# Patient Record
Sex: FEMALE | Race: BLACK OR AFRICAN AMERICAN | Employment: OTHER | URBAN - METROPOLITAN AREA
[De-identification: names, ages, dates, MRNs, and addresses within clinical notes are randomized per-mention and may not be internally consistent; named-entity substitution may affect disease eponyms.]

---

## 2018-06-07 ENCOUNTER — APPOINTMENT (OUTPATIENT)
Dept: CT IMAGING | Age: 68
DRG: 064 | End: 2018-06-07
Attending: EMERGENCY MEDICINE
Payer: MEDICARE

## 2018-06-07 ENCOUNTER — APPOINTMENT (OUTPATIENT)
Dept: GENERAL RADIOLOGY | Age: 68
DRG: 064 | End: 2018-06-07
Attending: EMERGENCY MEDICINE
Payer: MEDICARE

## 2018-06-07 ENCOUNTER — HOSPITAL ENCOUNTER (INPATIENT)
Age: 68
LOS: 4 days | Discharge: HOME | DRG: 064 | End: 2018-06-11
Attending: EMERGENCY MEDICINE | Admitting: INTERNAL MEDICINE
Payer: MEDICARE

## 2018-06-07 DIAGNOSIS — R73.9 HYPERGLYCEMIA: ICD-10-CM

## 2018-06-07 DIAGNOSIS — R56.9 SEIZURE (HCC): ICD-10-CM

## 2018-06-07 DIAGNOSIS — D72.829 LEUKOCYTOSIS, UNSPECIFIED TYPE: ICD-10-CM

## 2018-06-07 DIAGNOSIS — E87.20 METABOLIC ACIDOSIS: ICD-10-CM

## 2018-06-07 DIAGNOSIS — G40.209 PARTIAL SYMPTOMATIC EPILEPSY WITH COMPLEX PARTIAL SEIZURES, NOT INTRACTABLE, WITHOUT STATUS EPILEPTICUS (HCC): ICD-10-CM

## 2018-06-07 DIAGNOSIS — R41.82 ALTERED MENTAL STATUS, UNSPECIFIED ALTERED MENTAL STATUS TYPE: Primary | ICD-10-CM

## 2018-06-07 DIAGNOSIS — G93.40 ACUTE ENCEPHALOPATHY: ICD-10-CM

## 2018-06-07 PROBLEM — T17.908A ASPIRATION INTO AIRWAY: Status: ACTIVE | Noted: 2018-06-07

## 2018-06-07 PROBLEM — N28.9 RENAL INSUFFICIENCY: Status: ACTIVE | Noted: 2018-06-07

## 2018-06-07 PROBLEM — E11.9 DM (DIABETES MELLITUS) (HCC): Status: ACTIVE | Noted: 2018-06-07

## 2018-06-07 LAB
ANION GAP SERPL CALC-SCNC: 23 MMOL/L (ref 5–15)
ARTERIAL PATENCY WRIST A: YES
BASE DEFICIT BLDA-SCNC: 7.6 MMOL/L
BASOPHILS # BLD: 0.1 K/UL (ref 0–0.1)
BASOPHILS NFR BLD: 1 % (ref 0–1)
BDY SITE: ABNORMAL
BUN SERPL-MCNC: 6 MG/DL (ref 6–20)
BUN/CREAT SERPL: 5 (ref 12–20)
CALCIUM SERPL-MCNC: 10 MG/DL (ref 8.5–10.1)
CHLORIDE SERPL-SCNC: 95 MMOL/L (ref 97–108)
CO2 SERPL-SCNC: 20 MMOL/L (ref 21–32)
CREAT SERPL-MCNC: 1.2 MG/DL (ref 0.55–1.02)
DIFFERENTIAL METHOD BLD: ABNORMAL
EOSINOPHIL # BLD: 0.1 K/UL (ref 0–0.4)
EOSINOPHIL NFR BLD: 0 % (ref 0–7)
EPAP/CPAP/PEEP, PAPEEP: 5
ERYTHROCYTE [DISTWIDTH] IN BLOOD BY AUTOMATED COUNT: 13 % (ref 11.5–14.5)
FIO2 ON VENT: 100 %
GAS FLOW.O2 SETTING OXYMISER: 14 L/MIN
GLUCOSE SERPL-MCNC: 412 MG/DL (ref 65–100)
HCO3 BLDA-SCNC: 20 MMOL/L (ref 22–26)
HCT VFR BLD AUTO: 51.7 % (ref 35–47)
HGB BLD-MCNC: 16.5 G/DL (ref 11.5–16)
IMM GRANULOCYTES # BLD: 0.1 K/UL (ref 0–0.04)
IMM GRANULOCYTES NFR BLD AUTO: 1 % (ref 0–0.5)
LYMPHOCYTES # BLD: 2.8 K/UL (ref 0.8–3.5)
LYMPHOCYTES NFR BLD: 15 % (ref 12–49)
MCH RBC QN AUTO: 33.2 PG (ref 26–34)
MCHC RBC AUTO-ENTMCNC: 31.9 G/DL (ref 30–36.5)
MCV RBC AUTO: 104 FL (ref 80–99)
MONOCYTES # BLD: 0.7 K/UL (ref 0–1)
MONOCYTES NFR BLD: 3 % (ref 5–13)
NEUTS SEG # BLD: 15.6 K/UL (ref 1.8–8)
NEUTS SEG NFR BLD: 80 % (ref 32–75)
NRBC # BLD: 0 K/UL (ref 0–0.01)
NRBC BLD-RTO: 0 PER 100 WBC
PCO2 BLDA: 46 MMHG (ref 35–45)
PH BLDA: 7.25 [PH] (ref 7.35–7.45)
PLATELET # BLD AUTO: 320 K/UL (ref 150–400)
PMV BLD AUTO: 10.5 FL (ref 8.9–12.9)
PO2 BLDA: 137 MMHG (ref 80–100)
POTASSIUM SERPL-SCNC: 3.9 MMOL/L (ref 3.5–5.1)
RBC # BLD AUTO: 4.97 M/UL (ref 3.8–5.2)
SAO2 % BLD: 98 % (ref 92–97)
SAO2% DEVICE SAO2% SENSOR NAME: ABNORMAL
SERVICE CMNT-IMP: ABNORMAL
SODIUM SERPL-SCNC: 138 MMOL/L (ref 136–145)
SPECIMEN SITE: ABNORMAL
TROPONIN I SERPL-MCNC: <0.04 NG/ML
UR CULT HOLD, URHOLD: NORMAL
VENTILATION MODE VENT: ABNORMAL
VT SETTING VENT: 400 ML
WBC # BLD AUTO: 19.3 K/UL (ref 3.6–11)

## 2018-06-07 PROCEDURE — 96374 THER/PROPH/DIAG INJ IV PUSH: CPT

## 2018-06-07 PROCEDURE — 82803 BLOOD GASES ANY COMBINATION: CPT | Performed by: EMERGENCY MEDICINE

## 2018-06-07 PROCEDURE — 0BH18EZ INSERTION OF ENDOTRACHEAL AIRWAY INTO TRACHEA, VIA NATURAL OR ARTIFICIAL OPENING ENDOSCOPIC: ICD-10-PCS | Performed by: EMERGENCY MEDICINE

## 2018-06-07 PROCEDURE — 74011636320 HC RX REV CODE- 636/320: Performed by: RADIOLOGY

## 2018-06-07 PROCEDURE — 74011250636 HC RX REV CODE- 250/636: Performed by: EMERGENCY MEDICINE

## 2018-06-07 PROCEDURE — 74011000250 HC RX REV CODE- 250: Performed by: EMERGENCY MEDICINE

## 2018-06-07 PROCEDURE — 94762 N-INVAS EAR/PLS OXIMTRY CONT: CPT

## 2018-06-07 PROCEDURE — 80076 HEPATIC FUNCTION PANEL: CPT | Performed by: INTERNAL MEDICINE

## 2018-06-07 PROCEDURE — 96368 THER/DIAG CONCURRENT INF: CPT

## 2018-06-07 PROCEDURE — 96375 TX/PRO/DX INJ NEW DRUG ADDON: CPT

## 2018-06-07 PROCEDURE — 51702 INSERT TEMP BLADDER CATH: CPT

## 2018-06-07 PROCEDURE — 85730 THROMBOPLASTIN TIME PARTIAL: CPT | Performed by: EMERGENCY MEDICINE

## 2018-06-07 PROCEDURE — 70498 CT ANGIOGRAPHY NECK: CPT

## 2018-06-07 PROCEDURE — 5A1935Z RESPIRATORY VENTILATION, LESS THAN 24 CONSECUTIVE HOURS: ICD-10-PCS | Performed by: EMERGENCY MEDICINE

## 2018-06-07 PROCEDURE — 70450 CT HEAD/BRAIN W/O DYE: CPT

## 2018-06-07 PROCEDURE — 85610 PROTHROMBIN TIME: CPT | Performed by: EMERGENCY MEDICINE

## 2018-06-07 PROCEDURE — 85025 COMPLETE CBC W/AUTO DIFF WBC: CPT | Performed by: EMERGENCY MEDICINE

## 2018-06-07 PROCEDURE — 99285 EMERGENCY DEPT VISIT HI MDM: CPT

## 2018-06-07 PROCEDURE — 93005 ELECTROCARDIOGRAM TRACING: CPT

## 2018-06-07 PROCEDURE — 83036 HEMOGLOBIN GLYCOSYLATED A1C: CPT | Performed by: INTERNAL MEDICINE

## 2018-06-07 PROCEDURE — 81001 URINALYSIS AUTO W/SCOPE: CPT | Performed by: EMERGENCY MEDICINE

## 2018-06-07 PROCEDURE — 94002 VENT MGMT INPAT INIT DAY: CPT

## 2018-06-07 PROCEDURE — 36600 WITHDRAWAL OF ARTERIAL BLOOD: CPT | Performed by: EMERGENCY MEDICINE

## 2018-06-07 PROCEDURE — 96365 THER/PROPH/DIAG IV INF INIT: CPT

## 2018-06-07 PROCEDURE — 99291 CRITICAL CARE FIRST HOUR: CPT

## 2018-06-07 PROCEDURE — 74011636320 HC RX REV CODE- 636/320: Performed by: EMERGENCY MEDICINE

## 2018-06-07 PROCEDURE — 82550 ASSAY OF CK (CPK): CPT | Performed by: EMERGENCY MEDICINE

## 2018-06-07 PROCEDURE — 65610000006 HC RM INTENSIVE CARE

## 2018-06-07 PROCEDURE — 36415 COLL VENOUS BLD VENIPUNCTURE: CPT | Performed by: EMERGENCY MEDICINE

## 2018-06-07 PROCEDURE — 77030034850

## 2018-06-07 PROCEDURE — 71045 X-RAY EXAM CHEST 1 VIEW: CPT

## 2018-06-07 PROCEDURE — 80048 BASIC METABOLIC PNL TOTAL CA: CPT | Performed by: EMERGENCY MEDICINE

## 2018-06-07 PROCEDURE — 74011000258 HC RX REV CODE- 258: Performed by: EMERGENCY MEDICINE

## 2018-06-07 PROCEDURE — 36415 COLL VENOUS BLD VENIPUNCTURE: CPT | Performed by: INTERNAL MEDICINE

## 2018-06-07 PROCEDURE — 84484 ASSAY OF TROPONIN QUANT: CPT | Performed by: EMERGENCY MEDICINE

## 2018-06-07 RX ORDER — ETOMIDATE 2 MG/ML
20 INJECTION INTRAVENOUS
Status: COMPLETED | OUTPATIENT
Start: 2018-06-07 | End: 2018-06-07

## 2018-06-07 RX ORDER — SODIUM CHLORIDE 0.9 % (FLUSH) 0.9 %
5-10 SYRINGE (ML) INJECTION AS NEEDED
Status: DISCONTINUED | OUTPATIENT
Start: 2018-06-07 | End: 2018-06-11 | Stop reason: HOSPADM

## 2018-06-07 RX ORDER — LORAZEPAM 2 MG/ML
INJECTION INTRAMUSCULAR
Status: DISPENSED
Start: 2018-06-07 | End: 2018-06-08

## 2018-06-07 RX ORDER — LORAZEPAM 2 MG/ML
1 INJECTION INTRAMUSCULAR
Status: ACTIVE | OUTPATIENT
Start: 2018-06-07 | End: 2018-06-08

## 2018-06-07 RX ORDER — SODIUM CHLORIDE 0.9 % (FLUSH) 0.9 %
5-10 SYRINGE (ML) INJECTION EVERY 8 HOURS
Status: DISCONTINUED | OUTPATIENT
Start: 2018-06-07 | End: 2018-06-11 | Stop reason: HOSPADM

## 2018-06-07 RX ORDER — LORAZEPAM 2 MG/ML
1 INJECTION INTRAMUSCULAR
Status: COMPLETED | OUTPATIENT
Start: 2018-06-07 | End: 2018-06-07

## 2018-06-07 RX ORDER — SUCCINYLCHOLINE CHLORIDE 20 MG/ML
200 INJECTION INTRAMUSCULAR; INTRAVENOUS
Status: COMPLETED | OUTPATIENT
Start: 2018-06-07 | End: 2018-06-07

## 2018-06-07 RX ORDER — PROPOFOL 10 MG/ML
0-50 VIAL (ML) INTRAVENOUS
Status: COMPLETED | OUTPATIENT
Start: 2018-06-07 | End: 2018-06-07

## 2018-06-07 RX ORDER — FENTANYL CITRATE 50 UG/ML
100 INJECTION, SOLUTION INTRAMUSCULAR; INTRAVENOUS
Status: COMPLETED | OUTPATIENT
Start: 2018-06-07 | End: 2018-06-07

## 2018-06-07 RX ADMIN — IOPAMIDOL 95 ML: 755 INJECTION, SOLUTION INTRAVENOUS at 22:33

## 2018-06-07 RX ADMIN — FENTANYL CITRATE 100 MCG: 50 INJECTION, SOLUTION INTRAMUSCULAR; INTRAVENOUS at 23:45

## 2018-06-07 RX ADMIN — IOPAMIDOL 95 ML: 755 INJECTION, SOLUTION INTRAVENOUS at 22:56

## 2018-06-07 RX ADMIN — ETOMIDATE 20 MG: 2 INJECTION, SOLUTION INTRAVENOUS at 22:05

## 2018-06-07 RX ADMIN — SUCCINYLCHOLINE CHLORIDE 200 MG: 20 INJECTION, SOLUTION INTRAMUSCULAR; INTRAVENOUS at 22:06

## 2018-06-07 RX ADMIN — LEVETIRACETAM 1000 MG: 100 INJECTION, SOLUTION, CONCENTRATE INTRAVENOUS at 23:19

## 2018-06-07 RX ADMIN — PROPOFOL 30 MCG/KG/MIN: 10 INJECTION, EMULSION INTRAVENOUS at 22:15

## 2018-06-07 RX ADMIN — LORAZEPAM 1 MG: 2 INJECTION INTRAMUSCULAR; INTRAVENOUS at 21:49

## 2018-06-07 NOTE — Clinical Note
Status[de-identified] Inpatient [101] Type of Bed: Intensive Care [6] Inpatient Hospitalization Certified Necessary for the Following Reasons: 4. Patient requires ICU level of care interventions (further clarification in H&P documentation) Admitting Diagnosis: Encephalopathy acute [875887] Admitting Physician: Quinten Golden Attending Physician: Quinten Golden Estimated Length of Stay: 3-4 Midnights Discharge Plan[de-identified] Home with Office Follow-up

## 2018-06-07 NOTE — IP AVS SNAPSHOT
[address] [place] [address] 
[phone] Patient: Kevyn Gallardo MRN: [id number] MAR:95/34/9019 About your hospitalization You were admitted on:  June 7, 2018 You last received care in the:  [facility] 5M1 MED SURG 1 You were discharged on:  June 11, 2018 Why you were hospitalized Your primary diagnosis was:  Not on File Your diagnoses also included:  Encephalopathy Acute, Seizure (Hcc), Dm (Diabetes Mellitus) (Hcc), Leukocytosis, Aspiration Into Airway, Renal Insufficiency Follow-up Information Follow up With Details Comments Contact Info French Varela MD   [address] [facility] 
[phone] Discharge Orders None A check hipolito indicates which time of day the medication should be taken. My Medications START taking these medications Instructions Each Dose to Equal  
 Morning Noon Evening Bedtime  
 atorvastatin 20 mg tablet Commonly known as:  LIPITOR Your last dose was: Your next dose is: Take 0.5 Tabs by mouth daily. 10 mg  
    
   
   
   
  
 levETIRAcetam 500 mg tablet Commonly known as:  KEPPRA Your last dose was: Your next dose is: Take 1 Tab by mouth two (2) times a day. 500 mg  
    
   
   
   
  
 lisinopril 2.5 mg tablet Commonly known as:  Lupillo Little Your last dose was: Your next dose is: Take 1 Tab by mouth daily. 2.5 mg SITagliptin 50 mg tablet Commonly known as:  Del Couch Your last dose was: Your next dose is: Take 1 Tab by mouth daily. 50 mg CHANGE how you take these medications Instructions Each Dose to Equal  
 Morning Noon Evening Bedtime  
 aspirin delayed-release 81 mg tablet What changed:  additional instructions Your last dose was: Your next dose is: Take 1 Tab by mouth daily. Restart 18  
 81 mg CONTINUE taking these medications Instructions Each Dose to Equal  
 Morning Noon Evening Bedtime  
 amLODIPine 10 mg tablet Commonly known as:  Haig [place] Your last dose was: Your next dose is: Take 10 mg by mouth daily. 10 mg  
    
   
   
   
  
 busPIRone 10 mg tablet Commonly known as:  BUSPAR Your last dose was: Your next dose is: Take 10 mg by mouth two (2) times a day. 10 mg MOBIC 15 mg tablet Generic drug:  meloxicam  
   
Your last dose was: Your next dose is: Take 15 mg by mouth daily as needed for Pain (back pain). 15 mg  
    
   
   
   
  
 PriLOSEC 20 mg capsule Generic drug:  omeprazole Your last dose was: Your next dose is: Take 20 mg by mouth daily as needed (GERD). 20 mg  
    
   
   
   
  
 sertraline 100 mg tablet Commonly known as:  ZOLOFT Your last dose was: Your next dose is: Take 100 mg by mouth daily. 100 mg Where to Get Your Medications Information on where to get these meds will be given to you by the nurse or doctor. ! Ask your nurse or doctor about these medications  
  aspirin delayed-release 81 mg tablet  
 atorvastatin 20 mg tablet  
 levETIRAcetam 500 mg tablet  
 lisinopril 2.5 mg tablet SITagliptin 50 mg tablet Discharge Instructions Patient Discharge Instructions Nikki Gee / [id number] : 1950 Admitted 2018 Discharged: 2018 4:49 PM  
 
ACUTE DIAGNOSES: 
Encephalopathy acute Encephalopathy acute Coffee ground emesis CVA 
DM out of control CHRONIC MEDICAL DIAGNOSES: 
Problem List as of 2018  Date Reviewed: 2018 Codes Class Noted - Resolved Encephalopathy acute ICD-10-CM: G93.40 ICD-9-CM: 348.30  6/7/2018 - Present Seizure ([facility] [address] ICD-10-CM: R56.9 ICD-9-CM: 780.39  6/7/2018 - Present DM (diabetes mellitus) ([facility] ICD-10-CM: E11.9 ICD-9-CM: 250.00  6/7/2018 - Present Leukocytosis ICD-10-CM: F68.034 ICD-9-CM: 288.60  6/7/2018 - Present Aspiration into airway ICD-10-CM: T17.908A ICD-9-CM: 934.9  6/7/2018 - Present Renal insufficiency ICD-10-CM: N28.9 ICD-9-CM: 593.9  6/7/2018 - Present DISCHARGE MEDICATIONS:  
 
 
 
· It is important that you take the medication exactly as they are prescribed. · Keep your medication in the bottles provided by the pharmacist and keep a list of the medication names, dosages, and times to be taken in your wallet. · Do not take other medications without consulting your doctor. DIET:  Diabetic Diet ACTIVITY: Activity as tolerated ADDITIONAL INFORMATION: If you experience any of the following symptoms then please call your primary care physician or return to the emergency room if you cannot get hold of your doctor: Fever, chills, nausea, vomiting, diarrhea, change in mentation, falling, bleeding, shortness of breath. FOLLOW UP CARE: 
Dr. Jacinto Miranda MD  you are to call and set up an appointment to see them with in 1 week. Follow-up with specialists at directed by them Information obtained by : 
I understand that if any problems occur once I am at home I am to contact my physician. I understand and acknowledge receipt of the instructions indicated above. Physician's or R.N.'s Signature                                                                  Date/Time Patient or Representative Signature                                                          Date/Time CultureMap Announcement We are excited to announce that we are making your provider's discharge notes available to you in CultureMap. You will see these notes when they are completed and signed by the physician that discharged you from your recent hospital stay. If you have any questions or concerns about any information you see in CultureMap, please call the Health Information Department where you were seen or reach out to your Primary Care Provider for more information about your plan of care. Introducing [facility]! [facility] introduces CultureMap patient portal. Now you can access parts of your medical record, email your doctor's office, and request medication refills online. 1. In your internet browser, go to [email or web address]/Health [email or web address] 2. Click on the First Time User? Click Here link in the Sign In box. You will see the New Member Sign Up page. 3. Enter your CultureMap Access Code exactly as it appears below. You will not need to use this code after youve completed the sign-up process. If you do not sign up before the expiration date, you must request a new code. · CultureMap Access Code: [email or web address] Expires: 9/5/2018 10:49 PM 
 
4. Enter the last four digits of your Social Security Number (xxxx) and Date of Birth (mm/dd/yyyy) as indicated and click Submit. You will be taken to the next sign-up page. 5. Create a CultureMap ID. This will be your CultureMap login ID and cannot be changed, so think of one that is secure and easy to remember. 6. Create a CultureMap password. You can change your password at any time. 7. Enter your Password Reset Question and Answer. This can be used at a later time if you forget your password. 8. Enter your e-mail address. You will receive e-mail notification when new information is available in [address] 9. Click Sign Up. You can now view and download portions of your medical record. 10. Click the Download Summary menu link to download a portable copy of your medical information. If you have questions, please visit the Frequently Asked Questions section of the Databoxt website. Remember, [employer]ooms[employer] is NOT to be used for urgent needs. For medical emergencies, dial 911. Now available from your iPhone and Android! Introducing Johnathan Nichols As a [facility] patient, I wanted to make you aware of our electronic visit tool called Johnathan Nichols. Cramster 24/7 allows you to connect within minutes with a medical provider 24 hours a day, seven days a week via a mobile device or tablet or logging into a secure website from your computer. You can access Johnathan [employer] MediaForgekalyanfin from anywhere in the [place]. A virtual visit might be right for you when you have a simple condition and feel like you just dont want to get out of bed, or cant get away from work for an appointment, when your regular [facility] provider is not available (evenings, weekends or holidays), or when youre out of town and need minor care. Electronic visits cost only $49 and if the [employer] provider determines a prescription is needed to treat your condition, one can be electronically transmitted to a nearby pharmacy*. Please take a moment to enroll today if you have not already done so. The enrollment process is free and takes just a few minutes. To enroll, please download the [employer] sonia to your tablet or phone, or visit [email or web address] to enroll on your computer. And, as an [address] [place] [address] patient with a Nextinit account, the results of your visits will be scanned into your electronic medical record and your primary care provider will be able to view the scanned results.    
We urge you to continue to see your regular [facility] provider for your ongoing medical care. And while your primary care provider may not be the one available when you seek a Johnathan Jdkamryn virtual visit, the peace of mind you get from getting a real diagnosis real time can be priceless. For more information on Johnathan Mileskamryn, view our Frequently Asked Questions (FAQs) at [email or web address]. Sincerely, 
 
Narinder Pruett MD 
Chief Medical Officer Harpreet Ryan *:  certain medications cannot be prescribed via Johnathan Jdkamryn Unresulted tests-please follow up with your PCP on these results Procedure/Test Authorizing Provider  AMMONIA Barrington Pedroza MD  
 BLOOD GAS, ARTERIAL Evita Ordaz MD  
 BLOOD GAS, ARTERIAL Amelia Lev, DO  
 CBC W/O DIFF Pipo Manjarrez MD  
 CBC W/O DIFF Barrington Pedroza MD  
 CBC WITH AUTOMATED DIFF Amelia Lev, DO  
 CBC WITH AUTOMATED DIFF Myesha Simmons MD  
 CBC WITH AUTOMATED DIFF Barrington Pedroza MD  
 CK W/ REFLX [address], DO  
 CT HEAD  [address], DO  
 CTA CODE NEURO HEAD AND NECK W [address], DO  
 [facility]URE, [facility] [facility] [place]  
 DRUG SCREEN, [address], DO  
 EKG, 12 LEAD, [address], DO  
 HEMOGLOBIN A1C WITH EAG Evita Ordaz MD  
 HEPATIC FUNCTION PANEL Evita Ordaz MD  
 [place] 1/2 SPECIFIC AB, Emilia Zhao MD  
 LIPID PANEL MD OSMEL Garcia MD  
 MAGNESIUM Myesha Simmons MD  
 MAGNESIUM MD OSMEL Koch MD  
 MAGNESIUM Barrington Pedroza MD  
 METABOLIC PANEL, BASIC Amelia Lev, DO  
 METABOLIC PANEL, BASIC Barrington Pedroza MD  
 METABOLIC PANEL, Maritza Garcia MD  
 METABOLIC PANEL, Jessica Lomax MD  
 METABOLIC PANEL, Jessica Lomax MD  
 METABOLIC PANEL, COMPREHENSIVE Barrington Pedroza MD  
 METABOLIC PANEL, COMPREHENSIVE Florencia Judge MD  
 MRI BRAIN W WO CONT Michael Gee MD  
 PROTHROMBIN TIME + INR Tramaine Perez, DO  
 PTT Tramaine Perez, DO  
 SAMPLES BEING HELD Tramaine Perez DO  
 TROPONIN I Tramaine Perez, DO  
 URINALYSIS [address] [facility], DO  
 URINE CULTURE HOLD SAMPLE Tramaine Perez, DO  
 XR ABD PORT  1 V Michael Gee MD  
 XR CHEST PORT Tramaine Perez, DO  
 XR CHEST PORT Michael Gee MD  
  
Providers Seen During Your Hospitalization Provider Specialty Primary office phone Tramaine Perez, [address] [facility] [phone] Florencia Judge MD [facility] [phone] Your Primary Care Physician (PCP) Primary Care Physician Office Phone Office Fax Nikhil Freitas [phone] You are allergic to the following No active allergies Recent Documentation Height Weight BMI Smoking Status 1.676 m 99.4 kg 35.37 kg/m2 Unknown If Ever Smoked Emergency Contacts Name Discharge Info Relation Home Work Mobile Kareen Lee DISCHARGE CAREGIVER [3] Child [2] [phone] Patient Belongings The following personal items are in your possession at time of discharge: 
  Dental Appliances: None  Visual Aid: Glasses Please provide this summary of care documentation to your next provider. Signatures-by signing, you are acknowledging that this After Visit Summary has been reviewed with you and you have received a copy. Patient Signature:  ____________________________________________________________ Date:  ____________________________________________________________  
  
Lauryn Moy Provider Signature:  ____________________________________________________________ Date:  ____________________________________________________________

## 2018-06-08 ENCOUNTER — APPOINTMENT (OUTPATIENT)
Dept: GENERAL RADIOLOGY | Age: 68
DRG: 064 | End: 2018-06-08
Attending: INTERNAL MEDICINE
Payer: MEDICARE

## 2018-06-08 ENCOUNTER — APPOINTMENT (OUTPATIENT)
Dept: MRI IMAGING | Age: 68
DRG: 064 | End: 2018-06-08
Attending: INTERNAL MEDICINE
Payer: MEDICARE

## 2018-06-08 LAB
ALBUMIN SERPL-MCNC: 2.9 G/DL (ref 3.5–5)
ALBUMIN SERPL-MCNC: 4.1 G/DL (ref 3.5–5)
ALBUMIN/GLOB SERPL: 0.6 {RATIO} (ref 1.1–2.2)
ALBUMIN/GLOB SERPL: 0.7 {RATIO} (ref 1.1–2.2)
ALP SERPL-CCNC: 132 U/L (ref 45–117)
ALP SERPL-CCNC: 80 U/L (ref 45–117)
ALT SERPL-CCNC: 35 U/L (ref 12–78)
ALT SERPL-CCNC: 48 U/L (ref 12–78)
AMPHET UR QL SCN: NEGATIVE
ANION GAP SERPL CALC-SCNC: 11 MMOL/L (ref 5–15)
APPEARANCE UR: CLEAR
APTT PPP: 24.5 SEC (ref 22.1–32)
ARTERIAL PATENCY WRIST A: YES
AST SERPL-CCNC: 52 U/L (ref 15–37)
AST SERPL-CCNC: 53 U/L (ref 15–37)
ATRIAL RATE: 109 BPM
BACTERIA URNS QL MICRO: NEGATIVE /HPF
BARBITURATES UR QL SCN: NEGATIVE
BASE EXCESS BLDA CALC-SCNC: 1.6 MMOL/L
BASOPHILS # BLD: 0 K/UL (ref 0–0.1)
BASOPHILS NFR BLD: 0 % (ref 0–1)
BDY SITE: ABNORMAL
BENZODIAZ UR QL: NEGATIVE
BILIRUB DIRECT SERPL-MCNC: 0.3 MG/DL (ref 0–0.2)
BILIRUB SERPL-MCNC: 0.6 MG/DL (ref 0.2–1)
BILIRUB SERPL-MCNC: 0.7 MG/DL (ref 0.2–1)
BILIRUB UR QL: NEGATIVE
BUN SERPL-MCNC: 7 MG/DL (ref 6–20)
BUN/CREAT SERPL: 6 (ref 12–20)
CALCIUM SERPL-MCNC: 8.1 MG/DL (ref 8.5–10.1)
CALCULATED P AXIS, ECG09: 44 DEGREES
CALCULATED R AXIS, ECG10: -16 DEGREES
CALCULATED T AXIS, ECG11: 76 DEGREES
CANNABINOIDS UR QL SCN: NEGATIVE
CHLORIDE SERPL-SCNC: 99 MMOL/L (ref 97–108)
CK SERPL-CCNC: 176 U/L (ref 26–192)
CO2 SERPL-SCNC: 27 MMOL/L (ref 21–32)
COCAINE UR QL SCN: NEGATIVE
COLOR UR: ABNORMAL
CREAT SERPL-MCNC: 1.15 MG/DL (ref 0.55–1.02)
DIAGNOSIS, 93000: NORMAL
DIFFERENTIAL METHOD BLD: ABNORMAL
DRUG SCRN COMMENT,DRGCM: NORMAL
EOSINOPHIL # BLD: 0 K/UL (ref 0–0.4)
EOSINOPHIL NFR BLD: 0 % (ref 0–7)
EPAP/CPAP/PEEP, PAPEEP: 5
EPITH CASTS URNS QL MICRO: ABNORMAL /LPF
ERYTHROCYTE [DISTWIDTH] IN BLOOD BY AUTOMATED COUNT: 12.9 % (ref 11.5–14.5)
EST. AVERAGE GLUCOSE BLD GHB EST-MCNC: 235 MG/DL
FIO2 ON VENT: 50 %
GAS FLOW.O2 SETTING OXYMISER: 16 L/MIN
GLOBULIN SER CALC-MCNC: 5.2 G/DL (ref 2–4)
GLOBULIN SER CALC-MCNC: 6.2 G/DL (ref 2–4)
GLUCOSE BLD STRIP.AUTO-MCNC: 205 MG/DL (ref 65–100)
GLUCOSE BLD STRIP.AUTO-MCNC: 217 MG/DL (ref 65–100)
GLUCOSE BLD STRIP.AUTO-MCNC: 224 MG/DL (ref 65–100)
GLUCOSE BLD STRIP.AUTO-MCNC: 232 MG/DL (ref 65–100)
GLUCOSE BLD STRIP.AUTO-MCNC: 346 MG/DL (ref 65–100)
GLUCOSE SERPL-MCNC: 212 MG/DL (ref 65–100)
GLUCOSE UR STRIP.AUTO-MCNC: 500 MG/DL
H PYLORI FROM TISSUE: NEGATIVE
HBA1C MFR BLD: 9.8 % (ref 4.2–6.3)
HCO3 BLDA-SCNC: 25 MMOL/L (ref 22–26)
HCT VFR BLD AUTO: 42 % (ref 35–47)
HGB BLD-MCNC: 14.3 G/DL (ref 11.5–16)
HGB UR QL STRIP: ABNORMAL
IMM GRANULOCYTES # BLD: 0.1 K/UL (ref 0–0.04)
IMM GRANULOCYTES NFR BLD AUTO: 0 % (ref 0–0.5)
INR PPP: 1.1 (ref 0.9–1.1)
KETONES UR QL STRIP.AUTO: NEGATIVE MG/DL
KIT LOT NO., HCLOLOT: NORMAL
LEUKOCYTE ESTERASE UR QL STRIP.AUTO: NEGATIVE
LYMPHOCYTES # BLD: 1.4 K/UL (ref 0.8–3.5)
LYMPHOCYTES NFR BLD: 11 % (ref 12–49)
MAGNESIUM SERPL-MCNC: 2 MG/DL (ref 1.6–2.4)
MCH RBC QN AUTO: 33.9 PG (ref 26–34)
MCHC RBC AUTO-ENTMCNC: 34 G/DL (ref 30–36.5)
MCV RBC AUTO: 99.5 FL (ref 80–99)
METHADONE UR QL: NEGATIVE
MONOCYTES # BLD: 0.7 K/UL (ref 0–1)
MONOCYTES NFR BLD: 6 % (ref 5–13)
NEGATIVE CONTROL: NEGATIVE
NEUTS SEG # BLD: 10.3 K/UL (ref 1.8–8)
NEUTS SEG NFR BLD: 83 % (ref 32–75)
NITRITE UR QL STRIP.AUTO: NEGATIVE
NRBC # BLD: 0 K/UL (ref 0–0.01)
NRBC BLD-RTO: 0 PER 100 WBC
OPIATES UR QL: NEGATIVE
P-R INTERVAL, ECG05: 148 MS
PCO2 BLDA: 37 MMHG (ref 35–45)
PCP UR QL: NEGATIVE
PH BLDA: 7.45 [PH] (ref 7.35–7.45)
PH UR STRIP: 7 [PH] (ref 5–8)
PLATELET # BLD AUTO: 217 K/UL (ref 150–400)
PMV BLD AUTO: 11.6 FL (ref 8.9–12.9)
PO2 BLDA: 95 MMHG (ref 80–100)
POSITIVE CONTROL: POSITIVE
POTASSIUM SERPL-SCNC: 3.2 MMOL/L (ref 3.5–5.1)
PROT SERPL-MCNC: 10.3 G/DL (ref 6.4–8.2)
PROT SERPL-MCNC: 8.1 G/DL (ref 6.4–8.2)
PROT UR STRIP-MCNC: 300 MG/DL
PROTHROMBIN TIME: 11.1 SEC (ref 9–11.1)
Q-T INTERVAL, ECG07: 392 MS
QRS DURATION, ECG06: 82 MS
QTC CALCULATION (BEZET), ECG08: 527 MS
RBC # BLD AUTO: 4.22 M/UL (ref 3.8–5.2)
RBC #/AREA URNS HPF: ABNORMAL /HPF (ref 0–5)
SAO2 % BLD: 98 % (ref 92–97)
SAO2% DEVICE SAO2% SENSOR NAME: ABNORMAL
SERVICE CMNT-IMP: ABNORMAL
SODIUM SERPL-SCNC: 137 MMOL/L (ref 136–145)
SP GR UR REFRACTOMETRY: 1.01 (ref 1–1.03)
SPECIMEN SITE: ABNORMAL
THERAPEUTIC RANGE,PTTT: NORMAL SECS (ref 58–77)
UROBILINOGEN UR QL STRIP.AUTO: 0.2 EU/DL (ref 0.2–1)
VENTILATION MODE VENT: ABNORMAL
VENTRICULAR RATE, ECG03: 109 BPM
VT SETTING VENT: 450 ML
WBC # BLD AUTO: 12.4 K/UL (ref 3.6–11)
WBC URNS QL MICRO: ABNORMAL /HPF (ref 0–4)

## 2018-06-08 PROCEDURE — 31500 INSERT EMERGENCY AIRWAY: CPT

## 2018-06-08 PROCEDURE — 80053 COMPREHEN METABOLIC PANEL: CPT | Performed by: FAMILY MEDICINE

## 2018-06-08 PROCEDURE — 74011000250 HC RX REV CODE- 250: Performed by: INTERNAL MEDICINE

## 2018-06-08 PROCEDURE — 77030019563 HC DEV ATTCH FEED HOLL -A

## 2018-06-08 PROCEDURE — 77030009426 HC FCPS BIOP ENDOSC BSC -B: Performed by: INTERNAL MEDICINE

## 2018-06-08 PROCEDURE — 83735 ASSAY OF MAGNESIUM: CPT | Performed by: FAMILY MEDICINE

## 2018-06-08 PROCEDURE — 70553 MRI BRAIN STEM W/O & W/DYE: CPT

## 2018-06-08 PROCEDURE — 85025 COMPLETE CBC W/AUTO DIFF WBC: CPT | Performed by: FAMILY MEDICINE

## 2018-06-08 PROCEDURE — 77030010936 HC CLP LIG BSC -C: Performed by: INTERNAL MEDICINE

## 2018-06-08 PROCEDURE — 82962 GLUCOSE BLOOD TEST: CPT

## 2018-06-08 PROCEDURE — 74011250636 HC RX REV CODE- 250/636: Performed by: FAMILY MEDICINE

## 2018-06-08 PROCEDURE — 77030010547 HC BG URIN W/UMETER -A

## 2018-06-08 PROCEDURE — 74011250636 HC RX REV CODE- 250/636: Performed by: RADIOLOGY

## 2018-06-08 PROCEDURE — 74011636637 HC RX REV CODE- 636/637: Performed by: INTERNAL MEDICINE

## 2018-06-08 PROCEDURE — 94003 VENT MGMT INPAT SUBQ DAY: CPT

## 2018-06-08 PROCEDURE — 74011250636 HC RX REV CODE- 250/636: Performed by: PSYCHIATRY & NEUROLOGY

## 2018-06-08 PROCEDURE — 74011000258 HC RX REV CODE- 258: Performed by: INTERNAL MEDICINE

## 2018-06-08 PROCEDURE — 0W3P8ZZ CONTROL BLEEDING IN GASTROINTESTINAL TRACT, VIA NATURAL OR ARTIFICIAL OPENING ENDOSCOPIC: ICD-10-PCS | Performed by: INTERNAL MEDICINE

## 2018-06-08 PROCEDURE — 80307 DRUG TEST PRSMV CHEM ANLYZR: CPT | Performed by: EMERGENCY MEDICINE

## 2018-06-08 PROCEDURE — 74018 RADEX ABDOMEN 1 VIEW: CPT

## 2018-06-08 PROCEDURE — 95816 EEG AWAKE AND DROWSY: CPT | Performed by: INTERNAL MEDICINE

## 2018-06-08 PROCEDURE — 36600 WITHDRAWAL OF ARTERIAL BLOOD: CPT | Performed by: INTERNAL MEDICINE

## 2018-06-08 PROCEDURE — 82803 BLOOD GASES ANY COMBINATION: CPT | Performed by: INTERNAL MEDICINE

## 2018-06-08 PROCEDURE — 65610000006 HC RM INTENSIVE CARE

## 2018-06-08 PROCEDURE — A9575 INJ GADOTERATE MEGLUMI 0.1ML: HCPCS | Performed by: RADIOLOGY

## 2018-06-08 PROCEDURE — 74011250636 HC RX REV CODE- 250/636: Performed by: INTERNAL MEDICINE

## 2018-06-08 PROCEDURE — 87077 CULTURE AEROBIC IDENTIFY: CPT | Performed by: INTERNAL MEDICINE

## 2018-06-08 PROCEDURE — 0DB68ZX EXCISION OF STOMACH, VIA NATURAL OR ARTIFICIAL OPENING ENDOSCOPIC, DIAGNOSTIC: ICD-10-PCS | Performed by: INTERNAL MEDICINE

## 2018-06-08 PROCEDURE — 74011000258 HC RX REV CODE- 258: Performed by: PSYCHIATRY & NEUROLOGY

## 2018-06-08 PROCEDURE — 76040000019: Performed by: INTERNAL MEDICINE

## 2018-06-08 RX ORDER — MIDAZOLAM HYDROCHLORIDE 1 MG/ML
.25-5 INJECTION, SOLUTION INTRAMUSCULAR; INTRAVENOUS
Status: DISCONTINUED | OUTPATIENT
Start: 2018-06-08 | End: 2018-06-08 | Stop reason: HOSPADM

## 2018-06-08 RX ORDER — EPINEPHRINE 0.1 MG/ML
1 INJECTION INTRACARDIAC; INTRAVENOUS
Status: DISCONTINUED | OUTPATIENT
Start: 2018-06-08 | End: 2018-06-08 | Stop reason: HOSPADM

## 2018-06-08 RX ORDER — CHLORHEXIDINE GLUCONATE 1.2 MG/ML
15 RINSE ORAL 2 TIMES DAILY
Status: DISCONTINUED | OUTPATIENT
Start: 2018-06-08 | End: 2018-06-09

## 2018-06-08 RX ORDER — PROPOFOL 10 MG/ML
INJECTION, EMULSION INTRAVENOUS
Status: DISPENSED
Start: 2018-06-08 | End: 2018-06-08

## 2018-06-08 RX ORDER — FLUMAZENIL 0.1 MG/ML
0.2 INJECTION INTRAVENOUS
Status: DISCONTINUED | OUTPATIENT
Start: 2018-06-08 | End: 2018-06-08 | Stop reason: HOSPADM

## 2018-06-08 RX ORDER — SODIUM CHLORIDE 0.9 % (FLUSH) 0.9 %
5-10 SYRINGE (ML) INJECTION AS NEEDED
Status: DISCONTINUED | OUTPATIENT
Start: 2018-06-08 | End: 2018-06-11 | Stop reason: HOSPADM

## 2018-06-08 RX ORDER — ATROPINE SULFATE 0.1 MG/ML
0.4 INJECTION INTRAVENOUS
Status: DISCONTINUED | OUTPATIENT
Start: 2018-06-08 | End: 2018-06-08 | Stop reason: HOSPADM

## 2018-06-08 RX ORDER — DEXTROMETHORPHAN/PSEUDOEPHED 2.5-7.5/.8
1.2 DROPS ORAL
Status: DISCONTINUED | OUTPATIENT
Start: 2018-06-08 | End: 2018-06-08 | Stop reason: HOSPADM

## 2018-06-08 RX ORDER — INSULIN LISPRO 100 [IU]/ML
INJECTION, SOLUTION INTRAVENOUS; SUBCUTANEOUS EVERY 6 HOURS
Status: DISCONTINUED | OUTPATIENT
Start: 2018-06-08 | End: 2018-06-11 | Stop reason: HOSPADM

## 2018-06-08 RX ORDER — LABETALOL HYDROCHLORIDE 5 MG/ML
10 INJECTION, SOLUTION INTRAVENOUS
Status: DISCONTINUED | OUTPATIENT
Start: 2018-06-08 | End: 2018-06-10

## 2018-06-08 RX ORDER — GADOTERATE MEGLUMINE 376.9 MG/ML
19 INJECTION INTRAVENOUS
Status: COMPLETED | OUTPATIENT
Start: 2018-06-08 | End: 2018-06-08

## 2018-06-08 RX ORDER — SODIUM CHLORIDE 0.9 % (FLUSH) 0.9 %
5-10 SYRINGE (ML) INJECTION EVERY 8 HOURS
Status: DISCONTINUED | OUTPATIENT
Start: 2018-06-08 | End: 2018-06-11 | Stop reason: HOSPADM

## 2018-06-08 RX ORDER — LORAZEPAM 2 MG/ML
0.5 INJECTION INTRAMUSCULAR ONCE
Status: COMPLETED | OUTPATIENT
Start: 2018-06-08 | End: 2018-06-08

## 2018-06-08 RX ORDER — PROPOFOL 10 MG/ML
0-50 VIAL (ML) INTRAVENOUS
Status: DISCONTINUED | OUTPATIENT
Start: 2018-06-08 | End: 2018-06-11 | Stop reason: HOSPADM

## 2018-06-08 RX ORDER — HEPARIN SODIUM 5000 [USP'U]/ML
5000 INJECTION, SOLUTION INTRAVENOUS; SUBCUTANEOUS EVERY 8 HOURS
Status: DISCONTINUED | OUTPATIENT
Start: 2018-06-08 | End: 2018-06-09

## 2018-06-08 RX ORDER — MAGNESIUM SULFATE 100 %
4 CRYSTALS MISCELLANEOUS AS NEEDED
Status: DISCONTINUED | OUTPATIENT
Start: 2018-06-08 | End: 2018-06-11 | Stop reason: HOSPADM

## 2018-06-08 RX ORDER — DEXTROSE 50 % IN WATER (D50W) INTRAVENOUS SYRINGE
12.5-25 AS NEEDED
Status: DISCONTINUED | OUTPATIENT
Start: 2018-06-08 | End: 2018-06-11 | Stop reason: HOSPADM

## 2018-06-08 RX ORDER — HYDRALAZINE HYDROCHLORIDE 20 MG/ML
10 INJECTION INTRAMUSCULAR; INTRAVENOUS
Status: DISCONTINUED | OUTPATIENT
Start: 2018-06-08 | End: 2018-06-10

## 2018-06-08 RX ORDER — NALOXONE HYDROCHLORIDE 0.4 MG/ML
0.4 INJECTION, SOLUTION INTRAMUSCULAR; INTRAVENOUS; SUBCUTANEOUS
Status: ACTIVE | OUTPATIENT
Start: 2018-06-08 | End: 2018-06-08

## 2018-06-08 RX ORDER — SODIUM CHLORIDE 9 MG/ML
50 INJECTION, SOLUTION INTRAVENOUS CONTINUOUS
Status: DISCONTINUED | OUTPATIENT
Start: 2018-06-08 | End: 2018-06-08 | Stop reason: HOSPADM

## 2018-06-08 RX ORDER — SODIUM CHLORIDE 9 MG/ML
100 INJECTION, SOLUTION INTRAVENOUS CONTINUOUS
Status: DISCONTINUED | OUTPATIENT
Start: 2018-06-08 | End: 2018-06-10

## 2018-06-08 RX ADMIN — LORAZEPAM 0.5 MG: 2 INJECTION INTRAMUSCULAR; INTRAVENOUS at 22:34

## 2018-06-08 RX ADMIN — PROPOFOL 50 MCG/KG/MIN: 10 INJECTION, EMULSION INTRAVENOUS at 05:28

## 2018-06-08 RX ADMIN — INSULIN LISPRO 2 UNITS: 100 INJECTION, SOLUTION INTRAVENOUS; SUBCUTANEOUS at 23:22

## 2018-06-08 RX ADMIN — HYDRALAZINE HYDROCHLORIDE 10 MG: 20 INJECTION INTRAMUSCULAR; INTRAVENOUS at 03:18

## 2018-06-08 RX ADMIN — INSULIN LISPRO 3 UNITS: 100 INJECTION, SOLUTION INTRAVENOUS; SUBCUTANEOUS at 11:42

## 2018-06-08 RX ADMIN — Medication 10 ML: at 14:00

## 2018-06-08 RX ADMIN — INSULIN LISPRO 3 UNITS: 100 INJECTION, SOLUTION INTRAVENOUS; SUBCUTANEOUS at 18:03

## 2018-06-08 RX ADMIN — HEPARIN SODIUM 5000 UNITS: 5000 INJECTION, SOLUTION INTRAVENOUS; SUBCUTANEOUS at 00:53

## 2018-06-08 RX ADMIN — PROPOFOL 40 MCG/KG/MIN: 10 INJECTION, EMULSION INTRAVENOUS at 01:31

## 2018-06-08 RX ADMIN — INSULIN LISPRO 3 UNITS: 100 INJECTION, SOLUTION INTRAVENOUS; SUBCUTANEOUS at 05:41

## 2018-06-08 RX ADMIN — PIPERACILLIN SODIUM AND TAZOBACTAM SODIUM 3.38 G: 3; .375 INJECTION, POWDER, LYOPHILIZED, FOR SOLUTION INTRAVENOUS at 02:40

## 2018-06-08 RX ADMIN — FAMOTIDINE 20 MG: 10 INJECTION INTRAVENOUS at 21:58

## 2018-06-08 RX ADMIN — HYDRALAZINE HYDROCHLORIDE 10 MG: 20 INJECTION INTRAMUSCULAR; INTRAVENOUS at 18:03

## 2018-06-08 RX ADMIN — PIPERACILLIN SODIUM AND TAZOBACTAM SODIUM 3.38 G: 3; .375 INJECTION, POWDER, LYOPHILIZED, FOR SOLUTION INTRAVENOUS at 17:03

## 2018-06-08 RX ADMIN — Medication 10 ML: at 13:31

## 2018-06-08 RX ADMIN — HYDRALAZINE HYDROCHLORIDE 10 MG: 20 INJECTION INTRAMUSCULAR; INTRAVENOUS at 22:35

## 2018-06-08 RX ADMIN — HEPARIN SODIUM 5000 UNITS: 5000 INJECTION, SOLUTION INTRAVENOUS; SUBCUTANEOUS at 17:03

## 2018-06-08 RX ADMIN — LEVETIRACETAM 500 MG: 100 INJECTION, SOLUTION, CONCENTRATE INTRAVENOUS at 13:31

## 2018-06-08 RX ADMIN — PROPOFOL 50 MCG/KG/MIN: 10 INJECTION, EMULSION INTRAVENOUS at 07:41

## 2018-06-08 RX ADMIN — HEPARIN SODIUM 5000 UNITS: 5000 INJECTION, SOLUTION INTRAVENOUS; SUBCUTANEOUS at 08:51

## 2018-06-08 RX ADMIN — PROPOFOL 30 MCG/KG/MIN: 10 INJECTION, EMULSION INTRAVENOUS at 11:42

## 2018-06-08 RX ADMIN — LABETALOL HYDROCHLORIDE 10 MG: 5 INJECTION INTRAVENOUS at 01:00

## 2018-06-08 RX ADMIN — LABETALOL HYDROCHLORIDE 10 MG: 5 INJECTION INTRAVENOUS at 23:35

## 2018-06-08 RX ADMIN — INSULIN LISPRO 7 UNITS: 100 INJECTION, SOLUTION INTRAVENOUS; SUBCUTANEOUS at 00:53

## 2018-06-08 RX ADMIN — HYDRALAZINE HYDROCHLORIDE 10 MG: 20 INJECTION INTRAMUSCULAR; INTRAVENOUS at 06:43

## 2018-06-08 RX ADMIN — PIPERACILLIN SODIUM AND TAZOBACTAM SODIUM 3.38 G: 3; .375 INJECTION, POWDER, LYOPHILIZED, FOR SOLUTION INTRAVENOUS at 08:51

## 2018-06-08 RX ADMIN — PROPOFOL 45 MCG/KG/MIN: 10 INJECTION, EMULSION INTRAVENOUS at 16:00

## 2018-06-08 RX ADMIN — SODIUM CHLORIDE 100 ML/HR: 900 INJECTION, SOLUTION INTRAVENOUS at 15:00

## 2018-06-08 RX ADMIN — SODIUM CHLORIDE 100 ML/HR: 900 INJECTION, SOLUTION INTRAVENOUS at 01:00

## 2018-06-08 RX ADMIN — LABETALOL HYDROCHLORIDE 10 MG: 5 INJECTION INTRAVENOUS at 19:52

## 2018-06-08 RX ADMIN — GADOTERATE MEGLUMINE 19 ML: 376.9 INJECTION INTRAVENOUS at 19:01

## 2018-06-08 RX ADMIN — LEVETIRACETAM 500 MG: 100 INJECTION, SOLUTION, CONCENTRATE INTRAVENOUS at 21:54

## 2018-06-08 NOTE — ED NOTES
TRANSFER - OUT REPORT:    Verbal report given to Catherine MIGUEL on Haley Vizcarra  being transferred to ICU for routine progression of care       Report consisted of patients Situation, Background, Assessment and   Recommendations(SBAR). Information from the following report(s) SBAR and ED Summary was reviewed with the receiving nurse. Lines:   Peripheral IV 06/07/18 Right Forearm (Active)   Site Assessment Clean, dry, & intact 6/7/2018 10:02 PM   Phlebitis Assessment 0 6/7/2018 10:02 PM   Infiltration Assessment 0 6/7/2018 10:02 PM   Dressing Status Clean, dry, & intact 6/7/2018 10:02 PM   Hub Color/Line Status Pink 6/7/2018 10:02 PM       Peripheral IV 06/07/18 Right Hand (Active)   Site Assessment Clean, dry, & intact 6/7/2018 10:39 PM   Phlebitis Assessment 0 6/7/2018 10:39 PM   Infiltration Assessment 0 6/7/2018 10:39 PM   Dressing Status Clean, dry, & intact 6/7/2018 10:39 PM   Hub Color/Line Status Pink 6/7/2018 10:39 PM        Opportunity for questions and clarification was provided.       Patient transported with:   Monitor  O2 @ vent 50% liters  Registered Nurse

## 2018-06-08 NOTE — PROGRESS NOTES
[facility]: MED RECONCILIATION    Please see previous note family has been unable to report the patient's home medications or pharmacy. Pharmacist obtained a recent office note and medication list from the office of Dr. Ely Duncan. The  note is dated 9/5/17 and a copy will be placed on the paper chart for scanning. The note does not specify what pharmacy the patient uses. The note lists the following medications  Sertraline 100 mg daily started 9/5/17  Buspirone 10 mg twice daily started 9/5/17  Amlodipine 10 mg daily  Metformin  mg tablet Take two tablets daily  A pharmacist will return to the patient's room when she is able to complete an interview.     Thank you      Juliane Medeiros, PharmD, BCPS

## 2018-06-08 NOTE — PROGRESS NOTES
[facility]: MED RECONCILIATION    Comments/Recommendations:     Patient is intubated and sedated. Family is not present at this time. Pharmacist spoke with the nurse and family is expected today. Nurse will notify pharmacist when family arrives.     Thank you,    Lauryn Hoffmann, PharmD, BCPS

## 2018-06-08 NOTE — ED TRIAGE NOTES
Triage: Per family at approximately 2100 family went to check on pt and pt was \"in and out\". Pt is not verbal, and not following any commands. Per family pt is usually up and walking around.

## 2018-06-08 NOTE — PROGRESS NOTES
[facility]: MED RECONCILIATION    Patient is intubated and sedated    Family provided the following pill bottles  Filled with [employer] mail order  Citalopram 20 mg daily filled 4/4/17 for 90 pills (significant number of pills in the bottle). Bottle sealed with foil top. Buspirone 10 mg twice daily filled 9/9/17 for 180 pills (pill bottle approximately a quarter full) Pill identified as buspirone. Amlodipine 10 mg daily filled 9/9/17 for 90 pills (one pill in the bottle) Pill identified as amlodipine. Filled with [facility] on [facility]  Meloxicam 7.5mg one pill daily for back pain filled 9/6/17 for 30 pills (two pills left in the bottle). Pill identified as meloxicam.  Filled with [address]  Vitamin B1 100 mg daily filled 9/24/15 for 30 tablets (significant number of pills in the bottle). White pill no marks. Crestor 10 mg daily filled 7/6/15 for 90 pills (significant number of pills in the bottle) Pill identified as Crestor  Filled with [facility]  Oxycodone/apap 5/325 one pill every 6 hours as needed filled 4/6/16 for 20 pills (pill bottle is empty)  Cyclobenzaprine 10 mg one pill every 8 hours prn filled 4/6/16 for 15 pills (pill bottle is empty)    Per previous note:  Pharmacist obtained a recent office note and medication list from the office of Dr. Leonel Bustos. The  note is dated 9/5/17 and a copy will be placed on the paper chart for scanning. The note does not specify what pharmacy the patient uses. The note lists the following medications  Sertraline 100 mg daily started 9/5/17  Buspirone 10 mg twice daily started 9/5/17  Amlodipine 10 mg daily  Metformin  mg tablet Take two tablets daily    A pharmacist will return to the patient's room when she is able to complete an interview.     Thank you,    Delfino Patel, PharmD, BCPS

## 2018-06-08 NOTE — CONSULTS
[facility]   Inpatient Neurology Consult    Bernard Whitt MD    [address] [facility], [address] [facility] Manpreet Babinski, [address] [place] [address],[address]  [address] [place] [address]  [phone]    Medical record #: [id number]  Admission Date: 6/7/2018  Consult Date:  06/08/18  Referring Provider: Trudy Fairchild MD    Chief Complaint:  [address] of Hx:  Chart, Daughter    HISTORY OF PRESENT ILLNESS:     This is a 78 y.o. female with DM, HTN, prior stroke (mild residual aphasia/ dysarthria, mild right sided weakness) who I'm asked to see for witnessed seizure activity. Pt's daughter last talked to her around 4 PM yesterday, patient was reportedly normal.  When she went to check on her about 7 PM, she found patient in her room, vomiting, coming in and out of consciousness, became short of breath and passed out. EMS reported pt appeared comfortable during transport, alert, conversant. While in the ER, pt became combative then non-verbal, eyes deviated up and to right, right side started shaking. She was emergently intubated and then giving a loading dose of Keppra (1000 mg IV x 1). CT Head showed a large area of encephalomalacia in the left frontal and parieto-temporal regions, no intracranial bleed, no evidence of acute stroke. CTA Head/ Neck was done: no significant stenosis of CCAs or ICAs. Both ICAs with prominent tortuosity. Calcification of distal ICAs. Mild stenosis of distal right vertebral artery, but both vertebral arteries are patent (left vertebral dominant over right side, basilar artery patent). MCAs symmetric. Left EMMANUEL larger than right EMMANUEL. No evidence of aneurysms. Pt currently intubated, on lower dose proprofol, follows commands per RN. Complete Review of Systems: reviewed on admission H&P    Allergies:   No Known Allergies    Outpatient Meds  No current facility-administered medications on file prior to encounter.       No current outpatient prescriptions on file prior to encounter. History reviewed. No pertinent past medical history.       Inpatient Meds    Current Facility-Administered Medications:     sodium chloride (NS) flush 5-10 mL, 5-10 mL, IntraVENous, Q8H, Calos Muir MD    sodium chloride (NS) flush 5-10 mL, 5-10 mL, IntraVENous, PRN, Calos Muir MD    0.9% sodium chloride infusion, 100 mL/hr, IntraVENous, CONTINUOUS, Calos Muir MD, Last Rate: 100 mL/hr at 06/08/18 0100, 100 mL/hr at 06/08/18 0100    heparin (porcine) injection 5,000 Units, 5,000 Units, SubCUTAneous, Q8H, Calos Muir MD, 5,000 Units at 06/08/18 0851    insulin lispro (HUMALOG) injection, , SubCUTAneous, Q6H, Calos Muir MD, 3 Units at 06/08/18 1142    glucose chewable tablet 16 g, 4 Tab, Oral, PRN, Calos Muir MD    dextrose (D50W) injection syrg 12.5-25 g, 12.5-25 g, IntraVENous, PRN, Calos Muir MD    glucagon (GLUCAGEN) injection 1 mg, 1 mg, IntraMUSCular, PRN, Calos Muir MD    labetalol (NORMODYNE;TRANDATE) injection 10 mg, 10 mg, IntraVENous, Q2H PRN, Calos Muir MD, 10 mg at 06/08/18 0100    piperacillin-tazobactam (ZOSYN) 3.375 g in 0.9% sodium chloride (MBP/ADV) 100 mL, 3.375 g, IntraVENous, Q8H, Calos Muir MD, Last Rate: 25 mL/hr at 06/08/18 0851, 3.375 g at 06/08/18 0851    propofol (DIPRIVAN) infusion, 0-50 mcg/kg/min, IntraVENous, TITRATE, Calos Muir MD, Last Rate: 18 mL/hr at 06/08/18 1142, 30 mcg/kg/min at 06/08/18 1142    hydrALAZINE (APRESOLINE) 20 mg/mL injection 10 mg, 10 mg, IntraVENous, Q2H PRN, Calos Muir MD, 10 mg at 06/08/18 0643    famotidine (PF) (PEPCID) 20 mg in sodium chloride 0.9% 10 mL injection, 20 mg, IntraVENous, Q12H, Tim Shay MD    chlorhexidine (PERIDEX) 0.12 % mouthwash 15 mL, 15 mL, Oral, BID, Tim Shay MD    levETIRAcetam (KEPPRA) 500 mg in 0.9% sodium chloride 100 mL IVPB, 500 mg, IntraVENous, Q12H, Roseanna Vasquez MD    sodium chloride (NS) flush 5-10 mL, 5-10 mL, IntraVENous, Q8H, Dennis Baar, DO    sodium chloride (NS) flush 5-10 mL, 5-10 mL, IntraVENous, PRN, Dennis Baar, DO    History reviewed. No pertinent past medical history. History reviewed. No pertinent surgical history. No family history on file. Social History     Social History    Marital status:      Spouse name: N/A    Number of children: N/A    Years of education: N/A     Occupational History    Not on file.      Social History Main Topics    Smoking status: Not on file    Smokeless tobacco: Not on file    Alcohol use Not on file    Drug use: Not on file    Sexual activity: Not on file     Other Topics Concern    Not on file     Social History Narrative    No narrative on file       PHYSICAL EXAM  Patient Vitals for the past 12 hrs:   Temp Pulse Resp BP SpO2   06/08/18 1300 - (!) 110 16 (!) 123/97 99 %   06/08/18 1245 - (!) 114 20 158/89 100 %   06/08/18 1230 - (!) 108 17 147/80 98 %   06/08/18 1215 - (!) 113 18 151/81 97 %   06/08/18 1200 99.7 °F (37.6 °C) (!) 106 16 148/82 97 %   06/08/18 1145 - (!) 105 18 148/83 99 %   06/08/18 1139 - (!) 109 16 - 100 %   06/08/18 1130 - (!) 109 16 153/83 99 %   06/08/18 1115 - (!) 110 16 152/78 99 %   06/08/18 1109 - - - 149/79 -   06/08/18 1030 - (!) 106 18 157/83 98 %   06/08/18 1015 - (!) 106 17 155/83 98 %   06/08/18 1000 - (!) 102 18 153/83 98 %   06/08/18 0945 - (!) 104 19 149/81 98 %   06/08/18 0930 - (!) 102 17 157/82 98 %   06/08/18 0915 - - - 155/88 -   06/08/18 0900 - (!) 107 16 143/82 99 %   06/08/18 0845 - (!) 101 16 144/75 99 %   06/08/18 0830 - (!) 101 16 145/83 99 %   06/08/18 0815 - (!) 101 16 144/77 99 %   06/08/18 0806 99.9 °F (37.7 °C) (!) 101 16 114/83 100 %   06/08/18 0801 - (!) 102 17 - 99 %   06/08/18 0800 - (!) 101 18 114/83 99 %   06/08/18 0755 - (!) 102 - - -   06/08/18 0715 - (!) 102 17 174/86 99 %   06/08/18 0700 - (!) 101 17 150/79 99 %   06/08/18 0645 - 96 17 (!) 194/91 99 %   06/08/18 0630 - 99 16 (!) 186/97 99 %   06/08/18 0615 - 98 16 (!) 177/93 99 %   06/08/18 0600 - 96 16 (!) 186/93 99 %   06/08/18 0500 - 97 16 172/90 99 %   06/08/18 0430 - 96 16 141/81 98 %   06/08/18 0400 98.4 °F (36.9 °C) 98 16 173/86 99 %   06/08/18 0330 - 99 16 (!) 175/92 98 %   06/08/18 0315 - 98 16 (!) 197/112 98 %   06/08/18 0304 - 99 16 - 100 %   06/08/18 0300 - 98 16 (!) 188/93 100 %   06/08/18 0230 - 97 16 (!) 193/110 100 %   06/08/18 0215 - 97 16 (!) 176/112 100 %   06/08/18 0200 - 97 16 (!) 193/97 100 %   06/08/18 0130 - 96 16 (!) 184/130 100 %   06/08/18 0115 - 96 16 (!) 199/99 99 %           General:  Resting, intubated, sedated, but opens eyes to voice, follows commands     Head:  Normocephalic, atraumatic. Eyes:  Conjunctivae/corneas clear   Lungs:  Heart:  Non labored breathing  Not examined    Extremities: No edema.    Skin: No rashes    Neurologic Exam       Language: cannot test  Memory:   cannot test    Cranial Nerves:  I: smell Not tested   II: visual fields Grossly normal bilaterally    II: pupils Equal, round, reactive to light   II: optic disc Not examined   III,VII: ptosis none   III,IV,VI: extraocular muscles  normal   V: facial light touch sensation  cannot test   VII: facial muscle function  Raises eyebrows symmetrically;    VIII: hearing Symmetric/ grossly normal   IX: soft palate elevation  cannot test   XI: sternocleidomastoid strength cannot test   XII: tongue  cannot test      Motor: moves all limbs 3-4/ 5 (soft restraints)  Sensory:  cannot test  Cerebellar: no rest tremors    Reflexes: 1+ biceps, 1+ patellars, trace achilles  Plantar response: neutral bilaterally  Gait: cannot test  Romberg: cannot test     -----------------------------    Recent Results (from the past 12 hour(s))   DRUG SCREEN, URINE    Collection Time: 06/08/18  2:02 AM   Result Value Ref Range    AMPHETAMINES NEGATIVE  NEG      BARBITURATES NEGATIVE  NEG      BENZODIAZEPINES NEGATIVE  NEG      COCAINE NEGATIVE  NEG METHADONE NEGATIVE  NEG      OPIATES NEGATIVE  NEG      PCP(PHENCYCLIDINE) NEGATIVE  NEG      THC (TH-CANNABINOL) NEGATIVE  NEG      Drug screen comment (NOTE)    GLUCOSE, POC    Collection Time: 06/08/18  5:34 AM   Result Value Ref Range    Glucose (POC) 232 (H) 65 - 100 mg/dL    Performed by [facility], ARTERIAL    Collection Time: 06/08/18  5:40 AM   Result Value Ref Range    pH 7.45 7.35 - 7.45      PCO2 37 35.0 - 45.0 mmHg    PO2 95 80 - 100 mmHg    O2 SAT 98 (H) 92 - 97 %    BICARBONATE 25 22 - 26 mmol/L    BASE EXCESS 1.6 mmol/L    O2 METHOD VENTILATOR      FIO2 50 %    MODE A/C      Tidal volume 450      SET RATE 16      EPAP/CPAP/PEEP 5.0      Sample source ARTERIAL      SITE LEFT RADIAL      ANGELITA'S TEST YES     CBC WITH AUTOMATED DIFF    Collection Time: 06/08/18 10:21 AM   Result Value Ref Range    WBC 12.4 (H) 3.6 - 11.0 K/uL    RBC 4.22 3.80 - 5.20 M/uL    HGB 14.3 11.5 - 16.0 g/dL    HCT 42.0 35.0 - 47.0 %    MCV 99.5 (H) 80.0 - 99.0 FL    MCH 33.9 26.0 - 34.0 PG    MCHC 34.0 30.0 - 36.5 g/dL    RDW 12.9 11.5 - 14.5 %    PLATELET 954 199 - 758 K/uL    MPV 11.6 8.9 - 12.9 FL    NRBC 0.0 0  WBC    ABSOLUTE NRBC 0.00 0.00 - 0.01 K/uL    NEUTROPHILS 83 (H) 32 - 75 %    LYMPHOCYTES 11 (L) 12 - 49 %    MONOCYTES 6 5 - 13 %    EOSINOPHILS 0 0 - 7 %    BASOPHILS 0 0 - 1 %    IMMATURE GRANULOCYTES 0 0.0 - 0.5 %    ABS. NEUTROPHILS 10.3 (H) 1.8 - 8.0 K/UL    ABS. LYMPHOCYTES 1.4 0.8 - 3.5 K/UL    ABS. MONOCYTES 0.7 0.0 - 1.0 K/UL    ABS. EOSINOPHILS 0.0 0.0 - 0.4 K/UL    ABS. BASOPHILS 0.0 0.0 - 0.1 K/UL    ABS. IMM.  GRANS. 0.1 (H) 0.00 - 0.04 K/UL    DF AUTOMATED     MAGNESIUM    Collection Time: 06/08/18 10:21 AM   Result Value Ref Range    Magnesium 2.0 1.6 - 2.4 mg/dL   METABOLIC PANEL, COMPREHENSIVE    Collection Time: 06/08/18 10:21 AM   Result Value Ref Range    Sodium 137 136 - 145 mmol/L    Potassium 3.2 (L) 3.5 - 5.1 mmol/L    Chloride 99 97 - 108 mmol/L    CO2 27 21 - 32 mmol/L    Anion gap 11 5 - 15 mmol/L    Glucose 212 (H) 65 - 100 mg/dL    BUN 7 6 - 20 MG/DL    Creatinine 1.15 (H) 0.55 - 1.02 MG/DL    BUN/Creatinine ratio 6 (L) 12 - 20      GFR est AA 57 (L) >60 ml/min/1.73m2    GFR est non-AA 47 (L) >60 ml/min/1.73m2    Calcium 8.1 (L) 8.5 - 10.1 MG/DL    Bilirubin, total 0.7 0.2 - 1.0 MG/DL    ALT (SGPT) 35 12 - 78 U/L    AST (SGOT) 52 (H) 15 - 37 U/L    Alk. phosphatase 80 45 - 117 U/L    Protein, total 8.1 6.4 - 8.2 g/dL    Albumin 2.9 (L) 3.5 - 5.0 g/dL    Globulin 5.2 (H) 2.0 - 4.0 g/dL    A-G Ratio 0.6 (L) 1.1 - 2.2     GLUCOSE, POC    Collection Time: 06/08/18 11:30 AM   Result Value Ref Range    Glucose (POC) 205 (H) 65 - 100 mg/dL    Performed by [facility] Problems  Date Reviewed: 6/7/2018          Codes Class Noted POA    Encephalopathy acute ICD-10-CM: G93.40  ICD-9-CM: 348.30  6/7/2018 Unknown        Seizure ([facility] ICD-10-CM: R56.9  ICD-9-CM: 780.39  6/7/2018 Unknown        DM (diabetes mellitus) ([facility] ICD-10-CM: E11.9  ICD-9-CM: 250.00  6/7/2018 Unknown        Leukocytosis ICD-10-CM: Z58.763  ICD-9-CM: 288.60  6/7/2018 Unknown        Aspiration into airway ICD-10-CM: H64.317W  ICD-9-CM: 934.9  6/7/2018 Unknown        Renal insufficiency ICD-10-CM: N28.9  ICD-9-CM: 593.9  6/7/2018 Unknown              Impression and Plan      78 y.o. female with hx of DM, HTN. Found to have large area of encephalomalacia in left parieto-temporal and left frontal regions consistent with prior strokes. Unclear what precipitated patient's vomiting, but pt was witnessed to have a focal-onset/ complex partial seizure yesterday while in ER. Given underlying brain injury from strokes will start her on Keppra 500 mg IV BID (change to PO when possible) to reduce risk of future seizure. EEG to be done soon; will review. Agree with checking MRI Brain to rule out new structural abnormalities (? new stroke) and check FLP. If LDL > 70, recommend starting statin of choice. If MRI shows acute stroke, recommend getting TTE. If no large acute stroke seen on MRI, then go ahead and start Aspirin 81 mg/day. Will sign out to Dr Domingo Saenz who assumes service today and will round tomorrow. Thank you for asking the Neurology Service to see Mrs Pam Ernandez.         Lizy Souza MD  06/08/18

## 2018-06-08 NOTE — H&P
[facility]  [address], [address]  [phone]    Admission History and Physical      NAME:  Jaqui Oliva   :   1950   MRN:  [id number]     PCP:  Gerson Dahl MD     Date/Time:  2018         Subjective:     CHIEF COMPLAINT: less responsiveness, vomiting      HISTORY OF PRESENT ILLNESS:     Ms. Geno White is a 78 y.o.  female who is admitted with acute encephalopathy. Ms. Geno White with PMH of DM, HTN was brought to ER after she became less responsive and vomiting. Pt is intubated and sedated. aAcording to her daughter, she talked to her at 4 PM yesterday on the phone and was ok, but later when she reached home, she went to her room and found her vomiting all over the floor and became in and out of consciousness. Later EMS was called and was brought to ER and ER she noted to have a seizure like activity. keppra loading dose was given. History reviewed. No pertinent past medical history. uncle: DM    History reviewed. No pertinent surgical history. Social History   Substance Use Topics    Smoking status: Not on file    Smokeless tobacco: Not on file    Alcohol use Not on file        No family history on file. No Known Allergies     Prior to Admission medications    Not on File         Review of Systems:  Pt is intubated and sedated.         Objective:      VITALS:    Vital signs reviewed; most recent are:    Visit Vitals    /78    Pulse (!) 122    Temp 96.7 °F (35.9 °C)    Resp 16    Wt 99.8 kg (220 lb)    SpO2 98%    BMI 38.97 kg/m2     SpO2 Readings from Last 6 Encounters:   18 98%   14 97%    O2 Flow Rate (L/min): 15 l/min   No intake or output data in the 24 hours ending 18 0025         Exam:     Physical Exam:    Gen:  Well-developed, well-nourished, in no acute distress  HEENT:  Pink conjunctivae, PERRL, hearing intact to voice, moist mucous membranes  Neck:  Supple, without masses, thyroid non-tender  Resp:  No accessory muscle use, course breath sounds  Card:  No murmurs, normal S1, S2 without thrills, bruits or peripheral edema  Abd:  Soft, non-tender, non-distended, normoactive bowel sounds are present, no palpable organomegaly and no detectable hernias  Lymph:  No cervical or inguinal adenopathy  Musc:  No cyanosis or clubbing  Skin:  No rashes or ulcers, skin turgor is good  Neuro:  Intubated and sedated. Psych: Intubated and sedated        Labs:    Recent Labs      06/07/18   2154   WBC  19.3*   HGB  16.5*   HCT  51.7*   PLT  320     Recent Labs      06/07/18   2154   NA  138   K  3.9   CL  95*   CO2  20*   GLU  412*   BUN  6   CREA  1.20*   CA  10.0     No results found for: GLUCPOC  Recent Labs      06/07/18   2317   PH  7.25*   PCO2  46*   PO2  137*   HCO3  20*   FIO2  100     No results for input(s): INR in the last 72 hours. No lab exists for component: INREXT    Telemetry reviewed:   QT prolongation        Assessment/Plan:    1. Encephalopathy acute (6/7/2018). Possible secondary to seizure (seizue like activity was noted in ER). keppra loading dose was given. Check EEG. Pt is sedated on vent support. Check EEG, urine drug screen. Consult neurology      2. Possible Seizure ([facility] (6/7/2018). As above check EEG and consult neurology. 3.   DM (diabetes mellitus) ([facility] (6/7/2018). Check A1C and cover with SSI. 4.  Possible aspiration pneumonia. Pt has been vomiting even after intubation and earlier when she was less responsive. Start zosyn. 5.  Leukocytosis (6/7/2018). likely secondary to aspiration and SZD. Continue ABx and check BC     6. Renal insufficiency (6/7/2018). Unknown baseline. Continue IVF and monitor renal function     7. Acute respiratory failure due aspiration. On vent support. Consult pulmonary. 8.  HTN.  For now labetalol PRN IV       signed out to Dr Asia Pierce at 7 am    Previous medical records reviewed     Risk of deterioration: high Total time spent with patient: 70 Minutes of which 35 minutes of critical time.                    Care Plan discussed with: Family, Nursing Staff and >50% of time spent in counseling and coordination of care    Discussed:  Care Plan    Prophylaxis:  Hep SQ    Probable Disposition:  Home w/Family           ___________________________________________________    Attending Physician: Singh Bucio MD

## 2018-06-08 NOTE — PROGRESS NOTES
[facility] Pharmacy Dosing Services: Antimicrobial Stewardship Progress Note    Consult for antibiotic dosing of Zosyn by Dr. Francis Aguirre  Pharmacist reviewed antibiotic appropriateness for 78year old , female  for indication of possible aspiration PNA s/p seizure    Day of Therapy 1    Plan:    Non-Kinetic Antimicrobial Dosing:   Recommendation: Zosyn 3.375 gm IV x 1 now over 30 minutes followed by Zosyn 3.375 gm IV Q8H to be infused over 240 minutes    Other Antimicrobial  (not dosed by pharmacist)   none   Cultures     6/7 Urine - (pending)   Serum Creatinine     Lab Results   Component Value Date/Time    Creatinine 1.20 (H) 06/07/2018 09:54 PM       Creatinine Clearance CrCl cannot be calculated (Unknown ideal weight.). Temp   96.7 °F (35.9 °C)    WBC   Lab Results   Component Value Date/Time    WBC 19.3 (H) 06/07/2018 09:54 PM       H/H   Lab Results   Component Value Date/Time    HGB 16.5 (H) 06/07/2018 09:54 PM        Platelets   Lab Results   Component Value Date/Time    PLATELET 673 84/83/9955 09:54 PM        Thank you for the consult,  Gavino Jones [facility]

## 2018-06-08 NOTE — PROGRESS NOTES
I was unable to evaluate pt. Procedure being performed by GI in pt.'s room. I will request weekend CM to complete initial evaluation if possible.   Sophia Truong

## 2018-06-08 NOTE — CONSULTS
[facility]     Name: Asia Thakur MRN: [id number]   : 1950 [facility]   Date: 2018        Impression Plan   1. Encephalopathy  2. Seizure activity  3. DM  4. Abnormal Chest xray- bilateral atelectasis               · Full vent support  · Zosyn for now  · Wean Propofol and asses neuro again  · MRI brain  · Neuro eval pending  · D/C versed  · Accuchecks q6h  · SSI  · heparin         Pt is critically ill. Critical care time spent with pt exclusive of procedures was 42 minutes. Pt at risk for further decline due to seizure activity and lack of airway protection    Radiology  ( personally reviewed) CXR reviewed: bilateral atelectasis   ABG No results for input(s): PHI, PO2I, PCO2I in the last 72 hours. Subjective     Cc: encephalopathy    77 yo with PMHx of DM presenting with encephalopathy. Very little information available in chart and pt intubated, sedated. Pt was known to be in her normal state of health on . Yesterday,  she was found in her house unresponsive and with emesis around her. UDS negative. CT head negative. UA and CXR with no signs of infection. Trop wnl. Review of Systems:  Review of systems not obtained due to patient factors. History reviewed. No pertinent past medical history. History reviewed. No pertinent surgical history.    Prior to Admission medications    Not on File     Current Facility-Administered Medications   Medication Dose Route Frequency    sodium chloride (NS) flush 5-10 mL  5-10 mL IntraVENous Q8H    0.9% sodium chloride infusion  100 mL/hr IntraVENous CONTINUOUS    heparin (porcine) injection 5,000 Units  5,000 Units SubCUTAneous Q8H    insulin lispro (HUMALOG) injection   SubCUTAneous Q6H    piperacillin-tazobactam (ZOSYN) 3.375 g in 0.9% sodium chloride (MBP/ADV) 100 mL  3.375 g IntraVENous Q8H    propofol (DIPRIVAN) infusion  0-50 mcg/kg/min IntraVENous TITRATE    midazolam in normal saline (VERSED) 2 mg/mL infusion  1-10 mg/hr IntraVENous TITRATE    sodium chloride (NS) flush 5-10 mL  5-10 mL IntraVENous Q8H    LORazepam (ATIVAN) injection 1 mg  1 mg IntraVENous NOW    LORazepam (ATIVAN) 2 mg/mL injection        iopamidol (ISOVUE-370) 76 % injection         No Known Allergies   Social History   Substance Use Topics    Smoking status: Not on file    Smokeless tobacco: Not on file    Alcohol use Not on file      No family history on file. Laboratory: I have personally reviewed the critical care flowsheet and labs.      Recent Labs      06/07/18 2154   WBC  19.3*   HGB  16.5*   HCT  51.7*   PLT  320     Recent Labs      06/07/18 2154   NA  138   K  3.9   CL  95*   CO2  20*   GLU  412*   BUN  6   CREA  1.20*   CA  10.0   ALB  4.1   SGOT  53*   ALT  48   INR  1.1       Objective:     Mode Rate Tidal Volume Pressure FiO2 PEEP   Assist control   450 ml    50 % 5 cm H20     Vital Signs:    Ventilator Pressures  PIP Observed (cm H2O): 18 cm H2O  Plateau Pressure (cm H2O): 16 cm H2O  MAP (cm H2O): 8.4  PEEP/VENT (cm H2O): 5 cm H20  Auto PEEP Observed (cm H2O): 0 cm [id number](24)Ventilator Pressures  PIP Observed (cm H2O): 18 cm H2O  Plateau Pressure (cm H2O): 16 cm H2O  MAP (cm H2O): 8.4  PEEP/VENT (cm H2O): 5 cm H20  Auto PEEP Observed (cm H2O): 0 cm H2O  Safety & Alarms  Circuit Temperature: 98.6 °F (37 °C)  Backup Mode Checked/Apnea: Yes  Pressure Max: 40 cm H2O  Ve Min: 2  Ve Max: 20  Vt Min: 200 ml  Vt Max: 800 ml  RR Min: 16  RR Max: 50  Intake/Output:   Last shift:      Ventilator Volumes  Vt Set (ml): 450 ml  Vt Exhaled (Machine Breath) (ml): 450 ml  Ve Observed (l/min): 7.2 l/min  Last 3 shifts:  [id number]  Intake/Output Summary (Last 24 hours) at 06/08/18 0916  Last data filed at 06/08/18 0600   Gross per 24 hour   Intake                0 ml   Output              675 ml   Net             -675 ml     EXAM:   GENERAL: intubated, sedated, HEENT:  PERRL, EOMI, no alar flaring or epistaxis, oral mucosa moist without cyanosis, NECK:  no jugular vein distention, no retractions, no thyromegaly or masses, LUNGS: CTA, no w/r/r, HEART:  Regular rate and rhythm with no MGR; no edema is present, ABDOMEN:  soft with no tenderness, bowel sounds present, EXTREMITIES:  warm with no cyanosis, SKIN:  no jaundice or ecchymosis and NEUROLOGIC:  RASS -1. Will nod when addressed, but will not follow commands.      Jaylan Schaeffer MD  [facility]

## 2018-06-08 NOTE — PROGRESS NOTES
Nutrition Assessment:    RECOMMENDATIONS/INTERVENTION(S):   1. If pt remains intubated & EN is not contraindicated, recommend initiating TF    TF recommendations:  · Vital High Protein - initiate at 30 ml/hr increasing the rate 10 ml Q8H as pt tolerates to the goal rate of 60 ml/hr continuous  · 50 ml water flushes Q4H - MD to manage  TF @ goal + Propofol + water flushes provides 1915 kcals, 126 g protein, 1503 ml fluid which meets 107% of energy & 105% of protein needs)    2. Adjust/discontinue IVF accordingly    3. Once extubated, PO initiation & diet advancement per SLP and/or MD    ASSESSMENT:   6/8:  Chart reviewed 2/2 intubation. Discussed case in ICU rounds. 78 yof admitted for acute encephalopathy, ?seizure. Neurology following, s/p CT showing signs of old stroke & plans for MRI to f/o new stroke. Intubated and sedated on propofol (providing ~475 kcals/d from lipids). NS IVF. BG elevated, 232-346, A1c 9.8. DTC following. Pmhx includes DM, HTN. Cr elevated. NGT to suction. S/p KUB. No plans to start nutrition support - Intensivist aware of RD TF recs if appropriate. No edema, skin intact per flow sheets. Last BM unknown, abd WDL. Obese per BMI. No recent wt hx - wt +12% x ~4 yrs per hx. SLP following, plans for eval prior to initiating PO. SUBJECTIVE/OBJECTIVE:     Diet Order: NPO  % Eaten:  No data found. Pertinent Medications: [x] Reviewed - insulin, zosyn, propofol, NS IVF, pepcid     History reviewed. No pertinent past medical history.     Chemistries:  Lab Results   Component Value Date/Time    Sodium 137 06/08/2018 10:21 AM    Potassium 3.2 (L) 06/08/2018 10:21 AM    Chloride 99 06/08/2018 10:21 AM    CO2 27 06/08/2018 10:21 AM    Anion gap 11 06/08/2018 10:21 AM    Glucose 212 (H) 06/08/2018 10:21 AM    BUN 7 06/08/2018 10:21 AM    Creatinine 1.15 (H) 06/08/2018 10:21 AM    BUN/Creatinine ratio 6 (L) 06/08/2018 10:21 AM    GFR est AA 57 (L) 06/08/2018 10:21 AM    GFR est non-AA 47 (L) 06/08/2018 10:21 AM    Calcium 8.1 (L) 06/08/2018 10:21 AM    AST (SGOT) 52 (H) 06/08/2018 10:21 AM    Alk. phosphatase 80 06/08/2018 10:21 AM    Protein, total 8.1 06/08/2018 10:21 AM    Albumin 2.9 (L) 06/08/2018 10:21 AM    Globulin 5.2 (H) 06/08/2018 10:21 AM    A-G Ratio 0.6 (L) 06/08/2018 10:21 AM    ALT (SGPT) 35 06/08/2018 10:21 AM      Anthropometrics: Height: 5' 6\" (167.6 cm) Weight: 99.8 kg (220 lb 0.3 oz)    IBW (%IBW): 65 kg (143 lb 4.8 oz) (153.54 %) UBW (%UBW):  (UTO) (  %)    BMI: Body mass index is 35.51 kg/(m^2). This BMI is indicative of:   [] Underweight    [] Normal    [] Overweight    [x]  Obesity    []  Extreme Obesity (BMI>40)  Estimated Nutrition Needs (Based on): 1787 Kcals/day (PSU) , 99 g (-119 (1.0-1.2 g/kg x actual BW)) Protein  Carbohydrate: At Least 130 g/day  Fluids: 1800 mL/day (1 ml/kcal)    Last BM: ?, abd WDL   []Active     []Hyperactive  []Hypoactive       [] Absent   BS - not charted  Skin:    [x] Intact   [] Incision  [] Breakdown   [] DTI   [] Tears/Excoriation/Abrasion  []Edema [] Other:      Wt Readings from Last 30 Encounters:   06/08/18 99.8 kg (220 lb 0.3 oz)   06/08/18 99.8 kg (220 lb 0.3 oz)   12/09/14 88.5 kg (195 lb 1.7 oz)      NUTRITION DIAGNOSES:   Problem:  Inadequate oral intake     Etiology: related to conditions associated with a dx: respiratory failure, altered GI function     Signs/Symptoms: as evidenced by intubated, NPO w/ NGT to suction      NUTRITION INTERVENTIONS:  Meals/Snacks: General/healthful diet - once diet initiated Enteral/Parenteral Nutrition: Initiate enteral nutrition - if remains intubated                GOAL:   PO vs EN w/ no s/s of intolerance in the next 1-3 days    Cultural, Protestant, or Ethnic Dietary Needs: Other (comment) (UTO - pt sedated on vent)     EDUCATION & DISCHARGE NEEDS:    [x] None Identified   [] Identified and Education Provided/Documented   [] Identified and Pt declined/was not appropriate      [x] Interdisciplinary Care Plan Reviewed/Documented    [x] Discharge Needs:    tbd   [] No Nutrition Related Discharge Needs    NUTRITION RISK:   Pt Is At Nutrition Risk  [x]     No Nutrition Risk Identified  []       PT SEEN FOR:    []  MD Consult: []Calorie Count      []Diabetic Diet Education        []Diet Education     []Electrolyte Management     []General Nutrition Management and Supplements     []Management of Tube Feeding     []TPN Recommendations    []  RN Referral:  []MST score >=2     []Enteral/Parenteral Nutrition PTA     []Pregnant: Gestational DM or Multigestation                 [] Pressure Ulcer    []  Low BMI      []  Length of Stay       [] Dysphagia Diet         [x] Ventilator  []  Follow-up     Previous Recommendations:   [] Implemented          [] Not Implemented          [x] Not Applicable    Previous Goal:   [] Met              [] Progressing Towards Goal              [] Not Progressing Towards Goal   [x] Not Applicable            Bharat Brown, [address]  Pager [phone]

## 2018-06-08 NOTE — PROGRESS NOTES
1500: I have precepted Barb Smith today and agree with her charting and care for the day. 1510: Per GI, will be at bedside around 1600 for EGD. Consent obtained by daughter and placed in chart. Pt put back on a rate and sedation titrated up per Dr. Daphney Benítez in preparation for scope. 1555: EGD RNs at bedside. 1630: Dr. Marielle De La Torre at bedside, EGD complete. Ulcer found and clips placed. NGT removed during procedure. Per Dr. Daphney Benítez, decrease sedation and plan to extubate this evening. 440 2168: Pt extubated with RT, on 4L at this time. Pt sats daphne 90's    1840: Pt transported to MRI with RN on monitor. 1900: Bedside and Verbal shift change report given to 1200 Daniel Sullivan (oncoming nurse) by Shade Pacheco RN (offgoing nurse). Report included the following information SBAR, Kardex, ED Summary, Procedure Summary, Intake/Output, Recent Results and Cardiac Rhythm Sinus tach.

## 2018-06-08 NOTE — PROGRESS NOTES
Daily Progress Note: 6/8/2018  Nhung Palacios MD    Assessment/Plan:   1. Encephalopathy acute (6/7/2018). Possible secondary to seizure (seizue like activity was noted in ER). keppra loading dose was given. Check EEG. Pt is sedated on vent support. urine drug screen neg. Consulted neurology       2. Possible Seizure ([facility] (6/7/2018). As above check EEG and consulted neurology.      3.   DM (diabetes mellitus) ([facility] (6/7/2018). Check A1C and cover with SSI.      4.  Possible aspiration pneumonia. Pt has been vomiting even after intubation and earlier when she was less responsive. Start zosyn and per Pulmonary.      5.  Leukocytosis (6/7/2018). likely secondary to aspiration and SZD. Continue ABx and check BC      6.  Renal insufficiency (6/7/2018). Unknown baseline. Continue IVF and monitor renal function      7. Acute respiratory failure due aspiration. On vent support. Consult pulmonary.      8. HTN. For now labetalol PRN IV      Problem List:  Problem List as of 6/8/2018  Date Reviewed: 6/7/2018          Codes Class Noted - Resolved    Encephalopathy acute ICD-10-CM: G93.40  ICD-9-CM: 348.30  6/7/2018 - Present        Seizure ([facility] ICD-10-CM: R56.9  ICD-9-CM: 851.11  6/7/2018 - Present        DM (diabetes mellitus) ([facility] ICD-10-CM: E11.9  ICD-9-CM: 250.00  6/7/2018 - Present        Leukocytosis ICD-10-CM: Y88.269  ICD-9-CM: 288.60  6/7/2018 - Present        Aspiration into airway ICD-10-CM: T17.908A  ICD-9-CM: 934.9  6/7/2018 - Present        Renal insufficiency ICD-10-CM: N28.9  ICD-9-CM: 593.9  6/7/2018 - Present              Subjective:     78 y.o.  female who is admitted with acute encephalopathy. Ms. Osvaldo Del Toro with PMH of DM, HTN was brought to ER after she became less responsive and vomiting. Pt is intubated and sedated.  aAcording to her daughter, she talked to her at 4 PM yesterday on the phone and was ok, but later when she reached home, she went to her room and found her vomiting all over the floor and became in and out of consciousness. Later EMS was called and was brought to ER and ER she noted to have a seizure like activity. keppra loading dose was given. History reviewed. No pertinent past medical history. uncle: DM  History reviewed. No pertinent surgical history. (Dr Chanelle Brown)    :  Intubated and sedated. Some spontaneous movement noted. AM las pending. Review of Systems:   Review of systems not obtained due to patient factors. Objective:   Physical Exam:     Visit Vitals    /83 (BP 1 Location: Left arm, BP Patient Position: At rest)    Pulse (!) 101    Temp 99.9 °F (37.7 °C)    Resp 16    Wt 99.8 kg (220 lb)    SpO2 100%    BMI 38.97 kg/m2    O2 Flow Rate (L/min): 15 l/min O2 Device: Endotracheal tube, Ventilator    Temp (24hrs), Av.5 °F (36.9 °C), Min:96.7 °F (35.9 °C), Max:99.9 °F (37.7 °C)         1901 -  0700  In: -   Out: 165 [Urine:675]    General:  Intubated and sedated;  no distress, appears stated age. Head:  Normocephalic, without obvious abnormality, atraumatic. Eyes:  Conjunctivae/corneas clear. EOMs intact. Neck: Supple, symmetrical, trachea midline, no adenopathy, thyroid: no enlargement/tenderness/nodules, no carotid bruit and no JVD. Lungs:   A few scattered rhonchi bilaterally. Chest wall:  No tenderness or deformity. Heart:  Regular rate and rhythm, S1, S2 normal, no murmur, click, rub or gallop. Abdomen:   Soft, non-tender. Bowel sounds normal. No masses,  No organomegaly. Extremities: no cyanosis or edema. Skin: turgor normal.    Neurologic: Intubated and sedated     Data Review:    18  EXAM:  CTA CODE NEURO HEAD AND NECK W CONT  INDICATION:  seizure, waxing and waning altered mental status. TECHNIQUE: Axial spiral acquired CT angiography was performed from the aortic  arch up through the intracranial vessels.  This was performed during intravenous  bolus infusion 100 mL of Isovue 370. Post-processing with  MIP reconstructions  from aortic arch to the skull base. CT dose reduction was achieved through use  of a standardized protocol tailored for this examination and automatic exposure  control for dose modulation. COMPARISON: CT head. FINDINGS:  For unknown technical reasons there is suboptimal opacification of the arteries  with contrast.  Some atherosclerotic calcification in the aorta. 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 The left common carotid artery and right common carotid arteries both arise from  the innominate. Dominant left vertebral artery. Right vertebral artery is patent but less than  optimally visualized due to poor contrast opacification. The basilar artery has  no significant stenosis and supplies both posterior cerebral arteries. Common carotid arteries are mildly tortuous and otherwise unremarkable. No evidence of hemodynamically significant stenosis at carotid bifurcations. 0%  stenosis by NASCET criteria. Prominent tortuosity of both cervical internal  carotid arteries without associated stenosis. Carotid siphons are most visualized with no abnormality demonstrated. Symmetric  sclerotic calcification in the distal internal carotid arteries. Probable at  least mild stenosis distal right vertebral artery. Relatively symmetric  opacification of middle cerebral arteries. Left anterior cerebral artery is  dominant. Endotracheal tube is in place. Atelectasis possible small pleural effusions. IMPRESSION:  1. 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 2. Suboptimal arterial opacification apparently due to technical limitations. 3. Mild stenosis distal right internal carotid artery.   4. No acute arterial abnormality demonstrated.     Recent Days:  Recent Labs      06/07/18   2154   WBC  19.3*   HGB  16.5*   HCT  51.7*   PLT  320     Recent Labs      06/07/18   2154   NA  138   K  3.9   CL  95*   CO2  20*   GLU  412*   BUN  6 CREA  1.20*   CA  10.0   ALB  4.1   TBILI  0.6   SGOT  53*   ALT  48   INR  1.1     Recent Labs      06/08/18   0540  06/07/18   2317   PH  7.45  7.25*   PCO2  37  46*   PO2  95  137*   HCO3  25  20*   FIO2  50  100       24 Hour Results:  Recent Results (from the past 24 hour(s))   CBC WITH AUTOMATED DIFF    Collection Time: 06/07/18  9:54 PM   Result Value Ref Range    WBC 19.3 (H) 3.6 - 11.0 K/uL    RBC 4.97 3.80 - 5.20 M/uL    HGB 16.5 (H) 11.5 - 16.0 g/dL    HCT 51.7 (H) 35.0 - 47.0 %    .0 (H) 80.0 - 99.0 FL    MCH 33.2 26.0 - 34.0 PG    MCHC 31.9 30.0 - 36.5 g/dL    RDW 13.0 11.5 - 14.5 %    PLATELET 762 323 - 700 K/uL    MPV 10.5 8.9 - 12.9 FL    NRBC 0.0 0  WBC    ABSOLUTE NRBC 0.00 0.00 - 0.01 K/uL    NEUTROPHILS 80 (H) 32 - 75 %    LYMPHOCYTES 15 12 - 49 %    MONOCYTES 3 (L) 5 - 13 %    EOSINOPHILS 0 0 - 7 %    BASOPHILS 1 0 - 1 %    IMMATURE GRANULOCYTES 1 (H) 0.0 - 0.5 %    ABS. NEUTROPHILS 15.6 (H) 1.8 - 8.0 K/UL    ABS. LYMPHOCYTES 2.8 0.8 - 3.5 K/UL    ABS. MONOCYTES 0.7 0.0 - 1.0 K/UL    ABS. EOSINOPHILS 0.1 0.0 - 0.4 K/UL    ABS. BASOPHILS 0.1 0.0 - 0.1 K/UL    ABS. IMM.  GRANS. 0.1 (H) 0.00 - 0.04 K/UL    DF AUTOMATED     METABOLIC PANEL, BASIC    Collection Time: 06/07/18  9:54 PM   Result Value Ref Range    Sodium 138 136 - 145 mmol/L    Potassium 3.9 3.5 - 5.1 mmol/L    Chloride 95 (L) 97 - 108 mmol/L    CO2 20 (L) 21 - 32 mmol/L    Anion gap 23 (H) 5 - 15 mmol/L    Glucose 412 (H) 65 - 100 mg/dL    BUN 6 6 - 20 MG/DL    Creatinine 1.20 (H) 0.55 - 1.02 MG/DL    BUN/Creatinine ratio 5 (L) 12 - 20      GFR est AA 54 (L) >60 ml/min/1.73m2    GFR est non-AA 45 (L) >60 ml/min/1.73m2    Calcium 10.0 8.5 - 10.1 MG/DL   CK W/ REFLX CKMB    Collection Time: 06/07/18  9:54 PM   Result Value Ref Range     26 - 192 U/L   HEPATIC FUNCTION PANEL    Collection Time: 06/07/18  9:54 PM   Result Value Ref Range    Protein, total 10.3 (H) 6.4 - 8.2 g/dL    Albumin 4.1 3.5 - 5.0 g/dL Globulin 6.2 (H) 2.0 - 4.0 g/dL    A-G Ratio 0.7 (L) 1.1 - 2.2      Bilirubin, total 0.6 0.2 - 1.0 MG/DL    Bilirubin, direct 0.3 (H) 0.0 - 0.2 MG/DL    Alk.  phosphatase 132 (H) 45 - 117 U/L    AST (SGOT) 53 (H) 15 - 37 U/L    ALT (SGPT) 48 12 - 78 U/L   PROTHROMBIN TIME + INR    Collection Time: 06/07/18  9:54 PM   Result Value Ref Range    INR 1.1 0.9 - 1.1      Prothrombin time 11.1 9.0 - 11.1 sec   PTT    Collection Time: 06/07/18  9:54 PM   Result Value Ref Range    aPTT 24.5 22.1 - 32.0 sec    aPTT, therapeutic range     58.0 - 77.0 SECS   BLOOD GAS, ARTERIAL    Collection Time: 06/07/18 11:17 PM   Result Value Ref Range    pH 7.25 (L) 7.35 - 7.45      PCO2 46 (H) 35.0 - 45.0 mmHg    PO2 137 (H) 80 - 100 mmHg    O2 SAT 98 (H) 92 - 97 %    BICARBONATE 20 (L) 22 - 26 mmol/L    BASE DEFICIT 7.6 mmol/L    O2 METHOD VENTILATOR      FIO2 100 %    MODE A/C      Tidal volume 400      SET RATE 14      EPAP/CPAP/PEEP 5.0      Sample source ARTERIAL      SITE LEFT RADIAL      ANGELITA'S TEST YES      Critical value read back Esvin Paris MD    EKG, 12 LEAD, INITIAL    Collection Time: 06/07/18 11:27 PM   Result Value Ref Range    Ventricular Rate 109 BPM    Atrial Rate 109 BPM    P-R Interval 148 ms    QRS Duration 82 ms    Q-T Interval 392 ms    QTC Calculation (Bezet) 527 ms    Calculated P Axis 44 degrees    Calculated R Axis -16 degrees    Calculated T Axis 76 degrees    Diagnosis       Sinus tachycardia  Possible Left atrial enlargement  Septal infarct , age undetermined  Inferior injury pattern  Prolonged QT  ** ** ACUTE MI / STEMI ** **  Consider right ventricular involvement in acute inferior infarct  Abnormal ECG  No previous ECGs available     TROPONIN I    Collection Time: 06/07/18 11:30 PM   Result Value Ref Range    Troponin-I, Qt. <0.04 <0.05 ng/mL   URINALYSIS W/MICROSCOPIC    Collection Time: 06/07/18 11:42 PM   Result Value Ref Range    Color YELLOW/STRAW      Appearance CLEAR CLEAR      Specific gravity 1.013 1.003 - 1.030      pH (UA) 7.0 5.0 - 8.0      Protein 300 (A) NEG mg/dL    Glucose 500 (A) NEG mg/dL    Ketone NEGATIVE  NEG mg/dL    Bilirubin NEGATIVE  NEG      Blood SMALL (A) NEG      Urobilinogen 0.2 0.2 - 1.0 EU/dL    Nitrites NEGATIVE  NEG      Leukocyte Esterase NEGATIVE  NEG      WBC 0-4 0 - 4 /hpf    RBC 0-5 0 - 5 /hpf    Epithelial cells FEW FEW /lpf    Bacteria NEGATIVE  NEG /hpf   URINE CULTURE HOLD SAMPLE    Collection Time: 06/07/18 11:42 PM   Result Value Ref Range    Urine culture hold        URINE ON HOLD IN MICROBIOLOGY DEPT FOR 3 DAYS. IF UNPRESERVED URINE IS SUBMITTED, IT CANNOT BE USED FOR ADDITIONAL TESTING AFTER 24 HRS, RECOLLECTION WILL BE REQUIRED.    GLUCOSE, POC    Collection Time: 06/08/18 12:43 AM   Result Value Ref Range    Glucose (POC) 346 (H) 65 - 100 mg/dL    Performed by A[facility] [address] [address], URINE    Collection Time: 06/08/18  2:02 AM   Result Value Ref Range    AMPHETAMINES NEGATIVE  NEG      BARBITURATES NEGATIVE  NEG      BENZODIAZEPINES NEGATIVE  NEG      COCAINE NEGATIVE  NEG      METHADONE NEGATIVE  NEG      OPIATES NEGATIVE  NEG      PCP(PHENCYCLIDINE) NEGATIVE  NEG      THC (TH-CANNABINOL) NEGATIVE  NEG      Drug screen comment (NOTE)    GLUCOSE, POC    Collection Time: 06/08/18  5:34 AM   Result Value Ref Range    Glucose (POC) 232 (H) 65 - 100 mg/dL    Performed by [address] [place] [address], ARTERIAL    Collection Time: 06/08/18  5:40 AM   Result Value Ref Range    pH 7.45 7.35 - 7.45      PCO2 37 35.0 - 45.0 mmHg    PO2 95 80 - 100 mmHg    O2 SAT 98 (H) 92 - 97 %    BICARBONATE 25 22 - 26 mmol/L    BASE EXCESS 1.6 mmol/L    O2 METHOD VENTILATOR      FIO2 50 %    MODE A/C      Tidal volume 450      SET RATE 16      EPAP/CPAP/PEEP 5.0      Sample source ARTERIAL      SITE LEFT RADIAL      ANGELITA'S TEST YES         Medications reviewed  Current Facility-Administered Medications   Medication Dose Route Frequency    sodium chloride (NS) flush 5-10 mL  5-10 mL IntraVENous Q8H    sodium chloride (NS) flush 5-10 mL  5-10 mL IntraVENous PRN    0.9% sodium chloride infusion  100 mL/hr IntraVENous CONTINUOUS    heparin (porcine) injection 5,000 Units  5,000 Units SubCUTAneous Q8H    insulin lispro (HUMALOG) injection   SubCUTAneous Q6H    glucose chewable tablet 16 g  4 Tab Oral PRN    dextrose (D50W) injection syrg 12.5-25 g  12.5-25 g IntraVENous PRN    glucagon (GLUCAGEN) injection 1 mg  1 mg IntraMUSCular PRN    labetalol (NORMODYNE;TRANDATE) injection 10 mg  10 mg IntraVENous Q2H PRN    piperacillin-tazobactam (ZOSYN) 3.375 g in 0.9% sodium chloride (MBP/ADV) 100 mL  3.375 g IntraVENous Q8H    propofol (DIPRIVAN) infusion  0-50 mcg/kg/min IntraVENous TITRATE    midazolam in normal saline (VERSED) 2 mg/mL infusion  1-10 mg/hr IntraVENous TITRATE    hydrALAZINE (APRESOLINE) 20 mg/mL injection 10 mg  10 mg IntraVENous Q2H PRN    sodium chloride (NS) flush 5-10 mL  5-10 mL IntraVENous Q8H    sodium chloride (NS) flush 5-10 mL  5-10 mL IntraVENous PRN    LORazepam (ATIVAN) injection 1 mg  1 mg IntraVENous NOW    LORazepam (ATIVAN) 2 mg/mL injection        iopamidol (ISOVUE-370) 76 % injection           Care Plan discussed with: Patient/Family and Nurse    Total time spent with patient: 30 minutes.     Abner Barrios MD

## 2018-06-08 NOTE — PROGRESS NOTES
Bedside and Verbal shift change report given to Tresa Palm RN (oncoming nurse) by Marcia Grissom RN (offgoing nurse). Report included the following information SBAR, Kardex, ED Summary, OR Summary, Procedure Summary, Intake/Output, MAR and Recent Results. Received report from [place] while patient in MRI. Assisted patient back to ICU. Pt 's administered prn labetalol. Will continue to monitor. 65 Paged Dr. Jess Matias, updated MD on patient using SBAR format. Informed MD that patients BP continues to be elevated up to . Patient was a code atlas confused and did not want to be touched. Told the RN and the sitter that she did not want to live. BP continues to be elevated. Per MD administer prn ativan. Also administered prn hydralizine. Sitter order given for suicide precautions. Attempted to notify family. No answer. 2345 Pt resting comfortably with sitter at bedside. BP remains elevated. Administered prn BP medication. Will continue to monitor. 0996 Paged Dr. Margarita Lopez due to inablilty to maintain SBP less than 170 with PRN BP meds. Per MD administer nitropaste 0.5 inches times once. Will administer and continue to monitor. 0500 AM labs sent on patient. 0600 Lab called and said that CBC had clotted. Redrew CBC.  [id number] called and said second CBC clotted.

## 2018-06-08 NOTE — PROGRESS NOTES
[facility]: MED RECONCILIATION    Patient is intubated and sedated. A family member is currently in the room but does not know the patient's medications. She also is unsure of the patient's pharmacy (thinks only [facility]). She did state that another family member read the name of two providers off the medication bottles at home: [facility] and [employer]. Family member reports it will not be possible to bring in the pill bottles. Called the office for Dr. Kari Angela with no answer as it was closed for lunch. Pharmacist called the office again at [phone] and requested a recent note and medication list be faxed to the hospital. Reviewed  and the patient has not had any controlled substances filled for the last two years.      Thank you,        Mounika Diaz, PharmD, BCPS

## 2018-06-08 NOTE — PROGRESS NOTES
0745: TRANSFER - IN REPORT:  Verbal report received from Edgard, [address] [facility] (name) on Brianne Katz  being received from ER (unit) for routine progression of care    Report consisted of patients Situation, Background, Assessment and   Recommendations(SBAR). Information from the following report(s) SBAR, Kardex, ED Summary and MAR was reviewed with the receiving nurse. Opportunity for questions and clarification was provided. Assessment completed upon patients arrival to unit and care assumed. 2610: Patient arrives on unit. Initial assessment performed. Patient intubated and sedated. Patient opens eyes but does not follow commands. King patent and draining. Patient suctioned and mouth care performed. VSS. Will continue to monitor patient. 1100: Dr. Rachel Goldman at bedside. 0915: Dr. Zulay Moyer at bedside. 1015: Patient's daughter at bedside. 1040: Patient vomited dark, coffee ground color emesis. Patient suctioned and cleaned up. Dr. Zulay Moyer notified. Orders for KUB to verify tube placement received. 1100: Blood work drawn and sent to lab at this time. 1135: Xray at bedside to confirm tube placement. 1200: Reassessment completed at this time. Patient intubated and sedated. VSS. Family at bedside. King patent and draining. No pain noted and patient does not appear to be in distress. No additional needs at this time. Will continue to monitor patient. 1230: Patient awake and able to follow commands. Propofol titrated down from 50 mcg/kg/min to 30 mcg/kg/min. VSS. Family at bedside. Will continue to monitor patient. 1245: NGT reinserted to 60 at the nares. Xray to confirm placement to be ordered. 1250: Dr. Zulay Moyer notified of patient's blood work results. No new orders received at this time. 1300: Xray at bedside. 1330: EEG tech at bedside for EEG setup. 1340: Patient placed on SBT at this time. Will continue to monitor patient. 1443. Patient placed on 5/5 settings at this time.   1500: Dr. Zulay Moyer evaluating patient. GI consulted for NGT output. Patient to be extubated. NGT to remain in place. 1505: Bedside shift change report given to Mateus Lee RN (oncoming nurse) by Samuel Bravo RN (offgoing nurse). Report included the following information SBAR, Kardex, ED Summary and MAR.   1305: Verbal orders from Dr. Ivan Dior to titrate Propofol to 30 mcg/kg/min for EGD this afternoon.

## 2018-06-08 NOTE — ED NOTES
Bedside and Verbal shift change report given to Macey Huerta RN (oncoming nurse) by Kathleen Nelson RN (offgoing nurse). Report included the following information SBAR, Kardex, ED Summary, Procedure Summary, Intake/Output, MAR, Recent Results, Med Rec Status, Cardiac Rhythm sinus tach and Alarm Parameters .

## 2018-06-08 NOTE — PROGRESS NOTES
SPEECH THERAPY SCREENING:  SERVICES ARE INDICATED AND THERAPY ORDER IS REQUIRED    An InBasket screening referral was triggered for speech therapy based on results obtained during the nursing admission assessment. The patients chart was reviewed and the patient is appropriate for a skilled therapy evaluation. Please order a consult for speech therapy if you are in agreement and would like an evaluation to be completed. Thank you. Patient currently intubated. Has h/o CVA and possible new sz. Please reconsult SLP when appropriate.   Miguel Avery, SLP

## 2018-06-08 NOTE — ED PROVIDER NOTES
HPI Comments: 78 y.o. female with a limited known medical history of a CVA who presents via EMS to be evaluated for AMS. Per EMS, the pt's family reported that the pt \"vomited everywhere,\" became SOB, and then had a syncopal episode PTA. Per EMS personnel, the pt's family said that the pt was \"coming in and out,\" and they called EMS for help. EMS states that the pt's family did not provide a last known well time on the scene. EMS personnel notes that the pt was resting comfortably en route until they arrived at the hospital when she became combative and started resisting commands. EMS reports that the pt is alert and communicative at baseline. There are no other acute medical concerns at this time. Full history, physical exam, and ROS unable to be obtained due to:  AMS. Note written by Ranjit Rahman, as dictated by Micheline Conklin DO 9:40 PM     The history is provided by the EMS personnel. No  was used. History reviewed. No pertinent past medical history. History reviewed. No pertinent surgical history. No family history on file. Social History     Social History    Marital status:      Spouse name: N/A    Number of children: N/A    Years of education: N/A     Occupational History    Not on file. Social History Main Topics    Smoking status: Not on file    Smokeless tobacco: Not on file    Alcohol use Not on file    Drug use: Not on file    Sexual activity: Not on file     Other Topics Concern    Not on file     Social History Narrative    No narrative on file     ALLERGIES: Review of patient's allergies indicates no known allergies.     Review of Systems   Unable to perform ROS: Intubated       Vitals:    06/08/18 0100 06/08/18 0101 06/08/18 0115 06/08/18 0130   BP:  (!) 191/120 (!) 199/99 (!) 184/130   Pulse:  (!) 102 96 96   Resp:  16 16 16   Temp: 98.8 °F (37.1 °C) 98.8 °F (37.1 °C)     SpO2:  100% 99% 100%   Weight: Physical Exam   Constitutional: She appears well-developed and well-nourished. She appears distressed. HENT:   Head: Normocephalic and atraumatic. Right Ear: External ear normal.   Left Ear: External ear normal.   Nose: Nose normal.   Mouth/Throat: Oropharynx is clear and moist. Abnormal dentition. No oropharyngeal exudate. Poor dentition   Eyes: Conjunctivae are normal. Pupils are equal, round, and reactive to light. Right eye exhibits no discharge. Left eye exhibits no discharge. No scleral icterus. Neck: Neck supple. No JVD present. No tracheal deviation present. Cardiovascular: Regular rhythm, normal heart sounds and intact distal pulses. Tachycardia present. Exam reveals no gallop and no friction rub. No murmur heard. Pulmonary/Chest: Effort normal. No stridor. No respiratory distress. She has decreased breath sounds in the right lower field and the left lower field. She has no wheezes. She has no rhonchi. She has no rales. She exhibits no tenderness. Abdominal: Soft. Bowel sounds are normal. She exhibits no distension. There is no tenderness. There is no rebound and no guarding. Musculoskeletal: Normal range of motion. She exhibits no edema or tenderness. Neurological: She displays seizure activity. GCS eye subscore is 4. GCS verbal subscore is 2. GCS motor subscore is 5. Not following commands. Combative. Nonverbal. Eyes deviated to right and upward. Right sided stiffness. Skin: Skin is warm and dry. No rash noted. She is not diaphoretic. No erythema. No pallor. Nursing note and vitals reviewed.    Note written by Ranjit Zuniga, as dictated by Leon Richter DO 9:40 PM     MDM  Number of Diagnoses or Management Options  Acute encephalopathy:   Altered mental status, unspecified altered mental status type:   Hyperglycemia:   Leukocytosis, unspecified type:   Metabolic acidosis:   Seizure [facility]):      Amount and/or Complexity of Data Reviewed  Clinical lab tests: ordered and reviewed  Tests in the radiology section of CPT®: ordered and reviewed  Tests in the medicine section of CPT®: ordered and reviewed  Discuss the patient with other providers: yes (Hospitalist; cardiology)  Independent visualization of images, tracings, or specimens: yes (ekg)    Risk of Complications, Morbidity, and/or Mortality  Presenting problems: high  Diagnostic procedures: moderate  Management options: high    Critical Care  Total time providing critical care: 30-74 minutes (Total critical care time spent exclusive of procedures:  70 minutes)    Patient Progress  Patient progress: stable (Critical condition)        ED Course       INTUBATE PATIENT  Date/Time: 6/7/2018 10:11 PM  Performed by: Lyubov Lainez by: Ismael Chavira     Consent:     Consent obtained:  Emergent situation  Pre-procedure details:     Patient status:  Altered mental status    Paralytics:  Succinylcholine  Procedure details:     Preoxygenation:  Nonrebreather mask    CPR in progress: no      Intubation method:  Oral    Oral intubation technique:  Direct (DL)    Laryngoscope blade: Mac 4    Tube size (mm):  8.0    Number of attempts:  1    Ventilation between attempts: no      Cricoid pressure: no      Tube visualized through cords: yes    Placement assessment:     ETT to lip:  20    Tube secured with:  ETT plaza    Breath sounds:  Equal    Placement verification: equal breath sounds    Post-procedure details:     Patient tolerance of procedure: Tolerated well, no immediate complications  Comments:      Etomidate was given prior to the procedure for sedation. During the intubation, a small laceration to the R upper lip gum was sustained from the laryngoscope blade. Bleeding controlled. PROGRESS NOTE:  10:15 PM Spoke with the pt's family.  They reported that they went to see the pt around 5:00 PM, the pt opened the door, she vomited, and the pt was subsequently in and out of consciousness since then.      CONSULT NOTE:  11:55 PM Rhina Coats DO spoke with Dr. Ap Cormier, Consult for Hospitalist.  Discussed available diagnostic tests and clinical findings. He is in agreement with care plans as outlined. Dr. Ap Cormier will see and admit pt for further evaluation of treatment. Chief Complaint   Patient presents with    Altered mental status       The patient's presenting problems have been discussed, and they are in agreement with the care plan formulated and outlined with them. I have encouraged them to ask questions as they arise throughout their visit.     MEDICATIONS GIVEN:  Medications   sodium chloride (NS) flush 5-10 mL (not administered)   sodium chloride (NS) flush 5-10 mL (not administered)   LORazepam (ATIVAN) injection 1 mg (not administered)   LORazepam (ATIVAN) 2 mg/mL injection (not administered)   iopamidol (ISOVUE-370) 76 % injection (not administered)   sodium chloride (NS) flush 5-10 mL (not administered)   sodium chloride (NS) flush 5-10 mL (not administered)   0.9% sodium chloride infusion (100 mL/hr IntraVENous New Bag 6/8/18 0100)   heparin (porcine) injection 5,000 Units (5,000 Units SubCUTAneous Given 6/8/18 0053)   insulin lispro (HUMALOG) injection (7 Units SubCUTAneous Given 6/8/18 0053)   glucose chewable tablet 16 g (not administered)   dextrose (D50W) injection syrg 12.5-25 g (not administered)   glucagon (GLUCAGEN) injection 1 mg (not administered)   labetalol (NORMODYNE;TRANDATE) injection 10 mg (10 mg IntraVENous Given 6/8/18 0100)   piperacillin-tazobactam (ZOSYN) 3.375 g in 0.9% sodium chloride (MBP/ADV) 100 mL (not administered)   piperacillin-tazobactam (ZOSYN) 3.375 g in 0.9% sodium chloride (MBP/ADV) 100 mL (not administered)   propofol (DIPRIVAN) infusion (40 mcg/kg/min × 99.8 kg IntraVENous New Bag 6/8/18 0131)   propofol (DIPRIVAN) 10 mg/mL injection (not administered)   LORazepam (ATIVAN) injection 1 mg (1 mg IntraVENous Given 6/7/18 2149)   etomidate (AMIDATE) 2 mg/mL injection 20 mg (20 mg IntraVENous Given 6/7/18 2205)   succinylcholine (ANECTINE) injection 200 mg (200 mg IntraVENous Given 6/7/18 2206)   fentaNYL citrate (PF) injection 100 mcg (100 mcg IntraVENous Given 6/7/18 2345)   propofol (DIPRIVAN) infusion (30 mcg/kg/min × 99.8 kg IntraVENous New Bag 6/7/18 2215)   levETIRAcetam (KEPPRA) 1,000 mg in 0.9% sodium chloride 100 mL IVPB (0 mg IntraVENous IV Completed 6/7/18 2358)   iopamidol (ISOVUE-370) 76 % injection 100 mL (95 mL IntraVENous Given 6/7/18 2233)   iopamidol (ISOVUE-370) 76 % injection 100 mL (95 mL IntraVENous Given 6/7/18 2256)       LABS REVIEWED:  Recent Results (from the past 24 hour(s))   CBC WITH AUTOMATED DIFF    Collection Time: 06/07/18  9:54 PM   Result Value Ref Range    WBC 19.3 (H) 3.6 - 11.0 K/uL    RBC 4.97 3.80 - 5.20 M/uL    HGB 16.5 (H) 11.5 - 16.0 g/dL    HCT 51.7 (H) 35.0 - 47.0 %    .0 (H) 80.0 - 99.0 FL    MCH 33.2 26.0 - 34.0 PG    MCHC 31.9 30.0 - 36.5 g/dL    RDW 13.0 11.5 - 14.5 %    PLATELET 684 400 - 090 K/uL    MPV 10.5 8.9 - 12.9 FL    NRBC 0.0 0  WBC    ABSOLUTE NRBC 0.00 0.00 - 0.01 K/uL    NEUTROPHILS 80 (H) 32 - 75 %    LYMPHOCYTES 15 12 - 49 %    MONOCYTES 3 (L) 5 - 13 %    EOSINOPHILS 0 0 - 7 %    BASOPHILS 1 0 - 1 %    IMMATURE GRANULOCYTES 1 (H) 0.0 - 0.5 %    ABS. NEUTROPHILS 15.6 (H) 1.8 - 8.0 K/UL    ABS. LYMPHOCYTES 2.8 0.8 - 3.5 K/UL    ABS. MONOCYTES 0.7 0.0 - 1.0 K/UL    ABS. EOSINOPHILS 0.1 0.0 - 0.4 K/UL    ABS. BASOPHILS 0.1 0.0 - 0.1 K/UL    ABS. IMM.  GRANS. 0.1 (H) 0.00 - 0.04 K/UL    DF AUTOMATED     METABOLIC PANEL, BASIC    Collection Time: 06/07/18  9:54 PM   Result Value Ref Range    Sodium 138 136 - 145 mmol/L    Potassium 3.9 3.5 - 5.1 mmol/L    Chloride 95 (L) 97 - 108 mmol/L    CO2 20 (L) 21 - 32 mmol/L    Anion gap 23 (H) 5 - 15 mmol/L    Glucose 412 (H) 65 - 100 mg/dL    BUN 6 6 - 20 MG/DL    Creatinine 1.20 (H) 0.55 - 1.02 MG/DL    BUN/Creatinine ratio 5 (L) 12 - 20 GFR est AA 54 (L) >60 ml/min/1.73m2    GFR est non-AA 45 (L) >60 ml/min/1.73m2    Calcium 10.0 8.5 - 10.1 MG/DL   CK W/ REFLX CKMB    Collection Time: 06/07/18  9:54 PM   Result Value Ref Range     26 - 192 U/L   HEPATIC FUNCTION PANEL    Collection Time: 06/07/18  9:54 PM   Result Value Ref Range    Protein, total 10.3 (H) 6.4 - 8.2 g/dL    Albumin 4.1 3.5 - 5.0 g/dL    Globulin 6.2 (H) 2.0 - 4.0 g/dL    A-G Ratio 0.7 (L) 1.1 - 2.2      Bilirubin, total 0.6 0.2 - 1.0 MG/DL    Bilirubin, direct 0.3 (H) 0.0 - 0.2 MG/DL    Alk.  phosphatase 132 (H) 45 - 117 U/L    AST (SGOT) 53 (H) 15 - 37 U/L    ALT (SGPT) 48 12 - 78 U/L   PROTHROMBIN TIME + INR    Collection Time: 06/07/18  9:54 PM   Result Value Ref Range    INR 1.1 0.9 - 1.1      Prothrombin time 11.1 9.0 - 11.1 sec   PTT    Collection Time: 06/07/18  9:54 PM   Result Value Ref Range    aPTT 24.5 22.1 - 32.0 sec    aPTT, therapeutic range     58.0 - 77.0 SECS   BLOOD GAS, ARTERIAL    Collection Time: 06/07/18 11:17 PM   Result Value Ref Range    pH 7.25 (L) 7.35 - 7.45      PCO2 46 (H) 35.0 - 45.0 mmHg    PO2 137 (H) 80 - 100 mmHg    O2 SAT 98 (H) 92 - 97 %    BICARBONATE 20 (L) 22 - 26 mmol/L    BASE DEFICIT 7.6 mmol/L    O2 METHOD VENTILATOR      FIO2 100 %    MODE A/C      Tidal volume 400      SET RATE 14      EPAP/CPAP/PEEP 5.0      Sample source ARTERIAL      SITE LEFT RADIAL      ANGELITA'S TEST YES      Critical value read back Lucia Maher MD    TROPONIN I    Collection Time: 06/07/18 11:30 PM   Result Value Ref Range    Troponin-I, Qt. <0.04 <0.05 ng/mL   URINALYSIS W/MICROSCOPIC    Collection Time: 06/07/18 11:42 PM   Result Value Ref Range    Color YELLOW/STRAW      Appearance CLEAR CLEAR      Specific gravity 1.013 1.003 - 1.030      pH (UA) 7.0 5.0 - 8.0      Protein 300 (A) NEG mg/dL    Glucose 500 (A) NEG mg/dL    Ketone NEGATIVE  NEG mg/dL    Bilirubin NEGATIVE  NEG      Blood SMALL (A) NEG      Urobilinogen 0.2 0.2 - 1.0 EU/dL    Nitrites NEGATIVE  NEG      Leukocyte Esterase NEGATIVE  NEG      WBC 0-4 0 - 4 /hpf    RBC 0-5 0 - 5 /hpf    Epithelial cells FEW FEW /lpf    Bacteria NEGATIVE  NEG /hpf   URINE CULTURE HOLD SAMPLE    Collection Time: 06/07/18 11:42 PM   Result Value Ref Range    Urine culture hold        URINE ON HOLD IN MICROBIOLOGY DEPT FOR 3 DAYS. IF UNPRESERVED URINE IS SUBMITTED, IT CANNOT BE USED FOR ADDITIONAL TESTING AFTER 24 HRS, RECOLLECTION WILL BE REQUIRED. GLUCOSE, POC    Collection Time: 06/08/18 12:43 AM   Result Value Ref Range    Glucose (POC) 346 (H) 65 - 100 mg/dL    Performed by [employer]:  Patient Vitals for the past 12 hrs:   Temp Pulse Resp BP SpO2   06/08/18 0130 - 96 16 (!) 184/130 100 %   06/08/18 0115 - 96 16 (!) 199/99 99 %   06/08/18 0101 98.8 °F (37.1 °C) (!) 102 16 (!) 191/120 100 %   06/08/18 0100 98.8 °F (37.1 °C) - - - -   06/08/18 0045 - (!) 102 16 (!) 187/148 100 %   06/08/18 0030 - (!) 102 17 (!) 215/100 96 %   06/08/18 0022 - (!) 108 18 - 98 %   06/08/18 0015 - (!) 104 19 (!) 209/158 99 %   06/07/18 2328 - - 16 - 98 %   06/07/18 2230 - - - 105/78 -   06/07/18 2220 - (!) 122 14 - 100 %   06/07/18 2150 96.7 °F (35.9 °C) (!) 122 24 (!) 171/92 100 %       RADIOLOGY RESULTS:  The following have been ordered and reviewed:  Ct Head Wo Cont    Result Date: 6/7/2018  INDICATION: AMS Exam: Noncontrast CT of the brain is performed with 5 mm collimation. CT dose reduction was achieved with the use of the standardized protocol tailored for this examination and automatic exposure control for dose modulation. There is no prior study for direct comparison. FINDINGS: There is left frontal and left parietotemporal encephalomalacia. There is no evidence of acute territorial infarct. There is no acute intracranial hemorrhage, mass, mass effect or herniation. Ventricular system is normal. The mastoid air cells are well pneumatized.  The visualized paranasal sinuses are normal.     IMPRESSION: Left frontal and left parietotemporal encephalomalacia. No acute intracranial hemorrhage, mass or infarct. Xr Chest Port    Result Date: 6/7/2018  INDICATION: CVA, ET tube and NG tube placement. Portable AP supine view of the chest. Direct comparison made to prior chest x-ray dated December 9, 2014. Cardiomediastinal silhouette is prominent, likely secondary to supine positioning. ET tube is in appropriate position, 4.4 cm superior level of the braulio. NG tube extends to stomach. Lungs are hypoinflated. There is mild bibasilar atelectasis. No pleural fluid is visualized but is no pneumothorax. IMPRESSION: Tubes and lines in appropriate position. Mild bibasilar atelectasis. Cta Code Neuro Head And Neck W Cont    Result Date: 6/7/2018  Prelim report: Aberrant right subclavian artery. Right and left common and internal carotid arteries are patent. Left and right vertebral arteries are patent. Hypoplastic right A1 segment. No intracranial arterial occlusion. Incompletely visualized bibasilar patchy airspace disease. ET tube tip is 1.8 cm superior to the braulio. Final report to follow. ED EKG interpretation:  Rhythm: sinus tachycardia; and regular . Rate (approx.): 109; Axis: normal; P wave: normal; QRS interval: normal ; ST/T wave: non-specific changes; Other findings: abnormal ekg, slight ST elevation in III, septal infarct, LAE, NO STEMI. This EKG was interpreted by Rocky Huizar DO, ED Provider. PROCEDURES:  ETT, PIV    CONSULTATIONS:   Hospitalist; cardiology    PROGRESS NOTES:  Discussed results and plan with patient's family. Patient will be admitted/observed for further evaluation and treatment. DIAGNOSIS:    1. Altered mental status, unspecified altered mental status type    2. Seizure ([address]    3. Leukocytosis, unspecified type    4. Metabolic acidosis    5. Hyperglycemia    6.  Acute encephalopathy        PLAN:  Admit    ED COURSE: The patient's hospital course has been uncomplicated.

## 2018-06-08 NOTE — PROCEDURES
Keith Mayo M.D.  [phone]           2018                EGD Operative Report  Troy Search  :  1950  Niranjan Mosqueda Medical Record Number:  [id number]      Indication: Coffee ground emesis    : Travis Sanabria MD    Referring Provider:  Marika Roche MD      Anesthesia/Sedation:  MAC anesthesia Propofol    Airway assessment: No airway problems anticipated    Pre-Procedural Exam:      Airway: clear, no airway problems anticipated  Heart: RRR, without gallops or rubs  Lungs: clear bilaterally without wheezes, crackles, or rhonchi  Abdomen: soft, nontender, nondistended, bowel sounds present  Mental Status: awake, alert and oriented to person, place and time       Procedure Details     After infomed consent was obtained for the procedure, with all risks and benefits of procedure explained the patient was taken to the endoscopy suite and placed in the left lateral decubitus position. Following sequential administration of sedation as per above, the endoscope was inserted into the mouth and advanced under direct vision to second portion of the duodenum. A careful inspection was made as the gastroscope was withdrawn, including a retroflexed view of the proximal stomach; findings and interventions are described below. Findings:   Esophagus:normal  Stomach: 1 ulcer(s) 10 mm in size located in   body. This had an eschar and was oozing. I elected to treat this with hemoclip x 4. The ulcer was examined carefully and no residual bleeding was noted. Multiple small erosions were also noted throughout the stomach. Biopsies were obtained for LUCINDA testing for H.pylori infection  Duodenum/jejunum: normal    Therapies:  biopsy of stomach antrum  endoclip was placed for hemorrhage    Specimens: LUCINDA test           Complications:   None; patient tolerated the procedure well. EBL:  None. Impression:    A large oozing ulcer noted in the gastric body.  This was successfully treated with hemoclips x 4                 Multiple small erosions noted. Biopsies obtained for LUCINDA testing to rule out H.pylori    Recommendations:    -Continue acid suppression. ,   -Await LUCINDA test result and treat for Helicobacter pylori if positive. ,   -Follow symptoms.  -Monitor H&H closely  -Call the oncall GI team if there is evidence of recurrent GI bleeding  -Avoid placing NGT at this time.   -[facility]

## 2018-06-08 NOTE — H&P
Gume Marin M.D.  [phone]            History and Physical       NAME:  Brianne Katz   :   1950   MRN:   [id number]       Referring Physician:  aMtty Rodriguez    Consult Date: 2018 4:39 PM    Chief Complaint:  Colon cancer screening    History of Present Illness:  Patient is a 78 y.o. who is seen for coffee ground emesis. She is intubated and was found down. Hgb has drifted down  Unable to provide any history    PMH:  Past Medical History:   Diagnosis Date    Diabetes ([address]     Hypertension        PSH:  History reviewed. No pertinent surgical history.     Allergies:  No Known Allergies    Home Medications:  None       Hospital Medications:  Current Facility-Administered Medications   Medication Dose Route Frequency    sodium chloride (NS) flush 5-10 mL  5-10 mL IntraVENous Q8H    sodium chloride (NS) flush 5-10 mL  5-10 mL IntraVENous PRN    0.9% sodium chloride infusion  100 mL/hr IntraVENous CONTINUOUS    heparin (porcine) injection 5,000 Units  5,000 Units SubCUTAneous Q8H    insulin lispro (HUMALOG) injection   SubCUTAneous Q6H    glucose chewable tablet 16 g  4 Tab Oral PRN    dextrose (D50W) injection syrg 12.5-25 g  12.5-25 g IntraVENous PRN    glucagon (GLUCAGEN) injection 1 mg  1 mg IntraMUSCular PRN    labetalol (NORMODYNE;TRANDATE) injection 10 mg  10 mg IntraVENous Q2H PRN    piperacillin-tazobactam (ZOSYN) 3.375 g in 0.9% sodium chloride (MBP/ADV) 100 mL  3.375 g IntraVENous Q8H    propofol (DIPRIVAN) infusion  0-50 mcg/kg/min IntraVENous TITRATE    hydrALAZINE (APRESOLINE) 20 mg/mL injection 10 mg  10 mg IntraVENous Q2H PRN    famotidine (PF) (PEPCID) 20 mg in sodium chloride 0.9% 10 mL injection  20 mg IntraVENous Q12H    chlorhexidine (PERIDEX) 0.12 % mouthwash 15 mL  15 mL Oral BID    levETIRAcetam (KEPPRA) 500 mg in 0.9% sodium chloride 100 mL IVPB  500 mg IntraVENous Q12H    sodium chloride (NS) flush 5-10 mL  5-10 mL IntraVENous Q8H    sodium chloride (NS) flush 5-10 mL  5-10 mL IntraVENous PRN       Social History:  Social History   Substance Use Topics    Smoking status: Unknown If Ever Smoked    Smokeless tobacco: Not on file    Alcohol use Not on file      Comment: Unknown       Family History:  History reviewed. No pertinent family history.           Review of Systems:  Unable to obtain due to patients current mental status        Objective:   Patient Vitals for the past 8 hrs:   BP Temp Pulse Resp SpO2 Height Weight   06/08/18 1545 146/82 - (!) 108 16 100 % - -   06/08/18 1538 - 98.7 °F (37.1 °C) - - - - -   06/08/18 1530 150/79 - (!) 107 16 97 % - -   06/08/18 1524 - - (!) 107 16 99 % - -   06/08/18 1515 152/80 - (!) 104 16 98 % - -   06/08/18 1500 165/80 - (!) 105 10 98 % - -   06/08/18 1445 168/80 - (!) 108 16 97 % - -   06/08/18 1430 157/82 - (!) 105 - 98 % - -   06/08/18 1415 159/76 - (!) 107 17 98 % - -   06/08/18 1400 157/83 - (!) 109 17 99 % - -   06/08/18 1355 - - - - - 5' 6\" (1.676 m) 99.8 kg (220 lb 0.3 oz)   06/08/18 1345 145/74 - (!) 109 13 99 % - -   06/08/18 1334 - - (!) 108 16 98 % - -   06/08/18 1330 151/82 - (!) 111 16 96 % - -   06/08/18 1315 144/84 - (!) 113 16 97 % - -   06/08/18 1300 (!) 123/97 - (!) 110 16 99 % - -   06/08/18 1245 158/89 - (!) 114 20 100 % - -   06/08/18 1230 147/80 - (!) 108 17 98 % - -   06/08/18 1215 151/81 - (!) 113 18 97 % - -   06/08/18 1200 148/82 99.7 °F (37.6 °C) (!) 106 16 97 % - -   06/08/18 1145 148/83 - (!) 105 18 99 % - -   06/08/18 1139 - - (!) 109 16 100 % - -   06/08/18 1130 153/83 - (!) 109 16 99 % - -   06/08/18 1115 152/78 - (!) 110 16 99 % - -   06/08/18 1109 149/79 - - - - - -   06/08/18 1030 157/83 - (!) 106 18 98 % - -   06/08/18 1015 155/83 - (!) 106 17 98 % - -   06/08/18 1000 153/83 - (!) 102 18 98 % - -   06/08/18 0945 149/81 - (!) 104 19 98 % - -   06/08/18 0930 157/82 - (!) 102 17 98 % - -   06/08/18 0915 155/88 - - - - - -   06/08/18 0900 143/82 - (!) 107 16 99 % - -   06/08/18 0845 144/75 - (!) 101 16 99 % - -     06/08 0701 - 06/08 1900  In: 1998.8 [I.V.:1998.8]  Out: 495 [Urine:495]  06/06 1901 - 06/08 0700  In: -   Out: 675 [Urine:675]    EXAM:     NEURO-intubated and sedated   HEENT-wnl   LUNGS-clear    COR-regular rate and rhythym     ABD-soft , no tenderness, no rebound, good bs     EXT-no edema     Data Review     Recent Labs      06/08/18   1021  06/07/18   2154   WBC  12.4*  19.3*   HGB  14.3  16.5*   HCT  42.0  51.7*   PLT  217  320     Recent Labs      06/08/18   1021  06/07/18   2154   NA  137  138   K  3.2*  3.9   CL  99  95*   CO2  27  20*   BUN  7  6   CREA  1.15*  1.20*   GLU  212*  412*   CA  8.1*  10.0     Recent Labs      06/08/18   1021  06/07/18   2154   SGOT  52*  53*   AP  80  132*   TP  8.1  10.3*   ALB  2.9*  4.1   GLOB  5.2*  6.2*     Recent Labs      06/07/18   2154   INR  1.1   PTP  11.1   APTT  24.5       Patient Active Problem List   Diagnosis Code    Encephalopathy acute G93.40    Seiz[address] ([facility] [address] R56.9    DM (diabetes mellitus) ([facility] [address] E11.9    Leukocytosis D72.829    Aspiration into airway T17.908A    Renal insufficiency N28.9      Assessment:   · Coffee ground emesis   Plan:   · EGD today.      Signed By: Jose Amador MD     6/8/2018  4:39 PM

## 2018-06-08 NOTE — PERIOP NOTES
1614 Unable to obtain information from patient; information documented was found in chart or from assessment. 1625 Rapid H Pylori obtained. 5 Endoscope was pre-cleaned at bedside immediately following procedure by Bhumi Lacy. Patient tolerated procedure well. Abdomen remains soft and non tender post procedure, no indication of discomfort noted at this time. 1631 TRANSFER - OUT REPORT:    Verbal report given to [employer] (name) on Didi Parmar  being transferred to ICU 10 (unit) for routine progression of care       Report consisted of patients Situation, Background, Assessment and   Recommendations(SBAR). Information from the following report(s) SBAR, Procedure Summary and Recent Results was reviewed with the receiving nurse. Lines:   Peripheral IV 06/07/18 Right Forearm (Active)   Site Assessment Clean, dry, & intact 6/8/2018  3:40 PM   Phlebitis Assessment 0 6/8/2018  3:40 PM   Infiltration Assessment 0 6/8/2018  3:40 PM   Dressing Status Clean, dry, & intact 6/8/2018  3:40 PM   Dressing Type Transparent 6/8/2018  3:40 PM   Hub Color/Line Status Pink 6/8/2018  3:40 PM   Action Taken Open ports on tubing capped 6/8/2018 12:00 PM   Alcohol Cap Used Yes 6/8/2018  3:40 PM       Peripheral IV 06/07/18 Right Hand (Active)   Site Assessment Clean, dry, & intact 6/8/2018  3:57 PM   Phlebitis Assessment 0 6/8/2018  3:57 PM   Infiltration Assessment 0 6/8/2018  3:57 PM   Dressing Status Clean, dry, & intact 6/8/2018  3:57 PM   Dressing Type Transparent;Tape 6/8/2018  3:57 PM   Hub Color/Line Status Pink;Patent; Infusing 6/8/2018  3:57 PM   Action Taken Open ports on tubing capped 6/8/2018 12:00 PM   Alcohol Cap Used Yes 6/8/2018  3:40 PM        Opportunity for questions and clarification was provided. Care resumed by ICU nurse Joyce Sullivan.

## 2018-06-09 ENCOUNTER — APPOINTMENT (OUTPATIENT)
Dept: GENERAL RADIOLOGY | Age: 68
DRG: 064 | End: 2018-06-09
Attending: INTERNAL MEDICINE
Payer: MEDICARE

## 2018-06-09 LAB
ALBUMIN SERPL-MCNC: 2.7 G/DL (ref 3.5–5)
ALBUMIN SERPL-MCNC: 2.9 G/DL (ref 3.5–5)
ALBUMIN/GLOB SERPL: 0.5 {RATIO} (ref 1.1–2.2)
ALBUMIN/GLOB SERPL: 0.6 {RATIO} (ref 1.1–2.2)
ALP SERPL-CCNC: 71 U/L (ref 45–117)
ALP SERPL-CCNC: 78 U/L (ref 45–117)
ALT SERPL-CCNC: 30 U/L (ref 12–78)
ALT SERPL-CCNC: 33 U/L (ref 12–78)
ANION GAP SERPL CALC-SCNC: 5 MMOL/L (ref 5–15)
ANION GAP SERPL CALC-SCNC: 8 MMOL/L (ref 5–15)
AST SERPL-CCNC: 48 U/L (ref 15–37)
AST SERPL-CCNC: 49 U/L (ref 15–37)
BACTERIA SPEC CULT: NORMAL
BACTERIA SPEC CULT: NORMAL
BILIRUB SERPL-MCNC: 0.9 MG/DL (ref 0.2–1)
BILIRUB SERPL-MCNC: 1 MG/DL (ref 0.2–1)
BUN SERPL-MCNC: 6 MG/DL (ref 6–20)
BUN SERPL-MCNC: 8 MG/DL (ref 6–20)
BUN/CREAT SERPL: 6 (ref 12–20)
BUN/CREAT SERPL: 8 (ref 12–20)
CALCIUM SERPL-MCNC: 8.5 MG/DL (ref 8.5–10.1)
CALCIUM SERPL-MCNC: 8.6 MG/DL (ref 8.5–10.1)
CHLORIDE SERPL-SCNC: 102 MMOL/L (ref 97–108)
CHLORIDE SERPL-SCNC: 102 MMOL/L (ref 97–108)
CHOLEST SERPL-MCNC: 153 MG/DL
CO2 SERPL-SCNC: 28 MMOL/L (ref 21–32)
CO2 SERPL-SCNC: 30 MMOL/L (ref 21–32)
CREAT SERPL-MCNC: 1.02 MG/DL (ref 0.55–1.02)
CREAT SERPL-MCNC: 1.06 MG/DL (ref 0.55–1.02)
ERYTHROCYTE [DISTWIDTH] IN BLOOD BY AUTOMATED COUNT: 13.1 % (ref 11.5–14.5)
GLOBULIN SER CALC-MCNC: 5.2 G/DL (ref 2–4)
GLOBULIN SER CALC-MCNC: 5.2 G/DL (ref 2–4)
GLUCOSE BLD STRIP.AUTO-MCNC: 147 MG/DL (ref 65–100)
GLUCOSE BLD STRIP.AUTO-MCNC: 148 MG/DL (ref 65–100)
GLUCOSE BLD STRIP.AUTO-MCNC: 190 MG/DL (ref 65–100)
GLUCOSE BLD STRIP.AUTO-MCNC: 210 MG/DL (ref 65–100)
GLUCOSE SERPL-MCNC: 201 MG/DL (ref 65–100)
GLUCOSE SERPL-MCNC: 205 MG/DL (ref 65–100)
HCT VFR BLD AUTO: 37 % (ref 35–47)
HDLC SERPL-MCNC: 48 MG/DL
HDLC SERPL: 3.2 {RATIO} (ref 0–5)
HGB BLD-MCNC: 12.2 G/DL (ref 11.5–16)
LDLC SERPL CALC-MCNC: 85.8 MG/DL (ref 0–100)
LIPID PROFILE,FLP: NORMAL
MAGNESIUM SERPL-MCNC: 2.1 MG/DL (ref 1.6–2.4)
MAGNESIUM SERPL-MCNC: 2.2 MG/DL (ref 1.6–2.4)
MCH RBC QN AUTO: 33.3 PG (ref 26–34)
MCHC RBC AUTO-ENTMCNC: 33 G/DL (ref 30–36.5)
MCV RBC AUTO: 101.1 FL (ref 80–99)
NRBC # BLD: 0 K/UL (ref 0–0.01)
NRBC BLD-RTO: 0 PER 100 WBC
PLATELET # BLD AUTO: 176 K/UL (ref 150–400)
PMV BLD AUTO: 10.6 FL (ref 8.9–12.9)
POTASSIUM SERPL-SCNC: 3.5 MMOL/L (ref 3.5–5.1)
POTASSIUM SERPL-SCNC: 3.8 MMOL/L (ref 3.5–5.1)
PROT SERPL-MCNC: 7.9 G/DL (ref 6.4–8.2)
PROT SERPL-MCNC: 8.1 G/DL (ref 6.4–8.2)
RBC # BLD AUTO: 3.66 M/UL (ref 3.8–5.2)
SERVICE CMNT-IMP: ABNORMAL
SERVICE CMNT-IMP: NORMAL
SODIUM SERPL-SCNC: 137 MMOL/L (ref 136–145)
SODIUM SERPL-SCNC: 138 MMOL/L (ref 136–145)
TRIGL SERPL-MCNC: 96 MG/DL (ref ?–150)
VLDLC SERPL CALC-MCNC: 19.2 MG/DL
WBC # BLD AUTO: 10.6 K/UL (ref 3.6–11)

## 2018-06-09 PROCEDURE — 74011250637 HC RX REV CODE- 250/637: Performed by: INTERNAL MEDICINE

## 2018-06-09 PROCEDURE — 74011000258 HC RX REV CODE- 258: Performed by: PSYCHIATRY & NEUROLOGY

## 2018-06-09 PROCEDURE — 71045 X-RAY EXAM CHEST 1 VIEW: CPT

## 2018-06-09 PROCEDURE — 80053 COMPREHEN METABOLIC PANEL: CPT | Performed by: FAMILY MEDICINE

## 2018-06-09 PROCEDURE — 97530 THERAPEUTIC ACTIVITIES: CPT

## 2018-06-09 PROCEDURE — 82962 GLUCOSE BLOOD TEST: CPT

## 2018-06-09 PROCEDURE — 77010033678 HC OXYGEN DAILY

## 2018-06-09 PROCEDURE — 97162 PT EVAL MOD COMPLEX 30 MIN: CPT

## 2018-06-09 PROCEDURE — 74011250636 HC RX REV CODE- 250/636: Performed by: PSYCHIATRY & NEUROLOGY

## 2018-06-09 PROCEDURE — 86695 HERPES SIMPLEX TYPE 1 TEST: CPT | Performed by: PSYCHIATRY & NEUROLOGY

## 2018-06-09 PROCEDURE — 83735 ASSAY OF MAGNESIUM: CPT | Performed by: FAMILY MEDICINE

## 2018-06-09 PROCEDURE — 74011636637 HC RX REV CODE- 636/637: Performed by: INTERNAL MEDICINE

## 2018-06-09 PROCEDURE — 65610000006 HC RM INTENSIVE CARE

## 2018-06-09 PROCEDURE — 74011250636 HC RX REV CODE- 250/636: Performed by: INTERNAL MEDICINE

## 2018-06-09 PROCEDURE — 74011250637 HC RX REV CODE- 250/637: Performed by: FAMILY MEDICINE

## 2018-06-09 PROCEDURE — 85027 COMPLETE CBC AUTOMATED: CPT | Performed by: INTERNAL MEDICINE

## 2018-06-09 PROCEDURE — 36415 COLL VENOUS BLD VENIPUNCTURE: CPT | Performed by: FAMILY MEDICINE

## 2018-06-09 PROCEDURE — 74011250637 HC RX REV CODE- 250/637

## 2018-06-09 PROCEDURE — 74011000250 HC RX REV CODE- 250: Performed by: INTERNAL MEDICINE

## 2018-06-09 PROCEDURE — 80061 LIPID PANEL: CPT | Performed by: PSYCHIATRY & NEUROLOGY

## 2018-06-09 PROCEDURE — 74011000258 HC RX REV CODE- 258: Performed by: INTERNAL MEDICINE

## 2018-06-09 RX ORDER — PANTOPRAZOLE SODIUM 40 MG/1
40 TABLET, DELAYED RELEASE ORAL 2 TIMES DAILY
Status: DISCONTINUED | OUTPATIENT
Start: 2018-06-09 | End: 2018-06-11 | Stop reason: HOSPADM

## 2018-06-09 RX ORDER — AMLODIPINE BESYLATE 5 MG/1
5 TABLET ORAL DAILY
Status: DISCONTINUED | OUTPATIENT
Start: 2018-06-09 | End: 2018-06-10

## 2018-06-09 RX ADMIN — NITROGLYCERIN 0.5 INCH: 20 OINTMENT TOPICAL at 02:00

## 2018-06-09 RX ADMIN — Medication 10 ML: at 14:11

## 2018-06-09 RX ADMIN — AMLODIPINE BESYLATE 5 MG: 5 TABLET ORAL at 10:42

## 2018-06-09 RX ADMIN — INSULIN LISPRO 2 UNITS: 100 INJECTION, SOLUTION INTRAVENOUS; SUBCUTANEOUS at 18:09

## 2018-06-09 RX ADMIN — LEVETIRACETAM 500 MG: 100 INJECTION, SOLUTION, CONCENTRATE INTRAVENOUS at 08:35

## 2018-06-09 RX ADMIN — Medication 10 ML: at 20:25

## 2018-06-09 RX ADMIN — HEPARIN SODIUM 5000 UNITS: 5000 INJECTION, SOLUTION INTRAVENOUS; SUBCUTANEOUS at 08:35

## 2018-06-09 RX ADMIN — HEPARIN SODIUM 5000 UNITS: 5000 INJECTION, SOLUTION INTRAVENOUS; SUBCUTANEOUS at 02:30

## 2018-06-09 RX ADMIN — LABETALOL HYDROCHLORIDE 10 MG: 5 INJECTION INTRAVENOUS at 18:21

## 2018-06-09 RX ADMIN — INSULIN LISPRO 2 UNITS: 100 INJECTION, SOLUTION INTRAVENOUS; SUBCUTANEOUS at 12:05

## 2018-06-09 RX ADMIN — PANTOPRAZOLE SODIUM 40 MG: 40 TABLET, DELAYED RELEASE ORAL at 18:08

## 2018-06-09 RX ADMIN — INSULIN LISPRO 3 UNITS: 100 INJECTION, SOLUTION INTRAVENOUS; SUBCUTANEOUS at 06:34

## 2018-06-09 RX ADMIN — PIPERACILLIN SODIUM AND TAZOBACTAM SODIUM 3.38 G: 3; .375 INJECTION, POWDER, LYOPHILIZED, FOR SOLUTION INTRAVENOUS at 02:30

## 2018-06-09 RX ADMIN — LEVETIRACETAM 500 MG: 100 INJECTION, SOLUTION, CONCENTRATE INTRAVENOUS at 20:24

## 2018-06-09 RX ADMIN — INSULIN LISPRO 2 UNITS: 100 INJECTION, SOLUTION INTRAVENOUS; SUBCUTANEOUS at 23:50

## 2018-06-09 RX ADMIN — FAMOTIDINE 20 MG: 10 INJECTION INTRAVENOUS at 08:35

## 2018-06-09 NOTE — PROGRESS NOTES
Problem: Diabetes Self-Management  Goal: *Disease process and treatment process  Define diabetes and identify own type of diabetes; list 3 options for treating diabetes. Outcome: Not Progressing Towards Goal  Patient educated on exercise, medication, diet    Problem: Pressure Injury - Risk of  Goal: *Prevention of pressure injury  Document Fidel Scale and appropriate interventions in the flowsheet. Outcome: Progressing Towards Goal  Pressure Injury Interventions:  Sensory Interventions: Avoid rigorous massage over bony prominences, Discuss PT/OT consult with provider    Moisture Interventions: Check for incontinence Q2 hours and as needed    Activity Interventions: PT/OT evaluation    Mobility Interventions: Pressure redistribution bed/mattress (bed type)    Nutrition Interventions: Document food/fluid/supplement intake, Discuss nutritional consult with provider    Friction and Shear Interventions: Apply protective barrier, creams and emollients, HOB 30 degrees or less               Problem: Falls - Risk of  Goal: *Absence of Falls  Document Suzette Fall Risk and appropriate interventions in the flowsheet.    Outcome: Progressing Towards Goal  Fall Risk Interventions:       Mentation Interventions: Adequate sleep, hydration, pain control, Bed/chair exit alarm, Door open when patient unattended, Evaluate medications/consider consulting pharmacy    Medication Interventions: Patient to call before getting OOB    Elimination Interventions: Call light in reach, Patient to call for help with toileting needs, Toileting schedule/hourly rounds    History of Falls Interventions: Consult care management for discharge planning

## 2018-06-09 NOTE — PROGRESS NOTES
Problem: Mobility Impaired (Adult and Pediatric)  Goal: *Acute Goals and Plan of Care (Insert Text)  Physical Therapy Goals  Initiated 6/9/2018  1. Patient will move from supine to sit and sit to supine  in bed with modified independence within 7 day(s). 2.  Patient will transfer from bed to chair and chair to bed with modified independence using the least restrictive device within 7 day(s). 3.  Patient will perform sit to stand with modified independence within 7 day(s). 4.  Patient will ambulate with modified independence for 150 feet with the least restrictive device within 7 day(s). 5.  Patient will ascend/descend 4 stairs with handrail(s) with supervision/set-up within 7 day(s). physical Therapy EVALUATION  Patient: Mak Caceres (7 y.o. female)  Date: 6/9/2018  Primary Diagnosis: Encephalopathy acute  Encephalopathy acute  Coffee ground emesis  Procedure(s) (LRB):  ESOPHAGOGASTRODUODENOSCOPY (EGD) (N/A)  RESOLUTION  CLIP x 4 (N/A)  ESOPHAGOGASTRODUODENAL (EGD) BIOPSY (N/A) 1 Day Post-Op   Precautions:   Fall    ASSESSMENT :  Based on the objective data described below, the patient presents with generally decreased strength, decreased endurance, impaired dynamic standing balance, and limited functional mobility on hospital day 2 of admission with acute encephalopathy and possible seizure activity now POD 1 s/p EGD with clips. Pt remains disoriented and with questionable accuracy of verbal responses although follows all commands. She requires up to minimal assistance for functional mobility and VSS with activity. Patient will benefit from skilled intervention to address the above impairments.   Patients rehabilitation potential is considered to be Good  Factors which may influence rehabilitation potential include:   []         None noted  [x]         Mental ability/status  []         Medical condition  []         Home/family situation and support systems  []         Safety awareness  [] Pain tolerance/management  []         Other:      PLAN :  Recommendations and Planned Interventions:  [x]           Bed Mobility Training             []    Neuromuscular Re-Education  [x]           Transfer Training                   []    Orthotic/Prosthetic Training  [x]           Gait Training                         []    Modalities  [x]           Therapeutic Exercises           []    Edema Management/Control  [x]           Therapeutic Activities            [x]    Patient and Family Training/Education  []           Other (comment):    Frequency/Duration: Patient will be followed by physical therapy  5 times a week to address goals. Discharge Recommendations: To Be Determined: HHPT vs. Rehab pending progress  Further Equipment Recommendations for Discharge: none     SUBJECTIVE:   Patient stated I need to go to the bathroom.  Otherwise pt offering yes/no responses which sometime conflict. Affirms living in one story home and two story home. OBJECTIVE DATA SUMMARY:   HISTORY:    Past Medical History:   Diagnosis Date    Diabetes ([facility] [address]     Hypertension    History reviewed. No pertinent surgical history. Prior Level of Function/Home Situation: unclear baseline mobility status. Personal factors and/or comorbidities impacting plan of care: cognitive status. DM. Home Situation  Home Environment: Private residence  # Steps to Enter: 4  Rails to Enter: Yes  One/Two Story Residence: One story  Living Alone: Yes    EXAMINATION/PRESENTATION/DECISION MAKING:   Critical Behavior:  Neurologic State: Confused  Orientation Level: Oriented to person, Disoriented to place, Disoriented to situation, Disoriented to time  Cognition: Follows commands     Pt received supine, agreeable to PT and cleared by [facility]. Skin:  LE exposed skin intact  Edema: none noted LEs.   Range Of Motion:  AROM: Generally decreased, functional                       Strength:    Strength: Generally decreased, functional                    Tone & Sensation:   Tone: Normal                              Coordination:  Coordination: Within functional limits  Vision:      Functional Mobility:  Bed Mobility:     Supine to Sit: Additional time;Minimum assistance     Scooting: Additional time;Contact guard assistance  Transfers:  Sit to Stand: Additional time;Minimum assistance; cues for UE placement. Stand to Sit: Additional time;Minimum assistance                       Balance:   Sitting: Without support  Sitting - Static: Good (unsupported)  Sitting - Dynamic: Good (unsupported)  Standing: With support  Standing - Static: Good  Standing - Dynamic : Fair  Ambulation/Gait Training:  Distance (ft): 6 Feet (ft)  Assistive Device: Walker, rolling;Gait belt  Ambulation - Level of Assistance: Minimal assistance        Gait Abnormalities: Decreased step clearance        Base of Support: Widened                           Assist for line management and walker manipulation. Functional Measure:  Barthel Index:    Bathin  Bladder: 0  Bowels: 5  Groomin  Dressin  Feedin  Mobility: 0  Stairs: 0  Toilet Use: 5  Transfer (Bed to Chair and Back): 5  Total: 25       Barthel and G-code impairment scale:  Percentage of impairment CH  0% CI  1-19% CJ  20-39% CK  40-59% CL  60-79% CM  80-99% CN  100%   Barthel Score 0-100 100 99-80 79-60 59-40 20-39 1-19   0   Barthel Score 0-20 20 17-19 13-16 9-12 5-8 1-4 0      The Barthel ADL Index: Guidelines  1. The index should be used as a record of what a patient does, not as a record of what a patient could do. 2. The main aim is to establish degree of independence from any help, physical or verbal, however minor and for whatever reason. 3. The need for supervision renders the patient not independent. 4. A patient's performance should be established using the best available evidence.  Asking the patient, friends/relatives and nurses are the usual sources, but direct observation and common sense are also important. However direct testing is not needed. 5. Usually the patient's performance over the preceding 24-48 hours is important, but occasionally longer periods will be relevant. 6. Middle categories imply that the patient supplies over 50 per cent of the effort. 7. Use of aids to be independent is allowed. Freda Hilton., Barthel, D.W. ([id number]). Functional evaluation: the Barthel Index. [address] [facility] (14)2. Elias Fitch eric KYLAH Funk, Monica Esparza., Eleanor French., [place], [address] [place] [address] (1999). Measuring the change indisability after inpatient rehabilitation; comparison of the responsiveness of the Barthel Index and Functional [place] Measure. Journal of Neurology, Neurosurgery, and Psychiatry, 66(4), 622-150. Iamn Sultana, N.J.A, TANNA Hernandes, & Mai Carranza, MEMILIO. (2004.) Assessment of post-stroke quality of life in cost-effectiveness studies: The usefulness of the Barthel Index and the EuroQoL-5D. Quality of Life Research, 13, 901-09         G codes: In compliance with CMSs Claims Based Outcome Reporting, the following G-code set was chosen for this patient based on their primary functional limitation being treated: The outcome measure chosen to determine the severity of the functional limitation was the barthel with a score of 25/100 which was correlated with the impairment scale.     ? Mobility - Walking and Moving Around:     - CURRENT STATUS: CL - 60%-79% impaired, limited or restricted    - GOAL STATUS: CK - 40%-59% impaired, limited or restricted    - D/C STATUS:  ---------------To be determined---------------      Physical Therapy Evaluation Charge Determination   History Examination Presentation Decision-Making   MEDIUM  Complexity : 1-2 comorbidities / personal factors will impact the outcome/ POC  HIGH Complexity : 4+ Standardized tests and measures addressing body structure, function, activity limitation and / or participation in recreation  MEDIUM Complexity : Evolving with changing characteristics  Other outcome measures Barthel  HIGH       Based on the above components, the patient evaluation is determined to be of the following complexity level: MEDIUM    Pain:  Pain Scale 1: Numeric (0 - 10)  Pain Intensity 1: 0              Activity Tolerance:   No pt complaints. VSS. Please refer to the flowsheet for vital signs taken during this treatment. After treatment:   [x]         Patient left in no apparent distress sitting up in chair  []         Patient left in no apparent distress in bed  [x]         Call bell left within reach  [x]         Nursing notified  []         Caregiver present  []         Bed alarm activated    COMMUNICATION/EDUCATION:   The patients plan of care was discussed with: Occupational Therapist, Registered Nurse and Rehabilitation Attendant. [x]         Fall prevention education was provided and the patient/caregiver indicated understanding. [x]         Patient/family have participated as able in goal setting and plan of care. [x]         Patient/family agree to work toward stated goals and plan of care. []         Patient understands intent and goals of therapy, but is neutral about his/her participation. []         Patient is unable to participate in goal setting and plan of care.     Thank you for this referral.  Farhan Ulloa, PT, DPT   Time Calculation: 26 mins

## 2018-06-09 NOTE — PROGRESS NOTES
2110 Bed alarm ringing, RN to room. patient confused, attempting to get OOB without assistance. States, \" I am scared of yalll, dont touch me\"  patient appears agitated AEB swinging towards staff. Code [place] called.

## 2018-06-09 NOTE — PROGRESS NOTES
Neurology Hospital Progress Note    Patient ID:  Ely Conner  [id number]  78 y.o.  1950      Subjective:   History:  Ms. Brush Maze history of DM and HTN  who is admitted for vomiting coffee ground with increased confusion and apparently had seizure like activity. MRI brain showing L high cortical parietal lobe stroke; and a subacute L medial temporal stroke. CTA head and neck WNL  EEG (preliminary) was said to be okay  (+) elevated WBC    Found to have large ulcers requiring clipping and unable to give ASA    Still confused and agitated. ROS:  Per HPI-  Otherwise the remainder of the review of system was negative      Social Hx:  Social History     Social History    Marital status:      Spouse name: N/A    Number of children: N/A    Years of education: N/A     Social History Main Topics    Smoking status: Unknown If Ever Smoked    Smokeless tobacco: None    Alcohol use None      Comment: Unknown    Drug use: None    Sexual activity: Not Asked     Other Topics Concern    None     Social History Narrative       Meds:  No current facility-administered medications on file prior to encounter. No current outpatient prescriptions on file prior to encounter. Imaging:    CT Results (recent):    Results from Hospital Encounter encounter on 06/07/18   CTA CODE NEURO HEAD AND NECK W CONT   Narrative Prelim report:  Aberrant right subclavian artery. Right and left common and internal carotid arteries are patent. Left and right vertebral arteries are patent. Hypoplastic right A1 segment. No intracranial arterial occlusion. Incompletely visualized bibasilar patchy airspace disease. ET tube tip is 1.8 cm  superior to the braulio. Final report to follow. *FINAL REPORT BELOW  EXAM:  CTA CODE NEURO HEAD AND NECK W CONT  INDICATION:  seizure, waxing and waning altered mental status.   TECHNIQUE: Axial spiral acquired CT angiography was performed from the aortic  arch up through the intracranial vessels. This was performed during intravenous  bolus infusion 100 mL of Isovue 370. Post-processing with  MIP reconstructions  from aortic arch to the skull base. CT dose reduction was achieved through use  of a standardized protocol tailored for this examination and automatic exposure  control for dose modulation. COMPARISON: CT head. FINDINGS:  For unknown technical reasons there is suboptimal opacification of the arteries  with contrast.  Some atherosclerotic calcification in the aorta. 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 The left common carotid artery and right common carotid arteries both arise from  the innominate. Dominant left vertebral artery. Right vertebral artery is patent but less than  optimally visualized due to poor contrast opacification. The basilar artery has  no significant stenosis and supplies both posterior cerebral arteries. Common carotid arteries are mildly tortuous and otherwise unremarkable. No evidence of hemodynamically significant stenosis at carotid bifurcations. 0%  stenosis by NASCET criteria. Prominent tortuosity of both cervical internal  carotid arteries without associated stenosis. Carotid siphons are most visualized with no abnormality demonstrated. Symmetric  sclerotic calcification in the distal internal carotid arteries. Probable at  least mild stenosis distal right vertebral artery. Relatively symmetric  opacification of middle cerebral arteries. Left anterior cerebral artery is  dominant. Endotracheal tube is in place. Atelectasis possible small pleural effusions. Impression IMPRESSION:  1. 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 2. Suboptimal arterial opacification apparently due to technical limitations. 3. Mild stenosis distal right internal carotid artery. 4. No acute arterial abnormality demonstrated.               MRI Results (recent):    Results from [place] encounter on 06/07/18   MRI BRAIN W WO CONT   Narrative Clinical indication: Encephalopathy. Technical factors: Diffusion imaging, sagittal T1-weighted, axial load  T1-weighted pre and post 19 cc IV dotarem T2-weighted FLAIR gradient echo  coronal T1-weighted postcontrast no prior. There is no extra-axial fluid collection hemorrhage or shift of the midline. There is some encephalomalacia seen in the left parietal lobe inferiorly. Diffusion imaging show likely subacute to acute likely ischemic remote infarct  also seen in the left frontal convexity. Remote ischemic major flow voids in the  vessels at the base of the brain are present. Ventricular size is normal for  age. There is no abnormal enhancement or masses her. Impression IMPRESSION: Area of encephalomalacia likely relate to remote ischemic changes  left frontal convexity, left inferior parietal lobe. Suspect minimal recent  likely subacute ischemic changes medial left temporal lobe. Changes medially in  the left temporal lobe. IR Results (recent):  No results found for this or any previous visit. VAS/US Results (recent):    Results from Hospital Encounter encounter on 12/09/14   DUPLEX LOWER EXT VENOUS RIGHT   Narrative **Final Report**      ICD Codes / Adm. Diagnosis: 341815   / Foot Pain  Foot pain  Examination:  US LOW EXT VENOUS DOPPLER UNI RT  - [id number] - Dec  9 2014    2:53PM  Accession No:  [id number]  Reason:  pain; swelling      REPORT:  Indication: pain; swelling     Examination: Right lower extremity venous Doppler. Findings: Grayscale, color, and duplex imaging of the right lower lower   extremity deep venous system was performed. The veins are compressible and   demonstrate normal color flow and Doppler waveform. IMPRESSION:  1.  No evidence of right lower extremity venous thrombosis            Signing/Reading Doctor: Beatriz Treadwell ([id number])    Approved: Melvina PALACIOS ([id number])  Dec  9 2014  2:57PM Reviewed records in [facility] tab today    Lab Review     Admission on 06/07/2018   No results displayed because visit has over 200 results. Objective:       Exam:  Visit Vitals    /84    Pulse (!) 102    Temp 99.5 °F (37.5 °C)    Resp 20    Ht 5' 6\" (1.676 m)    Wt 99.4 kg (219 lb 2.2 oz)    SpO2 97%    BMI 35.37 kg/m2     Gen: Awake, alert, follows commands  Appropriate appearance, normal speech. Oriented to all spheres. No visual field defect on confrontation exam.  Full eyes movement, with no nystagmus, no diplopia, no ptosis. Normal gag and swallow. All remaining cranial nerves were normal  Motor function: 5/5 in all extremities  Sensroy: intact to LT, PP and JPS  DTRs ++ in all extremities, (-) Babinski  Good FTN and HTS   Gait: Deferred    Assessment:     1. Altered mental status, unspecified altered mental status type    2. Seizure ([address]    3. Leukocytosis, unspecified type    4. Metabolic acidosis    5. Hyperglycemia    6. Acute encephalopathy    7. Partial symptomatic epilepsy with complex partial seizures, not intractable, without status epilepticus ([address]      MRI brain showing L high cortical parietal lobe stroke; and a subacute L medial temporal stroke. Plan:   1. Will check HSV antibodies  2. Check ammonia  3. Already on Keppra 500 mg BID  4. If still no improvement despite above, consider referral for spinal tap c/o interventional radiologist (obese) to rule out HSV encephalitis (temporal lobe lesion)  5. Cannot give ASA due to recent GI bleed. Awaiting for GI to clear patient to be placed back on enteric ASA if possible  6.  Optimize medical management  Please call for questions      Darby Arroyo MD  Diplomate, American Board of Psychiatry and Neurology  Diplomate, Neuromuscular Medicine  Diplomate, American Board of Electrodiagnostic Medicine

## 2018-06-09 NOTE — PROGRESS NOTES
Shift Summary     0700 Bedside report with nightshift RN. Patient resting in bed, alert to self, confused on date and time. Denies pain. 2L of O2. 2 PIV CDI. Fluids infusing. Sitter at bedside for suicide precautions. King patent and draining. No other needs noted at this time. [id number] Daughter and son in law at bedside, updated them on plan of care, all questions answered fully. 0750 Scheduled labs. 0830 MD Cash at bedside to assess. Patient not suicidal upon her assessment. Suicide precautions discontinued. 0930 STAND assessment complete, allowed patient sips of water until GI rounds. 1003 [place] at bedside to assess. 1030 Scheduled meds. 1236 Neuro at bedside to assess. 0 MD Simin at bedside to assess. 1403 PT at bedside to assess. 1408 Reported recent events to moise Bucio to DC King and downgrade. 1421 King out per order, assisted patient to [facility] to void. Patient tolerated fairly. Denies pain, will continue to monitor. 1500 Patient back to bed, denies pain. No other needs voiced at this time. 1600 Assisted patient to [facility].     1700 Assisted patient to [facility] and into chair for dinner. 3 [place] patient back to bed. Family at bedside. Updated on recent events and plan of care. No other needs voiced at this time. Will sign off to nightshift RN.

## 2018-06-09 NOTE — CONSULTS
[facility]     Name: [facility] [place] MRN: [id number]   : 1950 Hospital: [facility]   Date: 2018        Impression Plan   1. Encephalopathy  2. GI bleed- gastric ulcer  3. Seizure activity  4. DM  5. Abnormal Chest xray- bilateral atelectasis               · Wean O2 to keep sats above 90%  · D/C zosyn  · MRI brain with chronic and subacute changes  · Neuro following- treating for seizures secondary to past strokes  · Continue Keppra  · Unable to give ASA due to GI bleed  · Accuchecks q6h  · SSI  · Hold heparin for 24 hrs atleast           Radiology  ( personally reviewed) CXR reviewed: bilateral atelectasis, no edema   ABG No results for input(s): PHI, PO2I, PCO2I in the last 72 hours. Subjective     Overnight events:  Reportedly pt said she wanted to kill herself last night. Pt is delirious for me. Answers \"Yes, I feel a little short\" to every questions including if she is suicidal. Can only follow simple commands after being prompted several times. No further bleeding/vomiting overnight  Hgb lower  MRI showed old strokes  No seizure activity    Past Medical History:   Diagnosis Date    Diabetes ([facility] [address]     Hypertension       History reviewed. No pertinent surgical history.    Prior to Admission medications    Not on File     Current Facility-Administered Medications   Medication Dose Route Frequency    sodium chloride (NS) flush 5-10 mL  5-10 mL IntraVENous Q8H    0.9% sodium chloride infusion  100 mL/hr IntraVENous CONTINUOUS    heparin (porcine) injection 5,000 Units  5,000 Units SubCUTAneous Q8H    insulin lispro (HUMALOG) injection   SubCUTAneous Q6H    piperacillin-tazobactam (ZOSYN) 3.375 g in 0.9% sodium chloride (MBP/ADV) 100 mL  3.375 g IntraVENous Q8H    propofol (DIPRIVAN) infusion  0-50 mcg/kg/min IntraVENous TITRATE    famotidine (PF) (PEPCID) 20 mg in sodium chloride 0.9% 10 mL injection  20 mg IntraVENous Q12H    levETIRAcetam (KEPPRA) 500 mg in 0.9% sodium chloride 100 mL IVPB  500 mg IntraVENous Q12H    sodium chloride (NS) flush 5-10 mL  5-10 mL IntraVENous Q8H     No Known Allergies   Social History   Substance Use Topics    Smoking status: Unknown If Ever Smoked    Smokeless tobacco: Not on file    Alcohol use Not on file      Comment: Unknown      History reviewed. No pertinent family history. Laboratory: I have personally reviewed the critical care flowsheet and labs.      Recent Labs      06/09/18   0750  06/08/18   1021  06/07/18   2154   WBC  10.6  12.4*  19.3*   HGB  12.2  14.3  16.5*   HCT  37.0  42.0  51.7*   PLT  176  217  320     Recent Labs      06/09/18   0615  06/09/18   0449  06/08/18   1021  06/07/18   2154   NA  137  138  137  138   K  3.8  3.5  3.2*  3.9   CL  102  102  99  95*   CO2  30  28  27  20*   GLU  201*  205*  212*  412*   BUN  8  6  7  6   CREA  1.02  1.06*  1.15*  1.20*   CA  8.5  8.6  8.1*  10.0   MG  2.1  2.2  2.0   --    ALB  2.7*  2.9*  2.9*  4.1   SGOT  49*  48*  52*  53*   ALT  30  33  35  48   INR   --    --    --   1.1       Objective:     Mode Rate Tidal Volume Pressure FiO2 PEEP   Assist control   450 ml  10 cm H2O 50 % 5 cm H20     Vital Signs:    Ventilator Pressures  Pressure Support (cm H2O): 10 cm H2O  PIP Observed (cm H2O): 16 cm H2O  Plateau Pressure (cm H2O): 17 cm H2O  MAP (cm H2O): 8.6  PEEP/VENT (cm H2O): 5 cm H20  Auto PEEP Observed (cm H2O): 0 cm [id number](24)Ventilator Pressures  Pressure Support (cm H2O): 10 cm H2O  PIP Observed (cm H2O): 16 cm H2O  Plateau Pressure (cm H2O): 17 cm H2O  MAP (cm H2O): 8.6  PEEP/VENT (cm H2O): 5 cm H20  Auto PEEP Observed (cm H2O): 0 cm H2O  Safety & Alarms  Circuit Temperature: 98.6 °F (37 °C)  Backup Mode Checked/Apnea: Yes  Pressure Max: 40 cm H2O  Ve Min: 2  Ve Max: 20  Vt Min: 200 ml  Vt Max: 800 ml  RR Min: 16  RR Max: 50  Intake/Output:   Last shift:      Ventilator Volumes  Vt Set (ml): 450 ml  Vt Exhaled (Machine Breath) (ml): 480 ml  Vt Spont (ml): 400 ml  Ve Observed (l/min): 7.8 l/min  Last 3 shifts: 06/09 0701 - 06/09 1900  In: 100 [I.V.:100]  Out: 125 [Urine:125][id number]    Intake/Output Summary (Last 24 hours) at 06/09/18 0849  Last data filed at 06/09/18 0800   Gross per 24 hour   Intake          2649.43 ml   Output             1470 ml   Net          1179.43 ml     EXAM:   GENERAL:awake, alert, HEENT:  Poor dentition, PERRL, EOMI, no alar flaring or epistaxis, oral mucosa moist without cyanosis, NECK:  no jugular vein distention, no retractions, no thyromegaly or masses, LUNGS: CTA, no w/r/r, HEART:  Regular rate and rhythm with no MGR; no edema is present, ABDOMEN:  soft with no tenderness, bowel sounds present, EXTREMITIES:  warm with no cyanosis, SKIN:  no jaundice or ecchymosis and NEUROLOGIC:  Perseverates on the same answer, needs repeated prompting to follow commands, moving all extremeties and meets eye contact.      Zainab Perez MD  [facility]

## 2018-06-09 NOTE — PROGRESS NOTES
Daily Progress Note: 6/9/2018  Jerilyn Slade MD    Assessment/Plan:   1. Encephalopathy acute -- initially intubated, extubated 6/8. --Likely seizure activity post old CVA  --still confused  --NPO, bedside speech ok  --on Keppra per neuro     3. Uncontrolled DM - A1c >9%  --BS high, will add lantus if needed especially if she starts taking po  --Start on acei pending Cr trend     4. Post extubation CXR with atelectasis  --abx stopped per pulmonary 6/8     5. Leukocytosis -- resolving     6. Renal insufficiency -. Unknown baseline.   -improving with IVF     7. HTN.  --re-start home norvasc, increase to 10mg if needed    8. GI bleed -- EGD yesterday with gastric ulcer  --holding all anticoag due to GI bleed  --Hgb down but still >12  --advance diet if ok with GI     Problem List:  Problem List as of 6/9/2018  Date Reviewed: 6/7/2018          Codes Class Noted - Resolved    Encephalopathy acute ICD-10-CM: G93.40  ICD-9-CM: 348.30  6/7/2018 - Present        Seizure ([facility] [address] ICD-10-CM: R56.9  ICD-9-CM: 780.39  6/7/2018 - Present        DM (diabetes mellitus) ([facility] [address] ICD-10-CM: E11.9  ICD-9-CM: 250.00  6/7/2018 - Present        Leukocytosis ICD-10-CM: M52.437  ICD-9-CM: 288.60  6/7/2018 - Present        Aspiration into airway ICD-10-CM: T17.908A  ICD-9-CM: 934.9  6/7/2018 - Present        Renal insufficiency ICD-10-CM: N28.9  ICD-9-CM: 593.9  6/7/2018 - Present              Subjective:     78 y.o.  female who is admitted with acute encephalopathy. Ms. Alexandro Blas with PMH of DM, HTN was brought to ER after she became less responsive and vomiting. Pt is intubated and sedated. aAcording to her daughter, she talked to her at 4 PM yesterday on the phone and was ok, but later when she reached home, she went to her room and found her vomiting all over the floor and became in and out of consciousness.  Later EMS was called and was brought to ER and ER she noted to have a seizure like activity. keppra loading dose was given. History reviewed. No pertinent past medical history. uncle: DM  History reviewed. No pertinent surgical history. (Dr Betsy Macias)    :  Intubated and sedated. Some spontaneous movement noted. AM las pending. : More pleasant this AM but generally very confused. Answers most yes/no questions but then can't elaborate. Nonsensical at times. Not oriented. Review of Systems:   Review of systems not obtained due to patient factors. Objective:   Physical Exam:     Visit Vitals    BP (!) 165/100 (BP 1 Location: Left arm, BP Patient Position: At rest)    Pulse 100    Temp 99.5 °F (37.5 °C)    Resp 16    Ht 5' 6\" (1.676 m)    Wt 99.4 kg (219 lb 2.2 oz)    LMP Comment: unknown    SpO2 94%    BMI 35.37 kg/m2    O2 Flow Rate (L/min): 2 l/min O2 Device: Nasal cannula    Temp (24hrs), Av.3 °F (37.4 °C), Min:98.7 °F (37.1 °C), Max:99.7 °F (37.6 °C)     07 -  1900  In: 100 [I.V.:100]  Out: 125 [Urine:125]   1901 -  0700  In: 3616.6 [I.V.:3616.6]  Out: 2095 [Urine:2095]    General:  Awake, alert, no distress, obese   Head:  Normocephalic, without obvious abnormality, atraumatic. Eyes:  Conjunctivae/corneas clear. EOMs intact. Neck: Supple, symmetrical, trachea midline, no adenopathy   Lungs:   CTAB no w/r/r   Heart:  Regular rate and rhythm, S1, S2 normal, no murmur, click, rub or gallop. Abdomen:   Soft, non-tender. Bowel sounds normal. No masses,  No organomegaly. Extremities: no cyanosis or edema. DP pulses 2+   Skin: turgor normal.    Neurologic: Alert, oriented x0. Moves all extremities on command     Data Review:    18  EXAM:  CTA CODE NEURO HEAD AND NECK W CONT  INDICATION:  seizure, waxing and waning altered mental status. TECHNIQUE: Axial spiral acquired CT angiography was performed from the aortic  arch up through the intracranial vessels. This was performed during intravenous  bolus infusion 100 mL of Isovue 370. Post-processing with  MIP reconstructions  from aortic arch to the skull base. CT dose reduction was achieved through use  of a standardized protocol tailored for this examination and automatic exposure  control for dose modulation. COMPARISON: CT head. FINDINGS:  For unknown technical reasons there is suboptimal opacification of the arteries  with contrast.  Some atherosclerotic calcification in the aorta. 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 The left common carotid artery and right common carotid arteries both arise from  the innominate. Dominant left vertebral artery. Right vertebral artery is patent but less than  optimally visualized due to poor contrast opacification. The basilar artery has  no significant stenosis and supplies both posterior cerebral arteries. Common carotid arteries are mildly tortuous and otherwise unremarkable. No evidence of hemodynamically significant stenosis at carotid bifurcations. 0%  stenosis by NASCET criteria. Prominent tortuosity of both cervical internal  carotid arteries without associated stenosis. Carotid siphons are most visualized with no abnormality demonstrated. Symmetric  sclerotic calcification in the distal internal carotid arteries. Probable at  least mild stenosis distal right vertebral artery. Relatively symmetric  opacification of middle cerebral arteries. Left anterior cerebral artery is  dominant. Endotracheal tube is in place. Atelectasis possible small pleural effusions. IMPRESSION:  1. 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 2. Suboptimal arterial opacification apparently due to technical limitations. 3. Mild stenosis distal right internal carotid artery.   4. No acute arterial abnormality demonstrated.     Recent Days:  Recent Labs      06/09/18   0750  06/08/18   1021  06/07/18   2154   WBC  10.6  12.4*  19.3*   HGB  12.2  14.3  16.5*   HCT  37.0  42.0  51.7*   PLT  176  217  320     Recent Labs 06/09/18   0615  06/09/18   0449  06/08/18   1021  06/07/18   2154   NA  137  138  137  138   K  3.8  3.5  3.2*  3.9   CL  102  102  99  95*   CO2  30  28  27  20*   GLU  201*  205*  212*  412*   BUN  8  6  7  6   CREA  1.02  1.06*  1.15*  1.20*   CA  8.5  8.6  8.1*  10.0   MG  2.1  2.2  2.0   --    ALB  2.7*  2.9*  2.9*  4.1   TBILI  0.9  1.0  0.7  0.6   SGOT  49*  48*  52*  53*   ALT  30  33  35  48   INR   --    --    --   1.1     Recent Labs      06/08/18   0540  06/07/18   2317   PH  7.45  7.25*   PCO2  37  46*   PO2  95  137*   HCO3  25  20*   FIO2  50  100       24 Hour Results:  Recent Results (from the past 24 hour(s))   CBC WITH AUTOMATED DIFF    Collection Time: 06/08/18 10:21 AM   Result Value Ref Range    WBC 12.4 (H) 3.6 - 11.0 K/uL    RBC 4.22 3.80 - 5.20 M/uL    HGB 14.3 11.5 - 16.0 g/dL    HCT 42.0 35.0 - 47.0 %    MCV 99.5 (H) 80.0 - 99.0 FL    MCH 33.9 26.0 - 34.0 PG    MCHC 34.0 30.0 - 36.5 g/dL    RDW 12.9 11.5 - 14.5 %    PLATELET 793 083 - 362 K/uL    MPV 11.6 8.9 - 12.9 FL    NRBC 0.0 0  WBC    ABSOLUTE NRBC 0.00 0.00 - 0.01 K/uL    NEUTROPHILS 83 (H) 32 - 75 %    LYMPHOCYTES 11 (L) 12 - 49 %    MONOCYTES 6 5 - 13 %    EOSINOPHILS 0 0 - 7 %    BASOPHILS 0 0 - 1 %    IMMATURE GRANULOCYTES 0 0.0 - 0.5 %    ABS. NEUTROPHILS 10.3 (H) 1.8 - 8.0 K/UL    ABS. LYMPHOCYTES 1.4 0.8 - 3.5 K/UL    ABS. MONOCYTES 0.7 0.0 - 1.0 K/UL    ABS. EOSINOPHILS 0.0 0.0 - 0.4 K/UL    ABS. BASOPHILS 0.0 0.0 - 0.1 K/UL    ABS. IMM.  GRANS. 0.1 (H) 0.00 - 0.04 K/UL    DF AUTOMATED     MAGNESIUM    Collection Time: 06/08/18 10:21 AM   Result Value Ref Range    Magnesium 2.0 1.6 - 2.4 mg/dL   METABOLIC PANEL, COMPREHENSIVE    Collection Time: 06/08/18 10:21 AM   Result Value Ref Range    Sodium 137 136 - 145 mmol/L    Potassium 3.2 (L) 3.5 - 5.1 mmol/L    Chloride 99 97 - 108 mmol/L    CO2 27 21 - 32 mmol/L    Anion gap 11 5 - 15 mmol/L    Glucose 212 (H) 65 - 100 mg/dL    BUN 7 6 - 20 MG/DL    Creatinine 1.15 (H) 0.55 - 1.02 MG/DL    BUN/Creatinine ratio 6 (L) 12 - 20      GFR est AA 57 (L) >60 ml/min/1.73m2    GFR est non-AA 47 (L) >60 ml/min/1.73m2    Calcium 8.1 (L) 8.5 - 10.1 MG/DL    Bilirubin, total 0.7 0.2 - 1.0 MG/DL    ALT (SGPT) 35 12 - 78 U/L    AST (SGOT) 52 (H) 15 - 37 U/L    Alk. phosphatase 80 45 - 117 U/L    Protein, total 8.1 6.4 - 8.2 g/dL    Albumin 2.9 (L) 3.5 - 5.0 g/dL    Globulin 5.2 (H) 2.0 - 4.0 g/dL    A-G Ratio 0.6 (L) 1.1 - 2.2     GLUCOSE, POC    Collection Time: 06/08/18 11:30 AM   Result Value Ref Range    Glucose (POC) 205 (H) 65 - 100 mg/dL    Performed by Easton Menon    POC H. PYLORI, TISSUE    Collection Time: 06/08/18  4:27 PM   Result Value Ref Range    H. pylori from tissue Negative Negative    Positive control Positive     Negative control Negative     Lot no. [id number]    GLUCOSE, POC    Collection Time: 06/08/18  5:59 PM   Result Value Ref Range    Glucose (POC) 217 (H) 65 - 100 mg/dL    Performed by [facility]    GLUCOSE, POC    Collection Time: 06/08/18 11:19 PM   Result Value Ref Range    Glucose (POC) 224 (H) 65 - 100 mg/dL    Performed by Da[facility]    METABOLIC PANEL, COMPREHENSIVE    Collection Time: 06/09/18  4:49 AM   Result Value Ref Range    Sodium 138 136 - 145 mmol/L    Potassium 3.5 3.5 - 5.1 mmol/L    Chloride 102 97 - 108 mmol/L    CO2 28 21 - 32 mmol/L    Anion gap 8 5 - 15 mmol/L    Glucose 205 (H) 65 - 100 mg/dL    BUN 6 6 - 20 MG/DL    Creatinine 1.06 (H) 0.55 - 1.02 MG/DL    BUN/Creatinine ratio 6 (L) 12 - 20      GFR est AA >60 >60 ml/min/1.73m2    GFR est non-AA 52 (L) >60 ml/min/1.73m2    Calcium 8.6 8.5 - 10.1 MG/DL    Bilirubin, total 1.0 0.2 - 1.0 MG/DL    ALT (SGPT) 33 12 - 78 U/L    AST (SGOT) 48 (H) 15 - 37 U/L    Alk.  phosphatase 78 45 - 117 U/L    Protein, total 8.1 6.4 - 8.2 g/dL    Albumin 2.9 (L) 3.5 - 5.0 g/dL    Globulin 5.2 (H) 2.0 - 4.0 g/dL    A-G Ratio 0.6 (L) 1.1 - 2.2     MAGNESIUM    Collection Time: 06/09/18  4:49 AM   Result Value Ref Range    Magnesium 2.2 1.6 - 2.4 mg/dL   LIPID PANEL    Collection Time: 06/09/18  4:49 AM   Result Value Ref Range    LIPID PROFILE          Cholesterol, total 153 <200 MG/DL    Triglyceride 96 <150 MG/DL    HDL Cholesterol 48 MG/DL    LDL, calculated 85.8 0 - 100 MG/DL    VLDL, calculated 19.2 MG/DL    CHOL/HDL Ratio 3.2 0 - 5.0     GLUCOSE, POC    Collection Time: 06/09/18  6:05 AM   Result Value Ref Range    Glucose (POC) 210 (H) 65 - 100 mg/dL    Performed by Monae Victor    METABOLIC PANEL, COMPREHENSIVE    Collection Time: 06/09/18  6:15 AM   Result Value Ref Range    Sodium 137 136 - 145 mmol/L    Potassium 3.8 3.5 - 5.1 mmol/L    Chloride 102 97 - 108 mmol/L    CO2 30 21 - 32 mmol/L    Anion gap 5 5 - 15 mmol/L    Glucose 201 (H) 65 - 100 mg/dL    BUN 8 6 - 20 MG/DL    Creatinine 1.02 0.55 - 1.02 MG/DL    BUN/Creatinine ratio 8 (L) 12 - 20      GFR est AA >60 >60 ml/min/1.73m2    GFR est non-AA 54 (L) >60 ml/min/1.73m2    Calcium 8.5 8.5 - 10.1 MG/DL    Bilirubin, total 0.9 0.2 - 1.0 MG/DL    ALT (SGPT) 30 12 - 78 U/L    AST (SGOT) 49 (H) 15 - 37 U/L    Alk.  phosphatase 71 45 - 117 U/L    Protein, total 7.9 6.4 - 8.2 g/dL    Albumin 2.7 (L) 3.5 - 5.0 g/dL    Globulin 5.2 (H) 2.0 - 4.0 g/dL    A-G Ratio 0.5 (L) 1.1 - 2.2     MAGNESIUM    Collection Time: 06/09/18  6:15 AM   Result Value Ref Range    Magnesium 2.1 1.6 - 2.4 mg/dL   CBC W/O DIFF    Collection Time: 06/09/18  7:50 AM   Result Value Ref Range    WBC 10.6 3.6 - 11.0 K/uL    RBC 3.66 (L) 3.80 - 5.20 M/uL    HGB 12.2 11.5 - 16.0 g/dL    HCT 37.0 35.0 - 47.0 %    .1 (H) 80.0 - 99.0 FL    MCH 33.3 26.0 - 34.0 PG    MCHC 33.0 30.0 - 36.5 g/dL    RDW 13.1 11.5 - 14.5 %    PLATELET 539 679 - 920 K/uL    MPV 10.6 8.9 - 12.9 FL    NRBC 0.0 0  WBC    ABSOLUTE NRBC 0.00 0.00 - 0.01 K/uL       Medications reviewed  Current Facility-Administered Medications   Medication Dose Route Frequency    sodium chloride (NS) flush 5-10 mL  5-10 mL IntraVENous Q8H    sodium chloride (NS) flush 5-10 mL  5-10 mL IntraVENous PRN    0.9% sodium chloride infusion  100 mL/hr IntraVENous CONTINUOUS    insulin lispro (HUMALOG) injection   SubCUTAneous Q6H    glucose chewable tablet 16 g  4 Tab Oral PRN    dextrose (D50W) injection syrg 12.5-25 g  12.5-25 g IntraVENous PRN    glucagon (GLUCAGEN) injection 1 mg  1 mg IntraMUSCular PRN    labetalol (NORMODYNE;TRANDATE) injection 10 mg  10 mg IntraVENous Q2H PRN    propofol (DIPRIVAN) infusion  0-50 mcg/kg/min IntraVENous TITRATE    hydrALAZINE (APRESOLINE) 20 mg/mL injection 10 mg  10 mg IntraVENous Q2H PRN    famotidine (PF) (PEPCID) 20 mg in sodium chloride 0.9% 10 mL injection  20 mg IntraVENous Q12H    levETIRAcetam (KEPPRA) 500 mg in 0.9% sodium chloride 100 mL IVPB  500 mg IntraVENous Q12H    sodium chloride (NS) flush 5-10 mL  5-10 mL IntraVENous Q8H    sodium chloride (NS) flush 5-10 mL  5-10 mL IntraVENous PRN         Deven Hess MD

## 2018-06-09 NOTE — PROGRESS NOTES
To whom it may concern:     Lois Anaya has been admitted to [facility] from 6/6/18- current and is under the care of MD Soraya Myers in the ICU. Please excuse Mahsa Garcia from work during this challenging time for his family.        MD may be contacted    Pretty Mcclendon MD   [facility]    [phone]    Freeman Tristan RN BSN CCU

## 2018-06-09 NOTE — ROUTINE PROCESS
0700 Bedside and Verbal shift change report given to [employer] (oncoming nurse) by Rene Cheung (offgoing nurse). Report included the following information SBAR, Kardex, Intake/Output, Recent Results and Cardiac Rhythm ST. Patient declines skin assessment. 1905 Bedside and Verbal shift change report given to [facility] RN (oncoming nurse) by Tomy Alba RN (offgoing nurse). Report included the following information SBAR, Kardex, Intake/Output, Recent Results and Cardiac Rhythm ST/NSR.

## 2018-06-09 NOTE — PROGRESS NOTES
Problem: Falls - Risk of  Goal: *Absence of Falls  Document Suzette Fall Risk and appropriate interventions in the flowsheet. Fall Risk Interventions:            Medication Interventions: Bed/chair exit alarm    Elimination Interventions:  Toileting schedule/hourly rounds    History of Falls Interventions: Door open when patient unattended, Room close to nurse's station

## 2018-06-09 NOTE — PROGRESS NOTES
Mirna Hall M.D.  [phone]           GI PROGRESS NOTE        NAME: Tirso Kent   :  1950   MRN:  [id number]       Subjective:   Awake, disoriented, however communicating well. Denies any abdominal pain, nausea or vomiting or bowel movements. Objective: In nAD      VITALS:   Last 24hrs VS reviewed since prior progress note. Most recent are:  Visit Vitals    /84    Pulse (!) 102    Temp 99.5 °F (37.5 °C)    Resp 20    Ht 5' 6\" (1.676 m)    Wt 99.4 kg (219 lb 2.2 oz)    SpO2 97%    BMI 35.37 kg/m2       Intake/Output Summary (Last 24 hours) at 18 1309  Last data filed at 18 1207   Gross per 24 hour   Intake          1935.78 ml   Output             1345 ml   Net           590.78 ml       PHYSICAL EXAM:  General: Alert, in no acute distress    HEENT: Anicteric sclerae. Lungs:            CTA Bilaterally. Heart:  Regular  rhythm,    Abdomen: Soft, Non distended, Non tender.  (+)Bowel sounds, no HSM    Lab Data Reviewed:   Recent Labs      18   0750  18   1021   WBC  10.6  12.4*   HGB  12.2  14.3   HCT  37.0  42.0   PLT  176  217     Recent Labs      18   0615  18   0449   NA  137  138   K  3.8  3.5   CL  102  102   CO2  30  28   BUN  8  6   CREA  1.02  1.06*   GLU  201*  205*   CA  8.5  8.6     Recent Labs      18   0615  18   0449   SGOT  49*  48*   AP  71  78   TP  7.9  8.1   ALB  2.7*  2.9*   GLOB  5.2*  5.2*       ________________________________________________________________________  Patient Active Problem List   Diagnosis Code    Encephalopathy acute G93.40    Seizure (HCC) R56.9    DM (diabetes mellitus) (HCC) E11.9    Leukocytosis D72.829    Aspiration into airway T17.908A    Renal insufficiency N28.9         Assessment and Plan:  Large gastric ulcer S/P EGD with 4 clips applied. Continue with BID PPI, check H.pylori serology.   Continue to monitor hemoglobin  Resume clear liquids today and will advance in am if she continues to do well. Can resume aspirin in about 5 days as she is still at high risk of bleeding.        Signed By: Wicho Patel MD     6/9/2018  1:09 PM

## 2018-06-09 NOTE — PROCEDURES
Procedure:   Routine EEG     Location:   [facility]  Date of Recordin18    Date of Interpretation:    18  Requesting provider:   Jeovanny Amaya MD    Interpreting physician: Catarina Lundberg MD      Introduction: This is a 78 y.o. female with is admitted with prior history of large left parietal-temporal stroke and small left frontal stroke who is admitted with new episode of seizure. Current medications: has a current medication list which includes the following Facility-Administered Medications: amlodipine, pantoprazole, sodium chloride, sodium chloride, 0.9% sodium chloride, insulin lispro, glucose, dextrose, glucagon, labetalol, propofol, hydralazine, levETIRAcetam (KEPPRA) 500 mg in 0.9% sodium chloride 100 mL IVPB, sodium chloride, and sodium chloride. Technical:   Digital EEG recorded in 10-20 international placement system, multiple montages    Interpretation:   Patient level of alertness: intubated, sedated  Background pattern: asymmetric; left fronto-temporal slowing  Artifacts: diffuse myogenic artifact    Posterior dominant rhythm: 6-7 Hz bilaterally. Photic stimulation: no clear driving response on either side. Hyperventilation: could not be done. Drowsiness recorded: no.  Sleep recorded: no.  Single lead EKG: no abnormalities    Areas of focal slowing: left frontal-temporal.  Epileptiform discharges: none. Electrographic seizures: none. Impression: This is a technically limited and abnormal recording. There was severe myogenic artifact that required filtering down to 15 Hz to make the study interpretable. There were no epileptiform discharges or triphasic waves during the recording. There was persistent focal slowing in the left frontal-temporal region which suggests an underlying structural injury and is compatible with patient's history of prior stroke in this area. Clinical correlation is necessary.         Catarina Lundberg, MD [facility]

## 2018-06-09 NOTE — PROGRESS NOTES
Bedside and Verbal shift change report given to Alireza Caputo RN (oncoming nurse) by Pamela Yeager (offgoing nurse). Report included the following information SBAR, Kardex, Recent Results, Med Rec Status and Cardiac Rhythm SR; ST   2030- patient alert, with some confusion. Following commands. Currently on room air. 2274- Patient alert, oxygen reapplied, patient saturations low in the mid 80's without 2 liters    0639- patient alert, labs redrawn per lab they were hemolyzed.

## 2018-06-10 LAB
ALBUMIN SERPL-MCNC: 2.6 G/DL (ref 3.5–5)
ALBUMIN/GLOB SERPL: 0.5 {RATIO} (ref 1.1–2.2)
ALP SERPL-CCNC: 60 U/L (ref 45–117)
ALT SERPL-CCNC: 26 U/L (ref 12–78)
AMMONIA PLAS-SCNC: 17 UMOL/L
ANION GAP SERPL CALC-SCNC: 7 MMOL/L (ref 5–15)
ANION GAP SERPL CALC-SCNC: 9 MMOL/L (ref 5–15)
AST SERPL-CCNC: 45 U/L (ref 15–37)
BILIRUB SERPL-MCNC: 1.1 MG/DL (ref 0.2–1)
BUN SERPL-MCNC: 7 MG/DL (ref 6–20)
BUN SERPL-MCNC: 8 MG/DL (ref 6–20)
BUN/CREAT SERPL: 10 (ref 12–20)
BUN/CREAT SERPL: 8 (ref 12–20)
CALCIUM SERPL-MCNC: 8.1 MG/DL (ref 8.5–10.1)
CALCIUM SERPL-MCNC: 8.3 MG/DL (ref 8.5–10.1)
CHLORIDE SERPL-SCNC: 100 MMOL/L (ref 97–108)
CHLORIDE SERPL-SCNC: 100 MMOL/L (ref 97–108)
CO2 SERPL-SCNC: 28 MMOL/L (ref 21–32)
CO2 SERPL-SCNC: 30 MMOL/L (ref 21–32)
CREAT SERPL-MCNC: 0.83 MG/DL (ref 0.55–1.02)
CREAT SERPL-MCNC: 0.86 MG/DL (ref 0.55–1.02)
ERYTHROCYTE [DISTWIDTH] IN BLOOD BY AUTOMATED COUNT: 12.3 % (ref 11.5–14.5)
GLOBULIN SER CALC-MCNC: 4.9 G/DL (ref 2–4)
GLUCOSE BLD STRIP.AUTO-MCNC: 130 MG/DL (ref 65–100)
GLUCOSE BLD STRIP.AUTO-MCNC: 137 MG/DL (ref 65–100)
GLUCOSE BLD STRIP.AUTO-MCNC: 146 MG/DL (ref 65–100)
GLUCOSE SERPL-MCNC: 142 MG/DL (ref 65–100)
GLUCOSE SERPL-MCNC: 144 MG/DL (ref 65–100)
HCT VFR BLD AUTO: 41.5 % (ref 35–47)
HGB BLD-MCNC: 13.2 G/DL (ref 11.5–16)
MAGNESIUM SERPL-MCNC: 1.9 MG/DL (ref 1.6–2.4)
MCH RBC QN AUTO: 32.5 PG (ref 26–34)
MCHC RBC AUTO-ENTMCNC: 31.8 G/DL (ref 30–36.5)
MCV RBC AUTO: 102.2 FL (ref 80–99)
NRBC # BLD: 0 K/UL (ref 0–0.01)
NRBC BLD-RTO: 0 PER 100 WBC
PLATELET # BLD AUTO: 143 K/UL (ref 150–400)
PMV BLD AUTO: 11.3 FL (ref 8.9–12.9)
POTASSIUM SERPL-SCNC: 3.2 MMOL/L (ref 3.5–5.1)
POTASSIUM SERPL-SCNC: 3.3 MMOL/L (ref 3.5–5.1)
PROT SERPL-MCNC: 7.5 G/DL (ref 6.4–8.2)
RBC # BLD AUTO: 4.06 M/UL (ref 3.8–5.2)
SERVICE CMNT-IMP: ABNORMAL
SODIUM SERPL-SCNC: 137 MMOL/L (ref 136–145)
SODIUM SERPL-SCNC: 137 MMOL/L (ref 136–145)
WBC # BLD AUTO: 10.9 K/UL (ref 3.6–11)

## 2018-06-10 PROCEDURE — 74011250637 HC RX REV CODE- 250/637: Performed by: FAMILY MEDICINE

## 2018-06-10 PROCEDURE — 77010033678 HC OXYGEN DAILY

## 2018-06-10 PROCEDURE — 97116 GAIT TRAINING THERAPY: CPT

## 2018-06-10 PROCEDURE — 74011000258 HC RX REV CODE- 258: Performed by: PSYCHIATRY & NEUROLOGY

## 2018-06-10 PROCEDURE — 36415 COLL VENOUS BLD VENIPUNCTURE: CPT | Performed by: FAMILY MEDICINE

## 2018-06-10 PROCEDURE — 82140 ASSAY OF AMMONIA: CPT | Performed by: FAMILY MEDICINE

## 2018-06-10 PROCEDURE — 74011250637 HC RX REV CODE- 250/637: Performed by: INTERNAL MEDICINE

## 2018-06-10 PROCEDURE — 85027 COMPLETE CBC AUTOMATED: CPT | Performed by: FAMILY MEDICINE

## 2018-06-10 PROCEDURE — 80053 COMPREHEN METABOLIC PANEL: CPT | Performed by: FAMILY MEDICINE

## 2018-06-10 PROCEDURE — 74011250636 HC RX REV CODE- 250/636: Performed by: INTERNAL MEDICINE

## 2018-06-10 PROCEDURE — 83735 ASSAY OF MAGNESIUM: CPT | Performed by: FAMILY MEDICINE

## 2018-06-10 PROCEDURE — 80048 BASIC METABOLIC PNL TOTAL CA: CPT | Performed by: FAMILY MEDICINE

## 2018-06-10 PROCEDURE — 74011000250 HC RX REV CODE- 250: Performed by: FAMILY MEDICINE

## 2018-06-10 PROCEDURE — 82962 GLUCOSE BLOOD TEST: CPT

## 2018-06-10 PROCEDURE — 74011636637 HC RX REV CODE- 636/637: Performed by: INTERNAL MEDICINE

## 2018-06-10 PROCEDURE — 65660000000 HC RM CCU STEPDOWN

## 2018-06-10 PROCEDURE — 74011250636 HC RX REV CODE- 250/636: Performed by: PSYCHIATRY & NEUROLOGY

## 2018-06-10 RX ORDER — BUSPIRONE HYDROCHLORIDE 10 MG/1
10 TABLET ORAL 2 TIMES DAILY
COMMUNITY
End: 2018-09-27

## 2018-06-10 RX ORDER — OMEPRAZOLE 20 MG/1
20 CAPSULE, DELAYED RELEASE ORAL
COMMUNITY
End: 2018-09-27

## 2018-06-10 RX ORDER — AMLODIPINE BESYLATE 5 MG/1
10 TABLET ORAL DAILY
Status: DISCONTINUED | OUTPATIENT
Start: 2018-06-10 | End: 2018-06-11 | Stop reason: HOSPADM

## 2018-06-10 RX ORDER — AMLODIPINE BESYLATE 10 MG/1
10 TABLET ORAL DAILY
COMMUNITY
End: 2021-01-16

## 2018-06-10 RX ORDER — MELOXICAM 15 MG/1
15 TABLET ORAL
COMMUNITY
End: 2018-09-27

## 2018-06-10 RX ORDER — POTASSIUM CHLORIDE 750 MG/1
20 TABLET, FILM COATED, EXTENDED RELEASE ORAL ONCE
Status: COMPLETED | OUTPATIENT
Start: 2018-06-10 | End: 2018-06-10

## 2018-06-10 RX ORDER — HEPARIN SODIUM 5000 [USP'U]/ML
5000 INJECTION, SOLUTION INTRAVENOUS; SUBCUTANEOUS EVERY 8 HOURS
Status: DISCONTINUED | OUTPATIENT
Start: 2018-06-10 | End: 2018-06-11 | Stop reason: HOSPADM

## 2018-06-10 RX ORDER — LABETALOL HYDROCHLORIDE 5 MG/ML
10 INJECTION, SOLUTION INTRAVENOUS
Status: DISCONTINUED | OUTPATIENT
Start: 2018-06-10 | End: 2018-06-11 | Stop reason: HOSPADM

## 2018-06-10 RX ORDER — SERTRALINE HYDROCHLORIDE 100 MG/1
100 TABLET, FILM COATED ORAL DAILY
Status: ON HOLD | COMMUNITY
End: 2020-10-24

## 2018-06-10 RX ORDER — HYDRALAZINE HYDROCHLORIDE 20 MG/ML
10 INJECTION INTRAMUSCULAR; INTRAVENOUS
Status: DISCONTINUED | OUTPATIENT
Start: 2018-06-10 | End: 2018-06-11 | Stop reason: HOSPADM

## 2018-06-10 RX ORDER — ASPIRIN 81 MG/1
81 TABLET ORAL DAILY
Status: ON HOLD | COMMUNITY
End: 2018-06-11

## 2018-06-10 RX ADMIN — LEVETIRACETAM 500 MG: 100 INJECTION, SOLUTION, CONCENTRATE INTRAVENOUS at 22:17

## 2018-06-10 RX ADMIN — SODIUM CHLORIDE 100 ML/HR: 900 INJECTION, SOLUTION INTRAVENOUS at 12:32

## 2018-06-10 RX ADMIN — PANTOPRAZOLE SODIUM 40 MG: 40 TABLET, DELAYED RELEASE ORAL at 08:27

## 2018-06-10 RX ADMIN — Medication 10 ML: at 05:36

## 2018-06-10 RX ADMIN — LEVETIRACETAM 500 MG: 100 INJECTION, SOLUTION, CONCENTRATE INTRAVENOUS at 08:31

## 2018-06-10 RX ADMIN — HEPARIN SODIUM 5000 UNITS: 5000 INJECTION, SOLUTION INTRAVENOUS; SUBCUTANEOUS at 21:53

## 2018-06-10 RX ADMIN — PANTOPRAZOLE SODIUM 40 MG: 40 TABLET, DELAYED RELEASE ORAL at 17:12

## 2018-06-10 RX ADMIN — Medication 10 ML: at 21:55

## 2018-06-10 RX ADMIN — AMLODIPINE BESYLATE 10 MG: 5 TABLET ORAL at 08:27

## 2018-06-10 RX ADMIN — HEPARIN SODIUM 5000 UNITS: 5000 INJECTION, SOLUTION INTRAVENOUS; SUBCUTANEOUS at 12:31

## 2018-06-10 RX ADMIN — INSULIN LISPRO 2 UNITS: 100 INJECTION, SOLUTION INTRAVENOUS; SUBCUTANEOUS at 17:12

## 2018-06-10 RX ADMIN — LABETALOL HYDROCHLORIDE 10 MG: 5 INJECTION INTRAVENOUS at 17:13

## 2018-06-10 RX ADMIN — POTASSIUM CHLORIDE 20 MEQ: 750 TABLET, EXTENDED RELEASE ORAL at 08:27

## 2018-06-10 RX ADMIN — Medication 10 ML: at 15:36

## 2018-06-10 NOTE — PROGRESS NOTES
4:27 PM Patient drinking, /98, called Dr. Emerald Morgan MD states OK to DC IVF and give PRN Bp meds, will continue to monitor.

## 2018-06-10 NOTE — ROUTINE PROCESS
0700 Shift change report received from Bonnie Hicks RN. SBAR, Kardex, Procedure Summary, Intake/Output, MAR, Accordion, Recent Results and Cardiac Rhythm SR/ST reviewed. TRANSFER - OUT REPORT:    Verbal report given to Stefani(name) on Neal Brown  being transferred to Fifth floor tele(unit) for routine progression of care       Report consisted of patients Situation, Background, Assessment and   Recommendations(SBAR). Information from the following report(s) SBAR, Kardex, Procedure Summary, Intake/Output, MAR, Accordion, Recent Results and Cardiac Rhythm SR was reviewed with the receiving nurse. Lines:   Peripheral IV 06/07/18 Right Forearm (Active)   Site Assessment Clean, dry, & intact 6/10/2018 12:02 PM   Phlebitis Assessment 0 6/10/2018 12:02 PM   Infiltration Assessment 0 6/10/2018 12:02 PM   Dressing Status Clean, dry, & intact 6/10/2018 12:02 PM   Dressing Type Transparent 6/10/2018 12:02 PM   Hub Color/Line Status Pink;Patent 6/10/2018 12:02 PM   Action Taken Open ports on tubing capped 6/10/2018 12:02 PM   Alcohol Cap Used Yes 6/10/2018 12:02 PM       Peripheral IV 06/07/18 Right Hand (Active)   Site Assessment Clean, dry, & intact 6/10/2018 12:02 PM   Phlebitis Assessment 0 6/10/2018 12:02 PM   Infiltration Assessment 0 6/10/2018 12:02 PM   Dressing Status Clean, dry, & intact 6/10/2018 12:02 PM   Dressing Type Transparent 6/10/2018 12:02 PM   Hub Color/Line Status Pink;Patent; Flushed 6/10/2018 12:02 PM   Action Taken Open ports on tubing capped 6/10/2018 12:02 PM   Alcohol Cap Used Yes 6/10/2018 12:02 PM        Opportunity for questions and clarification was provided.       Patient transported with:   Monitor  Tech

## 2018-06-10 NOTE — PROGRESS NOTES
[facility]: MED RECONCILIATION    Comments/Recommendations:    Med rec completed via patient interview. Patient presents much more awake and alert today and was able to answer all of my questions.  Added amlodipine, aspirin, buspar, mobic prn, prilosec prn, and sertraline to patient's PTA medication list per patient interview. Prior to Admission Medications:     Medication Documentation Review Audit       Reviewed by Jj Devine PHARMD (Pharmacist) on 06/10/18 at 1432         Medication Sig Documenting Provider Last Dose Status Taking? amLODIPine (NORVASC) 10 mg tablet Take 10 mg by mouth daily. Historical Provider  Active Yes    aspirin delayed-release 81 mg tablet Take 81 mg by mouth daily. Historical Provider  Active Yes    busPIRone (BUSPAR) 10 mg tablet Take 10 mg by mouth two (2) times a day. Historical Provider  Active Yes    meloxicam (MOBIC) 15 mg tablet Take 15 mg by mouth daily as needed for Pain (back pain). Historical Provider  Active Yes    omeprazole (PRILOSEC) 20 mg capsule Take 20 mg by mouth daily as needed (GERD). Historical Provider  Active Yes    sertraline (ZOLOFT) 100 mg tablet Take 100 mg by mouth daily.  Historical Provider  Active Yes                        KIMI Calvin   Contact: [phone]

## 2018-06-10 NOTE — PROGRESS NOTES
Shift Summary    0720: Bedside report done. Patient resting quietly in bed.  02 2L. No signs of distress noted. Patient voices no complaints or needs at this time. 0825:  Patient continues to rest in bed. She refused breakfast this morning. Medications given. 1000: Patient watching tv. No needs voiced. No changes noted. 1200: Patient up with PT. She is sitting in the chair. Encouraged to sit up for at least an hour. 1300:  Patient up in chair with family in the room. She does not want her lunch tray. Patient encouraged to do what she could. 1400:  Patient assisted to commode then back into bed. 1515:  Patient assisted to commode. No changes noted. 1540:  Spoke with Dr. Viet Gutierrez about PRN BP meds. Requested to have them changed from Q2 PRN to Q6 PRN for transfer out to unit. Order received.

## 2018-06-10 NOTE — PROGRESS NOTES
Problem: Mobility Impaired (Adult and Pediatric)  Goal: *Acute Goals and Plan of Care (Insert Text)  Physical Therapy Goals  Initiated 6/9/2018  1. Patient will move from supine to sit and sit to supine  in bed with modified independence within 7 day(s). 2.  Patient will transfer from bed to chair and chair to bed with modified independence using the least restrictive device within 7 day(s). 3.  Patient will perform sit to stand with modified independence within 7 day(s). 4.  Patient will ambulate with modified independence for 150 feet with the least restrictive device within 7 day(s). 5.  Patient will ascend/descend 4 stairs with handrail(s) with supervision/set-up within 7 day(s). physical Therapy TREATMENT  Patient: Mak Caceres (11 y.o. female)  Date: 6/10/2018  Diagnosis: Encephalopathy acute  Encephalopathy acute  Coffee ground emesis <principal problem not specified>  Procedure(s) (LRB):  ESOPHAGOGASTRODUODENOSCOPY (EGD) (N/A)  RESOLUTION  CLIP x 4 (N/A)  ESOPHAGOGASTRODUODENAL (EGD) BIOPSY (N/A) 2 Days Post-Op  Precautions: Fall  Chart, physical therapy assessment, plan of care and goals were reviewed. ASSESSMENT:  Received orders for PT to mobilize today. Patient evaluated yesterday. Chart reviewed and MYRIAM Frazier cleared PT to attempt tx - no recent events or concerns. Patient sleeping on arrival but easily aroused and calm cooperative, pleasant and followed commands. Patient without recollection of any recent events and complexity of her status on admission. Patient reports mild stiffness/soreness in her legs and back but denies pain. Patient agreed to get OOB and work with PT and put forth good effort. She appears more lucid today compared with previous description of mental status. According to her she typically ambulates without assistive device in the home and does not own DME such as RW or SPC.  She reports she has 3 JOHN one level home and typically home alone but has several children that check on her routinely. Given PMH of extensive CVAs, need to confirm accuracy of SH with family. Improved tolerance for distance amb today with greater independence. Hemodynamically stable throughout activity. PT recommends mobility per rehab tech for ongoing mobilization. PT will continue 5x weekly toward goals. Pending availability of family for home support and clearing mental status,  recommend HHPT. Progression toward goals:  [x]    Improving appropriately and progressing toward goals  []    Improving slowly and progressing toward goals  []    Not making progress toward goals and plan of care will be adjusted     PLAN:  Patient continues to benefit from skilled intervention to address the above impairments. Continue treatment per established plan of care. Discharge Recommendations:  Home Health if continued progress and improved MS  Further Equipment Recommendations for Discharge: None     SUBJECTIVE:   Patient stated I feel okay.     OBJECTIVE DATA SUMMARY:   Critical Behavior:  Neurologic State: Alert, Confused  Orientation Level: Appropriate for age, Oriented to person, Oriented to place, Disoriented to situation, Disoriented to time  Cognition: Appropriate decision making, Appropriate for age attention/concentration, Impaired decision making  Safety/Judgement: Lack of insight into deficits  Functional Mobility Training:  Bed Mobility:  Rolling: Supervision; Additional time  Supine to Sit: Supervision; Additional time  Sit to Supine:  (up to chair)  Scooting: Supervision        Transfers:  Sit to Stand: Stand-by assistance  Stand to Sit: Stand-by assistance  Stand Pivot Transfers: Stand-by assistance     Bed to Chair: Stand-by assistance                    Balance:  Sitting - Static: Good (unsupported)  Sitting - Dynamic: Good (unsupported)  Standing - Static: Good  Standing - Dynamic : Fair  Ambulation/Gait Training:  Distance (ft): 90 Feet (ft)  Assistive Device: Gait belt  Ambulation - Level of Assistance: Contact guard assistance        Gait Abnormalities: Trunk sway increased (lateral trunk sway)          Stairs - Level of Assistance:  (NT)        Pain:  Pain Scale 1: Numeric (0 - 10)  Pain Intensity 1: 0              Activity Tolerance:   Fair  /68 98% 1L 89bpm  /86 98% RA 99bpm  POST 157/98 96% RA 98bpm, placed back on 1L  Please refer to the flowsheet for vital signs taken during this treatment.   After treatment:   [x]    Patient left in no apparent distress sitting up in chair  []    Patient left in no apparent distress in bed  [x]    Call bell left within reach  [x]    Nursing notified  []    Caregiver present  [x]    Chair alarm activated    COMMUNICATION/COLLABORATION:   The patients plan of care was discussed with: Registered Nurse    José Luis White, PT   Time Calculation: 27 mins

## 2018-06-10 NOTE — PROGRESS NOTES
Paula Sol M.D.  [phone]           GI PROGRESS NOTE        NAME: Haley Vizcarra   :  1950   MRN:  [id number]       Subjective:   Awake, sitting in chair, family at the bedside. Denies any bowel movements, tolerating clear liquids. Objective: In nAD      VITALS:   Last 24hrs VS reviewed since prior progress note. Most recent are:  Visit Vitals    /79    Pulse 93    Temp 98.9 °F (37.2 °C)    Resp 13    Ht 5' 6\" (1.676 m)    Wt 99.4 kg (219 lb 2.2 oz)    SpO2 96%    BMI 35.37 kg/m2       Intake/Output Summary (Last 24 hours) at 06/10/18 1346  Last data filed at 06/10/18 1200   Gross per 24 hour   Intake             2940 ml   Output             3530 ml   Net             -590 ml       PHYSICAL EXAM:  General: Alert, in no acute distress    HEENT: Anicteric sclerae. Lungs:            CTA Bilaterally. Heart:  Regular  rhythm,    Abdomen: Soft, Non distended, Non tender.  (+)Bowel sounds, no HSM    Lab Data Reviewed:   Recent Labs      06/10/18   0615  18   0750   WBC  10.9  10.6   HGB  13.2  12.2   HCT  41.5  37.0   PLT  143*  176     Recent Labs      06/10/18   0615  06/10/18   0436   NA  137  137   K  3.2*  3.3*   CL  100  100   CO2  28  30   BUN  7  8   CREA  0.86  0.83   GLU  142*  144*   CA  8.1*  8.3*     Recent Labs      06/10/18   0436  18   0615   SGOT  45*  49*   AP  60  71   TP  7.5  7.9   ALB  2.6*  2.7*   GLOB  4.9*  5.2*       ________________________________________________________________________  Patient Active Problem List   Diagnosis Code    Encephalopathy acute G93.40    Seizure ([facility]) R56.9    DM (diabetes mellitus) ([facility]) E11.9    Leukocytosis D72.829    Aspiration into airway T17.908A    Renal insufficiency N28.9         Assessment and Plan:  Large gastric ulcer S/P EGD with 4 clips applied. Continue with BID PPI, check H.pylori serology.   Hemoglobin stable  Will advance diet  Can resume aspirin in about 5 days as she is still at high risk of bleeding.        Signed By: Ana Paula Vicente MD     6/10/2018  1:09 PM

## 2018-06-10 NOTE — PROGRESS NOTES
Daily Progress Note: 6/10/2018  Екатерина Cutler MD    Assessment/Plan:   1. Encephalopathy acute -- initially intubated, extubated 6/8. Ammonia normal  --Likely seizure activity in setting of CVA: MRI brain showing L high cortical parietal lobe stroke; and a subacute L medial temporal stroke. --still confused but improved  --tolerating po  --on Keppra per neuro  --ASA when ok with GI [in 5 days]     3. Uncontrolled DM - A1c >9%  --BS acceptable, start lantus if needed  --Start on acei prior to discharge but will watch BP with norvasc first     4. Post extubation CXR with atelectasis  --abx stopped per pulmonary 6/8     5. Leukocytosis -- resolving     6. Renal insufficiency -. Unknown baseline. Cr back to normal with IVF     7. HTN.  --re-started home norvasc, increase to 10mg 6/10 for continued elevated BP    8. GI bleed -- EGD 6/9 with gastric ulcer  --holding asa due to GI bleed  --Hgb down but still >12  --advance diet if ok with GI     Problem List:  Problem List as of 6/10/2018  Date Reviewed: 6/7/2018          Codes Class Noted - Resolved    Encephalopathy acute ICD-10-CM: G93.40  ICD-9-CM: 348.30  6/7/2018 - Present        Se[facility] ([facility] ICD-10-CM: R56.9  ICD-9-CM: 780.39  6/7/2018 - Present        DM (diabetes mellitus) ([facility] ICD-10-CM: E11.9  ICD-9-CM: 250.00  6/7/2018 - Present        Leukocytosis ICD-10-CM: R95.459  ICD-9-CM: 288.60  6/7/2018 - Present        Aspiration into airway ICD-10-CM: T17.908A  ICD-9-CM: 934.9  6/7/2018 - Present        Renal insufficiency ICD-10-CM: N28.9  ICD-9-CM: 593.9  6/7/2018 - Present              Subjective:     78 y.o.  female who is admitted with acute encephalopathy. Ms. Edward Roy with PMH of DM, HTN was brought to ER after she became less responsive and vomiting. Pt is intubated and sedated.  aAcording to her daughter, she talked to her at 4 PM yesterday on the phone and was ok, but later when she reached home, she went to her room and found her vomiting all over the floor and became in and out of consciousness. Later EMS was called and was brought to ER and ER she noted to have a seizure like activity. keppra loading dose was given. History reviewed. No pertinent past medical history. uncle: DM  History reviewed. No pertinent surgical history. (Dr Sasha Gonzáles)    :  Intubated and sedated. Some spontaneous movement noted. AM las pending. : More pleasant this AM but generally very confused. Answers most yes/no questions but then can't elaborate. Nonsensical at times. Not oriented. 6/10: Much more appropriate and answers questions. She has no complaints this morning. Review of Systems:   A comprehensive review of systems was negative except for that written in the HPI. Objective:   Physical Exam:     Visit Vitals    /80    Pulse 82    Temp 99.1 °F (37.3 °C)    Resp 18    Ht 5' 6\" (1.676 m)    Wt 99.4 kg (219 lb 2.2 oz)    LMP Comment: unknown    SpO2 94%    BMI 35.37 kg/m2    O2 Flow Rate (L/min): 2 l/min O2 Device: Nasal cannula    Temp (24hrs), Av.2 °F (37.3 °C), Min:98.9 °F (37.2 °C), Max:99.5 °F (37.5 °C)    1901 - 06/10 0700  In: 740 [P.O.:240; I.V.:500]  Out: 1800 [Urine:1800]    07 -  190  In: 4916.6 [I.V.:4916.6]  Out: 1362 [Urine:1725]    General:  Awake, alert, no distress, obese   Head:  Normocephalic, without obvious abnormality, atraumatic. Eyes:  Conjunctivae/corneas clear. EOMs intact. Neck: Supple, symmetrical, trachea midline, no adenopathy   Lungs:   CTAB no w/r/r   Heart:  Regular rate and rhythm, S1, S2 normal, no murmur, click, rub or gallop. Abdomen:   Soft, non-tender. Bowel sounds normal. No masses,  No organomegaly. Extremities: no cyanosis or edema. DP pulses 2+   Skin: turgor normal.    Neurologic: Alert, oriented x3.  Non focal     Data Review:    18  EXAM:  CTA CODE NEURO HEAD AND NECK W CONT  INDICATION:  seizure, waxing and waning altered mental status. TECHNIQUE: Axial spiral acquired CT angiography was performed from the aortic  arch up through the intracranial vessels. This was performed during intravenous  bolus infusion 100 mL of Isovue 370. Post-processing with  MIP reconstructions  from aortic arch to the skull base. CT dose reduction was achieved through use  of a standardized protocol tailored for this examination and automatic exposure  control for dose modulation. COMPARISON: CT head. FINDINGS:  For unknown technical reasons there is suboptimal opacification of the arteries  with contrast.  Some atherosclerotic calcification in the aorta. 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 The left common carotid artery and right common carotid arteries both arise from  the innominate. Dominant left vertebral artery. Right vertebral artery is patent but less than  optimally visualized due to poor contrast opacification. The basilar artery has  no significant stenosis and supplies both posterior cerebral arteries. Common carotid arteries are mildly tortuous and otherwise unremarkable. No evidence of hemodynamically significant stenosis at carotid bifurcations. 0%  stenosis by NASCET criteria. Prominent tortuosity of both cervical internal  carotid arteries without associated stenosis. Carotid siphons are most visualized with no abnormality demonstrated. Symmetric  sclerotic calcification in the distal internal carotid arteries. Probable at  least mild stenosis distal right vertebral artery. Relatively symmetric  opacification of middle cerebral arteries. Left anterior cerebral artery is  dominant. Endotracheal tube is in place. Atelectasis possible small pleural effusions. IMPRESSION:  1. 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 2. Suboptimal arterial opacification apparently due to technical limitations. 3. Mild stenosis distal right internal carotid artery.   4. No acute arterial abnormality demonstrated.     Recent Days:  Recent Labs      06/09/18   0750  06/08/18   1021  06/07/18   2154   WBC  10.6  12.4*  19.3*   HGB  12.2  14.3  16.5*   HCT  37.0  42.0  51.7*   PLT  176  217  320     Recent Labs      06/10/18   0436  06/09/18   0615  06/09/18   0449   06/07/18   2154   NA  137  137  138   < >  138   K  3.3*  3.8  3.5   < >  3.9   CL  100  102  102   < >  95*   CO2  30  30  28   < >  20*   GLU  144*  201*  205*   < >  412*   BUN  8  8  6   < >  6   CREA  0.83  1.02  1.06*   < >  1.20*   CA  8.3*  8.5  8.6   < >  10.0   MG  1.9  2.1  2.2   < >   --    ALB  2.6*  2.7*  2.9*   < >  4.1   TBILI  1.1*  0.9  1.0   < >  0.6   SGOT  45*  49*  48*   < >  53*   ALT  26  30  33   < >  48   INR   --    --    --    --   1.1    < > = values in this interval not displayed. Recent Labs      06/08/18   0540  06/07/18   2317   PH  7.45  7.25*   PCO2  37  46*   PO2  95  137*   HCO3  25  20*   FIO2  50  100       24 Hour Results:  Recent Results (from the past 24 hour(s))   GLUCOSE, POC    Collection Time: 06/09/18  6:05 AM   Result Value Ref Range    Glucose (POC) 210 (H) 65 - 100 mg/dL    Performed by [facility]    METABOLIC PANEL, COMPREHENSIVE    Collection Time: 06/09/18  6:15 AM   Result Value Ref Range    Sodium 137 136 - 145 mmol/L    Potassium 3.8 3.5 - 5.1 mmol/L    Chloride 102 97 - 108 mmol/L    CO2 30 21 - 32 mmol/L    Anion gap 5 5 - 15 mmol/L    Glucose 201 (H) 65 - 100 mg/dL    BUN 8 6 - 20 MG/DL    Creatinine 1.02 0.55 - 1.02 MG/DL    BUN/Creatinine ratio 8 (L) 12 - 20      GFR est AA >60 >60 ml/min/1.73m2    GFR est non-AA 54 (L) >60 ml/min/1.73m2    Calcium 8.5 8.5 - 10.1 MG/DL    Bilirubin, total 0.9 0.2 - 1.0 MG/DL    ALT (SGPT) 30 12 - 78 U/L    AST (SGOT) 49 (H) 15 - 37 U/L    Alk.  phosphatase 71 45 - 117 U/L    Protein, total 7.9 6.4 - 8.2 g/dL    Albumin 2.7 (L) 3.5 - 5.0 g/dL    Globulin 5.2 (H) 2.0 - 4.0 g/dL    A-G Ratio 0.5 (L) 1.1 - 2.2     MAGNESIUM    Collection Time: 06/09/18  6:15 AM   Result Value Ref Range    Magnesium 2.1 1.6 - 2.4 mg/dL   CBC W/O DIFF    Collection Time: 06/09/18  7:50 AM   Result Value Ref Range    WBC 10.6 3.6 - 11.0 K/uL    RBC 3.66 (L) 3.80 - 5.20 M/uL    HGB 12.2 11.5 - 16.0 g/dL    HCT 37.0 35.0 - 47.0 %    .1 (H) 80.0 - 99.0 FL    MCH 33.3 26.0 - 34.0 PG    MCHC 33.0 30.0 - 36.5 g/dL    RDW 13.1 11.5 - 14.5 %    PLATELET 400 377 - 300 K/uL    MPV 10.6 8.9 - 12.9 FL    NRBC 0.0 0  WBC    ABSOLUTE NRBC 0.00 0.00 - 0.01 K/uL   GLUCOSE, POC    Collection Time: 06/09/18 11:49 AM   Result Value Ref Range    Glucose (POC) 190 (H) 65 - 100 mg/dL    Performed by [id number], POC    Collection Time: 06/09/18  4:47 PM   Result Value Ref Range    Glucose (POC) 148 (H) 65 - 100 mg/dL    Performed by Danuta Vasquez    GLUCOSE, POC    Collection Time: 06/09/18 11:31 PM   Result Value Ref Range    Glucose (POC) 147 (H) 65 - 100 mg/dL    Performed by Sara Ambriz    METABOLIC PANEL, COMPREHENSIVE    Collection Time: 06/10/18  4:36 AM   Result Value Ref Range    Sodium 137 136 - 145 mmol/L    Potassium 3.3 (L) 3.5 - 5.1 mmol/L    Chloride 100 97 - 108 mmol/L    CO2 30 21 - 32 mmol/L    Anion gap 7 5 - 15 mmol/L    Glucose 144 (H) 65 - 100 mg/dL    BUN 8 6 - 20 MG/DL    Creatinine 0.83 0.55 - 1.02 MG/DL    BUN/Creatinine ratio 10 (L) 12 - 20      GFR est AA >60 >60 ml/min/1.73m2    GFR est non-AA >60 >60 ml/min/1.73m2    Calcium 8.3 (L) 8.5 - 10.1 MG/DL    Bilirubin, total 1.1 (H) 0.2 - 1.0 MG/DL    ALT (SGPT) 26 12 - 78 U/L    AST (SGOT) 45 (H) 15 - 37 U/L    Alk.  phosphatase 60 45 - 117 U/L    Protein, total 7.5 6.4 - 8.2 g/dL    Albumin 2.6 (L) 3.5 - 5.0 g/dL    Globulin 4.9 (H) 2.0 - 4.0 g/dL    A-G Ratio 0.5 (L) 1.1 - 2.2     MAGNESIUM    Collection Time: 06/10/18  4:36 AM   Result Value Ref Range    Magnesium 1.9 1.6 - 2.4 mg/dL   GLUCOSE, POC    Collection Time: 06/10/18  5:34 AM   Result Value Ref Range    Glucose (POC) 130 (H) 65 - 100 mg/dL    Performed by Debroah Blend        Medications reviewed  Current Facility-Administered Medications   Medication Dose Route Frequency    amLODIPine (NORVASC) tablet 10 mg  10 mg Oral DAILY    potassium chloride SR (KLOR-CON 10) tablet 20 mEq  20 mEq Oral ONCE    pantoprazole (PROTONIX) tablet 40 mg  40 mg Oral BID    sodium chloride (NS) flush 5-10 mL  5-10 mL IntraVENous Q8H    sodium chloride (NS) flush 5-10 mL  5-10 mL IntraVENous PRN    0.9% sodium chloride infusion  100 mL/hr IntraVENous CONTINUOUS    insulin lispro (HUMALOG) injection   SubCUTAneous Q6H    glucose chewable tablet 16 g  4 Tab Oral PRN    dextrose (D50W) injection syrg 12.5-25 g  12.5-25 g IntraVENous PRN    glucagon (GLUCAGEN) injection 1 mg  1 mg IntraMUSCular PRN    labetalol (NORMODYNE;TRANDATE) injection 10 mg  10 mg IntraVENous Q2H PRN    propofol (DIPRIVAN) infusion  0-50 mcg/kg/min IntraVENous TITRATE    hydrALAZINE (APRESOLINE) 20 mg/mL injection 10 mg  10 mg IntraVENous Q2H PRN    levETIRAcetam (KEPPRA) 500 mg in 0.9% sodium chloride 100 mL IVPB  500 mg IntraVENous Q12H    sodium chloride (NS) flush 5-10 mL  5-10 mL IntraVENous Q8H    sodium chloride (NS) flush 5-10 mL  5-10 mL IntraVENous PRN         Pramod Mota MD

## 2018-06-10 NOTE — PROGRESS NOTES
Neurology Hospital Progress Note    Patient ID:  Ximena Diego  [id number]  78 y.o.  1950      Subjective:   History:  Ms. Johnson Kill history of DM and HTN  who is admitted for vomiting coffee ground with increased confusion and apparently had seizure like activity. Apparently had a stroke in Dec. MRI brain showing L high cortical parietal lobe stroke; and a subacute L medial temporal stroke. CTA head and neck WNL   Found to have large ulcers requiring clipping and unable to give ASA     Patient doing better, more alert and conversant and now transferred to regular floor.     ROS:  Per HPI-  Otherwise the remainder of the review of system was negative      Social Hx:  Social History     Social History    Marital status:      Spouse name: N/A    Number of children: N/A    Years of education: N/A     Social History Main Topics    Smoking status: Unknown If Ever Smoked    Smokeless tobacco: None    Alcohol use None      Comment: Unknown    Drug use: None    Sexual activity: Not Asked     Other Topics Concern    None     Social History Narrative       Meds:  No current facility-administered medications on file prior to encounter. No current outpatient prescriptions on file prior to encounter. Imaging:    CT Results (recent):    Results from Hospital Encounter encounter on 06/07/18   CTA CODE NEURO HEAD AND NECK W CONT   Narrative Prelim report:  Aberrant right subclavian artery. Right and left common and internal carotid arteries are patent. Left and right vertebral arteries are patent. Hypoplastic right A1 segment. No intracranial arterial occlusion. Incompletely visualized bibasilar patchy airspace disease. ET tube tip is 1.8 cm  superior to the braulio. Final report to follow. *FINAL REPORT BELOW*  EXAM:  CTA CODE NEURO HEAD AND NECK W CONT  INDICATION:  seizure, waxing and waning altered mental status.   TECHNIQUE: Axial spiral acquired CT angiography was performed from the aortic  arch up through the intracranial vessels. This was performed during intravenous  bolus infusion 100 mL of Isovue 370. Post-processing with  MIP reconstructions  from aortic arch to the skull base. CT dose reduction was achieved through use  of a standardized protocol tailored for this examination and automatic exposure  control for dose modulation. COMPARISON: CT head. FINDINGS:  For unknown technical reasons there is suboptimal opacification of the arteries  with contrast.  Some atherosclerotic calcification in the aorta. 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 The left common carotid artery and right common carotid arteries both arise from  the innominate. Dominant left vertebral artery. Right vertebral artery is patent but less than  optimally visualized due to poor contrast opacification. The basilar artery has  no significant stenosis and supplies both posterior cerebral arteries. Common carotid arteries are mildly tortuous and otherwise unremarkable. No evidence of hemodynamically significant stenosis at carotid bifurcations. 0%  stenosis by NASCET criteria. Prominent tortuosity of both cervical internal  carotid arteries without associated stenosis. Carotid siphons are most visualized with no abnormality demonstrated. Symmetric  sclerotic calcification in the distal internal carotid arteries. Probable at  least mild stenosis distal right vertebral artery. Relatively symmetric  opacification of middle cerebral arteries. Left anterior cerebral artery is  dominant. Endotracheal tube is in place. Atelectasis possible small pleural effusions. Impression IMPRESSION:  1. 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 2. Suboptimal arterial opacification apparently due to technical limitations. 3. Mild stenosis distal right internal carotid artery. 4. No acute arterial abnormality demonstrated.               MRI Results (recent):    Results from [facility] Encounter encounter on 06/07/18   MRI BRAIN W WO CONT   Narrative Clinical indication: Encephalopathy. Technical factors: Diffusion imaging, sagittal T1-weighted, axial load  T1-weighted pre and post 19 cc IV dotarem T2-weighted FLAIR gradient echo  coronal T1-weighted postcontrast no prior. There is no extra-axial fluid collection hemorrhage or shift of the midline. There is some encephalomalacia seen in the left parietal lobe inferiorly. Diffusion imaging show likely subacute to acute likely ischemic remote infarct  also seen in the left frontal convexity. Remote ischemic major flow voids in the  vessels at the base of the brain are present. Ventricular size is normal for  age. There is no abnormal enhancement or masses her. Impression IMPRESSION: Area of encephalomalacia likely relate to remote ischemic changes  left frontal convexity, left inferior parietal lobe. Suspect minimal recent  likely subacute ischemic changes medial left temporal lobe. Changes medially in  the left temporal lobe. IR Results (recent):  No results found for this or any previous visit. VAS/US Results (recent):    Results from Hospital Encounter encounter on 12/09/14   DUPLEX LOWER EXT VENOUS RIGHT   Narrative **Final Report**      ICD Codes / Adm. Diagnosis: 340363   / Foot Pain  Foot pain  Examination:  US LOW EXT VENOUS DOPPLER UNI RT  - [id number] - Dec  9 2014    2:53PM  Accession No:  [id number]  Reason:  pain; swelling      REPORT:  Indication: pain; swelling     Examination: Right lower extremity venous Doppler. Findings: Grayscale, color, and duplex imaging of the right lower lower   extremity deep venous system was performed. The veins are compressible and   demonstrate normal color flow and Doppler waveform. IMPRESSION:  1.  No evidence of right lower extremity venous thrombosis            Signing/Reading Doctor: Alma Ortiz ([id number])    Approved: Charly PALACIOS ([id number])  Dec  9 2014  2:57PM Reviewed records in [employer] tab today    Lab Review     Admission on 06/07/2018   No results displayed because visit has over 200 results. Objective:       Exam:  Visit Vitals    BP (!) 197/99    Pulse 93    Temp 99.6 °F (37.6 °C)    Resp 20    Ht 5' 6\" (1.676 m)    Wt 99.4 kg (219 lb 2.2 oz)    SpO2 97%    BMI 35.37 kg/m2     Gen: Awake, alert, follows commands  Appropriate appearance, normal speech. Still disoriented to date and president  No visual field defect on confrontation exam.  Full eyes movement, with no nystagmus, no diplopia, no ptosis. Normal gag and swallow. All remaining cranial nerves were normal  Motor function: 5/5 in all extremities  Sensroy: intact to LT, PP and JPS  DTRs ++ in all extremities, (-) Babinski  Good FTN and HTS   Gait: Deferred    Assessment:     1. Altered mental status, unspecified altered mental status type    2. Seizure ([address]    3. Leukocytosis, unspecified type    4. Metabolic acidosis    5. Hyperglycemia    6. Acute encephalopathy    7. Partial symptomatic epilepsy with complex partial seizures, not intractable, without status epilepticus ([address]        MRI brain showing L high cortical parietal lobe stroke; and a subacute L medial temporal stroke    Plan:   1. Follow up HSV serum  2. Since patient continues to improve, will hold off doing a spinal tap  3. Appreciate GI input, patient can be placed back on enteric coated ASA 81 after 5 days  4.  Continue Keppra 500 mg BID    Please call for questions    Dao Kathleen MD  Diplomate, American Board of Psychiatry and Neurology  Diplomate, Neuromuscular Medicine  Diplomate, American Board of Electrodiagnostic Medicine

## 2018-06-10 NOTE — PROGRESS NOTES
7:51 PM Bedside and Verbal shift change report given to Bora RN (oncoming nurse) by Ryan Moran (offgoing nurse). Report included the following information SBAR and Kardex.

## 2018-06-10 NOTE — PROGRESS NOTES
TRANSFER - IN REPORT:    Verbal report received from [facility] ROSEMARY RN(name) on Lulú Staton  being received from ICU(unit) for routine progression of care      Report consisted of patients Situation, Background, Assessment and   Recommendations(SBAR). Information from the following report(s) SBAR and Kardex was reviewed with the receiving nurse. Opportunity for questions and clarification was provided. Assessment completed upon patients arrival to unit and care assumed.

## 2018-06-11 VITALS
SYSTOLIC BLOOD PRESSURE: 180 MMHG | HEART RATE: 84 BPM | WEIGHT: 219.14 LBS | HEIGHT: 66 IN | DIASTOLIC BLOOD PRESSURE: 87 MMHG | RESPIRATION RATE: 20 BRPM | BODY MASS INDEX: 35.22 KG/M2 | TEMPERATURE: 99 F | OXYGEN SATURATION: 96 %

## 2018-06-11 LAB
ALBUMIN SERPL-MCNC: 2.6 G/DL (ref 3.5–5)
ALBUMIN/GLOB SERPL: 0.5 {RATIO} (ref 1.1–2.2)
ALP SERPL-CCNC: 56 U/L (ref 45–117)
ALT SERPL-CCNC: 27 U/L (ref 12–78)
ANION GAP SERPL CALC-SCNC: 9 MMOL/L (ref 5–15)
AST SERPL-CCNC: 64 U/L (ref 15–37)
BASOPHILS # BLD: 0 K/UL (ref 0–0.1)
BASOPHILS NFR BLD: 0 % (ref 0–1)
BILIRUB SERPL-MCNC: 1.1 MG/DL (ref 0.2–1)
BUN SERPL-MCNC: 7 MG/DL (ref 6–20)
BUN/CREAT SERPL: 9 (ref 12–20)
CALCIUM SERPL-MCNC: 8.5 MG/DL (ref 8.5–10.1)
CHLORIDE SERPL-SCNC: 97 MMOL/L (ref 97–108)
CO2 SERPL-SCNC: 29 MMOL/L (ref 21–32)
CREAT SERPL-MCNC: 0.76 MG/DL (ref 0.55–1.02)
DIFFERENTIAL METHOD BLD: NORMAL
EOSINOPHIL # BLD: 0.3 K/UL (ref 0–0.4)
EOSINOPHIL NFR BLD: 3 % (ref 0–7)
ERYTHROCYTE [DISTWIDTH] IN BLOOD BY AUTOMATED COUNT: 12.3 % (ref 11.5–14.5)
GLOBULIN SER CALC-MCNC: 5 G/DL (ref 2–4)
GLUCOSE BLD STRIP.AUTO-MCNC: 123 MG/DL (ref 65–100)
GLUCOSE BLD STRIP.AUTO-MCNC: 129 MG/DL (ref 65–100)
GLUCOSE BLD STRIP.AUTO-MCNC: 160 MG/DL (ref 65–100)
GLUCOSE SERPL-MCNC: 126 MG/DL (ref 65–100)
HCT VFR BLD AUTO: 38.9 % (ref 35–47)
HGB BLD-MCNC: 13.1 G/DL (ref 11.5–16)
HSV1 IGG SER IA-ACNC: <0.91 INDEX (ref 0–0.9)
HSV2 IGG SER IA-ACNC: 12.3 INDEX (ref 0–0.9)
IMM GRANULOCYTES # BLD: 0 K/UL (ref 0–0.04)
IMM GRANULOCYTES NFR BLD AUTO: 0 % (ref 0–0.5)
LYMPHOCYTES # BLD: 2.5 K/UL (ref 0.8–3.5)
LYMPHOCYTES NFR BLD: 23 % (ref 12–49)
MAGNESIUM SERPL-MCNC: 1.7 MG/DL (ref 1.6–2.4)
MCH RBC QN AUTO: 33.2 PG (ref 26–34)
MCHC RBC AUTO-ENTMCNC: 33.7 G/DL (ref 30–36.5)
MCV RBC AUTO: 98.5 FL (ref 80–99)
MONOCYTES # BLD: 0.7 K/UL (ref 0–1)
MONOCYTES NFR BLD: 6 % (ref 5–13)
NEUTS SEG # BLD: 7.3 K/UL (ref 1.8–8)
NEUTS SEG NFR BLD: 67 % (ref 32–75)
NRBC # BLD: 0 K/UL (ref 0–0.01)
NRBC BLD-RTO: 0 PER 100 WBC
PLATELET # BLD AUTO: 179 K/UL (ref 150–400)
PMV BLD AUTO: 11.2 FL (ref 8.9–12.9)
POTASSIUM SERPL-SCNC: 3.9 MMOL/L (ref 3.5–5.1)
PROT SERPL-MCNC: 7.6 G/DL (ref 6.4–8.2)
RBC # BLD AUTO: 3.95 M/UL (ref 3.8–5.2)
SERVICE CMNT-IMP: ABNORMAL
SODIUM SERPL-SCNC: 135 MMOL/L (ref 136–145)
WBC # BLD AUTO: 10.8 K/UL (ref 3.6–11)

## 2018-06-11 PROCEDURE — 80053 COMPREHEN METABOLIC PANEL: CPT | Performed by: FAMILY MEDICINE

## 2018-06-11 PROCEDURE — 74011636637 HC RX REV CODE- 636/637: Performed by: INTERNAL MEDICINE

## 2018-06-11 PROCEDURE — 97116 GAIT TRAINING THERAPY: CPT

## 2018-06-11 PROCEDURE — 85025 COMPLETE CBC W/AUTO DIFF WBC: CPT | Performed by: FAMILY MEDICINE

## 2018-06-11 PROCEDURE — 74011250637 HC RX REV CODE- 250/637: Performed by: PSYCHIATRY & NEUROLOGY

## 2018-06-11 PROCEDURE — 97535 SELF CARE MNGMENT TRAINING: CPT

## 2018-06-11 PROCEDURE — 83735 ASSAY OF MAGNESIUM: CPT | Performed by: FAMILY MEDICINE

## 2018-06-11 PROCEDURE — 74011250637 HC RX REV CODE- 250/637: Performed by: FAMILY MEDICINE

## 2018-06-11 PROCEDURE — 74011250637 HC RX REV CODE- 250/637: Performed by: INTERNAL MEDICINE

## 2018-06-11 PROCEDURE — 36415 COLL VENOUS BLD VENIPUNCTURE: CPT | Performed by: FAMILY MEDICINE

## 2018-06-11 PROCEDURE — 74011250636 HC RX REV CODE- 250/636: Performed by: INTERNAL MEDICINE

## 2018-06-11 PROCEDURE — 97165 OT EVAL LOW COMPLEX 30 MIN: CPT

## 2018-06-11 PROCEDURE — 74011000258 HC RX REV CODE- 258: Performed by: PSYCHIATRY & NEUROLOGY

## 2018-06-11 PROCEDURE — 74011250636 HC RX REV CODE- 250/636: Performed by: PSYCHIATRY & NEUROLOGY

## 2018-06-11 PROCEDURE — 97530 THERAPEUTIC ACTIVITIES: CPT

## 2018-06-11 PROCEDURE — 82962 GLUCOSE BLOOD TEST: CPT

## 2018-06-11 RX ORDER — ATORVASTATIN CALCIUM 20 MG/1
10 TABLET, FILM COATED ORAL DAILY
Qty: 30 TAB | Refills: 3 | Status: SHIPPED | OUTPATIENT
Start: 2018-06-11 | End: 2018-09-27

## 2018-06-11 RX ORDER — LEVETIRACETAM 500 MG/1
500 TABLET ORAL 2 TIMES DAILY
Qty: 60 TAB | Refills: 0 | Status: ON HOLD | OUTPATIENT
Start: 2018-06-11 | End: 2020-10-24

## 2018-06-11 RX ORDER — LISINOPRIL 2.5 MG/1
2.5 TABLET ORAL DAILY
Qty: 30 TAB | Refills: 3 | Status: SHIPPED | OUTPATIENT
Start: 2018-06-11 | End: 2018-09-27

## 2018-06-11 RX ORDER — LEVETIRACETAM 500 MG/1
500 TABLET ORAL 2 TIMES DAILY
Status: DISCONTINUED | OUTPATIENT
Start: 2018-06-11 | End: 2018-06-11 | Stop reason: HOSPADM

## 2018-06-11 RX ORDER — ASPIRIN 81 MG/1
81 TABLET ORAL DAILY
Qty: 30 TAB | Refills: 0 | Status: SHIPPED
Start: 2018-06-11 | End: 2018-09-27

## 2018-06-11 RX ADMIN — PANTOPRAZOLE SODIUM 40 MG: 40 TABLET, DELAYED RELEASE ORAL at 09:29

## 2018-06-11 RX ADMIN — Medication 10 ML: at 13:10

## 2018-06-11 RX ADMIN — HEPARIN SODIUM 5000 UNITS: 5000 INJECTION, SOLUTION INTRAVENOUS; SUBCUTANEOUS at 13:10

## 2018-06-11 RX ADMIN — INSULIN LISPRO 2 UNITS: 100 INJECTION, SOLUTION INTRAVENOUS; SUBCUTANEOUS at 13:10

## 2018-06-11 RX ADMIN — Medication 10 ML: at 05:40

## 2018-06-11 RX ADMIN — HEPARIN SODIUM 5000 UNITS: 5000 INJECTION, SOLUTION INTRAVENOUS; SUBCUTANEOUS at 05:40

## 2018-06-11 RX ADMIN — AMLODIPINE BESYLATE 10 MG: 5 TABLET ORAL at 09:29

## 2018-06-11 RX ADMIN — LEVETIRACETAM 500 MG: 500 TABLET ORAL at 11:07

## 2018-06-11 NOTE — CDMP QUERY
2) => Confirmation of Aspiration PNA on admission secondary to vomiting treated with IV abx    => Other explanation of clinical findings   => Clinically Undetermined (no explanation for clinical findings)    The medical record reflects the following clinical findings, treatment, and risk factors. Risk Factors:  79 yo F presents with encephalopathy, seizure, & vomiting   Clinical Indicators:  H/P states \"  Possible aspiration pneumonia. Pt has been vomiting even after intubation and earlier when she was less responsive. Start zosyn\" \"  Acute respiratory failure due aspiration. On vent support. Consult pulmonary\"   Treatment: IV Abx Zosyn, intubation, pulm consult     Please clarify and document your clinical opinion in the progress notes and discharge summary including the definitive and/or presumptive diagnosis, (suspected or probable), related to the above clinical findings. Please include clinical findings supporting your diagnosis.     Thank you for your time   [facility] RN/BSN, [address] [address] [place]  Desk:   [phone]   Other:  [phone]

## 2018-06-11 NOTE — PROGRESS NOTES
Neurology Hospital Progress Note    Patient ID:  Wayne Patel  [id number]  78 y.o.  1950      Subjective:   History:  Ms. Lee history of DM and HTN  who is admitted for vomiting coffee ground with increased confusion and apparently had seizure like activity. Apparently had a stroke in Dec. MRI brain showing L high cortical parietal lobe stroke; and a subacute L medial temporal stroke. CTA head and neck WNL  Found to have large ulcers requiring clipping and unable to give ASA.     Patient is better compared to yesterday, more alert, and oriented. (-) fever  (-) seizure activity    ROS:  Per HPI-  Otherwise the remainder of the review of system was negative      Social Hx:  Social History     Social History    Marital status:      Spouse name: N/A    Number of children: N/A    Years of education: N/A     Social History Main Topics    Smoking status: Unknown If Ever Smoked    Smokeless tobacco: None    Alcohol use None      Comment: Unknown    Drug use: None    Sexual activity: Not Asked     Other Topics Concern    None     Social History Narrative       Meds:  No current facility-administered medications on file prior to encounter. No current outpatient prescriptions on file prior to encounter. Imaging:    CT Results (recent):    Results from Hospital Encounter encounter on 06/07/18   CTA CODE NEURO HEAD AND NECK W CONT   Narrative Prelim report:  Aberrant right subclavian artery. Right and left common and internal carotid arteries are patent. Left and right vertebral arteries are patent. Hypoplastic right A1 segment. No intracranial arterial occlusion. Incompletely visualized bibasilar patchy airspace disease. ET tube tip is 1.8 cm  superior to the braulio. Final report to follow. *FINAL REPORT BELOW*  EXAM:  CTA CODE NEURO HEAD AND NECK W CONT  INDICATION:  seizure, waxing and waning altered mental status.   TECHNIQUE: Axial spiral acquired CT angiography was performed from the aortic  arch up through the intracranial vessels. This was performed during intravenous  bolus infusion 100 mL of Isovue 370. Post-processing with  MIP reconstructions  from aortic arch to the skull base. CT dose reduction was achieved through use  of a standardized protocol tailored for this examination and automatic exposure  control for dose modulation. COMPARISON: CT head. FINDINGS:  For unknown technical reasons there is suboptimal opacification of the arteries  with contrast.  Some atherosclerotic calcification in the aorta. 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 The left common carotid artery and right common carotid arteries both arise from  the innominate. Dominant left vertebral artery. Right vertebral artery is patent but less than  optimally visualized due to poor contrast opacification. The basilar artery has  no significant stenosis and supplies both posterior cerebral arteries. Common carotid arteries are mildly tortuous and otherwise unremarkable. No evidence of hemodynamically significant stenosis at carotid bifurcations. 0%  stenosis by NASCET criteria. Prominent tortuosity of both cervical internal  carotid arteries without associated stenosis. Carotid siphons are most visualized with no abnormality demonstrated. Symmetric  sclerotic calcification in the distal internal carotid arteries. Probable at  least mild stenosis distal right vertebral artery. Relatively symmetric  opacification of middle cerebral arteries. Left anterior cerebral artery is  dominant. Endotracheal tube is in place. Atelectasis possible small pleural effusions. Impression IMPRESSION:  1. 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 2. Suboptimal arterial opacification apparently due to technical limitations. 3. Mild stenosis distal right internal carotid artery. 4. No acute arterial abnormality demonstrated.               MRI Results (recent):    Results from Hospital Encounter encounter on 06/07/18   MRI BRAIN W WO CONT   Narrative Clinical indication: Encephalopathy. Technical factors: Diffusion imaging, sagittal T1-weighted, axial load  T1-weighted pre and post 19 cc IV dotarem T2-weighted FLAIR gradient echo  coronal T1-weighted postcontrast no prior. There is no extra-axial fluid collection hemorrhage or shift of the midline. There is some encephalomalacia seen in the left parietal lobe inferiorly. Diffusion imaging show likely subacute to acute likely ischemic remote infarct  also seen in the left frontal convexity. Remote ischemic major flow voids in the  vessels at the base of the brain are present. Ventricular size is normal for  age. There is no abnormal enhancement or masses her. Impression IMPRESSION: Area of encephalomalacia likely relate to remote ischemic changes  left frontal convexity, left inferior parietal lobe. Suspect minimal recent  likely subacute ischemic changes medial left temporal lobe. Changes medially in  the left temporal lobe. IR Results (recent):  No results found for this or any previous visit. VAS/US Results (recent):    Results from Hospital Encounter encounter on 12/09/14   DUPLEX LOWER EXT VENOUS RIGHT   Narrative **Final Report**      ICD Codes / Adm. Diagnosis: [id number]   / Foot Pain  Foot pain  Examination:  US LOW EXT VENOUS DOPPLER UNI RT  - [id number] - Dec  9 2014    2:53PM  Accession No:  [id number]  Reason:  pain; swelling      REPORT:  Indication: pain; swelling     Examination: Right lower extremity venous Doppler. Findings: Grayscale, color, and duplex imaging of the right lower lower   extremity deep venous system was performed. The veins are compressible and   demonstrate normal color flow and Doppler waveform. IMPRESSION:  1.  No evidence of right lower extremity venous thrombosis            Signing/Reading Doctor: Felice Davalos ([id number])    Approved: Amena PALACIOS ([id number])  Dec  9 2014  2:57PM                                   Reviewed records in [employer] tab today    Lab Review     Admission on 06/07/2018   No results displayed because visit has over 200 results. Objective:       Exam:  Visit Vitals    /88 (BP 1 Location: Right arm, BP Patient Position: At rest;Supine)    Pulse 80    Temp 98.6 °F (37 °C)    Resp 20    Ht 5' 6\" (1.676 m)    Wt 99.4 kg (219 lb 2.2 oz)    SpO2 93%    BMI 35.37 kg/m2     Gen: Awake, alert, follows commands  Knows the president and knows hospital  Appropriate appearance, normal speech. No visual field defect on confrontation exam.  Full eyes movement, with no nystagmus, no diplopia, no ptosis. Normal gag and swallow. All remaining cranial nerves were normal  Motor function: 5/5 in all extremities  Sensroy: intact to LT, PP and JPS  Good FTN and HTS   Gait: Deferred    Assessment:     1. Altered mental status, unspecified altered mental status type    2. Seizure ([address]    3. Leukocytosis, unspecified type    4. Metabolic acidosis    5. Hyperglycemia    6. Acute encephalopathy    7. Partial symptomatic epilepsy with complex partial seizures, not intractable, without status epilepticus ([address]            Plan:   1. Since patient continues to improve, continue current management  2. Appreciate GI input, patient can be placed back on enteric coated ASA 81 after 5 days from procedure  3. Continue Keppra 500 mg BID    Please call for questions    35 mins of time spent, 50% of which was spent on counseling and coordination of care.       Hilaria Mayers MD  Diplomate, American Board of Psychiatry and Neurology  Diplomate, Neuromuscular Medicine  Diplomate, American Board of Electrodiagnostic Medicine

## 2018-06-11 NOTE — PROGRESS NOTES
Daily Progress Note and Discharge Note: 6/11/2018  Lisbeth Blackman MD    Assessment/Plan:   1. Encephalopathy acute -- initially intubated, extubated 6/8. Ammonia normal  --Likely seizure activity in setting of CVA: MRI brain showing L high cortical parietal lobe stroke; and a subacute L medial temporal stroke. --tolerating po  --on Keppra per neuro  --ASA when ok with GI [in 5 days]     3. Uncontrolled DM - A1c >9%  --BS acceptable in hosp  --Start on acei, statin, Januvia at DC and follow up with PCP     4. Post extubation CXR with atelectasis - no pneumonia  --abx stopped per pulmonary 6/8     5. Leukocytosis -- resolving     6. Renal insufficiency -. Unknown baseline. Cr back to normal with IVF     7. HTN.  --re-started home norvasc, increase to 10mg 6/10 for continued elevated BP    8. GI bleed -- EGD 6/9 with gastric ulcer  --holding ASA until 6/13 when ok to restart per GI      Problem List:  Problem List as of 6/11/2018  Date Reviewed: 6/7/2018          Codes Class Noted - Resolved    Encephalopathy acute ICD-10-CM: G93.40  ICD-9-CM: 348.30  6/7/2018 - Present        Seizure ([facility] [address] ICD-10-CM: R56.9  ICD-9-CM: 780.39  6/7/2018 - Present        DM (diabetes mellitus) ([facility] [address] ICD-10-CM: E11.9  ICD-9-CM: 250.00  6/7/2018 - Present        Leukocytosis ICD-10-CM: C74.308  ICD-9-CM: 288.60  6/7/2018 - Present        Aspiration into airway ICD-10-CM: T17.908A  ICD-9-CM: 934.9  6/7/2018 - Present        Renal insufficiency ICD-10-CM: N28.9  ICD-9-CM: 593.9  6/7/2018 - Present              Subjective:     78 y.o.  female who is admitted with acute encephalopathy. Ms. Raymundo Michaels with PMH of DM, HTN was brought to ER after she became less responsive and vomiting. Pt is intubated and sedated.  aAcording to her daughter, she talked to her at 4 PM yesterday on the phone and was ok, but later when she reached home, she went to her room and found her vomiting all over the floor and became in and out of consciousness. Later EMS was called and was brought to ER and ER she noted to have a seizure like activity. keppra loading dose was given. History reviewed. No pertinent past medical history. uncle: DM  History reviewed. No pertinent surgical history. (Dr Rik Sigala)    :  Intubated and sedated. Some spontaneous movement noted. AM las pending. : More pleasant this AM but generally very confused. Answers most yes/no questions but then can't elaborate. Nonsensical at times. Not oriented. 6/10: Much more appropriate and answers questions. She has no complaints this morning.    :  No complaints. Slept well. Answers most questions easily. BP better. She wants to go home. GI and Neuro have signed off. Restart ASA after . Cont Keppra. Will place on low dose Januvia at [place] for DM for now (A1C = 9.8 on ) and is to follow up with PCP later this wk for further tx/work up. Also add ACE inhib and statin and follow up with PCP. Follow up with Neuro and with GI as they direct. Review of Systems:   A comprehensive review of systems was negative except for that written in the HPI. Objective:   Physical Exam:     Visit Vitals    /78 (BP 1 Location: Right arm, BP Patient Position: At rest)    Pulse 86    Temp 99.3 °F (37.4 °C)    Resp 16    Ht 5' 6\" (1.676 m)    Wt 99.4 kg (219 lb 2.2 oz)    LMP Comment: unknown    SpO2 96%    BMI 35.37 kg/m2    O2 Flow Rate (L/min): 2 l/min O2 Device: Room air    Temp (24hrs), Av.3 °F (37.4 °C), Min:98.8 °F (37.1 °C), Max:99.7 °F (37.6 °C)         07 - 06/10 1900  In: 6425 [P.O.:1240; I.V.:2400]  Out: 3805 [Urine:3805]    General:  Awake, alert, no distress, obese   Head:  Normocephalic, without obvious abnormality, atraumatic. Eyes:  Conjunctivae/corneas clear. EOMs intact.    Neck: Supple, symmetrical, trachea midline, no adenopathy   Lungs:   CTAB no w/r/r   Heart:  Regular rate and rhythm, S1, S2 normal, no murmur, click, rub or gallop. Abdomen:   Soft, non-tender. Bowel sounds normal. No masses,  No organomegaly. Extremities: no cyanosis or edema. DP pulses 2+   Skin: turgor normal.    Neurologic: Alert, oriented x3. Non focal     Data Review:    6/7/18  EXAM:  CTA CODE NEURO HEAD AND NECK W CONT  IMPRESSION:  1. 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 2. Suboptimal arterial opacification apparently due to technical limitations. 3. Mild stenosis distal right internal carotid artery. 4. No acute arterial abnormality demonstrated. 6/8/17 MRI Brain  IMPRESSION: Area of encephalomalacia likely relate to remote ischemic changes  left frontal convexity, left inferior parietal lobe. Suspect minimal recent  likely subacute ischemic changes medial left temporal lobe. Changes medially in  the left temporal lobe.     Lab Results   Component Value Date/Time    Hemoglobin A1c 9.8 (H) 06/07/2018 09:54 PM     Recent Days:  Recent Labs      06/11/18   0225  06/10/18   0615  06/09/18   0750   WBC  10.8  10.9  10.6   HGB  13.1  13.2  12.2   HCT  38.9  41.5  37.0   PLT  179  143*  176     Recent Labs      06/11/18   0225  06/10/18   0615  06/10/18   0436  06/09/18   0615   NA  135*  137  137  137   K  3.9  3.2*  3.3*  3.8   CL  97  100  100  102   CO2  29  28  30  30   GLU  126*  142*  144*  201*   BUN  7  7  8  8   CREA  0.76  0.86  0.83  1.02   CA  8.5  8.1*  8.3*  8.5   MG  1.7   --   1.9  2.1   ALB  2.6*   --   2.6*  2.7*   TBILI  1.1*   --   1.1*  0.9   SGOT  64*   --   45*  49*   ALT  27   --   26  30     No results for input(s): PH, PCO2, PO2, HCO3, FIO2 in the last 72 hours.     24 Hour Results:  Recent Results (from the past 24 hour(s))   AMMONIA    Collection Time: 06/10/18  6:15 AM   Result Value Ref Range    Ammonia 17 <62 UMOL/L   METABOLIC PANEL, BASIC    Collection Time: 06/10/18  6:15 AM   Result Value Ref Range    Sodium 137 136 - 145 mmol/L    Potassium 3.2 (L) 3.5 - 5.1 mmol/L    Chloride 100 97 - 108 mmol/L    CO2 28 21 - 32 mmol/L    Anion gap 9 5 - 15 mmol/L    Glucose 142 (H) 65 - 100 mg/dL    BUN 7 6 - 20 MG/DL    Creatinine 0.86 0.55 - 1.02 MG/DL    BUN/Creatinine ratio 8 (L) 12 - 20      GFR est AA >60 >60 ml/min/1.73m2    GFR est non-AA >60 >60 ml/min/1.73m2    Calcium 8.1 (L) 8.5 - 10.1 MG/DL   CBC W/O DIFF    Collection Time: 06/10/18  6:15 AM   Result Value Ref Range    WBC 10.9 3.6 - 11.0 K/uL    RBC 4.06 3.80 - 5.20 M/uL    HGB 13.2 11.5 - 16.0 g/dL    HCT 41.5 35.0 - 47.0 %    .2 (H) 80.0 - 99.0 FL    MCH 32.5 26.0 - 34.0 PG    MCHC 31.8 30.0 - 36.5 g/dL    RDW 12.3 11.5 - 14.5 %    PLATELET 472 (L) 992 - 400 K/uL    MPV 11.3 8.9 - 12.9 FL    NRBC 0.0 0  WBC    ABSOLUTE NRBC 0.00 0.00 - 0.01 K/uL   GLUCOSE, POC    Collection Time: 06/10/18 11:50 AM   Result Value Ref Range    Glucose (POC) 137 (H) 65 - 100 mg/dL    Performed by Eliu العراقي    GLUCOSE, POC    Collection Time: 06/10/18  4:30 PM   Result Value Ref Range    Glucose (POC) 146 (H) 65 - 100 mg/dL    Performed by Gabe Vyas (PCT)    GLUCOSE, POC    Collection Time: 06/11/18 12:08 AM   Result Value Ref Range    Glucose (POC) 129 (H) 65 - 100 mg/dL    Performed by [facility] ([facility])    CBC WITH AUTOMATED DIFF    Collection Time: 06/11/18  2:25 AM   Result Value Ref Range    WBC 10.8 3.6 - 11.0 K/uL    RBC 3.95 3.80 - 5.20 M/uL    HGB 13.1 11.5 - 16.0 g/dL    HCT 38.9 35.0 - 47.0 %    MCV 98.5 80.0 - 99.0 FL    MCH 33.2 26.0 - 34.0 PG    MCHC 33.7 30.0 - 36.5 g/dL    RDW 12.3 11.5 - 14.5 %    PLATELET 414 868 - 960 K/uL    MPV 11.2 8.9 - 12.9 FL    NRBC 0.0 0  WBC    ABSOLUTE NRBC 0.00 0.00 - 0.01 K/uL    NEUTROPHILS 67 32 - 75 %    LYMPHOCYTES 23 12 - 49 %    MONOCYTES 6 5 - 13 %    EOSINOPHILS 3 0 - 7 %    BASOPHILS 0 0 - 1 %    IMMATURE GRANULOCYTES 0 0.0 - 0.5 %    ABS. NEUTROPHILS 7.3 1.8 - 8.0 K/UL    ABS. LYMPHOCYTES 2.5 0.8 - 3.5 K/UL    ABS. MONOCYTES 0.7 0.0 - 1.0 K/UL    ABS.  EOSINOPHILS 0.3 0.0 - 0.4 K/UL    ABS. BASOPHILS 0.0 0.0 - 0.1 K/UL    ABS. IMM. GRANS. 0.0 0.00 - 0.04 K/UL    DF AUTOMATED     METABOLIC PANEL, COMPREHENSIVE    Collection Time: 06/11/18  2:25 AM   Result Value Ref Range    Sodium 135 (L) 136 - 145 mmol/L    Potassium 3.9 3.5 - 5.1 mmol/L    Chloride 97 97 - 108 mmol/L    CO2 29 21 - 32 mmol/L    Anion gap 9 5 - 15 mmol/L    Glucose 126 (H) 65 - 100 mg/dL    BUN 7 6 - 20 MG/DL    Creatinine 0.76 0.55 - 1.02 MG/DL    BUN/Creatinine ratio 9 (L) 12 - 20      GFR est AA >60 >60 ml/min/1.73m2    GFR est non-AA >60 >60 ml/min/1.73m2    Calcium 8.5 8.5 - 10.1 MG/DL    Bilirubin, total 1.1 (H) 0.2 - 1.0 MG/DL    ALT (SGPT) 27 12 - 78 U/L    AST (SGOT) 64 (H) 15 - 37 U/L    Alk.  phosphatase 56 45 - 117 U/L    Protein, total 7.6 6.4 - 8.2 g/dL    Albumin 2.6 (L) 3.5 - 5.0 g/dL    Globulin 5.0 (H) 2.0 - 4.0 g/dL    A-G Ratio 0.5 (L) 1.1 - 2.2     MAGNESIUM    Collection Time: 06/11/18  2:25 AM   Result Value Ref Range    Magnesium 1.7 1.6 - 2.4 mg/dL   GLUCOSE, POC    Collection Time: 06/11/18  6:06 AM   Result Value Ref Range    Glucose (POC) 123 (H) 65 - 100 mg/dL    Performed by Jenny Gallo (PCT)        Medications reviewed  Current Facility-Administered Medications   Medication Dose Route Frequency    amLODIPine (NORVASC) tablet 10 mg  10 mg Oral DAILY    heparin (porcine) injection 5,000 Units  5,000 Units SubCUTAneous Q8H    labetalol (NORMODYNE;TRANDATE) injection 10 mg  10 mg IntraVENous Q6H PRN    hydrALAZINE (APRESOLINE) 20 mg/mL injection 10 mg  10 mg IntraVENous Q6H PRN    pantoprazole (PROTONIX) tablet 40 mg  40 mg Oral BID    sodium chloride (NS) flush 5-10 mL  5-10 mL IntraVENous Q8H    sodium chloride (NS) flush 5-10 mL  5-10 mL IntraVENous PRN    insulin lispro (HUMALOG) injection   SubCUTAneous Q6H    glucose chewable tablet 16 g  4 Tab Oral PRN    dextrose (D50W) injection syrg 12.5-25 g  12.5-25 g IntraVENous PRN    glucagon (GLUCAGEN) injection 1 mg  1 mg IntraMUSCular PRN    propofol (DIPRIVAN) infusion  0-50 mcg/kg/min IntraVENous TITRATE    levETIRAcetam (KEPPRA) 500 mg in 0.9% sodium chloride 100 mL IVPB  500 mg IntraVENous Q12H    sodium chloride (NS) flush 5-10 mL  5-10 mL IntraVENous Q8H    sodium chloride (NS) flush 5-10 mL  5-10 mL IntraVENous PRN         Chloe Moran MD

## 2018-06-11 NOTE — PROGRESS NOTES
Discharge instructions reviewed with the patient and her daughters and all questions answered. Prescriptions called in to [address] [place] [address]. Peripheral IV removed. Patient discharged via wheelchair home with daughters.

## 2018-06-11 NOTE — PROGRESS NOTES
Ivonne Hinds M.D.  [phone]           GI PROGRESS NOTE        NAME: Mak Caceres   :  1950   MRN:  [id number]       Subjective:   Doing well, denies any complaints, no bleeding episodes since EGD. Objective: In NAD      VITALS:   Last 24hrs VS reviewed since prior progress note. Most recent are:  Visit Vitals    /88 (BP 1 Location: Right arm, BP Patient Position: At rest;Supine)    Pulse 80    Temp 98.6 °F (37 °C)    Resp 20    Ht 5' 6\" (1.676 m)    Wt 99.4 kg (219 lb 2.2 oz)    SpO2 93%    BMI 35.37 kg/m2       Intake/Output Summary (Last 24 hours) at 18 0830  Last data filed at 06/10/18 1200   Gross per 24 hour   Intake             1500 ml   Output             1200 ml   Net              300 ml       PHYSICAL EXAM:  General: Alert, in no acute distress    HEENT: Anicteric sclerae. Lungs:            CTA Bilaterally. Heart:  Regular  rhythm,    Abdomen: Soft, Non distended, Non tender.  (+)Bowel sounds, no HSM    Lab Data Reviewed:   Recent Labs      185  06/10/18   0615   WBC  10.8  10.9   HGB  13.1  13.2   HCT  38.9  41.5   PLT  179  143*     Recent Labs      185  06/10/18   0615   NA  135*  137   K  3.9  3.2*   CL  97  100   CO2  29  28   BUN  7  7   CREA  0.76  0.86   GLU  126*  142*   CA  8.5  8.1*     Recent Labs      185  06/10/18   0436   SGOT  64*  45*   AP  56  60   TP  7.6  7.5   ALB  2.6*  2.6*   GLOB  5.0*  4.9*       ________________________________________________________________________  Patient Active Problem List   Diagnosis Code    Encephalopathy acute G93.40    Seizure (HCC) R56.9    DM (diabetes mellitus) (HCC) E11.9    Leukocytosis D72.829    Aspiration into airway T17.908A    Renal insufficiency N28.9         Assessment and Plan:  Large gastric ulcer S/P EGD with 4 clips applied. Continue with BID PPI for 4 weeks, then daily PPI, H.pylori serology pending.   Hemoglobin stable  Diet advanced to diabetic this morning. Can resume aspirin in about 5 days as she is still at high risk of bleeding. Will sign off, please call us if needed.        Signed By: Roosevelt Cantrell MD     6/11/2018  8:32 AM

## 2018-06-11 NOTE — PROGRESS NOTES
Problem: Self Care Deficits Care Plan (Adult)  Goal: *Acute Goals and Plan of Care (Insert Text)  Occupational Therapy Goals  Initiated 6/11/2018  1. Patient will perform lower body dressing with modified independence within 7 day(s). 2.  Patient will perform grooming with modified independence within 7 day(s). 3.  Patient will perform bathing with modified independence within 7 day(s). 4.  Patient will perform toilet transfers with modified independence within 7 day(s). 5.  Patient will perform all aspects of toileting with modified independence within 7 day(s). Occupational Therapy EVALUATION  Patient: Obi Shine (36 y.o. female)  Date: 6/11/2018  Primary Diagnosis: Encephalopathy acute  Encephalopathy acute  Coffee ground emesis  Procedure(s) (LRB):  ESOPHAGOGASTRODUODENOSCOPY (EGD) (N/A)  RESOLUTION  CLIP x 4 (N/A)  ESOPHAGOGASTRODUODENAL (EGD) BIOPSY (N/A) 3 Days Post-Op   Precautions:   Fall    ASSESSMENT :  Based on the objective data described below, the patient presents with hospital admission secondary to acute encephalopathy. Patient post op day 3 following EGD with resolution clip x 4. Patient received supine in bed and reports she ready to go home. Patient able to move to EOB with modified independence, performs sit to stand and transfers with close supervision and no LOB. Patient with good demonstration of reaching to feet to manage distal LE dressing. Patient chair set up at end of session and patient left in chair, alarm set. Patient able to answer questions appropriately today. Will follow for OT services to ensure continued safety as well as progression toward independence with ADL tasks as patient reports living alone. Patient will benefit from skilled intervention to address the above impairments.   Patients rehabilitation potential is considered to be Good  Factors which may influence rehabilitation potential include:   [x]             None noted  []             Mental ability/status  []             Medical condition  []             Home/family situation and support systems  []             Safety awareness  []             Pain tolerance/management  []             Other:      PLAN :  Recommendations and Planned Interventions:  [x]               Self Care Training                  [x]        Therapeutic Activities  [x]               Functional Mobility Training    []        Cognitive Retraining  [x]               Therapeutic Exercises           [x]        Endurance Activities  [x]               Balance Training                   []        Neuromuscular Re-Education  []               Visual/Perceptual Training     [x]   Home Safety Training  [x]               Patient Education                 [x]        Family Training/Education  []               Other (comment):    Frequency/Duration: Patient will be followed by occupational therapy 5 times a week to address goals. Discharge Recommendations: None  Further Equipment Recommendations for Discharge: none noted      SUBJECTIVE:   Patient stated I hope I can go home.     OBJECTIVE DATA SUMMARY:   HISTORY:   Past Medical History:   Diagnosis Date    Diabetes ([facility] [address]     Hypertension    History reviewed. No pertinent surgical history.     Prior Level of Function/Environment/Context: independent   Occupations in which the patient is/was successful, what are the barriers preventing that success:   Performance Patterns (routines, roles, habits, and rituals):   Personal Interests and/or values:   Expanded or extensive additional review of patient history:     Home Situation  Home Environment: Private residence  # Steps to Enter: 4  Rails to Enter: Yes  One/Two Story Residence: One story  Living Alone: Yes  Support Systems: Family member(s)  Patient Expects to be Discharged to[de-identified] Private residence  Current DME Used/Available at Home: None  Tub or Shower Type: Tub/Shower combination    Hand dominance: Right    EXAMINATION OF PERFORMANCE DEFICITS:  Cognitive/Behavioral Status:  Neurologic State: Alert  Orientation Level: Oriented to person;Oriented to place;Oriented to time;Oriented to situation  Cognition: Follows commands  Perception: Appears intact  Perseveration: No perseveration noted  Safety/Judgement: Decreased insight into deficits    Skin: intact as seen    Edema: none noted    Hearing: Auditory  Auditory Impairment: None    Vision/Perceptual:                                Corrective Lenses: Glasses (reports poor vision)    Range of Motion:  AROM: Within functional limits  PROM: Within functional limits                      Strength:  Strength: Within functional limits                Coordination:  Coordination: Within functional limits  Fine Motor Skills-Upper: Left Intact; Right Intact    Gross Motor Skills-Upper: Left Intact; Right Intact    Tone & Sensation:  Tone: Normal  Sensation: Intact                      Balance:  Sitting: Intact  Sitting - Static: Good (unsupported)  Sitting - Dynamic: Good (unsupported)  Standing: Intact; Without support  Standing - Static: Good  Standing - Dynamic : Good    Functional Mobility and Transfers for ADLs:  Bed Mobility:  Rolling: Modified independent  Supine to Sit: Modified independent  Sit to Supine: Modified independent  Scooting: Modified independent    Transfers:  Sit to Stand: Supervision  Stand to Sit: Supervision  Bed to Chair: Supervision  Toilet Transfer : Supervision    ADL Assessment:  Feeding: Independent    Oral Facial Hygiene/Grooming: Supervision    Bathing: Supervision    Upper Body Dressing: Supervision    Lower Body Dressing: Supervision    Toileting: Supervision                ADL Intervention and task modifications:                                     Cognitive Retraining  Safety/Judgement: Decreased insight into deficits    Therapeutic Exercise:     Functional Measure:  Barthel Index:    Bathin  Bladder: 5  Bowels: 10  Groomin  Dressin  Feeding: 10  Mobility: 10  Stairs: 5  Toilet Use: 5  Transfer (Bed to Chair and Back): 10  Total: 60       Barthel and G-code impairment scale:  Percentage of impairment CH  0% CI  1-19% CJ  20-39% CK  40-59% CL  60-79% CM  80-99% CN  100%   Barthel Score 0-100 100 99-80 79-60 59-40 20-39 1-19   0   Barthel Score 0-20 20 17-19 13-16 9-12 5-8 1-4 0      The Barthel ADL Index: Guidelines  1. The index should be used as a record of what a patient does, not as a record of what a patient could do. 2. The main aim is to establish degree of independence from any help, physical or verbal, however minor and for whatever reason. 3. The need for supervision renders the patient not independent. 4. A patient's performance should be established using the best available evidence. Asking the patient, friends/relatives and nurses are the usual sources, but direct observation and common sense are also important. However direct testing is not needed. 5. Usually the patient's performance over the preceding 24-48 hours is important, but occasionally longer periods will be relevant. 6. Middle categories imply that the patient supplies over 50 per cent of the effort. 7. Use of aids to be independent is allowed. Pricilla Mobley., Barthel, D.W. (0869). Functional evaluation: the Barthel Index. [facility] (14)2. Tyson Hurt eric KYLAH Funk, Arsh Villarreal., Sujey Mead, Shannan, [address] [place] [address] (1999). Measuring the change indisability after inpatient rehabilitation; comparison of the responsiveness of the Barthel Index and Functional [place] Measure. Journal of Neurology, Neurosurgery, and Psychiatry, 66(4), 969-576. Callie Israel, N.J.A, LESTER Hernandes.IVETTE, & Emiliano Franco, M.A. (2004.) Assessment of post-stroke quality of life in cost-effectiveness studies: The usefulness of the Barthel Index and the EuroQoL-5D. Quality of Life Research, 13, 918-46         G codes:   In compliance with CMSs Claims Based Outcome Reporting, the following G-code set was chosen for this patient based on their primary functional limitation being treated: The outcome measure chosen to determine the severity of the functional limitation was the Barthel Index  with a score of 60/100 which was correlated with the impairment scale. ? Self Care:     - CURRENT STATUS: CJ - 20%-39% impaired, limited or restricted    - GOAL STATUS: CI - 1%-19% impaired, limited or restricted    - D/C STATUS:  ---------------To be determined---------------     Occupational Therapy Evaluation Charge Determination   History Examination Decision-Making   LOW Complexity : Brief history review  LOW Complexity : 1-3 performance deficits relating to physical, cognitive , or psychosocial skils that result in activity limitations and / or participation restrictions  LOW Complexity : No comorbidities that affect functional and no verbal or physical assistance needed to complete eval tasks       Based on the above components, the patient evaluation is determined to be of the following complexity level: LOW   Pain:  Pain Scale 1: Numeric (0 - 10)  Pain Intensity 1: 0              Activity Tolerance:   VSS  Please refer to the flowsheet for vital signs taken during this treatment. After treatment:   [x] Patient left in no apparent distress sitting up in chair  [] Patient left in no apparent distress in bed  [x] Call bell left within reach  [x] Nursing notified  [] Caregiver present  [x] Bed alarm activated    COMMUNICATION/EDUCATION:   The patients plan of care was discussed with: Physical Therapist and Registered Nurse. [x] Home safety education was provided and the patient/caregiver indicated understanding. [x] Patient/family have participated as able in goal setting and plan of care. [x] Patient/family agree to work toward stated goals and plan of care. [] Patient understands intent and goals of therapy, but is neutral about his/her participation.   [] Patient is unable to participate in goal setting and plan of care. This patients plan of care is appropriate for delegation to NARCISO.     Thank you for this referral.  Alina Huynh, OTR/L  Time Calculation: 24 mins

## 2018-06-11 NOTE — CDMP QUERY
1) => Late effect of CVA causing seizures POA treated with IV Keppra    => Other explanation of clinical findings   => Clinically Undetermined (no explanation for clinical findings)    The medical record reflects the following clinical findings, treatment, and risk factors. Risk Factors:  77 yo F admitted with seizure, asp pna, & acute large gastric bleeding  ulcer   Clinical Indicators:  MRI shows subacute and remote L high cortical parietal lobe stroke; and a subacute L medial temporal stroke  6/11 neuro consult states\" Apparently had a stroke in Dec\"   \" Partial symptomatic epilepsy with complex partial seizures, not intractable, without status epilepticus \"   Treatment: IV Keppra, neuro consult, MRI     Please clarify and document your clinical opinion in the progress notes and discharge summary including the definitive and/or presumptive diagnosis, (suspected or probable), related to the above clinical findings. Please include clinical findings supporting your diagnosis.     Thank you for your time   [facility] RN/BSN, [address]  Desk:   [phone]   Other:  [phone]

## 2018-06-11 NOTE — DISCHARGE INSTRUCTIONS
Patient Discharge Instructions    Neal Brown / [id number] : 1950    Admitted 2018 Discharged: 2018 4:49 PM     ACUTE DIAGNOSES:  Encephalopathy acute  Encephalopathy acute  Coffee ground emesis   CVA  DM out of control    CHRONIC MEDICAL DIAGNOSES:  Problem List as of 2018  Date Reviewed: 2018          Codes Class Noted - Resolved    Encephalopathy acute ICD-10-CM: G93.40  ICD-9-CM: 348.30  2018 - Present        Seizure ([facility] [address] ICD-10-CM: R56.9  ICD-9-CM: 780.39  2018 - Present        DM (diabetes mellitus) ([facility] [address] ICD-10-CM: E11.9  ICD-9-CM: 250.00  2018 - Present        Leukocytosis ICD-10-CM: N98.127  ICD-9-CM: 288.60  2018 - Present        Aspiration into airway ICD-10-CM: L42.198P  ICD-9-CM: 934.9  2018 - Present        Renal insufficiency ICD-10-CM: N28.9  ICD-9-CM: 593.9  2018 - Present              DISCHARGE MEDICATIONS:         · It is important that you take the medication exactly as they are prescribed. · Keep your medication in the bottles provided by the pharmacist and keep a list of the medication names, dosages, and times to be taken in your wallet. · Do not take other medications without consulting your doctor. DIET:  Diabetic Diet    ACTIVITY: Activity as tolerated    ADDITIONAL INFORMATION: If you experience any of the following symptoms then please call your primary care physician or return to the emergency room if you cannot get hold of your doctor: Fever, chills, nausea, vomiting, diarrhea, change in mentation, falling, bleeding, shortness of breath. FOLLOW UP CARE:  Dr. Richi Herrera MD  you are to call and set up an appointment to see them with in 1 week. Follow-up with specialists at directed by them      Information obtained by :  I understand that if any problems occur once I am at home I am to contact my physician. I understand and acknowledge receipt of the instructions indicated above. Physician's or R.N.'s Signature                                                                  Date/Time                                                                                                                                              Patient or Representative Signature                                                          Date/Time

## 2018-06-11 NOTE — PROGRESS NOTES
Problem: Mobility Impaired (Adult and Pediatric)  Goal: *Acute Goals and Plan of Care (Insert Text)  Physical Therapy Goals  Initiated 6/9/2018  1. Patient will move from supine to sit and sit to supine  in bed with modified independence within 7 day(s). 2.  Patient will transfer from bed to chair and chair to bed with modified independence using the least restrictive device within 7 day(s). 3.  Patient will perform sit to stand with modified independence within 7 day(s). 4.  Patient will ambulate with modified independence for 150 feet with the least restrictive device within 7 day(s). 5.  Patient will ascend/descend 4 stairs with handrail(s) with supervision/set-up within 7 day(s). physical Therapy TREATMENT  Patient: Jj Devine (89 y.o. female)  Date: 6/11/2018  Diagnosis: Encephalopathy acute  Encephalopathy acute  Coffee ground emesis <principal problem not specified>  Procedure(s) (LRB):  ESOPHAGOGASTRODUODENOSCOPY (EGD) (N/A)  RESOLUTION  CLIP x 4 (N/A)  ESOPHAGOGASTRODUODENAL (EGD) BIOPSY (N/A) 3 Days Post-Op  Precautions: Fall  Chart, physical therapy assessment, plan of care and goals were reviewed. ASSESSMENT:  Based on the objective data described below, patient participated well with treatment today. Sit on the edge of bed modified independent, sit to stand  Supervision, ambulate without assistive device out on the 5th floor hallway, no loss of balance steady with sitting and standing balance. Up and down stair supervision. OOB to chair as tolerated, performed some active range of motion exercise on both LE all planes, patient progressing well, cleared per Physical Therapy perspective when medically stable to go home. Activated and notified nurse who agreed to monitor patient, assist back to bed when ready.     Progression toward goals:  [x]    Improving appropriately and progressing toward goals  []    Improving slowly and progressing toward goals  []    Not making progress toward goals and plan of care will be adjusted     PLAN:  Patient continues to benefit from skilled intervention to address the above impairments. Continue treatment per established plan of care. Discharge Recommendations:  None  Further Equipment Recommendations for Discharge:  none     SUBJECTIVE:   Patient stated I'm ready to go home.     OBJECTIVE DATA SUMMARY:   Critical Behavior:  Neurologic State: Alert  Orientation Level: Appropriate for age, Oriented to person, Oriented to place, Oriented to time  Cognition: Follows commands  Safety/Judgement: Lack of insight into deficits  Functional Mobility Training:  Bed Mobility:  Rolling: Modified independent  Supine to Sit: Modified independent  Sit to Supine: Modified independent  Scooting: Modified independent        Transfers:  Sit to Stand: Supervision  Stand to Sit: Supervision  Stand Pivot Transfers: Supervision     Bed to Chair: Supervision                    Balance:  Sitting: Intact; High guard  Sitting - Static: Good (unsupported)  Sitting - Dynamic: Good (unsupported)  Standing: Intact; Without support  Standing - Static: Good  Standing - Dynamic : Good  Ambulation/Gait Training:  Distance (ft): 300 Feet (ft)  Assistive Device: Gait belt  Ambulation - Level of Assistance: Supervision     Gait Description (WDL): Within defined limits                 Speed/Nereida: Pace decreased (<100 feet/min)                       Stairs:  Number of Stairs Trained: 4  Stairs - Level of Assistance: Supervision   Rail Use: Both      Therapeutic Exercises:    Instructed patient to continue active range of motion exercise on both legs while up on chair or on bed. Pain:  Pain Scale 1: Numeric (0 - 10)  Pain Intensity 1: 0              Activity Tolerance:   Good. Please refer to the flowsheet for vital signs taken during this treatment.   After treatment:   [x]    Patient left in no apparent distress sitting up in chair  []    Patient left in no apparent distress in bed  [x]    Call anderson left within reach  [x]    Nursing notified  []    Caregiver present  [x]    Chair alarm activated    COMMUNICATION/COLLABORATION:   The patients plan of care was discussed with: Occupational Therapist, Registered Nurse and patient    Daiana Morgan PT,WCC.    Time Calculation: 25 mins

## 2018-06-11 NOTE — PROGRESS NOTES
Bedside and Verbal shift change report given to Dianne Rincon RN (oncoming nurse) by Lila Oreilly RN   (offgoing nurse). Report included the following information SBAR, Kardex, ED Summary, Intake/Output, MAR, Accordion and Recent Results.

## 2018-06-11 NOTE — DISCHARGE SUMMARY
Physician Discharge Summary     Patient ID:    Jj Devine  [id number]  77 y.o.  1950  Nikolas Ansari MD    Admit date: 6/7/2018  Discharge date and time: 6/11/2018  Admission Diagnoses: Encephalopathy acute; Encephalopathy acute; Coffee ground nena*, seizure, CVA, GI bleed, DM out of control  Chronic Diagnoses:    Problem List as of 6/11/2018  Date Reviewed: 6/7/2018          Codes Class Noted - Resolved    Encephalopathy acute ICD-10-CM: G93.40  ICD-9-CM: 348.30  6/7/2018 - Present        Seizure ([facility] ICD-10-CM: R56.9  ICD-9-CM: 780.39  6/7/2018 - Present        DM (diabetes mellitus) ([facility] ICD-10-CM: E11.9  ICD-9-CM: 250.00  6/7/2018 - Present        Leukocytosis ICD-10-CM: X72.350  ICD-9-CM: 288.60  6/7/2018 - Present        Aspiration into airway ICD-10-CM: J49.005X  ICD-9-CM: 934.9  6/7/2018 - Present        Renal insufficiency ICD-10-CM: N28.9  ICD-9-CM: 593.9  6/7/2018 - Present              Discharge Medications:   Current Discharge Medication List      START taking these medications    Details   levETIRAcetam (KEPPRA) 500 mg tablet Take 1 Tab by mouth two (2) times a day. Qty: 60 Tab, Refills: 0      SITagliptin (JANUVIA) 50 mg tablet Take 1 Tab by mouth daily. Qty: 30 Tab, Refills: 3      lisinopril (PRINIVIL, ZESTRIL) 2.5 mg tablet Take 1 Tab by mouth daily. Qty: 30 Tab, Refills: 3      atorvastatin (LIPITOR) 20 mg tablet Take 0.5 Tabs by mouth daily. Qty: 30 Tab, Refills: 3         CONTINUE these medications which have CHANGED    Details   aspirin delayed-release 81 mg tablet Take 1 Tab by mouth daily. Restart 6/13/18  Qty: 30 Tab, Refills: 0         CONTINUE these medications which have NOT CHANGED    Details   amLODIPine (NORVASC) 10 mg tablet Take 10 mg by mouth daily. sertraline (ZOLOFT) 100 mg tablet Take 100 mg by mouth daily. meloxicam (MOBIC) 15 mg tablet Take 15 mg by mouth daily as needed for Pain (back pain).       busPIRone (BUSPAR) 10 mg tablet Take 10 mg by mouth two (2) times a day. omeprazole (PRILOSEC) 20 mg capsule Take 20 mg by mouth daily as needed (GERD). Follow up Care:    1. Candace Scott MD with in 1 weeks  2.  specialists as directed. Diet:  Diabetic Diet  Disposition:  Home. Advanced Directive:  Discharge Exam:  [See today's progress note.]  CONSULTATIONS: Pulmonary/Intensive care, GI and Neurology    Significant Diagnostic Studies:   Recent Labs      06/11/18   0225  06/10/18   0615   WBC  10.8  10.9   HGB  13.1  13.2   HCT  38.9  41.5   PLT  179  143*     Recent Labs      06/11/18   0225  06/10/18   0615  06/10/18   0436  06/09/18   0615   NA  135*  137  137  137   K  3.9  3.2*  3.3*  3.8   CL  97  100  100  102   CO2  29  28  30  30   BUN  7  7  8  8   CREA  0.76  0.86  0.83  1.02   GLU  126*  142*  144*  201*   CA  8.5  8.1*  8.3*  8.5   MG  1.7   --   1.9  2.1     Recent Labs      06/11/18   0225  06/10/18   0436  06/09/18   0615   SGOT  64*  45*  49*   ALT  27  26  30   AP  56  60  71   TBILI  1.1*  1.1*  0.9   TP  7.6  7.5  7.9   ALB  2.6*  2.6*  2.7*   GLOB  5.0*  4.9*  5.2*     Lab Results   Component Value Date/Time    Glucose (POC) 160 (H) 06/11/2018 12:37 PM    Glucose (POC) 123 (H) 06/11/2018 06:06 AM    Glucose (POC) 129 (H) 06/11/2018 12:08 AM    Glucose (POC) 146 (H) 06/10/2018 04:30 PM    Glucose (POC) 137 (H) 06/10/2018 11:50 AM     HOSPITAL COURSE & TREATMENT RENDERED:   1. See today's progress note:    Daily Progress Note and Discharge Note: 6/11/2018  Santana Wang MD         Assessment/Plan:   1.  Encephalopathy acute -- initially intubated, extubated 6/8. Ammonia normal  --Likely seizure activity in setting of CVA: MRI brain showing L high cortical parietal lobe stroke; and a subacute L medial temporal stroke.   --tolerating po  --on Keppra per neuro  --ASA when ok with GI [in 5 days]      3.   Uncontrolled DM - A1c >9%  --BS acceptable in hosp  --Start on acei, statin, Januvia at DC and follow up with PCP      4.  Post extubation CXR with atelectasis - no pneumonia  --abx stopped per pulmonary 6/8      5.  Leukocytosis -- resolving      6.  Renal insufficiency -. Unknown baseline. Cr back to normal with IVF      7.  HTN.  --re-started home norvasc, increase to 10mg 6/10 for continued elevated BP     8. GI bleed -- EGD 6/9 with gastric ulcer  --holding ASA until 6/13 when ok to restart per GI       Problem List:  Problem List as of 6/11/2018  Date Reviewed: 6/7/2018             Codes Class Noted - Resolved     Encephalopathy acute ICD-10-CM: G93.40  ICD-9-CM: 348.30   6/7/2018 - Present           Seizure ([facility] [address] ICD-10-CM: R56.9  ICD-9-CM: 780.39   6/7/2018 - Present           DM (diabetes mellitus) ([facility] [address] ICD-10-CM: E11.9  ICD-9-CM: 250.00   6/7/2018 - Present           Leukocytosis ICD-10-CM: X89.576  ICD-9-CM: 288.60   6/7/2018 - Present           Aspiration into airway ICD-10-CM: T17.908A  ICD-9-CM: 859. 9   6/7/2018 - Present           Renal insufficiency ICD-10-CM: N28.9  ICD-9-CM: 593.9   6/7/2018 - Present                  Subjective:     67 y.o.   female who is admitted with acute encephalopathy.  Ms. Lee with PMH of DM, HTN was brought to ER after she became less responsive and vomiting. Pt is intubated and sedated. aAcording to her daughter, she talked to her at 4 PM yesterday on the phone and was ok, but later when she reached home, she went to her room and found her vomiting all over the floor and became in and out of consciousness. Later EMS was called and was brought to ER and ER she noted to have a seizure like activity. keppra loading dose was given. History reviewed. No pertinent past medical history. uncle: DM  History reviewed. No pertinent surgical history. (Dr Marycruz Babb)     6/8: Intubated and sedated. Some spontaneous movement noted. AM las pending.     6/9: More pleasant this AM but generally very confused. Answers most yes/no questions but then can't elaborate. Nonsensical at times. Not oriented.     6/10: Much more appropriate and answers questions. She has no complaints this morning.     :  No complaints. Slept well. Answers most questions easily. BP better. She wants to go home. GI and Neuro have signed off. Restart ASA after . Cont Keppra. Will place on low dose Januvia at MT for DM for now (A1C = 9.8 on ) and is to follow up with PCP later this wk for further tx/work up. Also add ACE inhib and statin and follow up with PCP. Follow up with Neuro and with GI as they direct.      Review of Systems:   A comprehensive review of systems was negative except for that written in the HPI.     Objective:   Physical Exam:           Visit Vitals    /78 (BP 1 Location: Right arm, BP Patient Position: At rest)    Pulse 86    Temp 99.3 °F (37.4 °C)    Resp 16    Ht 5' 6\" (1.676 m)    Wt 99.4 kg (219 lb 2.2 oz)    LMP Comment: unknown    SpO2 96%    BMI 35.37 kg/m2    O2 Flow Rate (L/min): 2 l/min O2 Device: Room air     Temp (24hrs), Av.3 °F (37.4 °C), Min:98.8 °F (37.1 °C), Max:99.7 °F (37.6 °C)         07 - 06/10 1900  In: 5984 [P.O.:1240; I.V.:2400]  Out: 3805 [Urine:3805]     General:  Awake, alert, no distress, obese   Head:  Normocephalic, without obvious abnormality, atraumatic. Eyes:  Conjunctivae/corneas clear. EOMs intact. Neck: Supple, symmetrical, trachea midline, no adenopathy   Lungs:   CTAB no w/r/r   Heart:  Regular rate and rhythm, S1, S2 normal, no murmur, click, rub or gallop. Abdomen:   Soft, non-tender. Bowel sounds normal. No masses,  No organomegaly. Extremities: no cyanosis or edema. DP pulses 2+   Skin: turgor normal.    Neurologic: Alert, oriented x3. Non focal      Data Review:    18  EXAM:  CTA CODE NEURO HEAD AND NECK W CONT  IMPRESSION:  1. 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 2. Suboptimal arterial opacification apparently due to technical limitations.   3. Mild stenosis distal right internal carotid artery. 4. No acute arterial abnormality demonstrated.     6/8/17 MRI Brain  IMPRESSION: Area of encephalomalacia likely relate to remote ischemic changes  left frontal convexity, left inferior parietal lobe. Suspect minimal recent  likely subacute ischemic changes medial left temporal lobe.  Changes medially in  the left temporal lobe.            Lab Results   Component Value Date/Time     Hemoglobin A1c 9.8 (H) 06/07/2018 09:54 PM      Signed:  Emilia Gutiérrez MD  6/11/2018  4:49 PM

## 2018-06-11 NOTE — PROGRESS NOTES
CM Note:  Reason for Admission:   encephalopathy                 RRAT Score:        8             Plan for utilizing home health:      none                    Likelihood of Readmission:  low                         Transition of Care Plan:   PTA pt was living in a 1 story house with her son. There are 3 entry steps. Pt was independent with ADL's and walked unaided. She relies on family for transport. No DME at home; she has never had home health. Her emergency contact is her dtr, Radha Delacruz ([phone]), who will drive her home at d/c. Pt has Rx coverage and gets her medications by mail and from [facility]. Her PCP is Dr. Raisa Perez. No ANTOINE Delgado RN    Care Management Interventions  PCP Verified by CM:  Yes  Palliative Care Criteria Met (RRAT>21 & CHF Dx)?: No  MyChart Signup: No  Discharge Durable Medical Equipment: No  Physical Therapy Consult: Yes  Occupational Therapy Consult: Yes  Speech Therapy Consult: Yes  Current Support Network: Own Home  Confirm Follow Up Transport: Family  Plan discussed with Pt/Family/Caregiver: Yes  Discharge Location  Discharge Placement: Home with one level

## 2018-06-11 NOTE — PROGRESS NOTES
Nutrition Assessment:    RECOMMENDATIONS/INTERVENTION(S):   1. PO initiated & diet advancement to CCD/2 gm. 2. Monitor PO intakes, weight, BG  3. Pt tolerating diet well so far, no need for ONS- eats well at home. ASSESSMENT:   6/11: Follow up. Pt transferred to room 505. Pt sitting up in chair. Diet advanced this am to CCD, 2 gm. Pt ate >75% of breakfast this morning. No n/v. Pt endorses 1 BM this am, soft - not diarrhea. BG mostly controlled. 123, 129, 146 mg/dL. Na 135. No overt signs of bleeding. 6/8:  Chart reviewed 2/2 intubation. Discussed case in ICU rounds. 78 yof admitted for acute encephalopathy, ?seizure. Neurology following, s/p CT showing signs of old stroke & plans for MRI to f/o new stroke. Intubated and sedated on propofol (providing ~475 kcals/d from lipids). NS IVF. BG elevated, 232-346, A1c 9.8. DTC following. Pmhx includes DM, HTN. Cr elevated. NGT to suction. S/p KUB. No plans to start nutrition support - Intensivist aware of RD TF recs if appropriate. No edema, skin intact per flow sheets. Last BM unknown, abd WDL. Obese per BMI. No recent wt hx - wt +12% x ~4 yrs per hx. SLP following, plans for eval prior to initiating PO. SUBJECTIVE/OBJECTIVE:     Diet Order: Consistent carb  % Eaten:  No data found.     Pertinent Medications: [x] Reviewed - insulin, zosyn, propofol, NS IVF, pepcid     Past Medical History:   Diagnosis Date    Diabetes ([facility] [address]     Hypertension        Chemistries:  Lab Results   Component Value Date/Time    Sodium 135 (L) 06/11/2018 02:25 AM    Potassium 3.9 06/11/2018 02:25 AM    Chloride 97 06/11/2018 02:25 AM    CO2 29 06/11/2018 02:25 AM    Anion gap 9 06/11/2018 02:25 AM    Glucose 126 (H) 06/11/2018 02:25 AM    BUN 7 06/11/2018 02:25 AM    Creatinine 0.76 06/11/2018 02:25 AM    BUN/Creatinine ratio 9 (L) 06/11/2018 02:25 AM    GFR est AA >60 06/11/2018 02:25 AM    GFR est non-AA >60 06/11/2018 02:25 AM    Calcium 8.5 06/11/2018 02:25 AM AST (SGOT) 64 (H) 06/11/2018 02:25 AM    Alk. phosphatase 56 06/11/2018 02:25 AM    Protein, total 7.6 06/11/2018 02:25 AM    Albumin 2.6 (L) 06/11/2018 02:25 AM    Globulin 5.0 (H) 06/11/2018 02:25 AM    A-G Ratio 0.5 (L) 06/11/2018 02:25 AM    ALT (SGPT) 27 06/11/2018 02:25 AM      Anthropometrics: Height: 5' 6\" (167.6 cm) Weight: 99.4 kg (219 lb 2.2 oz)    IBW (%IBW): 65 kg (143 lb 4.8 oz) (153.54 %) UBW (%UBW):  (UTO) (  %)    BMI: Body mass index is 35.37 kg/(m^2). This BMI is indicative of:   [] Underweight    [] Normal    [] Overweight    [x]  Obesity    []  Extreme Obesity (BMI>40)  Estimated Nutrition Needs (Based on): 2016 Kcals/day (BMR(1551x1.3)) , 99 g (-119 (1.0-1.2 g/kg x actual BW)) Protein  Carbohydrate: At Least 130 g/day  Fluids: 1800 mL/day (1 ml/kcal)    Last BM: 6/11, abd WDL   []Active     []Hyperactive  []Hypoactive       [] Absent   BS - not charted  Skin:    [x] Intact   [] Incision  [] Breakdown   [] DTI   [] Tears/Excoriation/Abrasion  []Edema [] Other:      Wt Readings from Last 30 Encounters:   06/09/18 99.4 kg (219 lb 2.2 oz)   06/08/18 99.8 kg (220 lb 0.3 oz)   12/09/14 88.5 kg (195 lb 1.7 oz)      NUTRITION DIAGNOSES:   Problem:  Inadequate oral intake     Etiology: related to conditions associated with a dx: respiratory failure, altered GI function     Signs/Symptoms: as evidenced by intubated, NPO w/ NGT to suction      NUTRITION INTERVENTIONS:  Meals/Snacks: General/healthful diet - once diet initiated Enteral/Parenteral Nutrition: Initiate enteral nutrition - if remains intubated                GOAL:   Pt will tolerate meals without issues within 3-5 days    Cultural, Sabianism, or Ethnic Dietary Needs: None     EDUCATION & DISCHARGE NEEDS:    [x] None Identified   [] Identified and Education Provided/Documented   [] Identified and Pt declined/was not appropriate      [x] Interdisciplinary Care Plan Reviewed/Documented    [x] Discharge Needs:    tbd   [] No Nutrition Related Discharge Needs    NUTRITION RISK:   Pt Is At Nutrition Risk  [x]     No Nutrition Risk Identified  []       PT SEEN FOR:    []  MD Consult: []Calorie Count      []Diabetic Diet Education        []Diet Education     []Electrolyte Management     []General Nutrition Management and Supplements     []Management of Tube Feeding     []TPN Recommendations    []  RN Referral:  []MST score >=2     []Enteral/Parenteral Nutrition PTA     []Pregnant: Gestational DM or Multigestation                 [] Pressure Ulcer    []  Low BMI      []  Length of Stay       [] Dysphagia Diet         [] Ventilator  [x]  Follow-up     Previous Recommendations:   [x] Implemented          [] Not Implemented          [] Not Applicable    Previous Goal:   [x] Met              [] Progressing Towards Goal              [] Not Progressing Towards Goal   [] Not Applicable            Juliana Colin  Pager [phone]

## 2018-06-12 NOTE — PROGRESS NOTES
Visit documented 6/12/18  Spiritual Care Partner Volunteer visited patient in 5 Med Surg on 6/11/18.   Documented by: Renold Dakin [id number] ([id number])

## 2018-09-27 ENCOUNTER — APPOINTMENT (OUTPATIENT)
Dept: CT IMAGING | Age: 68
End: 2018-09-27
Attending: EMERGENCY MEDICINE
Payer: MEDICARE

## 2018-09-27 ENCOUNTER — HOSPITAL ENCOUNTER (OUTPATIENT)
Age: 68
Setting detail: OBSERVATION
Discharge: HOME | End: 2018-09-30
Attending: EMERGENCY MEDICINE | Admitting: INTERNAL MEDICINE
Payer: MEDICARE

## 2018-09-27 ENCOUNTER — APPOINTMENT (OUTPATIENT)
Dept: GENERAL RADIOLOGY | Age: 68
End: 2018-09-27
Attending: EMERGENCY MEDICINE
Payer: MEDICARE

## 2018-09-27 ENCOUNTER — APPOINTMENT (OUTPATIENT)
Dept: GENERAL RADIOLOGY | Age: 68
End: 2018-09-27
Attending: INTERNAL MEDICINE
Payer: MEDICARE

## 2018-09-27 DIAGNOSIS — D72.829 LEUKOCYTOSIS, UNSPECIFIED TYPE: Primary | ICD-10-CM

## 2018-09-27 DIAGNOSIS — E87.6 HYPOKALEMIA: ICD-10-CM

## 2018-09-27 DIAGNOSIS — R06.02 SOB (SHORTNESS OF BREATH): ICD-10-CM

## 2018-09-27 PROBLEM — N28.9 RENAL INSUFFICIENCY: Status: RESOLVED | Noted: 2018-06-07 | Resolved: 2018-09-27

## 2018-09-27 PROBLEM — R56.9 SEIZURE (HCC): Status: RESOLVED | Noted: 2018-06-07 | Resolved: 2018-09-27

## 2018-09-27 PROBLEM — R11.10 VOMITING: Status: ACTIVE | Noted: 2018-09-27

## 2018-09-27 PROBLEM — R65.10 SIRS (SYSTEMIC INFLAMMATORY RESPONSE SYNDROME) (HCC): Status: ACTIVE | Noted: 2018-09-27

## 2018-09-27 PROBLEM — T17.908A ASPIRATION INTO AIRWAY: Status: RESOLVED | Noted: 2018-06-07 | Resolved: 2018-09-27

## 2018-09-27 PROBLEM — G93.40 ENCEPHALOPATHY ACUTE: Status: RESOLVED | Noted: 2018-06-07 | Resolved: 2018-09-27

## 2018-09-27 PROBLEM — R00.0 SINUS TACHYCARDIA: Status: ACTIVE | Noted: 2018-09-27

## 2018-09-27 LAB
ALBUMIN SERPL-MCNC: 3.1 G/DL (ref 3.5–5)
ALBUMIN/GLOB SERPL: 0.6 {RATIO} (ref 1.1–2.2)
ALP SERPL-CCNC: 72 U/L (ref 45–117)
ALT SERPL-CCNC: 13 U/L (ref 12–78)
ANION GAP SERPL CALC-SCNC: 13 MMOL/L (ref 5–15)
AST SERPL-CCNC: 18 U/L (ref 15–37)
BASOPHILS # BLD: 0 K/UL (ref 0–0.1)
BASOPHILS NFR BLD: 0 % (ref 0–1)
BILIRUB SERPL-MCNC: 1.5 MG/DL (ref 0.2–1)
BUN SERPL-MCNC: 9 MG/DL (ref 6–20)
BUN/CREAT SERPL: 9 (ref 12–20)
CALCIUM SERPL-MCNC: 9.1 MG/DL (ref 8.5–10.1)
CHLORIDE SERPL-SCNC: 96 MMOL/L (ref 97–108)
CO2 SERPL-SCNC: 25 MMOL/L (ref 21–32)
COMMENT, HOLDF: NORMAL
CREAT SERPL-MCNC: 0.99 MG/DL (ref 0.55–1.02)
DIFFERENTIAL METHOD BLD: ABNORMAL
EOSINOPHIL # BLD: 0 K/UL (ref 0–0.4)
EOSINOPHIL NFR BLD: 0 % (ref 0–7)
ERYTHROCYTE [DISTWIDTH] IN BLOOD BY AUTOMATED COUNT: 12.7 % (ref 11.5–14.5)
GLOBULIN SER CALC-MCNC: 5.3 G/DL (ref 2–4)
GLUCOSE BLD STRIP.AUTO-MCNC: 110 MG/DL (ref 65–100)
GLUCOSE SERPL-MCNC: 142 MG/DL (ref 65–100)
HCT VFR BLD AUTO: 27 % (ref 35–47)
HGB BLD-MCNC: 9.3 G/DL (ref 11.5–16)
IMM GRANULOCYTES # BLD: 0.2 K/UL (ref 0–0.04)
IMM GRANULOCYTES NFR BLD AUTO: 1 % (ref 0–0.5)
LACTATE SERPL-SCNC: 2.2 MMOL/L (ref 0.4–2)
LIPASE SERPL-CCNC: 78 U/L (ref 73–393)
LYMPHOCYTES # BLD: 0.4 K/UL (ref 0.8–3.5)
LYMPHOCYTES NFR BLD: 2 % (ref 12–49)
MAGNESIUM SERPL-MCNC: 1.6 MG/DL (ref 1.6–2.4)
MCH RBC QN AUTO: 30.1 PG (ref 26–34)
MCHC RBC AUTO-ENTMCNC: 34.4 G/DL (ref 30–36.5)
MCV RBC AUTO: 87.4 FL (ref 80–99)
MONOCYTES # BLD: 0.8 K/UL (ref 0–1)
MONOCYTES NFR BLD: 4 % (ref 5–13)
NEUTS SEG # BLD: 19.7 K/UL (ref 1.8–8)
NEUTS SEG NFR BLD: 93 % (ref 32–75)
NRBC # BLD: 0 K/UL (ref 0–0.01)
NRBC BLD-RTO: 0 PER 100 WBC
PLATELET # BLD AUTO: 304 K/UL (ref 150–400)
PLATELET COMMENTS,PCOM: ABNORMAL
PMV BLD AUTO: 9.4 FL (ref 8.9–12.9)
POTASSIUM SERPL-SCNC: 2.7 MMOL/L (ref 3.5–5.1)
PROT SERPL-MCNC: 8.4 G/DL (ref 6.4–8.2)
RBC # BLD AUTO: 3.09 M/UL (ref 3.8–5.2)
SAMPLES BEING HELD,HOLD: NORMAL
SERVICE CMNT-IMP: ABNORMAL
SODIUM SERPL-SCNC: 134 MMOL/L (ref 136–145)
WBC # BLD AUTO: 21.1 K/UL (ref 3.6–11)
WBC MORPH BLD: ABNORMAL

## 2018-09-27 PROCEDURE — 71260 CT THORAX DX C+: CPT

## 2018-09-27 PROCEDURE — 82962 GLUCOSE BLOOD TEST: CPT

## 2018-09-27 PROCEDURE — 83735 ASSAY OF MAGNESIUM: CPT | Performed by: PHYSICIAN ASSISTANT

## 2018-09-27 PROCEDURE — 96375 TX/PRO/DX INJ NEW DRUG ADDON: CPT

## 2018-09-27 PROCEDURE — 71046 X-RAY EXAM CHEST 2 VIEWS: CPT

## 2018-09-27 PROCEDURE — 99218 HC RM OBSERVATION: CPT

## 2018-09-27 PROCEDURE — 80053 COMPREHEN METABOLIC PANEL: CPT | Performed by: EMERGENCY MEDICINE

## 2018-09-27 PROCEDURE — 93005 ELECTROCARDIOGRAM TRACING: CPT

## 2018-09-27 PROCEDURE — 94760 N-INVAS EAR/PLS OXIMETRY 1: CPT

## 2018-09-27 PROCEDURE — 87040 BLOOD CULTURE FOR BACTERIA: CPT | Performed by: INTERNAL MEDICINE

## 2018-09-27 PROCEDURE — 36415 COLL VENOUS BLD VENIPUNCTURE: CPT | Performed by: INTERNAL MEDICINE

## 2018-09-27 PROCEDURE — 96372 THER/PROPH/DIAG INJ SC/IM: CPT

## 2018-09-27 PROCEDURE — 87633 RESP VIRUS 12-25 TARGETS: CPT | Performed by: INTERNAL MEDICINE

## 2018-09-27 PROCEDURE — 74177 CT ABD & PELVIS W/CONTRAST: CPT

## 2018-09-27 PROCEDURE — 96361 HYDRATE IV INFUSION ADD-ON: CPT

## 2018-09-27 PROCEDURE — 74011250637 HC RX REV CODE- 250/637: Performed by: INTERNAL MEDICINE

## 2018-09-27 PROCEDURE — 85025 COMPLETE CBC W/AUTO DIFF WBC: CPT | Performed by: EMERGENCY MEDICINE

## 2018-09-27 PROCEDURE — 99283 EMERGENCY DEPT VISIT LOW MDM: CPT

## 2018-09-27 PROCEDURE — 83605 ASSAY OF LACTIC ACID: CPT | Performed by: INTERNAL MEDICINE

## 2018-09-27 PROCEDURE — 74011250636 HC RX REV CODE- 250/636: Performed by: INTERNAL MEDICINE

## 2018-09-27 PROCEDURE — 96365 THER/PROPH/DIAG IV INF INIT: CPT

## 2018-09-27 PROCEDURE — 83690 ASSAY OF LIPASE: CPT | Performed by: EMERGENCY MEDICINE

## 2018-09-27 PROCEDURE — 83036 HEMOGLOBIN GLYCOSYLATED A1C: CPT | Performed by: INTERNAL MEDICINE

## 2018-09-27 PROCEDURE — 74011636320 HC RX REV CODE- 636/320: Performed by: EMERGENCY MEDICINE

## 2018-09-27 RX ORDER — BUSPIRONE HYDROCHLORIDE 5 MG/1
10 TABLET ORAL 2 TIMES DAILY
Status: DISCONTINUED | OUTPATIENT
Start: 2018-09-27 | End: 2018-09-27

## 2018-09-27 RX ORDER — ONDANSETRON 2 MG/ML
4 INJECTION INTRAMUSCULAR; INTRAVENOUS
Status: DISCONTINUED | OUTPATIENT
Start: 2018-09-27 | End: 2018-09-30 | Stop reason: HOSPADM

## 2018-09-27 RX ORDER — PANTOPRAZOLE SODIUM 40 MG/1
40 TABLET, DELAYED RELEASE ORAL DAILY
Status: ON HOLD | COMMUNITY
End: 2020-10-24

## 2018-09-27 RX ORDER — AMLODIPINE BESYLATE 5 MG/1
10 TABLET ORAL DAILY
Status: DISCONTINUED | OUTPATIENT
Start: 2018-09-28 | End: 2018-09-30 | Stop reason: HOSPADM

## 2018-09-27 RX ORDER — SERTRALINE HYDROCHLORIDE 50 MG/1
100 TABLET, FILM COATED ORAL DAILY
Status: DISCONTINUED | OUTPATIENT
Start: 2018-09-28 | End: 2018-09-30 | Stop reason: HOSPADM

## 2018-09-27 RX ORDER — POTASSIUM CHLORIDE 7.45 MG/ML
10 INJECTION INTRAVENOUS
Status: DISPENSED | OUTPATIENT
Start: 2018-09-27 | End: 2018-09-27

## 2018-09-27 RX ORDER — ENOXAPARIN SODIUM 100 MG/ML
40 INJECTION SUBCUTANEOUS EVERY 24 HOURS
Status: DISCONTINUED | OUTPATIENT
Start: 2018-09-27 | End: 2018-09-30 | Stop reason: HOSPADM

## 2018-09-27 RX ORDER — DEXTROSE 50 % IN WATER (D50W) INTRAVENOUS SYRINGE
25-50 AS NEEDED
Status: DISCONTINUED | OUTPATIENT
Start: 2018-09-27 | End: 2018-09-30 | Stop reason: HOSPADM

## 2018-09-27 RX ORDER — POTASSIUM CHLORIDE AND SODIUM CHLORIDE 900; 300 MG/100ML; MG/100ML
INJECTION, SOLUTION INTRAVENOUS CONTINUOUS
Status: DISCONTINUED | OUTPATIENT
Start: 2018-09-27 | End: 2018-09-29

## 2018-09-27 RX ORDER — DIPHENHYDRAMINE HCL 25 MG
25 CAPSULE ORAL
Status: DISCONTINUED | OUTPATIENT
Start: 2018-09-27 | End: 2018-09-30 | Stop reason: HOSPADM

## 2018-09-27 RX ORDER — ASPIRIN 81 MG/1
81 TABLET ORAL DAILY
Status: DISCONTINUED | OUTPATIENT
Start: 2018-09-28 | End: 2018-09-30 | Stop reason: HOSPADM

## 2018-09-27 RX ORDER — MAGNESIUM SULFATE 100 %
4 CRYSTALS MISCELLANEOUS AS NEEDED
Status: DISCONTINUED | OUTPATIENT
Start: 2018-09-27 | End: 2018-09-30 | Stop reason: HOSPADM

## 2018-09-27 RX ORDER — POTASSIUM CHLORIDE 750 MG/1
40 TABLET, FILM COATED, EXTENDED RELEASE ORAL EVERY 4 HOURS
Status: COMPLETED | OUTPATIENT
Start: 2018-09-27 | End: 2018-09-28

## 2018-09-27 RX ORDER — GUAIFENESIN 600 MG/1
600 TABLET, EXTENDED RELEASE ORAL EVERY 12 HOURS
Status: DISCONTINUED | OUTPATIENT
Start: 2018-09-27 | End: 2018-09-30 | Stop reason: HOSPADM

## 2018-09-27 RX ORDER — ACETAMINOPHEN 325 MG/1
650 TABLET ORAL
Status: DISCONTINUED | OUTPATIENT
Start: 2018-09-27 | End: 2018-09-30 | Stop reason: HOSPADM

## 2018-09-27 RX ORDER — LEVETIRACETAM 250 MG/1
500 TABLET ORAL 2 TIMES DAILY
Status: DISCONTINUED | OUTPATIENT
Start: 2018-09-27 | End: 2018-09-30 | Stop reason: HOSPADM

## 2018-09-27 RX ORDER — ATORVASTATIN CALCIUM 20 MG/1
20 TABLET, FILM COATED ORAL DAILY
Status: ON HOLD | COMMUNITY
End: 2020-10-24

## 2018-09-27 RX ORDER — LISINOPRIL 5 MG/1
2.5 TABLET ORAL DAILY
Status: DISCONTINUED | OUTPATIENT
Start: 2018-09-28 | End: 2018-09-27

## 2018-09-27 RX ORDER — POTASSIUM CHLORIDE 750 MG/1
40 TABLET, FILM COATED, EXTENDED RELEASE ORAL
Status: DISCONTINUED | OUTPATIENT
Start: 2018-09-27 | End: 2018-09-27 | Stop reason: SDUPTHER

## 2018-09-27 RX ORDER — ATORVASTATIN CALCIUM 10 MG/1
20 TABLET, FILM COATED ORAL DAILY
Status: DISCONTINUED | OUTPATIENT
Start: 2018-09-28 | End: 2018-09-30 | Stop reason: HOSPADM

## 2018-09-27 RX ORDER — PANTOPRAZOLE SODIUM 40 MG/1
40 TABLET, DELAYED RELEASE ORAL
Status: DISCONTINUED | OUTPATIENT
Start: 2018-09-28 | End: 2018-09-30 | Stop reason: HOSPADM

## 2018-09-27 RX ORDER — POTASSIUM CHLORIDE 750 MG/1
40 TABLET, FILM COATED, EXTENDED RELEASE ORAL
Status: DISCONTINUED | OUTPATIENT
Start: 2018-09-27 | End: 2018-09-27

## 2018-09-27 RX ORDER — INSULIN LISPRO 100 [IU]/ML
INJECTION, SOLUTION INTRAVENOUS; SUBCUTANEOUS
Status: DISCONTINUED | OUTPATIENT
Start: 2018-09-27 | End: 2018-09-30 | Stop reason: HOSPADM

## 2018-09-27 RX ADMIN — ENOXAPARIN SODIUM 40 MG: 40 INJECTION, SOLUTION INTRAVENOUS; SUBCUTANEOUS at 20:49

## 2018-09-27 RX ADMIN — POTASSIUM CHLORIDE 40 MEQ: 750 TABLET, FILM COATED, EXTENDED RELEASE ORAL at 17:52

## 2018-09-27 RX ADMIN — GUAIFENESIN 600 MG: 600 TABLET, EXTENDED RELEASE ORAL at 20:47

## 2018-09-27 RX ADMIN — ONDANSETRON 4 MG: 2 INJECTION INTRAMUSCULAR; INTRAVENOUS at 22:01

## 2018-09-27 RX ADMIN — POTASSIUM CHLORIDE AND SODIUM CHLORIDE: 900; 300 INJECTION, SOLUTION INTRAVENOUS at 20:50

## 2018-09-27 RX ADMIN — POTASSIUM CHLORIDE 40 MEQ: 750 TABLET, FILM COATED, EXTENDED RELEASE ORAL at 20:48

## 2018-09-27 RX ADMIN — AZITHROMYCIN MONOHYDRATE 500 MG: 500 INJECTION, POWDER, LYOPHILIZED, FOR SOLUTION INTRAVENOUS at 17:54

## 2018-09-27 RX ADMIN — LEVETIRACETAM 500 MG: 250 TABLET ORAL at 20:48

## 2018-09-27 RX ADMIN — IOPAMIDOL 100 ML: 755 INJECTION, SOLUTION INTRAVENOUS at 15:34

## 2018-09-27 NOTE — H&P
[facility] Hospitalist Admission Note NAME:  Aakash Monday :   1950 MRN:  [id number] PCP:  Kal Silver MD  
 
Date/Time:  2018 4:16 PM 
 
  
  
Subjective: CHIEF COMPLAINT: cough and vomiting HISTORY OF PRESENT ILLNESS:    
Ms. Rock Pedraza is a 78 y.o.  female who presented to the Emergency Department complaining of cough and vomiting. She is a poor historian and no family is present. Cough orsening for days. She reports vomiting every day since ulcers found last month. Sudden vomiting, without persistent nausea. She reports weight loss, but out records do not confirm. ER workup finds SIRS criteria, xray is normal.  No fever. We will admit her for management. Past Medical History:  
Diagnosis Date  Anxiety and depression  Diabetes ([facility] [address]  Hypertension  Obese  Seizure disorder ([facility] [address] No past surgical history on file. Social History Substance Use Topics  Smoking status: Unknown If Ever Smoked  Smokeless tobacco: Not on file  Alcohol use Not on file Comment: Unknown Family History Problem Relation Age of Onset  Lung Cancer Mother  No Known Problems Father No Known Allergies Prior to Admission medications Medication Sig Start Date End Date Taking? Authorizing Provider  
aspirin delayed-release 81 mg tablet Take 1 Tab by mouth daily. Restart 18   Eliazar Bloom MD  
levETIRAcetam (KEPPRA) 500 mg tablet Take 1 Tab by mouth two (2) times a day. 18   Eliazar Bloom MD  
SITagliptin (JANUVIA) 50 mg tablet Take 1 Tab by mouth daily. 18   Eliazar Bloom MD  
lisinopril (PRINIVIL, ZESTRIL) 2.5 mg tablet Take 1 Tab by mouth daily. 18   Eliazar Bloom MD  
atorvastatin (LIPITOR) 20 mg tablet Take 0.5 Tabs by mouth daily. 18   Eliazar Bloom MD  
amLODIPine (NORVASC) 10 mg tablet Take 10 mg by mouth daily.     Historical Provider  
sertraline (ZOLOFT) 100 mg tablet Take 100 mg by mouth daily. Historical Provider  
meloxicam (MOBIC) 15 mg tablet Take 15 mg by mouth daily as needed for Pain (back pain). Historical Provider  
busPIRone (BUSPAR) 10 mg tablet Take 10 mg by mouth two (2) times a day. Historical Provider  
omeprazole (PRILOSEC) 20 mg capsule Take 20 mg by mouth daily as needed (GERD). Historical Provider Review of Systems: 
(bold if positive, if negative) Gen:  Eyes:  ENT:  CVS:  Pulm:  Cough, dyspneaGI:  nausea, emesis, :   
MS:  Skin:  Psych:  Endo:   
Hem:  Renal:   
Neuro:    
  
Objective: VITALS:   
Vital signs reviewed; most recent are: 
 
Visit Vitals  /83 (BP 1 Location: Right arm, BP Patient Position: At rest)  Pulse (!) 112  Temp 99.8 °F (37.7 °C)  Resp 14  
 Ht 5' 6\" (1.676 m)  Wt 99.3 kg (219 lb)  SpO2 97%  BMI 35.35 kg/m2 SpO2 Readings from Last 6 Encounters:  
09/27/18 97% 06/11/18 96% 12/09/14 97% No intake or output data in the 24 hours ending 09/27/18 1707 Exam:  
 
Physical Exam: 
 
Gen:  Obese, in no acute distress HEENT:  Pink conjunctivae, PERRL, hearing intact to voice, moist mucous membranes Neck:  Supple, without masses, thyroid non-tender Resp:  No accessory muscle use, clear breath sounds without wheezes rales or rhonchi 
Card:  No murmurs, tachycardic S1, S2 without thrills, bruits or peripheral edema Abd:  Soft, non-tender, non-distended, normoactive bowel sounds are present, no mass Lymph:  No cervical or inguinal adenopathy Musc:  No cyanosis or clubbing Skin:  No rashes or ulcers, skin turgor is good Neuro:  Cranial nerves are grossly intact, no focal motor weakness, follows commands appropriately Psych:  Good insight, oriented to person, place and time, alert Labs: 
 
Recent Labs  
   09/27/18 
 1426 WBC  21.1* HGB  9.3* HCT  27.0*  
PLT  304 Recent Labs  
   09/27/18 
 1426 NA  134* K  2.7*  
CL  96* CO2  25 GLU  142* BUN  9  
CREA  0.99 CA  9.1 MG  1.6 ALB  3.1* TBILI  1.5* SGOT  18 ALT  13 Lab Results Component Value Date/Time Glucose (POC) 160 (H) 06/11/2018 12:37 PM  
 Glucose (POC) 123 (H) 06/11/2018 06:06 AM  
 
No results for input(s): PH, PCO2, PO2, HCO3, FIO2 in the last 72 hours. No results for input(s): INR in the last 72 hours. No lab exists for component: INREXT, INREXT All Micro Results Procedure Component Value Units Date/Time RESPIRATORY PANEL,PCR,NASOPHARYNGEAL [[id number]] Collected:  09/27/18 1653 Order Status:  Completed Specimen:  NASOPHARYNGEAL SWAB Updated:  09/27/18 1705 LEGIONELLA PNEUMOPHILA AG, URINE [[id number]] Order Status:  Sent Specimen:  Urine from Urine ASTON Garcia, UR/CSF [[id number]] Order Status:  Sent Specimen:  Other CULTURE, BLOOD, PAIRED [[id number]] Order Status:  Sent Specimen:  Blood I have reviewed previous records Assessment and Plan:  
  
Leukocytosis / SIRS (systemic inflammatory response syndrome) / Sinus tachycardia - Unclear etiology. I think this is just from vomiting, vs GI viral.  I doubt pneumonia, but for today start azithromycin and check cx and serologies. Hypokalemia / Hyponatremia - POA, likely related to GI loss. Hydrate and follow. Vomiting - POA, unclear etiology. Consult Dr Emerald Vasquez. CT Abd/Pelv was unremarkable. Upper GI series. NPO at midnight. DM (diabetes mellitus) - Diabetic diet and counseling. SSI per protocol. Continue home sitagliptan. Check A1c. Hypertension - Stable. Continue lisinopril, amlodipine and ASA Anxiety and depression - Continue buspirone and sertraline. SSRI may contribute to low Na. Seizure disorder - Continue keppra Hyperlipidemia - Continue atorvastatin. Telemetry reviewed:   normal sinus rhythm Risk of deterioration: high Total time spent with patient: 79 Minutes Kaye out to Dr Loli Bhandari at [facility] [address] discussed with: Patient, Nursing Staff and >50% of time spent in counseling and coordination of care Discussed:  Care Plan      
___________________________________________________ Attending Physician: Delmis Ortiz MD

## 2018-09-27 NOTE — ED PROVIDER NOTES
HPI Comments: 2:08 PM 
I have evaluated the patient as the Provider in Triage. I have reviewed Her vital signs and the triage nurse assessment. I have talked with the patient and any available family and advised that I am the provider in triage and have ordered the appropriate study to initiate their work up based on the clinical presentation during my assessment. I have advised that the patient will be accommodated in the Main ED as soon as possible. I have also requested to contact the triage nurse or myself immediately if the patient experiences any changes in their condition during this brief waiting period. Anette Stewart MD 
 
 
Accompanied by son; vomiting for the past week 2-3 times per day ; seen at another hospital [facility]) due to vomiting (admitted for 5 days) 2 weeks ago; vomiting has been an issue for the past few months; decreased appetite; unknown amount of weight loss; no D; no abd pain. Pt states coughing seems to lead to the vomiting and she's been coughing. Anette Stewart MD 
 
78 y.o. female with past medical history significant for HTN and DM who presents from home with chief complaint of cough. Pt has been experiencing a cough, SOB, and vomiting and was admitted to [facility] last week. Her symptoms have still persisted and she reports some now generalized weakness. She has continued to vomit with clear, liquid emesis. Pt reports a h/o stomach ulcer. Pt denies abd pain, diarrhea, blood in stool, hemoptysis, or hematemesis. There are no other acute medical concerns at this time. PCP: Magdaleno Jerome MD 
 
Note written by Ranjit Albert, as dictated by Marianna Amos PA-C 3:00 PM 
 
The history is provided by the patient. No  was used. Past Medical History:  
Diagnosis Date  Diabetes ([facility]  Hypertension No past surgical history on file. No family history on file. Social History Social History  Marital status: LEGALLY  Spouse name: N/A  
 Number of children: N/A  
 Years of education: N/A Occupational History  Not on file. Social History Main Topics  Smoking status: Unknown If Ever Smoked  Smokeless tobacco: Not on file  Alcohol use Not on file Comment: Unknown  Drug use: Not on file  Sexual activity: Not on file Other Topics Concern  Not on file Social History Narrative ALLERGIES: Review of patient's allergies indicates no known allergies. Review of Systems Constitutional: Negative for chills, diaphoresis and fever. HENT: Negative for congestion, postnasal drip, rhinorrhea and sore throat. Eyes: Negative for photophobia, discharge, redness and visual disturbance. Respiratory: Positive for cough and shortness of breath. Negative for chest tightness and wheezing. Cardiovascular: Negative for chest pain, palpitations and leg swelling. Gastrointestinal: Positive for vomiting. Negative for abdominal distention, abdominal pain, blood in stool, constipation, diarrhea and nausea. Genitourinary: Negative for difficulty urinating, dysuria, frequency, hematuria and urgency. Musculoskeletal: Negative for arthralgias, back pain, joint swelling and myalgias. Skin: Negative for color change and rash. Neurological: Negative for dizziness, speech difficulty, weakness, light-headedness, numbness and headaches. Psychiatric/Behavioral: Negative for confusion. The patient is not nervous/anxious. All other systems reviewed and are negative. Vitals:  
 09/27/18 1409 BP: 127/83 Pulse: (!) 112 Resp: 14 Temp: 99.8 °F (37.7 °C) SpO2: 97% Weight: 99.3 kg (219 lb) Height: 5' 6\" (1.676 m) Physical Exam  
Constitutional: She is oriented to person, place, and time. She appears well-developed and well-nourished. No distress. HENT:  
Head: Normocephalic and atraumatic.   
Right Ear: External ear normal.  
 Left Ear: External ear normal.  
Nose: Nose normal.  
Mouth/Throat: Oropharynx is clear and moist.  
Eyes: Conjunctivae and EOM are normal. Pupils are equal, round, and reactive to light. No scleral icterus. Neck: Normal range of motion. Neck supple. No JVD present. No tracheal deviation present. No thyromegaly present. Cardiovascular: Regular rhythm and normal heart sounds. Exam reveals no gallop and no friction rub. No murmur heard. Tachycardia. Pulmonary/Chest: Effort normal and breath sounds normal. No respiratory distress. She has no wheezes. She has no rales. She exhibits no tenderness. Abdominal: Soft. Bowel sounds are normal. She exhibits no distension. There is no tenderness. Musculoskeletal: Normal range of motion. She exhibits no edema or tenderness. Lymphadenopathy:  
  She has no cervical adenopathy. Neurological: She is alert and oriented to person, place, and time. She has normal strength. She displays no atrophy and no tremor. No cranial nerve deficit. She exhibits normal muscle tone. Coordination and gait normal.  
Skin: Skin is warm and dry. No rash noted. She is not diaphoretic. No erythema. Psychiatric: She has a normal mood and affect. Her behavior is normal. Judgment and thought content normal.  
Nursing note and vitals reviewed. Note written by Ranjit Newman, as dictated by Sanaz Govea PA-C 3:00 PM 
 
MDM Number of Diagnoses or Management Options Hypokalemia:  
Leukocytosis, unspecified type:  
SOB (shortness of breath):  
Diagnosis management comments: + Leukocytosis. Hypokalemia. Shortness of breath. Spoke to hospitalist (Dr. Flavia Olsen) who agrees with plan to admit. Reviewed treatment plan with attending and they agree. Mervin Miranda 
 
 
 
ED Course Procedures ED EKG interpretation: 
Rhythm: sinus tachycardia with fusion complexes; and regular . Rate (approx.): 101. Note written by Ranjit Moses, as dictated by Donald Samuels MD 3:31 PM 
 
CONSULT NOTE: 
4:10 PM Marilu Wolff PA-C spoke with Dr. Agustina Brown, Consult for Hospitalist.  Discussed available diagnostic tests and clinical findings. Dr. Agustina Brown will admit the pt.  
 
4:11 PM 
Patient is being admitted to the hospital.  The results of their tests and reasons for their admission have been discussed with them and/or available family. They convey agreement and understanding for the need to be admitted and for their admission diagnosis. Consultation will be made now with the inpatient physician for hospitalization.

## 2018-09-27 NOTE — PROGRESS NOTES
Admission Medication Reconciliation: 
 
Comments/Recommendations: 
-Medication history obtained in the emergency department (room 13) -Patient brought in medication bottles from [facility] for review 
-Confirmed no known drug allergies Medications added: Pantoprazole Medications removed: Aspirin 81 mg (patient states she took herself off aspirin, this was not at the direction of a medical provider. She states the reason was she just stopped buying it), buspirone, lisinopril, meloxicam, omeprazole Medications changed: Atorvastatin dose increased to 20 mg daily Information obtained from: Patient, prescription medication bottles, and review of RxQuery Significant PMH/Disease States:  
Past Medical History:  
Diagnosis Date  Anxiety and depression  Diabetes ([facility] [address]  Hypertension  Obese  Seizure disorder ([facility] [address] Chief Complaint for this Admission: Chief Complaint Patient presents with  Vomiting  Cough Allergies:  Review of patient's allergies indicates no known allergies. Prior to Admission Medications:  
Prior to Admission Medications Prescriptions Last Dose Informant Patient Reported? Taking? SITagliptin (JANUVIA) 100 mg tablet 9/26/2018 at AM  Yes Yes Sig: Take 100 mg by mouth daily. amLODIPine (NORVASC) 10 mg tablet 9/26/2018 at AM Self Yes Yes Sig: Take 10 mg by mouth daily. atorvastatin (LIPITOR) 20 mg tablet 9/26/2018 at AM  Yes Yes Sig: Take 20 mg by mouth daily. levETIRAcetam (KEPPRA) 500 mg tablet 9/26/2018 at BID  No Yes Sig: Take 1 Tab by mouth two (2) times a day. pantoprazole (PROTONIX) 40 mg tablet 9/26/2018 at AM  Yes Yes Sig: Take 40 mg by mouth daily. sertraline (ZOLOFT) 100 mg tablet 9/26/2018 at AM Self Yes Yes Sig: Take 100 mg by mouth daily. Facility-Administered Medications: None Thank you, 
Tahri Victor, FAYD

## 2018-09-27 NOTE — IP AVS SNAPSHOT
[address] 
[phone] Patient: Monica Pickens MRN: [id number] JIX:[id number] A check hipolito indicates which time of day the medication should be taken. My Medications START taking these medications Instructions Each Dose to Equal  
 Morning Noon Evening Bedtime  
 azithromycin 250 mg tablet Commonly known as:  Salvadoren Poot Take 1 Tab by mouth daily for 3 days. 250 mg  
    
   
   
   
  
 magnesium oxide 400 mg (241.3 mg magnesium) tablet Commonly known as:  MAG-OX Take 1 Tab by mouth daily. 400 mg  
  8:44  
   
   
   
  
 potassium chloride 10 mEq tablet Commonly known as:  KLOR-CON Take 1 Tab by mouth daily. 10 mEq CONTINUE taking these medications Instructions Each Dose to Equal  
 Morning Noon Evening Bedtime  
 amLODIPine 10 mg tablet Commonly known as:  Pavithra Rising Take 10 mg by mouth daily. 10 mg  
  8:44  
   
   
   
  
 atorvastatin 20 mg tablet Commonly known as:  LIPITOR Take 20 mg by mouth daily. 20 mg  
  8:43  
   
   
   
  
 levETIRAcetam 500 mg tablet Commonly known as:  KEPPRA Take 1 Tab by mouth two (2) times a day. 500 mg  
  8:43  
   
   
   
  
 pantoprazole 40 mg tablet Commonly known as:  PROTONIX Take 40 mg by mouth daily. 40 mg  
    
   
   
   
  
 sertraline 100 mg tablet Commonly known as:  ZOLOFT Take 100 mg by mouth daily. 100 mg  
  8:43 SITagliptin 100 mg tablet Commonly known as:  Quinn Soho Take 100 mg by mouth daily. 100 mg  
  8:43 Where to Get Your Medications These medications were sent to [address] [place] [address], [facility]  [address] [place] [address]. [address] Phone:  [phone]  
  azithromycin 250 mg tablet magnesium oxide 400 mg (241.3 mg magnesium) tablet  
 potassium chloride 10 mEq tablet

## 2018-09-27 NOTE — ED TRIAGE NOTES
Pt c/o continued vomiting and coughing, seen at [place] and admitted. Symptoms continue. +cough, mask applied.

## 2018-09-27 NOTE — IP AVS SNAPSHOT
[address] [phone] Patient: Gemma Osler MRN: [id number] About your hospitalization You were admitted on:  2018 You last received care in the:  [facility] 4M POST SURG ORT 1 You were discharged on:  2018 Why you were hospitalized Your primary diagnosis was:  Leukocytosis Your diagnoses also included:  Sirs (Systemic Inflammatory Response Syndrome) (Hcc), Sinus Tachycardia, Dm (Diabetes Mellitus) (Hcc), Vomiting, Seizure Disorder (Hcc), Anxiety And Depression, Diabetes (Hcc), Hypertension, Hypokalemia Follow-up Information Follow up With Details Comments Contact Info Keshav Gao MD   [address] [facility] [address] [facility] 
[phone] Discharge Orders None A check hipolito indicates which time of day the medication should be taken. My Medications START taking these medications Instructions Each Dose to Equal  
 Morning Noon Evening Bedtime  
 azithromycin 250 mg tablet Commonly known as:  Leonardo Hodge Your last dose was: Your next dose is: Take 1 Tab by mouth daily for 3 days. 250 mg  
    
   
   
   
  
 magnesium oxide 400 mg (241.3 mg magnesium) tablet Commonly known as:  MAG-OX Your last dose was: Your next dose is: Take 1 Tab by mouth daily. 400 mg  
    
   
   
   
  
 potassium chloride 10 mEq tablet Commonly known as:  KLOR-CON Your last dose was: Your next dose is: Take 1 Tab by mouth daily. 10 mEq CONTINUE taking these medications Instructions Each Dose to Equal  
 Morning Noon Evening Bedtime  
 amLODIPine 10 mg tablet Commonly known as:  Evita Urena Your last dose was: Your next dose is: Take 10 mg by mouth daily.   
 10 mg  
    
   
   
   
  
 atorvastatin 20 mg tablet Commonly known as:  LIPITOR Your last dose was: Your next dose is: Take 20 mg by mouth daily. 20 mg  
    
   
   
   
  
 levETIRAcetam 500 mg tablet Commonly known as:  KEPPRA Your last dose was: Your next dose is: Take 1 Tab by mouth two (2) times a day. 500 mg  
    
   
   
   
  
 pantoprazole 40 mg tablet Commonly known as:  PROTONIX Your last dose was: Your next dose is: Take 40 mg by mouth daily. 40 mg  
    
   
   
   
  
 sertraline 100 mg tablet Commonly known as:  ZOLOFT Your last dose was: Your next dose is: Take 100 mg by mouth daily. 100 mg SITagliptin 100 mg tablet Commonly known as:  Lenda [place] Your last dose was: Your next dose is: Take 100 mg by mouth daily. 100 mg Where to Get Your Medications These medications were sent to 7 [facility], [address] [place] [address]. [address] Phone:  [phone]  
  azithromycin 250 mg tablet  
 magnesium oxide 400 mg (241.3 mg magnesium) tablet  
 potassium chloride 10 mEq tablet Discharge Instructions Patient Discharge Instructions Erick Miguel / [id number] : 1950 Admitted 2018 Discharged: 2018 8:16 AM  
 
ACUTE DIAGNOSES: 
Leukocytosis/Hypomag/hypokalemia/N/V 
 
CHRONIC MEDICAL DIAGNOSES: 
Problem List as of 2018  Date Reviewed: 2018 Codes Class Noted - Resolved SIRS (systemic inflammatory response syndrome) (HCC) ICD-10-CM: R65.10 ICD-9-CM: 995.90  2018 - Present Sinus tachycardia ICD-10-CM: R00.0 ICD-9-CM: 427.89  2018 - Present Hypertension ICD-10-CM: I10 
ICD-9-CM: 401.9  Unknown - Present Diabetes ([facility] ICD-10-CM: E11.9 ICD-9-CM: 250.00  Unknown - Present Anxiety and depression ICD-10-CM: F41.9, F32.9 ICD-9-CM: 300.00, 311  Unknown - Present Seizure disorder ([facility] ICD-10-CM: G40.909 ICD-9-CM: 345.90  Unknown - Present Vomiting ICD-10-CM: R11.10 ICD-9-CM: 787.03  9/27/2018 - Present Hypokalemia ICD-10-CM: E87.6 ICD-9-CM: 276.8  9/27/2018 - Present Obese ICD-10-CM: E66.9 ICD-9-CM: 278.00  Unknown - Present DM (diabetes mellitus) ([facility] ICD-10-CM: E11.9 ICD-9-CM: 250.00  6/7/2018 - Present * (Principal)Leukocytosis ICD-10-CM: K77.940 ICD-9-CM: 288.60  6/7/2018 - Present RESOLVED: Encephalopathy acute ICD-10-CM: G93.40 ICD-9-CM: 348.30  6/7/2018 - 9/27/2018 RESOLVED: Seizure ([facility] ICD-10-CM: R56.9 ICD-9-CM: 780.39  6/7/2018 - 9/27/2018 RESOLVED: Aspiration into airway ICD-10-CM: T17.908A ICD-9-CM: 934.9  6/7/2018 - 9/27/2018 RESOLVED: Renal insufficiency ICD-10-CM: N28.9 ICD-9-CM: 593.9  6/7/2018 - 9/27/2018 DISCHARGE MEDICATIONS:  
 
 
 
· It is important that you take the medication exactly as they are prescribed. · Keep your medication in the bottles provided by the pharmacist and keep a list of the medication names, dosages, and times to be taken in your wallet. · Do not take other medications without consulting your doctor. DIET:  Diabetic Diet ACTIVITY: Activity as tolerated ADDITIONAL INFORMATION: If you experience any of the following symptoms then please call your primary care physician or return to the emergency room if you cannot get hold of your doctor: Fever, chills, nausea, vomiting, diarrhea, change in mentation, falling, bleeding, shortness of breath. FOLLOW UP CARE: 
Dr. Renny Caceres MD  you are to call and set up an appointment to see them with in 1 week. Follow-up with specialists at directed by them Information obtained by : 
I understand that if any problems occur once I am at home I am to contact my physician. I understand and acknowledge receipt of the instructions indicated above. Physician's or R.N.'s Signature                                                                  Date/Time Patient or Representative Signature                                                          Date/Time [employer] Announcement We are excited to announce that we are making your provider's discharge notes available to you in GameHuddle. You will see these notes when they are completed and signed by the physician that discharged you from your recent hospital stay. If you have any questions or concerns about any information you see in GameHuddle, please call the Health Information Department where you were seen or reach out to your Primary Care Provider for more information about your plan of care. Introducing [facility]! [facility] introduces GameHuddle patient portal. Now you can access parts of your medical record, email your doctor's office, and request medication refills online. 1. In your internet browser, go to [email or web address] Republict 2. Click on the First Time User? Click Here link in the Sign In box. You will see the New Member Sign Up page. 3. Enter your GameHuddle Access Code exactly as it appears below. You will not need to use this code after youve completed the sign-up process. If you do not sign up before the expiration date, you must request a new code. · [employer] Access Code: [email or web address] Expires: 12/26/2018  2:11 PM 
 
4. Enter the last four digits of your Social Security Number (xxxx) and Date of Birth (mm/dd/yyyy) as indicated and click Submit. You will be taken to the next sign-up page. 5. Create a One Public ID. This will be your One Public login ID and cannot be changed, so think of one that is secure and easy to remember. 6. Create a One Public password. You can change your password at any time. 7. Enter your Password Reset Question and Answer. This can be used at a later time if you forget your password. 8. Enter your e-mail address. You will receive e-mail notification when new information is available in [address] 9. Click Sign Up. You can now view and download portions of your medical record. 10. Click the Download Summary menu link to download a portable copy of your medical information. If you have questions, please visit the Frequently Asked Questions section of the One Public website. Remember, One Public is NOT to be used for urgent needs. For medical emergencies, dial 911. Now available from your iPhone and Android! Introducing Johnathan Nichols As a [employer] patient, I wanted to make you aware of our electronic visit tool called Johnathan Nichols. [employer] 24/7 allows you to connect within minutes with a medical provider 24 hours a day, seven days a week via a mobile device or tablet or logging into a secure website from your computer. You can access Johnathan Antoniofin from anywhere in the [place]. A virtual visit might be right for you when you have a simple condition and feel like you just dont want to get out of bed, or cant get away from work for an appointment, when your regular Naldo Motosmarty provider is not available (evenings, weekends or holidays), or when youre out of town and need minor care. Electronic visits cost only $49 and if the [employer] 24/7 provider determines a prescription is needed to treat your condition, one can be electronically transmitted to a nearby pharmacy*. Please take a moment to enroll today if you have not already done so. The enrollment process is free and takes just a few minutes.   To enroll, please download the [employer] 24/7 sonia to your tablet or phone, or visit [email or web address] to enroll on your computer. And, as an [address] [place] [address] patient with a [facility] account, the results of your visits will be scanned into your electronic medical record and your primary care provider will be able to view the scanned results. We urge you to continue to see your regular [facility] provider for your ongoing medical care. And while your primary care provider may not be the one available when you seek a [employer] virtual visit, the peace of mind you get from getting a real diagnosis real time can be priceless. For more information on [employer], view our Frequently Asked Questions (FAQs) at [email or web address]. Sincerely, 
 
Miguel Angel Baker MD 
Chief Medical Officer Harpreet Ryan *:  certain medications cannot be prescribed via [employer] Unresulted Labs-Please follow up with your PCP about these lab tests Order Current Status LEGIONELLA PNEUMOPHILA AG, URINE In process S. PNEUMO AG, UR/CSF In process CULTURE, BLOOD, PAIRED Preliminary result Providers Seen During Your Hospitalization Provider Specialty Primary office phone Sin Corbett MD Emergency Medicine [phone] Jaime Gonsalez MD [facility] [phone] Janes Sky MD Internal Medicine [phone] Rik Pantoja MD [facility] [phone] Your Primary Care Physician (PCP) Primary Care Physician Office Phone Office Fax Alexandra Gaviria [phone] You are allergic to the following No active allergies Recent Documentation Height Weight BMI Smoking Status 1.676 m 99.3 kg 35.35 kg/m2 Unknown If Ever Smoked Emergency Contacts Name Discharge Info Relation Home Work Mobile Kareen Lee DISCHARGE CAREGIVER [3] Child [2] [phone] Patient Belongings The following personal items are in your possession at time of discharge: 
     Visual Aid: Glasses Please provide this summary of care documentation to your next provider. Signatures-by signing, you are acknowledging that this After Visit Summary has been reviewed with you and you have received a copy. Patient Signature:  ____________________________________________________________ Date:  ____________________________________________________________  
  
Marthann Sniff Provider Signature:  ____________________________________________________________ Date:  ____________________________________________________________

## 2018-09-28 ENCOUNTER — APPOINTMENT (OUTPATIENT)
Dept: GENERAL RADIOLOGY | Age: 68
End: 2018-09-28
Attending: INTERNAL MEDICINE
Payer: MEDICARE

## 2018-09-28 LAB
ALBUMIN SERPL-MCNC: 2.7 G/DL (ref 3.5–5)
ALBUMIN/GLOB SERPL: 0.6 {RATIO} (ref 1.1–2.2)
ALP SERPL-CCNC: 63 U/L (ref 45–117)
ALT SERPL-CCNC: 11 U/L (ref 12–78)
ANION GAP SERPL CALC-SCNC: 13 MMOL/L (ref 5–15)
AST SERPL-CCNC: 15 U/L (ref 15–37)
ATRIAL RATE: 101 BPM
B PERT DNA SPEC QL NAA+PROBE: NOT DETECTED
BILIRUB SERPL-MCNC: 1.5 MG/DL (ref 0.2–1)
BUN SERPL-MCNC: 9 MG/DL (ref 6–20)
BUN/CREAT SERPL: 12 (ref 12–20)
C PNEUM DNA SPEC QL NAA+PROBE: NOT DETECTED
CALCIUM SERPL-MCNC: 8.7 MG/DL (ref 8.5–10.1)
CALCULATED P AXIS, ECG09: 52 DEGREES
CALCULATED R AXIS, ECG10: -3 DEGREES
CALCULATED T AXIS, ECG11: 44 DEGREES
CHLORIDE SERPL-SCNC: 98 MMOL/L (ref 97–108)
CO2 SERPL-SCNC: 23 MMOL/L (ref 21–32)
CREAT SERPL-MCNC: 0.78 MG/DL (ref 0.55–1.02)
DIAGNOSIS, 93000: NORMAL
ERYTHROCYTE [DISTWIDTH] IN BLOOD BY AUTOMATED COUNT: 12.5 % (ref 11.5–14.5)
EST. AVERAGE GLUCOSE BLD GHB EST-MCNC: 163 MG/DL
FLUAV H1 2009 PAND RNA SPEC QL NAA+PROBE: NOT DETECTED
FLUAV H1 RNA SPEC QL NAA+PROBE: NOT DETECTED
FLUAV H3 RNA SPEC QL NAA+PROBE: NOT DETECTED
FLUAV SUBTYP SPEC NAA+PROBE: NOT DETECTED
FLUBV RNA SPEC QL NAA+PROBE: NOT DETECTED
GLOBULIN SER CALC-MCNC: 4.7 G/DL (ref 2–4)
GLUCOSE BLD STRIP.AUTO-MCNC: 105 MG/DL (ref 65–100)
GLUCOSE BLD STRIP.AUTO-MCNC: 64 MG/DL (ref 65–100)
GLUCOSE BLD STRIP.AUTO-MCNC: 68 MG/DL (ref 65–100)
GLUCOSE BLD STRIP.AUTO-MCNC: 71 MG/DL (ref 65–100)
GLUCOSE BLD STRIP.AUTO-MCNC: 77 MG/DL (ref 65–100)
GLUCOSE BLD STRIP.AUTO-MCNC: 82 MG/DL (ref 65–100)
GLUCOSE BLD STRIP.AUTO-MCNC: 90 MG/DL (ref 65–100)
GLUCOSE SERPL-MCNC: 104 MG/DL (ref 65–100)
HADV DNA SPEC QL NAA+PROBE: NOT DETECTED
HBA1C MFR BLD: 7.3 % (ref 4.2–6.3)
HCOV 229E RNA SPEC QL NAA+PROBE: NOT DETECTED
HCOV HKU1 RNA SPEC QL NAA+PROBE: NOT DETECTED
HCOV NL63 RNA SPEC QL NAA+PROBE: NOT DETECTED
HCOV OC43 RNA SPEC QL NAA+PROBE: NOT DETECTED
HCT VFR BLD AUTO: 24.3 % (ref 35–47)
HGB BLD-MCNC: 8.4 G/DL (ref 11.5–16)
HMPV RNA SPEC QL NAA+PROBE: NOT DETECTED
HPIV1 RNA SPEC QL NAA+PROBE: NOT DETECTED
HPIV2 RNA SPEC QL NAA+PROBE: NOT DETECTED
HPIV3 RNA SPEC QL NAA+PROBE: NOT DETECTED
HPIV4 RNA SPEC QL NAA+PROBE: NOT DETECTED
LACTATE SERPL-SCNC: 1.1 MMOL/L (ref 0.4–2)
M PNEUMO DNA SPEC QL NAA+PROBE: NOT DETECTED
MAGNESIUM SERPL-MCNC: 1.5 MG/DL (ref 1.6–2.4)
MCH RBC QN AUTO: 30.3 PG (ref 26–34)
MCHC RBC AUTO-ENTMCNC: 34.6 G/DL (ref 30–36.5)
MCV RBC AUTO: 87.7 FL (ref 80–99)
NRBC # BLD: 0 K/UL (ref 0–0.01)
NRBC BLD-RTO: 0 PER 100 WBC
P-R INTERVAL, ECG05: 154 MS
PHOSPHATE SERPL-MCNC: 2.1 MG/DL (ref 2.6–4.7)
PLATELET # BLD AUTO: 244 K/UL (ref 150–400)
PMV BLD AUTO: 9.3 FL (ref 8.9–12.9)
POTASSIUM SERPL-SCNC: 3.4 MMOL/L (ref 3.5–5.1)
PROT SERPL-MCNC: 7.4 G/DL (ref 6.4–8.2)
Q-T INTERVAL, ECG07: 372 MS
QRS DURATION, ECG06: 78 MS
QTC CALCULATION (BEZET), ECG08: 482 MS
RBC # BLD AUTO: 2.77 M/UL (ref 3.8–5.2)
RSV RNA SPEC QL NAA+PROBE: NOT DETECTED
RV+EV RNA SPEC QL NAA+PROBE: NOT DETECTED
SERVICE CMNT-IMP: ABNORMAL
SERVICE CMNT-IMP: ABNORMAL
SERVICE CMNT-IMP: NORMAL
SODIUM SERPL-SCNC: 134 MMOL/L (ref 136–145)
VENTRICULAR RATE, ECG03: 101 BPM
WBC # BLD AUTO: 15 K/UL (ref 3.6–11)

## 2018-09-28 PROCEDURE — 96366 THER/PROPH/DIAG IV INF ADDON: CPT

## 2018-09-28 PROCEDURE — 96361 HYDRATE IV INFUSION ADD-ON: CPT

## 2018-09-28 PROCEDURE — G8979 MOBILITY GOAL STATUS: HCPCS

## 2018-09-28 PROCEDURE — G8989 SELF CARE D/C STATUS: HCPCS | Performed by: OCCUPATIONAL THERAPIST

## 2018-09-28 PROCEDURE — 36415 COLL VENOUS BLD VENIPUNCTURE: CPT | Performed by: INTERNAL MEDICINE

## 2018-09-28 PROCEDURE — G8988 SELF CARE GOAL STATUS: HCPCS | Performed by: OCCUPATIONAL THERAPIST

## 2018-09-28 PROCEDURE — 97116 GAIT TRAINING THERAPY: CPT

## 2018-09-28 PROCEDURE — 82962 GLUCOSE BLOOD TEST: CPT

## 2018-09-28 PROCEDURE — 74011250637 HC RX REV CODE- 250/637: Performed by: INTERNAL MEDICINE

## 2018-09-28 PROCEDURE — 96372 THER/PROPH/DIAG INJ SC/IM: CPT

## 2018-09-28 PROCEDURE — G8978 MOBILITY CURRENT STATUS: HCPCS

## 2018-09-28 PROCEDURE — 80053 COMPREHEN METABOLIC PANEL: CPT | Performed by: INTERNAL MEDICINE

## 2018-09-28 PROCEDURE — 94760 N-INVAS EAR/PLS OXIMETRY 1: CPT

## 2018-09-28 PROCEDURE — 99218 HC RM OBSERVATION: CPT

## 2018-09-28 PROCEDURE — 74247 XR UPPER GI W KUB AIR CONT: CPT

## 2018-09-28 PROCEDURE — 83605 ASSAY OF LACTIC ACID: CPT | Performed by: INTERNAL MEDICINE

## 2018-09-28 PROCEDURE — 74011250637 HC RX REV CODE- 250/637: Performed by: FAMILY MEDICINE

## 2018-09-28 PROCEDURE — 83735 ASSAY OF MAGNESIUM: CPT | Performed by: INTERNAL MEDICINE

## 2018-09-28 PROCEDURE — 74011250636 HC RX REV CODE- 250/636: Performed by: INTERNAL MEDICINE

## 2018-09-28 PROCEDURE — G8987 SELF CARE CURRENT STATUS: HCPCS | Performed by: OCCUPATIONAL THERAPIST

## 2018-09-28 PROCEDURE — G8980 MOBILITY D/C STATUS: HCPCS

## 2018-09-28 PROCEDURE — 84100 ASSAY OF PHOSPHORUS: CPT | Performed by: INTERNAL MEDICINE

## 2018-09-28 PROCEDURE — 74011000250 HC RX REV CODE- 250: Performed by: INTERNAL MEDICINE

## 2018-09-28 PROCEDURE — 97165 OT EVAL LOW COMPLEX 30 MIN: CPT | Performed by: OCCUPATIONAL THERAPIST

## 2018-09-28 PROCEDURE — 97161 PT EVAL LOW COMPLEX 20 MIN: CPT

## 2018-09-28 PROCEDURE — 96375 TX/PRO/DX INJ NEW DRUG ADDON: CPT

## 2018-09-28 PROCEDURE — 74011250636 HC RX REV CODE- 250/636: Performed by: FAMILY MEDICINE

## 2018-09-28 PROCEDURE — 85027 COMPLETE CBC AUTOMATED: CPT | Performed by: INTERNAL MEDICINE

## 2018-09-28 RX ORDER — LANOLIN ALCOHOL/MO/W.PET/CERES
400 CREAM (GRAM) TOPICAL 2 TIMES DAILY
Status: DISCONTINUED | OUTPATIENT
Start: 2018-09-28 | End: 2018-09-30 | Stop reason: HOSPADM

## 2018-09-28 RX ADMIN — POTASSIUM CHLORIDE 40 MEQ: 750 TABLET, FILM COATED, EXTENDED RELEASE ORAL at 00:37

## 2018-09-28 RX ADMIN — AZITHROMYCIN MONOHYDRATE 500 MG: 500 INJECTION, POWDER, LYOPHILIZED, FOR SOLUTION INTRAVENOUS at 18:08

## 2018-09-28 RX ADMIN — ENOXAPARIN SODIUM 40 MG: 40 INJECTION, SOLUTION INTRAVENOUS; SUBCUTANEOUS at 23:14

## 2018-09-28 RX ADMIN — GUAIFENESIN 600 MG: 600 TABLET, EXTENDED RELEASE ORAL at 23:14

## 2018-09-28 RX ADMIN — PANTOPRAZOLE SODIUM 40 MG: 40 TABLET, DELAYED RELEASE ORAL at 07:07

## 2018-09-28 RX ADMIN — Medication 400 MG: at 16:30

## 2018-09-28 RX ADMIN — POTASSIUM CHLORIDE AND SODIUM CHLORIDE: 900; 300 INJECTION, SOLUTION INTRAVENOUS at 18:08

## 2018-09-28 RX ADMIN — LEVETIRACETAM 500 MG: 250 TABLET ORAL at 16:30

## 2018-09-28 RX ADMIN — POTASSIUM CHLORIDE AND SODIUM CHLORIDE: 900; 300 INJECTION, SOLUTION INTRAVENOUS at 05:45

## 2018-09-28 RX ADMIN — DEXTROSE MONOHYDRATE 25 G: 25 INJECTION, SOLUTION INTRAVENOUS at 21:56

## 2018-09-28 NOTE — CONSULTS
[address] [place] [address]. [address] [place] [address] NP  [phone]                    GASTROENTEROLOGY CONSULTATION NOTE              NAME:  Abbey Partida   :   1950   MRN:   [id number]       Referring Physician:   Dr. Vandana Vazquez Date:   2018 3:13 PM    Chief Complaint:    Vomiting     History of Present Illness:    Patient is a 78 y.o. who we were asked to see in consultation for the above complaint. The patient has been having persistent vomiting for 2 weeks. She has history of diabetes, states her she has not been checking her blood sugars often and been eating ice cream and fried chicken. PMH:  Past Medical History:   Diagnosis Date    Anxiety and depression     Diabetes ([facility]     Hypertension     Obese     Seizure disorder ([facility]        PSH:  No past surgical history on file. Allergies:  No Known Allergies    Home Medications:  Prior to Admission Medications   Prescriptions Last Dose Informant Patient Reported? Taking? SITagliptin (JANUVIA) 100 mg tablet 2018 at AM  Yes Yes   Sig: Take 100 mg by mouth daily. amLODIPine (NORVASC) 10 mg tablet 2018 at AM Self Yes Yes   Sig: Take 10 mg by mouth daily. atorvastatin (LIPITOR) 20 mg tablet 2018 at AM  Yes Yes   Sig: Take 20 mg by mouth daily. levETIRAcetam (KEPPRA) 500 mg tablet 2018 at BID  No Yes   Sig: Take 1 Tab by mouth two (2) times a day. pantoprazole (PROTONIX) 40 mg tablet 2018 at AM  Yes Yes   Sig: Take 40 mg by mouth daily. sertraline (ZOLOFT) 100 mg tablet 2018 at AM Self Yes Yes   Sig: Take 100 mg by mouth daily.       Facility-Administered Medications: None       Hospital Medications:  Current Facility-Administered Medications   Medication Dose Route Frequency    magnesium oxide (MAG-OX) tablet 400 mg  400 mg Oral BID    0.9% sodium chloride with KCl 40 mEq/L infusion   IntraVENous CONTINUOUS    amLODIPine (NORVASC) tablet 10 mg  10 mg Oral DAILY    aspirin delayed-release tablet 81 mg  81 mg Oral DAILY    atorvastatin (LIPITOR) tablet 20 mg  20 mg Oral DAILY    levETIRAcetam (KEPPRA) tablet 500 mg  500 mg Oral BID    pantoprazole (PROTONIX) tablet 40 mg  40 mg Oral ACB    SITagliptin (JANUVIA) tablet tab 100 mg  100 mg Oral DAILY    sertraline (ZOLOFT) tablet 100 mg  100 mg Oral DAILY    glucose chewable tablet 16 g  4 Tab Oral PRN    dextrose (D50W) injection syrg 12.5-25 g  25-50 mL IntraVENous PRN    glucagon (GLUCAGEN) injection 1 mg  1 mg IntraMUSCular PRN    acetaminophen (TYLENOL) tablet 650 mg  650 mg Oral Q4H PRN    diphenhydrAMINE (BENADRYL) capsule 25 mg  25 mg Oral Q4H PRN    ondansetron (ZOFRAN) injection 4 mg  4 mg IntraVENous Q4H PRN    enoxaparin (LOVENOX) injection 40 mg  40 mg SubCUTAneous Q24H    insulin lispro (HUMALOG) injection   SubCUTAneous AC&HS    guaiFENesin ER (MUCINEX) tablet 600 mg  600 mg Oral Q12H    azithromycin (ZITHROMAX) 500 mg in 0.9% sodium chloride (MBP/ADV) 250 mL  500 mg IntraVENous Q24H       Social History:  Social History   Substance Use Topics    Smoking status: Unknown If Ever Smoked    Smokeless tobacco: Not on file    Alcohol use Not on file      Comment: Unknown       Family History:  Family History   Problem Relation Age of Onset    Lung Cancer Mother     No Known Problems Father        Review of Systems:  Constitutional: negative fever, negative chills, negative weight loss  Eyes:   negative visual changes  ENT:   negative sore throat, tongue or lip swelling  Respiratory:  negative cough, negative dyspnea  Cards:  negative for chest pain, palpitations, lower extremity edema  GI:   See HPI  :  negative for frequency, dysuria  Integument:  negative for rash and pruritus  Heme:  negative for easy bruising and gum/nose bleeding  Musculoskel: negative for myalgias,  back pain and muscle weakness  Neuro: negative for headaches, dizziness, vertigo  Psych:  negative for feelings of anxiety, depression     Objective: Patient Vitals for the past 8 hrs:   BP Temp Pulse Resp SpO2   09/28/18 1238 124/67 - 89 - 98 %   09/28/18 1216 137/87 98.7 °F (37.1 °C) 90 16 97 %   09/28/18 0805 130/73 98.9 °F (37.2 °C) 93 16 99 %             EXAM:     NEURO-alert, oriented x3, affect appropriate   HEENT-Head: Normocephalic, no lesions, without obvious abnormality. LUNGS-clear to auscultation bilaterally    COR-regular rate and rhythym     ABD- soft, non-tender. Bowel sounds normal. No masses,  no organomegaly     EXT-no edema    Skin - No rash     Data Review     Recent Labs      09/28/18   0024  09/27/18   1426   WBC  15.0*  21.1*   HGB  8.4*  9.3*   HCT  24.3*  27.0*   PLT  244  304     Recent Labs      09/28/18   0024  09/27/18   1426   NA  134*  134*   K  3.4*  2.7*   CL  98  96*   CO2  23  25   BUN  9  9   CREA  0.78  0.99   GLU  104*  142*   PHOS  2.1*   --    CA  8.7  9.1     Recent Labs      09/28/18   0024  09/27/18   1426   SGOT  15  18   AP  63  72   TP  7.4  8.4*   ALB  2.7*  3.1*   GLOB  4.7*  5.3*   LPSE   --   78     No results for input(s): INR, PTP, APTT in the last 72 hours. No lab exists for component: INREXT    Patient Active Problem List   Diagnosis Code    DM (diabetes mellitus) ([facility] E11.9    Leukocytosis D72.829    SIRS (systemic inflammatory response syndrome) ([facility]) R65.10    Sinus tachycardia R00.0    Hypertension I10    Diabetes ([facility] E11.9    Anxiety and depression F41.9, F32.9    Seizure disorder ([facility]) G40.909    Vomiting R11.10    Hypokalemia E87.6    Obese E66.9       Assessment and Plan:  Vomiting:  Suspect there may be an element of gastroparesis playing a part in her symptoms. She has tolerated full liquid diet recently. CT and UGI are unremarkable. - OK to advance to diabetic diet. - Tight glycemic control.  - Diabetic low fat diet. - May benefit from diabetes educator teaching. Will sign off please call with any questions or concerns.       Thanks for allowing me to participate in the care of this patient.   Signed By: Tyra Davis NP     9/28/2018  3:13 PM

## 2018-09-28 NOTE — PROGRESS NOTES
Occupational Therapy EVALUATION/discharge Patient: Breonna Leyva (71 y.o. female) Date: 9/28/2018 Primary Diagnosis: Leukocytosis Precautions:Fall ASSESSMENT:  
Based on the objective data described below, the patient presents at an overall mod I level with ADLs and functional mobility. Pt demonstrated good safety awareness and no LOB. Further skilled acute occupational therapy is not indicated at this time. Discharge Recommendations: None for OT Further Equipment Recommendations for Discharge: none for OT SUBJECTIVE:  
Patient stated I feel much better.  OBJECTIVE DATA SUMMARY:  
HISTORY:  
Past Medical History:  
Diagnosis Date  Anxiety and depression  Diabetes ([facility] [address]  Hypertension  Obese  Seizure disorder ([facility] [address] No past surgical history on file. Prior Level of Function/Environment/Context: Mod I and using cane recently. Pt does not drive, but her children provide all transportation Home Situation Home Environment: Private residence # Steps to Enter: 3 Rails to Enter: Yes Hand Rails : Bilateral 
One/Two Story Residence: One story Living Alone: Yes Support Systems: Child(adrienne) Patient Expects to be Discharged to[de-identified] Private residence Current DME Used/Available at Home: Cane, straight Tub or Shower Type: Tub/Shower combination Hand dominance: Right EXAMINATION OF PERFORMANCE DEFICITS: 
Cognitive/Behavioral Status: 
Neurologic State: Alert; Appropriate for age Orientation Level: Oriented X4 Cognition: Appropriate decision making; Appropriate for age attention/concentration; Appropriate safety awareness; Follows commands Perception: Appears intact Perseveration: No perseveration noted Safety/Judgement: Awareness of environment; Fall prevention;Good awareness of safety precautions; Home safety; Insight into deficits Hearing: Auditory Auditory Impairment: None Vision/Perceptual:   
 
Acuity: Within Defined Limits Corrective Lenses: Glasses Range of Motion: 
AROM: Generally decreased, functional 
PROM: Generally decreased, functional 
  
  
  
  
  
  
 
Strength: 
Strength: Generally decreased, functional 
  
  
  
  
 
Coordination: 
Coordination: Within functional limits Fine Motor Skills-Upper: Left Intact; Right Intact Gross Motor Skills-Upper: Left Intact; Right Intact Tone & Sensation: 
Tone: Normal 
  
  
  
  
  
  
  
 
Balance: 
Sitting: Intact Standing: Intact; With support (cane) Functional Mobility and Transfers for ADLs: 
Bed Mobility: 
Rolling: Independent Supine to Sit: Independent Sit to Supine: Independent Scooting: Independent Transfers: 
Sit to Stand: Modified independent Bed to Chair: Modified independent Bathroom Mobility: Modified independent (using cane) Toilet Transfer : Modified independent (using cane) ADL Assessment: 
Feeding: Independent Oral Facial Hygiene/Grooming: Modified Independent (standing at sink to wash hands) Bathing: Modified independent (seated) Upper Body Dressing: Independent Lower Body Dressing: Modified independent Toileting: Modified independent ADL Intervention and task modifications: 
Patient was educated on the benefits of maintaining activity tolerance, functional mobility, and independence with self care tasks during acute stay. Encouraged patient to be out of bed for all meals, perform daily ADLs (as approved by RN/MD regarding bathing etc), performing functional mobility to/from bathroom, and increasing time OOB daily. Patient educated about the importance of maintaining activity tolerance to ensure safe return home and to baseline. Patient verbalized understanding of education. Cognitive Retraining Safety/Judgement: Awareness of environment; Fall prevention;Good awareness of safety precautions; Home safety; Insight into deficits Functional Measure: 
Barthel Index: 
 
Bathin Bladder: 5 Bowels: 10 
Groomin Dressing: 10 Feeding: 10 
 Mobility: 15 
Stairs: 5 Toilet Use: 10 Transfer (Bed to Chair and Back): 15 Total: 90 Barthel and G-code impairment scale: 
Percentage of impairment CH 
0% CI 
1-19% CJ 
20-39% CK 
40-59% CL 
60-79% CM 
80-99% CN 
100% Barthel Score 0-100 100 99-80 79-60 59-40 20-39 1-19 
 0 Barthel Score 0-20 20 17-19 13-16 9-12 5-8 1-4 0 The Barthel ADL Index: Guidelines 1. The index should be used as a record of what a patient does, not as a record of what a patient could do. 2. The main aim is to establish degree of independence from any help, physical or verbal, however minor and for whatever reason. 3. The need for supervision renders the patient not independent. 4. A patient's performance should be established using the best available evidence. Asking the patient, friends/relatives and nurses are the usual sources, but direct observation and common sense are also important. However direct testing is not needed. 5. Usually the patient's performance over the preceding 24-48 hours is important, but occasionally longer periods will be relevant. 6. Middle categories imply that the patient supplies over 50 per cent of the effort. 7. Use of aids to be independent is allowed. Clement Godoy., Barthel, D.W. ([id number]). Functional evaluation: the Barthel Index. [facility] (14)2. KYLAH Maloney, Katerin Foley., Edd Wray.[facility], [address] [place] [address] (1999). Measuring the change indisability after inpatient rehabilitation; comparison of the responsiveness of the Barthel Index and Functional [place] Measure. Journal of Neurology, Neurosurgery, and Psychiatry, 66(4), 355-450. [place] Guillermo, N.J.A, Andrew Rojas,  OTFJ.M, & Anu Jose, M.A. (2004.) Assessment of post-stroke quality of life in cost-effectiveness studies: The usefulness of the Barthel Index and the EuroQoL-5D. [facility], 13, 648-60 G codes: In compliance with CMSs Claims Based Outcome Reporting, the following G-code set was chosen for this patient based on their primary functional limitation being treated: The outcome measure chosen to determine the severity of the functional limitation was the Barthel Index with a score of 90/100 which was correlated with the impairment scale. ? Self Care:  
  - CURRENT STATUS: CI - 1%-19% impaired, limited or restricted  - GOAL STATUS: CI - 1%-19% impaired, limited or restricted  - D/C STATUS:  CI - 1%-19% impaired, limited or restricted Occupational Therapy Evaluation Charge Determination History Examination Decision-Making LOW Complexity : Brief history review  LOW Complexity : 1-3 performance deficits relating to physical, cognitive , or psychosocial skils that result in activity limitations and / or participation restrictions  LOW Complexity : No comorbidities that affect functional and no verbal or physical assistance needed to complete eval tasks Based on the above components, the patient evaluation is determined to be of the following complexity level: LOW Pain: Pt with no c/o pain Activity Tolerance:  
Fair Please refer to the flowsheet for vital signs taken during this treatment. After treatment:  
[]  Patient left in no apparent distress sitting up in chair 
[x]  Patient left in no apparent distress in bed 
[x]  Call bell left within reach [x]  Nursing notified 
[]  Caregiver present 
[]  Bed alarm activated COMMUNICATION/EDUCATION:  
Communication/Collaboration: 
[x]      Home safety education was provided and the patient/caregiver indicated understanding. [x]      Patient/family have participated as able and agree with findings and recommendations. []      Patient is unable to participate in plan of care at this time. Findings and recommendations were discussed with: Registered Nurse Raghav Ernandez OT Time Calculation: 14 mins

## 2018-09-28 NOTE — PROGRESS NOTES
Bedside and Verbal shift change report given to [facility], RN (oncoming nurse) by Svetlana Espinal (offgoing nurse). Report included the following information SBAR, Kardex, Procedure Summary, Intake/Output, MAR and Recent Results.

## 2018-09-28 NOTE — PROGRESS NOTES
Problem: Falls - Risk of 
Goal: *Absence of Falls Document Elisabeth Heart Fall Risk and appropriate interventions in the flowsheet. Outcome: Progressing Towards Goal 
Fall Risk Interventions: 
Mobility Interventions: Communicate number of staff needed for ambulation/transfer, Patient to call before getting OOB, PT Consult for mobility concerns Medication Interventions: Patient to call before getting OOB, Teach patient to arise slowly Elimination Interventions: Patient to call for help with toileting needs, Call light in reach

## 2018-09-28 NOTE — PROGRESS NOTES
Physical Therapy Note: 
RN consulted stating pt off flr for xray. Will continue to follow for PT evaluation.

## 2018-09-28 NOTE — PROGRESS NOTES
Problem: Mobility Impaired (Adult and Pediatric) Goal: *Acute Goals and Plan of Care (Insert Text) physical Therapy EVALUATION/DISCHARGE Patient: Monica Pickens (84 y.o. female) Date: 9/28/2018 Primary Diagnosis: Leukocytosis Precautions:   Fall ASSESSMENT : 
Based on the objective data described below, the patient presents with admission due to leukocytosis. Received supine in bed stating is feeling much better. Pt lives with son who works. Pt states that she uses and SPC most of the time, yet reports R LE giving way in the past due to hx of CVA's x2. Supine to sit with Mod I.  Gait of 150' with SBA to Mod I with rollator. Pt demonstrating gait instability in bell with [facility] and needing CGA. Managed stairs of 3 with R rail with same. Educated with proper use of rollator and order placed with CM. Pt demonstrating good understanding of its function safely and expressing gratitude using this device allowing preserved independence. Skilled physical therapy is not indicated at this time. PLAN : 
Discharge Recommendations: None Further Equipment Recommendations for Discharge: rollator SUBJECTIVE:  
Patient stated I like this device.  OBJECTIVE DATA SUMMARY:  
HISTORY:   
Past Medical History:  
Diagnosis Date  Anxiety and depression  Diabetes ([facility] [address]  Hypertension  Obese  Seizure disorder ([facility] [address] No past surgical history on file. Prior Level of Function/Home Situation: Mod I with SPC; lives with son Personal factors and/or comorbidities impacting plan of care: see above to include Dm; and Sz 
 
Home Situation Home Environment: Private residence # Steps to Enter: 3 Rails to Enter: Yes Hand Rails : Bilateral 
One/Two Story Residence: One story Living Alone: No ( son) Support Systems: Child(adrienne) Patient Expects to be Discharged to[de-identified] Private residence Current DME Used/Available at Home: Cane, straight Tub or Shower Type: Tub/Shower combination EXAMINATION/PRESENTATION/DECISION MAKING:  
Critical Behavior: 
Neurologic State: Alert, Appropriate for age Orientation Level: Oriented X4 Cognition: Appropriate decision making, Appropriate for age attention/concentration, Appropriate safety awareness, Follows commands Safety/Judgement: Awareness of environment, Fall prevention, Good awareness of safety precautions, Home safety, Insight into deficits Hearing: Auditory Auditory Impairment: None Skin:  IV 
Edema: BLE Range Of Motion: 
AROM: Generally decreased, functional 
  
  
  
PROM: Generally decreased, functional 
  
  
  
Strength:   
Strength:  (R LE weaker then L s/p 2 CVA 2yrs agos and 4mos ago) Tone & Sensation:  
Tone: Normal 
  
  
  
  
  
  
  
  
   
Coordination: 
Coordination: Within functional limits Vision:  
Acuity: Within Defined Limits Corrective Lenses: Glasses Functional Mobility: 
Bed Mobility: 
Rolling: Independent Supine to Sit: Independent Sit to Supine: Independent Scooting: Independent Transfers: 
Sit to Stand: Modified independent Bed to Chair: Modified independent Balance:  
Sitting: Intact Standing: Intact; With support (cane) Ambulation/Gait Training: 
Distance (ft): 150 Feet (ft) Assistive Device: Gait belt;Walker, rollator Ambulation - Level of Assistance: Stand-by assistance Gait Abnormalities: Path deviations Base of Support: Narrowed Stairs: 
Number of Stairs Trained: 3 Stairs - Level of Assistance: Contact guard assistance Rail Use: Right Functional Measure: 
Barthel Index: 
 
Bathin Bladder: 5 Bowels: 10 
Groomin Dressing: 10 Feeding: 10 Mobility: 15 
Stairs: 5 Toilet Use: 10 Transfer (Bed to Chair and Back): 15 Total: 90 Barthel and G-code impairment scale: 
Percentage of impairment CH 
0% CI 
1-19% CJ 
20-39% CK 
40-59% CL 
60-79% CM 
80-99% CN 
100% Barthel Score 0-100 100 99-80 79-60 59-40 20-39 1-19 
 0 Barthel Score 0-20 20 17-19 13-16 9-12 5-8 1-4 0 The Barthel ADL Index: Guidelines 1. The index should be used as a record of what a patient does, not as a record of what a patient could do. 2. The main aim is to establish degree of independence from any help, physical or verbal, however minor and for whatever reason. 3. The need for supervision renders the patient not independent. 4. A patient's performance should be established using the best available evidence. Asking the patient, friends/relatives and nurses are the usual sources, but direct observation and common sense are also important. However direct testing is not needed. 5. Usually the patient's performance over the preceding 24-48 hours is important, but occasionally longer periods will be relevant. 6. Middle categories imply that the patient supplies over 50 per cent of the effort. 7. Use of aids to be independent is allowed. Blayne Escalante., Barthel, D.W. ([id number]). Functional evaluation: the Barthel Index. [address] [facility] (14)2. СЕРГЕЙ PrescottF, Deni Lujan., Kiarra Butler., Shannan, [address] [facility] (1999). Measuring the change indisability after inpatient rehabilitation; comparison of the responsiveness of the Barthel Index and Functional [place] Measure. Journal of Neurology, Neurosurgery, and Psychiatry, 66(4), 728-352. Jasiel Rico, N.J.A, LESTER Hernandes.IVETTE, & Deyanira Robertson MShakaA. (2004.) Assessment of post-stroke quality of life in cost-effectiveness studies: The usefulness of the Barthel Index and the EuroQoL-5D. [facility], 13, 431-29 G codes: In compliance with CMSs Claims Based Outcome Reporting, the following G-code set was chosen for this patient based on their primary functional limitation being treated:  
 
The outcome measure chosen to determine the severity of the functional limitation was the barthel with a score of 90/100 which was correlated with the impairment scale. ? Mobility - Walking and Moving Around:  
  - CURRENT STATUS: CI - 1%-19% impaired, limited or restricted  - GOAL STATUS: CI - 1%-19% impaired, limited or restricted  - D/C STATUS:  CI - 1%-19% impaired, limited or restricted Physical Therapy Evaluation Charge Determination History Examination Presentation Decision-Making MEDIUM  Complexity : 1-2 comorbidities / personal factors will impact the outcome/ POC  LOW Complexity : 1-2 Standardized tests and measures addressing body structure, function, activity limitation and / or participation in recreation  LOW Complexity : Stable, uncomplicated  Other outcome measures barthel  LOW Based on the above components, the patient evaluation is determined to be of the following complexity level: LOW Activity Tolerance:  
good Please refer to the flowsheet for vital signs taken during this treatment. After treatment:  
[]   Patient left in no apparent distress sitting up in chair 
[]   Patient left in no apparent distress in bed 
[x]   Call bell left within reach [x]   Nursing notified 
[]   Caregiver present 
[]   Bed alarm activated COMMUNICATION/EDUCATION:  
Communication/Collaboration: 
[x]   Fall prevention education was provided and the patient/caregiver indicated understanding. [x]   Patient/family have participated as able and agree with findings and recommendations. []   Patient is unable to participate in plan of care at this time. Findings and recommendations were discussed with: Registered Nurse Thank you for this referral. 
Loida Arce, PT 
 30min

## 2018-09-28 NOTE — PROGRESS NOTES
9/28/2018 12:09 PM 
Reason for Admission:   Leukocytosis RRAT Score:     15 Do you (patient/family) have any concerns for transition/discharge? No concerns reported. Plan for utilizing home health:     TBD Likelihood of readmission? Yellow Transition of Care Plan:       
 
CM met with pt for assessment. Demographics and PCP were confirmed. Pt is a 78year old,  female who lives in a private residence with her son (3 exterior steps, 0 interior steps). PTA, pt was able to complete ADLs with the occasional use of a cane. Pt has prescription drug coverage, and prefers [address] in [place], [place]. OBS letter provided and explained. No discharge service needs indicated at this time. CM will continue to monitor for finalized discharge recommendations. Nancy Dean MA Care Management Interventions PCP Verified by CM: Yes Orren Boeck - No NN to notify) Palliative Care Criteria Met (RRAT>21 & CHF Dx)?: No 
Mode of Transport at Discharge: Other (see comment) (Family) MyChart Signup: No 
Physical Therapy Consult: Yes Occupational Therapy Consult: Yes Speech Therapy Consult: No 
Current Support Network: Relative's Home Confirm Follow Up Transport: Family Plan discussed with Pt/Family/Caregiver: Yes Discharge Location Discharge Placement: Unable to determine at this time

## 2018-09-28 NOTE — DIABETES MGMT
DTC Consult Note Recommendations/ Comments: Noted Todduvia held this AM due to NPO status. Please resume tomorrow morning as diet advanced to clear liquid this afternoon. Current hospital DM medication:  
-Humalog insulin resistant correction 
-Januvia 100mg daily Met with pt for consult. Pt reported that she was diagnosed with DM about 1 year ago but does not have a good understanding of how to manage. Spent majority of time discussing meal planning. Pt typically consuming soda or Gatorade throughout the day. Encouraged pt to try diet beverages to aid in glucose control. Discussed importance of reducing portion sizes of starchy foods and using plate method to put meals together. Pt interested in outpatient education but stated her children work a lot and would not be able to bring her to an appt. Encouraged her to discuss attending education classes with her children, provided contact info and education materials. Consult received for:  [x]             Assessment of home management 
              []      Medication Recommendations []             Meter/monitoring 
   []             Insulin instruction []             New diagnosis []             Outpatient education []             Insulin pump patient []             Insulin infusion 
   []             DKA/HHS Chart reviewed and initial evaluation complete on Lisa Epps. Patient is a 78 y.o. female with known history of Type 2 Diabetes on Januvia 100mg daily at home. BG monitoring at home - not currently checking. Assessed and instructed patient on the following:  
·  interpretation of lab results, blood sugar goals, complications of diabetes mellitus, illness management, SMBG skills, nutrition and referred to Diabetes Educator Encouraged the following:  
· dietary modifications: eliminating sugar-sweetend beverages · regular blood sugar monitoring Provided patient with the following: [x]             Survival skills education materials []             Insulin education materials []             CHO counting education materials []             Outpatient DTC contact number 
             []             Glucometer Discussed with patient and/or family need for follow up appointment for diabetes management after discharge. A1c:  
Lab Results Component Value Date/Time Hemoglobin A1c 7.3 (H) 09/27/2018 02:26 PM  
 
 
Recent Glucose Results:  
Lab Results Component Value Date/Time  (H) 09/28/2018 12:24 AM  
 GLUCPOC 82 09/28/2018 12:20 PM  
 GLUCPOC 90 09/28/2018 08:06 AM  
 GLUCPOC 110 (H) 09/27/2018 09:28 PM  
  
 
Lab Results Component Value Date/Time Creatinine 0.78 09/28/2018 12:24 AM  
 
Estimated Creatinine Clearance: 84.3 mL/min (based on Cr of 0.78). Active Orders Diet DIET CLEAR LIQUID  
  
 
PO intake: No data found. Will continue to follow as needed. Thank you. Vadim Contreras RD, CDE [facility] Office: [phone] Pager: [phone]

## 2018-09-28 NOTE — PROGRESS NOTES
Primary Nurse Raymond Vera RN and Rose Mary Zhu RN performed a dual skin assessment on this patient No impairment noted Fidel score is 20

## 2018-09-28 NOTE — PROGRESS NOTES
Daily Progress Note: 9/28/2018 Gemma Osler Ruperto Patron, MD 
 
Assessment/Plan:  
Leukocytosis / SIRS (systemic inflammatory response syndrome) / Sinus tachycardia - Unclear etiology. started azithromycin empirically. Viral resp panel neg 
  
Hypokalemia / Hyponatremia/ hypomag - POA, likely related to GI loss. Hydrate, replace and follow. 
  
Vomiting - POA, unclear etiology. CT Abd/Pelv was unremarkable. Upper GI series.  
  
DM (diabetes mellitus) - Diabetic diet and counseling. SSI per protocol. Continue home sitagliptan. Check A1c. 
  
Hypertension - Stable. Continue lisinopril, amlodipine and ASA 
  
Anxiety and depression - Continue buspirone and sertraline. SSRI may contribute to low Na. 
  
Seizure disorder - Continue keppra 
  
Hyperlipidemia - Continue atorvastatin. Problem List: 
Problem List as of 9/28/2018  Date Reviewed: 9/27/2018 Codes Class Noted - Resolved SIRS (systemic inflammatory response syndrome) (HCC) ICD-10-CM: R65.10 ICD-9-CM: 995.90  9/27/2018 - Present Sinus tachycardia ICD-10-CM: R00.0 ICD-9-CM: 427.89  9/27/2018 - Present Hypertension ICD-10-CM: I10 
ICD-9-CM: 401.9  Unknown - Present Diabetes ([facility] ICD-10-CM: E11.9 ICD-9-CM: 250.00  Unknown - Present Anxiety and depression ICD-10-CM: F41.9, F32.9 ICD-9-CM: 300.00, 311  Unknown - Present Seizure disorder ([facility] ICD-10-CM: G40.909 ICD-9-CM: 345.90  Unknown - Present Vomiting ICD-10-CM: R11.10 ICD-9-CM: 787.03  9/27/2018 - Present Hypokalemia ICD-10-CM: E87.6 ICD-9-CM: 276.8  9/27/2018 - Present Obese ICD-10-CM: E66.9 ICD-9-CM: 278.00  Unknown - Present DM (diabetes mellitus) ([facility] ICD-10-CM: E11.9 ICD-9-CM: 250.00  6/7/2018 - Present * (Principal)Leukocytosis ICD-10-CM: B78.209 ICD-9-CM: 288.60  6/7/2018 - Present RESOLVED: Encephalopathy acute ICD-10-CM: G93.40 ICD-9-CM: 348.30  6/7/2018 - 9/27/2018 RESOLVED: Seizure ([facility] ICD-10-CM: R56.9 ICD-9-CM: 780.39  2018 - 2018 RESOLVED: Aspiration into airway ICD-10-CM: T17.908A ICD-9-CM: 934.9  2018 - 2018 RESOLVED: Renal insufficiency ICD-10-CM: N28.9 ICD-9-CM: 593.9  2018 - 2018 Subjective:  
 78 y.o.  female who presented to the Emergency Department complaining of cough and vomiting. She is a poor historian and no family is present. Cough orsening for days. She reports vomiting every day since ulcers found last month. Sudden vomiting, without persistent nausea. She reports weight loss, but out records do not confirm. ER workup finds SIRS criteria, xray is normal.  No fever. We will admit her for management. :  She reports \"feel much better so far. \"  No cough at this time. No further N/V overnight - last episode N/V was at 10pm last night. She does have \"a bit\" of appetite now. No ab pain. No fevers/chills. Lactic acid normal.  WBC still elevated but down from admission. K+ better. Mag a little low - replacing. Slow IVF. Try clears again. Review of Systems: A comprehensive review of systems was negative except for that written in the HPI. Objective:  
Physical Exam:  
 
Visit Vitals  /79 (BP 1 Location: Left arm, BP Patient Position: At rest)  Pulse 96  Temp 99.3 °F (37.4 °C)  Resp 16  
 Ht 5' 6\" (1.676 m)  Wt 99.3 kg (219 lb)  SpO2 100%  BMI 35.35 kg/m2 O2 Device: Room air Temp (24hrs), Av.2 °F (37.3 °C), Min:98.5 °F (36.9 °C), Max:99.8 °F (37.7 °C) General:  Alert, cooperative, no distress, obese, appears stated age. Head:  Normocephalic, without obvious abnormality, atraumatic. Eyes:  Conjunctivae/corneas clear. EOMs intact. Throat: Lips, mucosa, and tongue moist.. Neck: Supple, symmetrical, trachea midline, no adenopathy, thyroid: no enlargement/tenderness/nodules, no carotid bruit and no JVD. Back:   Symmetric, no curvature. ROM normal. No CVA tenderness. Lungs:   Clear to auscultation bilaterally. Chest wall:  No tenderness or deformity. Heart:  Regular rate and rhythm, S1, S2 normal, no murmur, click, rub or gallop. Abdomen:   Soft, non-tender. Bowel sounds normal. No masses,  No organomegaly. Extremities: no cyanosis or edema. No calf tenderness or cords. Pulses: 2+ and symmetric all extremities. Skin:  turgor normal.   
Neurologic: CNII-XII intact. Alert and oriented X 3. Fine motor of hands and fingers normal.   equal.  No cogwheeling or rigidity. Gait not tested at this time. Sensation grossly normal to touch. Gross motor of extremities normal.    
 
Data Review: CTA Chest/Ab/Pelvis 9/27/18 CT CHEST, ABDOMEN, PELVIS WITH CONTRAST. 9/27/2018 3:35 PM  
INDICATION: Cough, persistent vomiting. COMPARISON: None. FINDINGS: 
Chest: Aside from minimal bibasilar dependent atelectasis, the lungs are clear. The central airways are patent. No pneumothorax or pleural effusion. Incidental 
note is made of a tiny air cyst in the right upper lobe (601-36, 3-17). The 
right and left common carotid arteries arise from a common trunk, a normal 
variant. Aberrant right subclavian artery is another normal variant. The heart 
size is normal. Left anterior descending coronary calcifications are mild. No 
thoracic lymphadenopathy. Abdomen: The esophagus, stomach, duodenum, liver, gallbladder, pancreas, spleen, 
adrenals, and kidneys are normal. Incidental note is made of endoscopy clips 
along the greater curvature in the gastric body. A portacaval lymph node is 
enlarged, measuring 15 mm in short axis on image [id number]. However, there are no 
other enlarged abdominopelvic lymph nodes, and this in isolation is of 
questionable significance. Pelvis: Diverticulosis is extensive throughout the colon. Incidental note is 
made of calcified, degenerated uterine fibroids.  The small bowel, ileocecal 
junction, appendix, and bladder are normal. No free air or fluid, and no 
abdominopelvic lymphadenopathy. IMPRESSION: 
No acute process in the chest, abdomen, and pelvis. Recent Days: 
Recent Labs  
   09/28/18 
 0024  09/27/18 
 1426 WBC  15.0*  21.1* HGB  8.4*  9.3* HCT  24.3*  27.0*  
PLT  244  304 Recent Labs  
   09/28/18 
 0024  09/27/18 
 1426 NA  134*  134* K  3.4*  2.7* CL  98  96* CO2  23  25 GLU  104*  142* BUN  9  9  
CREA  0.78  0.99 CA  8.7  9.1 MG  1.5*  1.6 PHOS  2.1*   --   
ALB  2.7*  3.1* TBILI  1.5*  1.5* SGOT  15  18 ALT  11*  13 No results for input(s): PH, PCO2, PO2, HCO3, FIO2 in the last 72 hours. 24 Hour Results: 
Recent Results (from the past 24 hour(s)) CBC WITH AUTOMATED DIFF Collection Time: 09/27/18  2:26 PM  
Result Value Ref Range WBC 21.1 (H) 3.6 - 11.0 K/uL  
 RBC 3.09 (L) 3.80 - 5.20 M/uL HGB 9.3 (L) 11.5 - 16.0 g/dL HCT 27.0 (L) 35.0 - 47.0 % MCV 87.4 80.0 - 99.0 FL  
 MCH 30.1 26.0 - 34.0 PG  
 MCHC 34.4 30.0 - 36.5 g/dL  
 RDW 12.7 11.5 - 14.5 % PLATELET 847 557 - 219 K/uL MPV 9.4 8.9 - 12.9 FL  
 NRBC 0.0 0  WBC ABSOLUTE NRBC 0.00 0.00 - 0.01 K/uL NEUTROPHILS 93 (H) 32 - 75 % LYMPHOCYTES 2 (L) 12 - 49 % MONOCYTES 4 (L) 5 - 13 % EOSINOPHILS 0 0 - 7 % BASOPHILS 0 0 - 1 % IMMATURE GRANULOCYTES 1 (H) 0.0 - 0.5 % ABS. NEUTROPHILS 19.7 (H) 1.8 - 8.0 K/UL  
 ABS. LYMPHOCYTES 0.4 (L) 0.8 - 3.5 K/UL  
 ABS. MONOCYTES 0.8 0.0 - 1.0 K/UL  
 ABS. EOSINOPHILS 0.0 0.0 - 0.4 K/UL  
 ABS. BASOPHILS 0.0 0.0 - 0.1 K/UL  
 ABS. IMM. GRANS. 0.2 (H) 0.00 - 0.04 K/UL  
 DF SMEAR SCANNED    
 PLATELET COMMENTS Giant platelets WBC COMMENTS VACUOLATED POLYS METABOLIC PANEL, COMPREHENSIVE Collection Time: 09/27/18  2:26 PM  
Result Value Ref Range Sodium 134 (L) 136 - 145 mmol/L Potassium 2.7 (LL) 3.5 - 5.1 mmol/L  Chloride 96 (L) 97 - 108 mmol/L  
 CO2 25 21 - 32 mmol/L  
 Anion gap 13 5 - 15 mmol/L Glucose 142 (H) 65 - 100 mg/dL BUN 9 6 - 20 MG/DL Creatinine 0.99 0.55 - 1.02 MG/DL  
 BUN/Creatinine ratio 9 (L) 12 - 20 GFR est AA >60 >60 ml/min/1.73m2 GFR est non-AA 56 (L) >60 ml/min/1.73m2 Calcium 9.1 8.5 - 10.1 MG/DL Bilirubin, total 1.5 (H) 0.2 - 1.0 MG/DL  
 ALT (SGPT) 13 12 - 78 U/L  
 AST (SGOT) 18 15 - 37 U/L Alk. phosphatase 72 45 - 117 U/L Protein, total 8.4 (H) 6.4 - 8.2 g/dL Albumin 3.1 (L) 3.5 - 5.0 g/dL Globulin 5.3 (H) 2.0 - 4.0 g/dL A-G Ratio 0.6 (L) 1.1 - 2.2 LIPASE Collection Time: 09/27/18  2:26 PM  
Result Value Ref Range Lipase 78 73 - 393 U/L  
SAMPLES BEING HELD Collection Time: 09/27/18  2:26 PM  
Result Value Ref Range SAMPLES BEING HELD 1SST, 1RED, 1BLU   
 COMMENT Add-on orders for these samples will be processed based on acceptable specimen integrity and analyte stability, which may vary by analyte. MAGNESIUM Collection Time: 09/27/18  2:26 PM  
Result Value Ref Range Magnesium 1.6 1.6 - 2.4 mg/dL EKG, 12 LEAD, INITIAL Collection Time: 09/27/18  2:47 PM  
Result Value Ref Range Ventricular Rate 101 BPM  
 Atrial Rate 101 BPM  
 P-R Interval 154 ms QRS Duration 78 ms Q-T Interval 372 ms QTC Calculation (Bezet) 482 ms Calculated P Axis 52 degrees Calculated R Axis -3 degrees Calculated T Axis 44 degrees Diagnosis Sinus tachycardia with fusion complexes Otherwise normal ECG When compared with ECG of 07-JUN-2018 23:27, 
fusion complexes are now present Criteria for Septal infarct are no longer present ST no longer elevated in Inferior leads ST no longer depressed in Lateral leads RESPIRATORY PANEL,PCR,NASOPHARYNGEAL Collection Time: 09/27/18  4:53 PM  
Result Value Ref Range Adenovirus NOT DETECTED NOTD Coronavirus 229E NOT DETECTED NOTD Coronavirus HKU1 NOT DETECTED NOTD Coronavirus CVNL63 NOT DETECTED NOTD Coronavirus OC43 NOT DETECTED NOTD Metapneumovirus NOT DETECTED NOTD Rhinovirus and Enterovirus NOT DETECTED NOTD Influenza A NOT DETECTED NOTD Influenza A, subtype H1 NOT DETECTED NOTD Influenza A, subtype H3 NOT DETECTED NOTD INFLUENZA A H1N1 PCR NOT DETECTED NOTD Influenza B NOT DETECTED NOTD Parainfluenza 1 NOT DETECTED NOTD Parainfluenza 2 NOT DETECTED NOTD Parainfluenza 3 NOT DETECTED NOTD Parainfluenza virus 4 NOT DETECTED NOTD    
 RSV by PCR NOT DETECTED NOTD Bordetella pertussis - PCR NOT DETECTED NOTD Chlamydophila pneumoniae DNA, QL, PCR NOT DETECTED NOTD Mycoplasma pneumoniae DNA, QL, PCR NOT DETECTED NOTD CULTURE, BLOOD, PAIRED Collection Time: 09/27/18  4:53 PM  
Result Value Ref Range Special Requests: NO SPECIAL REQUESTS Culture result: NO GROWTH AFTER 13 HOURS    
LACTIC ACID Collection Time: 09/27/18  4:53 PM  
Result Value Ref Range Lactic acid 2.2 (HH) 0.4 - 2.0 MMOL/L  
GLUCOSE, POC Collection Time: 09/27/18  9:28 PM  
Result Value Ref Range Glucose (POC) 110 (H) 65 - 100 mg/dL Performed by Sunshine Rondon (PCT) CBC W/O DIFF Collection Time: 09/28/18 12:24 AM  
Result Value Ref Range WBC 15.0 (H) 3.6 - 11.0 K/uL  
 RBC 2.77 (L) 3.80 - 5.20 M/uL HGB 8.4 (L) 11.5 - 16.0 g/dL HCT 24.3 (L) 35.0 - 47.0 % MCV 87.7 80.0 - 99.0 FL  
 MCH 30.3 26.0 - 34.0 PG  
 MCHC 34.6 30.0 - 36.5 g/dL  
 RDW 12.5 11.5 - 14.5 % PLATELET 434 208 - 697 K/uL MPV 9.3 8.9 - 12.9 FL  
 NRBC 0.0 0  WBC ABSOLUTE NRBC 0.00 0.00 - 0.01 K/uL MAGNESIUM Collection Time: 09/28/18 12:24 AM  
Result Value Ref Range Magnesium 1.5 (L) 1.6 - 2.4 mg/dL METABOLIC PANEL, COMPREHENSIVE Collection Time: 09/28/18 12:24 AM  
Result Value Ref Range Sodium 134 (L) 136 - 145 mmol/L Potassium 3.4 (L) 3.5 - 5.1 mmol/L  Chloride 98 97 - 108 mmol/L  
 CO2 23 21 - 32 mmol/L  
 Anion gap 13 5 - 15 mmol/L Glucose 104 (H) 65 - 100 mg/dL BUN 9 6 - 20 MG/DL Creatinine 0.78 0.55 - 1.02 MG/DL  
 BUN/Creatinine ratio 12 12 - 20 GFR est AA >60 >60 ml/min/1.73m2 GFR est non-AA >60 >60 ml/min/1.73m2 Calcium 8.7 8.5 - 10.1 MG/DL Bilirubin, total 1.5 (H) 0.2 - 1.0 MG/DL  
 ALT (SGPT) 11 (L) 12 - 78 U/L  
 AST (SGOT) 15 15 - 37 U/L Alk. phosphatase 63 45 - 117 U/L Protein, total 7.4 6.4 - 8.2 g/dL Albumin 2.7 (L) 3.5 - 5.0 g/dL Globulin 4.7 (H) 2.0 - 4.0 g/dL A-G Ratio 0.6 (L) 1.1 - 2.2 PHOSPHORUS Collection Time: 09/28/18 12:24 AM  
Result Value Ref Range Phosphorus 2.1 (L) 2.6 - 4.7 MG/DL  
LACTIC ACID Collection Time: 09/28/18 12:24 AM  
Result Value Ref Range Lactic acid 1.1 0.4 - 2.0 MMOL/L Medications reviewed Current Facility-Administered Medications Medication Dose Route Frequency  0.9% sodium chloride with KCl 40 mEq/L infusion   IntraVENous CONTINUOUS  
 amLODIPine (NORVASC) tablet 10 mg  10 mg Oral DAILY  aspirin delayed-release tablet 81 mg  81 mg Oral DAILY  atorvastatin (LIPITOR) tablet 20 mg  20 mg Oral DAILY  levETIRAcetam (KEPPRA) tablet 500 mg  500 mg Oral BID  pantoprazole (PROTONIX) tablet 40 mg  40 mg Oral ACB  SITagliptin (JANUVIA) tablet tab 100 mg  100 mg Oral DAILY  sertraline (ZOLOFT) tablet 100 mg  100 mg Oral DAILY  glucose chewable tablet 16 g  4 Tab Oral PRN  
 dextrose (D50W) injection syrg 12.5-25 g  25-50 mL IntraVENous PRN  
 glucagon (GLUCAGEN) injection 1 mg  1 mg IntraMUSCular PRN  
 acetaminophen (TYLENOL) tablet 650 mg  650 mg Oral Q4H PRN  
 diphenhydrAMINE (BENADRYL) capsule 25 mg  25 mg Oral Q4H PRN  
 ondansetron (ZOFRAN) injection 4 mg  4 mg IntraVENous Q4H PRN  
 enoxaparin (LOVENOX) injection 40 mg  40 mg SubCUTAneous Q24H  
 insulin lispro (HUMALOG) injection   SubCUTAneous AC&HS  
 guaiFENesin ER (MUCINEX) tablet 600 mg  600 mg Oral Q12H  azithromycin (ZITHROMAX) 500 mg in 0.9% sodium chloride (MBP/ADV) 250 mL  500 mg IntraVENous Q24H Care Plan discussed with: Patient/Family and Nurse Total time spent with patient: 30 minutes.  
 
Seferino Seymour MD

## 2018-09-28 NOTE — PROGRESS NOTES
09/28/18 2:04 PM 
Order for rollator received and sent in [employer] to [employer]. [place] can accept patient. They ship out rollators to patient's home, will be shipped and arrived within a week. Patient updated on this and comfortable with this plan. She has a straight cane to use until her rollator arrives.  
USMAN Delarosa

## 2018-09-29 ENCOUNTER — APPOINTMENT (OUTPATIENT)
Dept: GENERAL RADIOLOGY | Age: 68
End: 2018-09-29
Attending: FAMILY MEDICINE
Payer: MEDICARE

## 2018-09-29 LAB
ALBUMIN SERPL-MCNC: 2.9 G/DL (ref 3.5–5)
ALBUMIN/GLOB SERPL: 0.6 {RATIO} (ref 1.1–2.2)
ALP SERPL-CCNC: 75 U/L (ref 45–117)
ALT SERPL-CCNC: 14 U/L (ref 12–78)
ANION GAP SERPL CALC-SCNC: 14 MMOL/L (ref 5–15)
AST SERPL-CCNC: 17 U/L (ref 15–37)
BASOPHILS # BLD: 0 K/UL (ref 0–0.1)
BASOPHILS NFR BLD: 0 % (ref 0–1)
BILIRUB SERPL-MCNC: 1.5 MG/DL (ref 0.2–1)
BUN SERPL-MCNC: 7 MG/DL (ref 6–20)
BUN/CREAT SERPL: 9 (ref 12–20)
CALCIUM SERPL-MCNC: 9 MG/DL (ref 8.5–10.1)
CHLORIDE SERPL-SCNC: 95 MMOL/L (ref 97–108)
CO2 SERPL-SCNC: 20 MMOL/L (ref 21–32)
COMMENT, HOLDF: NORMAL
COMMENT, HOLDF: NORMAL
CREAT SERPL-MCNC: 0.82 MG/DL (ref 0.55–1.02)
DIFFERENTIAL METHOD BLD: ABNORMAL
EOSINOPHIL # BLD: 0 K/UL (ref 0–0.4)
EOSINOPHIL NFR BLD: 0 % (ref 0–7)
ERYTHROCYTE [DISTWIDTH] IN BLOOD BY AUTOMATED COUNT: 12.8 % (ref 11.5–14.5)
GLOBULIN SER CALC-MCNC: 4.8 G/DL (ref 2–4)
GLUCOSE BLD STRIP.AUTO-MCNC: 111 MG/DL (ref 65–100)
GLUCOSE BLD STRIP.AUTO-MCNC: 125 MG/DL (ref 65–100)
GLUCOSE BLD STRIP.AUTO-MCNC: 136 MG/DL (ref 65–100)
GLUCOSE BLD STRIP.AUTO-MCNC: 140 MG/DL (ref 65–100)
GLUCOSE BLD STRIP.AUTO-MCNC: 72 MG/DL (ref 65–100)
GLUCOSE BLD STRIP.AUTO-MCNC: 92 MG/DL (ref 65–100)
GLUCOSE SERPL-MCNC: 108 MG/DL (ref 65–100)
HCT VFR BLD AUTO: 26.5 % (ref 35–47)
HGB BLD-MCNC: 8.9 G/DL (ref 11.5–16)
IMM GRANULOCYTES # BLD: 0 K/UL
IMM GRANULOCYTES NFR BLD AUTO: 0 %
LYMPHOCYTES # BLD: 0.6 K/UL (ref 0.8–3.5)
LYMPHOCYTES NFR BLD: 4 % (ref 12–49)
MAGNESIUM SERPL-MCNC: 1.5 MG/DL (ref 1.6–2.4)
MCH RBC QN AUTO: 29.8 PG (ref 26–34)
MCHC RBC AUTO-ENTMCNC: 33.6 G/DL (ref 30–36.5)
MCV RBC AUTO: 88.6 FL (ref 80–99)
METAMYELOCYTES NFR BLD MANUAL: 1 %
MONOCYTES # BLD: 0 K/UL (ref 0–1)
MONOCYTES NFR BLD: 0 % (ref 5–13)
NEUTS SEG # BLD: 14.7 K/UL (ref 1.8–8)
NEUTS SEG NFR BLD: 95 % (ref 32–75)
NRBC # BLD: 0 K/UL (ref 0–0.01)
NRBC BLD-RTO: 0 PER 100 WBC
PLATELET # BLD AUTO: 235 K/UL (ref 150–400)
PMV BLD AUTO: 9.6 FL (ref 8.9–12.9)
POTASSIUM SERPL-SCNC: 4 MMOL/L (ref 3.5–5.1)
PROT SERPL-MCNC: 7.7 G/DL (ref 6.4–8.2)
RBC # BLD AUTO: 2.99 M/UL (ref 3.8–5.2)
RBC MORPH BLD: ABNORMAL
SAMPLES BEING HELD,HOLD: NORMAL
SAMPLES BEING HELD,HOLD: NORMAL
SERVICE CMNT-IMP: ABNORMAL
SERVICE CMNT-IMP: NORMAL
SERVICE CMNT-IMP: NORMAL
SODIUM SERPL-SCNC: 129 MMOL/L (ref 136–145)
WBC # BLD AUTO: 15.5 K/UL (ref 3.6–11)

## 2018-09-29 PROCEDURE — 85025 COMPLETE CBC W/AUTO DIFF WBC: CPT | Performed by: FAMILY MEDICINE

## 2018-09-29 PROCEDURE — 74011000250 HC RX REV CODE- 250: Performed by: INTERNAL MEDICINE

## 2018-09-29 PROCEDURE — 80053 COMPREHEN METABOLIC PANEL: CPT | Performed by: FAMILY MEDICINE

## 2018-09-29 PROCEDURE — 74011250636 HC RX REV CODE- 250/636: Performed by: INTERNAL MEDICINE

## 2018-09-29 PROCEDURE — 71045 X-RAY EXAM CHEST 1 VIEW: CPT

## 2018-09-29 PROCEDURE — 96361 HYDRATE IV INFUSION ADD-ON: CPT

## 2018-09-29 PROCEDURE — 94760 N-INVAS EAR/PLS OXIMETRY 1: CPT

## 2018-09-29 PROCEDURE — 96372 THER/PROPH/DIAG INJ SC/IM: CPT

## 2018-09-29 PROCEDURE — 87449 NOS EACH ORGANISM AG IA: CPT | Performed by: INTERNAL MEDICINE

## 2018-09-29 PROCEDURE — 96376 TX/PRO/DX INJ SAME DRUG ADON: CPT

## 2018-09-29 PROCEDURE — 83735 ASSAY OF MAGNESIUM: CPT | Performed by: FAMILY MEDICINE

## 2018-09-29 PROCEDURE — 74011250637 HC RX REV CODE- 250/637: Performed by: FAMILY MEDICINE

## 2018-09-29 PROCEDURE — 99218 HC RM OBSERVATION: CPT

## 2018-09-29 PROCEDURE — 82962 GLUCOSE BLOOD TEST: CPT

## 2018-09-29 PROCEDURE — 74011250637 HC RX REV CODE- 250/637: Performed by: INTERNAL MEDICINE

## 2018-09-29 PROCEDURE — 96366 THER/PROPH/DIAG IV INF ADDON: CPT

## 2018-09-29 PROCEDURE — 74011000258 HC RX REV CODE- 258: Performed by: FAMILY MEDICINE

## 2018-09-29 PROCEDURE — 87899 AGENT NOS ASSAY W/OPTIC: CPT | Performed by: INTERNAL MEDICINE

## 2018-09-29 RX ORDER — DEXTROSE MONOHYDRATE AND SODIUM CHLORIDE 5; .45 G/100ML; G/100ML
100 INJECTION, SOLUTION INTRAVENOUS CONTINUOUS
Status: DISCONTINUED | OUTPATIENT
Start: 2018-09-29 | End: 2018-09-30 | Stop reason: HOSPADM

## 2018-09-29 RX ADMIN — SERTRALINE HYDROCHLORIDE 100 MG: 50 TABLET ORAL at 08:28

## 2018-09-29 RX ADMIN — GUAIFENESIN 600 MG: 600 TABLET, EXTENDED RELEASE ORAL at 21:42

## 2018-09-29 RX ADMIN — AZITHROMYCIN MONOHYDRATE 500 MG: 500 INJECTION, POWDER, LYOPHILIZED, FOR SOLUTION INTRAVENOUS at 04:00

## 2018-09-29 RX ADMIN — ATORVASTATIN CALCIUM 20 MG: 10 TABLET, FILM COATED ORAL at 08:28

## 2018-09-29 RX ADMIN — DEXTROSE MONOHYDRATE 25 G: 25 INJECTION, SOLUTION INTRAVENOUS at 01:54

## 2018-09-29 RX ADMIN — SITAGLIPTIN 100 MG: 25 TABLET, FILM COATED ORAL at 08:28

## 2018-09-29 RX ADMIN — GUAIFENESIN 600 MG: 600 TABLET, EXTENDED RELEASE ORAL at 08:28

## 2018-09-29 RX ADMIN — Medication 400 MG: at 16:48

## 2018-09-29 RX ADMIN — PANTOPRAZOLE SODIUM 40 MG: 40 TABLET, DELAYED RELEASE ORAL at 06:36

## 2018-09-29 RX ADMIN — DEXTROSE MONOHYDRATE AND SODIUM CHLORIDE 100 ML/HR: 5; .45 INJECTION, SOLUTION INTRAVENOUS at 02:45

## 2018-09-29 RX ADMIN — ASPIRIN 81 MG: 81 TABLET, COATED ORAL at 08:28

## 2018-09-29 RX ADMIN — Medication 400 MG: at 08:28

## 2018-09-29 RX ADMIN — ENOXAPARIN SODIUM 40 MG: 40 INJECTION, SOLUTION INTRAVENOUS; SUBCUTANEOUS at 21:42

## 2018-09-29 RX ADMIN — LEVETIRACETAM 500 MG: 250 TABLET ORAL at 16:49

## 2018-09-29 RX ADMIN — AMLODIPINE BESYLATE 10 MG: 5 TABLET ORAL at 08:28

## 2018-09-29 RX ADMIN — LEVETIRACETAM 500 MG: 250 TABLET ORAL at 08:28

## 2018-09-29 RX ADMIN — ONDANSETRON 4 MG: 2 INJECTION INTRAMUSCULAR; INTRAVENOUS at 03:01

## 2018-09-29 NOTE — PROGRESS NOTES
Pt reports she does not remember where she is and why. Obtained VS - stable please see flow sheet. Obtained BS 72. D50 given IV BS increased to 92. No weakness noted at this time. Spoke with Dr Alisha Amaya received orders to change IV fluids to D5 with 1/2 NS at 100 ml/hr. Per Dr Alisha Amaya provide pt peanut butter and crackers. Cont to monitor [address] Pt is beginning to answer questions appropriately. Provided pt with peanut butter and crackers, pt did not tolerate and had a bout of emesis approximately 100 ml. Will cont to monitor 36 Noted pt sodium level at 129. Spoke with Dr Alisha Amaya received no orders at this time. Will cont to monitor

## 2018-09-29 NOTE — PROGRESS NOTES
Daily Progress Note: 9/29/2018 Alejandro Montaño MD 
 
Assessment/Plan:  
Leukocytosis / SIRS (systemic inflammatory response syndrome) / Sinus tachycardia - Unclear etiology. started azithromycin empirically. Viral resp panel neg 
  
Hypokalemia / Hyponatremia/ hypomag - POA, likely related to GI loss. Hydrate, replace and follow. 
  
Vomiting - POA, unclear etiology. CT Abd/Pelv was unremarkable. Upper GI series.  
  
DM (diabetes mellitus) - Diabetic diet and counseling. SSI per protocol. Continue home sitagliptan. Check A1c. 
  
Hypertension - Stable. Continue lisinopril, amlodipine and ASA 
  
Anxiety and depression - Continue buspirone and sertraline. SSRI may contribute to low Na. 
  
Seizure disorder - Continue keppra 
  
Hyperlipidemia - Continue atorvastatin. Problem List: 
Problem List as of 9/29/2018  Date Reviewed: 9/27/2018 Codes Class Noted - Resolved SIRS (systemic inflammatory response syndrome) (HCC) ICD-10-CM: R65.10 ICD-9-CM: 995.90  9/27/2018 - Present Sinus tachycardia ICD-10-CM: R00.0 ICD-9-CM: 427.89  9/27/2018 - Present Hypertension ICD-10-CM: I10 
ICD-9-CM: 401.9  Unknown - Present Diabetes ([facility] ICD-10-CM: E11.9 ICD-9-CM: 250.00  Unknown - Present Anxiety and depression ICD-10-CM: F41.9, F32.9 ICD-9-CM: 300.00, 311  Unknown - Present Seizure disorder ([facility] ICD-10-CM: G40.909 ICD-9-CM: 345.90  Unknown - Present Vomiting ICD-10-CM: R11.10 ICD-9-CM: 787.03  9/27/2018 - Present Hypokalemia ICD-10-CM: E87.6 ICD-9-CM: 276.8  9/27/2018 - Present Obese ICD-10-CM: E66.9 ICD-9-CM: 278.00  Unknown - Present DM (diabetes mellitus) ([facility] ICD-10-CM: E11.9 ICD-9-CM: 250.00  6/7/2018 - Present * (Principal)Leukocytosis ICD-10-CM: A70.056 ICD-9-CM: 288.60  6/7/2018 - Present RESOLVED: Encephalopathy acute ICD-10-CM: G93.40 ICD-9-CM: 348.30  6/7/2018 - 9/27/2018 RESOLVED: Seizure ([facility] ICD-10-CM: R56.9 ICD-9-CM: 780.39  2018 - 2018 RESOLVED: Aspiration into airway ICD-10-CM: T17.908A ICD-9-CM: 934.9  2018 - 2018 RESOLVED: Renal insufficiency ICD-10-CM: N28.9 ICD-9-CM: 593.9  2018 - 2018 Subjective:  
 78 y.o.  female who presented to the Emergency Department complaining of cough and vomiting. She is a poor historian and no family is present. Cough orsening for days. She reports vomiting every day since ulcers found last month. Sudden vomiting, without persistent nausea. She reports weight loss, but out records do not confirm. ER workup finds SIRS criteria, xray is normal.  No fever. We will admit her for management. :  She reports \"feel much better so far. \"  No cough at this time. No further N/V overnight - last episode N/V was at 10pm last night. She does have \"a bit\" of appetite now. No ab pain. No fevers/chills. Lactic acid normal.  WBC still elevated but down from admission. K+ better. Mag a little low - replacing. Slow IVF. Try clears again. :  No specific complaints. No pain. N/V episode yesterday after eating -  On diabetic diet and so far today tolerated part of breakfast.  Glu low this AM and D5NS started until eating better. Replacing lites. Na+ sl low - watching. Remains on Azithromycin. Review of Systems: A comprehensive review of systems was negative except for that written in the HPI. Objective:  
Physical Exam:  
 
Visit Vitals  /87 (BP 1 Location: Right arm, BP Patient Position: At rest)  Pulse (!) 106  Temp 98.3 °F (36.8 °C)  Resp 18  Ht 5' 6\" (1.676 m)  Wt 99.3 kg (219 lb)  SpO2 98%  BMI 35.35 kg/m2 O2 Device: Room air Temp (24hrs), Av.9 °F (37.2 °C), Min:98.3 °F (36.8 °C), Max:99.5 °F (37.5 °C) 
      1901 -  0700 In: 360 [P.O.:360] Out: - General:  Alert, cooperative, no distress, obese, appears stated age. Head:  Normocephalic, without obvious abnormality, atraumatic. Eyes:  Conjunctivae/corneas clear. EOMs intact. Throat: Lips, mucosa, and tongue moist.. Neck: Supple, symmetrical, trachea midline, no adenopathy, thyroid: no enlargement/tenderness/nodules, no carotid bruit and no JVD. Back:   Symmetric, no curvature. ROM normal. No CVA tenderness. Lungs:   Clear to auscultation bilaterally. Chest wall:  No tenderness or deformity. Heart:  Regular rate and rhythm, S1, S2 normal, no murmur, click, rub or gallop. Abdomen:   Soft, non-tender. Bowel sounds normal. No masses,  No organomegaly. Extremities: no cyanosis or edema. No calf tenderness or cords. Pulses: 2+ and symmetric all extremities. Skin:  turgor normal.   
Neurologic: CNII-XII intact. Alert and oriented X 3. Fine motor of hands and fingers normal.   equal.  No cogwheeling or rigidity. Gait not tested at this time. Sensation grossly normal to touch. Gross motor of extremities normal.    
 
Data Review: CTA Chest/Ab/Pelvis 9/27/18 CT CHEST, ABDOMEN, PELVIS WITH CONTRAST. 9/27/2018 3:35 PM  
INDICATION: Cough, persistent vomiting. COMPARISON: None. FINDINGS: 
Chest: Aside from minimal bibasilar dependent atelectasis, the lungs are clear. The central airways are patent. No pneumothorax or pleural effusion. Incidental 
note is made of a tiny air cyst in the right upper lobe (601-36, 3-17). The 
right and left common carotid arteries arise from a common trunk, a normal 
variant. Aberrant right subclavian artery is another normal variant. The heart 
size is normal. Left anterior descending coronary calcifications are mild. No 
thoracic lymphadenopathy. Abdomen: The esophagus, stomach, duodenum, liver, gallbladder, pancreas, spleen, 
adrenals, and kidneys are normal. Incidental note is made of endoscopy clips along the greater curvature in the gastric body. A portacaval lymph node is 
enlarged, measuring 15 mm in short axis on image 2-55. However, there are no 
other enlarged abdominopelvic lymph nodes, and this in isolation is of 
questionable significance. Pelvis: Diverticulosis is extensive throughout the colon. Incidental note is 
made of calcified, degenerated uterine fibroids. The small bowel, ileocecal 
junction, appendix, and bladder are normal. No free air or fluid, and no 
abdominopelvic lymphadenopathy. IMPRESSION: 
No acute process in the chest, abdomen, and pelvis. Recent Days: 
Recent Labs  
   09/29/18 
 0341  09/28/18 
 0024  09/27/18 
 1426 WBC  15.5*  15.0*  21.1* HGB  8.9*  8.4*  9.3* HCT  26.5*  24.3*  27.0*  
PLT  235  244  304 Recent Labs  
   09/29/18 
 0341  09/28/18 
 0024  09/27/18 
 1426 NA  129*  134*  134* K  4.0  3.4*  2.7*  
CL  95*  98  96* CO2  20*  23  25 GLU  108*  104*  142* BUN  7  9  9 CREA  0.82  0.78  0.99 CA  9.0  8.7  9.1 MG  1.5*  1.5*  1.6 PHOS   --   2.1*   --   
ALB  2.9*  2.7*  3.1* TBILI  1.5*  1.5*  1.5* SGOT  17  15  18 ALT  14  11*  13 No results for input(s): PH, PCO2, PO2, HCO3, FIO2 in the last 72 hours. 24 Hour Results: 
Recent Results (from the past 24 hour(s)) GLUCOSE, POC Collection Time: 09/28/18  8:06 AM  
Result Value Ref Range Glucose (POC) 90 65 - 100 mg/dL Performed by Wunsch-Brautkleid (PCT) GLUCOSE, POC Collection Time: 09/28/18 12:20 PM  
Result Value Ref Range Glucose (POC) 82 65 - 100 mg/dL Performed by [employer] (PCT) GLUCOSE, POC Collection Time: 09/28/18  4:09 PM  
Result Value Ref Range Glucose (POC) 71 65 - 100 mg/dL Performed by [employer] (PCT) GLUCOSE, POC Collection Time: 09/28/18  9:27 PM  
Result Value Ref Range Glucose (POC) 77 65 - 100 mg/dL Performed by Manjeet Barnhart GLUCOSE, POC  Collection Time: 09/28/18  9:38 PM  
 Result Value Ref Range Glucose (POC) 68 65 - 100 mg/dL Performed by [employer] GLUCOSE, POC Collection Time: 09/28/18  9:53 PM  
Result Value Ref Range Glucose (POC) 64 (L) 65 - 100 mg/dL Performed by [employer]and[employer] GLUCOSE, POC Collection Time: 09/28/18 10:08 PM  
Result Value Ref Range Glucose (POC) 105 (H) 65 - 100 mg/dL Performed by Aldera (PCT) GLUCOSE, POC Collection Time: 09/29/18  1:53 AM  
Result Value Ref Range Glucose (POC) 72 65 - 100 mg/dL Performed by [employer] GLUCOSE, POC Collection Time: 09/29/18  2:13 AM  
Result Value Ref Range Glucose (POC) 92 65 - 100 mg/dL Performed by Jia Ghosh MAGNESIUM Collection Time: 09/29/18  3:41 AM  
Result Value Ref Range Magnesium 1.5 (L) 1.6 - 2.4 mg/dL METABOLIC PANEL, COMPREHENSIVE Collection Time: 09/29/18  3:41 AM  
Result Value Ref Range Sodium 129 (L) 136 - 145 mmol/L Potassium 4.0 3.5 - 5.1 mmol/L Chloride 95 (L) 97 - 108 mmol/L  
 CO2 20 (L) 21 - 32 mmol/L Anion gap 14 5 - 15 mmol/L Glucose 108 (H) 65 - 100 mg/dL BUN 7 6 - 20 MG/DL Creatinine 0.82 0.55 - 1.02 MG/DL  
 BUN/Creatinine ratio 9 (L) 12 - 20 GFR est AA >60 >60 ml/min/1.73m2 GFR est non-AA >60 >60 ml/min/1.73m2 Calcium 9.0 8.5 - 10.1 MG/DL Bilirubin, total 1.5 (H) 0.2 - 1.0 MG/DL  
 ALT (SGPT) 14 12 - 78 U/L  
 AST (SGOT) 17 15 - 37 U/L Alk. phosphatase 75 45 - 117 U/L Protein, total 7.7 6.4 - 8.2 g/dL Albumin 2.9 (L) 3.5 - 5.0 g/dL Globulin 4.8 (H) 2.0 - 4.0 g/dL A-G Ratio 0.6 (L) 1.1 - 2.2    
CBC WITH AUTOMATED DIFF Collection Time: 09/29/18  3:41 AM  
Result Value Ref Range WBC 15.5 (H) 3.6 - 11.0 K/uL  
 RBC 2.99 (L) 3.80 - 5.20 M/uL HGB 8.9 (L) 11.5 - 16.0 g/dL HCT 26.5 (L) 35.0 - 47.0 % MCV 88.6 80.0 - 99.0 FL  
 MCH 29.8 26.0 - 34.0 PG  
 MCHC 33.6 30.0 - 36.5 g/dL  
 RDW 12.8 11.5 - 14.5 % PLATELET 317 945 - 563 K/uL MPV 9.6 8.9 - 12.9 FL  
 NRBC 0.0 0  WBC ABSOLUTE NRBC 0.00 0.00 - 0.01 K/uL NEUTROPHILS 95 (H) 32 - 75 % LYMPHOCYTES 4 (L) 12 - 49 % MONOCYTES 0 (L) 5 - 13 % EOSINOPHILS 0 0 - 7 % BASOPHILS 0 0 - 1 % METAMYELOCYTES 1 (H) 0 % IMMATURE GRANULOCYTES 0 %  
 ABS. NEUTROPHILS 14.7 (H) 1.8 - 8.0 K/UL  
 ABS. LYMPHOCYTES 0.6 (L) 0.8 - 3.5 K/UL  
 ABS. MONOCYTES 0.0 0.0 - 1.0 K/UL  
 ABS. EOSINOPHILS 0.0 0.0 - 0.4 K/UL  
 ABS. BASOPHILS 0.0 0.0 - 0.1 K/UL  
 ABS. IMM. GRANS. 0.0 K/UL  
 DF MANUAL    
 RBC COMMENTS NORMOCYTIC, NORMOCHROMIC    
SAMPLES BEING HELD Collection Time: 09/29/18  3:41 AM  
Result Value Ref Range SAMPLES BEING HELD 1PST COMMENT Add-on orders for these samples will be processed based on acceptable specimen integrity and analyte stability, which may vary by analyte. GLUCOSE, POC Collection Time: 09/29/18  6:42 AM  
Result Value Ref Range Glucose (POC) 125 (H) 65 - 100 mg/dL Performed by Ruby Sanchez (PCT) Medications reviewed Current Facility-Administered Medications Medication Dose Route Frequency  dextrose 5 % - 0.45% NaCl infusion  100 mL/hr IntraVENous CONTINUOUS  
 magnesium oxide (MAG-OX) tablet 400 mg  400 mg Oral BID  amLODIPine (NORVASC) tablet 10 mg  10 mg Oral DAILY  aspirin delayed-release tablet 81 mg  81 mg Oral DAILY  atorvastatin (LIPITOR) tablet 20 mg  20 mg Oral DAILY  levETIRAcetam (KEPPRA) tablet 500 mg  500 mg Oral BID  pantoprazole (PROTONIX) tablet 40 mg  40 mg Oral ACB  SITagliptin (JANUVIA) tablet tab 100 mg  100 mg Oral DAILY  sertraline (ZOLOFT) tablet 100 mg  100 mg Oral DAILY  glucose chewable tablet 16 g  4 Tab Oral PRN  
 dextrose (D50W) injection syrg 12.5-25 g  25-50 mL IntraVENous PRN  
 glucagon (GLUCAGEN) injection 1 mg  1 mg IntraMUSCular PRN  
 acetaminophen (TYLENOL) tablet 650 mg  650 mg Oral Q4H PRN  
  diphenhydrAMINE (BENADRYL) capsule 25 mg  25 mg Oral Q4H PRN  
 ondansetron (ZOFRAN) injection 4 mg  4 mg IntraVENous Q4H PRN  
 enoxaparin (LOVENOX) injection 40 mg  40 mg SubCUTAneous Q24H  
 insulin lispro (HUMALOG) injection   SubCUTAneous AC&HS  
 guaiFENesin ER (MUCINEX) tablet 600 mg  600 mg Oral Q12H  
 azithromycin (ZITHROMAX) 500 mg in 0.9% sodium chloride (MBP/ADV) 250 mL  500 mg IntraVENous Q24H Care Plan discussed with: Patient/Family and Nurse Total time spent with patient: 30 minutes.  
 
Ag Ramesh MD

## 2018-09-29 NOTE — PROGRESS NOTES
[address] Jovanni Schmitt M.D. 
[phone] GI PROGRESS NOTE 
 
 
 
NAME: Gemma Osler :  1950 MRN:  [id number] Subjective:  
Had 1 episode of vomiting overnight. This morning tolerated eggs and muffin well and kept it down Offers no pain to me this morning Objective: VITALS:  
Last 24hrs VS reviewed since prior progress note. Most recent are: 
Visit Vitals  /87 (BP 1 Location: Right arm, BP Patient Position: At rest)  Pulse (!) 106  Temp 98.3 °F (36.8 °C)  Resp 18  Ht 5' 6\" (1.676 m)  Wt 99.3 kg (219 lb)  SpO2 98%  BMI 35.35 kg/m2 Intake/Output Summary (Last 24 hours) at 18 0509 Last data filed at 18 3065 Gross per 24 hour Intake              360 ml Output                0 ml Net              360 ml PHYSICAL EXAM: 
General: Alert, in no acute distress HEENT: Anicteric sclerae. Lungs:            CTA Bilaterally. Heart:  Regular  rhythm, Abdomen: Soft, Non distended, Non tender.  (+)Bowel sounds, no HSM Extremities: No c/c/e Neurologic:  CN 2-12 gi, Alert and oriented X 3. No acute neurological distress Psych:   Good insight. Not anxious nor agitated. Lab Data Reviewed:  
Recent Labs  
   18 
 03418 
 0024 WBC  15.5*  15.0* HGB  8.9*  8.4* HCT  26.5*  24.3*  
PLT  235  244 Recent Labs  
   18 
 0341  18 
 0024 NA  129*  134* K  4.0  3.4*  
CL  95*  98  
CO2  20*  23 BUN  7  9 CREA  0.82  0.78 GLU  108*  104* PHOS   --   2.1*  
CA  9.0  8.7 Recent Labs  
   18 
 03418 
 0024  18 
 1426 SGOT  17  15  18 AP  75  63  72 TP  7.7  7.4  8.4* ALB  2.9*  2.7*  3.1*  
GLOB  4.8*  4.7*  5.3*  
LPSE   --    --   78  
 
 
________________________________________________________________________ Patient Active Problem List  
Diagnosis Code  DM (diabetes mellitus) ([facility] E11.9  Leukocytosis D72.829  
  SIRS (systemic inflammatory response syndrome) (HCC) R65.10  Sinus tachycardia R00.0  Hypertension I10  
 Diabetes ([facility] E11.9  Anxiety and depression F41.9, F32.9  Seizure disorder ([facility] G40.909  Vomiting R11.10  Hypokalemia E87.6  Obese E66.9 Assessment and Plan: 
Vomiting - likely gastroparesis 
 - continue supportive care. She is tolerating oral intake well. - continue good blood sugar control 
 - correct electrolytes - I am hoping she continues to do well and may be able to go home tomorrow. Will follow with you Signed By: Argentina Gray MD   
 9/29/2018  9:38 AM

## 2018-09-30 VITALS
DIASTOLIC BLOOD PRESSURE: 75 MMHG | HEIGHT: 66 IN | OXYGEN SATURATION: 93 % | TEMPERATURE: 99.1 F | SYSTOLIC BLOOD PRESSURE: 132 MMHG | BODY MASS INDEX: 35.2 KG/M2 | HEART RATE: 92 BPM | WEIGHT: 219 LBS | RESPIRATION RATE: 16 BRPM

## 2018-09-30 LAB
ALBUMIN SERPL-MCNC: 2.6 G/DL (ref 3.5–5)
ALBUMIN/GLOB SERPL: 0.6 {RATIO} (ref 1.1–2.2)
ALP SERPL-CCNC: 60 U/L (ref 45–117)
ALT SERPL-CCNC: 15 U/L (ref 12–78)
ANION GAP SERPL CALC-SCNC: 10 MMOL/L (ref 5–15)
AST SERPL-CCNC: 15 U/L (ref 15–37)
BASOPHILS # BLD: 0 K/UL (ref 0–0.1)
BASOPHILS NFR BLD: 0 % (ref 0–1)
BILIRUB SERPL-MCNC: 0.9 MG/DL (ref 0.2–1)
BUN SERPL-MCNC: 7 MG/DL (ref 6–20)
BUN/CREAT SERPL: 11 (ref 12–20)
CALCIUM SERPL-MCNC: 8.7 MG/DL (ref 8.5–10.1)
CHLORIDE SERPL-SCNC: 98 MMOL/L (ref 97–108)
CO2 SERPL-SCNC: 24 MMOL/L (ref 21–32)
CREAT SERPL-MCNC: 0.65 MG/DL (ref 0.55–1.02)
DIFFERENTIAL METHOD BLD: ABNORMAL
EOSINOPHIL # BLD: 0.1 K/UL (ref 0–0.4)
EOSINOPHIL NFR BLD: 1 % (ref 0–7)
ERYTHROCYTE [DISTWIDTH] IN BLOOD BY AUTOMATED COUNT: 13 % (ref 11.5–14.5)
GLOBULIN SER CALC-MCNC: 4.6 G/DL (ref 2–4)
GLUCOSE BLD STRIP.AUTO-MCNC: 109 MG/DL (ref 65–100)
GLUCOSE BLD STRIP.AUTO-MCNC: 125 MG/DL (ref 65–100)
GLUCOSE SERPL-MCNC: 102 MG/DL (ref 65–100)
HCT VFR BLD AUTO: 24.7 % (ref 35–47)
HGB BLD-MCNC: 8.4 G/DL (ref 11.5–16)
IMM GRANULOCYTES # BLD: 0.1 K/UL (ref 0–0.04)
IMM GRANULOCYTES NFR BLD AUTO: 1 % (ref 0–0.5)
LYMPHOCYTES # BLD: 1.5 K/UL (ref 0.8–3.5)
LYMPHOCYTES NFR BLD: 14 % (ref 12–49)
MAGNESIUM SERPL-MCNC: 1.9 MG/DL (ref 1.6–2.4)
MCH RBC QN AUTO: 29.9 PG (ref 26–34)
MCHC RBC AUTO-ENTMCNC: 34 G/DL (ref 30–36.5)
MCV RBC AUTO: 87.9 FL (ref 80–99)
MONOCYTES # BLD: 0.8 K/UL (ref 0–1)
MONOCYTES NFR BLD: 7 % (ref 5–13)
NEUTS SEG # BLD: 8.6 K/UL (ref 1.8–8)
NEUTS SEG NFR BLD: 78 % (ref 32–75)
NRBC # BLD: 0 K/UL (ref 0–0.01)
NRBC BLD-RTO: 0 PER 100 WBC
PLATELET # BLD AUTO: 216 K/UL (ref 150–400)
PMV BLD AUTO: 10 FL (ref 8.9–12.9)
POTASSIUM SERPL-SCNC: 3.1 MMOL/L (ref 3.5–5.1)
PROT SERPL-MCNC: 7.2 G/DL (ref 6.4–8.2)
RBC # BLD AUTO: 2.81 M/UL (ref 3.8–5.2)
SERVICE CMNT-IMP: ABNORMAL
SERVICE CMNT-IMP: ABNORMAL
SODIUM SERPL-SCNC: 132 MMOL/L (ref 136–145)
WBC # BLD AUTO: 11.1 K/UL (ref 3.6–11)

## 2018-09-30 PROCEDURE — 99218 HC RM OBSERVATION: CPT

## 2018-09-30 PROCEDURE — 96361 HYDRATE IV INFUSION ADD-ON: CPT

## 2018-09-30 PROCEDURE — 74011000258 HC RX REV CODE- 258: Performed by: FAMILY MEDICINE

## 2018-09-30 PROCEDURE — 83735 ASSAY OF MAGNESIUM: CPT | Performed by: FAMILY MEDICINE

## 2018-09-30 PROCEDURE — 80053 COMPREHEN METABOLIC PANEL: CPT | Performed by: FAMILY MEDICINE

## 2018-09-30 PROCEDURE — 74011250637 HC RX REV CODE- 250/637: Performed by: INTERNAL MEDICINE

## 2018-09-30 PROCEDURE — 74011250637 HC RX REV CODE- 250/637: Performed by: FAMILY MEDICINE

## 2018-09-30 PROCEDURE — 94760 N-INVAS EAR/PLS OXIMETRY 1: CPT

## 2018-09-30 PROCEDURE — 82962 GLUCOSE BLOOD TEST: CPT

## 2018-09-30 PROCEDURE — 85025 COMPLETE CBC W/AUTO DIFF WBC: CPT | Performed by: FAMILY MEDICINE

## 2018-09-30 PROCEDURE — 36415 COLL VENOUS BLD VENIPUNCTURE: CPT | Performed by: FAMILY MEDICINE

## 2018-09-30 RX ORDER — LANOLIN ALCOHOL/MO/W.PET/CERES
400 CREAM (GRAM) TOPICAL DAILY
Qty: 30 TAB | Refills: 0 | Status: ON HOLD | OUTPATIENT
Start: 2018-09-30 | End: 2020-10-24

## 2018-09-30 RX ORDER — AZITHROMYCIN 250 MG/1
250 TABLET, FILM COATED ORAL DAILY
Qty: 3 TAB | Refills: 0 | Status: SHIPPED | OUTPATIENT
Start: 2018-09-30 | End: 2018-10-03

## 2018-09-30 RX ORDER — POTASSIUM CHLORIDE 750 MG/1
10 TABLET, EXTENDED RELEASE ORAL DAILY
Qty: 30 TAB | Refills: 0 | Status: ON HOLD | OUTPATIENT
Start: 2018-09-30 | End: 2020-10-24

## 2018-09-30 RX ADMIN — SITAGLIPTIN 100 MG: 25 TABLET, FILM COATED ORAL at 08:43

## 2018-09-30 RX ADMIN — Medication 400 MG: at 08:44

## 2018-09-30 RX ADMIN — GUAIFENESIN 600 MG: 600 TABLET, EXTENDED RELEASE ORAL at 08:43

## 2018-09-30 RX ADMIN — ATORVASTATIN CALCIUM 20 MG: 10 TABLET, FILM COATED ORAL at 08:43

## 2018-09-30 RX ADMIN — DEXTROSE MONOHYDRATE AND SODIUM CHLORIDE 100 ML/HR: 5; .45 INJECTION, SOLUTION INTRAVENOUS at 00:44

## 2018-09-30 RX ADMIN — ASPIRIN 81 MG: 81 TABLET, COATED ORAL at 08:43

## 2018-09-30 RX ADMIN — AMLODIPINE BESYLATE 10 MG: 5 TABLET ORAL at 08:44

## 2018-09-30 RX ADMIN — SERTRALINE HYDROCHLORIDE 100 MG: 50 TABLET ORAL at 08:43

## 2018-09-30 RX ADMIN — PANTOPRAZOLE SODIUM 40 MG: 40 TABLET, DELAYED RELEASE ORAL at 05:32

## 2018-09-30 RX ADMIN — LEVETIRACETAM 500 MG: 250 TABLET ORAL at 08:44

## 2018-09-30 NOTE — PROGRESS NOTES
Bedside and Verbal shift change report given to ASHLI Adler (oncoming nurse) by VIKI Buchanan RN (offgoing nurse). Report included the following information SBAR, Kardex, Intake/Output, MAR, Recent Results and Quality Measures.

## 2018-09-30 NOTE — PROGRESS NOTES
Daily Progress Note and Discharge Note: 9/30/2018 Wojciech Daly MD 
 
Assessment/Plan:  
Leukocytosis / SIRS (systemic inflammatory response syndrome) / Sinus tachycardia - Unclear etiology. started azithromycin empirically -  cont outpt x 3 dats. Viral resp panel neg 
  
Hypokalemia / Hyponatremia/ hypomag - POA, likely related to GI loss. Replace and follow outpt. 
  
Vomiting - POA, unclear etiology. CT Abd/Pelv was unremarkable.   
  
DM (diabetes mellitus) - Diabetic diet and counseling. SSI per protocol. Continue home sitagliptan. A1c 7.3 9/27/18. 
  
Hypertension - Stable. Continue lisinopril, amlodipine and ASA 
  
Anxiety and depression - Continue buspirone and sertraline.   
  
Seizure disorder - Continue keppra 
  
Hyperlipidemia - Continue atorvastatin. Problem List: 
Problem List as of 9/30/2018  Date Reviewed: 9/27/2018 Codes Class Noted - Resolved SIRS (systemic inflammatory response syndrome) (HCC) ICD-10-CM: R65.10 ICD-9-CM: 995.90  9/27/2018 - Present Sinus tachycardia ICD-10-CM: R00.0 ICD-9-CM: 427.89  9/27/2018 - Present Hypertension ICD-10-CM: I10 
ICD-9-CM: 401.9  Unknown - Present Diabetes ([facility] ICD-10-CM: E11.9 ICD-9-CM: 250.00  Unknown - Present Anxiety and depression ICD-10-CM: F41.9, F32.9 ICD-9-CM: 300.00, 311  Unknown - Present Seizure disorder ([facility] ICD-10-CM: G40.909 ICD-9-CM: 345.90  Unknown - Present Vomiting ICD-10-CM: R11.10 ICD-9-CM: 787.03  9/27/2018 - Present Hypokalemia ICD-10-CM: E87.6 ICD-9-CM: 276.8  9/27/2018 - Present Obese ICD-10-CM: E66.9 ICD-9-CM: 278.00  Unknown - Present DM (diabetes mellitus) ([facility] ICD-10-CM: E11.9 ICD-9-CM: 250.00  6/7/2018 - Present * (Principal)Leukocytosis ICD-10-CM: T95.059 ICD-9-CM: 288.60  6/7/2018 - Present RESOLVED: Encephalopathy acute ICD-10-CM: G93.40 ICD-9-CM: 348.30  6/7/2018 - 9/27/2018 RESOLVED: Seizure ([facility] ICD-10-CM: R56.9 ICD-9-CM: 780.39  6/7/2018 - 9/27/2018 RESOLVED: Aspiration into airway ICD-10-CM: T17.908A ICD-9-CM: 934.9  6/7/2018 - 9/27/2018 RESOLVED: Renal insufficiency ICD-10-CM: N28.9 ICD-9-CM: 593.9  6/7/2018 - 9/27/2018 Subjective:  
 78 y.o.  female who presented to the Emergency Department complaining of cough and vomiting. She is a poor historian and no family is present. Cough orsening for days. She reports vomiting every day since ulcers found last month. Sudden vomiting, without persistent nausea. She reports weight loss, but out records do not confirm. ER workup finds SIRS criteria, xray is normal.  No fever. We will admit her for management. 9/28:  She reports \"feel much better so far. \"  No cough at this time. No further N/V overnight - last episode N/V was at 10pm last night. She does have \"a bit\" of appetite now. No ab pain. No fevers/chills. Lactic acid normal.  WBC still elevated but down from admission. K+ better. Mag a little low - replacing. Slow IVF. Try clears again. 9/29:  No specific complaints. No pain. N/V episode yesterday after eating -  On diabetic diet and so far today tolerated part of breakfast.  Glu low this AM and D5NS started until eating better. Replacing lites. Na+ sl low - watching. Remains on Azithromycin. 9/30:  She is feeling much better, has tolerated diet and wants to go home. No further hypoglycemia. Na+ better. K+ sl low - KCL rx given. Instructed to follow up with Dr Hendrix Last later this wk for recheck of K+. Review of Systems: A comprehensive review of systems was negative except for that written in the HPI. Objective:  
Physical Exam:  
 
Visit Vitals  /76 (BP 1 Location: Right arm, BP Patient Position: At rest)  Pulse 84  Temp 98.5 °F (36.9 °C)  Resp 16  
 Ht 5' 6\" (1.676 m)  Wt 99.3 kg (219 lb)  SpO2 99%  BMI 35.35 kg/m2 O2 Device: Room air Temp (24hrs), Av.1 °F (36.7 °C), Min:97.8 °F (36.6 °C), Max:98.5 °F (36.9 °C) 
      1901 -  0700 In: 1648.3 [P.O.:500; I.V.:1148.3] Out: 0128 [[id number]] General:  Alert, cooperative, no distress, obese, appears stated age. Head:  Normocephalic, without obvious abnormality, atraumatic. Eyes:  Conjunctivae/corneas clear. EOMs intact. Throat: Lips, mucosa, and tongue moist.. Neck: Supple, symmetrical, trachea midline, no adenopathy, thyroid: no enlargement/tenderness/nodules, no carotid bruit and no JVD. Back:   Symmetric, no curvature. ROM normal. No CVA tenderness. Lungs:   Clear to auscultation bilaterally. Chest wall:  No tenderness or deformity. Heart:  Regular rate and rhythm, S1, S2 normal, no murmur, click, rub or gallop. Abdomen:   Soft, non-tender. Bowel sounds normal. No masses,  No organomegaly. Extremities: no cyanosis or edema. No calf tenderness or cords. Pulses: 2+ and symmetric all extremities. Skin:  turgor normal.   
Neurologic: CNII-XII intact. Alert and oriented X 3. Fine motor of hands and fingers normal.   equal.  No cogwheeling or rigidity. Gait not tested at this time. Sensation grossly normal to touch. Gross motor of extremities normal.    
 
Data Review: CTA Chest/Ab/Pelvis 18 CT CHEST, ABDOMEN, PELVIS WITH CONTRAST. 2018 3:35 PM  
INDICATION: Cough, persistent vomiting. COMPARISON: None. FINDINGS: 
Chest: Aside from minimal bibasilar dependent atelectasis, the lungs are clear. The central airways are patent. No pneumothorax or pleural effusion. Incidental 
note is made of a tiny air cyst in the right upper lobe (601-36, 3-17). The 
right and left common carotid arteries arise from a common trunk, a normal 
variant. Aberrant right subclavian artery is another normal variant. The heart 
size is normal. Left anterior descending coronary calcifications are mild.  No 
 thoracic lymphadenopathy. Abdomen: The esophagus, stomach, duodenum, liver, gallbladder, pancreas, spleen, 
adrenals, and kidneys are normal. Incidental note is made of endoscopy clips 
along the greater curvature in the gastric body. A portacaval lymph node is 
enlarged, measuring 15 mm in short axis on image 2-55. However, there are no 
other enlarged abdominopelvic lymph nodes, and this in isolation is of 
questionable significance. Pelvis: Diverticulosis is extensive throughout the colon. Incidental note is 
made of calcified, degenerated uterine fibroids. The small bowel, ileocecal 
junction, appendix, and bladder are normal. No free air or fluid, and no 
abdominopelvic lymphadenopathy. IMPRESSION: 
No acute process in the chest, abdomen, and pelvis. Lab Results Component Value Date/Time Hemoglobin A1c 7.3 (H) 09/27/2018 02:26 PM  
 
Recent Days: 
Recent Labs  
   09/30/18 
 0540  09/29/18 
 0341  09/28/18 
 0024 WBC  11.1*  15.5*  15.0* HGB  8.4*  8.9*  8.4* HCT  24.7*  26.5*  24.3*  
PLT  216  235  244 Recent Labs  
   09/30/18 
 0540  09/29/18 
 0341  09/28/18 
 0024 NA  132*  129*  134* K  3.1*  4.0  3.4*  
CL  98  95*  98  
CO2  24  20*  23 GLU  102*  108*  104* BUN  7  7  9 CREA  0.65  0.82  0.78  
CA  8.7  9.0  8.7 MG  1.9  1.5*  1.5* PHOS   --    --   2.1* ALB  2.6*  2.9*  2.7* TBILI  0.9  1.5*  1.5* SGOT  15  17  15 ALT  15  14  11* No results for input(s): PH, PCO2, PO2, HCO3, FIO2 in the last 72 hours. 24 Hour Results: 
Recent Results (from the past 24 hour(s)) GLUCOSE, POC Collection Time: 09/29/18 12:14 PM  
Result Value Ref Range Glucose (POC) 140 (H) 65 - 100 mg/dL Performed by Reno Mejia  (PCT) SAMPLES BEING HELD Collection Time: 09/29/18  4:20 PM  
Result Value Ref Range SAMPLES BEING HELD [address] [place] [address] URINE   
 COMMENT   Add-on orders for these samples will be processed based on acceptable specimen integrity and analyte stability, which may vary by analyte. GLUCOSE, POC Collection Time: 09/29/18  4:22 PM  
Result Value Ref Range Glucose (POC) 136 (H) 65 - 100 mg/dL Performed by Kadie De Anda (PCT) GLUCOSE, POC Collection Time: 09/29/18  9:26 PM  
Result Value Ref Range Glucose (POC) 111 (H) 65 - 100 mg/dL Performed by Maite Em (PCT) MAGNESIUM Collection Time: 09/30/18  5:40 AM  
Result Value Ref Range Magnesium 1.9 1.6 - 2.4 mg/dL METABOLIC PANEL, COMPREHENSIVE Collection Time: 09/30/18  5:40 AM  
Result Value Ref Range Sodium 132 (L) 136 - 145 mmol/L Potassium 3.1 (L) 3.5 - 5.1 mmol/L Chloride 98 97 - 108 mmol/L  
 CO2 24 21 - 32 mmol/L Anion gap 10 5 - 15 mmol/L Glucose 102 (H) 65 - 100 mg/dL BUN 7 6 - 20 MG/DL Creatinine 0.65 0.55 - 1.02 MG/DL  
 BUN/Creatinine ratio 11 (L) 12 - 20 GFR est AA >60 >60 ml/min/1.73m2 GFR est non-AA >60 >60 ml/min/1.73m2 Calcium 8.7 8.5 - 10.1 MG/DL Bilirubin, total 0.9 0.2 - 1.0 MG/DL  
 ALT (SGPT) 15 12 - 78 U/L  
 AST (SGOT) 15 15 - 37 U/L Alk. phosphatase 60 45 - 117 U/L Protein, total 7.2 6.4 - 8.2 g/dL Albumin 2.6 (L) 3.5 - 5.0 g/dL Globulin 4.6 (H) 2.0 - 4.0 g/dL A-G Ratio 0.6 (L) 1.1 - 2.2    
CBC WITH AUTOMATED DIFF Collection Time: 09/30/18  5:40 AM  
Result Value Ref Range WBC 11.1 (H) 3.6 - 11.0 K/uL  
 RBC 2.81 (L) 3.80 - 5.20 M/uL HGB 8.4 (L) 11.5 - 16.0 g/dL HCT 24.7 (L) 35.0 - 47.0 % MCV 87.9 80.0 - 99.0 FL  
 MCH 29.9 26.0 - 34.0 PG  
 MCHC 34.0 30.0 - 36.5 g/dL  
 RDW 13.0 11.5 - 14.5 % PLATELET 679 994 - 416 K/uL MPV 10.0 8.9 - 12.9 FL  
 NRBC 0.0 0  WBC ABSOLUTE NRBC 0.00 0.00 - 0.01 K/uL NEUTROPHILS 78 (H) 32 - 75 % LYMPHOCYTES 14 12 - 49 % MONOCYTES 7 5 - 13 % EOSINOPHILS 1 0 - 7 % BASOPHILS 0 0 - 1 % IMMATURE GRANULOCYTES 1 (H) 0.0 - 0.5 % ABS. NEUTROPHILS 8.6 (H) 1.8 - 8.0 K/UL ABS. LYMPHOCYTES 1.5 0.8 - 3.5 K/UL  
 ABS. MONOCYTES 0.8 0.0 - 1.0 K/UL  
 ABS. EOSINOPHILS 0.1 0.0 - 0.4 K/UL  
 ABS. BASOPHILS 0.0 0.0 - 0.1 K/UL  
 ABS. IMM. GRANS. 0.1 (H) 0.00 - 0.04 K/UL  
 DF AUTOMATED    
GLUCOSE, POC Collection Time: 09/30/18  6:56 AM  
Result Value Ref Range Glucose (POC) 109 (H) 65 - 100 mg/dL Performed by Fatemeh Fernandez (PCT) Medications reviewed Current Facility-Administered Medications Medication Dose Route Frequency  dextrose 5 % - 0.45% NaCl infusion  100 mL/hr IntraVENous CONTINUOUS  
 magnesium oxide (MAG-OX) tablet 400 mg  400 mg Oral BID  amLODIPine (NORVASC) tablet 10 mg  10 mg Oral DAILY  aspirin delayed-release tablet 81 mg  81 mg Oral DAILY  atorvastatin (LIPITOR) tablet 20 mg  20 mg Oral DAILY  levETIRAcetam (KEPPRA) tablet 500 mg  500 mg Oral BID  pantoprazole (PROTONIX) tablet 40 mg  40 mg Oral ACB  SITagliptin (JANUVIA) tablet tab 100 mg  100 mg Oral DAILY  sertraline (ZOLOFT) tablet 100 mg  100 mg Oral DAILY  glucose chewable tablet 16 g  4 Tab Oral PRN  
 dextrose (D50W) injection syrg 12.5-25 g  25-50 mL IntraVENous PRN  
 glucagon (GLUCAGEN) injection 1 mg  1 mg IntraMUSCular PRN  
 acetaminophen (TYLENOL) tablet 650 mg  650 mg Oral Q4H PRN  
 diphenhydrAMINE (BENADRYL) capsule 25 mg  25 mg Oral Q4H PRN  
 ondansetron (ZOFRAN) injection 4 mg  4 mg IntraVENous Q4H PRN  
 enoxaparin (LOVENOX) injection 40 mg  40 mg SubCUTAneous Q24H  
 insulin lispro (HUMALOG) injection   SubCUTAneous AC&HS  
 guaiFENesin ER (MUCINEX) tablet 600 mg  600 mg Oral Q12H  
 azithromycin (ZITHROMAX) 500 mg in 0.9% sodium chloride (MBP/ADV) 250 mL  500 mg IntraVENous Q24H Care Plan discussed with: Patient and Nurse Total time spent with patient: 30 minutes.  
 
Jeannine Decker MD

## 2018-09-30 NOTE — DISCHARGE INSTRUCTIONS
Patient Discharge Instructions    Abbey Partida / [id number] : 1950    Admitted 2018 Discharged: 2018 8:16 AM     ACUTE DIAGNOSES:  Leukocytosis/Hypomag/hypokalemia/N/V    CHRONIC MEDICAL DIAGNOSES:  Problem List as of 2018  Date Reviewed: 2018          Codes Class Noted - Resolved    SIRS (systemic inflammatory response syndrome) ([facility] ICD-10-CM: R65.10  ICD-9-CM: 995.90  2018 - Present        Sinus tachycardia ICD-10-CM: R00.0  ICD-9-CM: 427.89  2018 - Present        Hypertension ICD-10-CM: I10  ICD-9-CM: 401.9  Unknown - Present        Diabetes ([facility] ICD-10-CM: E11.9  ICD-9-CM: 250.00  Unknown - Present        Anxiety and depression ICD-10-CM: F41.9, F32.9  ICD-9-CM: 300.00, 311  Unknown - Present        Seizure disorder ([facility] ICD-10-CM: G40.909  ICD-9-CM: 345.90  Unknown - Present        Vomiting ICD-10-CM: R11.10  ICD-9-CM: 787.03  2018 - Present        Hypokalemia ICD-10-CM: E87.6  ICD-9-CM: 276.8  2018 - Present        Obese ICD-10-CM: E66.9  ICD-9-CM: 278.00  Unknown - Present        DM (diabetes mellitus) ([facility] ICD-10-CM: E11.9  ICD-9-CM: 250.00  2018 - Present        * (Principal)Leukocytosis ICD-10-CM: Y63.238  ICD-9-CM: 288.60  2018 - Present        RESOLVED: Encephalopathy acute ICD-10-CM: G93.40  ICD-9-CM: 348.30  2018 - 2018        RESOLVED: Seizure ([facility] ICD-10-CM: R56.9  ICD-9-CM: 780.39  2018 - 2018        RESOLVED: Aspiration into airway ICD-10-CM: Q56.333N  ICD-9-CM: 934.9  2018 - 2018        RESOLVED: Renal insufficiency ICD-10-CM: N28.9  ICD-9-CM: 593.9  2018 - 2018              DISCHARGE MEDICATIONS:         · It is important that you take the medication exactly as they are prescribed. · Keep your medication in the bottles provided by the pharmacist and keep a list of the medication names, dosages, and times to be taken in your wallet. · Do not take other medications without consulting your doctor. DIET:  Diabetic Diet    ACTIVITY: Activity as tolerated    ADDITIONAL INFORMATION: If you experience any of the following symptoms then please call your primary care physician or return to the emergency room if you cannot get hold of your doctor: Fever, chills, nausea, vomiting, diarrhea, change in mentation, falling, bleeding, shortness of breath. FOLLOW UP CARE:  Dr. Suhas Hamilton MD  you are to call and set up an appointment to see them with in 1 week. Follow-up with specialists at directed by them      Information obtained by :  I understand that if any problems occur once I am at home I am to contact my physician. I understand and acknowledge receipt of the instructions indicated above.                                                                                                                                            Physician's or R.N.'s Signature                                                                  Date/Time                                                                                                                                              Patient or Representative Signature                                                          Date/Time

## 2018-09-30 NOTE — PROGRESS NOTES
[address] Eliazar Kebede M.D. 
[phone] GI PROGRESS NOTE 
 
 
 
NAME: Jose Alfaro :  1950 MRN:  [id number] Subjective:  
Doing well and tolerating oral intake well Denies any further vomiting or abdominal pain Objective: VITALS:  
Last 24hrs VS reviewed since prior progress note. Most recent are: 
Visit Vitals  /76 (BP 1 Location: Right arm, BP Patient Position: At rest)  Pulse 84  Temp 98.5 °F (36.9 °C)  Resp 16  
 Ht 5' 6\" (1.676 m)  Wt 99.3 kg (219 lb)  SpO2 99%  BMI 35.35 kg/m2 Intake/Output Summary (Last 24 hours) at 18 1085 Last data filed at 18 9671 Gross per 24 hour Intake          1648.33 ml Output             1650 ml Net            -1.67 ml PHYSICAL EXAM: 
General: Alert, in no acute distress HEENT: Anicteric sclerae. Lungs:            CTA Bilaterally. Heart:  Regular  rhythm, Abdomen: Soft, Non distended, Non tender.  (+)Bowel sounds, no HSM Extremities: No c/c/e Neurologic:  CN 2-12 gi, Alert and oriented X 3. No acute neurological distress Psych:   Good insight. Not anxious nor agitated. Lab Data Reviewed:  
Recent Labs  
   18 
 0540  18 
 0341 WBC  11.1*  15.5* HGB  8.4*  8.9* HCT  24.7*  26.5*  
PLT  216  235 Recent Labs  
   18 
 0540  18 
 0341  18 
 0024 NA  132*  129*  134* K  3.1*  4.0  3.4*  
CL  98  95*  98  
CO2  24  20*  23 BUN  7  7  9 CREA  0.65  0.82  0.78 GLU  102*  108*  104* PHOS   --    --   2.1*  
CA  8.7  9.0  8.7 Recent Labs  
   18 
 0540  18 
 0341   18 
 1426 SGOT  15  17   < >  18 AP  60  75   < >  72  
TP  7.2  7.7   < >  8.4* ALB  2.6*  2.9*   < >  3.1*  
GLOB  4.6*  4.8*   < >  5.3*  
LPSE   --    --    --   78  
 < > = values in this interval not displayed. ________________________________________________________________________ Patient Active Problem List  
Diagnosis Code  DM (diabetes mellitus) ([facility] E11.9  Leukocytosis D72.829  
 SIRS (systemic inflammatory response syndrome) (HCC) R65.10  Sinus tachycardia R00.0  Hypertension I10  
 Diabetes ([facility] E11.9  Anxiety and depression F41.9, F32.9  Seizure disorder ([facility] G40.909  Vomiting R11.10  Hypokalemia E87.6  Obese E66.9 Assessment and Plan: 
Vomiting - likely gastroparesis - She is tolerating oral intake well. - continue good blood sugar control 
 - no further GI work up needed I will sign off. Okay to go home from my perspective. Please call with any questions.   
  
 
  
Signed By: Dalia Corbin MD   
 9/30/2018  9:38 AM

## 2018-09-30 NOTE — DISCHARGE SUMMARY
Physician Discharge Summary     Patient ID:    Edwyna Sheets  [id number]  84 y.o.  1950  Teofilo Hogue MD    Admit date: 9/27/2018  Discharge date and time: 9/30/2018  Admission Diagnoses: Leukocytosis  Chronic Diagnoses:    Problem List as of 9/30/2018  Date Reviewed: 9/27/2018          Codes Class Noted - Resolved    SIRS (systemic inflammatory response syndrome) ([place] [id number].) ICD-10-CM: R65.10  ICD-9-CM: 995.90  9/27/2018 - Present        Sinus tachycardia ICD-10-CM: R00.0  ICD-9-CM: 427.89  9/27/2018 - Present        Hypertension ICD-10-CM: I10  ICD-9-CM: 401.9  Unknown - Present        Diabetes ([facility] ICD-10-CM: E11.9  ICD-9-CM: 250.00  Unknown - Present        Anxiety and depression ICD-10-CM: F41.9, F32.9  ICD-9-CM: 300.00, 311  Unknown - Present        Seizure disorder ([facility].) ICD-10-CM: G40.909  ICD-9-CM: 345.90  Unknown - Present        Vomiting ICD-10-CM: R11.10  ICD-9-CM: 787.03  9/27/2018 - Present        Hypokalemia ICD-10-CM: E87.6  ICD-9-CM: 276.8  9/27/2018 - Present        Obese ICD-10-CM: E66.9  ICD-9-CM: 278.00  Unknown - Present        DM (diabetes mellitus) ([facility] ICD-10-CM: E11.9  ICD-9-CM: 250.00  6/7/2018 - Present        * (Principal)Leukocytosis ICD-10-CM: E72.439  ICD-9-CM: 288.60  6/7/2018 - Present        RESOLVED: Encephalopathy acute ICD-10-CM: G93.40  ICD-9-CM: 348.30  6/7/2018 - 9/27/2018        RESOLVED: Seizure ([facility] ICD-10-CM: R56.9  ICD-9-CM: 780.39  6/7/2018 - 9/27/2018        RESOLVED: Aspiration into airway ICD-10-CM: W87.986J  ICD-9-CM: 934.9  6/7/2018 - 9/27/2018        RESOLVED: Renal insufficiency ICD-10-CM: N28.9  ICD-9-CM: 593.9  6/7/2018 - 9/27/2018            Discharge Medications:   Current Discharge Medication List      START taking these medications    Details   potassium chloride (KLOR-CON) 10 mEq tablet Take 1 Tab by mouth daily. Qty: 30 Tab, Refills: 0      magnesium oxide (MAG-OX) 400 mg (241.3 mg magnesium) tablet Take 1 Tab by mouth daily.   Qty: 30 Tab, Refills: 0      azithromycin (ZITHROMAX) 250 mg tablet Take 1 Tab by mouth daily for 3 days. Qty: 3 Tab, Refills: 0         CONTINUE these medications which have NOT CHANGED    Details   atorvastatin (LIPITOR) 20 mg tablet Take 20 mg by mouth daily. SITagliptin (JANUVIA) 100 mg tablet Take 100 mg by mouth daily. pantoprazole (PROTONIX) 40 mg tablet Take 40 mg by mouth daily. levETIRAcetam (KEPPRA) 500 mg tablet Take 1 Tab by mouth two (2) times a day. Qty: 60 Tab, Refills: 0      amLODIPine (NORVASC) 10 mg tablet Take 10 mg by mouth daily. sertraline (ZOLOFT) 100 mg tablet Take 100 mg by mouth daily. Follow up Care:    1. Haylie Thao MD with in 1 weeks  2.  specialists as directed. Diet:  Diabetic Diet  Disposition:  Home. Advanced Directive:  Discharge Exam:  [See today's progress note.]  CONSULTATIONS: GI  Significant Diagnostic Studies:   Recent Labs      09/30/18   0540  09/29/18   0341   WBC  11.1*  15.5*   HGB  8.4*  8.9*   HCT  24.7*  26.5*   PLT  216  235     Recent Labs      09/30/18   0540  09/29/18   0341  09/28/18   0024   NA  132*  129*  134*   K  3.1*  4.0  3.4*   CL  98  95*  98   CO2  24  20*  23   BUN  7  7  9   CREA  0.65  0.82  0.78   GLU  102*  108*  104*   CA  8.7  9.0  8.7   MG  1.9  1.5*  1.5*   PHOS   --    --   2.1*     Recent Labs      09/30/18   0540  09/29/18   0341  09/28/18   0024  09/27/18   1426   SGOT  15  17  15  18   ALT  15  14  11*  13   AP  60  75  63  72   TBILI  0.9  1.5*  1.5*  1.5*   TP  7.2  7.7  7.4  8.4*   ALB  2.6*  2.9*  2.7*  3.1*   GLOB  4.6*  4.8*  4.7*  5.3*   LPSE   --    --    --   78     Lab Results   Component Value Date/Time    Glucose (POC) 109 (H) 09/30/2018 06:56 AM    Glucose (POC) 111 (H) 09/29/2018 09:26 PM    Glucose (POC) 136 (H) 09/29/2018 04:22 PM    Glucose (POC) 140 (H) 09/29/2018 12:14 PM    Glucose (POC) 125 (H) 09/29/2018 06:42 AM     HOSPITAL COURSE & TREATMENT RENDERED:   1.  See today's progress note:    Daily Progress Note and Discharge Note: 9/30/2018  Chuckie Lam MD    Assessment/Plan:   Leukocytosis / SIRS (systemic inflammatory response syndrome) / Sinus tachycardia - Unclear etiology. started azithromycin empirically -  cont outpt x 3 dats. Viral resp panel neg     Hypokalemia / Hyponatremia/ hypomag - POA, likely related to GI loss. Replace and follow outpt.     Vomiting - POA, unclear etiology. CT Abd/Pelv was unremarkable.       DM (diabetes mellitus) - Diabetic diet and counseling. SSI per protocol. Continue home sitagliptan. A1c 7.3 9/27/18.     Hypertension - Stable. Continue lisinopril, amlodipine and ASA     Anxiety and depression - Continue buspirone and sertraline.       Seizure disorder - Continue keppra     Hyperlipidemia - Continue atorvastatin. Subjective:    78 y.o.  female who presented to the Emergency Department complaining of cough and vomiting. She is a poor historian and no family is present. Cough orsening for days. She reports vomiting every day since ulcers found last month. Sudden vomiting, without persistent nausea. She reports weight loss, but out records do not confirm. ER workup finds SIRS criteria, xray is normal.  No fever. We will admit her for management. 9/28:  She reports \"feel much better so far. \"  No cough at this time. No further N/V overnight - last episode N/V was at 10pm last night. She does have \"a bit\" of appetite now. No ab pain. No fevers/chills. Lactic acid normal.  WBC still elevated but down from admission. K+ better. Mag a little low - replacing. Slow IVF. Try clears again. 9/29:  No specific complaints. No pain. N/V episode yesterday after eating -  On diabetic diet and so far today tolerated part of breakfast.  Glu low this AM and D5NS started until eating better. Replacing lites. Na+ sl low - watching. Remains on Azithromycin.       9/30:  She is feeling much better, has tolerated diet and wants to go home. No further hypoglycemia. Na+ better. K+ sl low - KCL rx given. Resume pre-hosp meds. Instructed to follow up with Dr Stone Mayberry later this wk for recheck of K+/Mag/medical problems. Review of Systems:   A comprehensive review of systems was negative except for that written in the HPI. Objective:   Physical Exam:     Visit Vitals    /76 (BP 1 Location: Right arm, BP Patient Position: At rest)    Pulse 84    Temp 98.5 °F (36.9 °C)    Resp 16    Ht 5' 6\" (1.676 m)    Wt 99.3 kg (219 lb)    SpO2 99%    BMI 35.35 kg/m2      O2 Device: Room air  Temp (24hrs), Av.1 °F (36.7 °C), Min:97.8 °F (36.6 °C), Max:98.5 °F (36.9 °C)         1901 -  0700  In: 1648.3 [P.O.:500; I.V.:1148.3]  Out: 1650 [Urine:1650]  General:  Alert, cooperative, no distress, obese, appears stated age. Head:  Normocephalic, without obvious abnormality, atraumatic. Eyes:  Conjunctivae/corneas clear. EOMs intact. Throat: Lips, mucosa, and tongue moist..   Neck: Supple, symmetrical, trachea midline, no adenopathy, thyroid: no enlargement/tenderness/nodules, no carotid bruit and no JVD. Back:   Symmetric, no curvature. ROM normal. No CVA tenderness. Lungs:   Clear to auscultation bilaterally. Chest wall:  No tenderness or deformity. Heart:  Regular rate and rhythm, S1, S2 normal, no murmur, click, rub or gallop. Abdomen:   Soft, non-tender. Bowel sounds normal. No masses,  No organomegaly. Extremities: no cyanosis or edema. No calf tenderness or cords. Pulses: 2+ and symmetric all extremities. Skin:  turgor normal.    Neurologic: CNII-XII intact. Alert and oriented X 3. Fine motor of hands and fingers normal.   equal.  No cogwheeling or rigidity. Gait not tested at this time. Sensation grossly normal to touch.   Gross motor of extremities normal.       Data Review:     CTA Chest/Ab/Pelvis 18  CT CHEST, ABDOMEN, PELVIS WITH CONTRAST. 2018 3:35 PM   INDICATION: Cough, persistent vomiting. COMPARISON: None. FINDINGS:  Chest: Aside from minimal bibasilar dependent atelectasis, the lungs are clear. The central airways are patent. No pneumothorax or pleural effusion. Incidental  note is made of a tiny air cyst in the right upper lobe (601-36, 3-17). The  right and left common carotid arteries arise from a common trunk, a normal  variant. Aberrant right subclavian artery is another normal variant. The heart  size is normal. Left anterior descending coronary calcifications are mild. No  thoracic lymphadenopathy. Abdomen: The esophagus, stomach, duodenum, liver, gallbladder, pancreas, spleen,  adrenals, and kidneys are normal. Incidental note is made of endoscopy clips  along the greater curvature in the gastric body. A portacaval lymph node is  enlarged, measuring 15 mm in short axis on image [id number]. However, there are no  other enlarged abdominopelvic lymph nodes, and this in isolation is of  questionable significance. Pelvis: Diverticulosis is extensive throughout the colon. Incidental note is  made of calcified, degenerated uterine fibroids. The small bowel, ileocecal  junction, appendix, and bladder are normal. No free air or fluid, and no  abdominopelvic lymphadenopathy. IMPRESSION:  No acute process in the chest, abdomen, and pelvis.     Lab Results   Component Value Date/Time    Hemoglobin A1c 7.3 (H) 09/27/2018 02:26 PM     Signed:  Haylie Humphries MD  9/30/2018  8:17 AM

## 2018-09-30 NOTE — PROGRESS NOTES
Verbal order given by Dr. Altagracia Stark to call in patient's scripts to the [facility] at [phone].

## 2018-10-01 LAB
FLUID CULTURE, SPNG2: NORMAL
L PNEUMO1 AG UR QL IA: NEGATIVE
ORGANISM ID, SPNG3: NORMAL
PLEASE NOTE, SPNG4: NORMAL
S PNEUM AG SPEC QL LA: NEGATIVE
SPECIMEN SOURCE: NORMAL
SPECIMEN SOURCE: NORMAL
SPECIMEN, SPNG1: NORMAL

## 2018-10-02 LAB
BACTERIA SPEC CULT: NORMAL
SERVICE CMNT-IMP: NORMAL

## 2019-09-03 NOTE — PROGRESS NOTES
9/3/2019         RE: Jena Ibarra  [address] [place] [address]        Dear Colleague,    Thank you for referring your patient, Jena Ibarra, to the [facility]. Please see a copy of my visit note below.    Patient had no real help from the Prilosec. Was seen by Dr. betancourt and ordered a HIDA scan.,    Patient has right upper quadrant pain now.   SPECIMEN(S):   A: Antral biopsies   B: Stomach body polyp biopsy     FINAL DIAGNOSIS:   A. Stomach, Antrum, Biopsy:   - Gastric antral and body mucosa with no significant inflammation   - No H. pylori like organisms identified on routine staining   - Negative for intestinal metaplasia or dysplasia     B. Stomach, Body, Polypectomy:   Fundic gland polyp     So I was thinking that she probably had gallbladder issues and had stones, but some of her symptoms did not add up to just the gallbladder.  So that is why we did the EGD.    Her ct scan showed. Pancrease divisum and family history of pancreatic cancer so she saw the  gastrointestinal  Doctors.  And is scheduled for an EUS later this month.  Her HIDA scan shows poor gallbladder function.  So would recommend doing the laparoscopic cholecystectomy possible open.   But the patient and I agree that doing the EUS first would be best.      Utrasound shows + stones -dialted ducts -  thickening fo the gallbladder .   Patient denies any symptoms of common bile duct stone, or pancreatitis.  Last colonoscopy was no polyps  In 2017  Assessment:  cholelithiasis with right upper quadrant pain but mostly epigastric pain and happens with nausea and bloating and not from fatty foods.   In fact ate a hamburger and no problems.  So recommend an EGD.  Has a grandson and his mother living with them and this is somewhat stressful.   Did do the Mylanta lidocaine drink and that made her epigastric pain worse.   Will recheck liver function test and lipase.    And will start on prilosec.   Had a tubal ligation and  [facility]     Name: Jaqiu Oliva MRN: [id number]   : 1950 Hospital: [address]   Date: 6/10/2018        Impression Plan   1. Encephalopathy  2. GI bleed- gastric ulcer  3. Seizure activity  4. DM  5. Abnormal Chest xray- bilateral atelectasis               · Wean O2 to keep sats above 90%  · MRI brain with chronic and subacute changes  · Neuro following- treating for seizures secondary to past strokes  · Continue Keppra  · HSV ab pending  · Unable to give ASA due to GI bleed- Per GI wait for 5 days  · Accuchecks q6h  · SSI  · Start SQ heparin  · PT/OT           Radiology  ( personally reviewed) CXR reviewed: bilateral atelectasis, no edema   ABG No results for input(s): PHI, PO2I, PCO2I in the last 72 hours. Subjective     Overnight events:  Alert this morning and mental status much improved  No further bleeding/vomiting  Hgb stable  No seizure activity    Past Medical History:   Diagnosis Date    Diabetes ([facility] [address]     Hypertension       History reviewed. No pertinent surgical history. Prior to Admission medications    Not on File     Current Facility-Administered Medications   Medication Dose Route Frequency    amLODIPine (NORVASC) tablet 10 mg  10 mg Oral DAILY    pantoprazole (PROTONIX) tablet 40 mg  40 mg Oral BID    sodium chloride (NS) flush 5-10 mL  5-10 mL IntraVENous Q8H    0.9% sodium chloride infusion  100 mL/hr IntraVENous CONTINUOUS    insulin lispro (HUMALOG) injection   SubCUTAneous Q6H    propofol (DIPRIVAN) infusion  0-50 mcg/kg/min IntraVENous TITRATE    levETIRAcetam (KEPPRA) 500 mg in 0.9% sodium chloride 100 mL IVPB  500 mg IntraVENous Q12H    sodium chloride (NS) flush 5-10 mL  5-10 mL IntraVENous Q8H     No Known Allergies   Social History   Substance Use Topics    Smoking status: Unknown If Ever Smoked    Smokeless tobacco: Not on file    Alcohol use Not on file      Comment: Unknown      History reviewed.  No pertinent family history. Laboratory: I have personally reviewed the critical care flowsheet and labs. Recent Labs      06/10/18   0615  06/09/18   0750  06/08/18   1021   WBC  10.9  10.6  12.4*   HGB  13.2  12.2  14.3   HCT  41.5  37.0  42.0   PLT  143*  176  217     Recent Labs      06/10/18   0615  06/10/18   0436  06/09/18   0615  06/09/18   0449   06/07/18   2154   NA  137  137  137  138   < >  138   K  3.2*  3.3*  3.8  3.5   < >  3.9   CL  100  100  102  102   < >  95*   CO2  28  30  30  28   < >  20*   GLU  142*  144*  201*  205*   < >  412*   BUN  7  8  8  6   < >  6   CREA  0.86  0.83  1.02  1.06*   < >  1.20*   CA  8.1*  8.3*  8.5  8.6   < >  10.0   MG   --   1.9  2.1  2.2   < >   --    ALB   --   2.6*  2.7*  2.9*   < >  4.1   SGOT   --   45*  49*  48*   < >  53*   ALT   --   26  30  33   < >  48   INR   --    --    --    --    --   1.1    < > = values in this interval not displayed.        Objective:     Mode Rate Tidal Volume Pressure FiO2 PEEP   Assist control   450 ml  10 cm H2O 50 % 5 cm H20     Vital Signs:    Ventilator Pressures  Pressure Support (cm H2O): 10 cm H2O  PIP Observed (cm H2O): 16 cm H2O  Plateau Pressure (cm H2O): 17 cm H2O  MAP (cm H2O): 8.6  PEEP/VENT (cm H2O): 5 cm H20  Auto PEEP Observed (cm H2O): 0 cm [id number](24)Ventilator Pressures  Pressure Support (cm H2O): 10 cm H2O  PIP Observed (cm H2O): 16 cm H2O  Plateau Pressure (cm H2O): 17 cm H2O  MAP (cm H2O): 8.6  PEEP/VENT (cm H2O): 5 cm H20  Auto PEEP Observed (cm H2O): 0 cm H2O  Safety & Alarms  Circuit Temperature: 98.6 °F (37 °C)  Backup Mode Checked/Apnea: Yes  Pressure Max: 40 cm H2O  Ve Min: 2  Ve Max: 20  Vt Min: 200 ml  Vt Max: 800 ml  RR Min: 16  RR Max: 50  Intake/Output:   Last shift:      Ventilator Volumes  Vt Set (ml): 450 ml  Vt Exhaled (Machine Breath) (ml): 480 ml  Vt Spont (ml): 400 ml  Ve Observed (l/min): 7.8 l/min  Last 3 shifts:  [id number]    Intake/Output Summary (Last 24 hours) at 06/10/18 4664  Last data filed at looks like they went above the umbilicus so may need right upper quadrant trocar.    Plan to do  If needed a laparoscopic cholecystectomy possible open.  But right now her stomach may be more of the problem.   We discussed typical gallbladder symptoms and I drew out a diagram to help discuss how the gallbladder works and what are the symptoms of a gallbladder attack.  I also discussed gallbladder surgery with risks and complications of surgery including bleeding, hernias, damage to bowel , damage to the bile ducts, risks of anesthesia.  If I get into bleeding that I can not control laparoscopicly, if I get a hole in the bowel or if the anatomy of the bile ducts does not look normal I may have to convert to an open procedure.  Discussed what to expect afterwards, the use of the abdominal binder and no lifting greater than 20 pounds for 3 weeks after surgery. That if done laparoscopically will plan on her going home the same day, but if I have to convert to an open procedure will have patient admitted to the hospital.     Time spent with the patient with greater that 50% of the time in discussion was 30 minutes.  In discussing the plan and her latest test results. .      Jamal Beach MD      Examination:  Hepatobiliary scan       Date: 8/30/2019 9:33 AM.     Indication:   Post-prandial abdominal pain/bloating       Additional Information: none     Comparison: CT abdomen/pelvis on 7/30/2019     Technique:     The patient received 6 mCi of Tc-99m Choletec intravenously. Images  were obtained out through 60 minutes.   At 60 minutes the patient  received Shake equaling 28 grams fat given for EF  . An additional 45 minutes of images were obtained after the gall  bladder contraction intervention.     Findings:     There is prompt clearance of the radionuclide from the blood pool into  the liver. By 10 minutes there is clear visualization of the  intrahepatic ducts as well as the upper common bile duct. By 15  minutes  06/10/18 0641   Gross per 24 hour   Intake             1940 ml   Output             2480 ml   Net             -540 ml     EXAM:   GENERAL:awake, alert, HEENT:  Poor dentition, PERRL, EOMI, no alar flaring or epistaxis, oral mucosa moist without cyanosis, NECK:  no jugular vein distention, no retractions, no thyromegaly or masses, LUNGS: CTA, no w/r/r, HEART:  Regular rate and rhythm with no MGR; no edema is present, ABDOMEN:  soft with no tenderness, bowel sounds present, EXTREMITIES:  warm with no cyanosis, SKIN:  no jaundice or ecchymosis and NEUROLOGIC: AOx2, moving all extremeties. Appropriate in conversation.     Karolina Bonds MD  [facility] there is visualization of the gallbladder. At 60 minutes there  is emptying from the common bile duct into the small bowel.     Gallbladder ejection fraction was measured at 35%.  The normal  gallbladder EF based on a 45 minute infusion is >40%.     Enterogastric reflux was not present.                                                                      Impression:     Findings compatible with gallbladder dyskinesia versus chronic  cholecystitis. No evidence of acute cholecystitis.      =======================     The normal Gall Bladder ejection fraction for a 45 minute infusion is  >40%     I have personally reviewed the examination and initial interpretation  and I agree with the findings.     ZACH RICO MD  Per  gastrointestinal  Doc:  ASSESSMENT/PLAN:  1. Post-prandial abdominal pain/bloating - differential includes symptomatic cholelithiasis, irritable bowel, constipation related, etc.  No evidence of PUD, gastritis or GERD on recent EGD.  Less likely pancreatic etiology, although possible.  LFTs wnl on recent labs.  Lipase low normal.  Will give trial of bentyl. Can continue simethicone PRN.  Stop omeprazole as patient reports no improvement. Bowel regimen to prevent constipation. Will also obtain HIDA with [facility].     2. Cholelithiasis     3. Pancreas divisum      4. Family history of pancreatic cancer in first degree relative + mildly atrophic pancreas on CT - will plan for EUS with Dr. Jaeger to better evaluate pancreatic parenchyma.  Study Result     EXAMINATION: CT ABDOMEN PELVIS W CONTRAST, 7/30/2019 9:20 AM     TECHNIQUE:  Helical CT images from the lung bases through the  symphysis pubis were obtained with IV contrast. Contrast dose: 105 ml  isovue 370     COMPARISON: Correlation made with abdominal ultrasound 4/11/2019     HISTORY: Abd distension; patient with vague bloating and family  history of brother with pancreatic cancer.; Abdominal pain,  generalized; Family history of pancreatic  cancer     FINDINGS:     Lung bases: Unremarkable     Abdomen and pelvis: Focal fatty sparing of the liver along the  falciform ligament. Normal spleen. Cholelithiasis without evidence  cholecystitis. Pancreas divisum. Small periampullary duodenal  diverticulum. Pancreas is mildly atrophic. Normal adrenal glands.  Renal cortical cysts. No dilation of the urinary collecting system.  Minimally distended bladder. Multiple phleboliths. No pelvic mass.  Nonobstructive bowel gas pattern. Diverticulosis without  diverticulitis. Degenerative changes of thoracolumbar spine.     Bones and soft tissues: No concerning osseous or soft tissue findings.                                                                      IMPRESSION:   1. Diverticulosis without diverticulitis.  2. Cholelithiasis without cholecystitis.  3. Focal fatty sparing of the liver.  4. Pancreas divisum without pancreatic mass.     I have personally reviewed the examination and initial interpretation  and I agree with the findings.     ANNE PATIÑO MD               Linked Documents     View Image   Component Collected Lab   Upper GI Endoscopy 07/25/2019 12:41 PM [facility]   Endoscopy Department-[place]   _______________________________________________________________________________   Patient Name: Jena Ibarra            Procedure Date: 7/25/2019 12:41 PM   MRN: [id number]                       YOB: 1952   Admit Type: Outpatient                Age: 67   Gender: Female                        Note Status: Finalized   Attending MD: Jamal Beach MD     Instrument Name: GIF  [id number]   _______________________________________________________________________________       Procedure:                Upper GI endoscopy   Indications:              Epigastric abdominal pain, Generalized abdominal                             pain, Nausea   Providers:                Jamal Beach MD, Maximus Yoon    Referring MD:             Jamal Beach MD   Medicines:                Fentanyl 100 micrograms IV, Midazolam 3 mg IV,                             Ondansetron 4 mg IV   Complications:            No immediate complications.   _______________________________________________________________________________   Procedure:                Pre-Anesthesia Assessment:                             - Prior to the procedure, a History and Physical                             was performed, and patient medications and                             allergies were reviewed. The risks and benefits of                             the procedure and the sedation options and risks                             were discussed with the patient. All questions were                             answered and informed consent was obtained. Patient                             identification and proposed procedure were verified                             by the physician in the pre-procedure area. Mental                             Status Examination: alert and oriented. Airway                             Examination: normal oropharyngeal airway and neck                             mobility. Respiratory Examination: clear to                             auscultation. CV Examination: normal. Prophylactic                             Antibiotics: The patient does not require                             prophylactic antibiotics. Prior Anticoagulants: The                             patient has taken no previous anticoagulant or                             antiplatelet agents. ASA Grade Assessment: II - A                             patient with mild systemic disease. After reviewing                             the risks and benefits, the patient was deemed in                             satisfactory condition to undergo the procedure.                             The anesthesia plan was to use moderate sedation /                             analgesia  (conscious sedation). This assessment was                             completed before the administration of sedation at                             12:35 PM.                             After obtaining informed consent, the endoscope was                             passed under direct vision. Throughout the                             procedure, the patient's blood pressure, pulse, and                             oxygen saturations were monitored continuously. The                             Endoscope was introduced through the mouth, and                             advanced to the second part of duodenum. The upper                             GI endoscopy was accomplished without difficulty.                             The patient tolerated the procedure well.                                                                                     Findings:        The examined esophagus was normal.        Diffuse mildly erythematous mucosa without bleeding was found in the        gastric antrum. Biopsies were taken with a cold forceps for Helicobacter        pylori testing.        The ampulla, duodenal bulb, first portion of the duodenum and second        portion of the duodenum were normal.        The exam was otherwise without abnormality.                                                                                     Moderate Sedation:        Moderate (conscious) sedation was administered by the endoscopy nurse        and supervised by the endoscopist. The following parameters were        monitored: oxygen saturation, heart rate, blood pressure, and response        to care. Total physician intraservice time was 11 minutes.   Impression:               - Normal esophagus.                             - Erythematous mucosa in the antrum. Biopsied.                             - Normal ampulla, duodenal bulb, first portion of                             the duodenum and second portion of the duodenum.                              - The examination was otherwise normal.                             ge junction 35 cm with 2-3 cm hiatal hernia   Recommendation:           - Your EGD went well. I did take biopsies of your                             esophagus and stomach. Your stomach does show some                             inflammation, no ulcers. Your esophagus looks                             normal. Please continue your Prilosec and let me                             know if it is helping.                             Since your symptoms do no match up well with your                             gallbladder, we may need to look at other causes.                             Please call [phone] to set up an appointment                             in 1 to 2 weeks to go over the biopsy results. Or                             wait for a letter with the biopsy results in 3-4                             weeks. Please call [phone], if after 5 weeks                             you did not receive your letter.                             There is no evidence of any cancer.                             You should avoid alcohol, Motrin, Advil, ibuprofen                             and aspirin as these can irritate the stomach. For                             reflux, eating small meals, losing weight, and not                             eating several hours before bedtime will help. Also                             avoiding alcohol, tobacco, chocolate, peppermint                             and caffeine will help your reflux symptoms.                             If you are not doing better with just the Prilosec                             we may need to do a ct scan of your abdomen to make                             sure that there is nothing else going on.                             Your liver function test and lipase we did was                             normal.                             Gallbladder can be tricky, but you  "know what the                             usual symptoms area and that fatty food is the                             culprit.                             Please call Brittanie's number at  if                             getting worse or want to get the ct scan ordered.                             Please follow up with me if you think the symptoms                             are more consistant with gallbladder problems.                                                                                       ___________________   Jamal Beach MD   7/25/2019 1:12:44 PM   I was physically present for the entire viewing      Office Visit     7/16/2019  Jamal Whitehead MD   Surgery   Epigastric pain   Dx   Abdominal Pain ; Referred by Willy Colmenares MD   Reason for Visit    Progress Notes     Expand All Collapse All    Dear Ludmila Garcia  I was asked to see this patient by Ludmila Torres for please see below.  I have seen Jena Ibarra and as you know his chief complaint is epigastric pain  Bloating and nausea and nausea with just eating a banana.    Coffee plain upsets her stomach.   Has had some pain in the right upper quadrant area but more in the epigastric area.  Also on the left upper quadrant.       HPI:  Patient is a 67 year old female  with complaints see above  The last episode the patient ate ? which started the symptoms  Patient has family history of gallbladder problems  Mother and sister  Brother had pancreatic cancer  Not eating makes the episode better.  Is taking gasx that helps and sometimes tums.         Review Of Systems  Respiratory: No shortness of breath, dyspnea on exertion, cough, or hemoptysis  Cardiovascular: negative  Gastrointestinal: nausea, constipation and diarrhea  Endocrine: negative  10 point review of systems done and all others are negative other than above.   /86   Pulse 86   Ht 1.676 m (5' 6\")   " Wt 78.5 kg (173 lb)   BMI 27.92 kg/m       Past Medical History        Past Medical History:   Diagnosis Date     Diverticulosis       History of colon polyps       Hyperlipidemia LDL goal < 160       Osteopenia              Past Surgical History         Past Surgical History:   Procedure Laterality Date     COLONOSCOPY         COLONOSCOPY WITH CO2 INSUFFLATION N/A 10/13/2014     Procedure: COLONOSCOPY WITH CO2 INSUFFLATION;  Surgeon: Jamal Beach MD;  Location: [facility] OR     COLONOSCOPY WITH CO2 INSUFFLATION N/A 11/14/2017     Procedure: COLONOSCOPY WITH CO2 INSUFFLATION;  COLON-RECTAL BLEEDING / BARUSYA;  Surgeon: Henok Jewell MD;  Location: [facility] OR     TUBAL LIGATION                Social History   Social History            Socioeconomic History     Marital status:        Spouse name: Not on file     Number of children: Not on file     Years of education: Not on file     Highest education level: Not on file   Occupational History       Employer: [employer]   Social Needs     Financial resource strain: Not on file     Food insecurity:       Worry: Not on file       Inability: Not on file     Transportation needs:       Medical: Not on file       Non-medical: Not on file   Tobacco Use     Smoking status: Never Smoker     Smokeless tobacco: Never Used   Substance and Sexual Activity     Alcohol use: Yes       Comment: occasional     Drug use: No     Sexual activity: Yes       Partners: Male       Birth control/protection: Surgical       Comment: tubal ligation   Lifestyle     Physical activity:       Days per week: Not on file       Minutes per session: Not on file     Stress: Not on file   Relationships     Social connections:       Talks on phone: Not on file       Gets together: Not on file       Attends Worship service: Not on file       Active member of club or organization: Not on file       Attends meetings of clubs or organizations: Not on file       Relationship status: Not on file      Intimate partner violence:       Fear of current or ex partner: Not on file       Emotionally abused: Not on file       Physically abused: Not on file       Forced sexual activity: Not on file   Other Topics Concern     Parent/sibling w/ CABG, MI or angioplasty before 65F 55M? Not Asked   Social History Narrative     Not on file            Current Outpatient Prescriptions   No current outpatient medications on file.         Physical exam:  Patient able to get up on table without difficulty.  Head eyes, nose and mouth within normal limits.  No supraclavicular or cervical adenopathy palpated.  Thyroid within normal limits.  No carotid bruits auscultated.  Lungs are clear to auscultation  Heart is regular rate and rhythm with no murmur or thrills noted.  No costal vertebral angle tenderness noted.  Abdomen is abdomen is soft without significant tenderness, masses, organomegaly or guarding  bowel sounds are positive and no caput medusa noted.  Mild epigastric discomfort  No obvious hernias noted.  Easily palpable posterior tibial pulse or dorsalis pedis pulse bilaterally.  Lower extremity edema is not present.  Skin warm and pink     Study Result      ULTRASOUND ABDOMEN LIMITED  4/11/2019 11:11 AM      HISTORY: Epigastric abdominal pain.     COMPARISON: None.     FINDINGS:  Liver is mildly fatty infiltrated as evidenced by a diffuse  increase in echogenicity without focal lesions. Gallbladder  demonstrates cholelithiasis without evidence for cholecystitis.  Extrahepatic bile duct is normal in diameter. Pancreas is normal where  visualized. Right kidney is normal in size. There is no  hydronephrosis. Tiny simple renal cysts noted measuring 1 cm.        IMPRESSION:    1. Cholelithiasis without evidence for cholecystitis.   2. Fatty liver.      JASON MAC MD         Labs show:               Exam Date Exam Time Accession # Results    4/9/19  4:35 PM [id number]     Component Value Flag Ref Range Units Status  Collected Lab   Bilirubin Direct 0.1    0.0 - 0.2 mg/dL Final 04/09/2019  4:35 PM MG   Bilirubin Total 0.6    0.2 - 1.3 mg/dL Final 04/09/2019  4:35 PM MG   Albumin 4.2    3.4 - 5.0 g/dL Final 04/09/2019  4:35 PM MG   Protein Total 7.6    6.8 - 8.8 g/dL Final 04/09/2019  4:35 PM MG   Alkaline Phosphatase 73    40 - 150 U/L Final 04/09/2019  4:35 PM MG   ALT 27    0 - 50 U/L Final 04/09/2019  4:35 PM MG   AST 17    0 - 45 U/L Final 04/09/2019  4:35 PM MG   Lab and Collection      Lipase [LAB99] (Order [id number])   Exam Information                   Exam Date Exam Time Accession # Results    4/9/19  4:35 PM [id number]     Component Value Flag Ref Range Units Status Collected Lab   Lipase 95    73 - 393 U/L Final 04/09/2019  4:35 PM              Utrasound shows + stones -dialted ducts -  thickening fo the gallbladder .   Patient denies any symptoms of common bile duct stone, or pancreatitis.  Last colonoscopy was no polyps  In 2017  Assessment:  cholelithiasis with right upper quadrant pain but mostly epigastric pain and happens with nausea and bloating and not from fatty foods.   In fact ate a hamburger and no problems.  So recommend an EGD.  Has a grandson and his mother living with them and this is somewhat stressful.   Did do the Mylanta lidocaine drink and that made her epigastric pain worse.   Will recheck liver function test and lipase.    And will start on prilosec.   Had a tubal ligation and looks like they went above the umbilicus so may need right upper quadrant trocar.    Plan to do  If needed a laparoscopic cholecystectomy possible open.  But right now her stomach may be more of the problem.   We discussed typical gallbladder symptoms and I drew out a diagram to help discuss how the gallbladder works and what are the symptoms of a gallbladder attack.  I also discussed gallbladder surgery with risks and complications of surgery including bleeding, hernias, damage to bowel , damage to the bile ducts,  risks of anesthesia.  If I get into bleeding that I can not control laparoscopicly, if I get a hole in the bowel or if the anatomy of the bile ducts does not look normal I may have to convert to an open procedure.  Discussed what to expect afterwards, the use of the abdominal binder and no lifting greater than 20 pounds for 3 weeks after surgery. That if done laparoscopically will plan on her going home the same day, but if I have to convert to an open procedure will have patient admitted to the hospital.        Time spent with the patient with greater that 50% of the time in discussion was 33 minutes.     Jamal Beach MD          Again, thank you for allowing me to participate in the care of your patient.        Sincerely,        Jamal Beach MD

## 2020-05-25 ENCOUNTER — HOSPITAL ENCOUNTER (EMERGENCY)
Age: 70
Discharge: HOME | End: 2020-05-25
Attending: EMERGENCY MEDICINE | Admitting: EMERGENCY MEDICINE
Payer: MEDICARE

## 2020-05-25 ENCOUNTER — APPOINTMENT (OUTPATIENT)
Dept: GENERAL RADIOLOGY | Age: 70
End: 2020-05-25
Attending: EMERGENCY MEDICINE
Payer: MEDICARE

## 2020-05-25 VITALS
HEIGHT: 64 IN | BODY MASS INDEX: 31.76 KG/M2 | WEIGHT: 186 LBS | OXYGEN SATURATION: 97 % | RESPIRATION RATE: 20 BRPM | SYSTOLIC BLOOD PRESSURE: 104 MMHG | DIASTOLIC BLOOD PRESSURE: 70 MMHG | TEMPERATURE: 100.4 F | HEART RATE: 96 BPM

## 2020-05-25 DIAGNOSIS — R50.9 ACUTE FEBRILE ILLNESS: Primary | ICD-10-CM

## 2020-05-25 DIAGNOSIS — Z20.822 SUSPECTED COVID-19 VIRUS INFECTION: ICD-10-CM

## 2020-05-25 LAB
ALBUMIN SERPL-MCNC: 3.5 G/DL (ref 3.5–5)
ALBUMIN/GLOB SERPL: 0.7 {RATIO} (ref 1.1–2.2)
ALP SERPL-CCNC: 76 U/L (ref 45–117)
ALT SERPL-CCNC: 81 U/L (ref 12–78)
ANION GAP SERPL CALC-SCNC: 7 MMOL/L (ref 5–15)
APPEARANCE UR: ABNORMAL
AST SERPL-CCNC: 141 U/L (ref 15–37)
BACTERIA URNS QL MICRO: ABNORMAL /HPF
BASOPHILS # BLD: 0 K/UL (ref 0–0.1)
BASOPHILS NFR BLD: 0 % (ref 0–1)
BILIRUB SERPL-MCNC: 0.6 MG/DL (ref 0.2–1)
BILIRUB UR QL CFM: NEGATIVE
BUN SERPL-MCNC: 15 MG/DL (ref 6–20)
BUN/CREAT SERPL: 12 (ref 12–20)
CALCIUM SERPL-MCNC: 8.7 MG/DL (ref 8.5–10.1)
CHLORIDE SERPL-SCNC: 99 MMOL/L (ref 97–108)
CO2 SERPL-SCNC: 24 MMOL/L (ref 21–32)
COLOR UR: ABNORMAL
COMMENT, HOLDF: NORMAL
CREAT SERPL-MCNC: 1.26 MG/DL (ref 0.55–1.02)
CRP SERPL-MCNC: 2.42 MG/DL (ref 0–0.6)
DIFFERENTIAL METHOD BLD: ABNORMAL
EOSINOPHIL # BLD: 0 K/UL (ref 0–0.4)
EOSINOPHIL NFR BLD: 0 % (ref 0–7)
EPITH CASTS URNS QL MICRO: ABNORMAL /LPF
ERYTHROCYTE [DISTWIDTH] IN BLOOD BY AUTOMATED COUNT: 13.1 % (ref 11.5–14.5)
GLOBULIN SER CALC-MCNC: 4.8 G/DL (ref 2–4)
GLUCOSE SERPL-MCNC: 98 MG/DL (ref 65–100)
GLUCOSE UR STRIP.AUTO-MCNC: NEGATIVE MG/DL
HCT VFR BLD AUTO: 45.4 % (ref 35–47)
HGB BLD-MCNC: 15.5 G/DL (ref 11.5–16)
HGB UR QL STRIP: NEGATIVE
IMM GRANULOCYTES # BLD AUTO: 0 K/UL (ref 0–0.04)
IMM GRANULOCYTES NFR BLD AUTO: 0 % (ref 0–0.5)
KETONES UR QL STRIP.AUTO: NEGATIVE MG/DL
LACTATE SERPL-SCNC: 1.6 MMOL/L (ref 0.4–2)
LEUKOCYTE ESTERASE UR QL STRIP.AUTO: ABNORMAL
LYMPHOCYTES # BLD: 0.7 K/UL (ref 0.8–3.5)
LYMPHOCYTES NFR BLD: 14 % (ref 12–49)
MCH RBC QN AUTO: 33.3 PG (ref 26–34)
MCHC RBC AUTO-ENTMCNC: 34.1 G/DL (ref 30–36.5)
MCV RBC AUTO: 97.4 FL (ref 80–99)
MONOCYTES # BLD: 0.5 K/UL (ref 0–1)
MONOCYTES NFR BLD: 10 % (ref 5–13)
MUCOUS THREADS URNS QL MICRO: ABNORMAL /LPF
NEUTS SEG # BLD: 4.1 K/UL (ref 1.8–8)
NEUTS SEG NFR BLD: 76 % (ref 32–75)
NITRITE UR QL STRIP.AUTO: NEGATIVE
NRBC # BLD: 0 K/UL (ref 0–0.01)
NRBC BLD-RTO: 0 PER 100 WBC
PH UR STRIP: 5 [PH] (ref 5–8)
PLATELET # BLD AUTO: 151 K/UL (ref 150–400)
PMV BLD AUTO: 9.9 FL (ref 8.9–12.9)
POTASSIUM SERPL-SCNC: 4.1 MMOL/L (ref 3.5–5.1)
PROT SERPL-MCNC: 8.3 G/DL (ref 6.4–8.2)
PROT UR STRIP-MCNC: NEGATIVE MG/DL
RBC # BLD AUTO: 4.66 M/UL (ref 3.8–5.2)
RBC #/AREA URNS HPF: ABNORMAL /HPF (ref 0–5)
RBC MORPH BLD: ABNORMAL
SAMPLES BEING HELD,HOLD: NORMAL
SODIUM SERPL-SCNC: 130 MMOL/L (ref 136–145)
SP GR UR REFRACTOMETRY: 1.02 (ref 1–1.03)
UA: UC IF INDICATED,UAUC: ABNORMAL
UROBILINOGEN UR QL STRIP.AUTO: 0.2 EU/DL (ref 0.2–1)
WBC # BLD AUTO: 5.3 K/UL (ref 3.6–11)
WBC URNS QL MICRO: ABNORMAL /HPF (ref 0–4)
YEAST BUDDING URNS QL: PRESENT

## 2020-05-25 PROCEDURE — 74011250637 HC RX REV CODE- 250/637: Performed by: EMERGENCY MEDICINE

## 2020-05-25 PROCEDURE — 71045 X-RAY EXAM CHEST 1 VIEW: CPT

## 2020-05-25 PROCEDURE — 81001 URINALYSIS AUTO W/SCOPE: CPT

## 2020-05-25 PROCEDURE — 86140 C-REACTIVE PROTEIN: CPT

## 2020-05-25 PROCEDURE — 85025 COMPLETE CBC W/AUTO DIFF WBC: CPT

## 2020-05-25 PROCEDURE — 87086 URINE CULTURE/COLONY COUNT: CPT

## 2020-05-25 PROCEDURE — 93005 ELECTROCARDIOGRAM TRACING: CPT

## 2020-05-25 PROCEDURE — 87040 BLOOD CULTURE FOR BACTERIA: CPT

## 2020-05-25 PROCEDURE — 74011250636 HC RX REV CODE- 250/636: Performed by: EMERGENCY MEDICINE

## 2020-05-25 PROCEDURE — 83605 ASSAY OF LACTIC ACID: CPT

## 2020-05-25 PROCEDURE — 87635 SARS-COV-2 COVID-19 AMP PRB: CPT

## 2020-05-25 PROCEDURE — 99283 EMERGENCY DEPT VISIT LOW MDM: CPT

## 2020-05-25 PROCEDURE — 36415 COLL VENOUS BLD VENIPUNCTURE: CPT

## 2020-05-25 PROCEDURE — 80053 COMPREHEN METABOLIC PANEL: CPT

## 2020-05-25 RX ORDER — IBUPROFEN 800 MG/1
800 TABLET ORAL
Qty: 30 TAB | Refills: 0 | Status: ON HOLD | OUTPATIENT
Start: 2020-05-25 | End: 2020-10-24

## 2020-05-25 RX ORDER — AZITHROMYCIN 250 MG/1
TABLET, FILM COATED ORAL
Qty: 6 TAB | Refills: 0 | Status: SHIPPED | OUTPATIENT
Start: 2020-05-25 | End: 2020-05-30

## 2020-05-25 RX ORDER — ACETAMINOPHEN 500 MG
1000 TABLET ORAL
Qty: 20 TAB | Refills: 0 | Status: ON HOLD | OUTPATIENT
Start: 2020-05-25 | End: 2020-10-24

## 2020-05-25 RX ORDER — ASCORBIC ACID 250 MG
250 TABLET ORAL DAILY
Qty: 20 TAB | Refills: 0 | Status: ON HOLD | OUTPATIENT
Start: 2020-05-25 | End: 2020-10-24

## 2020-05-25 RX ORDER — ACETAMINOPHEN 500 MG
1000 TABLET ORAL
Status: COMPLETED | OUTPATIENT
Start: 2020-05-25 | End: 2020-05-25

## 2020-05-25 RX ADMIN — ACETAMINOPHEN 1000 MG: 500 TABLET ORAL at 22:02

## 2020-05-25 RX ADMIN — SODIUM CHLORIDE 1000 ML: 900 INJECTION, SOLUTION INTRAVENOUS at 21:08

## 2020-05-25 NOTE — ED TRIAGE NOTES
Pt reports cough and fatigue that started yesterday. Pt states \"my legs feel weak, like I just can do anything. \"  Pt has temp of 102.7 in triage. Pt denies sick contacts.

## 2020-05-26 ENCOUNTER — PATIENT OUTREACH (OUTPATIENT)
Dept: INTERNAL MEDICINE CLINIC | Age: 70
End: 2020-05-26

## 2020-05-26 LAB
ATRIAL RATE: 97 BPM
CALCULATED P AXIS, ECG09: 46 DEGREES
CALCULATED R AXIS, ECG10: -31 DEGREES
CALCULATED T AXIS, ECG11: 61 DEGREES
DIAGNOSIS, 93000: NORMAL
P-R INTERVAL, ECG05: 154 MS
Q-T INTERVAL, ECG07: 342 MS
QRS DURATION, ECG06: 80 MS
QTC CALCULATION (BEZET), ECG08: 434 MS
VENTRICULAR RATE, ECG03: 97 BPM

## 2020-05-26 NOTE — ED PROVIDER NOTES
The patient is a 56-year-old female with a past medical history significant for anxiety, depression, diabetes, hypertension, morbid obesity, seizure disorder who presents to the ED with a complaint of fever x2 days, dry cough, runny nose, congestion, shortness of breath after coughing. The patient denies any headache, neck pain or stiffness, chest pain, sore throat, nausea, vomiting, abdominal pain, diarrhea, constipation, dysuria, dizziness, extremity weakness or numbness, sick contact, skin rash, recent travel, prior history of the same. Past Medical History:   Diagnosis Date    Anxiety and depression     Diabetes ([facility]     Hypertension     Obese     Seizure disorder ([facility]        No past surgical history on file.       Family History:   Problem Relation Age of Onset    Lung Cancer Mother     No Known Problems Father        Social History     Socioeconomic History    Marital status: LEGALLY      Spouse name: Not on file    Number of children: Not on file    Years of education: Not on file    Highest education level: Not on file   Occupational History    Not on file   Social Needs    Financial resource strain: Not on file    Food insecurity     Worry: Not on file     Inability: Not on file    Transportation needs     Medical: Not on file     Non-medical: Not on file   Tobacco Use    Smoking status: Unknown If Ever Smoked   Substance and Sexual Activity    Alcohol use: Not on file     Comment: Unknown    Drug use: Not on file    Sexual activity: Not on file   Lifestyle    Physical activity     Days per week: Not on file     Minutes per session: Not on file    Stress: Not on file   Relationships    Social connections     Talks on phone: Not on file     Gets together: Not on file     Attends Gnosticism service: Not on file     Active member of club or organization: Not on file     Attends meetings of clubs or organizations: Not on file     Relationship status: Not on file   Kishan Ibarra Intimate partner violence     Fear of current or ex partner: Not on file     Emotionally abused: Not on file     Physically abused: Not on file     Forced sexual activity: Not on file   Other Topics Concern    Not on file   Social History Narrative    Not on file         ALLERGIES: Patient has no known allergies. Review of Systems   All other systems reviewed and are negative. Vitals:    05/25/20 1822 05/25/20 1920   BP: 145/82 118/80   Pulse: (!) 104    Resp: 20    Temp: (!) 102.7 °F (39.3 °C)    SpO2: 95% 97%   Weight: 84.4 kg (186 lb)    Height: 5' 4\" (1.626 m)             Physical Exam  Vitals signs and nursing note reviewed. Exam conducted with a chaperone present. CONSTITUTIONAL: Well-appearing; well-nourished; in no apparent distress  HEAD: Normocephalic; atraumatic  EYES: PERRL; EOM intact; conjunctiva and sclera are clear bilaterally. ENT: No rhinorrhea; normal pharynx with no tonsillar hypertrophy; mucous membranes pink/moist, no erythema, no exudate. NECK: Supple; non-tender; no cervical lymphadenopathy  CARD: Normal S1, S2; no murmurs, rubs, or gallops. Regular rate and rhythm. RESP: Normal respiratory effort; breath sounds clear and equal bilaterally; no wheezes, rhonchi, or rales. ABD: Normal bowel sounds; non-distended; non-tender; no palpable organomegaly, no masses, no bruits. Back Exam: Normal inspection; no vertebral point tenderness, no CVA tenderness. Normal range of motion. EXT: Normal ROM in all four extremities; non-tender to palpation; no swelling or deformity; distal pulses are normal, no edema. SKIN: Warm; dry; no rash.   NEURO:Alert and oriented x 3, coherent, VERONICA-XII grossly intact, sensory and motor are non-focal.        MDM  Number of Diagnoses or Management Options  Acute febrile illness:   Suspected COVID-19 virus infection:   Diagnosis management comments: Assessment: 45-year-old female who presents to the ED with intermittent episodes of fever and chills x2 days and history of diabetes. Differential diagnosis include sepsis, dehydration, pneumonia rule out COVID infection. The patient is hemodynamically stable. Plan: Lab/EKG/chest x-ray/antipyretic/serial exam/ Monitor and Reevaluate. Amount and/or Complexity of Data Reviewed  Clinical lab tests: ordered and reviewed  Tests in the radiology section of CPT®: ordered and reviewed  Tests in the medicine section of CPT®: reviewed and ordered  Discussion of test results with the performing providers: yes  Decide to obtain previous medical records or to obtain history from someone other than the patient: yes  Obtain history from someone other than the patient: yes  Review and summarize past medical records: yes  Discuss the patient with other providers: yes  Independent visualization of images, tracings, or specimens: yes    Risk of Complications, Morbidity, and/or Mortality  Presenting problems: moderate  Diagnostic procedures: moderate  Management options: moderate    Critical Care  Total time providing critical care: (Total critical care time spent exclusive of procedures: 45 minutes)    Patient Progress  Patient progress: stable         Procedures         ED EKG interpretation:  Rhythm: normal sinus rhythm; and regular . Rate (approx.): 97; Axis: left axis deviation; P wave: normal; QRS interval: normal ; ST/T wave: non-specific changes; in  Lead: Diffusely; inferior infarct; Other findings: abnormal ekg. This EKG was interpreted by Savage Hollis MD,ED Provider. XRAY INTERPRETATION (ED MD)  Chest Xray  No acute process seen. Normal heart size. No bony abnormalities. No infiltrate. Sameera Brown MD 8:59 PM    Progress Note:   Pt has been reexamined by Savage Hollis MD. Pt is feeling much better. Symptoms have improved. All available results have been reviewed with pt and any available family. Pt understands sx, dx, and tx in ED. Care plan has been outlined and questions have been answered.  Pt is ready to go home. Will send home on acute febrile illness and COVID-19 infection instruction. Prescription for Z-Ghulam, Tylenol, ibuprofen, vitamin C and zinc. Outpatient referral with PCP as needed. Written by Ravi Draper MD,9:40 PM    .   .

## 2020-05-26 NOTE — PROGRESS NOTES
Was unable to contact pt. Both numbers listed were wrong numbers and her emergency contact # was invalid. Three attempts were made to contact pt for transitions of care regarding [facility] ED 5/25/20 for chief complaint - fatigue, fever and discharging diagnosis of febrile illness, suspected covid-19 virus infection. no plan for follow up at this time based on severity of symptoms and risk factors.

## 2020-05-26 NOTE — ED NOTES
Patient discharged by provider. D/C instructions given. Pt educated to take r medications as instructed for management at home. Pt verbalized understanding, verbalized no questions. PIV removed, pressure dressing applied. Pt ambulated out of ER without difficulty, NAD.   Patient Vitals for the past 4 hrs:   Temp Pulse Resp BP SpO2   05/25/20 2110 100.4 °F (38 °C) 96  104/70 97 %   05/25/20 1920    118/80 97 %   05/25/20 1822 (!) 102.7 °F (39.3 °C) (!) 104 20 145/82 95 %

## 2020-05-26 NOTE — DISCHARGE INSTRUCTIONS
Patient Education        Learning About Fever  What is a fever? A fever is a high body temperature. It's one way your body fights being sick. A fever shows that the body is responding to infection or other illnesses, both minor and severe. A fever is a symptom, not an illness by itself. A fever can be a sign that you are ill, but most fevers are not caused by a serious problem. You may have a fever with a minor illness, such as a cold. But sometimes a very serious infection may cause little or no fever. It is important to look at other symptoms, other conditions you have, and how you feel in general. In children, notice how they act and see what symptoms they complain of. What is a normal body temperature? A normal body temperature is about 98. 6ºF. Some people have a normal temperature that is a little higher or a little lower than this. Your temperature may be a little lower in the morning than it is later in the day. It may go up during hot weather or when you exercise, wear heavy clothes, or take a hot bath. Your temperature may also be different depending on how you take it. A temperature taken in the mouth (oral) or under the arm may be a little lower than your core temperature (rectal). What is a fever temperature? A core temperature of 100.4°F or above is considered a fever. What can cause a fever? A fever may be caused by:  · Infections. This is the most common cause of a fever. Examples of infections that can cause a fever include the flu, a kidney infection, or pneumonia. · Some medicines. · Severe trauma or injury, such as a heart attack, stroke, heatstroke, or burns. · Other medical conditions, such as arthritis and some cancers. How can you treat a fever at home? · Ask your doctor if you can take an over-the-counter pain medicine, such as acetaminophen (Tylenol), ibuprofen (Advil, Motrin), or naproxen (Aleve). Be safe with medicines. Read and follow all instructions on the label.   · To prevent dehydration, drink plenty of fluids. Choose water and other caffeine-free clear liquids until you feel better. If you have kidney, heart, or liver disease and have to limit fluids, talk with your doctor before you increase the amount of fluids you drink. Follow-up care is a key part of your treatment and safety. Be sure to make and go to all appointments, and call your doctor if you are having problems. It's also a good idea to know your test results and keep a list of the medicines you take. Where can you learn more? Go to http://mk-danny.info/  Enter G732 in the search box to learn more about \"Learning About Fever. \"  Current as of: June 26, 2019Content Version: 12.4  © 0811-2458 [employer]. Care instructions adapted under license by [employer] (which disclaims liability or warranty for this information). If you have questions about a medical condition or this instruction, always ask your healthcare professional. [address] any warranty or liability for your use of this information.

## 2020-05-27 LAB
BACTERIA SPEC CULT: NORMAL
CC UR VC: NORMAL
SARS-COV-2, COV2NT: DETECTED
SERVICE CMNT-IMP: NORMAL
SOURCE, COVRS: ABNORMAL

## 2020-05-27 NOTE — ED NOTES
Received call from [id number] [address] qureshi that patient's COVID-19 test is positive. Called patient's home phone, no answer, voicemail message was for a different name so did not leave a message. I also called the mobile phone listed, me and answered and reported that it was a wrong number and that he does not know the listed patient.

## 2020-05-27 NOTE — PROGRESS NOTES
Attempted to reach patient. Home number is office that does not match patient name. Mobile number is incorrect number per individual that answered phone.   Emergency contact number does not work  Will send registered letter

## 2020-05-30 LAB
BACTERIA SPEC CULT: NORMAL
SERVICE CMNT-IMP: NORMAL

## 2020-10-23 ENCOUNTER — APPOINTMENT (OUTPATIENT)
Dept: GENERAL RADIOLOGY | Age: 70
DRG: 641 | End: 2020-10-23
Attending: EMERGENCY MEDICINE
Payer: MEDICARE

## 2020-10-23 ENCOUNTER — HOSPITAL ENCOUNTER (INPATIENT)
Age: 70
LOS: 2 days | Discharge: HOME | DRG: 641 | End: 2020-10-26
Attending: EMERGENCY MEDICINE | Admitting: FAMILY MEDICINE
Payer: MEDICARE

## 2020-10-23 ENCOUNTER — APPOINTMENT (OUTPATIENT)
Dept: CT IMAGING | Age: 70
DRG: 641 | End: 2020-10-23
Attending: EMERGENCY MEDICINE
Payer: MEDICARE

## 2020-10-23 DIAGNOSIS — R53.1 WEAKNESS: Primary | ICD-10-CM

## 2020-10-23 DIAGNOSIS — I16.0 HYPERTENSIVE URGENCY: ICD-10-CM

## 2020-10-23 DIAGNOSIS — G40.909 SEIZURE DISORDER (HCC): ICD-10-CM

## 2020-10-23 DIAGNOSIS — E87.20 LACTIC ACID ACIDOSIS: ICD-10-CM

## 2020-10-23 LAB
ALBUMIN SERPL-MCNC: 3.6 G/DL (ref 3.5–5)
ALBUMIN/GLOB SERPL: 0.8 {RATIO} (ref 1.1–2.2)
ALP SERPL-CCNC: 84 U/L (ref 45–117)
ALT SERPL-CCNC: 33 U/L (ref 12–78)
ANION GAP SERPL CALC-SCNC: 13 MMOL/L (ref 5–15)
AST SERPL-CCNC: 38 U/L (ref 15–37)
BASOPHILS # BLD: 0 K/UL (ref 0–0.1)
BASOPHILS NFR BLD: 1 % (ref 0–1)
BILIRUB SERPL-MCNC: 0.7 MG/DL (ref 0.2–1)
BUN SERPL-MCNC: 4 MG/DL (ref 6–20)
BUN/CREAT SERPL: 5 (ref 12–20)
CALCIUM SERPL-MCNC: 8.8 MG/DL (ref 8.5–10.1)
CHLORIDE SERPL-SCNC: 105 MMOL/L (ref 97–108)
CO2 SERPL-SCNC: 21 MMOL/L (ref 21–32)
COMMENT, HOLDF: NORMAL
CREAT SERPL-MCNC: 0.88 MG/DL (ref 0.55–1.02)
CRP SERPL-MCNC: <0.29 MG/DL (ref 0–0.6)
DIFFERENTIAL METHOD BLD: ABNORMAL
EOSINOPHIL # BLD: 0.1 K/UL (ref 0–0.4)
EOSINOPHIL NFR BLD: 2 % (ref 0–7)
ERYTHROCYTE [DISTWIDTH] IN BLOOD BY AUTOMATED COUNT: 13 % (ref 11.5–14.5)
GLOBULIN SER CALC-MCNC: 4.7 G/DL (ref 2–4)
GLUCOSE SERPL-MCNC: 139 MG/DL (ref 65–100)
HCT VFR BLD AUTO: 44.8 % (ref 35–47)
HGB BLD-MCNC: 15.5 G/DL (ref 11.5–16)
IMM GRANULOCYTES # BLD AUTO: 0 K/UL (ref 0–0.04)
IMM GRANULOCYTES NFR BLD AUTO: 0 % (ref 0–0.5)
LACTATE SERPL-SCNC: 4.4 MMOL/L (ref 0.4–2)
LYMPHOCYTES # BLD: 2.1 K/UL (ref 0.8–3.5)
LYMPHOCYTES NFR BLD: 26 % (ref 12–49)
MCH RBC QN AUTO: 34.7 PG (ref 26–34)
MCHC RBC AUTO-ENTMCNC: 34.6 G/DL (ref 30–36.5)
MCV RBC AUTO: 100.2 FL (ref 80–99)
MONOCYTES # BLD: 0.4 K/UL (ref 0–1)
MONOCYTES NFR BLD: 5 % (ref 5–13)
NEUTS SEG # BLD: 5.4 K/UL (ref 1.8–8)
NEUTS SEG NFR BLD: 66 % (ref 32–75)
NRBC # BLD: 0 K/UL (ref 0–0.01)
NRBC BLD-RTO: 0 PER 100 WBC
PLATELET # BLD AUTO: 171 K/UL (ref 150–400)
PMV BLD AUTO: 9.1 FL (ref 8.9–12.9)
POTASSIUM SERPL-SCNC: 3.4 MMOL/L (ref 3.5–5.1)
PROCALCITONIN SERPL-MCNC: <0.05 NG/ML
PROT SERPL-MCNC: 8.3 G/DL (ref 6.4–8.2)
RBC # BLD AUTO: 4.47 M/UL (ref 3.8–5.2)
SAMPLES BEING HELD,HOLD: NORMAL
SODIUM SERPL-SCNC: 139 MMOL/L (ref 136–145)
TROPONIN I SERPL-MCNC: <0.05 NG/ML
WBC # BLD AUTO: 8.1 K/UL (ref 3.6–11)

## 2020-10-23 PROCEDURE — 77030019905 HC CATH URETH INTMIT MDII -A

## 2020-10-23 PROCEDURE — 93005 ELECTROCARDIOGRAM TRACING: CPT

## 2020-10-23 PROCEDURE — 83605 ASSAY OF LACTIC ACID: CPT

## 2020-10-23 PROCEDURE — 80053 COMPREHEN METABOLIC PANEL: CPT

## 2020-10-23 PROCEDURE — 71045 X-RAY EXAM CHEST 1 VIEW: CPT

## 2020-10-23 PROCEDURE — 36415 COLL VENOUS BLD VENIPUNCTURE: CPT

## 2020-10-23 PROCEDURE — 99285 EMERGENCY DEPT VISIT HI MDM: CPT

## 2020-10-23 PROCEDURE — 74011000636 HC RX REV CODE- 636: Performed by: RADIOLOGY

## 2020-10-23 PROCEDURE — 51701 INSERT BLADDER CATHETER: CPT

## 2020-10-23 PROCEDURE — 85025 COMPLETE CBC W/AUTO DIFF WBC: CPT

## 2020-10-23 PROCEDURE — 84145 PROCALCITONIN (PCT): CPT

## 2020-10-23 PROCEDURE — 87040 BLOOD CULTURE FOR BACTERIA: CPT

## 2020-10-23 PROCEDURE — 77030038269 HC DRN EXT URIN PURWCK BARD -A

## 2020-10-23 PROCEDURE — 86140 C-REACTIVE PROTEIN: CPT

## 2020-10-23 PROCEDURE — 84484 ASSAY OF TROPONIN QUANT: CPT

## 2020-10-23 PROCEDURE — 74011250636 HC RX REV CODE- 250/636: Performed by: EMERGENCY MEDICINE

## 2020-10-23 RX ADMIN — SODIUM CHLORIDE 1000 ML: 900 INJECTION, SOLUTION INTRAVENOUS at 23:54

## 2020-10-23 RX ADMIN — SODIUM CHLORIDE 1000 ML: 900 INJECTION, SOLUTION INTRAVENOUS at 23:55

## 2020-10-23 RX ADMIN — IOPAMIDOL 100 ML: 755 INJECTION, SOLUTION INTRAVENOUS at 23:44

## 2020-10-24 ENCOUNTER — APPOINTMENT (OUTPATIENT)
Dept: CT IMAGING | Age: 70
DRG: 641 | End: 2020-10-24
Attending: EMERGENCY MEDICINE
Payer: MEDICARE

## 2020-10-24 PROBLEM — E87.20 LACTIC ACIDOSIS: Status: ACTIVE | Noted: 2020-10-24

## 2020-10-24 LAB
APPEARANCE UR: CLEAR
BACTERIA URNS QL MICRO: NEGATIVE /HPF
BILIRUB UR QL: NEGATIVE
COLOR UR: ABNORMAL
EPITH CASTS URNS QL MICRO: ABNORMAL /LPF
EST. AVERAGE GLUCOSE BLD GHB EST-MCNC: 134 MG/DL
GLUCOSE BLD STRIP.AUTO-MCNC: 114 MG/DL (ref 65–100)
GLUCOSE BLD STRIP.AUTO-MCNC: 130 MG/DL (ref 65–100)
GLUCOSE BLD STRIP.AUTO-MCNC: 130 MG/DL (ref 65–100)
GLUCOSE BLD STRIP.AUTO-MCNC: 261 MG/DL (ref 65–100)
GLUCOSE UR STRIP.AUTO-MCNC: NEGATIVE MG/DL
HBA1C MFR BLD: 6.3 % (ref 4–5.6)
HGB UR QL STRIP: NEGATIVE
KETONES UR QL STRIP.AUTO: NEGATIVE MG/DL
LACTATE BLD-SCNC: 3.94 MMOL/L (ref 0.4–2)
LACTATE BLD-SCNC: 4.53 MMOL/L (ref 0.4–2)
LACTATE SERPL-SCNC: 1.4 MMOL/L (ref 0.4–2)
LACTATE SERPL-SCNC: 2.6 MMOL/L (ref 0.4–2)
LEUKOCYTE ESTERASE UR QL STRIP.AUTO: NEGATIVE
MAGNESIUM SERPL-MCNC: 1.9 MG/DL (ref 1.6–2.4)
NITRITE UR QL STRIP.AUTO: NEGATIVE
PH UR STRIP: 5.5 [PH] (ref 5–8)
PROT UR STRIP-MCNC: NEGATIVE MG/DL
RBC #/AREA URNS HPF: ABNORMAL /HPF (ref 0–5)
SERVICE CMNT-IMP: ABNORMAL
SP GR UR REFRACTOMETRY: 1 (ref 1–1.03)
UA: UC IF INDICATED,UAUC: ABNORMAL
UROBILINOGEN UR QL STRIP.AUTO: 0.2 EU/DL (ref 0.2–1)
WBC URNS QL MICRO: ABNORMAL /HPF (ref 0–4)
YEAST URNS QL MICRO: PRESENT

## 2020-10-24 PROCEDURE — 74011250637 HC RX REV CODE- 250/637: Performed by: FAMILY MEDICINE

## 2020-10-24 PROCEDURE — 97530 THERAPEUTIC ACTIVITIES: CPT

## 2020-10-24 PROCEDURE — 82962 GLUCOSE BLOOD TEST: CPT

## 2020-10-24 PROCEDURE — 65660000000 HC RM CCU STEPDOWN

## 2020-10-24 PROCEDURE — 97162 PT EVAL MOD COMPLEX 30 MIN: CPT

## 2020-10-24 PROCEDURE — 36415 COLL VENOUS BLD VENIPUNCTURE: CPT

## 2020-10-24 PROCEDURE — 74011636637 HC RX REV CODE- 636/637: Performed by: FAMILY MEDICINE

## 2020-10-24 PROCEDURE — 71260 CT THORAX DX C+: CPT

## 2020-10-24 PROCEDURE — 96374 THER/PROPH/DIAG INJ IV PUSH: CPT

## 2020-10-24 PROCEDURE — 97116 GAIT TRAINING THERAPY: CPT

## 2020-10-24 PROCEDURE — 77030038269 HC DRN EXT URIN PURWCK BARD -A

## 2020-10-24 PROCEDURE — 81001 URINALYSIS AUTO W/SCOPE: CPT

## 2020-10-24 PROCEDURE — 83605 ASSAY OF LACTIC ACID: CPT

## 2020-10-24 PROCEDURE — 83735 ASSAY OF MAGNESIUM: CPT

## 2020-10-24 PROCEDURE — 87635 SARS-COV-2 COVID-19 AMP PRB: CPT

## 2020-10-24 PROCEDURE — 74011250636 HC RX REV CODE- 250/636: Performed by: EMERGENCY MEDICINE

## 2020-10-24 PROCEDURE — 2709999900 HC NON-CHARGEABLE SUPPLY

## 2020-10-24 PROCEDURE — 96375 TX/PRO/DX INJ NEW DRUG ADDON: CPT

## 2020-10-24 PROCEDURE — 74011250636 HC RX REV CODE- 250/636: Performed by: FAMILY MEDICINE

## 2020-10-24 PROCEDURE — 83036 HEMOGLOBIN GLYCOSYLATED A1C: CPT

## 2020-10-24 RX ORDER — ALOGLIPTIN 12.5 MG/1
25 TABLET, FILM COATED ORAL DAILY
Status: DISCONTINUED | OUTPATIENT
Start: 2020-10-24 | End: 2020-10-26 | Stop reason: HOSPADM

## 2020-10-24 RX ORDER — ACETAMINOPHEN 325 MG/1
650 TABLET ORAL
Status: DISCONTINUED | OUTPATIENT
Start: 2020-10-24 | End: 2020-10-26 | Stop reason: HOSPADM

## 2020-10-24 RX ORDER — PANTOPRAZOLE SODIUM 40 MG/1
40 TABLET, DELAYED RELEASE ORAL DAILY
Status: DISCONTINUED | OUTPATIENT
Start: 2020-10-25 | End: 2020-10-24

## 2020-10-24 RX ORDER — SODIUM CHLORIDE 0.9 % (FLUSH) 0.9 %
5-40 SYRINGE (ML) INJECTION EVERY 8 HOURS
Status: DISCONTINUED | OUTPATIENT
Start: 2020-10-24 | End: 2020-10-26 | Stop reason: HOSPADM

## 2020-10-24 RX ORDER — ATORVASTATIN CALCIUM 20 MG/1
20 TABLET, FILM COATED ORAL DAILY
Status: DISCONTINUED | OUTPATIENT
Start: 2020-10-25 | End: 2020-10-24

## 2020-10-24 RX ORDER — LANOLIN ALCOHOL/MO/W.PET/CERES
400 CREAM (GRAM) TOPICAL DAILY
Status: DISCONTINUED | OUTPATIENT
Start: 2020-10-25 | End: 2020-10-24

## 2020-10-24 RX ORDER — POTASSIUM CHLORIDE 750 MG/1
40 TABLET, FILM COATED, EXTENDED RELEASE ORAL DAILY
Status: DISCONTINUED | OUTPATIENT
Start: 2020-10-25 | End: 2020-10-25

## 2020-10-24 RX ORDER — SODIUM CHLORIDE 9 MG/ML
100 INJECTION, SOLUTION INTRAVENOUS CONTINUOUS
Status: DISCONTINUED | OUTPATIENT
Start: 2020-10-24 | End: 2020-10-25

## 2020-10-24 RX ORDER — SERTRALINE HYDROCHLORIDE 50 MG/1
100 TABLET, FILM COATED ORAL DAILY
Status: DISCONTINUED | OUTPATIENT
Start: 2020-10-24 | End: 2020-10-26 | Stop reason: HOSPADM

## 2020-10-24 RX ORDER — ALOGLIPTIN 12.5 MG/1
25 TABLET, FILM COATED ORAL DAILY
Status: DISCONTINUED | OUTPATIENT
Start: 2020-10-25 | End: 2020-10-24

## 2020-10-24 RX ORDER — SERTRALINE HYDROCHLORIDE 50 MG/1
100 TABLET, FILM COATED ORAL DAILY
Status: DISCONTINUED | OUTPATIENT
Start: 2020-10-25 | End: 2020-10-24

## 2020-10-24 RX ORDER — ASPIRIN 81 MG/1
81 TABLET ORAL DAILY
Status: ON HOLD | COMMUNITY
End: 2020-10-26 | Stop reason: SDUPTHER

## 2020-10-24 RX ORDER — LORAZEPAM 2 MG/ML
1 INJECTION INTRAMUSCULAR
Status: COMPLETED | OUTPATIENT
Start: 2020-10-24 | End: 2020-10-24

## 2020-10-24 RX ORDER — ATORVASTATIN CALCIUM 20 MG/1
20 TABLET, FILM COATED ORAL DAILY
Status: DISCONTINUED | OUTPATIENT
Start: 2020-10-24 | End: 2020-10-26 | Stop reason: HOSPADM

## 2020-10-24 RX ORDER — LEVETIRACETAM 500 MG/1
500 TABLET ORAL 2 TIMES DAILY
Status: DISCONTINUED | OUTPATIENT
Start: 2020-10-24 | End: 2020-10-26 | Stop reason: HOSPADM

## 2020-10-24 RX ORDER — DEXTROSE 50 % IN WATER (D50W) INTRAVENOUS SYRINGE
25-50 AS NEEDED
Status: DISCONTINUED | OUTPATIENT
Start: 2020-10-24 | End: 2020-10-26 | Stop reason: HOSPADM

## 2020-10-24 RX ORDER — AMLODIPINE BESYLATE 5 MG/1
10 TABLET ORAL DAILY
Status: DISCONTINUED | OUTPATIENT
Start: 2020-10-24 | End: 2020-10-26 | Stop reason: HOSPADM

## 2020-10-24 RX ORDER — ENOXAPARIN SODIUM 100 MG/ML
40 INJECTION SUBCUTANEOUS DAILY
Status: DISCONTINUED | OUTPATIENT
Start: 2020-10-24 | End: 2020-10-26 | Stop reason: HOSPADM

## 2020-10-24 RX ORDER — HYDRALAZINE HYDROCHLORIDE 25 MG/1
25 TABLET, FILM COATED ORAL ONCE
Status: COMPLETED | OUTPATIENT
Start: 2020-10-24 | End: 2020-10-24

## 2020-10-24 RX ORDER — ONDANSETRON 2 MG/ML
4 INJECTION INTRAMUSCULAR; INTRAVENOUS
Status: DISCONTINUED | OUTPATIENT
Start: 2020-10-24 | End: 2020-10-26 | Stop reason: HOSPADM

## 2020-10-24 RX ORDER — LANOLIN ALCOHOL/MO/W.PET/CERES
400 CREAM (GRAM) TOPICAL DAILY
Status: DISCONTINUED | OUTPATIENT
Start: 2020-10-24 | End: 2020-10-25

## 2020-10-24 RX ORDER — HYDRALAZINE HYDROCHLORIDE 20 MG/ML
20 INJECTION INTRAMUSCULAR; INTRAVENOUS ONCE
Status: COMPLETED | OUTPATIENT
Start: 2020-10-24 | End: 2020-10-24

## 2020-10-24 RX ORDER — AMLODIPINE BESYLATE 5 MG/1
10 TABLET ORAL DAILY
Status: DISCONTINUED | OUTPATIENT
Start: 2020-10-25 | End: 2020-10-24

## 2020-10-24 RX ORDER — PANTOPRAZOLE SODIUM 40 MG/1
40 TABLET, DELAYED RELEASE ORAL DAILY
Status: DISCONTINUED | OUTPATIENT
Start: 2020-10-24 | End: 2020-10-26 | Stop reason: HOSPADM

## 2020-10-24 RX ORDER — SODIUM CHLORIDE 0.9 % (FLUSH) 0.9 %
5-40 SYRINGE (ML) INJECTION AS NEEDED
Status: DISCONTINUED | OUTPATIENT
Start: 2020-10-24 | End: 2020-10-26 | Stop reason: HOSPADM

## 2020-10-24 RX ORDER — POLYETHYLENE GLYCOL 3350 17 G/17G
17 POWDER, FOR SOLUTION ORAL DAILY PRN
Status: DISCONTINUED | OUTPATIENT
Start: 2020-10-24 | End: 2020-10-26 | Stop reason: HOSPADM

## 2020-10-24 RX ORDER — MAGNESIUM SULFATE 100 %
4 CRYSTALS MISCELLANEOUS AS NEEDED
Status: DISCONTINUED | OUTPATIENT
Start: 2020-10-24 | End: 2020-10-26 | Stop reason: HOSPADM

## 2020-10-24 RX ORDER — INSULIN LISPRO 100 [IU]/ML
INJECTION, SOLUTION INTRAVENOUS; SUBCUTANEOUS
Status: DISCONTINUED | OUTPATIENT
Start: 2020-10-24 | End: 2020-10-26 | Stop reason: HOSPADM

## 2020-10-24 RX ADMIN — INSULIN LISPRO 5 UNITS: 100 INJECTION, SOLUTION INTRAVENOUS; SUBCUTANEOUS at 17:35

## 2020-10-24 RX ADMIN — LORAZEPAM 1 MG: 2 INJECTION INTRAMUSCULAR; INTRAVENOUS at 03:10

## 2020-10-24 RX ADMIN — SODIUM CHLORIDE 100 ML/HR: 900 INJECTION, SOLUTION INTRAVENOUS at 16:39

## 2020-10-24 RX ADMIN — HYDRALAZINE HYDROCHLORIDE 20 MG: 20 INJECTION INTRAMUSCULAR; INTRAVENOUS at 00:43

## 2020-10-24 RX ADMIN — ENOXAPARIN SODIUM 40 MG: 40 INJECTION SUBCUTANEOUS at 10:49

## 2020-10-24 RX ADMIN — HYDRALAZINE HYDROCHLORIDE 25 MG: 25 TABLET, FILM COATED ORAL at 22:18

## 2020-10-24 RX ADMIN — MAGNESIUM GLUCONATE 500 MG ORAL TABLET 400 MG: 500 TABLET ORAL at 13:00

## 2020-10-24 RX ADMIN — LEVETIRACETAM 500 MG: 500 TABLET ORAL at 13:00

## 2020-10-24 RX ADMIN — ALOGLIPTIN 25 MG: 12.5 TABLET, FILM COATED ORAL at 13:00

## 2020-10-24 RX ADMIN — Medication 10 ML: at 21:04

## 2020-10-24 RX ADMIN — ACETAMINOPHEN 650 MG: 325 TABLET ORAL at 22:18

## 2020-10-24 RX ADMIN — Medication 10 ML: at 13:02

## 2020-10-24 RX ADMIN — SODIUM CHLORIDE 100 ML/HR: 900 INJECTION, SOLUTION INTRAVENOUS at 04:54

## 2020-10-24 RX ADMIN — Medication 10 ML: at 06:00

## 2020-10-24 RX ADMIN — AMLODIPINE BESYLATE 10 MG: 5 TABLET ORAL at 13:00

## 2020-10-24 RX ADMIN — SERTRALINE HYDROCHLORIDE 100 MG: 50 TABLET ORAL at 13:00

## 2020-10-24 RX ADMIN — LEVETIRACETAM 500 MG: 500 TABLET ORAL at 17:35

## 2020-10-24 RX ADMIN — PANTOPRAZOLE SODIUM 40 MG: 40 TABLET, DELAYED RELEASE ORAL at 13:00

## 2020-10-24 RX ADMIN — ATORVASTATIN CALCIUM 20 MG: 20 TABLET, FILM COATED ORAL at 13:00

## 2020-10-24 NOTE — ED NOTES
Bedside and Verbal shift change report given to Kate Schmitt RN (oncoming nurse) by Karlos Mcdaniel RN (offgoing nurse). Report included the following information Kardex, ED Summary and Recent Results.

## 2020-10-24 NOTE — PROGRESS NOTES
TRANSFER - IN REPORT:    05.35: Verbal report received from Norma Bell RN to Parminder Cloud RN on Tomas Ayon  being received from ED for routine progression of care      Report consisted of patients Situation, Background, Assessment and   Recommendations(SBAR). Information from the following report(s) SBAR, Kardex, Intake/Output, MAR, Recent Results, Med Rec Status and Cardiac Rhythm sinus tach was reviewed with the receiving nurse. Opportunity for questions and clarification was provided. 0600: Assessment completed upon patients arrival to unit and care assumed. Whole body tremors/shakiness noted while ambulating from stretcher to bed. Patient had soiled the stretcher with urine. CHG bath given. 0830: Bedside and Verbal shift change report given to MYRIAM Pelaez (oncoming nurse) by Joe Tinsley RN (offgoing nurse). Report included the following information SBAR, Kardex, Intake/Output, MAR, Recent Results, Med Rec Status and Cardiac Rhythm sinus tach.

## 2020-10-24 NOTE — PROGRESS NOTES
0830 Bedside and Verbal shift change report given to Latanya Mosquera RN (oncoming nurse) by Kourtney Rios RN (offgoing nurse). Report included the following information SBAR and Kardex. 1341 called lactic level 1.4 and mag 1.9 to Dr Ramona Rodriguez orders noted to discontinue further lactic levels. 1915 Bedside and Verbal shift change report given to [address] [place] [address] (oncoming nurse) by Latanya Mar RN (offgoing nurse). Report included the following information SBAR and Kardex.

## 2020-10-24 NOTE — ED TRIAGE NOTES
Pt arrives via EMS with mask c/o chest pressure and shaking starting 10/22. Reports pain and shaking worsening throughout the day today. Reports difficulty speaking d/t shaking.   Hx of stroke, 324 ASA in route

## 2020-10-24 NOTE — PROGRESS NOTES
10/24/2020  Reason for Admission:   Lactic Acidosis                   RUR Score:          11% low           Plan for utilizing home health:     Not at this time, patient has never had before     PCP: First and Last name:  Jazmín Quigley MD   Name of Practice:    Are you a current patient: Yes/No:  Yes   Approximate date of last visit: About 1 year ago   Can you participate in a virtual visit with your PCP:  Yes with some help from family                    Current Advanced Directive/Advance Care Plan:  Full code, no ACP on file, ACP note completed. NOK would be her children. Transition of Care Plan:                     I completed the assessment with the patient over the phone. No patient contact at this time due to droplet plus precautions. Patient lives with her son in a 2 story home with about 3 steps to enter. She said she did need some help with ADLs prior to admission and her son helps her out with that along with providing transportation for her. She has never had home health in the past and does have a cane at home. Her emergency contact is her daughter Karthikeyan, who can be reached at [phone]. Patient's son will be picking her up when she is ready for discharge. Patient states she usually gets her prescriptions at [facility] and they are sometimes covered by insurance, but states she has had trouble affording them in the past. I will drop GoodRx off with the patient's nurse to be taken into the room. Patient sees Dr. Mitchel Beavers as her PCP and saw her for a visit about a year ago. No other discharge needs at this time, will continue to follow. Transition of Care Plan  1. Continue treatment plan  2. Patient will likely return home with family assistance  3. Son to transport  4. Will provide GoodRx to patient  5. CM will continue to follow    Care Management Interventions  PCP Verified by CM: Yes(Dr. Jazmín Quigley)  Mode of Transport at Discharge:  Other (see comment)(son)  Transition of Care Consult (CM Consult): Discharge Planning  MyChart Signup: No  Discharge Durable Medical Equipment: No  Physical Therapy Consult: Yes  Occupational Therapy Consult: Yes  Speech Therapy Consult: No  Current Support Network: Family Lives Nearby  Confirm Follow Up Transport: Family  Discharge Location  Discharge Placement: Home with family assistance    SHONA Orosco

## 2020-10-24 NOTE — PROGRESS NOTES
[facility]: MED RECONCILIATION    Comments/Recommendations:   Medication non-compliance? Patient states that she only takes blood pressure medicine (amlodipine) and ASA, which she ran out of 2 weeks ago. She states that her physician took her off of Levetiracetam 2 years ago (no history of medication fill in Rx Query). Patient states she does not have seizures. She also states her physician stopped her Atorvastatin about 2 weeks ago. Patient states that she was prescribed Januvia, but never used it. MD (Dr. Vitaly Sorto) is aware of medications not taken and will restart. Medication(s) ADDED to PTA list:  none    Medication(s) REMOVED from PTA list:  Atorvastatin 20 mg daily  Sertraline 100 mg daily  Januvia 100 mg daily  Levetiracetam 500 mg BID  Pantoprazole 40 mg  Ascorbic acid 250 mg, Mag oxide 400 mg, KCl 40 mEq, Zinc 100 mg daily  Acetaminophen 1000 mg, Ibuprofen prn    Medication(s) ADJUSTED on PTA list:  none    Information obtained from: Patient      Allergies: Patient has no known allergies. Prior to Admission Medications:     Prior to Admission Medications   Prescriptions Last Dose Informant Patient Reported? Taking? amLODIPine (NORVASC) 10 mg tablet 10/9/2020 at Unknown time Self Yes Yes   Sig: Take 10 mg by mouth daily. aspirin delayed-release 81 mg tablet 10/9/2020  Yes Yes   Sig: Take 81 mg by mouth daily.       Facility-Administered Medications: None          Dayne Gilbert, PHARMD

## 2020-10-24 NOTE — PROGRESS NOTES
Problem: Mobility Impaired (Adult and Pediatric)  Goal: *Acute Goals and Plan of Care (Insert Text)  Description: FUNCTIONAL STATUS PRIOR TO ADMISSION: Patient was modified independent using a single point cane for functional mobility. HOME SUPPORT PRIOR TO ADMISSION: The patient lived alone with family to provide assistance. Physical Therapy Goals  Initiated 10/24/2020  1. Patient will move from supine to sit and sit to supine  in bed with modified independence within 7 day(s). 2.  Patient will transfer from bed to chair and chair to bed with modified independence using the least restrictive device within 7 day(s). 3.  Patient will perform sit to stand with modified independence within 7 day(s). 4.  Patient will ambulate with modified independence for 150 feet with the least restrictive device within 7 day(s). 5.  Patient will ascend/descend 3 stairs with handrail(s) with minimal assistance/contact guard assist within 7 day(s). Outcome: Progressing Towards Goal  PHYSICAL THERAPY EVALUATION  Patient: Shira Castillo (34 y.o. female)  Date: 10/24/2020  Primary Diagnosis: Lactic acidosis [E87.2]       Precautions:   Fall - Droplet +/ COVID rule out    ASSESSMENT  Based on the objective data described below, the patient presents with generally decreased strength, decreased endurance, impaired high level standing balance, mildly tremulous UEs and voice, and limited functional mobility on day 1 of admission with lactic acidosis. She presented to ED with complaints of shakiness and restlessness; medical history includes seizures. Pt offers good participation and tolerates activity without complaints. She performs multiple transfers using RW and ambulates in room with CGA to SBA. Recommend gait trial using cane at next treatment. Current Level of Function Impacting Discharge (mobility/balance): CGA    Functional Outcome Measure:   The patient scored 70 on the Barthel outcome measure which is indicative of 30% functional impairment. Other factors to consider for discharge: none additional     Patient will benefit from skilled therapy intervention to address the above noted impairments. PLAN :  Recommendations and Planned Interventions: bed mobility training, transfer training, gait training, therapeutic exercises, neuromuscular re-education, modalities, patient and family training/education, and therapeutic activities      Frequency/Duration: Patient will be followed by physical therapy:  5 times a week to address goals. Recommendation for discharge: (in order for the patient to meet his/her long term goals)  Physical therapy at least 2 days/week in the home AND ensure assist and/or supervision for safety with all functional mobility    This discharge recommendation:  Has not yet been discussed the attending provider and/or case management    IF patient discharges home will need the following DME: likely none         SUBJECTIVE:   Patient stated It's a lot better today, re: \"shakiness\" leading to admission. Pt received seated, agreeable to PT and cleared by RN. OBJECTIVE DATA SUMMARY:   HISTORY:    Past Medical History:   Diagnosis Date    Anxiety and depression     Diabetes ([facility]     Hypertension     Obese     Seizure disorder ([facility]    No past surgical history on file. Personal factors and/or comorbidities impacting plan of care: as above    Home Situation  Home Environment: Private residence  # Steps to Enter: 3  Rails to Enter: Yes  One/Two Story Residence: One story  Living Alone: No  Support Systems: Family member(s), Friends \ neighbors  Patient Expects to be Discharged to[de-identified] Private residence  Current DME Used/Available at Home: Cane, straight    EXAMINATION/PRESENTATION/DECISION MAKING:   Critical Behavior:  Neurologic State: Alert     Cognition: Follows commands     Hearing:   Auditory  Auditory Impairment: None  Skin:  LE exposed skin intact  Edema: none noted  Range Of Motion:  AROM: Generally decreased, functional                       Strength:    Strength: Generally decreased, functional                    Tone & Sensation:   Tone: Normal              Sensation: Intact               Coordination:  Coordination: Within functional limits       Functional Mobility:  Bed Mobility:           Scooting: Modified independent  Transfers:  Sit to Stand: Contact guard assistance;Stand-by assistance  Stand to Sit: Contact guard assistance;Stand-by assistance        Bed to Chair: Contact guard assistance;Stand-by assistance              Balance:   Sitting: Intact  Standing: With support  Standing - Static: Good  Standing - Dynamic : Good  Ambulation/Gait Training:  Distance (ft): 40 Feet (ft)  Assistive Device: Walker, rolling;Gait belt  Ambulation - Level of Assistance: Contact guard assistance;Stand-by assistance        Gait Abnormalities: Decreased step clearance        Base of Support: Widened     Speed/Nereida: Pace decreased (<100 feet/min)                     Additional time and cues for obstacle negotiation                   Functional Measure:  Barthel Index:    Bathin  Bladder: 5  Bowels: 10  Groomin  Dressing: 10  Feeding: 10  Mobility: 10  Stairs: 5  Toilet Use: 5  Transfer (Bed to Chair and Back): 10  Total: 70/100       The Barthel ADL Index: Guidelines  1. The index should be used as a record of what a patient does, not as a record of what a patient could do. 2. The main aim is to establish degree of independence from any help, physical or verbal, however minor and for whatever reason. 3. The need for supervision renders the patient not independent. 4. A patient's performance should be established using the best available evidence. Asking the patient, friends/relatives and nurses are the usual sources, but direct observation and common sense are also important. However direct testing is not needed.   5. Usually the patient's performance over the preceding 24-48 hours is important, but occasionally longer periods will be relevant. 6. Middle categories imply that the patient supplies over 50 per cent of the effort. 7. Use of aids to be independent is allowed. Bebe Amos., Barthel, D.W. (0632). Functional evaluation: the Barthel Index. [address] [facility] (14)2. KYLAH Walton, Israel Watkins., Chuy Solis., Shannan, [address] Measuring the change indisability after inpatient rehabilitation; comparison of the responsiveness of the Barthel Index and Functional [place] Measure. Journal of Neurology, Neurosurgery, and Psychiatry, 66(4), 463-285. Rizwana Saenz, N.J.A, TANNA Hernandes, & Roe David, MShakaA. (2004.) Assessment of post-stroke quality of life in cost-effectiveness studies: The usefulness of the Barthel Index and the EuroQoL-5D. Quality of Life Research, 15, 256-98          Physical Therapy Evaluation Charge Determination   History Examination Presentation Decision-Making   HIGH Complexity :3+ comorbidities / personal factors will impact the outcome/ POC  HIGH Complexity : 4+ Standardized tests and measures addressing body structure, function, activity limitation and / or participation in recreation  MEDIUM Complexity : Evolving with changing characteristics  Other outcome measures barthle  MEDIUM      Based on the above components, the patient evaluation is determined to be of the following complexity level: MEDIUM    Pain Ratin    Activity Tolerance:   Good  Please refer to the flowsheet for vital signs taken during this treatment. After treatment patient left in no apparent distress:   Sitting in chair, Call bell within reach, and Bed / chair alarm activated    Pt educated regarding safety precautions including proper footwear and need to contact staff for assistance with all mobility. Pt verbalizes understanding.       COMMUNICATION/EDUCATION:   The patients plan of care was discussed with: Registered nurse and Rehabilitation technician. Fall prevention education was provided and the patient/caregiver indicated understanding., Patient/family have participated as able in goal setting and plan of care. , and Patient/family agree to work toward stated goals and plan of care.     Thank you for this referral.  Jo Ann Eckert, PT, DPT   Time Calculation: 33 mins

## 2020-10-24 NOTE — ED NOTES
TRANSFER - OUT REPORT:    Verbal report given to Regla Fournier RN on Varun Cousins  being transferred to  for routine progression of care       Report consisted of patients Situation, Background, Assessment and   Recommendations(SBAR). Information from the following report(s) SBAR, ED Summary, [place] and Recent Results was reviewed with the receiving nurse. Opportunity for questions and clarification was provided.

## 2020-10-24 NOTE — PROGRESS NOTES
Problem: Falls - Risk of  Goal: *Absence of Falls  Description: Document Edmonia Morning Fall Risk and appropriate interventions in the flowsheet.   Outcome: Progressing Towards Goal  Note: Fall Risk Interventions:  Mobility Interventions: PT Consult for mobility concerns    Mentation Interventions: Bed/chair exit alarm         Elimination Interventions: Call light in reach, Stay With Me (per policy)

## 2020-10-24 NOTE — ACP (ADVANCE CARE PLANNING)
Responded to In [employer] request for and Advance Medical Directive (AMD) on Progressive Care. Miss Sandro Obregon is on contact restrictions, consulted with her nurse who will take AMD form to her next time she goes into the room. Called Miss Sandro Obregon on the phone. She shared she has 5 adult children. Provided a brief overview of the AMD form that the nurse will bring into her. Advised to have  paged should she have further questions and/or need assistance with the AMD form.   Visited by: Yrn Noonan., MS., [address] [facility] ([id number])

## 2020-10-24 NOTE — PROGRESS NOTES
Daily Progress Note: 10/24/2020  [place] Lizabeth Cruz MD    Assessment/Plan:   Weakness:-Uncertain etiology, there has been med noncompliance  -PT/OT eval     Lactic acidosis: ddx would be unwitnessed seizure, intraabdominal process- CT abd with mesenteric swirrling, but no focal source   improving, afebrile  -Covid test pending  -Continue IV fluids and trend      Seizure disorder  -restart Keppra     Hypertension: high  -restart amlodipine     Type 2 diabetes: A1c 6.3  -Sliding scale insulin  -restart januvia- may not be what she can afford outpt- will defer that to her PCP longterm    Hypokalemia  -replete  -check mag and replete PRN     GERD  -Continue PPI     Depression  -restart Zoloft     Hyperlipidemia  -continue statin     Diet: Diabetic  Activity: As tolerated  DVT prophylaxis: Lovenox  Isolation precautions: Droplet plus    Will consult CM for dispo planning and help with resources for affording her meds.      Problem List:  Problem List as of 10/24/2020 Date Reviewed: 9/27/2018          Codes Class Noted - Resolved    Lactic acidosis ICD-10-CM: E87.2  ICD-9-CM: 276.2  10/24/2020 - Present        SIRS (systemic inflammatory response syndrome) ([facility] ICD-10-CM: R65.10  ICD-9-CM: 995.90  9/27/2018 - Present        Sinus tachycardia ICD-10-CM: R00.0  ICD-9-CM: 427.89  9/27/2018 - Present        Hypertension ICD-10-CM: I10  ICD-9-CM: 401.9  Unknown - Present        Diabetes ([email or web address] ICD-10-CM: E11.9  ICD-9-CM: 250.00  Unknown - Present        Anxiety and depression ICD-10-CM: F41.9, F32.9  ICD-9-CM: 300.00, 311  Unknown - Present        Seizure disorder ([email or web address] ICD-10-CM: G40.909  ICD-9-CM: 345.90  Unknown - Present        Vomiting ICD-10-CM: R11.10  ICD-9-CM: 787.03  9/27/2018 - Present        Hypokalemia ICD-10-CM: E87.6  ICD-9-CM: 276.8  9/27/2018 - Present        Obese ICD-10-CM: E66.9  ICD-9-CM: 278.00  Unknown - Present        DM (diabetes mellitus) ([facility] ICD-10-CM: E11.9  ICD-9-CM: 250.00 2018 - Present        Leukocytosis ICD-10-CM: C10.470  ICD-9-CM: 288.60  2018 - Present        RESOLVED: Encephalopathy acute ICD-10-CM: G93.40  ICD-9-CM: 348.30  2018 - 2018        RESOLVED: Seizure ([address] ICD-10-CM: R56.9  ICD-9-CM: 615.51  2018 - 2018        RESOLVED: Aspiration into airway ICD-10-CM: R17.470G  ICD-9-CM: 934.9  2018 - 2018        RESOLVED: Renal insufficiency ICD-10-CM: N28.9  ICD-9-CM: 593.9  2018 - 2018              Subjective: Emelina Mcclendon is a 77 y.o. female who presented with shaking and restlessness for the past 1 day. She reports waking up yesterday morning with feeling of general malaise and weakness. She denies fever, headache, sore throat, cough, congestion, chest pain, shortness of breath, diarrhea, nausea/vomiting, abdominal pain, or pain or burning on urination. She reports having uncontrollable shaking but did not lose consciousness. She has history of seizure disorder. [Dr Shiraz Orellana    10/24: Hasn't had her home meds yet. Wasn't taking them outpt because said she couldn't afford them. ? If she had a seizure in her sleep that could have caused her lactic acidosis. Notes belly pain off and on as well. If her lactic acid and belly pain don't fully resolve, will consider starting cipro/flagyl. Tolerated 25% of breakfast.  Last BM yesterday. Review of Systems:   A comprehensive review of systems was negative except for that written in the HPI.     Objective:   Physical Exam:     Visit Vitals  BP (!) 160/88 (BP 1 Location: Right arm)   Pulse 97   Temp 98.8 °F (37.1 °C)   Resp 20   Ht 5' 4\" (1.626 m)   Wt 80.5 kg (177 lb 6.4 oz)   SpO2 98%   Breastfeeding No   BMI 30.45 kg/m²      O2 Device: Room air    Temp (24hrs), Av.9 °F (37.2 °C), Min:98.3 °F (36.8 °C), Max:99.8 °F (37.7 °C)    10/24 0701 - 10/24 1900  In: 120 [P.O.:120]  Out: 900 [Urine:900]   10/22 1901 - 10/24 0700  In: 2100 [I.V.:2100]  Out: 800 [Urine:800]    General: Alert, cooperative, no distress, appears stated age, mildly anxious   Head:  Normocephalic, without obvious abnormality, atraumatic. Eyes:  Conjunctivae/corneas clear. PERRL, EOMs intact. Nose: Nares normal. Septum midline. Throat: Lips, mucosa, and tongue moist.   Neck: Supple, symmetrical, trachea midline, no adenopathy, thyroid: no enlargement/tenderness/nodules, no carotid bruit and no JVD. Back:   Symmetric, no curvature. ROM normal. No CVA tenderness. Lungs:   Clear to auscultation bilaterally. Chest wall:  No tenderness or deformity. Heart:  Regular rate and rhythm, S1, S2 normal, no murmur, click, rub or gallop. Abdomen:   Soft, non-tender. Bowel sounds normal. No masses,  No organomegaly. Extremities: Extremities normal, atraumatic, no cyanosis or edema. No calf tenderness or cords. Pulses: 2+ and symmetric all extremities. Skin: Skin color, texture, turgor normal. No rashes or lesions   Neurologic: CNII-XII intact. Alert and oriented X 3. Fine motor of hands and fingers normal.   equal. Gait not tested at this time. Sensation grossly normal to touch. Gross motor of extremities normal.       Data Review:       Recent Days:  Recent Labs     10/23/20  2201   WBC 8.1   HGB 15.5   HCT 44.8        Recent Labs     10/23/20  2201      K 3.4*      CO2 21   *   BUN 4*   CREA 0.88   CA 8.8   ALB 3.6   ALT 33     No results for input(s): PH, PCO2, PO2, HCO3, FIO2 in the last 72 hours. 24 Hour Results:  Recent Results (from the past 24 hour(s))   SAMPLES BEING HELD    Collection Time: 10/23/20 10:01 PM   Result Value Ref Range    SAMPLES BEING HELD 1DRK GRN,1SST,1RD,1BL     COMMENT        Add-on orders for these samples will be processed based on acceptable specimen integrity and analyte stability, which may vary by analyte.    CBC WITH AUTOMATED DIFF    Collection Time: 10/23/20 10:01 PM   Result Value Ref Range    WBC 8.1 3.6 - 11.0 K/uL    RBC 4.47 3.80 - 5.20 M/uL    HGB 15.5 11.5 - 16.0 g/dL    HCT 44.8 35.0 - 47.0 %    .2 (H) 80.0 - 99.0 FL    MCH 34.7 (H) 26.0 - 34.0 PG    MCHC 34.6 30.0 - 36.5 g/dL    RDW 13.0 11.5 - 14.5 %    PLATELET 410 626 - 175 K/uL    MPV 9.1 8.9 - 12.9 FL    NRBC 0.0 0  WBC    ABSOLUTE NRBC 0.00 0.00 - 0.01 K/uL    NEUTROPHILS 66 32 - 75 %    LYMPHOCYTES 26 12 - 49 %    MONOCYTES 5 5 - 13 %    EOSINOPHILS 2 0 - 7 %    BASOPHILS 1 0 - 1 %    IMMATURE GRANULOCYTES 0 0.0 - 0.5 %    ABS. NEUTROPHILS 5.4 1.8 - 8.0 K/UL    ABS. LYMPHOCYTES 2.1 0.8 - 3.5 K/UL    ABS. MONOCYTES 0.4 0.0 - 1.0 K/UL    ABS. EOSINOPHILS 0.1 0.0 - 0.4 K/UL    ABS. BASOPHILS 0.0 0.0 - 0.1 K/UL    ABS. IMM. GRANS. 0.0 0.00 - 0.04 K/UL    DF AUTOMATED     METABOLIC PANEL, COMPREHENSIVE    Collection Time: 10/23/20 10:01 PM   Result Value Ref Range    Sodium 139 136 - 145 mmol/L    Potassium 3.4 (L) 3.5 - 5.1 mmol/L    Chloride 105 97 - 108 mmol/L    CO2 21 21 - 32 mmol/L    Anion gap 13 5 - 15 mmol/L    Glucose 139 (H) 65 - 100 mg/dL    BUN 4 (L) 6 - 20 MG/DL    Creatinine 0.88 0.55 - 1.02 MG/DL    BUN/Creatinine ratio 5 (L) 12 - 20      GFR est AA >60 >60 ml/min/1.73m2    GFR est non-AA >60 >60 ml/min/1.73m2    Calcium 8.8 8.5 - 10.1 MG/DL    Bilirubin, total 0.7 0.2 - 1.0 MG/DL    ALT (SGPT) 33 12 - 78 U/L    AST (SGOT) 38 (H) 15 - 37 U/L    Alk.  phosphatase 84 45 - 117 U/L    Protein, total 8.3 (H) 6.4 - 8.2 g/dL    Albumin 3.6 3.5 - 5.0 g/dL    Globulin 4.7 (H) 2.0 - 4.0 g/dL    A-G Ratio 0.8 (L) 1.1 - 2.2     TROPONIN I    Collection Time: 10/23/20 10:01 PM   Result Value Ref Range    Troponin-I, Qt. <0.05 <0.05 ng/mL   C REACTIVE PROTEIN, QT    Collection Time: 10/23/20 10:01 PM   Result Value Ref Range    C-Reactive protein <0.29 0.00 - 0.60 mg/dL   PROCALCITONIN    Collection Time: 10/23/20 10:01 PM   Result Value Ref Range    Procalcitonin <0.05 ng/mL   POC LACTIC ACID    Collection Time: 10/23/20 10:35 PM   Result Value Ref Range    Lactic Acid (POC) 4.53 (HH) 0.40 - 2.00 mmol/L   CULTURE, BLOOD, PAIRED    Collection Time: 10/23/20 10:48 PM    Specimen: Blood   Result Value Ref Range    Special Requests: NO SPECIAL REQUESTS      Culture result: NO GROWTH AFTER 6 HOURS     LACTIC ACID    Collection Time: 10/23/20 10:48 PM   Result Value Ref Range    Lactic acid 4.4 (HH) 0.4 - 2.0 MMOL/L   URINALYSIS W/ REFLEX CULTURE    Collection Time: 10/24/20 12:05 AM    Specimen: Urine   Result Value Ref Range    Color YELLOW/STRAW      Appearance CLEAR CLEAR      Specific gravity 1.005 1.003 - 1.030      pH (UA) 5.5 5.0 - 8.0      Protein Negative NEG mg/dL    Glucose Negative NEG mg/dL    Ketone Negative NEG mg/dL    Bilirubin Negative NEG      Blood Negative NEG      Urobilinogen 0.2 0.2 - 1.0 EU/dL    Nitrites Negative NEG      Leukocyte Esterase Negative NEG      WBC 0-4 0 - 4 /hpf    RBC 0-5 0 - 5 /hpf    Epithelial cells FEW FEW /lpf    Bacteria Negative NEG /hpf    UA:UC IF INDICATED CULTURE NOT INDICATED BY UA RESULT CNI      Yeast PRESENT (A) NEG     POC LACTIC ACID    Collection Time: 10/24/20  2:22 AM   Result Value Ref Range    Lactic Acid (POC) 3.94 (HH) 0.40 - 2.00 mmol/L   SARS-COV-2    Collection Time: 10/24/20  4:13 AM   Result Value Ref Range    Specimen source Nasopharyngeal      SARS-CoV-2 PENDING     SARS-CoV-2 PENDING     Specimen source Nasopharyngeal      COVID-19 rapid test PENDING     Specimen type NP Swab      Health status PENDING     COVID-19 PENDING    LACTIC ACID    Collection Time: 10/24/20  5:09 AM   Result Value Ref Range    Lactic acid 2.6 (HH) 0.4 - 2.0 MMOL/L   HEMOGLOBIN A1C WITH EAG    Collection Time: 10/24/20  5:09 AM   Result Value Ref Range    Hemoglobin A1c 6.3 (H) 4.0 - 5.6 %    Est. average glucose 134 mg/dL   GLUCOSE, POC    Collection Time: 10/24/20  9:28 AM   Result Value Ref Range    Glucose (POC) 130 (H) 65 - 100 mg/dL    Performed by Tam Carreon APRIL        Medications reviewed  Current Facility-Administered Medications Medication Dose Route Frequency    sodium chloride (NS) flush 5-40 mL  5-40 mL IntraVENous Q8H    sodium chloride (NS) flush 5-40 mL  5-40 mL IntraVENous PRN    acetaminophen (TYLENOL) tablet 650 mg  650 mg Oral Q6H PRN    polyethylene glycol (MIRALAX) packet 17 g  17 g Oral DAILY PRN    ondansetron (ZOFRAN) injection 4 mg  4 mg IntraVENous Q6H PRN    enoxaparin (LOVENOX) injection 40 mg  40 mg SubCUTAneous DAILY    0.9% sodium chloride infusion  100 mL/hr IntraVENous CONTINUOUS    glucose chewable tablet 16 g  4 Tab Oral PRN    dextrose (D50W) injection syrg 12.5-25 g  25-50 mL IntraVENous PRN    glucagon (GLUCAGEN) injection 1 mg  1 mg IntraMUSCular PRN    insulin lispro (HUMALOG) injection   SubCUTAneous AC&HS       Care Plan discussed with: Patient/Family and Nurse    Total time spent with patient: 30 minutes.     Jessy Bowser MD

## 2020-10-24 NOTE — ED NOTES
Attempted to ambulate patient; patient only able to ambulate to chair at bedside. Patient became short of breath accompanied with \"shakines. \" Patient assisted back into bed with assistance of Dr Clay Ragland.

## 2020-10-24 NOTE — H&P
[address] Adult  Hospitalist Group    History & Physical    Date of service: 10/23/2020    Patient name: Edgard Figueroa  MRN: [id number]  YOB: 1950  Age: 77 y.o. Primary care provider:  Tyra Umanzor MD     Source of Information: patient, medical records                                Chief complain: Shaking    History of present illness  Edgard Figueroa is a 77 y.o. female who presented with shaking and restlessness for the past 1 day. She reports waking up yesterday morning with feeling of general malaise and weakness. She denies fever, headache, sore throat, cough, congestion, chest pain, shortness of breath, diarrhea, nausea/vomiting, abdominal pain, or pain or burning on urination. She reports having uncontrollable shaking but did not lose consciousness. She has history of seizure disorder. Past Medical History:   Diagnosis Date    Anxiety and depression     [facility] [address]     Hypertension     Obese     Seizure disorder ([facility] [address]       No past surgical history on file. Prior to Admission medications    Medication Sig Start Date End Date Taking? Authorizing Provider   zinc 50 mg tab tablet Take 2 Tabs by mouth daily. 5/25/20   Venia Simmonds, MD   ascorbic acid, vitamin C, (Vitamin C) 250 mg tablet Take 1 Tab by mouth daily. 5/25/20   Venia Simmonds, MD   acetaminophen (TYLENOL) 500 mg tablet Take 2 Tabs by mouth every six (6) hours as needed for Pain or Fever. 5/25/20   Venia Simmonds, MD   ibuprofen (MOTRIN) 800 mg tablet Take 1 Tab by mouth every eight (8) hours as needed for Pain (fever). 5/25/20   Venia Simmonds, MD   potassium chloride (KLOR-CON) 10 mEq tablet Take 1 Tab by mouth daily. 9/30/18   Aliya Langley MD   magnesium oxide (MAG-OX) 400 mg (241.3 mg magnesium) tablet Take 1 Tab by mouth daily. 9/30/18   Aliya Langley MD   atorvastatin (LIPITOR) 20 mg tablet Take 20 mg by mouth daily.     Provider, Historical   SITagliptin (JANUVIA) 100 mg tablet Take 100 mg by mouth daily. Provider, Historical   pantoprazole (PROTONIX) 40 mg tablet Take 40 mg by mouth daily. Provider, Historical   levETIRAcetam (KEPPRA) 500 mg tablet Take 1 Tab by mouth two (2) times a day. 6/11/18   Jewell Kuhn MD   amLODIPine (NORVASC) 10 mg tablet Take 10 mg by mouth daily. Provider, Historical   sertraline (ZOLOFT) 100 mg tablet Take 100 mg by mouth daily. Provider, Historical     No Known Allergies   Family History   Problem Relation Age of Onset    Lung Cancer Mother     No Known Problems Father          Social history    Social History     Tobacco Use   Smoking Status Unknown If Ever Smoked       Social History     Substance and Sexual Activity   Alcohol Use None    Comment: Unknown       Code status  Code status discussed with the patient/caregivers. Prior    Review of systems    A comprehensive review of systems was negative except for that written in the History of Present Illness. Physical Examination   Visit Vitals  BP (!) 140/72   Pulse (!) 112   Temp 99.8 °F (37.7 °C)   Resp 20   Ht 5' 4\" (1.626 m)   Wt 84.4 kg (186 lb)   SpO2 96%   BMI 31.93 kg/m²          O2 Device: Room air    General:  Alert, cooperative, no distress   Head:  Normocephalic, without obvious abnormality, atraumatic   Eyes:  Conjunctivae/corneas clear.  PERRL, EOMs intact   Head/Neck: Nares normal. No nasal drainage or sinus tenderness  Lips, mucosa, and tongue normal   Teeth and gums normal  Clear oropharynx  Trachea midline  No palpable adenopathy  No thyroid enlargement, tenderness     Lungs:   Symmetrical chest expansion and respiratory effort  Clear to auscultation bilaterally       Heart:  Regular rhythm   Sounds normal; no murmur, rub or gallop   Abdomen:   Soft, no tenderness  Bowel sounds normal  No masses or hepatosplenomegaly     Back: No CVA tenderness   Extremities: Extremities normal, atraumatic  No cyanosis or edema     Skin: No rashes or ulcers   Musculo-      skeletal: Gait not tested  Normal symmetry, ROM, strength and tone   Neuro: Cranial nerves grossly normal     Psych: Alert, oriented x3  Normal affect, judgement and insight     Data Review    24 Hour Results:  Recent Results (from the past 24 hour(s))   SAMPLES BEING HELD    Collection Time: 10/23/20 10:01 PM   Result Value Ref Range    SAMPLES BEING HELD 1DRK GRN,1SST,1RD,1BL     COMMENT        Add-on orders for these samples will be processed based on acceptable specimen integrity and analyte stability, which may vary by analyte. CBC WITH AUTOMATED DIFF    Collection Time: 10/23/20 10:01 PM   Result Value Ref Range    WBC 8.1 3.6 - 11.0 K/uL    RBC 4.47 3.80 - 5.20 M/uL    HGB 15.5 11.5 - 16.0 g/dL    HCT 44.8 35.0 - 47.0 %    .2 (H) 80.0 - 99.0 FL    MCH 34.7 (H) 26.0 - 34.0 PG    MCHC 34.6 30.0 - 36.5 g/dL    RDW 13.0 11.5 - 14.5 %    PLATELET 848 000 - 415 K/uL    MPV 9.1 8.9 - 12.9 FL    NRBC 0.0 0  WBC    ABSOLUTE NRBC 0.00 0.00 - 0.01 K/uL    NEUTROPHILS 66 32 - 75 %    LYMPHOCYTES 26 12 - 49 %    MONOCYTES 5 5 - 13 %    EOSINOPHILS 2 0 - 7 %    BASOPHILS 1 0 - 1 %    IMMATURE GRANULOCYTES 0 0.0 - 0.5 %    ABS. NEUTROPHILS 5.4 1.8 - 8.0 K/UL    ABS. LYMPHOCYTES 2.1 0.8 - 3.5 K/UL    ABS. MONOCYTES 0.4 0.0 - 1.0 K/UL    ABS. EOSINOPHILS 0.1 0.0 - 0.4 K/UL    ABS. BASOPHILS 0.0 0.0 - 0.1 K/UL    ABS. IMM.  GRANS. 0.0 0.00 - 0.04 K/UL    DF AUTOMATED     METABOLIC PANEL, COMPREHENSIVE    Collection Time: 10/23/20 10:01 PM   Result Value Ref Range    Sodium 139 136 - 145 mmol/L    Potassium 3.4 (L) 3.5 - 5.1 mmol/L    Chloride 105 97 - 108 mmol/L    CO2 21 21 - 32 mmol/L    Anion gap 13 5 - 15 mmol/L    Glucose 139 (H) 65 - 100 mg/dL    BUN 4 (L) 6 - 20 MG/DL    Creatinine 0.88 0.55 - 1.02 MG/DL    BUN/Creatinine ratio 5 (L) 12 - 20      GFR est AA >60 >60 ml/min/1.73m2    GFR est non-AA >60 >60 ml/min/1.73m2    Calcium 8.8 8.5 - 10.1 MG/DL    Bilirubin, total 0.7 0.2 - 1.0 MG/DL    ALT (SGPT) 33 12 - 78 U/L    AST (SGOT) 38 (H) 15 - 37 U/L    Alk.  phosphatase 84 45 - 117 U/L    Protein, total 8.3 (H) 6.4 - 8.2 g/dL    Albumin 3.6 3.5 - 5.0 g/dL    Globulin 4.7 (H) 2.0 - 4.0 g/dL    A-G Ratio 0.8 (L) 1.1 - 2.2     TROPONIN I    Collection Time: 10/23/20 10:01 PM   Result Value Ref Range    Troponin-I, Qt. <0.05 <0.05 ng/mL   C REACTIVE PROTEIN, QT    Collection Time: 10/23/20 10:01 PM   Result Value Ref Range    C-Reactive protein <0.29 0.00 - 0.60 mg/dL   PROCALCITONIN    Collection Time: 10/23/20 10:01 PM   Result Value Ref Range    Procalcitonin <0.05 ng/mL   POC LACTIC ACID    Collection Time: 10/23/20 10:35 PM   Result Value Ref Range    Lactic Acid (POC) 4.53 (HH) 0.40 - 2.00 mmol/L   LACTIC ACID    Collection Time: 10/23/20 10:48 PM   Result Value Ref Range    Lactic acid 4.4 (HH) 0.4 - 2.0 MMOL/L   URINALYSIS W/ REFLEX CULTURE    Collection Time: 10/24/20 12:05 AM    Specimen: Urine   Result Value Ref Range    Color YELLOW/STRAW      Appearance CLEAR CLEAR      Specific gravity 1.005 1.003 - 1.030      pH (UA) 5.5 5.0 - 8.0      Protein Negative NEG mg/dL    Glucose Negative NEG mg/dL    Ketone Negative NEG mg/dL    Bilirubin Negative NEG      Blood Negative NEG      Urobilinogen 0.2 0.2 - 1.0 EU/dL    Nitrites Negative NEG      Leukocyte Esterase Negative NEG      WBC 0-4 0 - 4 /hpf    RBC 0-5 0 - 5 /hpf    Epithelial cells FEW FEW /lpf    Bacteria Negative NEG /hpf    UA:UC IF INDICATED CULTURE NOT INDICATED BY UA RESULT CNI      Yeast PRESENT (A) NEG     POC LACTIC ACID    Collection Time: 10/24/20  2:22 AM   Result Value Ref Range    Lactic Acid (POC) 3.94 (HH) 0.40 - 2.00 mmol/L     Recent Labs     10/23/20  2201   WBC 8.1   HGB 15.5   HCT 44.8        Recent Labs     10/23/20  2201      K 3.4*      CO2 21   *   BUN 4*   CREA 0.88   CA 8.8   ALB 3.6   TBILI 0.7   ALT 33       Imaging  Ct Chest Abd Pelv W Cont    Result Date: 10/24/2020  INDICATION: sepsis/ weakness EXAM:  CT CHEST, ABDOMEN, PELVIS WITH CONTRAST COMPARISON: September 27, 2018 TECHNIQUE:  CT imaging of the chest, abdomen and pelvis was performed after the uneventful intravenous administration of contrast material.  Coronal and sagittal reconstructions were generated. CT dose reduction was achieved through use of a standardized protocol tailored for this examination and automatic exposure control for dose modulation. FINDINGS: THYROID: Small right lobe nodules partly visualized. MEDIASTINUM/LELO: No mass or lymphadenopathy. Aberrant right subclavian artery, congenital variant. HEART/PERICARDIUM: Unremarkable. LUNGS/PLEURA: No pulmonary edema, pleural effusion or focal airspace disease. BONES: No destructive bone lesion. ADDITIONAL COMMENTS: N/A LIVER: Steatosis. GALLBLADDER: Unremarkable. SPLEEN: No enlargement or lesion. PANCREAS: No mass or ductal dilatation. ADRENALS: No mass. KIDNEYS: No mass, calculus, or hydronephrosis. GI TRACT: No bowel obstruction or bowel wall thickening. Colonic diverticulosis without diverticulitis. Slight swirling of central abdominal mesentery PERITONEUM: No free air or free fluid. APPENDIX: Unremarkable. RETROPERITONEUM: No aortic aneurysm. LYMPH NODES: None enlarged. ADDITIONAL COMMENTS: N/A. URINARY BLADDER: No mass or calculus. LYMPH NODES: None enlarged. REPRODUCTIVE ORGANS: Large calcified fibroids. FREE FLUID: None. BONES: No destructive bone lesion. ADDITIONAL COMMENTS: N/A. IMPRESSION: 1. No acute cardiopulmonary process. 2. Hepatic steatosis. 3. Colonic diverticulosis without diverticulitis. 4. Slight swirling of mid abdominal mesentery without bowel obstruction or bowel wall thickening. 5. Uterine fibroids    Xr Chest Port    Result Date: 10/23/2020  INDICATION: SOB EXAM:  AP CHEST RADIOGRAPH COMPARISON: May 25, 2020 FINDINGS: AP portable view of the chest demonstrates a normal cardiomediastinal silhouette. There is no edema, effusion, consolidation, or pneumothorax.  The osseous structures are unremarkable. IMPRESSION: No acute process. Assessment and Plan     Weakness  -Uncertain etiology  -PT/OT eval    Lactic acidosis  -CT of the chest/abdomen/pelvis was unrevealing  -Covid test pending  -Continue IV fluids and trend lactic acids    Seizure disorder  -Continue with Keppra    Hypertension  -Continue with amlodipine    Type 2 diabetes  -Check A1c  -Sliding scale insulin    GERD  -Continue PPI    Depression  -Continue Zoloft    Hyperlipidemia  -continue statin      Diet: Diabetic  Activity: As tolerated  DVT prophylaxis: Lovenox  Isolation precautions: Droplet plus       Signed by:  Simba Blunt MD    October 24, 2020 at 3:55 AM

## 2020-10-24 NOTE — PROGRESS NOTES
10/24/2020  Case Management Note    11:45 AM  Noted consult for Goodrx coupons--I will be doing a full evaluation for the patient today, and will bring goodrx coupons with me and discuss how to use.  Will complete consult for now, full evaluation to follow    SHONA Aguilar

## 2020-10-24 NOTE — PROGRESS NOTES
Advance Care Planning     Advance Care Planning Activator (Inpatient)  Conversation Note      Date of ACP Conversation: 10/24/20     Conversation Conducted with:   Patient with Decision Making Capacity    ACP Activator: Caitlin Hidalgo Decision Maker:    Current Designated Health Care Decision Maker: Family as listed below    If no Decision Maker listed above or available through scanned documents, then:    If no Authorized Decision Maker has previously been identified, then patient chooses Health Care Decision Maker:  \"Who would you like to name as your primary health care decision-maker? \"    Name: Alvarez Varela   Relationship: Daughter Phone number: [phone]  Kenyatta Azevedo this person be reached easily? \" YES  \"Who would you like to name as your back-up decision maker? \"   Name: Donato Beebe  Relationship: Son Phone number: Patient does not know off the top of her head  \"Can this person be reached easily? \" YES      Care Preferences    Ventilation: \"If you were in your present state of health and suddenly became very ill and were unable to breathe on your own, what would your preference be about the use of a ventilator (breathing machine) if it were available to you? \"      If patient would desire the use of a ventilator (breathing machine), answer \"yes\", if not \"no\":yes    \"If your health worsens and it becomes clear that your chance of recovery is unlikely, what would your preference be about the use of a ventilator (breathing machine) if it were available to you? \"     Would the patient desire the use of a ventilator (breathing machine)? YES      Resuscitation  \"CPR works best to restart the heart when there is a sudden event, like a heart attack, in someone who is otherwise healthy. Unfortunately, CPR does not typically restart the heart for people who have serious health conditions or who are very sick. \"    \"In the event your heart stopped as a result of an underlying serious health condition, would you want attempts to be made to restart your heart (answer \"yes\" for attempt to resuscitate) or would you prefer a natural death (answer \"no\" for do not attempt to resuscitate)? \" yes    [] Yes  [x] No   Educated Patient / Kathleen Tony regarding differences between Advance Directives and portable DNR orders. Length of ACP Conversation in minutes:      Conversation Outcomes:  [x] ACP discussion completed  [] Existing advance directive reviewed with patient; no changes to patient's previously recorded wishes     [] New Advance Directive completed   [] Portable Do Not Resuscitate prepared for Provider review and signature  [] POLST/POST/MOLST/MOST prepared for Provider review and signature      Follow-up plan:    [] Schedule follow-up conversation to continue planning  [x] Referred individual to Provider for additional questions/concerns   [] Advised patient/agent/surrogate to review completed ACP document and update if needed with changes in condition, patient preferences or care setting     [] This note routed to one or more involved healthcare providers       Let patient know that if she had questions she could ask us or her doctors.      SHONA De La Cruz

## 2020-10-24 NOTE — ED PROVIDER NOTES
The patient is a 61-year-old female with a past medical history significant for anxiety and depression, diabetes, hypertension, morbid obesity, seizure disorder who presents to the ED by EMS for evaluation for chills and restlessness for 24 hours. The patient also complains of general malaise and weakness and subjective feeling of fever. She denies any headache, sore throat, cough or congestion, neck pain, back pain, nausea, vomiting, abdominal pain, diarrhea, constipation, dysuria, hematuria, dizziness, extremity weakness or numbness, sick contact, skin rash, recent travel and prior history of the same. Past Medical History:   Diagnosis Date    Anxiety and depression     Diabetes ([facility] [address]     Hypertension     Obese     [facility]        No past surgical history on file.       Family History:   Problem Relation Age of Onset    Lung Cancer Mother     No Known Problems Father        Social History     Socioeconomic History    Marital status: LEGALLY      Spouse name: Not on file    Number of children: Not on file    Years of education: Not on file    Highest education level: Not on file   Occupational History    Not on file   Social Needs    Financial resource strain: Not on file    Food insecurity     Worry: Not on file     Inability: Not on file    Transportation needs     Medical: Not on file     Non-medical: Not on file   Tobacco Use    Smoking status: Unknown If Ever Smoked   Substance and Sexual Activity    Alcohol use: Not on file     Comment: Unknown    Drug use: Not on file    Sexual activity: Not on file   Lifestyle    Physical activity     Days per week: Not on file     Minutes per session: Not on file    Stress: Not on file   Relationships    Social connections     Talks on phone: Not on file     Gets together: Not on file     Attends Gnosticism service: Not on file     Active member of club or organization: Not on file     Attends meetings of clubs or organizations: Not on file     Relationship status: Not on file    Intimate partner violence     Fear of current or ex partner: Not on file     Emotionally abused: Not on file     Physically abused: Not on file     Forced sexual activity: Not on file   Other Topics Concern    Not on file   Social History Narrative    Not on file         ALLERGIES: Patient has no known allergies. Review of Systems   All other systems reviewed and are negative. Vitals:    10/23/20 2202 10/23/20 2209 10/23/20 2219 10/23/20 2247   BP: (!) 146/79      Pulse:  96     Resp:       Temp:    99.8 °F (37.7 °C)   SpO2: 97%  96%    Weight:       Height:                Physical Exam  Vitals signs and nursing note reviewed. Exam conducted with a chaperone present. CONSTITUTIONAL: Well-appearing; well-nourished; in no apparent distress  HEAD: Normocephalic; atraumatic  EYES: PERRL; EOM intact; conjunctiva and sclera are clear bilaterally. ENT: No rhinorrhea; normal pharynx with no tonsillar hypertrophy; mucous membranes pink/moist, no erythema, no exudate. NECK: Supple; non-tender; no cervical lymphadenopathy  CARD: Normal S1, S2; no murmurs, rubs, or gallops. Regular rate and rhythm. RESP: Normal respiratory effort; breath sounds clear and equal bilaterally; no wheezes, rhonchi, or rales. ABD: Normal bowel sounds; non-distended; non-tender; no palpable organomegaly, no masses, no bruits. Back Exam: Normal inspection; no vertebral point tenderness, no CVA tenderness. Normal range of motion. EXT: Normal ROM in all four extremities; non-tender to palpation; no swelling or deformity; distal pulses are normal, no edema. SKIN: Warm; dry; no rash.   NEURO:Alert and oriented x 3, coherent, VERONICA-XII grossly intact, sensory and motor are non-focal.        MDM  Number of Diagnoses or Management Options  Diagnosis management comments: Assessment: 79-year-old female who presents to the ED with chills general malaise and body aches has a nonfocal exam with stable vital signs. The patient need evaluation for sepsis with occult bacteremia, electrolyte normality, dehydration. Plan: Lab/EKG/chest x-ray/IV fluid/serial exam/ Monitor and Reevaluate. Amount and/or Complexity of Data Reviewed  Clinical lab tests: ordered and reviewed  Tests in the radiology section of CPT®: reviewed and ordered  Tests in the medicine section of CPT®: reviewed and ordered  Discussion of test results with the performing providers: yes  Decide to obtain previous medical records or to obtain history from someone other than the patient: yes  Obtain history from someone other than the patient: yes  Review and summarize past medical records: yes  Discuss the patient with other providers: yes  Independent visualization of images, tracings, or specimens: yes    Risk of Complications, Morbidity, and/or Mortality  Presenting problems: moderate  Diagnostic procedures: moderate  Management options: moderate    Patient Progress  Patient progress: stable         Procedures    ED EKG interpretation:  Rhythm: normal sinus rhythm; and regular . Rate (approx.): 88; Axis: left axis deviation; P wave: normal; QRS interval: normal ; ST/T wave: non-specific changes; in  Lead: Diffusely; Other findings: abnormal ekg. This EKG was interpreted by Jani Amador MD,ED Provider. XRAY INTERPRETATION (ED MD)  Chest Xray  No acute process seen. Normal heart size. No bony abnormalities. No infiltrate. Cheryln Dakins, MD 11:23 PM      PROGRESS NOTE:  Pt has been reexamined by Cheryln Dakins, MD all available results have been reviewed with pt and any available family. Pt understands sx, dx, and tx in ED. Care plan has been outlined and questions have been answered. Pt and any available family understands and agrees to need for admission to hospital for further tx not available in ED. Pt is ready for admission.     Written by Jani Amador MD,  3:03 AM    Perfect Serve Consult for Admission  3:07 AM    ED Room Number: ER10/10  Patient Name and age: Hellen Alas 77 y.o.  female  Working Diagnosis:   1. Weakness    2. Lactic acid acidosis    3. Seizure disorder ([address]    4. Hypertensive urgency        COVID-19 Suspicion:  yes  Sepsis present:  no  Reassessment needed: yes  Code Status:  Full Code  Readmission: no  Isolation Requirements:  yes  Recommended Level of Care:  telemetry  Department:[facility] ED - [phone]  Other: I attempted to ambulate the patient without success. She continued to have resting tremors and chills. The patient no obvious source of infection as part of lactic acidosis. She has had extensive work-up without any breath is unable to ambulate and remains symptomatic. She lives at home by herself. CONSULT NOTE:  Shasha Pinzon MD spoke with Dr. Joe Archuleta of the adult hospitalist team. Discussed patient's presentation, history, physical assessment, and available diagnostic results. He will evaluate, write orders and admit the patient to the hospital. 3:10 AM  .   .

## 2020-10-24 NOTE — PROGRESS NOTES
Spiritual Care Assessment/Progress Note  [email or web address]      NAME: Marylee Channel      MRN: [id number]  AGE: 77 y.o.  SEX: female  Sikhism Affiliation: Zoroastrianism   Language: English     10/24/2020     Total Time (in minutes): 28     Spiritual Assessment begun in [facility] [address] through conversation with:         [x]Patient        [] Family    [] Friend(s)        Reason for Consult: Advance medical directive consult     Spiritual beliefs: (Please include comment if needed)     [x] Identifies with a janice tradition:   Naldo Mosher      [x] Supported by a janice community:      [facility]     [] Claims no spiritual orientation:           [] Seeking spiritual identity:                [] Adheres to an individual form of spirituality:           [] Not able to assess:                           Identified resources for coping:      [] Prayer                               [] Music                  [] Guided Imagery     [] Family/friends                 [] Pet visits     [] Devotional reading                         [x] Unknown     [] Other:                                              Interventions offered during this visit: (See comments for more details)    Patient Interventions: Advance medical directive consult, Affirmation of janice, Iconic (affirming the presence of God/Higher Power), Initial/Spiritual assessment, patient floor           Plan of Care:     [] Support spiritual and/or cultural needs    [] Support AMD and/or advance care planning process      [] Support grieving process   [] Coordinate Rites and/or Rituals    [] Coordination with community clergy   [] No spiritual needs identified at this time   [] Detailed Plan of Care below (See Comments)  [] Make referral to Music Therapy  [] Make referral to Pet Therapy     [] Make referral to Addiction services  [] Make referral to [facility]  [] Make referral to Spiritual Care Partner  [] No future visits requested        [x] Follow up visits as needed Comments: Responded to In Basket request for and Advance Medical Directive (AMD) on Progressive Care. Miss Sandro Obregon is on contact restrictions, consulted with her nurse who will take AMD form to her next time she goes into the room. Called Miss Sandro Obregon on the phone. She shared she has 5 adult children. Provided a brief overview of the AMD form that the nurse will bring into her. Advised to have  paged should she have further questions and/or need assistance with the AMD form. Miss Sandro Obregon shared she has a Restoration belief in God, she regularly attended [facility] prior to [facility]. She has no other needs at this time.   Visited by: Yrn Noonan., MS., [id number] ([id number])

## 2020-10-25 LAB
ALBUMIN SERPL-MCNC: 3 G/DL (ref 3.5–5)
ALBUMIN/GLOB SERPL: 0.7 {RATIO} (ref 1.1–2.2)
ALP SERPL-CCNC: 73 U/L (ref 45–117)
ALT SERPL-CCNC: 22 U/L (ref 12–78)
ANION GAP SERPL CALC-SCNC: 8 MMOL/L (ref 5–15)
AST SERPL-CCNC: 24 U/L (ref 15–37)
BILIRUB SERPL-MCNC: 1.5 MG/DL (ref 0.2–1)
BUN SERPL-MCNC: 4 MG/DL (ref 6–20)
BUN/CREAT SERPL: 6 (ref 12–20)
CALCIUM SERPL-MCNC: 8.2 MG/DL (ref 8.5–10.1)
CHLORIDE SERPL-SCNC: 103 MMOL/L (ref 97–108)
CO2 SERPL-SCNC: 24 MMOL/L (ref 21–32)
CREAT SERPL-MCNC: 0.72 MG/DL (ref 0.55–1.02)
ERYTHROCYTE [DISTWIDTH] IN BLOOD BY AUTOMATED COUNT: 12.9 % (ref 11.5–14.5)
GLOBULIN SER CALC-MCNC: 4.2 G/DL (ref 2–4)
GLUCOSE BLD STRIP.AUTO-MCNC: 115 MG/DL (ref 65–100)
GLUCOSE BLD STRIP.AUTO-MCNC: 126 MG/DL (ref 65–100)
GLUCOSE BLD STRIP.AUTO-MCNC: 129 MG/DL (ref 65–100)
GLUCOSE BLD STRIP.AUTO-MCNC: 142 MG/DL (ref 65–100)
GLUCOSE SERPL-MCNC: 126 MG/DL (ref 65–100)
HCT VFR BLD AUTO: 42.3 % (ref 35–47)
HGB BLD-MCNC: 14.2 G/DL (ref 11.5–16)
MCH RBC QN AUTO: 34 PG (ref 26–34)
MCHC RBC AUTO-ENTMCNC: 33.6 G/DL (ref 30–36.5)
MCV RBC AUTO: 101.2 FL (ref 80–99)
NRBC # BLD: 0 K/UL (ref 0–0.01)
NRBC BLD-RTO: 0 PER 100 WBC
PLATELET # BLD AUTO: 143 K/UL (ref 150–400)
PMV BLD AUTO: 10.1 FL (ref 8.9–12.9)
POTASSIUM SERPL-SCNC: 3 MMOL/L (ref 3.5–5.1)
PROT SERPL-MCNC: 7.2 G/DL (ref 6.4–8.2)
RBC # BLD AUTO: 4.18 M/UL (ref 3.8–5.2)
SERVICE CMNT-IMP: ABNORMAL
SODIUM SERPL-SCNC: 135 MMOL/L (ref 136–145)
WBC # BLD AUTO: 6.3 K/UL (ref 3.6–11)

## 2020-10-25 PROCEDURE — 97535 SELF CARE MNGMENT TRAINING: CPT

## 2020-10-25 PROCEDURE — 74011250636 HC RX REV CODE- 250/636: Performed by: FAMILY MEDICINE

## 2020-10-25 PROCEDURE — 65660000000 HC RM CCU STEPDOWN

## 2020-10-25 PROCEDURE — 74011250637 HC RX REV CODE- 250/637: Performed by: FAMILY MEDICINE

## 2020-10-25 PROCEDURE — 85027 COMPLETE CBC AUTOMATED: CPT

## 2020-10-25 PROCEDURE — 80053 COMPREHEN METABOLIC PANEL: CPT

## 2020-10-25 PROCEDURE — 36415 COLL VENOUS BLD VENIPUNCTURE: CPT

## 2020-10-25 PROCEDURE — 82962 GLUCOSE BLOOD TEST: CPT

## 2020-10-25 PROCEDURE — 97165 OT EVAL LOW COMPLEX 30 MIN: CPT

## 2020-10-25 RX ORDER — POTASSIUM CHLORIDE 750 MG/1
40 TABLET, FILM COATED, EXTENDED RELEASE ORAL 2 TIMES DAILY
Status: DISCONTINUED | OUTPATIENT
Start: 2020-10-25 | End: 2020-10-26 | Stop reason: HOSPADM

## 2020-10-25 RX ORDER — HYDRALAZINE HYDROCHLORIDE 25 MG/1
25 TABLET, FILM COATED ORAL
Status: DISCONTINUED | OUTPATIENT
Start: 2020-10-25 | End: 2020-10-26 | Stop reason: HOSPADM

## 2020-10-25 RX ORDER — MAGNESIUM SULFATE HEPTAHYDRATE 40 MG/ML
2 INJECTION, SOLUTION INTRAVENOUS ONCE
Status: COMPLETED | OUTPATIENT
Start: 2020-10-25 | End: 2020-10-25

## 2020-10-25 RX ORDER — LOSARTAN POTASSIUM 25 MG/1
25 TABLET ORAL 2 TIMES DAILY
Status: DISCONTINUED | OUTPATIENT
Start: 2020-10-25 | End: 2020-10-26 | Stop reason: HOSPADM

## 2020-10-25 RX ADMIN — Medication 10 ML: at 16:50

## 2020-10-25 RX ADMIN — ALOGLIPTIN 25 MG: 12.5 TABLET, FILM COATED ORAL at 08:19

## 2020-10-25 RX ADMIN — LOSARTAN POTASSIUM 25 MG: 25 TABLET, FILM COATED ORAL at 16:51

## 2020-10-25 RX ADMIN — SODIUM CHLORIDE 100 ML/HR: 900 INJECTION, SOLUTION INTRAVENOUS at 03:50

## 2020-10-25 RX ADMIN — SERTRALINE HYDROCHLORIDE 100 MG: 50 TABLET ORAL at 08:19

## 2020-10-25 RX ADMIN — LOSARTAN POTASSIUM 25 MG: 25 TABLET, FILM COATED ORAL at 08:19

## 2020-10-25 RX ADMIN — PANTOPRAZOLE SODIUM 40 MG: 40 TABLET, DELAYED RELEASE ORAL at 08:19

## 2020-10-25 RX ADMIN — AMLODIPINE BESYLATE 10 MG: 5 TABLET ORAL at 08:19

## 2020-10-25 RX ADMIN — ENOXAPARIN SODIUM 40 MG: 40 INJECTION SUBCUTANEOUS at 08:19

## 2020-10-25 RX ADMIN — LEVETIRACETAM 500 MG: 500 TABLET ORAL at 16:51

## 2020-10-25 RX ADMIN — ATORVASTATIN CALCIUM 20 MG: 20 TABLET, FILM COATED ORAL at 08:19

## 2020-10-25 RX ADMIN — MAGNESIUM SULFATE HEPTAHYDRATE 2 G: 40 INJECTION, SOLUTION INTRAVENOUS at 08:11

## 2020-10-25 RX ADMIN — Medication 10 ML: at 22:00

## 2020-10-25 RX ADMIN — LEVETIRACETAM 500 MG: 500 TABLET ORAL at 08:19

## 2020-10-25 RX ADMIN — POTASSIUM CHLORIDE 40 MEQ: 750 TABLET, FILM COATED, EXTENDED RELEASE ORAL at 16:50

## 2020-10-25 RX ADMIN — POTASSIUM CHLORIDE 40 MEQ: 750 TABLET, FILM COATED, EXTENDED RELEASE ORAL at 08:19

## 2020-10-25 NOTE — PROGRESS NOTES
Bedside and Verbal shift change report given to Jearline Bosworth, RN (oncoming nurse) by Osmani Cedeno RN (offgoing nurse). Report included the following information SBAR, Kardex, Intake/Output, MAR, Recent Results, Med Rec Status and Cardiac Rhythm NSR. C/o 5/10 headache. BPs have been 682'Z-057' systolic since admission this a.m. /109 with assessment. Contacted Dr. Latif Certain mg PO hydralazine ordered and given. /91 prior to hydralazine administration. BP's repeated every 2 hours-remained 160s/90's asymptomatic. Covid test negative. Droplet plus precautions discontinued. 0700: Bedside and Verbal shift change report given to Tami Cook RN (oncoming nurse) by Jearline Bosworth, RN (offgoing nurse). Report included the following information SBAR, Kardex, Intake/Output, MAR, Recent Results, Med Rec Status and Cardiac Rhythm NSR.

## 2020-10-25 NOTE — PROGRESS NOTES
Daily Progress Note: 10/25/2020  Akil Valadez MD    Assessment/Plan:   Weakness:-Uncertain etiology, there has been med noncompliance  -PT rec HH PT     Lactic acidosis: ddx would be unwitnessed seizure- resolved, COVID neg  -stop IVF     Seizure disorder  - Keppra  -counseled about  compliance     Hypertension: high with IVF  -amlodipine   -add losartan  -hydralazine PRN     Type 2 diabetes: A1c 6.3  -Sliding scale insulin  -restart januvia- may not be what she can afford outpt- will defer that to her PCP longterm    Hypokalemia  -replete potassium BID  -replete mag     GERD  -Continue PPI     Depression  - Zoloft     Hyperlipidemia  -continue statin     Diet: Diabetic  Activity: As tolerated  DVT prophylaxis: Lovenox  Isolation precautions: Droplet plus    CM helping with Goodrx coupons to help with med access.      Problem List:  Problem List as of 10/25/2020 Date Reviewed: 9/27/2018          Codes Class Noted - Resolved    Lactic acidosis ICD-10-CM: E87.2  ICD-9-CM: 276.2  10/24/2020 - Present        SIRS (systemic inflammatory response syndrome) ([facility] ICD-10-CM: R65.10  ICD-9-CM: 995.90  9/27/2018 - Present        Sinus tachycardia ICD-10-CM: R00.0  ICD-9-CM: 427.89  9/27/2018 - Present        Hypertension ICD-10-CM: I10  ICD-9-CM: 401.9  Unknown - Present        Diabetes ([facility] ICD-10-CM: E11.9  ICD-9-CM: 250.00  Unknown - Present        Anxiety and depression ICD-10-CM: F41.9, F32.9  ICD-9-CM: 300.00, 311  Unknown - Present        Seizure disorder ([facility] ICD-10-CM: G40.909  ICD-9-CM: 345.90  Unknown - Present        Vomiting ICD-10-CM: R11.10  ICD-9-CM: 787.03  9/27/2018 - Present        Hypokalemia ICD-10-CM: E87.6  ICD-9-CM: 276.8  9/27/2018 - Present        Obese ICD-10-CM: E66.9  ICD-9-CM: 278.00  Unknown - Present        DM (diabetes mellitus) ([facility] ICD-10-CM: E11.9  ICD-9-CM: 250.00  6/7/2018 - Present        Leukocytosis ICD-10-CM: D06.317  ICD-9-CM: 288.60  6/7/2018 - Present RESOLVED: Encephalopathy acute ICD-10-CM: G93.40  ICD-9-CM: 348.30  6/7/2018 - 9/27/2018        RESOLVED: Seizure ([address] ICD-10-CM: R56.9  ICD-9-CM: 912.01  6/7/2018 - 9/27/2018        RESOLVED: Aspiration into airway ICD-10-CM: F04.593F  ICD-9-CM: 934.9  6/7/2018 - 9/27/2018        RESOLVED: Renal insufficiency ICD-10-CM: N28.9  ICD-9-CM: 593.9  6/7/2018 - 9/27/2018              Subjective: Marylee Channel is a 77 y.o. female who presented with shaking and restlessness for the past 1 day. She reports waking up yesterday morning with feeling of general malaise and weakness. She denies fever, headache, sore throat, cough, congestion, chest pain, shortness of breath, diarrhea, nausea/vomiting, abdominal pain, or pain or burning on urination. She reports having uncontrollable shaking but did not lose consciousness. She has history of seizure disorder. [Dr Shiraz Orellana    10/24: Hasn't had her home meds yet. Wasn't taking them outpt because said she couldn't afford them. ? If she had a seizure in her sleep that could have caused her lactic acidosis. Notes belly pain off and on as well. If her lactic acid and belly pain don't fully resolve, will consider starting cipro/flagyl. Tolerated 25% of breakfast.  Last BM yesterday. 10/25: BPs remained elevated overnight with IVF. Lactic acidosis is resolved. WBC normal and afebrile. COVID has been ruled out. Upon further questioning, pt lives alone and said it is possible that she could have had a seizure in her sleep before coming to the hospital.  Denies any current belly pain. Tolerated PO and had BM yesterday. Review of Systems:   A comprehensive review of systems was negative except for that written in the HPI.     Objective:   Physical Exam:     Visit Vitals  BP (!) 160/87 (BP 1 Location: Right arm, BP Patient Position: Sitting)   Pulse 88   Temp 98.5 °F (36.9 °C)   Resp 16   Ht 5' 4\" (1.626 m)   Wt 80.5 kg (177 lb 6.4 oz)   SpO2 97%   Breastfeeding No   BMI 30.45 kg/m²      O2 Device: Room air    Temp (24hrs), Av.8 °F (37.1 °C), Min:98.5 °F (36.9 °C), Max:99 °F (37.2 °C)    No intake/output data recorded. 10/23 190 - 10/25 0700  In: 4283.3 [P.O.:1040; I.V.:3243.3]  Out: 4350 [Urine:4350]    General:  Alert, cooperative, no distress, appears stated age, calm   Head:  Normocephalic, without obvious abnormality, atraumatic. Eyes:  Conjunctivae/corneas clear. PERRL, EOMs intact. Nose: Nares normal. Septum midline. Throat: Lips, mucosa, and tongue moist.   Neck: Supple, symmetrical, trachea midline, no adenopathy, thyroid: no enlargement/tenderness/nodules, no carotid bruit and no JVD. Back:   Symmetric, no curvature. ROM normal. No CVA tenderness. Lungs:   Clear to auscultation bilaterally. Chest wall:  No tenderness or deformity. Heart:  Regular rate and rhythm, S1, S2 normal, no murmur, click, rub or gallop. Abdomen:   Soft, non-tender. Bowel sounds normal. No masses,  No organomegaly. Extremities: Extremities normal, atraumatic, no cyanosis or edema. No calf tenderness or cords. Pulses: 2+ and symmetric all extremities. Skin: Skin color, texture, turgor normal. No rashes or lesions   Neurologic: CNII-XII intact. Alert and oriented X 3. Fine motor of hands and fingers normal.   equal. Gait not tested at this time. Sensation grossly normal to touch. Gross motor of extremities normal.       Data Review:       Recent Days:  Recent Labs     10/25/20  0400 10/23/20  2201   WBC 6.3 8.1   HGB 14.2 15.5   HCT 42.3 44.8   * 171     Recent Labs     10/25/20  0400 10/24/20  1143 10/23/20  2201   *  --  139   K 3.0*  --  3.4*     --  105   CO2 24  --  21   *  --  139*   BUN 4*  --  4*   CREA 0.72  --  0.88   CA 8.2*  --  8.8   MG  --  1.9  --    ALB 3.0*  --  3.6   ALT 22  --  33     No results for input(s): PH, PCO2, PO2, HCO3, FIO2 in the last 72 hours.     24 Hour Results:  Recent Results (from the past 24 hour(s)) GLUCOSE, POC    Collection Time: 10/24/20  9:28 AM   Result Value Ref Range    Glucose (POC) 130 (H) 65 - 100 mg/dL    Performed by Inell Sleek    GLUCOSE, POC    Collection Time: 10/24/20 11:12 AM   Result Value Ref Range    Glucose (POC) 130 (H) 65 - 100 mg/dL    Performed by Divya UNDERWOOD    LACTIC ACID    Collection Time: 10/24/20 11:43 AM   Result Value Ref Range    Lactic acid 1.4 0.4 - 2.0 MMOL/L   MAGNESIUM    Collection Time: 10/24/20 11:43 AM   Result Value Ref Range    Magnesium 1.9 1.6 - 2.4 mg/dL   GLUCOSE, POC    Collection Time: 10/24/20  4:45 PM   Result Value Ref Range    Glucose (POC) 261 (H) 65 - 100 mg/dL    Performed by Inell Sleek    GLUCOSE, POC    Collection Time: 10/24/20  9:02 PM   Result Value Ref Range    Glucose (POC) 114 (H) 65 - 100 mg/dL    Performed by Fahda Ventura    CBC W/O DIFF    Collection Time: 10/25/20  4:00 AM   Result Value Ref Range    WBC 6.3 3.6 - 11.0 K/uL    RBC 4.18 3.80 - 5.20 M/uL    HGB 14.2 11.5 - 16.0 g/dL    HCT 42.3 35.0 - 47.0 %    .2 (H) 80.0 - 99.0 FL    MCH 34.0 26.0 - 34.0 PG    MCHC 33.6 30.0 - 36.5 g/dL    RDW 12.9 11.5 - 14.5 %    PLATELET 993 (L) 590 - 400 K/uL    MPV 10.1 8.9 - 12.9 FL    NRBC 0.0 0  WBC    ABSOLUTE NRBC 0.00 0.00 - 1.78 K/uL   METABOLIC PANEL, COMPREHENSIVE    Collection Time: 10/25/20  4:00 AM   Result Value Ref Range    Sodium 135 (L) 136 - 145 mmol/L    Potassium 3.0 (L) 3.5 - 5.1 mmol/L    Chloride 103 97 - 108 mmol/L    CO2 24 21 - 32 mmol/L    Anion gap 8 5 - 15 mmol/L    Glucose 126 (H) 65 - 100 mg/dL    BUN 4 (L) 6 - 20 MG/DL    Creatinine 0.72 0.55 - 1.02 MG/DL    BUN/Creatinine ratio 6 (L) 12 - 20      GFR est AA >60 >60 ml/min/1.73m2    GFR est non-AA >60 >60 ml/min/1.73m2    Calcium 8.2 (L) 8.5 - 10.1 MG/DL    Bilirubin, total 1.5 (H) 0.2 - 1.0 MG/DL    ALT (SGPT) 22 12 - 78 U/L    AST (SGOT) 24 15 - 37 U/L    Alk.  phosphatase 73 45 - 117 U/L    Protein, total 7.2 6.4 - 8.2 g/dL    Albumin 3.0 (L) 3.5 - 5.0 g/dL    Globulin 4.2 (H) 2.0 - 4.0 g/dL    A-G Ratio 0.7 (L) 1.1 - 2.2         Medications reviewed  Current Facility-Administered Medications   Medication Dose Route Frequency    sodium chloride (NS) flush 5-40 mL  5-40 mL IntraVENous Q8H    sodium chloride (NS) flush 5-40 mL  5-40 mL IntraVENous PRN    acetaminophen (TYLENOL) tablet 650 mg  650 mg Oral Q6H PRN    polyethylene glycol (MIRALAX) packet 17 g  17 g Oral DAILY PRN    ondansetron (ZOFRAN) injection 4 mg  4 mg IntraVENous Q6H PRN    enoxaparin (LOVENOX) injection 40 mg  40 mg SubCUTAneous DAILY    0.9% sodium chloride infusion  100 mL/hr IntraVENous CONTINUOUS    glucose chewable tablet 16 g  4 Tab Oral PRN    dextrose (D50W) injection syrg 12.5-25 g  25-50 mL IntraVENous PRN    glucagon (GLUCAGEN) injection 1 mg  1 mg IntraMUSCular PRN    insulin lispro (HUMALOG) injection   SubCUTAneous AC&HS    levETIRAcetam (KEPPRA) tablet 500 mg  500 mg Oral BID    amLODIPine (NORVASC) tablet 10 mg  10 mg Oral DAILY    atorvastatin (LIPITOR) tablet 20 mg  20 mg Oral DAILY    alogliptin (NESINA) tablet 25 mg  25 mg Oral DAILY    magnesium oxide (MAG-OX) tablet 400 mg  400 mg Oral DAILY    pantoprazole (PROTONIX) tablet 40 mg  40 mg Oral DAILY    sertraline (ZOLOFT) tablet 100 mg  100 mg Oral DAILY    potassium chloride SR (KLOR-CON 10) tablet 40 mEq  40 mEq Oral DAILY       Care Plan discussed with: Patient/Family and Nurse    Total time spent with patient: 30 minutes.     Caprice Frost MD

## 2020-10-25 NOTE — PROGRESS NOTES
Problem: Falls - Risk of  Goal: *Absence of Falls  Description: Document Lorraine Sykes Fall Risk and appropriate interventions in the flowsheet.   10/25/2020 0754 by Otoniel Stage  Outcome: Progressing Towards Goal  Note: Fall Risk Interventions:  Mobility Interventions: Patient to call before getting OOB, Utilize walker, cane, or other assistive device, Utilize gait belt for transfers/ambulation    Mentation Interventions: Adequate sleep, hydration, pain control, Gait belt with transfers/ambulation    Medication Interventions: Evaluate medications/consider consulting pharmacy, Patient to call before getting OOB    Elimination Interventions: Call light in reach, Stay With Me (per policy)           55/14/6871 0753 by Otoniel Stage  Outcome: Progressing Towards Goal  Note: Fall Risk Interventions:  Mobility Interventions: Patient to call before getting OOB, Utilize walker, cane, or other assistive device, Utilize gait belt for transfers/ambulation    Mentation Interventions: Adequate sleep, hydration, pain control, Gait belt with transfers/ambulation    Medication Interventions: Evaluate medications/consider consulting pharmacy, Patient to call before getting OOB    Elimination Interventions: Call light in reach, Stay With Me (per policy)

## 2020-10-25 NOTE — PROGRESS NOTES
Problem: Self Care Deficits Care Plan (Adult)  Goal: *Acute Goals and Plan of Care (Insert Text)  Description: FUNCTIONAL STATUS PRIOR TO ADMISSION: Patient was modified independent using a single point cane for functional mobility. HOME SUPPORT PRIOR TO ADMISSION: The patient lived alone with family to provide assistance. Occupational Therapy Goals  Initiated 10/25/2020  1. Patient will perform lower body dressing with modified independence within 7 day(s). 2.  Patient will perform grooming with modified independence within 7 day(s). 3.  Patient will perform toilet transfers with modified independence within 7 day(s). 4.  Patient will perform all aspects of toileting with modified independence within 7 day(s). 5.  Patient will participate in upper extremity therapeutic exercise/activities with modified independence for 10 minutes within 7 day(s). 6.  Patient will utilize energy conservation techniques during functional activities with verbal cues within 7 day(s). Outcome: Progressing Towards Goal   OCCUPATIONAL THERAPY EVALUATION  Patient: Varun Stuart (74 y.o. female)  Date: 10/25/2020  Primary Diagnosis: Lactic acidosis [E87.2]        Precautions:   Fall    ASSESSMENT  Based on the objective data described below, the patient presents with hospital admission secondary to lactic acidosis. Patient received in bed, HOB elevated. She reports currently with hallucinations and describes as seeing her children when she closes her eyes. Patient agreeable to activity with OT. She is alert and oriented but seemingly confused regarding home PLOF. Patient noted with tremulous lower jaw which she reports is new. Patient to EOB with SBA and able to transfer to CGA. Patient manages toileting and hygiene with CGA/SBA. Patient with some difficulty locating paper towels despite verbal cues for finding on R side.   Patient returned to bed at end of session per her request.  Patient without mention of further hallucinations during therapy session. Bed alarm set. Current Level of Function Impacting Discharge (ADLs/self-care): CGA/SBA    Functional Outcome Measure: The patient scored 65/100 on the BArthel Index  outcome measure. Other factors to consider for discharge: lives alone? Patient will benefit from skilled therapy intervention to address the above noted impairments. PLAN :  Recommendations and Planned Interventions: self care training, functional mobility training, therapeutic exercise, balance training, therapeutic activities, endurance activities, patient education, home safety training, and family training/education    Frequency/Duration: Patient will be followed by occupational therapy 5 times a week to address goals. Recommendation for discharge: (in order for the patient to meet his/her long term goals)  Occupational therapy at least 2 days/week in the home and ensure supervision with ADLs tasks and transfers     This discharge recommendation:  Has not yet been discussed the attending provider and/or case management    IF patient discharges home will need the following DME: TBD       SUBJECTIVE:   Patient stated I need glasses.     OBJECTIVE DATA SUMMARY:   HISTORY:   Past Medical History:   Diagnosis Date    Anxiety and depression     Diabetes ([facility]     Hypertension     Obese     Seizure disorder ([facility]    No past surgical history on file.     Expanded or extensive additional review of patient history:     Home Situation  Home Environment: Private residence  # Steps to Enter: 3  Rails to Enter: Yes  One/Two Story Residence: One story  Living Alone: No  Support Systems: Family member(s), Friends \ neighbors  Patient Expects to be Discharged to[de-identified] Private residence  Current DME Used/Available at Home: None  Tub or Shower Type: Shower    Hand dominance: Right    EXAMINATION OF PERFORMANCE DEFICITS:  Cognitive/Behavioral Status:  Neurologic State: Alert  Orientation Level: Oriented X4  Cognition: Follows commands  Perception: Appears intact  Perseveration: No perseveration noted  Safety/Judgement: Awareness of environment;Decreased insight into deficits    Skin: intact as seen    Edema: none noted     Hearing: Auditory  Auditory Impairment: None    Vision/Perceptual:                                     Range of Motion:  AROM: Within functional limits                         Strength:  Strength: Generally decreased, functional                Coordination:  Coordination: Within functional limits  Fine Motor Skills-Upper: Left Intact; Right Intact    Gross Motor Skills-Upper: Left Intact; Right Intact    Tone & Sensation:  Tone: Normal  Sensation: Intact                      Balance:  Sitting: Intact  Standing: Intact; With support    Functional Mobility and Transfers for ADLs:  Bed Mobility:  Supine to Sit: Stand-by assistance  Sit to Supine: Stand-by assistance    Transfers:  Sit to Stand: Contact guard assistance  Stand to Sit: Contact guard assistance  Bed to Chair: Contact guard assistance  Bathroom Mobility: Contact guard assistance  Toilet Transfer : Contact guard assistance    ADL Assessment:  Feeding: Setup    Oral Facial Hygiene/Grooming: Contact guard assistance(standing )    Bathing: Contact guard assistance    Upper Body Dressing: Supervision    Lower Body Dressing: Contact guard assistance    Toileting: Stand by assistance                ADL Intervention and task modifications:                                     Cognitive Retraining  Safety/Judgement: Awareness of environment;Decreased insight into deficits    Therapeutic Exercise:     Functional Measure:  Barthel Index:    Bathin  Bladder: 5  Bowels: 10  Groomin  Dressin  Feeding: 10  Mobility: 10  Stairs: 5  Toilet Use: 5  Transfer (Bed to Chair and Back): 10  Total: 65/100        The Barthel ADL Index: Guidelines  1.  The index should be used as a record of what a patient does, not as a record of what a patient could do. 2. The main aim is to establish degree of independence from any help, physical or verbal, however minor and for whatever reason. 3. The need for supervision renders the patient not independent. 4. A patient's performance should be established using the best available evidence. Asking the patient, friends/relatives and nurses are the usual sources, but direct observation and common sense are also important. However direct testing is not needed. 5. Usually the patient's performance over the preceding 24-48 hours is important, but occasionally longer periods will be relevant. 6. Middle categories imply that the patient supplies over 50 per cent of the effort. 7. Use of aids to be independent is allowed. Rodo Godoy., Barthel, D.W. (9453). Functional evaluation: the Barthel Index. [address] [facility] (14)2. Elbert Travis eric KYLAH Funk, Frandy Hurley, Alexa South., [place], [address] [place] [address] (1999). Measuring the change indisability after inpatient rehabilitation; comparison of the responsiveness of the Barthel Index and Functional [place] Measure. Journal of Neurology, Neurosurgery, and Psychiatry, 66(4), 894-832. Armida Mcneil, N.J.A, TANNA Hernandes, & Alba Raza M.A. (2004.) Assessment of post-stroke quality of life in cost-effectiveness studies: The usefulness of the Barthel Index and the EuroQoL-5D.  Quality of Life Research, 15, 915-97         Occupational Therapy Evaluation Charge Determination   History Examination Decision-Making   LOW Complexity : Brief history review  LOW Complexity : 1-3 performance deficits relating to physical, cognitive , or psychosocial skils that result in activity limitations and / or participation restrictions  LOW Complexity : No comorbidities that affect functional and no verbal or physical assistance needed to complete eval tasks       Based on the above components, the patient evaluation is determined to be of the following complexity level: LOW   Pain Rating:      Activity Tolerance:   Good  Please refer to the flowsheet for vital signs taken during this treatment. After treatment patient left in no apparent distress:    Supine in bed, Call bell within reach, Bed / chair alarm activated, and Side rails x 3    COMMUNICATION/EDUCATION:   The patients plan of care was discussed with: Physical therapist and Registered nurse. Home safety education was provided and the patient/caregiver indicated understanding., Patient/family have participated as able in goal setting and plan of care. , and Patient/family agree to work toward stated goals and plan of care. This patients plan of care is appropriate for delegation to [facility].     Thank you for this referral.  Ines Fisher OTR/L  Time Calculation: 25 mins

## 2020-10-26 VITALS
BODY MASS INDEX: 30.29 KG/M2 | HEIGHT: 64 IN | DIASTOLIC BLOOD PRESSURE: 73 MMHG | OXYGEN SATURATION: 96 % | HEART RATE: 86 BPM | SYSTOLIC BLOOD PRESSURE: 116 MMHG | WEIGHT: 177.4 LBS | RESPIRATION RATE: 18 BRPM | TEMPERATURE: 98.4 F

## 2020-10-26 LAB
ALBUMIN SERPL-MCNC: 3.1 G/DL (ref 3.5–5)
ALBUMIN/GLOB SERPL: 0.7 {RATIO} (ref 1.1–2.2)
ALP SERPL-CCNC: 66 U/L (ref 45–117)
ALT SERPL-CCNC: 21 U/L (ref 12–78)
ANION GAP SERPL CALC-SCNC: 8 MMOL/L (ref 5–15)
AST SERPL-CCNC: 22 U/L (ref 15–37)
ATRIAL RATE: 88 BPM
BASOPHILS # BLD: 0 K/UL (ref 0–0.1)
BASOPHILS NFR BLD: 0 % (ref 0–1)
BILIRUB SERPL-MCNC: 1.5 MG/DL (ref 0.2–1)
BUN SERPL-MCNC: 4 MG/DL (ref 6–20)
BUN/CREAT SERPL: 5 (ref 12–20)
CALCIUM SERPL-MCNC: 8.8 MG/DL (ref 8.5–10.1)
CALCULATED P AXIS, ECG09: 57 DEGREES
CALCULATED R AXIS, ECG10: -24 DEGREES
CALCULATED T AXIS, ECG11: 48 DEGREES
CHLORIDE SERPL-SCNC: 102 MMOL/L (ref 97–108)
CO2 SERPL-SCNC: 22 MMOL/L (ref 21–32)
CREAT SERPL-MCNC: 0.73 MG/DL (ref 0.55–1.02)
DIAGNOSIS, 93000: NORMAL
DIFFERENTIAL METHOD BLD: ABNORMAL
EOSINOPHIL # BLD: 0.2 K/UL (ref 0–0.4)
EOSINOPHIL NFR BLD: 2 % (ref 0–7)
ERYTHROCYTE [DISTWIDTH] IN BLOOD BY AUTOMATED COUNT: 12.7 % (ref 11.5–14.5)
GLOBULIN SER CALC-MCNC: 4.3 G/DL (ref 2–4)
GLUCOSE BLD STRIP.AUTO-MCNC: 109 MG/DL (ref 65–100)
GLUCOSE BLD STRIP.AUTO-MCNC: 147 MG/DL (ref 65–100)
GLUCOSE SERPL-MCNC: 103 MG/DL (ref 65–100)
HCT VFR BLD AUTO: 43.1 % (ref 35–47)
HEALTH STATUS, XMCV2T: NORMAL
HGB BLD-MCNC: 14.4 G/DL (ref 11.5–16)
IMM GRANULOCYTES # BLD AUTO: 0 K/UL (ref 0–0.04)
IMM GRANULOCYTES NFR BLD AUTO: 0 % (ref 0–0.5)
LYMPHOCYTES # BLD: 1.2 K/UL (ref 0.8–3.5)
LYMPHOCYTES NFR BLD: 19 % (ref 12–49)
MCH RBC QN AUTO: 34 PG (ref 26–34)
MCHC RBC AUTO-ENTMCNC: 33.4 G/DL (ref 30–36.5)
MCV RBC AUTO: 101.7 FL (ref 80–99)
MONOCYTES # BLD: 0.4 K/UL (ref 0–1)
MONOCYTES NFR BLD: 7 % (ref 5–13)
NEUTS SEG # BLD: 4.7 K/UL (ref 1.8–8)
NEUTS SEG NFR BLD: 72 % (ref 32–75)
NRBC # BLD: 0 K/UL (ref 0–0.01)
NRBC BLD-RTO: 0 PER 100 WBC
P-R INTERVAL, ECG05: 170 MS
PLATELET # BLD AUTO: 136 K/UL (ref 150–400)
PMV BLD AUTO: 10.3 FL (ref 8.9–12.9)
POTASSIUM SERPL-SCNC: 3.8 MMOL/L (ref 3.5–5.1)
PROT SERPL-MCNC: 7.4 G/DL (ref 6.4–8.2)
Q-T INTERVAL, ECG07: 406 MS
QRS DURATION, ECG06: 82 MS
QTC CALCULATION (BEZET), ECG08: 491 MS
RBC # BLD AUTO: 4.24 M/UL (ref 3.8–5.2)
SARS-COV-2, COV2: NOT DETECTED
SERVICE CMNT-IMP: ABNORMAL
SERVICE CMNT-IMP: ABNORMAL
SODIUM SERPL-SCNC: 132 MMOL/L (ref 136–145)
SOURCE, COVRS: NORMAL
SPECIMEN SOURCE, FCOV2M: NORMAL
SPECIMEN TYPE, XMCV1T: NORMAL
VENTRICULAR RATE, ECG03: 88 BPM
WBC # BLD AUTO: 6.5 K/UL (ref 3.6–11)

## 2020-10-26 PROCEDURE — 97116 GAIT TRAINING THERAPY: CPT

## 2020-10-26 PROCEDURE — 74011250637 HC RX REV CODE- 250/637: Performed by: FAMILY MEDICINE

## 2020-10-26 PROCEDURE — 74011250636 HC RX REV CODE- 250/636: Performed by: FAMILY MEDICINE

## 2020-10-26 PROCEDURE — 80053 COMPREHEN METABOLIC PANEL: CPT

## 2020-10-26 PROCEDURE — 85025 COMPLETE CBC W/AUTO DIFF WBC: CPT

## 2020-10-26 PROCEDURE — 36415 COLL VENOUS BLD VENIPUNCTURE: CPT

## 2020-10-26 PROCEDURE — 82962 GLUCOSE BLOOD TEST: CPT

## 2020-10-26 PROCEDURE — 97535 SELF CARE MNGMENT TRAINING: CPT

## 2020-10-26 PROCEDURE — 74011636637 HC RX REV CODE- 636/637: Performed by: FAMILY MEDICINE

## 2020-10-26 PROCEDURE — 90471 IMMUNIZATION ADMIN: CPT

## 2020-10-26 PROCEDURE — 97530 THERAPEUTIC ACTIVITIES: CPT

## 2020-10-26 PROCEDURE — 90686 IIV4 VACC NO PRSV 0.5 ML IM: CPT | Performed by: FAMILY MEDICINE

## 2020-10-26 RX ORDER — POTASSIUM CHLORIDE 750 MG/1
10 TABLET, FILM COATED, EXTENDED RELEASE ORAL DAILY
Qty: 30 TAB | Refills: 0 | Status: SHIPPED | OUTPATIENT
Start: 2020-10-26 | End: 2020-10-26

## 2020-10-26 RX ORDER — ATORVASTATIN CALCIUM 20 MG/1
20 TABLET, FILM COATED ORAL DAILY
Qty: 30 TAB | Refills: 0 | Status: SHIPPED | OUTPATIENT
Start: 2020-10-26 | End: 2020-10-26

## 2020-10-26 RX ORDER — POTASSIUM CHLORIDE 750 MG/1
10 TABLET, FILM COATED, EXTENDED RELEASE ORAL DAILY
Qty: 30 TAB | Refills: 0 | Status: SHIPPED | OUTPATIENT
Start: 2020-10-26 | End: 2021-01-16

## 2020-10-26 RX ORDER — ACETAMINOPHEN 325 MG/1
650 TABLET ORAL
Qty: 30 TAB | Refills: 0 | Status: SHIPPED
Start: 2020-10-26 | End: 2021-01-19

## 2020-10-26 RX ORDER — SERTRALINE HYDROCHLORIDE 100 MG/1
100 TABLET, FILM COATED ORAL DAILY
Qty: 30 TAB | Refills: 0 | Status: SHIPPED | OUTPATIENT
Start: 2020-10-26 | End: 2020-10-26

## 2020-10-26 RX ORDER — PANTOPRAZOLE SODIUM 40 MG/1
40 TABLET, DELAYED RELEASE ORAL DAILY
Qty: 30 TAB | Refills: 0 | Status: SHIPPED | OUTPATIENT
Start: 2020-10-27 | End: 2021-01-19

## 2020-10-26 RX ORDER — LEVETIRACETAM 500 MG/1
500 TABLET ORAL 2 TIMES DAILY
Qty: 60 TAB | Refills: 0 | Status: SHIPPED | OUTPATIENT
Start: 2020-10-26 | End: 2020-10-26

## 2020-10-26 RX ORDER — SERTRALINE HYDROCHLORIDE 100 MG/1
100 TABLET, FILM COATED ORAL DAILY
Qty: 30 TAB | Refills: 0 | Status: SHIPPED | OUTPATIENT
Start: 2020-10-26 | End: 2021-01-19

## 2020-10-26 RX ORDER — LOSARTAN POTASSIUM 25 MG/1
25 TABLET ORAL 2 TIMES DAILY
Qty: 30 TAB | Refills: 0 | Status: SHIPPED | OUTPATIENT
Start: 2020-10-26 | End: 2021-01-19

## 2020-10-26 RX ORDER — ATORVASTATIN CALCIUM 20 MG/1
20 TABLET, FILM COATED ORAL DAILY
Qty: 30 TAB | Refills: 0 | Status: SHIPPED | OUTPATIENT
Start: 2020-10-26 | End: 2021-01-19

## 2020-10-26 RX ORDER — LOSARTAN POTASSIUM 25 MG/1
25 TABLET ORAL 2 TIMES DAILY
Qty: 30 TAB | Refills: 0 | Status: SHIPPED | OUTPATIENT
Start: 2020-10-26 | End: 2020-10-26

## 2020-10-26 RX ORDER — LEVETIRACETAM 500 MG/1
500 TABLET ORAL 2 TIMES DAILY
Qty: 60 TAB | Refills: 0 | Status: SHIPPED | OUTPATIENT
Start: 2020-10-26 | End: 2021-01-19

## 2020-10-26 RX ORDER — PANTOPRAZOLE SODIUM 40 MG/1
40 TABLET, DELAYED RELEASE ORAL DAILY
Qty: 30 TAB | Refills: 0 | Status: SHIPPED | OUTPATIENT
Start: 2020-10-27 | End: 2020-10-26

## 2020-10-26 RX ORDER — ASPIRIN 81 MG/1
81 TABLET ORAL DAILY
Qty: 30 TAB | Refills: 0 | Status: SHIPPED
Start: 2020-10-26

## 2020-10-26 RX ADMIN — ENOXAPARIN SODIUM 40 MG: 40 INJECTION SUBCUTANEOUS at 08:31

## 2020-10-26 RX ADMIN — POTASSIUM CHLORIDE 40 MEQ: 750 TABLET, FILM COATED, EXTENDED RELEASE ORAL at 08:32

## 2020-10-26 RX ADMIN — SERTRALINE HYDROCHLORIDE 100 MG: 50 TABLET ORAL at 08:32

## 2020-10-26 RX ADMIN — INFLUENZA VIRUS VACCINE 0.5 ML: 15; 15; 15; 15 SUSPENSION INTRAMUSCULAR at 16:39

## 2020-10-26 RX ADMIN — LEVETIRACETAM 500 MG: 500 TABLET ORAL at 08:32

## 2020-10-26 RX ADMIN — ATORVASTATIN CALCIUM 20 MG: 20 TABLET, FILM COATED ORAL at 08:32

## 2020-10-26 RX ADMIN — LOSARTAN POTASSIUM 25 MG: 25 TABLET, FILM COATED ORAL at 08:32

## 2020-10-26 RX ADMIN — PANTOPRAZOLE SODIUM 40 MG: 40 TABLET, DELAYED RELEASE ORAL at 08:32

## 2020-10-26 RX ADMIN — Medication 10 ML: at 08:34

## 2020-10-26 RX ADMIN — INSULIN LISPRO 2 UNITS: 100 INJECTION, SOLUTION INTRAVENOUS; SUBCUTANEOUS at 12:42

## 2020-10-26 RX ADMIN — ALOGLIPTIN 25 MG: 12.5 TABLET, FILM COATED ORAL at 08:32

## 2020-10-26 RX ADMIN — AMLODIPINE BESYLATE 10 MG: 5 TABLET ORAL at 08:32

## 2020-10-26 RX ADMIN — ACETAMINOPHEN 650 MG: 325 TABLET ORAL at 08:44

## 2020-10-26 NOTE — PROGRESS NOTES
Daily Progress Note and Discharge Note: 10/26/2020  Annie Dougherty MD    Assessment/Plan:   Weakness:-Uncertain etiology, there has been med noncompliance  -follow up outpt with Dr Chani Sharpe later this wk     Lactic acidosis: resolved, unk etiology  - COVID neg     Seizure disorder  - Keppra as directed  -counseled about  compliance     Hypertension:  -cont amlodipine   -added losartan  -follow up with Dr Chani Sharpe later this wk     Type 2 diabetes: A1c 6.3  -Sliding scale insulin inpt  -restart januvia  -follow up with PCP later this wk    Hypokalemia: resolved  -recheck with PCP later this wk  -replete mag     GERD  -Continue PPI     Depression  - cont Zoloft     Hyperlipidemia  -continue statin     Diet: Diabetic  Activity: As tolerated       Problem List:  Problem List as of 10/26/2020 Date Reviewed: 9/27/2018          Codes Class Noted - Resolved    Lactic acidosis ICD-10-CM: E87.2  ICD-9-CM: 276.2  10/24/2020 - Present        SIRS (systemic inflammatory response syndrome) ([facility] ICD-10-CM: R65.10  ICD-9-CM: 995.90  9/27/2018 - Present        Sinus tachycardia ICD-10-CM: R00.0  ICD-9-CM: 427.89  9/27/2018 - Present        Hypertension ICD-10-CM: I10  ICD-9-CM: 401.9  Unknown - Present        Diabetes ([facility] ICD-10-CM: E11.9  ICD-9-CM: 250.00  Unknown - Present        Anxiety and depression ICD-10-CM: F41.9, F32.9  ICD-9-CM: 300.00, 311  Unknown - Present        Seizure disorder ([facility] ICD-10-CM: G40.909  ICD-9-CM: 345.90  Unknown - Present        Vomiting ICD-10-CM: R11.10  ICD-9-CM: 787.03  9/27/2018 - Present        Hypokalemia ICD-10-CM: E87.6  ICD-9-CM: 276.8  9/27/2018 - Present        Obese ICD-10-CM: E66.9  ICD-9-CM: 278.00  Unknown - Present        DM (diabetes mellitus) ([facility] ICD-10-CM: E11.9  ICD-9-CM: 250.00  6/7/2018 - Present        Leukocytosis ICD-10-CM: O61.258  ICD-9-CM: 288.60  6/7/2018 - Present        RESOLVED: Encephalopathy acute ICD-10-CM: G93.40  ICD-9-CM: 348.30  6/7/2018 - 9/27/2018        RESOLVED: Seizure ([facility] ICD-10-CM: R56.9  ICD-9-CM: 780.39  6/7/2018 - 9/27/2018        RESOLVED: Aspiration into airway ICD-10-CM: T17.908A  ICD-9-CM: 934.9  6/7/2018 - 9/27/2018        RESOLVED: Renal insufficiency ICD-10-CM: N28.9  ICD-9-CM: 593.9  6/7/2018 - 9/27/2018              Subjective: Judith Hallman is a 77 y.o. female who presented with shaking and restlessness for the past 1 day. She reports waking up yesterday morning with feeling of general malaise and weakness. She denies fever, headache, sore throat, cough, congestion, chest pain, shortness of breath, diarrhea, nausea/vomiting, abdominal pain, or pain or burning on urination. She reports having uncontrollable shaking but did not lose consciousness. She has history of seizure disorder. [Dr Shiraz Orellana    10/24: Hasn't had her home meds yet. Wasn't taking them outpt because said she couldn't afford them. ? If she had a seizure in her sleep that could have caused her lactic acidosis. Notes belly pain off and on as well. If her lactic acid and belly pain don't fully resolve, will consider starting cipro/flagyl. Tolerated 25% of breakfast.  Last BM yesterday. 10/25: BPs remained elevated overnight with IVF. Lactic acidosis is resolved. WBC normal and afebrile. COVID has been ruled out. Upon further questioning, pt lives alone and said it is possible that she could have had a seizure in her sleep before coming to the hospital.  Denies any current belly pain. Tolerated PO and had BM yesterday. 10/26:  No complaints this AM, feeling better and would like to go home. K+ was low yesterday and AM labs pending. If no other workup planned then hopefully home later today if labs ok. 3pm:  Wants to go home. Labs finally back and K+ ok. Discussed compliance with meds and follow up and she is to make appt to see Dr Emre Bourgeois later this wk. Losartan added to BP meds and to follow up with Dr Emre Bourgeois later this wk.       Review of Systems:   A comprehensive review of systems was negative except for that written in the HPI. Objective:   Physical Exam:     Visit Vitals  /82 (BP 1 Location: Right arm, BP Patient Position: At rest)   Pulse 91   Temp 98.6 °F (37 °C)   Resp 14   Ht 5' 4\" (1.626 m)   Wt 80.5 kg (177 lb 6.4 oz)   SpO2 95%   Breastfeeding No   BMI 30.45 kg/m²      O2 Device: Room air  Temp (24hrs), Av.7 °F (37.1 °C), Min:98.5 °F (36.9 °C), Max:99 °F (37.2 °C)    10/26 0701 - 10/26 1900  In: -   Out: 500 [Urine:500]   10/24 1901 - 10/26 0700  In: 0872 [P.O.:1220; I.V.:1200]  Out: 4950 [Urine:4950]  General:  Alert, cooperative, no distress, appears stated age, calm   Head:  Normocephalic, without obvious abnormality, atraumatic. Eyes:  Conjunctivae/corneas clear. PERRL, EOMs intact. Nose: Nares normal. Septum midline. Throat: Lips, mucosa, and tongue moist.   Neck: Supple, symmetrical, trachea midline, no adenopathy, thyroid: no enlargement/tenderness/nodules, no carotid bruit and no JVD. Back:   Symmetric, no curvature. ROM normal. No CVA tenderness. Lungs:   Clear to auscultation bilaterally. Chest wall:  No tenderness or deformity. Heart:  Regular rate and rhythm, S1, S2 normal, no murmur, click, rub or gallop. Abdomen:   Soft, non-tender. Bowel sounds normal. No masses,  No organomegaly. Extremities: Extremities normal, atraumatic, no cyanosis or edema. No calf tenderness or cords. Pulses: 2+ and symmetric all extremities. Skin: Skin color, texture, turgor normal. No rashes or lesions   Neurologic: CNII-XII intact. Alert and oriented X 3. Fine motor of hands and fingers normal.   equal. Gait not tested at this time. Sensation grossly normal to touch.   Gross motor of extremities normal.       Data Review:       Recent Days:  Recent Labs     10/26/20  0610 10/25/20  0400 10/23/20  2201   WBC 6.5 6.3 8.1   HGB 14.4 14.2 15.5   HCT 43.1 42.3 44.8   * 143* 171     Recent Labs 10/26/20  0610 10/25/20  0400 10/24/20  1143 10/23/20  2201   * 135*  --  139   K 3.8 3.0*  --  3.4*    103  --  105   CO2 22 24  --  21   * 126*  --  139*   BUN 4* 4*  --  4*   CREA 0.73 0.72  --  0.88   CA 8.8 8.2*  --  8.8   MG  --   --  1.9  --    ALB 3.1* 3.0*  --  3.6   ALT 21 22  --  33     No results for input(s): PH, PCO2, PO2, HCO3, FIO2 in the last 72 hours. 24 Hour Results:  Recent Results (from the past 24 hour(s))   GLUCOSE, POC    Collection Time: 10/25/20  4:49 PM   Result Value Ref Range    Glucose (POC) 129 (H) 65 - 100 mg/dL    Performed by Mayte Sandoval (PCT)    GLUCOSE, POC    Collection Time: 10/25/20  8:48 PM   Result Value Ref Range    Glucose (POC) 126 (H) 65 - 100 mg/dL    Performed by [facility]    METABOLIC PANEL, COMPREHENSIVE    Collection Time: 10/26/20  6:10 AM   Result Value Ref Range    Sodium 132 (L) 136 - 145 mmol/L    Potassium 3.8 3.5 - 5.1 mmol/L    Chloride 102 97 - 108 mmol/L    CO2 22 21 - 32 mmol/L    Anion gap 8 5 - 15 mmol/L    Glucose 103 (H) 65 - 100 mg/dL    BUN 4 (L) 6 - 20 MG/DL    Creatinine 0.73 0.55 - 1.02 MG/DL    BUN/Creatinine ratio 5 (L) 12 - 20      GFR est AA >60 >60 ml/min/1.73m2    GFR est non-AA >60 >60 ml/min/1.73m2    Calcium 8.8 8.5 - 10.1 MG/DL    Bilirubin, total 1.5 (H) 0.2 - 1.0 MG/DL    ALT (SGPT) 21 12 - 78 U/L    AST (SGOT) 22 15 - 37 U/L    Alk.  phosphatase 66 45 - 117 U/L    Protein, total 7.4 6.4 - 8.2 g/dL    Albumin 3.1 (L) 3.5 - 5.0 g/dL    Globulin 4.3 (H) 2.0 - 4.0 g/dL    A-G Ratio 0.7 (L) 1.1 - 2.2     CBC WITH AUTOMATED DIFF    Collection Time: 10/26/20  6:10 AM   Result Value Ref Range    WBC 6.5 3.6 - 11.0 K/uL    RBC 4.24 3.80 - 5.20 M/uL    HGB 14.4 11.5 - 16.0 g/dL    HCT 43.1 35.0 - 47.0 %    .7 (H) 80.0 - 99.0 FL    MCH 34.0 26.0 - 34.0 PG    MCHC 33.4 30.0 - 36.5 g/dL    RDW 12.7 11.5 - 14.5 %    PLATELET 499 (L) 679 - 400 K/uL    MPV 10.3 8.9 - 12.9 FL    NRBC 0.0 0  WBC    ABSOLUTE NRBC 0. 00 0.00 - 0.01 K/uL    NEUTROPHILS 72 32 - 75 %    LYMPHOCYTES 19 12 - 49 %    MONOCYTES 7 5 - 13 %    EOSINOPHILS 2 0 - 7 %    BASOPHILS 0 0 - 1 %    IMMATURE GRANULOCYTES 0 0.0 - 0.5 %    ABS. NEUTROPHILS 4.7 1.8 - 8.0 K/UL    ABS. LYMPHOCYTES 1.2 0.8 - 3.5 K/UL    ABS. MONOCYTES 0.4 0.0 - 1.0 K/UL    ABS. EOSINOPHILS 0.2 0.0 - 0.4 K/UL    ABS. BASOPHILS 0.0 0.0 - 0.1 K/UL    ABS. IMM.  GRANS. 0.0 0.00 - 0.04 K/UL    DF AUTOMATED     GLUCOSE, POC    Collection Time: 10/26/20  7:40 AM   Result Value Ref Range    Glucose (POC) 109 (H) 65 - 100 mg/dL    Performed by Eric Mitchell (PCT)    GLUCOSE, POC    Collection Time: 10/26/20 11:53 AM   Result Value Ref Range    Glucose (POC) 147 (H) 65 - 100 mg/dL    Performed by Eric Mitchell (PCT)        Medications reviewed  Current Facility-Administered Medications   Medication Dose Route Frequency    hydrALAZINE (APRESOLINE) tablet 25 mg  25 mg Oral TID PRN    losartan (COZAAR) tablet 25 mg  25 mg Oral BID    potassium chloride SR (KLOR-CON 10) tablet 40 mEq  40 mEq Oral BID    sodium chloride (NS) flush 5-40 mL  5-40 mL IntraVENous Q8H    sodium chloride (NS) flush 5-40 mL  5-40 mL IntraVENous PRN    acetaminophen (TYLENOL) tablet 650 mg  650 mg Oral Q6H PRN    polyethylene glycol (MIRALAX) packet 17 g  17 g Oral DAILY PRN    ondansetron (ZOFRAN) injection 4 mg  4 mg IntraVENous Q6H PRN    enoxaparin (LOVENOX) injection 40 mg  40 mg SubCUTAneous DAILY    glucose chewable tablet 16 g  4 Tab Oral PRN    dextrose (D50W) injection syrg 12.5-25 g  25-50 mL IntraVENous PRN    glucagon (GLUCAGEN) injection 1 mg  1 mg IntraMUSCular PRN    insulin lispro (HUMALOG) injection   SubCUTAneous AC&HS    levETIRAcetam (KEPPRA) tablet 500 mg  500 mg Oral BID    amLODIPine (NORVASC) tablet 10 mg  10 mg Oral DAILY    atorvastatin (LIPITOR) tablet 20 mg  20 mg Oral DAILY    alogliptin (NESINA) tablet 25 mg  25 mg Oral DAILY    pantoprazole (PROTONIX) tablet 40 mg  40 mg Oral DAILY    sertraline (ZOLOFT) tablet 100 mg  100 mg Oral DAILY     Care Plan discussed with: Patient and Nurse  Total time spent with patient: 30 minutes.   Anastasia Leblanc MD

## 2020-10-26 NOTE — DISCHARGE INSTRUCTIONS
Patient Discharge Instructions    Aquilino Kimbrough / [id number] : 1950    Admitted 10/23/2020 Discharged: 10/26/2020 3:16 PM     ACUTE DIAGNOSES:  Lactic acidosis [E87.2]    CHRONIC MEDICAL DIAGNOSES:  Problem List as of 10/26/2020 Date Reviewed: 2018          Codes Class Noted - Resolved    Lactic acidosis ICD-10-CM: E87.2  ICD-9-CM: 276.2  10/24/2020 - Present        SIRS (systemic inflammatory response syndrome) ([facility] ICD-10-CM: R65.10  ICD-9-CM: 995.90  2018 - Present        Sinus tachycardia ICD-10-CM: R00.0  ICD-9-CM: 427.89  2018 - Present        Hypertension ICD-10-CM: I10  ICD-9-CM: 401.9  Unknown - Present        Diabetes ([facility] ICD-10-CM: E11.9  ICD-9-CM: 250.00  Unknown - Present        Anxiety and depression ICD-10-CM: F41.9, F32.9  ICD-9-CM: 300.00, 311  Unknown - Present        Seizure disorder ([facility] ICD-10-CM: G40.909  ICD-9-CM: 345.90  Unknown - Present        Vomiting ICD-10-CM: R11.10  ICD-9-CM: 787.03  2018 - Present        Hypokalemia ICD-10-CM: E87.6  ICD-9-CM: 276.8  2018 - Present        Obese ICD-10-CM: E66.9  ICD-9-CM: 278.00  Unknown - Present        DM (diabetes mellitus) ([facility] ICD-10-CM: E11.9  ICD-9-CM: 250.00  2018 - Present        Leukocytosis ICD-10-CM: R62.729  ICD-9-CM: 288.60  2018 - Present        RESOLVED: Encephalopathy acute ICD-10-CM: G93.40  ICD-9-CM: 348.30  2018 - 2018        RESOLVED: Seizure ([facility] ICD-10-CM: R56.9  ICD-9-CM: 780.39  2018 - 2018        RESOLVED: Aspiration into airway ICD-10-CM: L27.281E  ICD-9-CM: 934.9  2018 - 2018        RESOLVED: Renal insufficiency ICD-10-CM: N28.9  ICD-9-CM: 593.9  2018 - 2018              DISCHARGE MEDICATIONS:         · It is important that you take the medication exactly as they are prescribed. · Keep your medication in the bottles provided by the pharmacist and keep a list of the medication names, dosages, and times to be taken in your wallet.    · Do not take other medications without consulting your doctor. DIET:  Diabetic Diet    ACTIVITY: Activity as tolerated    ADDITIONAL INFORMATION: If you experience any of the following symptoms then please call your primary care physician or return to the emergency room if you cannot get hold of your doctor: Fever, chills, nausea, vomiting, diarrhea, change in mentation, falling, bleeding, shortness of breath. FOLLOW UP CARE:  Dr. Mariya Jim MD  you are to call and set up an appointment to see them with in 1 week. Follow-up with specialists at directed by them      Information obtained by :  I understand that if any problems occur once I am at home I am to contact my physician. I understand and acknowledge receipt of the instructions indicated above.                                                                                                                                            Physician's or R.N.'s Signature                                                                  Date/Time                                                                                                                                              Patient or Representative Signature                                                          Date/Time

## 2020-10-26 NOTE — PROGRESS NOTES
Problem: Falls - Risk of  Goal: *Absence of Falls  Description: Document Rubens Dewey Fall Risk and appropriate interventions in the flowsheet. Outcome: Progressing Towards Goal  Note: Fall Risk Interventions:  Mobility Interventions: Patient to call before getting OOB    Mentation Interventions: Increase mobility    Medication Interventions: Patient to call before getting OOB    Elimination Interventions: Patient to call for help with toileting needs, Call light in reach, Stay With Me (per policy)    Walking steady to BR with SBA only.

## 2020-10-26 NOTE — DISCHARGE SUMMARY
Physician Discharge Summary     Patient ID:    Emelina Mcclendon  [id number]  18 y.o.  1950  Vinnie Sumner MD    Admit date: 10/23/2020  Discharge date and time: 10/26/2020  Admission Diagnoses: Lactic acidosis [E87.2]    Discharge Medications:   Current Discharge Medication List      START taking these medications    Details   acetaminophen (TYLENOL) 325 mg tablet Take 2 Tabs by mouth every six (6) hours as needed for Pain or Fever. Qty: 30 Tab, Refills: 0      losartan (COZAAR) 25 mg tablet Take 1 Tab by mouth two (2) times a day. Qty: 30 Tab, Refills: 0      potassium chloride SR (KLOR-CON 10) 10 mEq tablet Take 1 Tab by mouth daily. Qty: 30 Tab, Refills: 0         CONTINUE these medications which have CHANGED    Details   SITagliptin (JANUVIA) 100 mg tablet Take 1 Tab by mouth daily. Qty: 30 Tab, Refills: 0      levETIRAcetam (KEPPRA) 500 mg tablet Take 1 Tab by mouth two (2) times a day. Qty: 60 Tab, Refills: 0      pantoprazole (PROTONIX) 40 mg tablet Take 1 Tab by mouth daily. Qty: 30 Tab, Refills: 0      sertraline (ZOLOFT) 100 mg tablet Take 1 Tab by mouth daily. Qty: 30 Tab, Refills: 0      atorvastatin (LIPITOR) 20 mg tablet Take 1 Tab by mouth daily. Qty: 30 Tab, Refills: 0         CONTINUE these medications which have NOT CHANGED    Details   aspirin delayed-release 81 mg tablet Take 81 mg by mouth daily. amLODIPine (NORVASC) 10 mg tablet Take 10 mg by mouth daily. Follow up Care:    1. Vinnie Sumner MD with in 1 weeks  2.  specialists as directed. Diet:  Diabetic Diet  Disposition:  Home.   Advanced Directive:  Discharge Exam:  [See today's progress note.]  CONSULTATIONS: None    Significant Diagnostic Studies:   Recent Labs     10/26/20  0610 10/25/20  0400   WBC 6.5 6.3   HGB 14.4 14.2   HCT 43.1 42.3   * 143*     Recent Labs     10/26/20  0610 10/25/20  0400 10/24/20  1143 10/23/20  2201   * 135*  --  139   K 3.8 3.0*  --  3.4*    103  --  105   CO2 22 24  --  21   BUN 4* 4*  --  4*   CREA 0.73 0.72  --  0.88   * 126*  --  139*   CA 8.8 8.2*  --  8.8   MG  --   --  1.9  --      Recent Labs     10/26/20  0610 10/25/20  0400 10/23/20  2201   ALT 21 22 33   AP 66 73 84   TBILI 1.5* 1.5* 0.7   TP 7.4 7.2 8.3*   ALB 3.1* 3.0* 3.6   GLOB 4.3* 4.2* 4.7*     HOSPITAL COURSE & TREATMENT RENDERED:   1.  See today's progress note:  Daily Progress Note and Discharge Note: 10/26/2020  Master Styles MD         Assessment/Plan:   Weakness:-Uncertain etiology, there has been med noncompliance  -follow up outpt with Dr Shayy Etienne later this wk     Lactic acidosis: resolved, unk etiology  - COVID neg     Seizure disorder  - Keppra as directed  -counseled about  compliance     Hypertension:  -cont amlodipine   -added losartan  -follow up with Dr Shayy Etienne later this wk     Type 2 diabetes: A1c 6.3  -Sliding scale insulin inpt  -restart januvia  -follow up with PCP later this wk     Hypokalemia: resolved  -recheck with PCP later this wk  -replete mag     GERD  -Continue PPI     Depression  - cont Zoloft     Hyperlipidemia  -continue statin     Diet: Diabetic  Activity: As tolerated         Problem List:             Problem List as of 10/26/2020 Date Reviewed: 9/27/2018           Codes Class Noted - Resolved     Lactic acidosis ICD-10-CM: E87.2  ICD-9-CM: 276.2   10/24/2020 - Present           SIRS (systemic inflammatory response syndrome) ([facility] ICD-10-CM: R65.10  ICD-9-CM: 995.90   9/27/2018 - Present           Sinus tachycardia ICD-10-CM: R00.0  ICD-9-CM: 427.89   9/27/2018 - Present           Hypertension ICD-10-CM: I10  ICD-9-CM: 401.9   Unknown - Present           Diabetes ([facility] ICD-10-CM: E11.9  ICD-9-CM: 250.00   Unknown - Present           Anxiety and depression ICD-10-CM: F41.9, F32.9  ICD-9-CM: 300.00, 311   Unknown - Present           Seizure disorder ([facility] ICD-10-CM: G40.909  ICD-9-CM: 345.90   Unknown - Present           Vomiting ICD-10-CM: R11.10  ICD-9-CM: 787.03   9/27/2018 - Present           Hypokalemia ICD-10-CM: E87.6  ICD-9-CM: 276.8   9/27/2018 - Present           Obese ICD-10-CM: E66.9  ICD-9-CM: 278.00   Unknown - Present           DM (diabetes mellitus) ([facility] ICD-10-CM: E11.9  ICD-9-CM: 250.00   6/7/2018 - Present           Leukocytosis ICD-10-CM: B51.011  ICD-9-CM: 288.60   6/7/2018 - Present           RESOLVED: Encephalopathy acute ICD-10-CM: G93.40  ICD-9-CM: 348.30   6/7/2018 - 9/27/2018           RESOLVED: Seizure ([facility] ICD-10-CM: R56.9  ICD-9-CM: 780.39   6/7/2018 - 9/27/2018           RESOLVED: Aspiration into airway ICD-10-CM: T17.908A  ICD-9-CM: 421. 9   6/7/2018 - 9/27/2018           RESOLVED: Renal insufficiency ICD-10-CM: N28.9  ICD-9-CM: 593.9   6/7/2018 - 9/27/2018               Subjective: Leslee Newman a 77 y. o. female who presented with shaking and restlessness for the past 1 day.  She reports waking up yesterday morning with feeling of general malaise and weakness.  She denies fever, headache, sore throat, cough, congestion, chest pain, shortness of breath, diarrhea, nausea/vomiting, abdominal pain, or pain or burning on urination.  She reports having uncontrollable shaking but did not lose consciousness.  She has history of seizure disorder. [Dr Molina]     10/24: Hasn't had her home meds yet. Wasn't taking them outpt because said she couldn't afford them. ? If she had a seizure in her sleep that could have caused her lactic acidosis. Notes belly pain off and on as well. If her lactic acid and belly pain don't fully resolve, will consider starting cipro/flagyl. Tolerated 25% of breakfast.  Last BM yesterday.     10/25: BPs remained elevated overnight with IVF. Lactic acidosis is resolved. WBC normal and afebrile. COVID has been ruled out. Upon further questioning, pt lives alone and said it is possible that she could have had a seizure in her sleep before coming to the hospital.  Denies any current belly pain. Tolerated PO and had BM yesterday.     10/26:  No complaints this AM, feeling better and would like to go home. K+ was low yesterday and AM labs pending. If no other workup planned then hopefully home later today if labs ok. 3pm:  Wants to go home. Labs finally back and K+ ok. Discussed compliance with meds and follow up and she is to make appt to see Dr Elvira Christian later this wk. Losartan added to BP meds and to follow up with Dr Elvira Christian later this wk.       Review of Systems:   A comprehensive review of systems was negative except for that written in the HPI.     Objective:   Physical Exam:      Visit Vitals  /82 (BP 1 Location: Right arm, BP Patient Position: At rest)   Pulse 91   Temp 98.6 °F (37 °C)   Resp 14   Ht 5' 4\" (1.626 m)   Wt 80.5 kg (177 lb 6.4 oz)   SpO2 95%   Breastfeeding No   BMI 30.45 kg/m²      O2 Device: Room air  Temp (24hrs), Av.7 °F (37.1 °C), Min:98.5 °F (36.9 °C), Max:99 °F (37.2 °C)    10/26 0701 - 10/26 1900  In: -   Out: 500 [Urine:500]   10/24 1901 - 10/26 0700  In: 9231 [P.O.:1220; I.V.:1200]  Out: 4950 [Urine:4950]  General:  Alert, cooperative, no distress, appears stated age, calm   Head:  Normocephalic, without obvious abnormality, atraumatic. Eyes:  Conjunctivae/corneas clear. PERRL, EOMs intact. Nose: Nares normal. Septum midline. Throat: Lips, mucosa, and tongue moist.   Neck: Supple, symmetrical, trachea midline, no adenopathy, thyroid: no enlargement/tenderness/nodules, no carotid bruit and no JVD. Back:   Symmetric, no curvature. ROM normal. No CVA tenderness. Lungs:   Clear to auscultation bilaterally. Chest wall:  No tenderness or deformity. Heart:  Regular rate and rhythm, S1, S2 normal, no murmur, click, rub or gallop. Abdomen:   Soft, non-tender. Bowel sounds normal. No masses,  No organomegaly. Extremities: Extremities normal, atraumatic, no cyanosis or edema. No calf tenderness or cords. Pulses: 2+ and symmetric all extremities. Skin: Skin color, texture, turgor normal. No rashes or lesions   Neurologic: CNII-XII intact. Alert and oriented X 3. Fine motor of hands and fingers normal.   equal. Gait not tested at this time. Sensation grossly normal to touch. Gross motor of extremities normal.       EXAM:  CT CHEST, ABDOMEN, PELVIS WITH CONTRAST 10/24/20  COMPARISON: September 27, 2018  FINDINGS:  THYROID: Small right lobe nodules partly visualized. MEDIASTINUM/LELO: No mass or lymphadenopathy. Aberrant right subclavian artery,  congenital variant. HEART/PERICARDIUM: Unremarkable. LUNGS/PLEURA: No pulmonary edema, pleural effusion or focal airspace disease. BONES: No destructive bone lesion. ADDITIONAL COMMENTS: N/A  LIVER: Steatosis. GALLBLADDER: Unremarkable. SPLEEN: No enlargement or lesion. PANCREAS: No mass or ductal dilatation. ADRENALS: No mass. KIDNEYS: No mass, calculus, or hydronephrosis. GI TRACT: No bowel obstruction or bowel wall thickening. Colonic diverticulosis  without diverticulitis. Slight swirling of central abdominal mesentery  PERITONEUM: No free air or free fluid. APPENDIX: Unremarkable. RETROPERITONEUM: No aortic aneurysm. LYMPH NODES: None enlarged. ADDITIONAL COMMENTS: N/A.  URINARY BLADDER: No mass or calculus. LYMPH NODES: None enlarged. REPRODUCTIVE ORGANS: Large calcified fibroids. FREE FLUID: None. BONES: No destructive bone lesion. ADDITIONAL COMMENTS: N/A. IMPRESSION  IMPRESSION:  1. No acute cardiopulmonary process. 2. Hepatic steatosis. 3. Colonic diverticulosis without diverticulitis. 4. Slight swirling of mid abdominal mesentery without bowel obstruction or bowel  wall thickening.   5. Uterine fibroids        Signed:  Domonique Omer MD  10/26/2020  3:16 PM

## 2020-10-26 NOTE — PROGRESS NOTES
Problem: Self Care Deficits Care Plan (Adult)  Goal: *Acute Goals and Plan of Care (Insert Text)  Description: FUNCTIONAL STATUS PRIOR TO ADMISSION: Patient was modified independent using a single point cane for functional mobility. HOME SUPPORT PRIOR TO ADMISSION: The patient lived alone with family to provide assistance. Occupational Therapy Goals  Initiated 10/25/2020  1. Patient will perform lower body dressing with modified independence within 7 day(s). 2.  Patient will perform grooming with modified independence within 7 day(s). 3.  Patient will perform toilet transfers with modified independence within 7 day(s). 4.  Patient will perform all aspects of toileting with modified independence within 7 day(s). 5.  Patient will participate in upper extremity therapeutic exercise/activities with modified independence for 10 minutes within 7 day(s). 6.  Patient will utilize energy conservation techniques during functional activities with verbal cues within 7 day(s). Outcome: Progressing Towards Goal   OCCUPATIONAL THERAPY TREATMENT  Patient: Liza Topete (73 y.o. female)  Date: 10/26/2020  Diagnosis: Lactic acidosis [E87.2]   <principal problem not specified>       Precautions: Fall  Chart, occupational therapy assessment, plan of care, and goals were reviewed. ASSESSMENT  Patient continues with skilled OT services and is progressing towards goals. Ms. Elina Pond was received in the chair alert, oriented x4, and following all commands. Patient was able to come to sitting EOB with supervision with good overall sitting balance. She was able to transfer into the bathroom for ADL retraining. Patient educated on adaptive ADL techniques and safe transfer techniques. Patient required CGA for all toileting needs and standing grooming tasks. Patient agreeable to remain up at end of tx. Patient is requiring decreased assistance and tolerating increased activity.       Current Level of Function Impacting Discharge (ADLs): Patient required supervision/SBA for bed mobility and CGA for OOB transfers. Patient required CGA for toileting and standing grooming tasks. PLAN :  Patient continues to benefit from skilled intervention to address the above impairments. Continue treatment per established plan of care. to address goals. Recommend with staff:   Recommend patient be OOB to chair as frequently as tolerated; Goal of 3x/day for all meals for 60 minutes at a time. For toileting needs, recommend transfers to/from bathroom. Encourage patient involvement in personal care as able. Recommend next OT session: Continue towards set OT goals. Recommendation for discharge: (in order for the patient to meet his/her long term goals)  Occupational therapy at least 2 days/week in the home     This discharge recommendation:  Has been made in collaboration with the attending provider and/or case management    IF patient discharges home will need the following DME: none       SUBJECTIVE:   Patient agreeable to OT tx. OBJECTIVE DATA SUMMARY:   Cognitive/Behavioral Status:  Neurologic State: Alert  Orientation Level: Oriented X4  Cognition: Follows commands  Perception: Appears intact  Perseveration: No perseveration noted  Safety/Judgement: Awareness of environment    Functional Mobility and Transfers for ADLs:  Bed Mobility:  Supine to Sit: Stand-by assistance  Sit to Supine: Stand-by assistance    Transfers:  Sit to Stand: Contact guard assistance  Functional Transfers  Toilet Transfer : Contact guard assistance     Balance:  Sitting: Intact  Standing: Intact; With support    ADL Intervention:  Grooming  Grooming Assistance: Contact guard assistance  Position Performed: Standing  Washing Face: Contact guard assistance  Washing Hands: Contact guard assistance  Cues: Verbal cues provided    Toileting  Toileting Assistance: Contact guard assistance  Bladder Hygiene: Contact guard assistance  Bowel Hygiene: Contact guard assistance  Clothing Management: Contact guard assistance  Cues: Verbal cues provided  Adaptive Equipment: Grab bars    Cognitive Retraining  Safety/Judgement: Awareness of environment    Activity Tolerance:   Good  Please refer to the flowsheet for vital signs taken during this treatment. After treatment patient left in no apparent distress:   Sitting in chair, Call bell within reach, and Bed / chair alarm activated    COMMUNICATION/COLLABORATION:   The patients plan of care was discussed with: Physical therapist, Registered nurse, and patient .      Janelle Bis, OTR/L  Time Calculation: 25 mins

## 2020-10-26 NOTE — PROGRESS NOTES
Bedside and Verbal shift change report given to Miguel Murrieta RN (oncoming nurse) by Nesha Brady RN (offgoing nurse). Report included the following information SBAR, Kardex, Intake/Output, MAR, Recent Results, Med Rec Status and Cardiac Rhythm NSR. Shift was unremarkable. Patient up to BR with SBA, HS , BP's stable, denies pain. Bedside and Verbal shift change report given to Nick Keene RN (oncoming nurse) by Miguel Murrieta RN (offgoing nurse). Report included the following information SBAR and Kardex, Intake/Output, MAR, recent results, med rec status and cardiac rhythm NSR. Likely discharging home today.

## 2020-10-26 NOTE — PROGRESS NOTES
Problem: Mobility Impaired (Adult and Pediatric)  Goal: *Acute Goals and Plan of Care (Insert Text)  Description: FUNCTIONAL STATUS PRIOR TO ADMISSION: Patient was modified independent using a single point cane for functional mobility. HOME SUPPORT PRIOR TO ADMISSION: The patient lived alone with family to provide assistance. Physical Therapy Goals  Initiated 10/24/2020  1. Patient will move from supine to sit and sit to supine  in bed with modified independence within 7 day(s). 2.  Patient will transfer from bed to chair and chair to bed with modified independence using the least restrictive device within 7 day(s). 3.  Patient will perform sit to stand with modified independence within 7 day(s). 4.  Patient will ambulate with modified independence for 150 feet with the least restrictive device within 7 day(s). 5.  Patient will ascend/descend 3 stairs with handrail(s) with minimal assistance/contact guard assist within 7 day(s). Outcome: Progressing Towards Goal     PHYSICAL THERAPY TREATMENT  Patient: Singh Cheung (51 y.o. female)  Date: 10/26/2020  Diagnosis: Lactic acidosis [E87.2]   <principal problem not specified>       Precautions: Fall  Chart, physical therapy assessment, plan of care and goals were reviewed. ASSESSMENT  Patient continues with skilled PT services and is progressing towards goals. She endorses feeling better overall and was agreeable and motivated to work with therapy. She is currently performing bed mobility with standby assistance and transfers and gait with RW with CGA for safety initially, progressing to Standby assist. She is safe to dc home from a mobility standpoint at this time with family to provide supervision, however, should pt remain in the acute setting, acute PT will continue to follow.  Rec HHPT to follow up at [facility]    Current Level of Function Impacting Discharge (mobility/balance): Standby for bed mobility, CGA/SBA for gait and transfers with RW    Other factors to consider for discharge: None         PLAN :  Patient continues to benefit from skilled intervention to address the above impairments. Continue treatment per established plan of care. to address goals. Recommendation for discharge: (in order for the patient to meet his/her long term goals)  Physical therapy at least 2 days/week in the home AND ensure assist and/or supervision for safety with mobility    This discharge recommendation:  Has been made in collaboration with the attending provider and/or case management    IF patient discharges home will need the following DME: patient owns DME required for discharge       SUBJECTIVE:   Patient stated I\"m doing so much better.     OBJECTIVE DATA SUMMARY:   Critical Behavior:  Neurologic State: Alert  Orientation Level: Oriented X4  Cognition: Follows commands  Safety/Judgement: Awareness of environment, Decreased insight into deficits  Functional Mobility Training:  Bed Mobility:     Supine to Sit: Stand-by assistance  Sit to Supine: Stand-by assistance           Transfers:  Sit to Stand: Contact guard assistance  Stand to Sit: Contact guard assistance                             Balance:  Sitting: Intact  Standing: Intact; With support  Ambulation/Gait Training:  Distance (ft): 65 Feet (ft)  Assistive Device: Gait belt;Walker, rolling  Ambulation - Level of Assistance: Contact guard assistance                 Base of Support: Widened                Pain Rating:  Denied pain    Activity Tolerance: WNL  Please refer to the flowsheet for vital signs taken during this treatment. After treatment patient left in no apparent distress:   Sitting in chair and Call bell within reach    COMMUNICATION/COLLABORATION:   The patients plan of care was discussed with: Registered nurse.      Miranda Huber PT, DPT   Time Calculation: 24 mins

## 2020-10-26 NOTE — PROGRESS NOTES
Physician Progress Note      PATIENT:               Kaylyn Farmer  CSN #:                  [id number]  :                       1950  ADMIT DATE:       10/23/2020 9:48 PM  [address] [place] DATE:  RESPONDING  PROVIDER #:        Brown RODARTE MD          QUERY TEXT:    10/26/20 @ 1054  Dear Dr. Storm Quiroz    Pt admitted with weakness &  lactic acidosis and noncompliance with medications. Pt noted to have Hypertensive urgency per the ED physician note. If possible, please document in progress notes and discharge summary if you are evaluating and/or treating any of the following: The medical record reflects the following:  Risk Factors: 78 yo F  weakness &  lactic acidosis and noncompliance with medications  Clinical Indicators: On admission: /87,  179/107,  152/81, 153/93  Ed note states \" Hypertensive urgency\"  Treatment: Hydralazine 25 mg x 2    Hypertensive Crisis, unspecified: at least 2 consecutive readings of SBP > 180 mmHg or DBP > 110 mmHg  - Hypertensive Urgency: Hypertensive crisis w/o associated organ dysfunction. S/s may or may not be present, but can include severe headache, SOB, epistaxis, severe anxiety. Tx: adjustment of oral antihypertensives; IV meds not usually required. - Hypertensive Emergency: Hypertensive crisis w/ associated organ damage (stroke, encephalopathy, JOSE, MI, angina, acute or decompensated CHF, acute pulmonary edema, HELLP, etc.). Requires immediate treatment (usually IV meds) & possible ICU admission. Associated organ dysfunction needs documented.   [email or web address]  Options provided:  -- Hypertensive Urgency  -- Hypertensive Crisis  -- Hypertensive Emergency  -- Other - I will add my own diagnosis  -- Disagree - Not applicable / Not valid  -- Disagree - Clinically unable to determine / Unknown  -- Refer to Clinical Documentation Reviewer    PROVIDER RESPONSE TEXT:    This patient has hypertensive urgency.     Query created by: Yared Alejandro on 10/26/2020 10:59 AM      Electronically signed by:  Dinora Oliva MD 10/26/2020 2:56 PM

## 2020-10-26 NOTE — PROGRESS NOTES
Bedside and Verbal shift change report given to Roderick Guzman RN (oncoming nurse) by Aracely Liz RN (offgoing nurse). Report included the following information SBAR, Kardex, Intake/Output, MAR, Accordion and Recent Results.

## 2020-10-28 LAB
BACTERIA SPEC CULT: NORMAL
SERVICE CMNT-IMP: NORMAL

## 2021-01-01 ENCOUNTER — APPOINTMENT (OUTPATIENT)
Dept: CT IMAGING | Age: 71
DRG: 871 | End: 2021-01-01
Attending: EMERGENCY MEDICINE
Payer: MEDICARE

## 2021-01-01 ENCOUNTER — HOSPITAL ENCOUNTER (INPATIENT)
Age: 71
LOS: 15 days | Discharge: HOME | DRG: 871 | End: 2021-01-16
Attending: EMERGENCY MEDICINE | Admitting: FAMILY MEDICINE
Payer: MEDICARE

## 2021-01-01 ENCOUNTER — APPOINTMENT (OUTPATIENT)
Dept: GENERAL RADIOLOGY | Age: 71
DRG: 871 | End: 2021-01-01
Attending: FAMILY MEDICINE
Payer: MEDICARE

## 2021-01-01 DIAGNOSIS — R65.20 SEPSIS WITH ACUTE ORGAN DYSFUNCTION WITHOUT SEPTIC SHOCK, DUE TO UNSPECIFIED ORGANISM, UNSPECIFIED TYPE (HCC): ICD-10-CM

## 2021-01-01 DIAGNOSIS — R82.90 ABNORMAL URINALYSIS: ICD-10-CM

## 2021-01-01 DIAGNOSIS — G40.909 SEIZURE DISORDER (HCC): ICD-10-CM

## 2021-01-01 DIAGNOSIS — R41.82 ALTERED MENTAL STATUS, UNSPECIFIED ALTERED MENTAL STATUS TYPE: ICD-10-CM

## 2021-01-01 DIAGNOSIS — A41.9 SEPSIS WITH ACUTE ORGAN DYSFUNCTION WITHOUT SEPTIC SHOCK, DUE TO UNSPECIFIED ORGANISM, UNSPECIFIED TYPE (HCC): ICD-10-CM

## 2021-01-01 DIAGNOSIS — E86.0 DEHYDRATION: ICD-10-CM

## 2021-01-01 DIAGNOSIS — R41.0 DELIRIUM: Primary | ICD-10-CM

## 2021-01-01 PROBLEM — R00.0 TACHYCARDIA: Status: ACTIVE | Noted: 2021-01-01

## 2021-01-01 PROBLEM — D64.9 ANEMIA: Status: ACTIVE | Noted: 2021-01-01

## 2021-01-01 LAB
ALBUMIN SERPL-MCNC: 3.4 G/DL (ref 3.5–5)
ALBUMIN/GLOB SERPL: 0.7 {RATIO} (ref 1.1–2.2)
ALP SERPL-CCNC: 78 U/L (ref 45–117)
ALT SERPL-CCNC: 21 U/L (ref 12–78)
ANION GAP SERPL CALC-SCNC: 5 MMOL/L (ref 5–15)
APPEARANCE UR: CLEAR
AST SERPL-CCNC: 27 U/L (ref 15–37)
BACTERIA URNS QL MICRO: NEGATIVE /HPF
BASOPHILS # BLD: 0.1 K/UL (ref 0–0.1)
BASOPHILS NFR BLD: 1 % (ref 0–1)
BILIRUB SERPL-MCNC: 0.3 MG/DL (ref 0.2–1)
BILIRUB UR QL: NEGATIVE
BUN SERPL-MCNC: 16 MG/DL (ref 6–20)
BUN/CREAT SERPL: 12 (ref 12–20)
CALCIUM SERPL-MCNC: 10 MG/DL (ref 8.5–10.1)
CHLORIDE SERPL-SCNC: 108 MMOL/L (ref 97–108)
CO2 SERPL-SCNC: 26 MMOL/L (ref 21–32)
COLOR UR: ABNORMAL
COMMENT, HOLDF: NORMAL
CREAT SERPL-MCNC: 1.34 MG/DL (ref 0.55–1.02)
DIFFERENTIAL METHOD BLD: ABNORMAL
EOSINOPHIL # BLD: 0.3 K/UL (ref 0–0.4)
EOSINOPHIL NFR BLD: 3 % (ref 0–7)
EPITH CASTS URNS QL MICRO: ABNORMAL /LPF
ERYTHROCYTE [DISTWIDTH] IN BLOOD BY AUTOMATED COUNT: 13.3 % (ref 11.5–14.5)
GLOBULIN SER CALC-MCNC: 5 G/DL (ref 2–4)
GLUCOSE SERPL-MCNC: 100 MG/DL (ref 65–100)
GLUCOSE UR STRIP.AUTO-MCNC: NEGATIVE MG/DL
HCT VFR BLD AUTO: 35.6 % (ref 35–47)
HGB BLD-MCNC: 11.4 G/DL (ref 11.5–16)
HGB UR QL STRIP: ABNORMAL
HYALINE CASTS URNS QL MICRO: ABNORMAL /LPF (ref 0–5)
IMM GRANULOCYTES # BLD AUTO: 0.1 K/UL (ref 0–0.04)
IMM GRANULOCYTES NFR BLD AUTO: 0 % (ref 0–0.5)
KETONES UR QL STRIP.AUTO: NEGATIVE MG/DL
LACTATE BLD-SCNC: 2.06 MMOL/L (ref 0.4–2)
LEUKOCYTE ESTERASE UR QL STRIP.AUTO: ABNORMAL
LYMPHOCYTES # BLD: 2.4 K/UL (ref 0.8–3.5)
LYMPHOCYTES NFR BLD: 20 % (ref 12–49)
MCH RBC QN AUTO: 32.7 PG (ref 26–34)
MCHC RBC AUTO-ENTMCNC: 32 G/DL (ref 30–36.5)
MCV RBC AUTO: 102 FL (ref 80–99)
MONOCYTES # BLD: 0.7 K/UL (ref 0–1)
MONOCYTES NFR BLD: 6 % (ref 5–13)
NEUTS SEG # BLD: 8.6 K/UL (ref 1.8–8)
NEUTS SEG NFR BLD: 70 % (ref 32–75)
NITRITE UR QL STRIP.AUTO: NEGATIVE
NRBC # BLD: 0 K/UL (ref 0–0.01)
NRBC BLD-RTO: 0 PER 100 WBC
PH UR STRIP: 5.5 [PH] (ref 5–8)
PLATELET # BLD AUTO: 399 K/UL (ref 150–400)
PMV BLD AUTO: 10.5 FL (ref 8.9–12.9)
POTASSIUM SERPL-SCNC: 4.9 MMOL/L (ref 3.5–5.1)
PROT SERPL-MCNC: 8.4 G/DL (ref 6.4–8.2)
PROT UR STRIP-MCNC: NEGATIVE MG/DL
RBC # BLD AUTO: 3.49 M/UL (ref 3.8–5.2)
RBC #/AREA URNS HPF: ABNORMAL /HPF (ref 0–5)
SAMPLES BEING HELD,HOLD: NORMAL
SODIUM SERPL-SCNC: 139 MMOL/L (ref 136–145)
SP GR UR REFRACTOMETRY: 1.01 (ref 1–1.03)
UR CULT HOLD, URHOLD: NORMAL
UROBILINOGEN UR QL STRIP.AUTO: 0.2 EU/DL (ref 0.2–1)
WBC # BLD AUTO: 12.1 K/UL (ref 3.6–11)
WBC URNS QL MICRO: ABNORMAL /HPF (ref 0–4)

## 2021-01-01 PROCEDURE — 74011000250 HC RX REV CODE- 250: Performed by: EMERGENCY MEDICINE

## 2021-01-01 PROCEDURE — 99285 EMERGENCY DEPT VISIT HI MDM: CPT

## 2021-01-01 PROCEDURE — 65660000000 HC RM CCU STEPDOWN

## 2021-01-01 PROCEDURE — 83605 ASSAY OF LACTIC ACID: CPT

## 2021-01-01 PROCEDURE — 80053 COMPREHEN METABOLIC PANEL: CPT

## 2021-01-01 PROCEDURE — 71045 X-RAY EXAM CHEST 1 VIEW: CPT

## 2021-01-01 PROCEDURE — 70450 CT HEAD/BRAIN W/O DYE: CPT

## 2021-01-01 PROCEDURE — 85025 COMPLETE CBC W/AUTO DIFF WBC: CPT

## 2021-01-01 PROCEDURE — 81001 URINALYSIS AUTO W/SCOPE: CPT

## 2021-01-01 PROCEDURE — 74011250636 HC RX REV CODE- 250/636: Performed by: EMERGENCY MEDICINE

## 2021-01-01 PROCEDURE — 87449 NOS EACH ORGANISM AG IA: CPT

## 2021-01-01 PROCEDURE — 87086 URINE CULTURE/COLONY COUNT: CPT

## 2021-01-01 PROCEDURE — 96375 TX/PRO/DX INJ NEW DRUG ADDON: CPT

## 2021-01-01 PROCEDURE — 36415 COLL VENOUS BLD VENIPUNCTURE: CPT

## 2021-01-01 PROCEDURE — 96374 THER/PROPH/DIAG INJ IV PUSH: CPT

## 2021-01-01 PROCEDURE — 87040 BLOOD CULTURE FOR BACTERIA: CPT

## 2021-01-01 RX ORDER — ACETAMINOPHEN 325 MG/1
650 TABLET ORAL
Status: DISCONTINUED | OUTPATIENT
Start: 2021-01-01 | End: 2021-01-04

## 2021-01-01 RX ORDER — LORAZEPAM 2 MG/ML
1 INJECTION INTRAMUSCULAR
Status: COMPLETED | OUTPATIENT
Start: 2021-01-01 | End: 2021-01-01

## 2021-01-01 RX ORDER — ENOXAPARIN SODIUM 100 MG/ML
30 INJECTION SUBCUTANEOUS EVERY 12 HOURS
Status: DISCONTINUED | OUTPATIENT
Start: 2021-01-01 | End: 2021-01-04

## 2021-01-01 RX ORDER — ACETAMINOPHEN 650 MG/1
650 SUPPOSITORY RECTAL
Status: DISCONTINUED | OUTPATIENT
Start: 2021-01-01 | End: 2021-01-04

## 2021-01-01 RX ADMIN — CEFTRIAXONE 1 G: 1 INJECTION, POWDER, FOR SOLUTION INTRAMUSCULAR; INTRAVENOUS at 21:24

## 2021-01-01 RX ADMIN — SODIUM CHLORIDE 1000 ML: 9 INJECTION, SOLUTION INTRAVENOUS at 21:31

## 2021-01-01 RX ADMIN — LORAZEPAM 1 MG: 2 INJECTION INTRAMUSCULAR; INTRAVENOUS at 18:28

## 2021-01-01 RX ADMIN — SODIUM CHLORIDE 1000 ML: 9 INJECTION, SOLUTION INTRAVENOUS at 16:12

## 2021-01-01 NOTE — ED PROVIDER NOTES
78-year-old female with a history of prior CVA, diabetes, hypertension, morbid obesity, seizure disorder presents to the ED via EMS for problems with her King catheter. Reportedly she was complaining of pain in her bladder area and the King was not draining. Patient denies any other symptoms besides a mild headache and constipation. She appears confused but it is unclear what her baseline is. On chart review patient was admitted from October 23 to the 26 for lactic acidosis and weakness. She presented at that time for chills and restlessness. Urinary Catheter Problem   Her past medical history is significant for urinary catheter problem. Past Medical History:   Diagnosis Date    Anxiety and depression     Diabetes ([facility] [address]     Hypertension     Obese     Seizure disorder ([facility] [address]        No past surgical history on file.       Family History:   Problem Relation Age of Onset    Lung Cancer Mother     No Known Problems Father        Social History     Socioeconomic History    Marital status: LEGALLY      Spouse name: Not on file    Number of children: Not on file    Years of education: Not on file    Highest education level: Not on file   Occupational History    Not on file   Social Needs    Financial resource strain: Not on file    Food insecurity     Worry: Not on file     Inability: Not on file    Transportation needs     Medical: Not on file     Non-medical: Not on file   Tobacco Use    Smoking status: Unknown If Ever Smoked   Substance and Sexual Activity    Alcohol use: Not on file     Comment: Unknown    Drug use: Not on file    Sexual activity: Not on file   Lifestyle    Physical activity     Days per week: Not on file     Minutes per session: Not on file    Stress: Not on file   Relationships    Social connections     Talks on phone: Not on file     Gets together: Not on file     Attends Gnosticist service: Not on file     Active member of club or organization: Not on file     Attends meetings of clubs or organizations: Not on file     Relationship status: Not on file   • Intimate partner violence     Fear of current or ex partner: Not on file     Emotionally abused: Not on file     Physically abused: Not on file     Forced sexual activity: Not on file   Other Topics Concern   • Not on file   Social History Narrative   • Not on file         ALLERGIES: Patient has no known allergies.    Review of Systems   Unable to perform ROS: Dementia       Vitals:    01/01/21 1420   BP: 102/66   Pulse: (!) 110   Resp: 16   Temp: 98.5 °F (36.9 °C)   SpO2: 96%   Weight: 79.2 kg (174 lb 11.2 oz)            Physical Exam  Vitals signs and nursing note reviewed.   Constitutional:       General: She is not in acute distress.  HENT:      Head: Normocephalic and atraumatic.      Mouth/Throat:      Mouth: Mucous membranes are moist.   Eyes:      General: No scleral icterus.     Conjunctiva/sclera: Conjunctivae normal.      Pupils: Pupils are equal, round, and reactive to light.   Neck:      Musculoskeletal: Neck supple.      Trachea: No tracheal deviation.   Cardiovascular:      Rate and Rhythm: Regular rhythm. Tachycardia present.   Pulmonary:      Effort: Pulmonary effort is normal. No respiratory distress.      Breath sounds: No wheezing or rales.   Abdominal:      General: There is no distension.      Palpations: Abdomen is soft.      Tenderness: There is no abdominal tenderness.   Genitourinary:     Comments: deferred  Musculoskeletal:         General: No deformity.   Skin:     General: Skin is warm and dry.   Neurological:      General: No focal deficit present.      Mental Status: She is alert.      Comments: Confused   Psychiatric:         Mood and Affect: Mood normal.          [facility]  ED Course as of Jan 01 2058 Fri Jan 01, 2021   1550 Further history from her daughter at bedside.  States that 3 weeks ago she was had a stroke and she was treated at [facility].  She spent 2 weeks in rehab  and just recently came home. Since then she has had left-sided weakness, confusion, hallucinations. The primary reason for being seen today is potential problem with the urinary catheter. She does not know if the patient is normally tachycardic. [TT]   1804 I placed an EJ IV catheter due to poor IV access. Used normal sterile precautions. Successful placement with blood return and flushing.    [TT]      ED Course User Index  [TT] Kevon Mcnair MD       Procedures               Perfect Serve Consult for Admission  8:58 PM    ED Room Number: LP29/27  Patient Name and age: Bill Gross 72 y.o.  female  Working Diagnosis:   1. Altered mental status, unspecified altered mental status type    2. Sepsis with acute organ dysfunction without septic shock, due to unspecified organism, unspecified type ([address]    3. Dehydration        COVID-19 Suspicion:  no  Sepsis present:  yes  Reassessment needed: yes  Code Status:  Full Code  Readmission: no  Isolation Requirements:  no  Recommended Level of Care:  telemetry  Department:[facility] ED - [phone]  Other:  Given rocephin, altered since recent stay at [facility]. LABORATORY TESTS:  Labs Reviewed   CBC WITH AUTOMATED DIFF - Abnormal; Notable for the following components:       Result Value    WBC 12.1 (*)     RBC 3.49 (*)     HGB 11.4 (*)     .0 (*)     ABS. NEUTROPHILS 8.6 (*)     ABS. IMM.  GRANS. 0.1 (*)     All other components within normal limits   METABOLIC PANEL, COMPREHENSIVE - Abnormal; Notable for the following components:    Creatinine 1.34 (*)     GFR est AA 48 (*)     GFR est non-AA 39 (*)     Protein, total 8.4 (*)     Albumin 3.4 (*)     Globulin 5.0 (*)     A-G Ratio 0.7 (*)     All other components within normal limits   URINALYSIS W/MICROSCOPIC - Abnormal; Notable for the following components:    Blood MODERATE (*)     Leukocyte Esterase SMALL (*)     All other components within normal limits   POC LACTIC ACID - Abnormal; Notable for the following components:    Lactic Acid (POC) 2.06 (*)     All other components within normal limits   URINE CULTURE HOLD SAMPLE   SAMPLES BEING HELD       IMAGING RESULTS:  CT HEAD WO CONT   Final Result   IMPRESSION:    1. No acute intracranial abnormality. Stable chronic left frontal and parietal   infarcts. 2. Incompletely characterized 1.6 cm left scalp lesion. MEDICATIONS GIVEN:  Medications   cefTRIAXone (ROCEPHIN) 1 g in sterile water (preservative free) 10 mL IV syringe (has no administration in time range)   sodium chloride 0.9 % bolus infusion 1,000 mL (has no administration in time range)   sodium chloride 0.9 % bolus infusion 1,000 mL (1,000 mL IntraVENous New Bag 1/1/21 1612)   LORazepam (ATIVAN) injection 1 mg (1 mg IntraVENous Given 1/1/21 1828)         IMPRESSION/ PLAN:  1. Altered mental status, unspecified altered mental status type    2. Sepsis with acute organ dysfunction without septic shock, due to unspecified organism, unspecified type ([facility] [address]    3. Dehydration      - admit   - IV antibiotics    Total critical care time spent exclusive of procedures:  39 minutes      Carmelo Sherwood MD

## 2021-01-01 NOTE — ED TRIAGE NOTES
Patient arrives to ED from home via EMS with c/o price blockage. Patient family called EMS today after pt c/o bladder fullness. Family reported to EMS that they emptied urine bag last night approx 10 pm with no issue. Patient has hx of stroke and left sided deficiencies.

## 2021-01-02 ENCOUNTER — APPOINTMENT (OUTPATIENT)
Dept: GENERAL RADIOLOGY | Age: 71
DRG: 871 | End: 2021-01-02
Attending: FAMILY MEDICINE
Payer: MEDICARE

## 2021-01-02 LAB
25(OH)D3 SERPL-MCNC: 14.7 NG/ML (ref 30–100)
ABO + RH BLD: NORMAL
ALBUMIN SERPL-MCNC: 3.1 G/DL (ref 3.5–5)
ALBUMIN/GLOB SERPL: 0.7 {RATIO} (ref 1.1–2.2)
ALP SERPL-CCNC: 62 U/L (ref 45–117)
ALT SERPL-CCNC: 18 U/L (ref 12–78)
ANION GAP SERPL CALC-SCNC: 5 MMOL/L (ref 5–15)
APPEARANCE UR: CLEAR
APTT PPP: 26.5 SEC (ref 22.1–31)
AST SERPL-CCNC: 31 U/L (ref 15–37)
ATRIAL RATE: 99 BPM
BACTERIA URNS QL MICRO: NEGATIVE /HPF
BASOPHILS # BLD: 0 K/UL (ref 0–0.1)
BASOPHILS NFR BLD: 1 % (ref 0–1)
BILIRUB SERPL-MCNC: 0.4 MG/DL (ref 0.2–1)
BILIRUB UR QL: NEGATIVE
BLOOD GROUP ANTIBODIES SERPL: NORMAL
BUN SERPL-MCNC: 12 MG/DL (ref 6–20)
BUN/CREAT SERPL: 13 (ref 12–20)
CALCIUM SERPL-MCNC: 9.4 MG/DL (ref 8.5–10.1)
CALCULATED P AXIS, ECG09: 62 DEGREES
CALCULATED R AXIS, ECG10: 6 DEGREES
CALCULATED T AXIS, ECG11: 47 DEGREES
CHLORIDE SERPL-SCNC: 111 MMOL/L (ref 97–108)
CO2 SERPL-SCNC: 26 MMOL/L (ref 21–32)
COLOR UR: ABNORMAL
COMMENT, HOLDF: NORMAL
COVID-19 RAPID TEST, COVR: NOT DETECTED
CREAT SERPL-MCNC: 0.89 MG/DL (ref 0.55–1.02)
CRP SERPL-MCNC: 1.43 MG/DL (ref 0–0.6)
D DIMER PPP FEU-MCNC: 1.38 MG/L FEU (ref 0–0.65)
DIAGNOSIS, 93000: NORMAL
DIFFERENTIAL METHOD BLD: ABNORMAL
EOSINOPHIL # BLD: 0.3 K/UL (ref 0–0.4)
EOSINOPHIL NFR BLD: 3 % (ref 0–7)
EPITH CASTS URNS QL MICRO: ABNORMAL /LPF
ERYTHROCYTE [DISTWIDTH] IN BLOOD BY AUTOMATED COUNT: 13.4 % (ref 11.5–14.5)
ETHANOL SERPL-MCNC: <10 MG/DL
FIBRINOGEN PPP-MCNC: 532 MG/DL (ref 200–475)
FOLATE SERPL-MCNC: 7.5 NG/ML (ref 5–21)
GLOBULIN SER CALC-MCNC: 4.7 G/DL (ref 2–4)
GLUCOSE SERPL-MCNC: 98 MG/DL (ref 65–100)
GLUCOSE UR STRIP.AUTO-MCNC: NEGATIVE MG/DL
HCT VFR BLD AUTO: 30.7 % (ref 35–47)
HEALTH STATUS, XMCV2T: NORMAL
HGB BLD-MCNC: 9.9 G/DL (ref 11.5–16)
HGB UR QL STRIP: ABNORMAL
IMM GRANULOCYTES # BLD AUTO: 0 K/UL (ref 0–0.04)
IMM GRANULOCYTES NFR BLD AUTO: 1 % (ref 0–0.5)
INR PPP: 1.1 (ref 0.9–1.1)
IRON SATN MFR SERPL: 29 % (ref 20–50)
IRON SERPL-MCNC: 59 UG/DL (ref 35–150)
KETONES UR QL STRIP.AUTO: NEGATIVE MG/DL
LACTATE SERPL-SCNC: 0.9 MMOL/L (ref 0.4–2)
LACTATE SERPL-SCNC: 1.4 MMOL/L (ref 0.4–2)
LDH SERPL L TO P-CCNC: 289 U/L (ref 81–246)
LEUKOCYTE ESTERASE UR QL STRIP.AUTO: ABNORMAL
LYMPHOCYTES # BLD: 1.8 K/UL (ref 0.8–3.5)
LYMPHOCYTES NFR BLD: 23 % (ref 12–49)
MCH RBC QN AUTO: 32.9 PG (ref 26–34)
MCHC RBC AUTO-ENTMCNC: 32.2 G/DL (ref 30–36.5)
MCV RBC AUTO: 102 FL (ref 80–99)
MONOCYTES # BLD: 0.5 K/UL (ref 0–1)
MONOCYTES NFR BLD: 6 % (ref 5–13)
NEUTS SEG # BLD: 5.5 K/UL (ref 1.8–8)
NEUTS SEG NFR BLD: 66 % (ref 32–75)
NITRITE UR QL STRIP.AUTO: NEGATIVE
NRBC # BLD: 0 K/UL (ref 0–0.01)
NRBC BLD-RTO: 0 PER 100 WBC
P-R INTERVAL, ECG05: 170 MS
PH UR STRIP: 5.5 [PH] (ref 5–8)
PLATELET # BLD AUTO: 309 K/UL (ref 150–400)
PMV BLD AUTO: 10.3 FL (ref 8.9–12.9)
POTASSIUM SERPL-SCNC: 4.7 MMOL/L (ref 3.5–5.1)
PROCALCITONIN SERPL-MCNC: 0.06 NG/ML
PROT SERPL-MCNC: 7.8 G/DL (ref 6.4–8.2)
PROT UR STRIP-MCNC: NEGATIVE MG/DL
PROTHROMBIN TIME: 11.3 SEC (ref 9–11.1)
Q-T INTERVAL, ECG07: 362 MS
QRS DURATION, ECG06: 76 MS
QTC CALCULATION (BEZET), ECG08: 464 MS
RBC # BLD AUTO: 3.01 M/UL (ref 3.8–5.2)
RBC #/AREA URNS HPF: ABNORMAL /HPF (ref 0–5)
SALICYLATES SERPL-MCNC: 2.1 MG/DL (ref 2.8–20)
SAMPLES BEING HELD,HOLD: NORMAL
SODIUM SERPL-SCNC: 142 MMOL/L (ref 136–145)
SOURCE, COVRS: NORMAL
SP GR UR REFRACTOMETRY: 1.01 (ref 1–1.03)
SPECIMEN EXP DATE BLD: NORMAL
SPECIMEN SOURCE, FCOV2M: NORMAL
SPECIMEN TYPE, XMCV1T: NORMAL
THERAPEUTIC RANGE,PTTT: NORMAL SECS (ref 58–77)
TIBC SERPL-MCNC: 202 UG/DL (ref 250–450)
UROBILINOGEN UR QL STRIP.AUTO: 0.2 EU/DL (ref 0.2–1)
VENTRICULAR RATE, ECG03: 99 BPM
VIT B12 SERPL-MCNC: 362 PG/ML (ref 193–986)
WBC # BLD AUTO: 8.2 K/UL (ref 3.6–11)
WBC URNS QL MICRO: ABNORMAL /HPF (ref 0–4)

## 2021-01-02 PROCEDURE — 74011250637 HC RX REV CODE- 250/637: Performed by: FAMILY MEDICINE

## 2021-01-02 PROCEDURE — 85384 FIBRINOGEN ACTIVITY: CPT

## 2021-01-02 PROCEDURE — 82746 ASSAY OF FOLIC ACID SERUM: CPT

## 2021-01-02 PROCEDURE — 82077 ASSAY SPEC XCP UR&BREATH IA: CPT

## 2021-01-02 PROCEDURE — 87899 AGENT NOS ASSAY W/OPTIC: CPT

## 2021-01-02 PROCEDURE — 83550 IRON BINDING TEST: CPT

## 2021-01-02 PROCEDURE — 99223 1ST HOSP IP/OBS HIGH 75: CPT | Performed by: PSYCHIATRY & NEUROLOGY

## 2021-01-02 PROCEDURE — 85610 PROTHROMBIN TIME: CPT

## 2021-01-02 PROCEDURE — 85025 COMPLETE CBC W/AUTO DIFF WBC: CPT

## 2021-01-02 PROCEDURE — 83615 LACTATE (LD) (LDH) ENZYME: CPT

## 2021-01-02 PROCEDURE — 95816 EEG AWAKE AND DROWSY: CPT | Performed by: PSYCHIATRY & NEUROLOGY

## 2021-01-02 PROCEDURE — 86140 C-REACTIVE PROTEIN: CPT

## 2021-01-02 PROCEDURE — 82306 VITAMIN D 25 HYDROXY: CPT

## 2021-01-02 PROCEDURE — 83605 ASSAY OF LACTIC ACID: CPT

## 2021-01-02 PROCEDURE — 84145 PROCALCITONIN (PCT): CPT

## 2021-01-02 PROCEDURE — 74011000250 HC RX REV CODE- 250: Performed by: FAMILY MEDICINE

## 2021-01-02 PROCEDURE — 86901 BLOOD TYPING SEROLOGIC RH(D): CPT

## 2021-01-02 PROCEDURE — 80179 DRUG ASSAY SALICYLATE: CPT

## 2021-01-02 PROCEDURE — 93005 ELECTROCARDIOGRAM TRACING: CPT

## 2021-01-02 PROCEDURE — 80053 COMPREHEN METABOLIC PANEL: CPT

## 2021-01-02 PROCEDURE — 86850 RBC ANTIBODY SCREEN: CPT

## 2021-01-02 PROCEDURE — 81001 URINALYSIS AUTO W/SCOPE: CPT

## 2021-01-02 PROCEDURE — 71045 X-RAY EXAM CHEST 1 VIEW: CPT

## 2021-01-02 PROCEDURE — 82607 VITAMIN B-12: CPT

## 2021-01-02 PROCEDURE — 74011250636 HC RX REV CODE- 250/636: Performed by: FAMILY MEDICINE

## 2021-01-02 PROCEDURE — 85730 THROMBOPLASTIN TIME PARTIAL: CPT

## 2021-01-02 PROCEDURE — 36415 COLL VENOUS BLD VENIPUNCTURE: CPT

## 2021-01-02 PROCEDURE — 85379 FIBRIN DEGRADATION QUANT: CPT

## 2021-01-02 PROCEDURE — 87635 SARS-COV-2 COVID-19 AMP PRB: CPT

## 2021-01-02 PROCEDURE — 65660000000 HC RM CCU STEPDOWN

## 2021-01-02 RX ORDER — SODIUM CHLORIDE 0.9 % (FLUSH) 0.9 %
5-40 SYRINGE (ML) INJECTION AS NEEDED
Status: DISCONTINUED | OUTPATIENT
Start: 2021-01-02 | End: 2021-01-16 | Stop reason: HOSPADM

## 2021-01-02 RX ORDER — HALOPERIDOL 5 MG/ML
2 INJECTION INTRAMUSCULAR AS NEEDED
Status: COMPLETED | OUTPATIENT
Start: 2021-01-02 | End: 2021-01-03

## 2021-01-02 RX ORDER — MELATONIN
5000 DAILY
Status: DISCONTINUED | OUTPATIENT
Start: 2021-01-02 | End: 2021-01-16 | Stop reason: HOSPADM

## 2021-01-02 RX ORDER — SODIUM CHLORIDE 0.9 % (FLUSH) 0.9 %
5-10 SYRINGE (ML) INJECTION AS NEEDED
Status: DISCONTINUED | OUTPATIENT
Start: 2021-01-02 | End: 2021-01-16 | Stop reason: HOSPADM

## 2021-01-02 RX ORDER — SODIUM CHLORIDE 0.9 % (FLUSH) 0.9 %
5-40 SYRINGE (ML) INJECTION EVERY 8 HOURS
Status: DISCONTINUED | OUTPATIENT
Start: 2021-01-02 | End: 2021-01-03

## 2021-01-02 RX ORDER — SODIUM CHLORIDE 9 MG/ML
125 INJECTION, SOLUTION INTRAVENOUS CONTINUOUS
Status: DISCONTINUED | OUTPATIENT
Start: 2021-01-02 | End: 2021-01-07

## 2021-01-02 RX ADMIN — Medication 10 ML: at 02:48

## 2021-01-02 RX ADMIN — Medication 5 TABLET: at 13:47

## 2021-01-02 RX ADMIN — ENOXAPARIN SODIUM 30 MG: 30 INJECTION SUBCUTANEOUS at 08:42

## 2021-01-02 RX ADMIN — SODIUM CHLORIDE 125 ML/HR: 9 INJECTION, SOLUTION INTRAVENOUS at 02:48

## 2021-01-02 RX ADMIN — CEFEPIME 2 G: 20 INJECTION, POWDER, FOR SOLUTION INTRAVENOUS at 11:11

## 2021-01-02 RX ADMIN — HALOPERIDOL LACTATE 2 MG: 5 INJECTION, SOLUTION INTRAMUSCULAR at 22:21

## 2021-01-02 RX ADMIN — ENOXAPARIN SODIUM 30 MG: 30 INJECTION SUBCUTANEOUS at 02:49

## 2021-01-02 RX ADMIN — CEFEPIME 2 G: 20 INJECTION, POWDER, FOR SOLUTION INTRAVENOUS at 02:48

## 2021-01-02 RX ADMIN — Medication 10 ML: at 14:00

## 2021-01-02 NOTE — PROGRESS NOTES
Pt attempted to hit and kick staff, she stated that she wants to die and is going to jump out of the window. Pt also states she has nothing to live for and is ready to go. Nursing supervisor Maria Eugenia Etienne made aware-ordered to complete sadd scale and continue to monitor for SI behavior. Pt does not have a sitter at this time-nursing supervisor aware.

## 2021-01-02 NOTE — PROGRESS NOTES
Daily Progress Note: 1/2/2021  Cullen Olson, Viola Patrick MD    Assessment/Plan:   1. Altered mental status - with history of recent stroke. Concern for acute encephalopathy       - telemetry       -  neuro checks and fall precautions       - consult neurology       - nurse driven dysphagia screen. 2.  Sepsis        - with noted leukocytosis, tachycardia, lactic acidosis, and abnormal UA possible UTI       - continue IV cefepime       - check urine and blood cultures     - repeat COVID screen and CXR     3. Abnormal urinalysis       - not definite for UTI       - plan as above     4.   Dehydration        - continue IV fluids      5.  Leukocytosis       - improving     6.  anemia: Hgb 9.9  - FOBT  -trend     7. Tachycardia       - continue telemetry monitoring     8. History of stroke        - plan as above     9. Suspected COVID 19 virus infection        - repeat COVID 19        - not requiring supplemental oxygen at current but provide if needed     10. Hypertension: soft BP         - hold home meds  -Monitor BP.     11. Hyperlipidemia        - home meds     VTE prophylaxis         - Lovenox     CODE STATUS:  FULL CODE. Patient was unable to discuss code status without the medical capacity to make her own decisions at this time.        Problem List:  Problem List as of 1/2/2021 Date Reviewed: 9/27/2018          Codes Class Noted - Resolved    Dehydration ICD-10-CM: E86.0  ICD-9-CM: 276.51  1/1/2021 - Present        Altered mental status ICD-10-CM: R41.82  ICD-9-CM: 780.97  1/1/2021 - Present        Anemia ICD-10-CM: D64.9  ICD-9-CM: 285.9  1/1/2021 - Present        Tachycardia ICD-10-CM: R00.0  ICD-9-CM: 785.0  1/1/2021 - Present        Abnormal urinalysis ICD-10-CM: R82.90  ICD-9-CM: 791.9  1/1/2021 - Present        Sepsis ([address] ICD-10-CM: A41.9  ICD-9-CM: 038.9, 995.91  1/1/2021 - Present        Lactic acidosis ICD-10-CM: E87.2  ICD-9-CM: 276.2  10/24/2020 - Present        SIRS (systemic inflammatory response syndrome) (HCC) ICD-10-CM: R65.10  ICD-9-CM: 995.90  9/27/2018 - Present        Sinus tachycardia ICD-10-CM: R00.0  ICD-9-CM: 427.89  9/27/2018 - Present        Hypertension ICD-10-CM: I10  ICD-9-CM: 401.9  Unknown - Present        Diabetes ([facility] ICD-10-CM: E11.9  ICD-9-CM: 250.00  Unknown - Present        Anxiety and depression ICD-10-CM: F41.9, F32.9  ICD-9-CM: 300.00, 311  Unknown - Present        Seizure disorder ([facility] ICD-10-CM: G40.909  ICD-9-CM: 345.90  Unknown - Present        Vomiting ICD-10-CM: R11.10  ICD-9-CM: 787.03  9/27/2018 - Present        Hypokalemia ICD-10-CM: E87.6  ICD-9-CM: 276.8  9/27/2018 - Present        Obese ICD-10-CM: E66.9  ICD-9-CM: 278.00  Unknown - Present        DM (diabetes mellitus) ([facility] ICD-10-CM: E11.9  ICD-9-CM: 250.00  6/7/2018 - Present        Leukocytosis ICD-10-CM: V04.915  ICD-9-CM: 288.60  6/7/2018 - Present        RESOLVED: Encephalopathy acute ICD-10-CM: G93.40  ICD-9-CM: 348.30  6/7/2018 - 9/27/2018        RESOLVED: Seizure ([facility] ICD-10-CM: R56.9  ICD-9-CM: 780.39  6/7/2018 - 9/27/2018        RESOLVED: Aspiration into airway ICD-10-CM: X47.136B  ICD-9-CM: 934.9  6/7/2018 - 9/27/2018        RESOLVED: Renal insufficiency ICD-10-CM: N28.9  ICD-9-CM: 593.9  6/7/2018 - 9/27/2018              Subjective: Nicki Grover is a 72 y.o. female with past medical history of anxiety, depression, DM, hypertension, obesity and seizure disorder presented to the ED from home with reported confusion and pain over \"bladder\". History was obtained per review of ED and electronic medical records as patient was confused and not answering questions. Exact onset of symptoms is not documented. Patient reportedly was hospitalized at [facility] ~ 3 weeks ago with a stroke, discharged to rehab x 2 weeks and then returned home. She reportedly has been confused, hallucinating, with residual left sided weakness.   On arrival in the ED, initial recorded vital signs were BP = 102/66, HR= 110, RR= 16, O2sat= 96% on room air. WBC= 12,100. poc lactic acid = 2.06.  Creatinine = 1.34. Per the ED, patient was started on Ceftriaxone 1 gram IV x 1 dose, Ativan 1 mg IV, and 0.9% NS 1000 ml IV fluid bolus x 2. Patient is now seen for admission to the hospitalist service. There were no reports of new onset falls, head/ neck trauma, headache, neck pain, visual disturbance, numbness, paresthesias, focal weakness, chest pain, shortness of breath (SOB), palpitations, abdominal pain, nausea, vomiting, diarrhea, melena, dysuria, hematuria, calf pain, increased leg swelling/ edema, fever, chills, or known COVID 19 exposure or positive previous test results. [Dr Camejo Purple. : Hgb low today. Sepsis labs improving. Remains tachy with soft BPs. Pt is very sleepy and difficult to rouse this AM.  1st COVID screen neg. Urine and blood cx NG so far. Nurse notes she was awake earlier and trying to get out of bed. Now pt is sleeping hard, but wakes to sternal rub. After attempting all phone numbers on pt's face sheet, I was unable to reach any family. Will have CM help. Review of Systems:   A comprehensive review of systems was negative except for that written in the HPI. Objective:   Physical Exam:     Visit Vitals  /77   Pulse (!) 101   Temp 98.5 °F (36.9 °C)   Resp 13   Wt 79.2 kg (174 lb 11.2 oz)   SpO2 98%   BMI 29.99 kg/m²      O2 Device: Room air    Temp (24hrs), Av.5 °F (36.9 °C), Min:98.5 °F (36.9 °C), Max:98.5 °F (36.9 °C)    No intake/output data recorded.  1901 -  0700  In: 1000 [I.V.:1000]  Out: 650 [Urine:650]    General:  Somnolent and only rousable to sternal rub, but falls quickly back to sleep, no distress, appears stated age. Head:  Normocephalic, without obvious abnormality, atraumatic. Eyes:  Conjunctivae/corneas clear. PERRL, EOMs intact. L lid remains closed   Nose: Nares normal. Septum midline.     Throat: Lips, mucosa, and tongue moist.   Neck: Supple, symmetrical, trachea midline, no adenopathy, thyroid: no enlargement/tenderness/nodules, no carotid bruit and no JVD. Back:   Symmetric, no curvature. ROM normal. No CVA tenderness. Lungs:   Clear to auscultation anteriorly. Non-labored, symmetric   Chest wall:  No tenderness or deformity. Heart:  Regular rate and rhythm, S1, S2 normal, no murmur, click, rub or gallop. Abdomen:   Soft, non-tender. Bowel sounds normal. No masses,  No organomegaly. Extremities: Extremities normal, atraumatic, no cyanosis or edema. No calf tenderness or cords. Pulses: 2+ and symmetric all extremities. Skin: Skin color, texture, turgor normal. No rashes or lesions   Neurologic: L lid stays closed, pt manually opens R eyelid but then it stays open, moves all extremities independently, but not enough to check muscle tone. Very somnolent. Gait not tested at this time. Data Review:       Recent Days:  Recent Labs     01/02/21  0413 01/01/21  1611   WBC 8.2 12.1*   HGB 9.9* 11.4*   HCT 30.7* 35.6    399     Recent Labs     01/02/21  0413 01/01/21  1611    139   K 4.7 4.9   * 108   CO2 26 26   GLU 98 100   BUN 12 16   CREA 0.89 1.34*   CA 9.4 10.0   ALB 3.1* 3.4*   ALT 18 21     No results for input(s): PH, PCO2, PO2, HCO3, FIO2 in the last 72 hours.     24 Hour Results:  Recent Results (from the past 24 hour(s))   CBC WITH AUTOMATED DIFF    Collection Time: 01/01/21  4:11 PM   Result Value Ref Range    WBC 12.1 (H) 3.6 - 11.0 K/uL    RBC 3.49 (L) 3.80 - 5.20 M/uL    HGB 11.4 (L) 11.5 - 16.0 g/dL    HCT 35.6 35.0 - 47.0 %    .0 (H) 80.0 - 99.0 FL    MCH 32.7 26.0 - 34.0 PG    MCHC 32.0 30.0 - 36.5 g/dL    RDW 13.3 11.5 - 14.5 %    PLATELET 536 632 - 735 K/uL    MPV 10.5 8.9 - 12.9 FL    NRBC 0.0 0  WBC    ABSOLUTE NRBC 0.00 0.00 - 0.01 K/uL    NEUTROPHILS 70 32 - 75 %    LYMPHOCYTES 20 12 - 49 %    MONOCYTES 6 5 - 13 %    EOSINOPHILS 3 0 - 7 % BASOPHILS 1 0 - 1 %    IMMATURE GRANULOCYTES 0 0.0 - 0.5 %    ABS. NEUTROPHILS 8.6 (H) 1.8 - 8.0 K/UL    ABS. LYMPHOCYTES 2.4 0.8 - 3.5 K/UL    ABS. MONOCYTES 0.7 0.0 - 1.0 K/UL    ABS. EOSINOPHILS 0.3 0.0 - 0.4 K/UL    ABS. BASOPHILS 0.1 0.0 - 0.1 K/UL    ABS. IMM. GRANS. 0.1 (H) 0.00 - 0.04 K/UL    DF AUTOMATED     METABOLIC PANEL, COMPREHENSIVE    Collection Time: 01/01/21  4:11 PM   Result Value Ref Range    Sodium 139 136 - 145 mmol/L    Potassium 4.9 3.5 - 5.1 mmol/L    Chloride 108 97 - 108 mmol/L    CO2 26 21 - 32 mmol/L    Anion gap 5 5 - 15 mmol/L    Glucose 100 65 - 100 mg/dL    BUN 16 6 - 20 MG/DL    Creatinine 1.34 (H) 0.55 - 1.02 MG/DL    BUN/Creatinine ratio 12 12 - 20      GFR est AA 48 (L) >60 ml/min/1.73m2    GFR est non-AA 39 (L) >60 ml/min/1.73m2    Calcium 10.0 8.5 - 10.1 MG/DL    Bilirubin, total 0.3 0.2 - 1.0 MG/DL    ALT (SGPT) 21 12 - 78 U/L    AST (SGOT) 27 15 - 37 U/L    Alk. phosphatase 78 45 - 117 U/L    Protein, total 8.4 (H) 6.4 - 8.2 g/dL    Albumin 3.4 (L) 3.5 - 5.0 g/dL    Globulin 5.0 (H) 2.0 - 4.0 g/dL    A-G Ratio 0.7 (L) 1.1 - 2.2     SAMPLES BEING HELD    Collection Time: 01/01/21  4:11 PM   Result Value Ref Range    SAMPLES BEING HELD SST.RED.TORIBIO     COMMENT        Add-on orders for these samples will be processed based on acceptable specimen integrity and analyte stability, which may vary by analyte.    POC LACTIC ACID    Collection Time: 01/01/21  4:12 PM   Result Value Ref Range    Lactic Acid (POC) 2.06 (HH) 0.40 - 2.00 mmol/L   URINALYSIS W/MICROSCOPIC    Collection Time: 01/01/21  8:04 PM   Result Value Ref Range    Color YELLOW/STRAW      Appearance CLEAR CLEAR      Specific gravity 1.010 1.003 - 1.030      pH (UA) 5.5 5.0 - 8.0      Protein Negative NEG mg/dL    Glucose Negative NEG mg/dL    Ketone Negative NEG mg/dL    Bilirubin Negative NEG      Blood MODERATE (A) NEG      Urobilinogen 0.2 0.2 - 1.0 EU/dL    Nitrites Negative NEG      Leukocyte Esterase SMALL (A) NEG      WBC 5-10 0 - 4 /hpf    RBC 0-5 0 - 5 /hpf    Epithelial cells FEW FEW /lpf    Bacteria Negative NEG /hpf    Hyaline cast 2-5 0 - 5 /lpf   URINE CULTURE HOLD SAMPLE    Collection Time: 01/01/21  8:04 PM    Specimen: Serum; Urine   Result Value Ref Range    Urine culture hold        Urine on hold in Microbiology dept for 2 days. If unpreserved urine is submitted, it cannot be used for addtional testing after 24 hours, recollection will be required. CULTURE, BLOOD    Collection Time: 01/01/21  9:33 PM    Specimen: Blood   Result Value Ref Range    Special Requests: NO SPECIAL REQUESTS      Culture result: NO GROWTH AFTER 7 HOURS     TYPE & SCREEN    Collection Time: 01/02/21 12:42 AM   Result Value Ref Range    Crossmatch Expiration 01/05/2021,2359     ABO/Rh(D) Charity Seo POSITIVE     Antibody screen NEG    LD    Collection Time: 01/02/21 12:42 AM   Result Value Ref Range     (H) 81 - 246 U/L   PROCALCITONIN    Collection Time: 01/02/21 12:42 AM   Result Value Ref Range    Procalcitonin 0.06 ng/mL   SAMPLES BEING HELD    Collection Time: 01/02/21 12:42 AM   Result Value Ref Range    SAMPLES BEING HELD 1BLU     COMMENT        Add-on orders for these samples will be processed based on acceptable specimen integrity and analyte stability, which may vary by analyte.    SARS-COV-2    Collection Time: 01/02/21  1:20 AM   Result Value Ref Range    Specimen source Nasopharyngeal      Specimen source NP SWAB     COVID-19 rapid test Not detected NOTD      Specimen type NP Swab      Health status Symptomatic Testing     ETHYL ALCOHOL    Collection Time: 01/02/21  2:34 AM   Result Value Ref Range    ALCOHOL(ETHYL),SERUM <65 <25 MG/DL   SALICYLATE    Collection Time: 01/02/21  2:34 AM   Result Value Ref Range    Salicylate level 2.1 (L) 2.8 - 20.0 MG/DL   LACTIC ACID    Collection Time: 01/02/21  2:44 AM   Result Value Ref Range    Lactic acid 0.9 0.4 - 2.0 MMOL/L   C REACTIVE PROTEIN, QT    Collection Time: 01/02/21  4:13 AM   Result Value Ref Range    C-Reactive protein 1.43 (H) 0.00 - 2.17 mg/dL   METABOLIC PANEL, COMPREHENSIVE    Collection Time: 01/02/21  4:13 AM   Result Value Ref Range    Sodium 142 136 - 145 mmol/L    Potassium 4.7 3.5 - 5.1 mmol/L    Chloride 111 (H) 97 - 108 mmol/L    CO2 26 21 - 32 mmol/L    Anion gap 5 5 - 15 mmol/L    Glucose 98 65 - 100 mg/dL    BUN 12 6 - 20 MG/DL    Creatinine 0.89 0.55 - 1.02 MG/DL    BUN/Creatinine ratio 13 12 - 20      GFR est AA >60 >60 ml/min/1.73m2    GFR est non-AA >60 >60 ml/min/1.73m2    Calcium 9.4 8.5 - 10.1 MG/DL    Bilirubin, total 0.4 0.2 - 1.0 MG/DL    ALT (SGPT) 18 12 - 78 U/L    AST (SGOT) 31 15 - 37 U/L    Alk. phosphatase 62 45 - 117 U/L    Protein, total 7.8 6.4 - 8.2 g/dL    Albumin 3.1 (L) 3.5 - 5.0 g/dL    Globulin 4.7 (H) 2.0 - 4.0 g/dL    A-G Ratio 0.7 (L) 1.1 - 2.2     CBC WITH AUTOMATED DIFF    Collection Time: 01/02/21  4:13 AM   Result Value Ref Range    WBC 8.2 3.6 - 11.0 K/uL    RBC 3.01 (L) 3.80 - 5.20 M/uL    HGB 9.9 (L) 11.5 - 16.0 g/dL    HCT 30.7 (L) 35.0 - 47.0 %    .0 (H) 80.0 - 99.0 FL    MCH 32.9 26.0 - 34.0 PG    MCHC 32.2 30.0 - 36.5 g/dL    RDW 13.4 11.5 - 14.5 %    PLATELET 481 393 - 446 K/uL    MPV 10.3 8.9 - 12.9 FL    NRBC 0.0 0  WBC    ABSOLUTE NRBC 0.00 0.00 - 0.01 K/uL    NEUTROPHILS 66 32 - 75 %    LYMPHOCYTES 23 12 - 49 %    MONOCYTES 6 5 - 13 %    EOSINOPHILS 3 0 - 7 %    BASOPHILS 1 0 - 1 %    IMMATURE GRANULOCYTES 1 (H) 0.0 - 0.5 %    ABS. NEUTROPHILS 5.5 1.8 - 8.0 K/UL    ABS. LYMPHOCYTES 1.8 0.8 - 3.5 K/UL    ABS. MONOCYTES 0.5 0.0 - 1.0 K/UL    ABS. EOSINOPHILS 0.3 0.0 - 0.4 K/UL    ABS. BASOPHILS 0.0 0.0 - 0.1 K/UL    ABS. IMM.  GRANS. 0.0 0.00 - 0.04 K/UL    DF AUTOMATED         Medications reviewed  Current Facility-Administered Medications   Medication Dose Route Frequency    sodium chloride (NS) flush 5-10 mL  5-10 mL IntraVENous PRN    cefepime (MAXIPIME) 2 g in sterile water (preservative free) 10 mL IV syringe  2 g IntraVENous Q8H    0.9% sodium chloride infusion  125 mL/hr IntraVENous CONTINUOUS    sodium chloride (NS) flush 5-40 mL  5-40 mL IntraVENous Q8H    sodium chloride (NS) flush 5-40 mL  5-40 mL IntraVENous PRN    enoxaparin (LOVENOX) injection 30 mg  30 mg SubCUTAneous Q12H    acetaminophen (TYLENOL) tablet 650 mg  650 mg Oral Q6H PRN    Or    acetaminophen (TYLENOL) suppository 650 mg  650 mg Rectal Q6H PRN       Care Plan discussed with: Patient/Family and Nurse    Total time spent with patient: 30 minutes.     Ilda Perez MD

## 2021-01-02 NOTE — PROGRESS NOTES
1/2/2021  4:17 PM    CM consult noted. CM attempting to locate contact information for pt's family. CM called both contacts listed in pt's chart: Liyah Rose [phone], phone number is disconnected, CM called Sarahfranck Cowart [phone], unable to leave [facility]. CM following.      Nick Driver

## 2021-01-02 NOTE — H&P
History & Physical    Date of admission: 1/1/2021    Patient name: Daniel Pugh  MRN: [id number]  YOB: 1950  Age: 72 y.o. Primary care provider:  Srikanth Garcia MD     Source of Information: ED and electronic medical records                                Chief complaint:  Patient does not provide    History of present illness  Daniel Pugh is a 72 y.o. female with past medical history of anxiety, depression, DM, hypertension, obesity and seizure disorder presented to the ED from home with reported confusion and pain over \"bladder\". History was obtained per review of ED and electronic medical records as patient was confused and not answering questions. Exact onset of symptoms is not documented. Patient reportedly was hospitalized at [facility] ~ 3 weeks ago with a stroke, discharged to rehab x 2 weeks and then returned home. She reportedly has been confused, hallucinating, with residual left sided weakness. On arrival in the ED, initial recorded vital signs were BP = 102/66, HR= 110, RR= 16, O2sat= 96% on room air. WBC= 12,100. poc lactic acid = 2.06.  Creatinine = 1.34. Per the ED, patient was started on Ceftriaxone 1 gram IV x 1 dose, Ativan 1 mg IV, and 0.9% NS 1000 ml IV fluid bolus x 2. Patient is now seen for admission to the hospitalist service. There were no reports of new onset falls, head/ neck trauma, headache, neck pain, visual disturbance, numbness, paresthesias, focal weakness, chest pain, shortness of breath (SOB), palpitations, abdominal pain, nausea, vomiting, diarrhea, melena, dysuria, hematuria, calf pain, increased leg swelling/ edema, fever, chills, or known COVID 19 exposure or positive previous test results.        Past Medical History:   Diagnosis Date    Anxiety and depression     Diabetes ([facility]     Hypertension     Obese     Seizure disorder ([facility] [address]       - stroke    MEDICATIONS:  Prior to Admission medications    Medication Sig Start Date End Date Taking? Authorizing Provider   acetaminophen (TYLENOL) 325 mg tablet Take 2 Tabs by mouth every six (6) hours as needed for Pain or Fever. 10/26/20   Lovette Nissen, MD   aspirin delayed-release 81 mg tablet Take 1 Tab by mouth daily. 10/26/20   Lovette Nissen, MD   atorvastatin (LIPITOR) 20 mg tablet Take 1 Tab by mouth daily. 10/26/20   Lovette Nissen, MD   sertraline (ZOLOFT) 100 mg tablet Take 1 Tab by mouth daily. 10/26/20   Lovette Nissen, MD   levETIRAcetam (KEPPRA) 500 mg tablet Take 1 Tab by mouth two (2) times a day. 10/26/20   Lovette Nissen, MD   pantoprazole (PROTONIX) 40 mg tablet Take 1 Tab by mouth daily. 10/27/20   Lovette Nissen, MD   SITagliptin (JANUVIA) 100 mg tablet Take 1 Tab by mouth daily. 10/26/20   Lovette Nissen, MD   losartan (COZAAR) 25 mg tablet Take 1 Tab by mouth two (2) times a day. 10/26/20   Lovette Nissen, MD   potassium chloride SR (KLOR-CON 10) 10 mEq tablet Take 1 Tab by mouth daily. 10/26/20   Lovette Nissen, MD   amLODIPine (NORVASC) 10 mg tablet Take 10 mg by mouth daily.     Provider, Historical     ALLERGIES:  NO KNOWN DRUG ALLERGIES    FAMILY HISTORY:  Family History   Problem Relation Age of Onset    Lung Cancer Mother     No Known Problems Father          Social history  Patient resides  X  at home     Alcohol history   x  unknown           Smoking history  x  unknown             Social History     Tobacco Use   Smoking Status Unknown If Ever Smoked       Code status  x  Full code       Review of systems  Unable to obtain review of systems as patient is confused and not answering questions    Physical Examination   Visit Vitals  /68   Pulse (!) 106   Temp 98.5 °F (36.9 °C)   Resp 16   Wt 79.2 kg (174 lb 11.2 oz)   SpO2 97%   BMI 29.99 kg/m²          O2 Device: Room air    General:  Obese female in no acute respiratory distress   Head: Normocephalic, without obvious abnormality, atraumatic   Eyes:  Conjunctivae/corneas clear. Pupils 2 mm reactive bilateral   E/N/M/T: Nares normal. Septum midline. No nasal drainage or sinus tenderness  Tongue midline  Clear oropharynx   Neck: Normal appearance and movements, symmetrical, trachea midline  No palpable adenopathy  No thyroid enlargement, tenderness or nodules  No carotid bruit   No JVD   Lungs:   Symmetrical chest expansion and respiratory effort  Clear to auscultation bilaterally   Chest wall:  No tenderness or deformity   Heart:  Tachycardic  Regular rhythm   Sounds normal; no murmur, click, rub or gallop   Abdomen:   Soft, no tenderness  No rebound, guarding, or rigidity  Non-distended  Bowel sounds normal     Back: No CVA tenderness   Extremities: Extremities normal, atraumatic  No cyanosis or edema     Pulses 2+ radial / 1+ DP bilateral pulses   Skin: No rashes or ulcers  Warm/ dry   Musculo-      skeletal: Gait not tested     Neuro: GCS 8 (E2 V2 M4). Moves all extremities x 4 with generalized weakness (barely able to wiggle toes of both feet). Not following commands. Aphasic. No facial droop. Sensation grossly intact.  unable to test for pronator drift   Psych: Somnolent but arousable to loud verbal stimuli and sternal rub  Mostly non-verbal   Intermittently anxious and uncooperative         Data Review      24 Hour Results:  Recent Results (from the past 24 hour(s))   CBC WITH AUTOMATED DIFF    Collection Time: 01/01/21  4:11 PM   Result Value Ref Range    WBC 12.1 (H) 3.6 - 11.0 K/uL    RBC 3.49 (L) 3.80 - 5.20 M/uL    HGB 11.4 (L) 11.5 - 16.0 g/dL    HCT 35.6 35.0 - 47.0 %    .0 (H) 80.0 - 99.0 FL    MCH 32.7 26.0 - 34.0 PG    MCHC 32.0 30.0 - 36.5 g/dL    RDW 13.3 11.5 - 14.5 %    PLATELET 449 096 - 206 K/uL    MPV 10.5 8.9 - 12.9 FL    NRBC 0.0 0  WBC    ABSOLUTE NRBC 0.00 0.00 - 0.01 K/uL    NEUTROPHILS 70 32 - 75 %    LYMPHOCYTES 20 12 - 49 %    MONOCYTES 6 5 - 13 % EOSINOPHILS 3 0 - 7 %    BASOPHILS 1 0 - 1 %    IMMATURE GRANULOCYTES 0 0.0 - 0.5 %    ABS. NEUTROPHILS 8.6 (H) 1.8 - 8.0 K/UL    ABS. LYMPHOCYTES 2.4 0.8 - 3.5 K/UL    ABS. MONOCYTES 0.7 0.0 - 1.0 K/UL    ABS. EOSINOPHILS 0.3 0.0 - 0.4 K/UL    ABS. BASOPHILS 0.1 0.0 - 0.1 K/UL    ABS. IMM. GRANS. 0.1 (H) 0.00 - 0.04 K/UL    DF AUTOMATED     METABOLIC PANEL, COMPREHENSIVE    Collection Time: 01/01/21  4:11 PM   Result Value Ref Range    Sodium 139 136 - 145 mmol/L    Potassium 4.9 3.5 - 5.1 mmol/L    Chloride 108 97 - 108 mmol/L    CO2 26 21 - 32 mmol/L    Anion gap 5 5 - 15 mmol/L    Glucose 100 65 - 100 mg/dL    BUN 16 6 - 20 MG/DL    Creatinine 1.34 (H) 0.55 - 1.02 MG/DL    BUN/Creatinine ratio 12 12 - 20      GFR est AA 48 (L) >60 ml/min/1.73m2    GFR est non-AA 39 (L) >60 ml/min/1.73m2    Calcium 10.0 8.5 - 10.1 MG/DL    Bilirubin, total 0.3 0.2 - 1.0 MG/DL    ALT (SGPT) 21 12 - 78 U/L    AST (SGOT) 27 15 - 37 U/L    Alk. phosphatase 78 45 - 117 U/L    Protein, total 8.4 (H) 6.4 - 8.2 g/dL    Albumin 3.4 (L) 3.5 - 5.0 g/dL    Globulin 5.0 (H) 2.0 - 4.0 g/dL    A-G Ratio 0.7 (L) 1.1 - 2.2     SAMPLES BEING HELD    Collection Time: 01/01/21  4:11 PM   Result Value Ref Range    SAMPLES BEING HELD SST.RED.TORIBIO     COMMENT        Add-on orders for these samples will be processed based on acceptable specimen integrity and analyte stability, which may vary by analyte.    POC LACTIC ACID    Collection Time: 01/01/21  4:12 PM   Result Value Ref Range    Lactic Acid (POC) 2.06 (HH) 0.40 - 2.00 mmol/L   URINALYSIS W/MICROSCOPIC    Collection Time: 01/01/21  8:04 PM   Result Value Ref Range    Color YELLOW/STRAW      Appearance CLEAR CLEAR      Specific gravity 1.010 1.003 - 1.030      pH (UA) 5.5 5.0 - 8.0      Protein Negative NEG mg/dL    Glucose Negative NEG mg/dL    Ketone Negative NEG mg/dL    Bilirubin Negative NEG      Blood MODERATE (A) NEG      Urobilinogen 0.2 0.2 - 1.0 EU/dL    Nitrites Negative NEG Leukocyte Esterase SMALL (A) NEG      WBC 5-10 0 - 4 /hpf    RBC 0-5 0 - 5 /hpf    Epithelial cells FEW FEW /lpf    Bacteria Negative NEG /hpf    Hyaline cast 2-5 0 - 5 /lpf   URINE CULTURE HOLD SAMPLE    Collection Time: 01/01/21  8:04 PM    Specimen: Serum; Urine   Result Value Ref Range    Urine culture hold        Urine on hold in Microbiology dept for 2 days. If unpreserved urine is submitted, it cannot be used for addtional testing after 24 hours, recollection will be required. Recent Labs     01/01/21  1611   WBC 12.1*   HGB 11.4*   HCT 35.6        Recent Labs     01/01/21  1611      K 4.9      CO2 26      BUN 16   CREA 1.34*   CA 10.0   ALB 3.4*   TBILI 0.3   ALT 21       Imaging  CT HEAD WO CONT     INDICATION: Altered mental status     COMPARISON: MRI 6/8/2018. CT 6/7/2018     TECHNIQUE: Axial noncontrast head CT from foramen magnum to vertex. Coronal and  sagittal reformatted images were obtained. CT dose reduction was achieved  through use of a standardized protocol tailored for this examination and  automatic exposure control for dose modulation.     FINDINGS:  There is diffuse age-related parenchymal volume loss. The ventricles  and sulci are age-appropriate without hydrocephalus. There is no mass effect or  midline shift. There is no intracranial hemorrhage or extra-axial fluid  collection. Scattered foci of low attenuation in the periventricular white  matter represent stable chronic microvascular ischemic changes. There are stable  chronic infarcts in the left frontal and parietal lobes. There is no new  abnormal parenchymal attenuation. The basal cisterns are patent.     A left scalp lesion measuring 1.6 cm is incompletely characterized. The osseous  structures are intact. The visualized paranasal sinuses and mastoid air cells  are clear.     IMPRESSION:   1. No acute intracranial abnormality.  Stable chronic left frontal and parietal  infarcts.         Assessment and Plan   1. Altered mental status       - with history of recent stroke. Concern for acute encephalopathy       - admit to telemetry       - place on neuro checks and fall precautions       - consider for neurology consult       - order dysphagia screen. Keep NPO until passes the same or speech evaluation         2. Sepsis        - with noted leukocytosis, tachycardia, lactic acidosis, and abnormal UA possible UTI       - continue IV antibiotic       - check urine and blood cultures    3. Abnormal urinalysis       - not definite for UTI       - plan as above    4. Dehydration        - continue IV fluids for hydration and repeat renal panel in a.m.        - place on strict Is and Os/ daily weights    5. Leukocytosis       - repeat WBC    6. Lactic acidosis       - continue IV fluids until lactic acid levels have corrected    7. Tachycardia       - continue telemetry monitoring    8. History of stroke        - plan as above    9. Suspected COVID 19 virus infection        - check COVID 19 test result        - not requiring supplemental oxygen at current but provide if needed    10. Hypertension         - resume home medications once passes dysphagia screen. Monitor BP.    11.  Hyperlipidemia        - plan as above    VTE prophylaxis         - Lovenox    CODE STATUS:  FULL CODE. Patient was unable to discuss code status without the medical capacity to make her own decisions at this time.                  Signed by: Estefania Snider MD    January 1, 2021 at 10:43 PM

## 2021-01-02 NOTE — ED NOTES
TRANSFER - OUT REPORT:    Verbal report given to Froilan Yap RN (name) on Jennifer Hanson  being transferred to bed ([phone] (unit) for routine progression of care       Report consisted of patients Situation, Background, Assessment and   Recommendations(SBAR). Information from the following report(s) SBAR, ED Summary, Intake/Output, MAR and Recent Results was reviewed with the receiving nurse. Lines:   Peripheral IV 01/02/21 Right Antecubital (Active)        Opportunity for questions and clarification was provided.       Patient transported with:   Monitor  Tech / Registered Nurse

## 2021-01-02 NOTE — ED NOTES
Verbal/Bedside report was given to [facility] who will assume care of patient at this time. SBAR report consisted of ED summary, MAR, recent results, and additional pertinent information. Receiving nurse given opportunity to ask questions and seek clarifications. Receiving nurse aware of pending lab results and orders that are to be completed.

## 2021-01-02 NOTE — CONSULTS
[facility]   Inpatient Neurology Consult     Name:   Neal Lester      :   1950     MRN:    [id number]    Admission Date:  2021  Consult Date:  21    HISTORY OF PRESENT ILLNESS:     This is a 72 y.o. female with PMHx DM, HTN, Obesityy, Anxiety-Depression, hx of Seizure disorder (on Keppra 500 mg BID at home). Neurology is asked to evaluate for altered mental status. Per EMR notes, family called EMS last night after pt c/o bladder fullness/ pain, problem with her indwelling price catheter. In ER, ER provider noted that patient was reporting pain in bladder area, mild headache, constipation, appeared confused. ER discussed with pts daughter who reported that pt had had a stroke 3 weeks ago and was treated at [facility], spent 2 weeks in rehab then recently discharged home. Residual symptoms: left sided weakness, confusion, hallucinations. Family brought pt to ER on 2021 due to price catheter wasn't working. In ED she had low blood pressure, elevated WBCs 12k, mild elevated Cr 1.34, UA + for small amount of LE and 5-10 WBCs. She was started on IV antibiotics for possible UTI. Neurology was consulted to evaluate for altered mental status. CT Head: stable encephalomalacia/ chronic infarcts in left frontal and parietal lobes, no new parenchymal abnomralities, no hydrocephalus. Diffuse age-related cerebral volume loss. Stable small vessel ischemic changes. A left scalp lesion that is incompletely characterized. Subsequent labs: WBC 8.2k, Vit D 14.7 (Low), SARS COV-2 NP Swab Rapid Test NEGATIVE, EtOH < 10, B12 362, INR 1.1    EEG: Pending    Complete Review of Systems: reviewed on admission H&P    Allergies:   No Known Allergies    PMHx:   Past Medical History:   Diagnosis Date    Anxiety and depression     Diabetes ([facility]     Hypertension     Obese     Seizure disorder ([facility]        PSHx:  has no past surgical history on file.     SocHx:      FHx: family history includes Lung Cancer in her mother; No Known Problems in her father. Outpatient Meds:  Current Outpatient Medications   Medication Instructions    acetaminophen (TYLENOL) 650 mg, Oral, EVERY 6 HOURS AS NEEDED    amLODIPine (NORVASC) 10 mg, Oral, DAILY    aspirin delayed-release 81 mg, Oral, DAILY    atorvastatin (LIPITOR) 20 mg, Oral, DAILY    levETIRAcetam (KEPPRA) 500 mg, Oral, 2 TIMES DAILY    losartan (COZAAR) 25 mg, Oral, 2 TIMES DAILY    pantoprazole (PROTONIX) 40 mg, Oral, DAILY    potassium chloride SR (KLOR-CON 10) 10 mEq tablet 10 mEq, Oral, DAILY    sertraline (ZOLOFT) 100 mg, Oral, DAILY    SITagliptin (JANUVIA) 100 mg, Oral, DAILY       Inpatient Meds:   Current Facility-Administered Medications   Medication Dose Route Frequency Provider Last Rate Last Admin    sodium chloride (NS) flush 5-10 mL  5-10 mL IntraVENous PRN Martina Ibarra MD        cefepime (MAXIPIME) 2 g in sterile water (preservative free) 10 mL IV syringe  2 g IntraVENous Q8H Sylvain Ibarra MD   2 g at 01/02/21 1111    0.9% sodium chloride infusion  125 mL/hr IntraVENous CONTINUOUS Rizwana Siegel  mL/hr at 01/02/21 0248 125 mL/hr at 01/02/21 0248    sodium chloride (NS) flush 5-40 mL  5-40 mL IntraVENous Q8H Sylvain Ibarra MD   10 mL at 01/02/21 1400    sodium chloride (NS) flush 5-40 mL  5-40 mL IntraVENous PRN Rizwana Siegel MD        cholecalciferol (VITAMIN D3) (1000 Units /25 mcg) tablet 5 Tab  5,000 Units Oral DAILY Saskia DAS MD   5 Tab at 01/02/21 1347    enoxaparin (LOVENOX) injection 30 mg  30 mg SubCUTAneous Q12H Rizwana Siegel MD   30 mg at 01/02/21 3530    acetaminophen (TYLENOL) tablet 650 mg  650 mg Oral Q6H PRN Rizwana Siegel MD        Or   [address] [place] [address] Connor acetaminophen (TYLENOL) suppository 650 mg  650 mg Rectal Q6H PRN Martian Ibarra MD           PHYSICAL EXAM    No data found. General: Head: normocephalic, atraumatic. Eyes: conjunctiva clear.   Neck: supple. Lungs: not examined  Heart: not examined  Extremities: no edema. Skin: No rashes    Neurologic Exam    Mental status:  Awake, confused    Orientation: cannot assess due to confusion    Speech: aphasic speech, no dysarthria    Cranial Nerves:   CN 1: not tested. CN 2: pupils equal, round, reactive to light. Difficult to assess visual fields due to aphasia. Funduscopic deferred. CN 3/ CN 4/ CN 6: EOMI, No SEAN, No ptosis. CN 5: grossly intact LT in V1-2-3 bilaterally. CN 7: left lower faical droop at rest.  CN 8: grossly normal.  CN 9/ CN 10: not examined. CN 11: pt cannot understand/ cooperate. CN 12: not examined    Cerebellar:  Resting tremor: none. Motor: mildly reduced throughout 4+/5, unclear what portion is due to patient difficulty understanding/ following commands. Sensory:  Cannot assess  DTRs: 2+ patellars, 1+ biceps, symmetric  Plantar response: deferred    Gait: cannot test due to patient confusion   Romberg: not tested    Labs/ Radiology   I reviewed the following labs and radiology studies;     Recent Results (from the past 12 hour(s))   URINALYSIS W/ RFLX MICROSCOPIC    Collection Time: 01/02/21  8:08 AM   Result Value Ref Range    Color YELLOW/STRAW      Appearance CLEAR CLEAR      Specific gravity 1.010 1.003 - 1.030      pH (UA) 5.5 5.0 - 8.0      Protein Negative NEG mg/dL    Glucose Negative NEG mg/dL    Ketone Negative NEG mg/dL    Bilirubin Negative NEG      Blood MODERATE (A) NEG      Urobilinogen 0.2 0.2 - 1.0 EU/dL    Nitrites Negative NEG      Leukocyte Esterase SMALL (A) NEG      WBC 5-10 0 - 4 /hpf    RBC 10-20 0 - 5 /hpf    Epithelial cells FEW FEW /lpf    Bacteria Negative NEG /hpf   LACTIC ACID    Collection Time: 01/02/21 11:44 AM   Result Value Ref Range    Lactic acid 1.4 0.4 - 2.0 MMOL/L   PROTHROMBIN TIME + INR    Collection Time: 01/02/21 11:45 AM   Result Value Ref Range    INR 1.1 0.9 - 1.1      Prothrombin time 11.3 (H) 9.0 - 11.1 sec   PTT    Collection Time: 01/02/21 11:45 AM   Result Value Ref Range    aPTT 26.5 22.1 - 31.0 sec    aPTT, therapeutic range     58.0 - 77.0 SECS   FIBRINOGEN    Collection Time: 01/02/21 11:45 AM   Result Value Ref Range    Fibrinogen 532 (H) 200 - 475 mg/dL   D DIMER    Collection Time: 01/02/21 11:45 AM   Result Value Ref Range    D-dimer 1.38 (H) 0.00 - 0.65 mg/L FEU   IRON PROFILE    Collection Time: 01/02/21 11:45 AM   Result Value Ref Range    Iron 59 35 - 150 ug/dL    TIBC 202 (L) 250 - 450 ug/dL    Iron % saturation 29 20 - 50 %         Assessment/ Plan        ICD-10-CM ICD-9-CM    1. Delirium  R41.0 780.09    2. Altered mental status, unspecified altered mental status type  R41.82 780.97    3. Sepsis with acute organ dysfunction without septic shock, due to unspecified organism, unspecified type ([facility]  A41.9 038.9     R65.20 995.92    4. Dehydration  E86.0 276.51         72 y.o. female with recent (around 3 wks ago) left frontal and parietal lobe stroke  Residual \"confusion\" (likely aphasia), weakness (family reported left but if anything should be right), and hallucinations  Possible UTI (on IV antibiotics)  Initial Covid test is negative; FP has ordered repeat test  Some Hx of Seizure disorder (unclear if preceded stroke or a result of it)  No additional hx available at present as family has not responded to Case Management calls and no records from [facility] yet. Ddx of current AMS: metabolic encephalopathy from UTI vs new stroke vs complex partial seizure. Agree with checking EEG    Entered order for MRI Brain w/o contrast    Will revisit tomorrow        Thank you for asking the Neurology Service to evaluate Juanito Sumner.       Signed By: Andrey Santamaria MD     January 2, 2021

## 2021-01-02 NOTE — ED NOTES
Report received from Merlene, [facility].  Assumed care of pt at this time

## 2021-01-02 NOTE — PROGRESS NOTES
Pt is A&O to self only and denies having any pain at this time. VSS, due medications given per orders. IV patent, RA, able to ambulate w/ SBA. Pt is very confused and is frequently yelling out. Pt not easily redirectable-bed alarm in place for safety precautions. Will continue to monitor and assess frequently.

## 2021-01-03 LAB
BACTERIA SPEC CULT: NORMAL
SERVICE CMNT-IMP: NORMAL

## 2021-01-03 PROCEDURE — 74011250636 HC RX REV CODE- 250/636: Performed by: FAMILY MEDICINE

## 2021-01-03 PROCEDURE — 74011000250 HC RX REV CODE- 250: Performed by: FAMILY MEDICINE

## 2021-01-03 PROCEDURE — 74011250637 HC RX REV CODE- 250/637: Performed by: FAMILY MEDICINE

## 2021-01-03 PROCEDURE — 65660000000 HC RM CCU STEPDOWN

## 2021-01-03 PROCEDURE — 99233 SBSQ HOSP IP/OBS HIGH 50: CPT | Performed by: PSYCHIATRY & NEUROLOGY

## 2021-01-03 PROCEDURE — 87635 SARS-COV-2 COVID-19 AMP PRB: CPT

## 2021-01-03 PROCEDURE — 65270000029 HC RM PRIVATE

## 2021-01-03 RX ORDER — LORAZEPAM 2 MG/ML
1 INJECTION INTRAMUSCULAR
Status: COMPLETED | OUTPATIENT
Start: 2021-01-03 | End: 2021-01-04

## 2021-01-03 RX ADMIN — ENOXAPARIN SODIUM 30 MG: 30 INJECTION SUBCUTANEOUS at 09:39

## 2021-01-03 RX ADMIN — HALOPERIDOL LACTATE 2 MG: 5 INJECTION, SOLUTION INTRAMUSCULAR at 20:02

## 2021-01-03 RX ADMIN — ENOXAPARIN SODIUM 30 MG: 30 INJECTION SUBCUTANEOUS at 20:01

## 2021-01-03 RX ADMIN — Medication 5 TABLET: at 09:39

## 2021-01-03 RX ADMIN — CEFEPIME 2 G: 20 INJECTION, POWDER, FOR SOLUTION INTRAVENOUS at 12:35

## 2021-01-03 RX ADMIN — CEFEPIME 2 G: 20 INJECTION, POWDER, FOR SOLUTION INTRAVENOUS at 18:00
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
## 2021-01-03 NOTE — PROGRESS NOTES
Inpatient Neurology Progress Note      Patient ID:   Delilah De Dios  [id number]  41 y.o.  1950    Follow up: admitted for altered mental status. Hx of left frontal/ parietal stroke 3 weeks prior to admission with residual aphasia, weakness, and hallucinations    Subjective:     EEG was attempted today but pt was uncooperative/ agitated so not performed. MRI Brain Pending (Nursing staff has called family twice today to do MRI checklist, no answer). Pt is resting in bed when I entered room. Sitter at bedside. Pt is calm, pleasant, conversant but still with expressive aphasia. Face appears symmetric. Moving extremities spontaneously. I called Pt's Daughter, Chris Rosales and was able to get in touch with her. I updated her on patient status. She had questions about patient's abnormal speech. On questioning, she reports pt has had this speech difficulty since she was discharged from [facility], and while she was in rehab. We discussed that pt's speech is expressive aphasia, from her prior stroke (3-4 weeks ago). I located a nurse to talk with Daughter to get MRI checklist completed so pt can proceed with that. Vitals:   Patient Vitals for the past 8 hrs:   Temp Pulse Resp BP SpO2   01/03/21 1455 98.9 °F (37.2 °C) 92 16 112/72 97 %   01/03/21 1133 98.5 °F (36.9 °C) 96 18 125/80 100 %         Objective:      Interval progress notes in [facility] were reviewed    Brief ROS: Per Interval Hx above    Allergies:   No Known Allergies    Medications:  Current Facility-Administered Medications   Medication Dose Route Frequency    LORazepam (ATIVAN) injection 1 mg  1 mg IntraVENous ONCE PRN    sodium chloride (NS) flush 5-10 mL  5-10 mL IntraVENous PRN    cefepime (MAXIPIME) 2 g in sterile water (preservative free) 10 mL IV syringe  2 g IntraVENous Q8H    0.9% sodium chloride infusion  125 mL/hr IntraVENous CONTINUOUS    sodium chloride (NS) flush 5-40 mL  5-40 mL IntraVENous Q8H    sodium chloride (NS) flush 5-40 mL  5-40 mL IntraVENous PRN    cholecalciferol (VITAMIN D3) (1000 Units /25 mcg) tablet 5 Tab  5,000 Units Oral DAILY    haloperidol lactate (HALDOL) injection 2 mg  2 mg IntraMUSCular PRN    enoxaparin (LOVENOX) injection 30 mg  30 mg SubCUTAneous Q12H    acetaminophen (TYLENOL) tablet 650 mg  650 mg Oral Q6H PRN    Or    acetaminophen (TYLENOL) suppository 650 mg  650 mg Rectal Q6H PRN       Impression/ Plan:       ICD-10-CM ICD-9-CM    1. Delirium  R41.0 780.09    2. Altered mental status, unspecified altered mental status type  R41.82 780.97    3. Sepsis with acute organ dysfunction without septic shock, due to unspecified organism, unspecified type ([facility]  A41.9 038.9     R65.20 995.92    4. Dehydration  E86.0 276.51    5. Abnormal urinalysis  R82.90 791.9    6.  Seizure disorder ([facility]  G40.909 345.90         Altered mental status  Expressive aphasia and right side weakness from stroke 3-4 weeks ago  Possible UTI  Possible pneumonia (abnormal chest x-ray)  Initial covid test was negative; FP Attending has repeated that test (result pending)  Receiving IV antibiotic  MRI Checklist obtained by Nurse  Pt will need sedation prior to MRI   Entered order for Ativan 1 mg IV x 1 to give 20 minutes before MRI   Will follow up after MRI completed  Entered order to reattempt EEG tomorrow      Signed By: Rajinder Hector MD     January 3, 2021
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
## 2021-01-03 NOTE — PROGRESS NOTES
1/3/2021  9:47 AM    Reason for Admission:   past medical history of anxiety, depression, DM, hypertension, obesity and seizure disorder presented to the ED from home with reported confusion and pain over \"bladder\". RUR Score:    16%              PCP: First and Last name:  Dr. Naveen Perea   Name of Practice:    Are you a current patient: Yes/No:  YES   Approximate date of last visit:    Can you participate in a virtual visit if needed:      Do you (patient/family) have any concerns for transition/discharge? Family did not provide concerns at this time. Plan for utilizing home health: family unsure at this time     Current Advanced Directive/Advance Care Plan:   None at this time             Transition of Care Plan:         CM consult noted, no contact information listed in pt's chart. CM called contact number for Nanette Mobley [phone], CM spoke with son to complete initial assessment. Son stated that his sister -Chris Rosales [phone], is the best  for pt. Son noted that he lives with pt in a one story home. Pt has Rx coverage, but doesn't recall what pharmacy pt uses. Pt's PCP listed is Naveen Perea. Son reports that PTA, pt was independent with most ADLs, but relies of family for transportation. CM reached out to pt's daughter- Chris Rosales lm for return call to complete full assessment. Care Plan:  1. Attempting to contact family  2. CM following for needs    Care Management Interventions  PCP Verified by CM: Yes(Lyn Gunter)  Mode of Transport at Discharge:  Other (see comment)  Transition of Care Consult (CM Consult): Discharge Planning  Current Support Network: Own Home, Family Lives Nearby  Confirm Follow Up Transport: Family  Discharge Location  Discharge Placement: Unable to determine at this time  Brennan Whitehead

## 2021-01-04 ENCOUNTER — HOSPITAL ENCOUNTER (OUTPATIENT)
Dept: MRI IMAGING | Age: 71
Discharge: HOME | End: 2021-01-04
Attending: PSYCHIATRY & NEUROLOGY

## 2021-01-04 LAB
FLUID CULTURE, SPNG2: NORMAL
HEALTH STATUS, XMCV2T: NORMAL
L PNEUMO1 AG UR QL IA: NEGATIVE
ORGANISM ID, SPNG3: NORMAL
PLEASE NOTE, SPNG4: NORMAL
S PNEUM AG SPEC QL LA: NEGATIVE
SARS-COV-2, COV2: NOT DETECTED
SOURCE, COVRS: NORMAL
SPECIMEN SOURCE, FCOV2M: NORMAL
SPECIMEN SOURCE: NORMAL
SPECIMEN SOURCE: NORMAL
SPECIMEN TYPE, XMCV1T: NORMAL
SPECIMEN, SPNG1: NORMAL

## 2021-01-04 PROCEDURE — 74011250636 HC RX REV CODE- 250/636: Performed by: FAMILY MEDICINE

## 2021-01-04 PROCEDURE — 74011250636 HC RX REV CODE- 250/636: Performed by: PSYCHIATRY & NEUROLOGY

## 2021-01-04 PROCEDURE — 74011250637 HC RX REV CODE- 250/637: Performed by: FAMILY MEDICINE

## 2021-01-04 PROCEDURE — 97161 PT EVAL LOW COMPLEX 20 MIN: CPT

## 2021-01-04 PROCEDURE — 97530 THERAPEUTIC ACTIVITIES: CPT

## 2021-01-04 PROCEDURE — 74011250636 HC RX REV CODE- 250/636

## 2021-01-04 PROCEDURE — 99232 SBSQ HOSP IP/OBS MODERATE 35: CPT | Performed by: PSYCHIATRY & NEUROLOGY

## 2021-01-04 PROCEDURE — 65270000029 HC RM PRIVATE

## 2021-01-04 PROCEDURE — 65660000000 HC RM CCU STEPDOWN

## 2021-01-04 PROCEDURE — 74011000250 HC RX REV CODE- 250: Performed by: FAMILY MEDICINE

## 2021-01-04 RX ORDER — ASPIRIN 81 MG/1
81 TABLET ORAL DAILY
Status: DISCONTINUED | OUTPATIENT
Start: 2021-01-04 | End: 2021-01-16 | Stop reason: HOSPADM

## 2021-01-04 RX ORDER — LOSARTAN POTASSIUM 25 MG/1
25 TABLET ORAL 2 TIMES DAILY
Status: DISCONTINUED | OUTPATIENT
Start: 2021-01-04 | End: 2021-01-16 | Stop reason: HOSPADM

## 2021-01-04 RX ORDER — HALOPERIDOL 5 MG/ML
2 INJECTION INTRAMUSCULAR ONCE
Status: COMPLETED | OUTPATIENT
Start: 2021-01-04 | End: 2021-01-04

## 2021-01-04 RX ORDER — QUETIAPINE FUMARATE 25 MG/1
12.5 TABLET, FILM COATED ORAL AS NEEDED
Status: DISCONTINUED | OUTPATIENT
Start: 2021-01-04 | End: 2021-01-04

## 2021-01-04 RX ORDER — SERTRALINE HYDROCHLORIDE 50 MG/1
100 TABLET, FILM COATED ORAL DAILY
Status: DISCONTINUED | OUTPATIENT
Start: 2021-01-04 | End: 2021-01-16 | Stop reason: HOSPADM

## 2021-01-04 RX ORDER — ATORVASTATIN CALCIUM 20 MG/1
20 TABLET, FILM COATED ORAL DAILY
Status: DISCONTINUED | OUTPATIENT
Start: 2021-01-04 | End: 2021-01-16 | Stop reason: HOSPADM

## 2021-01-04 RX ORDER — HALOPERIDOL 5 MG/ML
1 INJECTION INTRAMUSCULAR ONCE
Status: DISPENSED | OUTPATIENT
Start: 2021-01-04 | End: 2021-01-05

## 2021-01-04 RX ORDER — LEVETIRACETAM 500 MG/1
500 TABLET ORAL 2 TIMES DAILY
Status: DISCONTINUED | OUTPATIENT
Start: 2021-01-04 | End: 2021-01-16 | Stop reason: HOSPADM

## 2021-01-04 RX ORDER — ACETAMINOPHEN 325 MG/1
650 TABLET ORAL
Status: DISCONTINUED | OUTPATIENT
Start: 2021-01-04 | End: 2021-01-16 | Stop reason: HOSPADM

## 2021-01-04 RX ORDER — PANTOPRAZOLE SODIUM 40 MG/1
40 TABLET, DELAYED RELEASE ORAL DAILY
Status: DISCONTINUED | OUTPATIENT
Start: 2021-01-04 | End: 2021-01-16 | Stop reason: HOSPADM

## 2021-01-04 RX ORDER — HALOPERIDOL 5 MG/ML
1 INJECTION INTRAMUSCULAR AS NEEDED
Status: DISCONTINUED | OUTPATIENT
Start: 2021-01-04 | End: 2021-01-04

## 2021-01-04 RX ORDER — DIPHENHYDRAMINE HYDROCHLORIDE 50 MG/ML
50 INJECTION, SOLUTION INTRAMUSCULAR; INTRAVENOUS
Status: COMPLETED | OUTPATIENT
Start: 2021-01-04 | End: 2021-01-04

## 2021-01-04 RX ORDER — QUETIAPINE FUMARATE 25 MG/1
12.5 TABLET, FILM COATED ORAL ONCE
Status: COMPLETED | OUTPATIENT
Start: 2021-01-04 | End: 2021-01-04

## 2021-01-04 RX ORDER — HALOPERIDOL 5 MG/ML
INJECTION INTRAMUSCULAR
Status: COMPLETED
Start: 2021-01-04 | End: 2021-01-04

## 2021-01-04 RX ADMIN — HALOPERIDOL LACTATE 2 MG: 5 INJECTION, SOLUTION INTRAMUSCULAR at 02:26

## 2021-01-04 RX ADMIN — LORAZEPAM 1 MG: 2 INJECTION INTRAMUSCULAR; INTRAVENOUS at 02:26

## 2021-01-04 RX ADMIN — LOSARTAN POTASSIUM 25 MG: 25 TABLET, FILM COATED ORAL at 10:22

## 2021-01-04 RX ADMIN — DIPHENHYDRAMINE HYDROCHLORIDE 50 MG: 50 INJECTION, SOLUTION INTRAMUSCULAR; INTRAVENOUS at 02:26

## 2021-01-04 RX ADMIN — LEVETIRACETAM 500 MG: 500 TABLET ORAL at 21:54

## 2021-01-04 RX ADMIN — LOSARTAN POTASSIUM 25 MG: 25 TABLET, FILM COATED ORAL at 16:40

## 2021-01-04 RX ADMIN — QUETIAPINE FUMARATE 12.5 MG: 25 TABLET ORAL at 21:54

## 2021-01-04 RX ADMIN — CEFEPIME 2 G: 20 INJECTION, POWDER, FOR SOLUTION INTRAVENOUS at 10:22

## 2021-01-04 RX ADMIN — ATORVASTATIN CALCIUM 20 MG: 20 TABLET, FILM COATED ORAL at 10:23

## 2021-01-04 RX ADMIN — CEFEPIME 2 G: 20 INJECTION, POWDER, FOR SOLUTION INTRAVENOUS at 16:47

## 2021-01-04 RX ADMIN — HALOPERIDOL 2 MG: 5 INJECTION INTRAMUSCULAR at 00:40

## 2021-01-04 RX ADMIN — Medication 5 TABLET: at 10:22

## 2021-01-04 RX ADMIN — ASPIRIN 81 MG: 81 TABLET, COATED ORAL at 10:22

## 2021-01-04 RX ADMIN — HALOPERIDOL LACTATE 2 MG: 5 INJECTION, SOLUTION INTRAMUSCULAR at 00:40

## 2021-01-04 RX ADMIN — LEVETIRACETAM 500 MG: 500 TABLET ORAL at 10:22

## 2021-01-04 RX ADMIN — SERTRALINE HYDROCHLORIDE 100 MG: 50 TABLET ORAL at 10:23

## 2021-01-04 RX ADMIN — PANTOPRAZOLE SODIUM 40 MG: 40 TABLET, DELAYED RELEASE ORAL at 10:23

## 2021-01-04 NOTE — PROGRESS NOTES
Bedside shift change report given to Toño Peguero RN (oncoming nurse) by Inga Lyles RN (offgoing nurse). Report included the following information SBAR, Kardex and MAR.

## 2021-01-04 NOTE — PROGRESS NOTES
Problem: Mobility Impaired (Adult and Pediatric)  Goal: *Acute Goals and Plan of Care (Insert Text)  Description: FUNCTIONAL STATUS PRIOR TO ADMISSION: Pt is a limited historian. Prior to CVA 3 weeks ago pt was independent. Pt was discharged from rehab and readmitted to hospital within 24 hours. HOME SUPPORT PRIOR TO ADMISSION: The patient lived with family members. Physical Therapy Goals  Initiated 1/4/2021  1. Patient will move from supine to sit and sit to supine , scoot up and down, and roll side to side in bed with minimal assistance/contact guard assist within 7 day(s). 2.  Patient will transfer from bed to chair and chair to bed with minimal assistance/contact guard assist using the least restrictive device within 7 day(s). 3.  Patient will perform sit to stand with minimal assistance/contact guard assist within 7 day(s). 4.  Patient will ambulate with minimal assistance/contact guard assist for 50 feet with the least restrictive device within 7 day(s). Outcome: Progressing Towards Goal  PHYSICAL THERAPY EVALUATION  Patient: Reji Arriola (69 y.o. female)  Date: 1/4/2021  Primary Diagnosis: Altered mental status [R41.82]  Sepsis ([facility] [A41.9]  Leukocytosis [D72.829]  Abnormal urinalysis [R82.90]  Tachycardia [R00.0]  Dehydration [E86.0]  Lactic acidosis [E87.2]  Anemia [D64.9]        Precautions:        ASSESSMENT  Based on the objective data described below, the patient presents with lethargy, difficulty maintaining alertness, impaired command following, generalized weakness, impaired standing balance and functional mobility below perceived baseline following admission for AMS and agitation. Pt recently discharged from [facility]hab following CVA rehab. Pt requires A x 2 for mobility to EOB and standing from side of bed. Several attempts to ambulate away from side of bed though pt with minimal foot clearance and stepping in place vs making forward progress.  Returned to supine with all needs in reach and sitter present. Pt likely will require SNF level of rehab at discharge. Current Level of Function Impacting Discharge (mobility/balance): Min A - Mod A x 2 to stand from EOB    Functional Outcome Measure: The patient scored 3/56 on the Cash outcome measure which is indicative of high fall risk. Other factors to consider for discharge: readmitted less than 24 hours after discharge     Patient will benefit from skilled therapy intervention to address the above noted impairments. PLAN :  Recommendations and Planned Interventions: bed mobility training, transfer training, gait training, therapeutic exercises, neuromuscular re-education, patient and family training/education, and therapeutic activities      Frequency/Duration: Patient will be followed by physical therapy:  5 times a week to address goals. Recommendation for discharge: (in order for the patient to meet his/her long term goals)  Therapy up to 5 days/week in SNF setting    This discharge recommendation:  Has been made in collaboration with the attending provider and/or case management    IF patient discharges home will need the following DME: to be determined (TBD)         SUBJECTIVE:   Patient stated Yes, okay.     OBJECTIVE DATA SUMMARY:   HISTORY:    Past Medical History:   Diagnosis Date    Anxiety and depression     Diabetes ([facility]     Hypertension     Obese     Seizure disorder ([facility]    No past surgical history on file.     Personal factors and/or comorbidities impacting plan of care: HTN, diabetes, recent stroke    Home Situation  Home Environment: Private residence  One/Two Story Residence: One story  Living Alone: No  Support Systems: Child(adrienne)  Patient Expects to be Discharged to[de-identified] Skilled nursing facility  Current DME Used/Available at Home: None    EXAMINATION/PRESENTATION/DECISION MAKING:   Critical Behavior:  Neurologic State: Drowsy  Orientation Level: Oriented to person  Cognition: Other (comment)(pt sleeping)     Hearing: Auditory  Auditory Impairment: None  Skin:    Edema:   Range Of Motion:  AROM: Generally decreased, functional           PROM: Generally decreased, functional           Strength:    Strength: Generally decreased, functional                    Tone & Sensation:   Tone: Normal              Sensation: Intact               Coordination:  Coordination: Generally decreased, functional  Vision:      Functional Mobility:  Bed Mobility:  Rolling: Maximum assistance  Supine to Sit: Moderate assistance  Sit to Supine:  Moderate assistance;Assist x2  Scooting: Maximum assistance  Transfers:  Sit to Stand: Minimum assistance;Assist x2  Stand to Sit: Minimum assistance;Assist x2                       Balance:   Sitting: Impaired  Sitting - Static: Good (unsupported)  Sitting - Dynamic: Fair (occasional)  Standing: Impaired  Standing - Static: Fair  Standing - Dynamic : Poor  Ambulation/Gait Training:              Gait Description (WDL): (deferred after multiple attempts with no success)                    Functional Measure:  Cash Balance Test:    Sitting to Standin  Standing Unsupported: 0  Sitting with Back Unsupported: 2  Standing to Sittin  Transfers: 0  Standing Unsupported with Eyes Closed: 0  Standing Unsupported with Feet Together: 0  Reach Forward with Outstretched Arm: 0   Object: 0  Turn to Look Over Shoulders: 0  Turn 360 Degrees: 0  Alternate Foot on Step/Stool: 0  Standing Unsupported One Foot in Front: 0  Stand on One Le  Total: 3/56         56=Maximum possible score;   0-20=High fall risk  21-40=Moderate fall risk   41-56=Low fall risk                Physical Therapy Evaluation Charge Determination   History Examination Presentation Decision-Making   Medium MEDIUM Complexity : 3 Standardized tests and measures addressing body structure, function, activity limitation and / or participation in recreation  LOW Complexity : Stable, uncomplicated  Other outcome measures Kiara Segovia LOW       Based on the above components, the patient evaluation is determined to be of the following complexity level: LOW     Pain Rating:  none    Activity Tolerance:   Fair and requires rest breaks    After treatment patient left in no apparent distress:   Supine in bed, Call bell within reach, Bed / chair alarm activated, Side rails x 3, and sitter in room    COMMUNICATION/EDUCATION:   The patients plan of care was discussed with: Registered nurse. Fall prevention education was provided and the patient/caregiver indicated understanding., Patient/family have participated as able in goal setting and plan of care. , and Patient/family agree to work toward stated goals and plan of care.     Thank you for this referral.  Tha Guevara, PT   Time Calculation: 20 mins

## 2021-01-04 NOTE — PROGRESS NOTES
1/4/2021 2:26 PM Received return phone call from pt's daughter, Arash Angeles. Arash Angeles reported pt was discharged from [facility] and was admitted to [facility] the next day. Pt's daughter reported they had tried to get pt in to [facility] but was unsure why this was unsuccessful. Pt's daughter reported [facility] is their preferred SNF for pt. CM sent referral to [facility] via [employer]. 1/4/2021  10:56 AM EMR reviewed. Noted pt to have sitter at bedside due to agitation. CM called to pt's daughter, Jonathon Meeks to complete assessment. Charted address and phone numbers confirmed. Pt's daughter reported pt lives at home with her brother who works during the day. Pt was recently at [address] [place] [address] for a stroke(admited 12/11/20) and was discharged to rehab at [facility]. Pt's daughter pt was discharged from [facility] and was brought to [facility] the same day. Pt's daughter was unsure if pt was sent home with [facility] nor any dme. Pt's daughter reported during pt's stay at [facility], pt's daughter, Liborio Huggins was the main point of contact. Sebastián Bowser provided Kareen's phone number of [phone]. Pt's daughter was understanding pt may need additional rehab following her stay at [facility]. Sebastián Bowser reported she does not want pt to return to [facility] as she was not pleased with their rehab. CM requested PT and OT evbertha from pt's MD to determine pt's level of function. CM called and lvm with pt's daughter, Arash Angeles. CM will follow.  Vishal Stacy, SHONAW
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
## 2021-01-04 NOTE — PROGRESS NOTES
Spoke with Dr. Lenora Greenberg who advised family would come sit with pt if she started to act out. Also stated family is aware of medications being given in order to calm patient and they are okay with that. Spoke with daughter, asked if she was able to come sit with the patient. Daughter states she has to work and would be able to come tomorrow after work. Also states she would contact her mother's  to see if a sitter could be obtained thru Medicaid. .. Explained to daughter we want her mother safe and her walking around the room, pulling out her IVs and yelling is not safe for her. Advised we will take care of her mother and make sure she is safe. Advised I will call her again if emergency. She states she is available for calls at any time.

## 2021-01-04 NOTE — PROGRESS NOTES
Inpatient Neurology Progress Note      Patient ID:   Delilah De Dios  [id number]  13 y.o.  1950    Follow up: admitted for altered mental status. Hx of left frontal/ parietal stroke 3 weeks prior to admission with residual aphasia, weakness, and hallucinations    Subjective:     Reattempts at EEG pending for today. I spoke with Radiology who will schedule the Brain MRI for today (not on schedule yet as MRI checklist wasn't sent down. Reviewed chart. Pt remains agitated, requiring a sitter. When I visit, she is resting in bed, semi-sleeping. Awakens to voice, mumbles a few things. Is not oriented to locations,calls this her house (though she does have expressive aphasia). Moves all extremities to command. Face appears symmetric. Vitals:   Patient Vitals for the past 8 hrs:   Temp Pulse Resp BP SpO2   01/04/21 0748 97.6 °F (36.4 °C) 99 16 117/78 95 %   01/04/21 0350 98.1 °F (36.7 °C) 99 18 (!) 145/72 96 %         Objective:      Interval progress notes in [facility] were reviewed    Brief ROS: Per Interval Hx above    Allergies:   No Known Allergies    Medications:  Current Facility-Administered Medications   Medication Dose Route Frequency    acetaminophen (TYLENOL) tablet 650 mg  650 mg Oral Q6H PRN    sertraline (ZOLOFT) tablet 100 mg  100 mg Oral DAILY    pantoprazole (PROTONIX) tablet 40 mg  40 mg Oral DAILY    losartan (COZAAR) tablet 25 mg  25 mg Oral BID    levETIRAcetam (KEPPRA) tablet 500 mg  500 mg Oral BID    atorvastatin (LIPITOR) tablet 20 mg  20 mg Oral DAILY    aspirin delayed-release tablet 81 mg  81 mg Oral DAILY    sodium chloride (NS) flush 5-10 mL  5-10 mL IntraVENous PRN    cefepime (MAXIPIME) 2 g in sterile water (preservative free) 10 mL IV syringe  2 g IntraVENous Q8H    0.9% sodium chloride infusion  125 mL/hr IntraVENous CONTINUOUS    sodium chloride (NS) flush 5-40 mL  5-40 mL IntraVENous PRN    cholecalciferol (VITAMIN D3) (1000 Units /25 mcg) tablet 5 Tab 5,000 Units Oral DAILY       Impression/ Plan:       ICD-10-CM ICD-9-CM    1. Delirium  R41.0 780.09    2. Altered mental status, unspecified altered mental status type  R41.82 780.97    3. Sepsis with acute organ dysfunction without septic shock, due to unspecified organism, unspecified type ([place] [address].)  A41.9 038.9     R65.20 995.92    4. Dehydration  E86.0 276.51    5. Abnormal urinalysis  R82.90 791.9    6.  Seizure disorder ([facility]  G40.909 345.90         Altered mental status/ metabolic encephalopathy  Expressive aphasia and right side weakness from stroke 3-4 weeks ago  Possible UTI; on ABx  Possible pneumonia (abnormal chest x-ray)  covid test was negative x 2     Await MRI Brain and EEG   Will follow up after those are complete       Signed By: Rajinder Hector MD     January 4, 2021

## 2021-01-04 NOTE — PROGRESS NOTES
Called MD regarding MRI checklist for patient.  Patient drowsy and answers given earlier do not match, will try again later, called IV team to request for IV as patient has no access and is edematous

## 2021-01-05 ENCOUNTER — APPOINTMENT (OUTPATIENT)
Dept: MRI IMAGING | Age: 71
DRG: 871 | End: 2021-01-05
Attending: PSYCHIATRY & NEUROLOGY
Payer: MEDICARE

## 2021-01-05 LAB
ALBUMIN SERPL-MCNC: 3.2 G/DL (ref 3.5–5)
ALBUMIN/GLOB SERPL: 0.6 {RATIO} (ref 1.1–2.2)
ALP SERPL-CCNC: 67 U/L (ref 45–117)
ALT SERPL-CCNC: 16 U/L (ref 12–78)
ANION GAP SERPL CALC-SCNC: 5 MMOL/L (ref 5–15)
AST SERPL-CCNC: 28 U/L (ref 15–37)
BASOPHILS # BLD: 0.1 K/UL (ref 0–0.1)
BASOPHILS NFR BLD: 0 % (ref 0–1)
BILIRUB SERPL-MCNC: 0.4 MG/DL (ref 0.2–1)
BUN SERPL-MCNC: 12 MG/DL (ref 6–20)
BUN/CREAT SERPL: 13 (ref 12–20)
CALCIUM SERPL-MCNC: 9.7 MG/DL (ref 8.5–10.1)
CHLORIDE SERPL-SCNC: 105 MMOL/L (ref 97–108)
CO2 SERPL-SCNC: 25 MMOL/L (ref 21–32)
CREAT SERPL-MCNC: 0.9 MG/DL (ref 0.55–1.02)
DIFFERENTIAL METHOD BLD: ABNORMAL
EOSINOPHIL # BLD: 0.4 K/UL (ref 0–0.4)
EOSINOPHIL NFR BLD: 4 % (ref 0–7)
ERYTHROCYTE [DISTWIDTH] IN BLOOD BY AUTOMATED COUNT: 12.9 % (ref 11.5–14.5)
GLOBULIN SER CALC-MCNC: 5.2 G/DL (ref 2–4)
GLUCOSE SERPL-MCNC: 121 MG/DL (ref 65–100)
HCT VFR BLD AUTO: 36.4 % (ref 35–47)
HGB BLD-MCNC: 11.9 G/DL (ref 11.5–16)
IMM GRANULOCYTES # BLD AUTO: 0.1 K/UL (ref 0–0.04)
IMM GRANULOCYTES NFR BLD AUTO: 0 % (ref 0–0.5)
LYMPHOCYTES # BLD: 1.6 K/UL (ref 0.8–3.5)
LYMPHOCYTES NFR BLD: 14 % (ref 12–49)
MCH RBC QN AUTO: 32.2 PG (ref 26–34)
MCHC RBC AUTO-ENTMCNC: 32.7 G/DL (ref 30–36.5)
MCV RBC AUTO: 98.6 FL (ref 80–99)
MONOCYTES # BLD: 0.8 K/UL (ref 0–1)
MONOCYTES NFR BLD: 7 % (ref 5–13)
NEUTS SEG # BLD: 8.5 K/UL (ref 1.8–8)
NEUTS SEG NFR BLD: 75 % (ref 32–75)
NRBC # BLD: 0 K/UL (ref 0–0.01)
NRBC BLD-RTO: 0 PER 100 WBC
PLATELET # BLD AUTO: 324 K/UL (ref 150–400)
PMV BLD AUTO: 9.9 FL (ref 8.9–12.9)
POTASSIUM SERPL-SCNC: 3.8 MMOL/L (ref 3.5–5.1)
PROT SERPL-MCNC: 8.4 G/DL (ref 6.4–8.2)
RBC # BLD AUTO: 3.69 M/UL (ref 3.8–5.2)
SODIUM SERPL-SCNC: 135 MMOL/L (ref 136–145)
WBC # BLD AUTO: 11.4 K/UL (ref 3.6–11)

## 2021-01-05 PROCEDURE — 97165 OT EVAL LOW COMPLEX 30 MIN: CPT

## 2021-01-05 PROCEDURE — 74011250636 HC RX REV CODE- 250/636: Performed by: FAMILY MEDICINE

## 2021-01-05 PROCEDURE — 85025 COMPLETE CBC W/AUTO DIFF WBC: CPT

## 2021-01-05 PROCEDURE — 74011250637 HC RX REV CODE- 250/637: Performed by: FAMILY MEDICINE

## 2021-01-05 PROCEDURE — 95819 EEG AWAKE AND ASLEEP: CPT | Performed by: PSYCHIATRY & NEUROLOGY

## 2021-01-05 PROCEDURE — 97535 SELF CARE MNGMENT TRAINING: CPT

## 2021-01-05 PROCEDURE — 80053 COMPREHEN METABOLIC PANEL: CPT

## 2021-01-05 PROCEDURE — 97116 GAIT TRAINING THERAPY: CPT

## 2021-01-05 PROCEDURE — 74011000250 HC RX REV CODE- 250: Performed by: FAMILY MEDICINE

## 2021-01-05 PROCEDURE — 36415 COLL VENOUS BLD VENIPUNCTURE: CPT

## 2021-01-05 PROCEDURE — 97530 THERAPEUTIC ACTIVITIES: CPT

## 2021-01-05 PROCEDURE — 70551 MRI BRAIN STEM W/O DYE: CPT

## 2021-01-05 PROCEDURE — 65660000000 HC RM CCU STEPDOWN

## 2021-01-05 RX ORDER — HALOPERIDOL 5 MG/ML
1 INJECTION INTRAMUSCULAR
Status: DISCONTINUED | OUTPATIENT
Start: 2021-01-05 | End: 2021-01-06

## 2021-01-05 RX ORDER — QUETIAPINE FUMARATE 25 MG/1
12.5 TABLET, FILM COATED ORAL
Status: DISCONTINUED | OUTPATIENT
Start: 2021-01-05 | End: 2021-01-10

## 2021-01-05 RX ORDER — HALOPERIDOL 5 MG/ML
1 INJECTION INTRAMUSCULAR ONCE
Status: COMPLETED | OUTPATIENT
Start: 2021-01-05 | End: 2021-01-06

## 2021-01-05 RX ORDER — QUETIAPINE FUMARATE 25 MG/1
12.5 TABLET, FILM COATED ORAL AS NEEDED
Status: DISCONTINUED | OUTPATIENT
Start: 2021-01-05 | End: 2021-01-05

## 2021-01-05 RX ORDER — QUETIAPINE FUMARATE 25 MG/1
12.5 TABLET, FILM COATED ORAL DAILY
Status: DISCONTINUED | OUTPATIENT
Start: 2021-01-06 | End: 2021-01-05

## 2021-01-05 RX ORDER — QUETIAPINE FUMARATE 25 MG/1
12.5 TABLET, FILM COATED ORAL EVERY EVENING
Status: DISCONTINUED | OUTPATIENT
Start: 2021-01-05 | End: 2021-01-06

## 2021-01-05 RX ADMIN — LEVETIRACETAM 500 MG: 500 TABLET ORAL at 11:38

## 2021-01-05 RX ADMIN — CEFEPIME 2 G: 20 INJECTION, POWDER, FOR SOLUTION INTRAVENOUS at 03:13

## 2021-01-05 RX ADMIN — ASPIRIN 81 MG: 81 TABLET, COATED ORAL at 11:43

## 2021-01-05 RX ADMIN — LOSARTAN POTASSIUM 25 MG: 25 TABLET, FILM COATED ORAL at 17:43

## 2021-01-05 RX ADMIN — LOSARTAN POTASSIUM 25 MG: 25 TABLET, FILM COATED ORAL at 11:38

## 2021-01-05 RX ADMIN — ATORVASTATIN CALCIUM 20 MG: 20 TABLET, FILM COATED ORAL at 11:43

## 2021-01-05 RX ADMIN — SERTRALINE HYDROCHLORIDE 100 MG: 50 TABLET ORAL at 11:43

## 2021-01-05 RX ADMIN — QUETIAPINE FUMARATE 12.5 MG: 25 TABLET ORAL at 19:36

## 2021-01-05 RX ADMIN — PANTOPRAZOLE SODIUM 40 MG: 40 TABLET, DELAYED RELEASE ORAL at 11:37

## 2021-01-05 RX ADMIN — HALOPERIDOL LACTATE 1 MG: 5 INJECTION, SOLUTION INTRAMUSCULAR at 23:30

## 2021-01-05 RX ADMIN — CEFEPIME 2 G: 20 INJECTION, POWDER, FOR SOLUTION INTRAVENOUS at 11:36

## 2021-01-05 RX ADMIN — CEFEPIME 2 G: 20 INJECTION, POWDER, FOR SOLUTION INTRAVENOUS at 17:42

## 2021-01-05 RX ADMIN — QUETIAPINE FUMARATE 12.5 MG: 25 TABLET ORAL at 23:21

## 2021-01-05 RX ADMIN — Medication 5 TABLET: at 11:37

## 2021-01-05 NOTE — PROGRESS NOTES
[facility] \"Patient in 419 is continues to become more restless and very impulsive. The medication prescribed to patient last night seem to work very well for her. Wanted to know if we can get the same medications on board for tonight. \"

## 2021-01-05 NOTE — PROGRESS NOTES
Problem: Self Care Deficits Care Plan (Adult)  Goal: *Acute Goals and Plan of Care (Insert Text)  Description:   FUNCTIONAL STATUS PRIOR TO ADMISSION: Patient recent stroke with dc to [facility] and had dc home for one day before this admission. Patient is poor historian. From chart review she was living with family and indep with ADL tasks. HOME SUPPORT: The patient lived with family. Occupational Therapy Goals  Initiated 1/5/2021  1. Patient will perform grooming standing for ADL tasks with supervision/set-up within 7 day(s). 2.  Patient will perform lower body dressing with supervision/set-up within 7 day(s). 3.  Patient will perform bathing with supervision/set-up within 7 day(s). 4.  Patient will perform toilet transfers with supervision/set-up within 7 day(s). 5.  Patient will perform all aspects of toileting with supervision/set-up within 7 day(s). 6.  Patient will participate in upper extremity therapeutic exercise/activities with supervision/set-up for 8 minutes within 7 day(s). Outcome: Progressing Towards Goal   OCCUPATIONAL THERAPY EVALUATION  Patient: Daniel Pugh (85 y.o. female)  Date: 1/5/2021  Primary Diagnosis: Altered mental status [R41.82]  Sepsis ([facility] [address] [A41.9]  Leukocytosis [D72.829]  Abnormal urinalysis [R82.90]  Tachycardia [R00.0]  Dehydration [E86.0]  Lactic acidosis [E87.2]  Anemia [D64.9]        Precautions: Fall       ASSESSMENT  Based on the objective data described below, the patient presents with confusion, impaired activity tolerance, balance, mobility and general strength necessary in ADL tasks. She was admitted for AMS with recent stroke. Patient oriented to first name (gave maiden name?), and her month/date of birth. Reoriented to place and situation. Command follow approx 75% with additional time and multimodal cues. Received sitting EOB with nurse and sitter to complete toileting. SPT with CGA to [facility] with CGA with additional cues.   She ambulated in room with min A with HHA with slow pace. Current Level of Function Impacting Discharge (ADLs/self-care): UB care min A, LB care max A, toileting min A    Functional Outcome Measure: The patient scored 35/100 on the Barthel Index outcome measure which is indicative of moderate to severe impairment with ADL tasks. Other factors to consider for discharge: Patient with AMS, multiple hospital admissions. Good progression with participation in therapy services today compared to PT eval yesterday. Patient will benefit from skilled therapy intervention to address the above noted impairments. PLAN :  Recommendations and Planned Interventions: self care training, functional mobility training, therapeutic exercise, balance training, therapeutic activities, endurance activities, patient education, home safety training, and family training/education    Frequency/Duration: Patient will be followed by occupational therapy 5 times a week to address goals. Recommendation for discharge: (in order for the patient to meet his/her long term goals)  To be determined: SNF vs HH with 24 assistance    This discharge recommendation:  Has been made in collaboration with the attending provider and/or case management    IF patient discharges home will need the following DME: TBD with progression       SUBJECTIVE:   Patient stated \"December 25.    OBJECTIVE DATA SUMMARY:   HISTORY:   Past Medical History:   Diagnosis Date    Anxiety and depression     Diabetes ([facility] [address]     Hypertension     Obese     Seizure disorder ([facility] [address]    No past surgical history on file.     Expanded or extensive additional review of patient history:     Home Situation  Home Environment: Private residence  One/Two Story Residence: One story  Living Alone: No  Support Systems: Family member(s)  Patient Expects to be Discharged to[de-identified] Skilled nursing facility  Current DME Used/Available at Home: None    Hand dominance: Right    EXAMINATION OF PERFORMANCE DEFICITS:  Cognitive/Behavioral Status:  Neurologic State: Confused  Orientation Level: Oriented to person;Disoriented to time;Disoriented to situation;Disoriented to place(verbalized first name and ?maiden name and )  Cognition: Poor safety awareness;Decreased command following;Decreased attention/concentration        Hearing: Auditory  Auditory Impairment: None    Vision/Perceptual:      Corrective Lenses: (missing glasses)    Range of Motion:  AROM: Generally decreased, functional  PROM: Generally decreased, functional            Strength:  Strength: Generally decreased, functional         Coordination:  Coordination: Generally decreased, functional  Fine Motor Skills-Upper: Left Intact; Right Intact    Gross Motor Skills-Upper: Left Intact; Right Intact    Tone & Sensation:  Tone: Normal          Balance:  Sitting: Impaired  Sitting - Static: Good (unsupported)  Sitting - Dynamic: Fair (occasional)  Standing: Impaired  Standing - Static: (P) Good  Standing - Dynamic : (P) Fair    Functional Mobility and Transfers for ADLs:  Bed Mobility:  Supine to Sit: (P) Stand-by assistance  Sit to Supine: (P) Stand-by assistance  Scooting: (P) Supervision    Transfers:  Sit to Stand: (P) Contact guard assistance; Additional time  Stand to Sit: (P) Contact guard assistance  Bed to Chair: (P) Contact guard assistance  Bathroom Mobility: Minimum assistance  Toilet Transfer : Minimum assistance(to [facility])    ADL Assessment:  Feeding: Setup  Oral Facial Hygiene/Grooming: Minimum assistance  Bathing: Maximum assistance  Upper Body Dressing: Minimum assistance  Lower Body Dressing: Maximum assistance  Toileting:  Moderate assistance         ADL Intervention and task modifications:  Feeding  Food to Mouth: Set-up  Grooming  Position Performed: Seated edge of bed  Washing Hands: Contact guard assistance(constant verbal and visual cues to complete)  Toileting  Bladder Hygiene: Contact guard assistance(extensive- verbal cues to complete)       Functional Measure:  Barthel Index:    Bathin  Bladder: 5  Bowels: 5  Groomin  Dressin  Feedin  Mobility: 0  Stairs: 0  Toilet Use: 5  Transfer (Bed to Chair and Back): 10  Total: 35/100        The Barthel ADL Index: Guidelines  1. The index should be used as a record of what a patient does, not as a record of what a patient could do. 2. The main aim is to establish degree of independence from any help, physical or verbal, however minor and for whatever reason. 3. The need for supervision renders the patient not independent. 4. A patient's performance should be established using the best available evidence. Asking the patient, friends/relatives and nurses are the usual sources, but direct observation and common sense are also important. However direct testing is not needed. 5. Usually the patient's performance over the preceding 24-48 hours is important, but occasionally longer periods will be relevant. 6. Middle categories imply that the patient supplies over 50 per cent of the effort. 7. Use of aids to be independent is allowed. Valerie Ramos., Barthel, D.W. (1161). Functional evaluation: the Barthel Index. [facility] (14)2. KYLAH Mckinnon, Karen Morris., Michelle Renee., [place], [facility] (). Measuring the change indisability after inpatient rehabilitation; comparison of the responsiveness of the Barthel Index and Functional Owen Measure. Journal of Neurology, Neurosurgery, and Psychiatry, 66(4), 339-320. Domonique Barrett, N.J.A, TANNA Hernandes, & Tammie Flor MShakaA. (2004.) Assessment of post-stroke quality of life in cost-effectiveness studies: The usefulness of the Barthel Index and the EuroQoL-5D.  Quality of Life Research, 15, 228-26         Occupational Therapy Evaluation Charge Determination   History Examination Decision-Making   LOW Complexity : Brief history review  LOW Complexity : 1-3 performance deficits relating to physical, cognitive , or psychosocial skils that result in activity limitations and / or participation restrictions  LOW Complexity : No comorbidities that affect functional and no verbal or physical assistance needed to complete eval tasks       Based on the above components, the patient evaluation is determined to be of the following complexity level: LOW   Pain Rating:  No c/o pain    Activity Tolerance:   Fair    After treatment patient left in no apparent distress:    Sitting in chair and Call bell within reach    COMMUNICATION/EDUCATION:   The patient’s plan of care was discussed with: Physical therapist and Registered nurse.     Home safety education was provided and the patient/caregiver indicated understanding. and Patient is unable to participate in goal setting and plan of care.    This patient’s plan of care is appropriate for delegation to [facility].    Thank you for this referral.  Tatyana Little, OT  Time Calculation: 30 mins

## 2021-01-05 NOTE — PROGRESS NOTES
Comprehensive Nutrition Assessment    Type and Reason for Visit: Initial, RD nutrition re-screen/LOS    Nutrition Recommendations/Plan:   1. Continue cardiac diet. Nutrition Assessment:     1/5: 73 y/o female admitted with AMS. PMH includes DM. Pt noted to be confused. Answered some questions appropriately at time of visit. No family in room to get full nutrition hx. Pt says her appetite is good. Stated \"I eat, but not that much. \" Per sitter, pt ate eggs, fruit, and a bite of oatmeal for breakfast this AM. Recorded intake of 85%. Sitter says she was with pt yesterday and pt was too sleepy all day, didn't eat anything for breakfast or lunch. 0% recorded intake of dinner as well. Intakes on 1/3 were good, % meals. No significant weight changes noted per EMR. No c/o N/V. Labs- vit. D 14.7, A1c 6.3 (10/24/2020)  Meds- cefepime, vit. D3, Protonix. Intakes:  Patient Vitals for the past 168 hrs:   % Diet Eaten   01/05/21 1017 85 %   01/04/21 1938 0 %   01/03/21 1842 100 %   01/03/21 1310 90 %     Weight hx: Wt Readings from Last 10 Encounters:   01/01/21 79.2 kg (174 lb 11.2 oz)   10/24/20 80.5 kg (177 lb 6.4 oz)   05/25/20 84.4 kg (186 lb)   09/27/18 99.3 kg (219 lb)   06/09/18 99.4 kg (219 lb 2.2 oz)   06/08/18 99.8 kg (220 lb 0.3 oz)   12/09/14 88.5 kg (195 lb 1.7 oz)     Malnutrition Assessment:  Malnutrition Status: unable to assess    Estimated Daily Nutrient Needs:  Energy (kcal): 3351(1312 x 1.3 AF); Weight Used for Energy Requirements: Current  Protein (g): 79(1-1.2 g/kg);  Weight Used for Protein Requirements: Current  Fluid (ml/day): 1692; Method Used for Fluid Requirements: 1 ml/kcal    Nutrition Related Findings:  LBM 1/2      Wounds:    None       Current Nutrition Therapies:  DIET CARDIAC Regular    Anthropometric Measures:  · Height:  5' 4\" (162.6 cm)  · Current Body Wt:  79.2 kg (174 lb 9.7 oz)   · Admission Body Wt:       · Usual Body Wt:        · Ideal Body Wt:  120 lbs:  145.5 %   · Adjusted Body Weight:   ; Weight Adjustment for: No adjustment    · BMI Category:  Obese class 1 (BMI 30.0-34. 9)       Nutrition Diagnosis:   · No nutrition diagnosis at this time related to   as evidenced by      Nutrition Interventions:   Food and/or Nutrient Delivery: Continue current diet  Nutrition Education and Counseling: No recommendations at this time  Coordination of Nutrition Care: Continue to monitor while inpatient    Goals:  PO intake >75% meals next 5-7 days       Nutrition Monitoring and Evaluation:   Behavioral-Environmental Outcomes: None identified  Food/Nutrient Intake Outcomes: Food and nutrient intake  Physical Signs/Symptoms Outcomes: Biochemical data, Weight, Meal time behavior    Discharge Planning:    Continue current diet     Electronically signed by Gabbie Duke RDN on 1/5/2021 at 2:26 PM    Contact: [phone]

## 2021-01-05 NOTE — PROGRESS NOTES
Occupational Therapy    Orders received, chart reviewed and patient evaluated by occupational therapy. Pending progression with skilled acute occupational therapy, recommend: To be determined: SNF vs HH depending on progression     Recommend with nursing patient to complete as able in order to maintain strength, endurance and independence: OOB to chair 3x/day with min A and functional mobility to the bathroom with min A with gait belt. Thank you for your assistance. Full evaluation to follow.            Juan Vaughn, OT

## 2021-01-05 NOTE — PROGRESS NOTES
Consent received with daughter Ruth Finn for MRI. MRI notified of the checklist completion at a this time.

## 2021-01-05 NOTE — PROGRESS NOTES
Problem: Mobility Impaired (Adult and Pediatric)  Goal: *Acute Goals and Plan of Care (Insert Text)  Description: FUNCTIONAL STATUS PRIOR TO ADMISSION: Pt is a limited historian. Prior to CVA 3 weeks ago pt was independent. Pt was discharged from rehab and readmitted to hospital within 24 hours. HOME SUPPORT PRIOR TO ADMISSION: The patient lived with family members. Physical Therapy Goals  Initiated 1/4/2021  1. Patient will move from supine to sit and sit to supine , scoot up and down, and roll side to side in bed with minimal assistance/contact guard assist within 7 day(s). 2.  Patient will transfer from bed to chair and chair to bed with minimal assistance/contact guard assist using the least restrictive device within 7 day(s). 3.  Patient will perform sit to stand with minimal assistance/contact guard assist within 7 day(s). 4.  Patient will ambulate with minimal assistance/contact guard assist for 50 feet with the least restrictive device within 7 day(s). Note:   PHYSICAL THERAPY TREATMENT  Patient: Brianne Garvey (30 y.o. female)  Date: 1/5/2021  Diagnosis: Altered mental status [R41.82]  Sepsis ([facility] [address] [A41.9]  Leukocytosis [D72.829]  Abnormal urinalysis [R82.90]  Tachycardia [R00.0]  Dehydration [E86.0]  Lactic acidosis [E87.2]  Anemia [D64.9] <principal problem not specified>       Precautions:  falls  Chart, physical therapy assessment, plan of care and goals were reviewed. ASSESSMENT  Patient continues with skilled PT services and is progressing towards goals. Patient received awake and alert, 1:1 sitter present. Oriented to her name and being born [birth year] but disoriented to place, year, reason admission. Now that she is more alert, min A- CGA hand held assistance for balance and orientation but amb 100', very pleasant and answers questions. Gait slow with some path dev and sway necessitating min HHA for balance. Requires 1:1  24/7 due to MS.      Current Level of Function Impacting Discharge (mobility/balance): min A x 1 for all mobility and orientation    Other factors to consider for discharge:          PLAN :  Patient continues to benefit from skilled intervention to address the above impairments. Continue treatment per established plan of care. to address goals. Recommendation for discharge: (in order for the patient to meet his/her long term goals)  No skilled physical therapy/ follow up rehabilitation needs identified at this time. This discharge recommendation:  Has not yet been discussed the attending provider and/or case management    IF patient discharges home will need the following DME: none       SUBJECTIVE:   Patient stated .    OBJECTIVE DATA SUMMARY:   Critical Behavior:  Neurologic State: Confused  Orientation Level: Oriented to person, Disoriented to time, Disoriented to situation, Disoriented to place(verbalized first name and ?maiden name and )  Cognition: Poor safety awareness, Decreased command following, Decreased attention/concentration     Functional Mobility Training:  Bed Mobility:     Supine to Sit: Stand-by assistance  Sit to Supine: Stand-by assistance  Scooting: Supervision        Transfers:  Sit to Stand: Contact guard assistance; Additional time  Stand to Sit: Contact guard assistance  Stand Pivot Transfers: Contact guard assistance     Bed to Chair: Minimum assistance                    Balance:  Sitting: Impaired  Sitting - Static: Good (unsupported)  Sitting - Dynamic: Fair (occasional)  Standing: Impaired  Standing - Static: Good  Standing - Dynamic : Fair  Ambulation/Gait Training:  Distance (ft): 100 Feet (ft)  Assistive Device: Gait belt  Ambulation - Level of Assistance: Minimal assistance(min HHA)  Gait Abnormalities: Decreased step clearance; Path deviations;Trunk sway increased  Base of Support: Narrowed  Speed/Nereida: Slow       Stairs:NT    Pain Rating:  NT    Activity Tolerance:   Good    After treatment patient left in no apparent distress:   Supine in bed, Call bell within reach, and Bed / chair alarm activated    COMMUNICATION/COLLABORATION:   The patients plan of care was discussed with: Occupational therapist and Registered nurse and PCT.      Juventino Sanchez, PT, DPT   Time Calculation: 30 mins

## 2021-01-05 NOTE — PROCEDURES
Name:   Felicity Ureña  YOB: 1950  Age:    72 y.o. Sex:    female       Procedure:   EEG     Location:   [facility]    Date of Interpretation:    21  Date of Recordin2021      Interpreting Physician: Po Simmons MD     Indication:  72 y.o. female with altered mental status, hx of stroke, hx of aphasia      Current medications: has a current medication list which includes the following prescription(s): acetaminophen, aspirin delayed-release, atorvastatin, sertraline, levetiracetam, pantoprazole, sitagliptin, losartan, potassium chloride sr, and amlodipine, and the following Facility-Administered Medications: acetaminophen, sertraline, pantoprazole, losartan, levetiracetam, atorvastatin, aspirin delayed-release, sodium chloride, cefepime (MAXIPIME) 2 g in sterile water (preservative free) 10 mL IV syringe, 0.9% sodium chloride, sodium chloride, and cholecalciferol. Technical: Digital EEG recorded in 10-20 international placement system, multiple montages    Interpretation:     Patient level of alertness: awake, asleep    Background pattern: asymmetric, lower amplitude on right hemisphere compared to left. Posterior dominant rhythm: 5-6 Hz on left, 2-4 Hz on right  Photic stimulation: not performed  Hyperventilation: not performed   Drowsiness recorded: no  Sleep recorded: yes, normal     Single lead EKG: no abnormalities. Areas of focal slowing: right hemisphere with lower amplitude than left  Epileptiform discharges: no  Electrographic seizures: no    Impression: This is an abnormal EEG showing lower amplitude in the right hemisphere compared to left, suggesting an underlying structural abnormality. Recommend correlation with imaging. Sleep was recorded and normal.  No epileptiform discharges seen.      Signed By: Po Simmons MD     2021

## 2021-01-05 NOTE — PROGRESS NOTES
Transition of Care Plan - RUR 14%:  1. CM following- patient reportedly lives with her son who works during the day. 2. Referral placed to [facility] ([facility]) and they have declined d/t behavior/sitter. 3. PT eval 1/5- patient with no skilled PT needs  4. CM placed a call to patient's daughter, Pineda Chowdhury to discuss discharge plan. Left vm. Awaiting return call.      Tupelo Hew, LCSW
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
## 2021-01-05 NOTE — PROGRESS NOTES
MRI checklist wasn't done properly without signature of patient/ person completed it. MRI is pending until this form is completed.

## 2021-01-06 LAB
ALBUMIN SERPL-MCNC: 3.1 G/DL (ref 3.5–5)
ALBUMIN/GLOB SERPL: 0.6 {RATIO} (ref 1.1–2.2)
ALP SERPL-CCNC: 66 U/L (ref 45–117)
ALT SERPL-CCNC: 15 U/L (ref 12–78)
ANION GAP SERPL CALC-SCNC: 5 MMOL/L (ref 5–15)
AST SERPL-CCNC: 26 U/L (ref 15–37)
BASOPHILS # BLD: 0 K/UL (ref 0–0.1)
BASOPHILS NFR BLD: 0 % (ref 0–1)
BILIRUB SERPL-MCNC: 0.4 MG/DL (ref 0.2–1)
BUN SERPL-MCNC: 13 MG/DL (ref 6–20)
BUN/CREAT SERPL: 15 (ref 12–20)
CALCIUM SERPL-MCNC: 9.8 MG/DL (ref 8.5–10.1)
CHLORIDE SERPL-SCNC: 107 MMOL/L (ref 97–108)
CO2 SERPL-SCNC: 26 MMOL/L (ref 21–32)
CREAT SERPL-MCNC: 0.85 MG/DL (ref 0.55–1.02)
DIFFERENTIAL METHOD BLD: ABNORMAL
EOSINOPHIL # BLD: 0.4 K/UL (ref 0–0.4)
EOSINOPHIL NFR BLD: 5 % (ref 0–7)
ERYTHROCYTE [DISTWIDTH] IN BLOOD BY AUTOMATED COUNT: 12.8 % (ref 11.5–14.5)
GLOBULIN SER CALC-MCNC: 4.9 G/DL (ref 2–4)
GLUCOSE SERPL-MCNC: 124 MG/DL (ref 65–100)
HCT VFR BLD AUTO: 32.9 % (ref 35–47)
HGB BLD-MCNC: 10.7 G/DL (ref 11.5–16)
IMM GRANULOCYTES # BLD AUTO: 0.1 K/UL (ref 0–0.04)
IMM GRANULOCYTES NFR BLD AUTO: 1 % (ref 0–0.5)
LYMPHOCYTES # BLD: 2.2 K/UL (ref 0.8–3.5)
LYMPHOCYTES NFR BLD: 24 % (ref 12–49)
MCH RBC QN AUTO: 32.1 PG (ref 26–34)
MCHC RBC AUTO-ENTMCNC: 32.5 G/DL (ref 30–36.5)
MCV RBC AUTO: 98.8 FL (ref 80–99)
MONOCYTES # BLD: 0.8 K/UL (ref 0–1)
MONOCYTES NFR BLD: 9 % (ref 5–13)
NEUTS SEG # BLD: 5.4 K/UL (ref 1.8–8)
NEUTS SEG NFR BLD: 61 % (ref 32–75)
NRBC # BLD: 0 K/UL (ref 0–0.01)
NRBC BLD-RTO: 0 PER 100 WBC
PLATELET # BLD AUTO: 285 K/UL (ref 150–400)
PMV BLD AUTO: 9.9 FL (ref 8.9–12.9)
POTASSIUM SERPL-SCNC: 3.6 MMOL/L (ref 3.5–5.1)
PROT SERPL-MCNC: 8 G/DL (ref 6.4–8.2)
RBC # BLD AUTO: 3.33 M/UL (ref 3.8–5.2)
SODIUM SERPL-SCNC: 138 MMOL/L (ref 136–145)
WBC # BLD AUTO: 8.9 K/UL (ref 3.6–11)

## 2021-01-06 PROCEDURE — 65660000000 HC RM CCU STEPDOWN

## 2021-01-06 PROCEDURE — 80053 COMPREHEN METABOLIC PANEL: CPT

## 2021-01-06 PROCEDURE — 74011250637 HC RX REV CODE- 250/637: Performed by: FAMILY MEDICINE

## 2021-01-06 PROCEDURE — 36415 COLL VENOUS BLD VENIPUNCTURE: CPT

## 2021-01-06 PROCEDURE — 85025 COMPLETE CBC W/AUTO DIFF WBC: CPT

## 2021-01-06 PROCEDURE — 74011250636 HC RX REV CODE- 250/636: Performed by: FAMILY MEDICINE

## 2021-01-06 PROCEDURE — 99232 SBSQ HOSP IP/OBS MODERATE 35: CPT | Performed by: PSYCHIATRY & NEUROLOGY

## 2021-01-06 PROCEDURE — 74011000250 HC RX REV CODE- 250: Performed by: FAMILY MEDICINE

## 2021-01-06 RX ORDER — QUETIAPINE FUMARATE 25 MG/1
12.5 TABLET, FILM COATED ORAL 2 TIMES DAILY
Status: DISCONTINUED | OUTPATIENT
Start: 2021-01-06 | End: 2021-01-10

## 2021-01-06 RX ORDER — HALOPERIDOL 5 MG/ML
1 INJECTION INTRAMUSCULAR
Status: DISCONTINUED | OUTPATIENT
Start: 2021-01-06 | End: 2021-01-10

## 2021-01-06 RX ADMIN — PANTOPRAZOLE SODIUM 40 MG: 40 TABLET, DELAYED RELEASE ORAL at 12:02

## 2021-01-06 RX ADMIN — CEFEPIME 2 G: 20 INJECTION, POWDER, FOR SOLUTION INTRAVENOUS at 17:50

## 2021-01-06 RX ADMIN — LEVETIRACETAM 500 MG: 500 TABLET ORAL at 21:37

## 2021-01-06 RX ADMIN — CEFEPIME 2 G: 20 INJECTION, POWDER, FOR SOLUTION INTRAVENOUS at 02:31

## 2021-01-06 RX ADMIN — SERTRALINE HYDROCHLORIDE 100 MG: 50 TABLET ORAL at 12:05

## 2021-01-06 RX ADMIN — CEFEPIME 2 G: 20 INJECTION, POWDER, FOR SOLUTION INTRAVENOUS at 11:57

## 2021-01-06 RX ADMIN — LEVETIRACETAM 500 MG: 500 TABLET ORAL at 12:02

## 2021-01-06 RX ADMIN — LOSARTAN POTASSIUM 25 MG: 25 TABLET, FILM COATED ORAL at 19:47

## 2021-01-06 RX ADMIN — ASPIRIN 81 MG: 81 TABLET, COATED ORAL at 12:01

## 2021-01-06 RX ADMIN — QUETIAPINE FUMARATE 12.5 MG: 25 TABLET ORAL at 19:48

## 2021-01-06 RX ADMIN — ATORVASTATIN CALCIUM 20 MG: 20 TABLET, FILM COATED ORAL at 12:05

## 2021-01-06 RX ADMIN — Medication 1 TABLET: at 12:05

## 2021-01-06 RX ADMIN — HALOPERIDOL LACTATE 1 MG: 5 INJECTION, SOLUTION INTRAMUSCULAR at 00:06

## 2021-01-06 RX ADMIN — LOSARTAN POTASSIUM 25 MG: 25 TABLET, FILM COATED ORAL at 12:05
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
## 2021-01-06 NOTE — PROGRESS NOTES
1/6/2021 1:20 PM EMR reviewed, pt with sitter and events overnight noted. CM called to [facility], had to [facility] with admissions requested follow up regarding pt's referral. [facility] had denied pt due to behaviors but CM will discuss possibility of pt to be reviewed again if pt's behaviors improve. CM called to pt's daughter, Hong Haywood to discuss SNF placement. Pt's daughter reported pt cannot discharge home at this time and she would like SNF placement to be continued to be pursued. Pt's daughter is aware of pt's behaviors and understanding this will make SNF placement challenging. Pt's daughter was agreeable to referrals being sent to [facility], [address] and [address] [place] [address], [place] [id number]. Referrals sent via [employer] or [employer].  ZEINAB Cabrera

## 2021-01-06 NOTE — PROGRESS NOTES
Physical Therapy- Patient lethargic but eyes open. BP being monitored per nursing and too elevated. 155/122. In conjunction with high BP and change in mental status and presentation,which is opposite of how she was received yesterday in PT, defer tx at this time.   Anuradha Barraza, PT, DPT

## 2021-01-06 NOTE — PROGRESS NOTES
[facility]: [id number] [address] Neurology  [facility]  [phone]        Name:   Danae Romo   Medical record #: [id number]  Admission Date: 1/1/2021   Reason for Consult:  AMS    Subjective:   Overnight events:    MRI with mild generalized parenchymal volume loss and mild to moderate chronic microvascular ischemic disease. Unchanged chronic infarcts in the left frontal lobe, left temporo-occipital lobe, and right chad. Attempted to call Daja taylor ([phone]) to update but no answer. Objective:   EEG:   This is an abnormal EEG showing lower amplitude in the right hemisphere compared to left, suggesting an underlying structural abnormality. Recommend correlation with imaging. Sleep was recorded and normal.  No epileptiform discharges seen. Care Plan discussed with:  Patient x   Family    RN    Care Manager    Consultant/Specialist:         Thank you for allowing the Neurology Service the pleasure of participating in the care of your patient. This patient will be discussed with my collaborating care team physician, Dr. Titus Owens, and he may have further recommendations regarding this patient's care. Lilly Martin, [facility]-BC  ====================    Attending Attestation:       MRI Brain shows multiple chronic infarcts (left frontal, left temporo-occipital, right chad), chronic small vessel ischemic changes, no acute abnormalities. EEG showed slowing consistent with encephalopathy, no evidence of seizure discharges. Pt resting in bed. Nurse and (?) Sitter in room. When I ask pt if she's doing okay today, nurse tells me pt has been agitated, telling nurse to leave her alone. I d/w patient her test results: no recent stroke, no evidence of seizures on EEG. Pt did say very much but did follow commands to close / open eyes, lift left arm, lift right arm, and move legs.       Impression/ Plan:     Metabolic encephalopathy due to UTI  No acute neurologic abnormalities on Brain MRI  No evidence of seizure/ seizure discharges on EEG  Continue supportive care  Will s/o    Signed By: Eugenio Leblanc MD     January 6, 2021             Impression/ Plan:      1. Altered Mental Status:    · Neurochecks:  Every 4 hours  · Stop sedating medications  · Likely related to  UTI and pneurmonia  · Outpatient neuropsych testing  · Cleared for discharge from a neurologic perspective, to follow up in our clinic in 2 weeks. 2.  Mobility:   · Has not been OOB. · PT/OT to eval for rehab     Physical Exam    Patient Vitals for the past 12 hrs:   Temp Pulse Resp BP SpO2   01/06/21 1204 97 °F (36.1 °C) 99 16 (!) 157/122 99 %   01/06/21 0225 97.9 °F (36.6 °C) 95 16 131/67 99 %          NEUROLOGICAL EXAM:      General:  Sleepy but opens eyes to voice  Mental Status: Oriented to to person only. Inattentive. mumbles       Cerebellar:  No resting, no postural tremors.                             Motor:        Observed moving all extremities but does not follow commands    for strength testing

## 2021-01-06 NOTE — PROGRESS NOTES
Occupational Therapy Note:  Patient lethargic but eyes open. BP being monitored per nursing and too elevated. 155/122. RN recommending holding at this time d/t elevated BP and change in mental status. Will continue to follow.   Mackenzie Cervantes, OTR/L

## 2021-01-07 LAB
ALBUMIN SERPL-MCNC: 3.1 G/DL (ref 3.5–5)
ALBUMIN/GLOB SERPL: 0.6 {RATIO} (ref 1.1–2.2)
ALP SERPL-CCNC: 61 U/L (ref 45–117)
ALT SERPL-CCNC: 16 U/L (ref 12–78)
ANION GAP SERPL CALC-SCNC: 3 MMOL/L (ref 5–15)
AST SERPL-CCNC: 29 U/L (ref 15–37)
BACTERIA SPEC CULT: NORMAL
BASOPHILS # BLD: 0.1 K/UL (ref 0–0.1)
BASOPHILS NFR BLD: 1 % (ref 0–1)
BILIRUB SERPL-MCNC: 0.4 MG/DL (ref 0.2–1)
BUN SERPL-MCNC: 13 MG/DL (ref 6–20)
BUN/CREAT SERPL: 12 (ref 12–20)
CALCIUM SERPL-MCNC: 9.5 MG/DL (ref 8.5–10.1)
CHLORIDE SERPL-SCNC: 105 MMOL/L (ref 97–108)
CO2 SERPL-SCNC: 27 MMOL/L (ref 21–32)
CREAT SERPL-MCNC: 1.08 MG/DL (ref 0.55–1.02)
DIFFERENTIAL METHOD BLD: ABNORMAL
EOSINOPHIL # BLD: 0.4 K/UL (ref 0–0.4)
EOSINOPHIL NFR BLD: 5 % (ref 0–7)
ERYTHROCYTE [DISTWIDTH] IN BLOOD BY AUTOMATED COUNT: 12.9 % (ref 11.5–14.5)
GLOBULIN SER CALC-MCNC: 5.1 G/DL (ref 2–4)
GLUCOSE SERPL-MCNC: 109 MG/DL (ref 65–100)
HCT VFR BLD AUTO: 33.8 % (ref 35–47)
HGB BLD-MCNC: 11.2 G/DL (ref 11.5–16)
IMM GRANULOCYTES # BLD AUTO: 0 K/UL (ref 0–0.04)
IMM GRANULOCYTES NFR BLD AUTO: 0 % (ref 0–0.5)
LYMPHOCYTES # BLD: 1.9 K/UL (ref 0.8–3.5)
LYMPHOCYTES NFR BLD: 22 % (ref 12–49)
MCH RBC QN AUTO: 32.6 PG (ref 26–34)
MCHC RBC AUTO-ENTMCNC: 33.1 G/DL (ref 30–36.5)
MCV RBC AUTO: 98.3 FL (ref 80–99)
MONOCYTES # BLD: 0.5 K/UL (ref 0–1)
MONOCYTES NFR BLD: 6 % (ref 5–13)
NEUTS SEG # BLD: 5.7 K/UL (ref 1.8–8)
NEUTS SEG NFR BLD: 66 % (ref 32–75)
NRBC # BLD: 0 K/UL (ref 0–0.01)
NRBC BLD-RTO: 0 PER 100 WBC
PLATELET # BLD AUTO: 273 K/UL (ref 150–400)
PMV BLD AUTO: 10.2 FL (ref 8.9–12.9)
POTASSIUM SERPL-SCNC: 3.6 MMOL/L (ref 3.5–5.1)
PROT SERPL-MCNC: 8.2 G/DL (ref 6.4–8.2)
RBC # BLD AUTO: 3.44 M/UL (ref 3.8–5.2)
RBC MORPH BLD: ABNORMAL
SERVICE CMNT-IMP: NORMAL
SODIUM SERPL-SCNC: 135 MMOL/L (ref 136–145)
WBC # BLD AUTO: 8.6 K/UL (ref 3.6–11)

## 2021-01-07 PROCEDURE — 74011250636 HC RX REV CODE- 250/636: Performed by: FAMILY MEDICINE

## 2021-01-07 PROCEDURE — 36415 COLL VENOUS BLD VENIPUNCTURE: CPT

## 2021-01-07 PROCEDURE — 80053 COMPREHEN METABOLIC PANEL: CPT

## 2021-01-07 PROCEDURE — 74011000250 HC RX REV CODE- 250: Performed by: FAMILY MEDICINE

## 2021-01-07 PROCEDURE — 74011250637 HC RX REV CODE- 250/637: Performed by: FAMILY MEDICINE

## 2021-01-07 PROCEDURE — 97530 THERAPEUTIC ACTIVITIES: CPT

## 2021-01-07 PROCEDURE — 85025 COMPLETE CBC W/AUTO DIFF WBC: CPT

## 2021-01-07 PROCEDURE — 65660000000 HC RM CCU STEPDOWN

## 2021-01-07 PROCEDURE — 97116 GAIT TRAINING THERAPY: CPT

## 2021-01-07 RX ADMIN — CEFEPIME 2 G: 20 INJECTION, POWDER, FOR SOLUTION INTRAVENOUS at 18:03

## 2021-01-07 RX ADMIN — CEFEPIME 2 G: 20 INJECTION, POWDER, FOR SOLUTION INTRAVENOUS at 03:58

## 2021-01-07 RX ADMIN — Medication 10 ML: at 12:32

## 2021-01-07 RX ADMIN — ACETAMINOPHEN 650 MG: 325 TABLET ORAL at 21:42

## 2021-01-07 RX ADMIN — QUETIAPINE FUMARATE 12.5 MG: 25 TABLET ORAL at 20:34

## 2021-01-07 RX ADMIN — HALOPERIDOL LACTATE 1 MG: 5 INJECTION, SOLUTION INTRAMUSCULAR at 21:12

## 2021-01-07 RX ADMIN — LOSARTAN POTASSIUM 25 MG: 25 TABLET, FILM COATED ORAL at 18:03

## 2021-01-07 RX ADMIN — QUETIAPINE FUMARATE 12.5 MG: 25 TABLET ORAL at 18:03

## 2021-01-07 RX ADMIN — LEVETIRACETAM 500 MG: 500 TABLET ORAL at 20:34

## 2021-01-07 RX ADMIN — CEFEPIME 2 G: 20 INJECTION, POWDER, FOR SOLUTION INTRAVENOUS at 12:32

## 2021-01-07 NOTE — PROGRESS NOTES
Problem: Mobility Impaired (Adult and Pediatric)  Goal: *Acute Goals and Plan of Care (Insert Text)  Description: FUNCTIONAL STATUS PRIOR TO ADMISSION: Pt is a limited historian. Prior to CVA 3 weeks ago pt was independent. Pt was discharged from rehab and readmitted to hospital within 24 hours. HOME SUPPORT PRIOR TO ADMISSION: The patient lived with family members. Physical Therapy Goals  Initiated 1/4/2021  1. Patient will move from supine to sit and sit to supine , scoot up and down, and roll side to side in bed with minimal assistance/contact guard assist within 7 day(s). 2.  Patient will transfer from bed to chair and chair to bed with minimal assistance/contact guard assist using the least restrictive device within 7 day(s). 3.  Patient will perform sit to stand with minimal assistance/contact guard assist within 7 day(s). 4.  Patient will ambulate with minimal assistance/contact guard assist for 50 feet with the least restrictive device within 7 day(s). Outcome: Progressing Towards Goal   PHYSICAL THERAPY TREATMENT  Patient: Leon Javier (89 y.o. female)  Date: 1/7/2021  Diagnosis: Altered mental status [R41.82]  Sepsis ([facility] [A41.9]  Leukocytosis [D72.829]  Abnormal urinalysis [R82.90]  Tachycardia [R00.0]  Dehydration [E86.0]  Lactic acidosis [E87.2]  Anemia [D64.9] <principal problem not specified>       Precautions:    Chart, physical therapy assessment, plan of care and goals were reviewed. ASSESSMENT  Patient continues with skilled PT services and is progressing towards goals. Patient received awake in bed. Oriented to self. Follows a few commands if related to function and with manual guidance (sit up, go to the bathroom, wash your hands). She moves slowly and safely, no loss of balance with sitting, standing or gait, and no path deviations with gait. Patient with a wide base of support and decreased step length with gait training.   Takes standing rest breaks, but no shortness of breath noted. Conversation is tangental.  Patient able to walk to bathroom, around room and in hallway with contact guard to min assist for safety. Would need 24/7 supervision at home    Current Level of Function Impacting Discharge (mobility/balance): contact guard/min assist for most mobility to guide her    Other factors to consider for discharge: decreased command follow, decreased carryover of training         PLAN :  Patient continues to benefit from skilled intervention to address the above impairments. Continue treatment per established plan of care. to address goals. Recommendation for discharge: (in order for the patient to meet his/her long term goals)   No skilled physical therapy/ follow up rehabilitation needs identified at this time. This discharge recommendation:  Has not yet been discussed the attending provider and/or case management    IF patient discharges home will need the following DME: none       SUBJECTIVE:   Patient stated \"Hi Ethelene Mano.  to staff walking by. Patient states her daughter's name is Ethelene Mano    OBJECTIVE DATA SUMMARY:   Critical Behavior:  Neurologic State: Confused  Orientation Level: Disoriented to place, Disoriented to situation, Disoriented to time  Cognition: Impaired decision making     Functional Mobility Training:  Bed Mobility:     Supine to Sit: Minimum assistance;Assist x1;Additional time  Sit to Supine: Minimum assistance;Assist x1;Additional time           Transfers:  Sit to Stand: Contact guard assistance;Assist x1  Stand to Sit: Contact guard assistance;Assist x1;Additional time; Adaptive equipment                             Balance:     Ambulation/Gait Training:  Distance (ft): 100 Feet (ft)(twice)  Assistive Device: Gait belt  Ambulation - Level of Assistance: Minimal assistance        Gait Abnormalities: Decreased step clearance        Base of Support: Widened                   Pain Rating:  \"no it doesn't hurt\"    Activity Tolerance: Good    After treatment patient left in no apparent distress:   Supine in bed, Call bell within reach, Bed / chair alarm activated, and pt given the busy apron    COMMUNICATION/COLLABORATION:   The patients plan of care was discussed with: Registered nurse.      Arti Stewart, PT   Time Calculation: 25 mins
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
## 2021-01-07 NOTE — PROGRESS NOTES
1/7/2021 5:22 PM Pt continues to have sitter at home. Therapy reported pt is needing 24 hour assistance and supervision at home which pt does not have. SNF placement being pursued. Pt continues with sitter in room. Pt will need to be sitter free prior to SNF placement. SNF placement is pending with [address] [place] [address]. CM will continue to follow.  ZEINAB Ruiz

## 2021-01-07 NOTE — PROGRESS NOTES
4562: Pt refused AM medications & VS. Pt A&Ox1.  1233: Pt in bed resting quietly watching TV, sitter not present. Pt was cooperative w/ PT & walked hallways. Bed exit alarm armed. Lunch tray set up for pt.  1626: Sitter back @ bedside, pt remains calm & cooperative. Pt was given busy apron after working w/ PT today & is watching TV.  1920: Pt in bed resting quietly w/ sitter @ bedside. Pt did not eat breakfast/lunch or take anything PO until dinner time. Pt ate about 30% of dinner & drank 6 cups of juice. Pt's affect pleasant @ this time, A&Ox self. Bedside and Verbal shift change report given to [phone] (oncoming nurse) by Miracle MIGUEL (offgoing nurse). Report included the following information SBAR, Kardex, Intake/Output, Recent Results and Cardiac Rhythm Pt refuses/pulls off telemetry monitoring. Pt is telemetry status- has not been on telemetry monitoring d/t pt pulling off leads. Provider note added.

## 2021-01-07 NOTE — PROGRESS NOTES
Patient in room with sitter. Currently declining participation with therapy. Will retry another time.

## 2021-01-08 LAB
ALBUMIN SERPL-MCNC: 3.1 G/DL (ref 3.5–5)
ALBUMIN/GLOB SERPL: 0.6 {RATIO} (ref 1.1–2.2)
ALP SERPL-CCNC: 65 U/L (ref 45–117)
ALT SERPL-CCNC: 15 U/L (ref 12–78)
ANION GAP SERPL CALC-SCNC: 3 MMOL/L (ref 5–15)
AST SERPL-CCNC: 25 U/L (ref 15–37)
BASOPHILS # BLD: 0 K/UL (ref 0–0.1)
BASOPHILS NFR BLD: 1 % (ref 0–1)
BILIRUB SERPL-MCNC: 0.5 MG/DL (ref 0.2–1)
BUN SERPL-MCNC: 14 MG/DL (ref 6–20)
BUN/CREAT SERPL: 14 (ref 12–20)
CALCIUM SERPL-MCNC: 9.6 MG/DL (ref 8.5–10.1)
CHLORIDE SERPL-SCNC: 108 MMOL/L (ref 97–108)
CO2 SERPL-SCNC: 27 MMOL/L (ref 21–32)
COMMENT, HOLDF: NORMAL
CREAT SERPL-MCNC: 0.99 MG/DL (ref 0.55–1.02)
DIFFERENTIAL METHOD BLD: ABNORMAL
EOSINOPHIL # BLD: 0.5 K/UL (ref 0–0.4)
EOSINOPHIL NFR BLD: 7 % (ref 0–7)
ERYTHROCYTE [DISTWIDTH] IN BLOOD BY AUTOMATED COUNT: 12.9 % (ref 11.5–14.5)
GLOBULIN SER CALC-MCNC: 5 G/DL (ref 2–4)
GLUCOSE SERPL-MCNC: 109 MG/DL (ref 65–100)
HCT VFR BLD AUTO: 35.4 % (ref 35–47)
HGB BLD-MCNC: 11.5 G/DL (ref 11.5–16)
IMM GRANULOCYTES # BLD AUTO: 0 K/UL (ref 0–0.04)
IMM GRANULOCYTES NFR BLD AUTO: 0 % (ref 0–0.5)
LYMPHOCYTES # BLD: 2.1 K/UL (ref 0.8–3.5)
LYMPHOCYTES NFR BLD: 29 % (ref 12–49)
MCH RBC QN AUTO: 32.1 PG (ref 26–34)
MCHC RBC AUTO-ENTMCNC: 32.5 G/DL (ref 30–36.5)
MCV RBC AUTO: 98.9 FL (ref 80–99)
MONOCYTES # BLD: 0.6 K/UL (ref 0–1)
MONOCYTES NFR BLD: 8 % (ref 5–13)
NEUTS SEG # BLD: 3.9 K/UL (ref 1.8–8)
NEUTS SEG NFR BLD: 55 % (ref 32–75)
NRBC # BLD: 0 K/UL (ref 0–0.01)
NRBC BLD-RTO: 0 PER 100 WBC
PLATELET # BLD AUTO: 289 K/UL (ref 150–400)
PMV BLD AUTO: 10.4 FL (ref 8.9–12.9)
POTASSIUM SERPL-SCNC: 4.1 MMOL/L (ref 3.5–5.1)
PROT SERPL-MCNC: 8.1 G/DL (ref 6.4–8.2)
RBC # BLD AUTO: 3.58 M/UL (ref 3.8–5.2)
SAMPLES BEING HELD,HOLD: NORMAL
SODIUM SERPL-SCNC: 138 MMOL/L (ref 136–145)
WBC # BLD AUTO: 7.1 K/UL (ref 3.6–11)

## 2021-01-08 PROCEDURE — 74011250637 HC RX REV CODE- 250/637: Performed by: FAMILY MEDICINE

## 2021-01-08 PROCEDURE — 65660000000 HC RM CCU STEPDOWN

## 2021-01-08 PROCEDURE — 80053 COMPREHEN METABOLIC PANEL: CPT

## 2021-01-08 PROCEDURE — 74011250636 HC RX REV CODE- 250/636: Performed by: FAMILY MEDICINE

## 2021-01-08 PROCEDURE — 36415 COLL VENOUS BLD VENIPUNCTURE: CPT

## 2021-01-08 PROCEDURE — 85025 COMPLETE CBC W/AUTO DIFF WBC: CPT

## 2021-01-08 PROCEDURE — 74011000250 HC RX REV CODE- 250: Performed by: FAMILY MEDICINE

## 2021-01-08 RX ADMIN — LEVETIRACETAM 500 MG: 500 TABLET ORAL at 10:40

## 2021-01-08 RX ADMIN — Medication 5 TABLET: at 10:40

## 2021-01-08 RX ADMIN — CEFEPIME 2 G: 20 INJECTION, POWDER, FOR SOLUTION INTRAVENOUS at 02:17

## 2021-01-08 RX ADMIN — ATORVASTATIN CALCIUM 20 MG: 20 TABLET, FILM COATED ORAL at 10:41

## 2021-01-08 RX ADMIN — QUETIAPINE FUMARATE 12.5 MG: 25 TABLET ORAL at 15:48

## 2021-01-08 RX ADMIN — PANTOPRAZOLE SODIUM 40 MG: 40 TABLET, DELAYED RELEASE ORAL at 10:40

## 2021-01-08 RX ADMIN — LOSARTAN POTASSIUM 25 MG: 25 TABLET, FILM COATED ORAL at 10:40

## 2021-01-08 RX ADMIN — QUETIAPINE FUMARATE 12.5 MG: 25 TABLET ORAL at 19:56

## 2021-01-08 RX ADMIN — SERTRALINE HYDROCHLORIDE 100 MG: 50 TABLET ORAL at 10:40

## 2021-01-08 RX ADMIN — LOSARTAN POTASSIUM 25 MG: 25 TABLET, FILM COATED ORAL at 18:28

## 2021-01-08 RX ADMIN — CEFEPIME 2 G: 20 INJECTION, POWDER, FOR SOLUTION INTRAVENOUS at 18:28

## 2021-01-08 RX ADMIN — CEFEPIME 2 G: 20 INJECTION, POWDER, FOR SOLUTION INTRAVENOUS at 10:41

## 2021-01-08 RX ADMIN — ASPIRIN 81 MG: 81 TABLET, COATED ORAL at 10:40

## 2021-01-08 NOTE — PROGRESS NOTES
PHYSICAL THERAPY:Pt awake but not responsive to PT today Pt not initiating out of bed on request.PT will follow next treatment day.
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
## 2021-01-09 PROCEDURE — 74011250637 HC RX REV CODE- 250/637: Performed by: FAMILY MEDICINE

## 2021-01-09 PROCEDURE — 74011000250 HC RX REV CODE- 250: Performed by: FAMILY MEDICINE

## 2021-01-09 PROCEDURE — 65660000000 HC RM CCU STEPDOWN

## 2021-01-09 PROCEDURE — 74011250636 HC RX REV CODE- 250/636: Performed by: FAMILY MEDICINE

## 2021-01-09 RX ADMIN — CEFEPIME 2 G: 20 INJECTION, POWDER, FOR SOLUTION INTRAVENOUS at 11:15

## 2021-01-09 RX ADMIN — QUETIAPINE FUMARATE 12.5 MG: 25 TABLET ORAL at 17:46

## 2021-01-09 RX ADMIN — Medication 5 TABLET: at 09:43

## 2021-01-09 RX ADMIN — LEVETIRACETAM 500 MG: 500 TABLET ORAL at 09:43

## 2021-01-09 RX ADMIN — CEFEPIME 2 G: 20 INJECTION, POWDER, FOR SOLUTION INTRAVENOUS at 17:46

## 2021-01-09 RX ADMIN — ASPIRIN 81 MG: 81 TABLET, COATED ORAL at 09:43

## 2021-01-09 RX ADMIN — LOSARTAN POTASSIUM 25 MG: 25 TABLET, FILM COATED ORAL at 09:43

## 2021-01-09 RX ADMIN — SERTRALINE HYDROCHLORIDE 100 MG: 50 TABLET ORAL at 09:43

## 2021-01-09 RX ADMIN — CEFEPIME 2 G: 20 INJECTION, POWDER, FOR SOLUTION INTRAVENOUS at 02:24

## 2021-01-09 RX ADMIN — LOSARTAN POTASSIUM 25 MG: 25 TABLET, FILM COATED ORAL at 17:46

## 2021-01-09 RX ADMIN — PANTOPRAZOLE SODIUM 40 MG: 40 TABLET, DELAYED RELEASE ORAL at 09:43

## 2021-01-09 RX ADMIN — ATORVASTATIN CALCIUM 20 MG: 20 TABLET, FILM COATED ORAL at 09:43
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
## 2021-01-09 NOTE — CONSULTS
PSYCHIATRY CONSULT NOTE:    REASON FOR CONSULT:  Behavioral Consult    HISTORY OF PRESENTING COMPLAINT:  Max Pelayo is a 72 y.o.  female who is currently admitted to the medical floor at [facility]. Patient recently had a stroke 3 weeks ago and was sent to rehab for a couple of weeks. Patient at this time would not endorse in assessment or answer any questions. Please re consult when patient is more alert and can engage in care. Thank you for your consideration with this consult. PAST MEDICAL HISTORY:  Please see H&P for details. Past Medical History:   Diagnosis Date    Anxiety and depression     Diabetes ([address]     Hypertension     Obese     Seizure disorder [facility])            Lab Results   Component Value Date/Time    WBC 7.1 01/08/2021 06:02 AM    HGB 11.5 01/08/2021 06:02 AM    HCT 35.4 01/08/2021 06:02 AM    PLATELET 825 52/44/7280 06:02 AM    MCV 98.9 01/08/2021 06:02 AM      Lab Results   Component Value Date/Time    Sodium 138 01/08/2021 06:02 AM    Potassium 4.1 01/08/2021 06:02 AM    Chloride 108 01/08/2021 06:02 AM    CO2 27 01/08/2021 06:02 AM    Anion gap 3 (L) 01/08/2021 06:02 AM    Glucose 109 (H) 01/08/2021 06:02 AM    BUN 14 01/08/2021 06:02 AM    Creatinine 0.99 01/08/2021 06:02 AM    BUN/Creatinine ratio 14 01/08/2021 06:02 AM    GFR est AA >60 01/08/2021 06:02 AM    GFR est non-AA 56 (L) 01/08/2021 06:02 AM    Calcium 9.6 01/08/2021 06:02 AM    Bilirubin, total 0.5 01/08/2021 06:02 AM    Alk.  phosphatase 65 01/08/2021 06:02 AM    Protein, total 8.1 01/08/2021 06:02 AM    Albumin 3.1 (L) 01/08/2021 06:02 AM    Globulin 5.0 (H) 01/08/2021 06:02 AM    A-G Ratio 0.6 (L) 01/08/2021 06:02 AM    ALT (SGPT) 15 01/08/2021 06:02 AM

## 2021-01-09 NOTE — PROGRESS NOTES
1935- rec'd report from MYRIAM Prince to include SBAR. Introduced self to pt. And replaced seizure mat pt. Threw on floor. Call bell within reach, bed in lowest position, and reminded pt. How to call for assistance.     1956- gave pt. seroquel with applesauce and she chewed most of pill and spit applesauce back on me. Pt. Stated that it was \"555 and 446\" and she \"wasn't going\". Requested that PCT return to room to monitor pt.     2008- Pt. Refused Keppra at this time.    0206- pt. Too aggressive and refusing to have blood drawn. Will retry later.

## 2021-01-10 PROCEDURE — 74011250637 HC RX REV CODE- 250/637: Performed by: FAMILY MEDICINE

## 2021-01-10 PROCEDURE — 65660000000 HC RM CCU STEPDOWN

## 2021-01-10 PROCEDURE — 74011250636 HC RX REV CODE- 250/636: Performed by: NURSE PRACTITIONER

## 2021-01-10 PROCEDURE — 74011250636 HC RX REV CODE- 250/636: Performed by: FAMILY MEDICINE

## 2021-01-10 PROCEDURE — 74011000250 HC RX REV CODE- 250: Performed by: FAMILY MEDICINE

## 2021-01-10 PROCEDURE — 74011250637 HC RX REV CODE- 250/637: Performed by: NURSE PRACTITIONER

## 2021-01-10 RX ORDER — QUETIAPINE FUMARATE 25 MG/1
25 TABLET, FILM COATED ORAL 2 TIMES DAILY
Status: DISCONTINUED | OUTPATIENT
Start: 2021-01-10 | End: 2021-01-16 | Stop reason: HOSPADM

## 2021-01-10 RX ORDER — DIPHENHYDRAMINE HYDROCHLORIDE 50 MG/ML
12.5 INJECTION, SOLUTION INTRAMUSCULAR; INTRAVENOUS
Status: DISCONTINUED | OUTPATIENT
Start: 2021-01-10 | End: 2021-01-16 | Stop reason: HOSPADM

## 2021-01-10 RX ORDER — HALOPERIDOL 5 MG/ML
2 INJECTION INTRAMUSCULAR
Status: DISCONTINUED | OUTPATIENT
Start: 2021-01-10 | End: 2021-01-15

## 2021-01-10 RX ORDER — LORAZEPAM 2 MG/ML
1 INJECTION INTRAMUSCULAR
Status: DISCONTINUED | OUTPATIENT
Start: 2021-01-10 | End: 2021-01-15

## 2021-01-10 RX ADMIN — HALOPERIDOL LACTATE 1 MG: 5 INJECTION, SOLUTION INTRAMUSCULAR at 08:08

## 2021-01-10 RX ADMIN — LORAZEPAM 1 MG: 2 INJECTION INTRAMUSCULAR; INTRAVENOUS at 12:08

## 2021-01-10 RX ADMIN — DIPHENHYDRAMINE HYDROCHLORIDE 12.5 MG: 50 INJECTION, SOLUTION INTRAMUSCULAR; INTRAVENOUS at 13:09

## 2021-01-10 RX ADMIN — QUETIAPINE FUMARATE 25 MG: 25 TABLET ORAL at 20:05

## 2021-01-10 RX ADMIN — LEVETIRACETAM 500 MG: 500 TABLET ORAL at 20:05

## 2021-01-10 RX ADMIN — WATER 10 MG: 1 INJECTION INTRAMUSCULAR; INTRAVENOUS; SUBCUTANEOUS at 08:47
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
## 2021-01-10 NOTE — PROGRESS NOTES
0- rec'd report from [facility], RN to include SBAR. Introduced self to pt. Call bell within reach, bed in lowest position, and reminded pt. How to call for assistance. Kishan Mccloudty, PCT is in room and will be sitting with pt. Tonight. 2200- pt. Was up all night well into this morning. Pt. Is still sleeping from today, but is easily aroused. Pt. Is Refusing all medication, food, or to answer any questions. Sitter is still at bedside. 0309- pt cooperative and calm. Attempted to access IV under wrap, and it was clearly coming out. Attempted to reinsert IV twice and blood draw once but was unsuccessful. Pt. Is becoming more agitated the more we bother her. Tristen Kimbrough from Dr. Mai Narayanan to leave IV out.     6897- pt. \"daughter\" called stating she did not want her mother to go to a SNF. I asked if she had the pt.'s code and she stated she did not. I told her that I could not relay any information without the code. Caller stated she would call her sister and get the code but that she did not want her mother going to a SNF.

## 2021-01-10 NOTE — CONSULTS
PSYCHIATRY CONSULT NOTE:    REASON FOR CONSULT:  Behavioral Consult    HISTORY OF PRESENTING COMPLAINT:  Shawn Conde is a 72 y.o. PATIENT REFUSED female who is currently admitted to the medical floor at [facility].  Patient is more alert today but remains confused during assessment and is unable to answer questions during the assessment properly. Neurology consult needed at this time. When asked what year it was patient states, \"2112\". Patient is also not able to hold a conversation at this time. She became combative during part of the morning but at this time is calm. Spoke with nursing staff and patient waxes and weans with behaviors. Medications will be adjusted at this time. PAST PSYCHIATRIC HISTORY and SUBSTANCE ABUSE HISTORY:  CYRUS due to patient being confused. PAST MEDICAL HISTORY:  Please see H&P for details. Past Medical History:   Diagnosis Date    Anxiety and depression     Diabetes ([facility] [address]     Hypertension     Obese     Seizure disorder [facility])            Lab Results   Component Value Date/Time    WBC 7.1 01/08/2021 06:02 AM    HGB 11.5 01/08/2021 06:02 AM    HCT 35.4 01/08/2021 06:02 AM    PLATELET 246 18/76/9614 06:02 AM    MCV 98.9 01/08/2021 06:02 AM      Lab Results   Component Value Date/Time    Sodium 138 01/08/2021 06:02 AM    Potassium 4.1 01/08/2021 06:02 AM    Chloride 108 01/08/2021 06:02 AM    CO2 27 01/08/2021 06:02 AM    Anion gap 3 (L) 01/08/2021 06:02 AM    Glucose 109 (H) 01/08/2021 06:02 AM    BUN 14 01/08/2021 06:02 AM    Creatinine 0.99 01/08/2021 06:02 AM    BUN/Creatinine ratio 14 01/08/2021 06:02 AM    GFR est AA >60 01/08/2021 06:02 AM    GFR est non-AA 56 (L) 01/08/2021 06:02 AM    Calcium 9.6 01/08/2021 06:02 AM    Bilirubin, total 0.5 01/08/2021 06:02 AM    Alk.  phosphatase 65 01/08/2021 06:02 AM    Protein, total 8.1 01/08/2021 06:02 AM    Albumin 3.1 (L) 01/08/2021 06:02 AM    Globulin 5.0 (H) 01/08/2021 06:02 AM    A-G Ratio 0.6 (L) 01/08/2021 06:02 AM    ALT (SGPT) 15 01/08/2021 06:02 AM          PSYCHOSOCIAL HISTORY:    CYRUS due to patient being confused. MENTAL STATUS EXAM:    General appearance: psychomotor activity is  calm  Eye contact: Good eye contact  Speech: Spontaneous  Affect : Congruent  Mood: Calm  Thought Process[de-identified] not logical at this time  Perception: Confused  Thought Content: Confused  Insight: Poor/Confused  Judgement: Poor  Cognition: Impaired due to mental health     ASSESSMENT AND PLAN:  Yordan Morrison meets criteria for a diagnosis of  Dementia with Behavioral Disturbances. At this time patient is not a candidate for inpatient psychiatric hospitalization but could benefit greatly from a neurology consult and possible LTC placement. Thank you for your consideration with this consult.

## 2021-01-10 NOTE — PROGRESS NOTES
1/10/2021  8:57 AM  Case management note    Patient has had 2 [facility] this am. Will continue to wait for mention to change for placement  [address]

## 2021-01-11 LAB
APPEARANCE UR: ABNORMAL
BACTERIA URNS QL MICRO: NEGATIVE /HPF
BILIRUB UR QL: NEGATIVE
COLOR UR: ABNORMAL
EPITH CASTS URNS QL MICRO: ABNORMAL /LPF
GLUCOSE UR STRIP.AUTO-MCNC: NEGATIVE MG/DL
HGB UR QL STRIP: NEGATIVE
HYALINE CASTS URNS QL MICRO: ABNORMAL /LPF (ref 0–5)
KETONES UR QL STRIP.AUTO: ABNORMAL MG/DL
LEUKOCYTE ESTERASE UR QL STRIP.AUTO: ABNORMAL
NITRITE UR QL STRIP.AUTO: NEGATIVE
PH UR STRIP: 5 [PH] (ref 5–8)
PROT UR STRIP-MCNC: 30 MG/DL
RBC #/AREA URNS HPF: ABNORMAL /HPF (ref 0–5)
SP GR UR REFRACTOMETRY: 1.02 (ref 1–1.03)
UA: UC IF INDICATED,UAUC: ABNORMAL
UROBILINOGEN UR QL STRIP.AUTO: 0.2 EU/DL (ref 0.2–1)
WBC URNS QL MICRO: ABNORMAL /HPF (ref 0–4)

## 2021-01-11 PROCEDURE — 97530 THERAPEUTIC ACTIVITIES: CPT

## 2021-01-11 PROCEDURE — 74011250637 HC RX REV CODE- 250/637: Performed by: FAMILY MEDICINE

## 2021-01-11 PROCEDURE — 65660000000 HC RM CCU STEPDOWN

## 2021-01-11 PROCEDURE — 87086 URINE CULTURE/COLONY COUNT: CPT

## 2021-01-11 PROCEDURE — 74011250637 HC RX REV CODE- 250/637: Performed by: NURSE PRACTITIONER

## 2021-01-11 PROCEDURE — 97116 GAIT TRAINING THERAPY: CPT

## 2021-01-11 PROCEDURE — 97535 SELF CARE MNGMENT TRAINING: CPT

## 2021-01-11 PROCEDURE — 74011250636 HC RX REV CODE- 250/636: Performed by: NURSE PRACTITIONER

## 2021-01-11 PROCEDURE — 81001 URINALYSIS AUTO W/SCOPE: CPT

## 2021-01-11 RX ADMIN — QUETIAPINE FUMARATE 25 MG: 25 TABLET ORAL at 21:34

## 2021-01-11 RX ADMIN — LEVETIRACETAM 500 MG: 500 TABLET ORAL at 11:44

## 2021-01-11 RX ADMIN — LORAZEPAM 1 MG: 2 INJECTION INTRAMUSCULAR; INTRAVENOUS at 00:56

## 2021-01-11 RX ADMIN — LOSARTAN POTASSIUM 25 MG: 25 TABLET, FILM COATED ORAL at 11:44

## 2021-01-11 RX ADMIN — LEVETIRACETAM 500 MG: 500 TABLET ORAL at 21:34

## 2021-01-11 RX ADMIN — ATORVASTATIN CALCIUM 20 MG: 20 TABLET, FILM COATED ORAL at 11:44

## 2021-01-11 RX ADMIN — Medication 5 TABLET: at 11:55

## 2021-01-11 RX ADMIN — HALOPERIDOL LACTATE 2 MG: 5 INJECTION, SOLUTION INTRAMUSCULAR at 00:55

## 2021-01-11 RX ADMIN — ASPIRIN 81 MG: 81 TABLET, COATED ORAL at 11:55

## 2021-01-11 RX ADMIN — SERTRALINE HYDROCHLORIDE 100 MG: 50 TABLET ORAL at 11:44
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
## 2021-01-11 NOTE — PROGRESS NOTES
5- rec'd report from Shiraz TalaveraEssex, RN to include SBAR. Introduced self to pt. Call bell within reach, bed in lowest position, and reminded pt. How to call for assistance. Completed restraint assessment. 2000- Gave pt. meds with her strawberry dessert. 0040- pt. Became agitated, yelling into hallway, and attempting to throw her legs off the side of the bed. A mask was placed on pt. Due to her spitting and haldol and ativan given IM.    0100- Pt. Calmed after IM medication and rested. No s/s of skin breakdown or irritation under or around wrist restraints. No sitter in room with pt. Tonight so door remained open. Safety checks performed, bed in lowest position w/ seizure mats on both sides of pt, call bell within pt reach, yellow socks on, and lights dimmed. 0530- pt.  Refused blood draw

## 2021-01-11 NOTE — PROGRESS NOTES
1/11/2021 5:25 PM CM called back to pt's daughter, Yanira Roman, who reported she is waiting for a call back from pt's nephew if he will be able to stay with pt at home. Yanira Roman reported she would be interested in having a Medicaid personal care aide for pt. CM explained this would not be something that could start at pt's discharge but could be initiated. CM searched in [facility], pt had a UAI completed while at [facility] on 12/31/2020. CM will reach out to [facility] in attempt to obtain UAI.     1/11/2021 4:23 PM CM called to pt's daughter, Yanira Roman at [phone] regarding pt's discharge, had to [facility]. CM sent preliminary home health referral to numerous agencies to check insurance coverage, [facility], [address], [facility], [facility], and [facility] via [address]. 1/11/2021  11:50 AM CM called and lvm with pt's daughter, Sunny Cordova at [phone]. CM also called and spoke with pt's other daughter, Yanira Roman at [phone]. CM relayed pt's readiness for discharge and barriers to SNF placement. CM relayed pt is no longer requiring SNF placement and pt would be able to discharge home if she has 24 hour supervision at home. Yanira Roman reported she will talk with her brother and other siblings about pt discharging home with 24 hour supervision. Yanira Roman is aware pt is at risk for wandering and will need someone watching her closely at home. Yanira Roman requested CM call her back at 4:30PM when she gets off as at this time she will have had time to talk with her siblings about pt discharging home. CM will follow up.  SHONA HollowayW

## 2021-01-11 NOTE — PROGRESS NOTES
Problem: Mobility Impaired (Adult and Pediatric)  Goal: *Acute Goals and Plan of Care (Insert Text)  Description: FUNCTIONAL STATUS PRIOR TO ADMISSION: Pt is a limited historian. Prior to CVA 3 weeks ago pt was independent. Pt was discharged from rehab and readmitted to hospital within 24 hours. HOME SUPPORT PRIOR TO ADMISSION: The patient lived with family members. Physical Therapy Goals  Re-Assessed 1/11/2021  1. Patient will move from supine to sit and sit to supine , scoot up and down, and roll side to side in bed with supervision/set-up within 7 day(s). 2.  Patient will transfer from bed to chair and chair to bed with minimal assistance/contact guard assist using the least restrictive device within 7 day(s). 3.  Patient will perform sit to stand with supervision/set-up within 7 day(s). 4.  Patient will ambulate with minimal assistance/contact guard assist for 150 feet with the least restrictive device within 7 day(s). 5.  Patient will improve Cash Balance score by 7 points within 7 days. Physical Therapy Goals  Initiated 1/4/2021  1. Patient will move from supine to sit and sit to supine , scoot up and down, and roll side to side in bed with minimal assistance/contact guard assist within 7 day(s). 2.  Patient will transfer from bed to chair and chair to bed with minimal assistance/contact guard assist using the least restrictive device within 7 day(s). 3.  Patient will perform sit to stand with minimal assistance/contact guard assist within 7 day(s). 4.  Patient will ambulate with minimal assistance/contact guard assist for 50 feet with the least restrictive device within 7 day(s).          Outcome: Progressing Towards Goal   PHYSICAL THERAPY TREATMENT: WEEKLY REASSESSMENT  Patient: Dirk Solano (78 y.o. female)  Date: 1/11/2021  Primary Diagnosis: Altered mental status [R41.82]  Sepsis ([facility] [address] [A41.9]  Leukocytosis [D72.829]  Abnormal urinalysis [R82.90]  Tachycardia [R00.0]  Dehydration [E86.0]  Lactic acidosis [E87.2]  Anemia [D64.9]        Precautions:          ASSESSMENT  Patient continues with skilled PT services and is slowly progressing towards goals. Pt limited by periods of agitation and confusion with several [employer] [place] calls over the past week. Pt received with sitter present in room and restraints discontinued. Pt requires consistent verbal cueing to maintain alertness d/t Haldol and Ativan given this AM. Pt with most assistance needed to initiate movement but then able to transfer to EOB and stand with CGA. Min A to ambulate into the bathroom with B HHA. Limited by impaired balance, decreased step clearance and high fall risk. Pt does not have 24/7 at home therefore will require SNF placement. Patient's progression toward goals since last assessment: slow progress, ambulating now, limited by lethargy today    Current Level of Function Impacting Discharge (mobility/balance): Min A to mobilize away from EOB    Functional Outcome Measure: The patient scored 6/56 on the Cash outcome measure which is indicative of high fall risk. Other factors to consider for discharge: does not have 24/7 assistance, waxing and waning mentation level         PLAN :  Goals have been updated based on progression since last assessment. Patient continues to benefit from skilled intervention to address the above impairments. Recommendations and Planned Interventions: bed mobility training, transfer training, gait training, therapeutic exercises, neuromuscular re-education, patient and family training/education, and therapeutic activities      Frequency/Duration: Patient will be followed by physical therapy:  5 times a week to address goals.     Recommendation for discharge: (in order for the patient to meet his/her long term goals)  Therapy up to 5 days/week in SNF setting    This discharge recommendation:  Has been made in collaboration with the attending provider and/or case management    IF patient discharges home will need the following DME: none         SUBJECTIVE:   Patient stated I'm hungry.     OBJECTIVE DATA SUMMARY:   HISTORY:    Past Medical History:   Diagnosis Date    Anxiety and depression     Diabetes ([facility] [address]     Hypertension     Obese     Seizure disorder ([facility] [address]    No past surgical history on file. Personal factors and/or comorbidities impacting plan of care: diabetes, HTN, seizures, recent CVA    Home Situation  Home Environment: Private residence  One/Two Story Residence: One story  Living Alone: No  Support Systems: Family member(s)  Patient Expects to be Discharged to[de-identified] Skilled nursing facility  Current DME Used/Available at Home: None    EXAMINATION/PRESENTATION/DECISION MAKING:   Critical Behavior:  Neurologic State: Sleeping  Orientation Level: Oriented to person, Disoriented to place, Disoriented to situation, Disoriented to time  Cognition: Decreased command following, Decreased attention/concentration     Hearing:   Auditory  Auditory Impairment: None    Range Of Motion:  AROM: Generally decreased, functional           PROM: Generally decreased, functional           Strength:    Strength: Generally decreased, functional                    Tone & Sensation:                                  Coordination:  Coordination: Generally decreased, functional  Vision:      Functional Mobility:  Bed Mobility:     Supine to Sit: Minimum assistance;Assist x1(A to initiate EOB)  Sit to Supine: Minimum assistance  Scooting: Contact guard assistance  Transfers:  Sit to Stand: Contact guard assistance;Assist x1;Additional time  Stand to Sit: Contact guard assistance;Assist x1;Additional time        Bed to Chair: Minimum assistance              Balance:   Sitting: Intact  Standing: Impaired  Standing - Static: Fair;Constant support  Standing - Dynamic : Fair;Constant support  Ambulation/Gait Training:  Distance (ft): 15 Feet (ft)(x 2)  Assistive Device: Gait belt(Heath HHA)  Ambulation - Level of Assistance: Minimal assistance;Assist x2        Gait Abnormalities: Decreased step clearance; Step to gait        Base of Support: Widened     Speed/[place]re[place]: Pace decreased (<100 feet/min); Slow;Delayed  Step Length: Right shortened;Left shortened            Functional Measure:  Cash Balance Test:    Sitting to Standin  Standing Unsupported: 0  Sitting with Back Unsupported: 3  Standing to Sittin  Transfers: 1  Standing Unsupported with Eyes Closed: 0  Standing Unsupported with Feet Together: 0  Reach Forward with Outstretched Arm: 0   Object: 0  Turn to Look Over Shoulders: 0  Turn 360 Degrees: 0  Alternate Foot on Step/Stool: 0  Standing Unsupported One Foot in Front: 0  Stand on One Le  Total: 6         56=Maximum possible score;   0-20=High fall risk  21-40=Moderate fall risk   41-56=Low fall risk               Pain Rating:  none    Activity Tolerance:   Fair and SpO2 stable on RA    After treatment patient left in no apparent distress:   Supine in bed, Call bell within reach, Bed / chair alarm activated, Side rails x 3, and sitter present    COMMUNICATION/EDUCATION:   The patients plan of care was discussed with: Registered nurse. Patient is unable to participate in goal setting and plan of care.     Thank you for this referral.  Silvia Zamudio, PT   Time Calculation: 25 mins

## 2021-01-11 NOTE — PROGRESS NOTES
Problem: Self Care Deficits Care Plan (Adult)  Goal: *Acute Goals and Plan of Care (Insert Text)  Description:   FUNCTIONAL STATUS PRIOR TO ADMISSION: Patient recent stroke with dc to [facility] and had dc home for one day before this admission. Patient is poor historian. From chart review she was living with family and indep with ADL tasks. HOME SUPPORT: The patient lived with family. Occupational Therapy Goals  Initiated 1/5/2021  1. Patient will perform grooming standing for ADL tasks with supervision/set-up within 7 day(s). 2.  Patient will perform lower body dressing with supervision/set-up within 7 day(s). 3.  Patient will perform bathing with supervision/set-up within 7 day(s). 4.  Patient will perform toilet transfers with supervision/set-up within 7 day(s). 5.  Patient will perform all aspects of toileting with supervision/set-up within 7 day(s). 6.  Patient will participate in upper extremity therapeutic exercise/activities with supervision/set-up for 8 minutes within 7 day(s). Outcome: Progressing Towards Goal   OCCUPATIONAL THERAPY TREATMENT  Patient: Santana Hu (84 y.o. female)  Date: 1/11/2021  Diagnosis: Altered mental status [R41.82]  Sepsis ([facility] [address] [A41.9]  Leukocytosis [D72.829]  Abnormal urinalysis [R82.90]  Tachycardia [R00.0]  Dehydration [E86.0]  Lactic acidosis [E87.2]  Anemia [D64.9] <principal problem not specified>       Precautions:  fall  Chart, occupational therapy assessment, plan of care, and goals were reviewed. ASSESSMENT  Patient continues with skilled OT services and is progressing towards goals. Ms. Faye Hearn was received in bed, oriented to person only, limited by confusion. She was able to follow 50% of commands accurately with improved follow through noted with additional cuing; Does best with simple verbal cuing in addition to physical guidance. Patient performed transfer into the bathroom for ADL retraining.   Education provided regarding safe transfer technique, adaptive toileting techniques, and safe positioning to facilitate energy conservation. Patient required up to max A for toileting and min A for standing grooming tasks. Patient is making slow progress overall. She has improved cooperation and engagement in functional activities today. Current Level of Function Impacting Discharge (ADLs): Patient required CGA to min A for functional mobility; Hand held assist for OOB transfers. Patient required mod A for LB dressing, mod to max for toileting, and min A for standing grooming tasks. PLAN :  Patient continues to benefit from skilled intervention to address the above impairments. Continue treatment per established plan of care. to address goals. Recommend with staff:   Recommend patient be OOB to chair as frequently as tolerated; Goal of 3x/day for all meals for 60 minutes at a time. For toileting needs, recommend transfers to/from bathroom with staff assist.    Encourage patient involvement in personal care as able. Encourage exercises frequently throughout the day. Recommend next OT session: Continue towards set OT goals. Recommendation for discharge: (in order for the patient to meet his/her long term goals)  Therapy up to 5 days/week in SNF setting    This discharge recommendation:  Has been made in collaboration with the attending provider and/or case management    IF patient discharges home will need the following DME: none       SUBJECTIVE:   Patient agreeable to OT tx. OBJECTIVE DATA SUMMARY:   Cognitive/Behavioral Status:  Neurologic State: Alert;Confused  Orientation Level: Oriented to person;Disoriented to situation;Disoriented to place; Disoriented to time  Cognition: Decreased command following;Decreased attention/concentration  Perception: Appears intact  Perseveration: No perseveration noted  Safety/Judgement: Awareness of environment    Functional Mobility and Transfers for ADLs:  Bed Mobility:  Supine to Sit: Minimum assistance;Assist x1(A to initiate EOB)  Sit to Supine: Minimum assistance  Scooting: Contact guard assistance    Transfers:  Sit to Stand: Contact guard assistance;Assist x1;Additional time  Functional Transfers  Toilet Transfer : Minimum assistance  Bed to Chair: Minimum assistance    Balance:  Sitting: Intact  Standing: Impaired  Standing - Static: Fair;Constant support  Standing - Dynamic : Fair;Constant support    ADL Intervention:  Grooming  Grooming Assistance: Minimum assistance  Position Performed: Standing;Seated in chair  Washing Face: Minimum assistance  Washing Hands: Contact guard assistance  Cues: Verbal cues provided    Lower Body Dressing Assistance  Dressing Assistance: Moderate assistance  Socks: Moderate assistance  Leg Crossed Method Used: Yes  Position Performed: Seated in chair    Toileting  Toileting Assistance: Maximum assistance  Bladder Hygiene: Moderate assistance  Bowel Hygiene: Maximum assistance  Clothing Management: Maximum assistance  Cues: Verbal cues provided  Adaptive Equipment: Grab bars    Cognitive Retraining  Safety/Judgement: Awareness of environment    Activity Tolerance:   Good    After treatment patient left in no apparent distress:   Sitting in chair, Call bell within reach, Bed / chair alarm activated, and sitter present    COMMUNICATION/COLLABORATION:   The patients plan of care was discussed with: Physical therapist, Registered nurse, and patient .      Edgard Price OTR/L  Time Calculation: 41 mins

## 2021-01-12 LAB
ALBUMIN SERPL-MCNC: 3.2 G/DL (ref 3.5–5)
ALBUMIN/GLOB SERPL: 0.7 {RATIO} (ref 1.1–2.2)
ALP SERPL-CCNC: 68 U/L (ref 45–117)
ALT SERPL-CCNC: 12 U/L (ref 12–78)
ANION GAP SERPL CALC-SCNC: 2 MMOL/L (ref 5–15)
AST SERPL-CCNC: 20 U/L (ref 15–37)
ATRIAL RATE: 85 BPM
BACTERIA SPEC CULT: NORMAL
BASOPHILS # BLD: 0.1 K/UL (ref 0–0.1)
BASOPHILS NFR BLD: 1 % (ref 0–1)
BILIRUB SERPL-MCNC: 0.4 MG/DL (ref 0.2–1)
BUN SERPL-MCNC: 18 MG/DL (ref 6–20)
BUN/CREAT SERPL: 21 (ref 12–20)
CALCIUM SERPL-MCNC: 9.9 MG/DL (ref 8.5–10.1)
CALCULATED P AXIS, ECG09: 45 DEGREES
CALCULATED R AXIS, ECG10: -13 DEGREES
CALCULATED T AXIS, ECG11: 34 DEGREES
CHLORIDE SERPL-SCNC: 106 MMOL/L (ref 97–108)
CO2 SERPL-SCNC: 28 MMOL/L (ref 21–32)
CREAT SERPL-MCNC: 0.84 MG/DL (ref 0.55–1.02)
DIAGNOSIS, 93000: NORMAL
DIFFERENTIAL METHOD BLD: ABNORMAL
EOSINOPHIL # BLD: 0.4 K/UL (ref 0–0.4)
EOSINOPHIL NFR BLD: 4 % (ref 0–7)
ERYTHROCYTE [DISTWIDTH] IN BLOOD BY AUTOMATED COUNT: 12.9 % (ref 11.5–14.5)
GLOBULIN SER CALC-MCNC: 4.8 G/DL (ref 2–4)
GLUCOSE SERPL-MCNC: 112 MG/DL (ref 65–100)
HCT VFR BLD AUTO: 35.4 % (ref 35–47)
HGB BLD-MCNC: 11.5 G/DL (ref 11.5–16)
IMM GRANULOCYTES # BLD AUTO: 0.1 K/UL (ref 0–0.04)
IMM GRANULOCYTES NFR BLD AUTO: 1 % (ref 0–0.5)
LYMPHOCYTES # BLD: 3.2 K/UL (ref 0.8–3.5)
LYMPHOCYTES NFR BLD: 30 % (ref 12–49)
MCH RBC QN AUTO: 32 PG (ref 26–34)
MCHC RBC AUTO-ENTMCNC: 32.5 G/DL (ref 30–36.5)
MCV RBC AUTO: 98.6 FL (ref 80–99)
MONOCYTES # BLD: 0.6 K/UL (ref 0–1)
MONOCYTES NFR BLD: 5 % (ref 5–13)
NEUTS SEG # BLD: 6.4 K/UL (ref 1.8–8)
NEUTS SEG NFR BLD: 59 % (ref 32–75)
NRBC # BLD: 0 K/UL (ref 0–0.01)
NRBC BLD-RTO: 0 PER 100 WBC
P-R INTERVAL, ECG05: 162 MS
PLATELET # BLD AUTO: 276 K/UL (ref 150–400)
PMV BLD AUTO: 10.5 FL (ref 8.9–12.9)
POTASSIUM SERPL-SCNC: 4.1 MMOL/L (ref 3.5–5.1)
PROT SERPL-MCNC: 8 G/DL (ref 6.4–8.2)
Q-T INTERVAL, ECG07: 378 MS
QRS DURATION, ECG06: 78 MS
QTC CALCULATION (BEZET), ECG08: 449 MS
RBC # BLD AUTO: 3.59 M/UL (ref 3.8–5.2)
SERVICE CMNT-IMP: NORMAL
SODIUM SERPL-SCNC: 136 MMOL/L (ref 136–145)
VENTRICULAR RATE, ECG03: 85 BPM
WBC # BLD AUTO: 10.6 K/UL (ref 3.6–11)

## 2021-01-12 PROCEDURE — 74011250636 HC RX REV CODE- 250/636: Performed by: NURSE PRACTITIONER

## 2021-01-12 PROCEDURE — 97530 THERAPEUTIC ACTIVITIES: CPT

## 2021-01-12 PROCEDURE — 65660000000 HC RM CCU STEPDOWN

## 2021-01-12 PROCEDURE — 93005 ELECTROCARDIOGRAM TRACING: CPT

## 2021-01-12 PROCEDURE — 74011250637 HC RX REV CODE- 250/637: Performed by: NURSE PRACTITIONER

## 2021-01-12 PROCEDURE — 80053 COMPREHEN METABOLIC PANEL: CPT

## 2021-01-12 PROCEDURE — 36415 COLL VENOUS BLD VENIPUNCTURE: CPT

## 2021-01-12 PROCEDURE — 85025 COMPLETE CBC W/AUTO DIFF WBC: CPT

## 2021-01-12 PROCEDURE — 74011250637 HC RX REV CODE- 250/637: Performed by: FAMILY MEDICINE

## 2021-01-12 PROCEDURE — 97116 GAIT TRAINING THERAPY: CPT

## 2021-01-12 RX ADMIN — ASPIRIN 81 MG: 81 TABLET, COATED ORAL at 09:43

## 2021-01-12 RX ADMIN — LORAZEPAM 1 MG: 2 INJECTION INTRAMUSCULAR; INTRAVENOUS at 13:21

## 2021-01-12 RX ADMIN — HALOPERIDOL LACTATE 2 MG: 5 INJECTION, SOLUTION INTRAMUSCULAR at 13:22

## 2021-01-12 RX ADMIN — Medication 5 TABLET: at 09:43

## 2021-01-12 RX ADMIN — PANTOPRAZOLE SODIUM 40 MG: 40 TABLET, DELAYED RELEASE ORAL at 09:43

## 2021-01-12 RX ADMIN — LOSARTAN POTASSIUM 25 MG: 25 TABLET, FILM COATED ORAL at 09:43

## 2021-01-12 RX ADMIN — ATORVASTATIN CALCIUM 20 MG: 20 TABLET, FILM COATED ORAL at 09:43

## 2021-01-12 RX ADMIN — QUETIAPINE FUMARATE 25 MG: 25 TABLET ORAL at 20:36

## 2021-01-12 RX ADMIN — LEVETIRACETAM 500 MG: 500 TABLET ORAL at 09:42

## 2021-01-12 RX ADMIN — LEVETIRACETAM 500 MG: 500 TABLET ORAL at 20:36

## 2021-01-12 RX ADMIN — SERTRALINE HYDROCHLORIDE 100 MG: 50 TABLET ORAL at 09:43

## 2021-01-12 NOTE — PROGRESS NOTES
Bedside shift change report given to Li Sharpe (oncoming nurse) by Dustin (offgoing nurse). Report included the following information SBAR, Kardex, Intake/Output, MAR and Recent Results.
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
## 2021-01-12 NOTE — PROGRESS NOTES
Problem: Mobility Impaired (Adult and Pediatric)  Goal: *Acute Goals and Plan of Care (Insert Text)  Description: FUNCTIONAL STATUS PRIOR TO ADMISSION: Pt is a limited historian. Prior to CVA 3 weeks ago pt was independent. Pt was discharged from rehab and readmitted to hospital within 24 hours. HOME SUPPORT PRIOR TO ADMISSION: The patient lived with family members. Physical Therapy Goals  Re-Assessed 1/11/2021  1. Patient will move from supine to sit and sit to supine , scoot up and down, and roll side to side in bed with supervision/set-up within 7 day(s). 2.  Patient will transfer from bed to chair and chair to bed with minimal assistance/contact guard assist using the least restrictive device within 7 day(s). 3.  Patient will perform sit to stand with supervision/set-up within 7 day(s). 4.  Patient will ambulate with minimal assistance/contact guard assist for 150 feet with the least restrictive device within 7 day(s). 5.  Patient will improve Cash Balance score by 7 points within 7 days. Physical Therapy Goals  Initiated 1/4/2021  1. Patient will move from supine to sit and sit to supine , scoot up and down, and roll side to side in bed with minimal assistance/contact guard assist within 7 day(s). 2.  Patient will transfer from bed to chair and chair to bed with minimal assistance/contact guard assist using the least restrictive device within 7 day(s). 3.  Patient will perform sit to stand with minimal assistance/contact guard assist within 7 day(s). 4.  Patient will ambulate with minimal assistance/contact guard assist for 50 feet with the least restrictive device within 7 day(s).          Outcome: Progressing Towards Goal   PHYSICAL THERAPY TREATMENT  Patient: Ag Shoulder (56 y.o. female)  Date: 1/12/2021  Diagnosis: Altered mental status [R41.82]  Sepsis ([facility] [address] [A41.9]  Leukocytosis [D72.829]  Abnormal urinalysis [R82.90]  Tachycardia [R00.0]  Dehydration [E86.0]  Lactic acidosis [E87.2]  Anemia [D64.9] <principal problem not specified>       Precautions:  fall  Chart, physical therapy assessment, plan of care and goals were reviewed. ASSESSMENT  Patient continues with skilled PT services and is progressing towards goals. Communicated with nurse cleared for therapy. Sitter in the room during the entire therapy sessions. Sit on the edge of bed SBA, sit to stand CGA, ambulate with out assistive in the room and out on the 4th floor hallway slow pace gait no loss of balance steady sitting and standing. OOB to chair as tolerated performed some active range of motion exercise on both LE all planes. Communicated with nurse who agreed to monitor patient. Current Level of Function Impacting Discharge (mobility/balance): CGA with ambulation without assistive device. Other factors to consider for discharge: fall         PLAN :  Patient continues to benefit from skilled intervention to address the above impairments. Continue treatment per established plan of care. to address goals. Recommendation for discharge: (in order for the patient to meet his/her long term goals)  Therapy up to 5 days/week in SNF setting    This discharge recommendation:  Has been made in collaboration with the attending provider and/or case management    IF patient discharges home will need the following DME: none       SUBJECTIVE:   Patient stated ok.     OBJECTIVE DATA SUMMARY:   Critical Behavior:  Neurologic State: Alert  Orientation Level: Oriented to person  Cognition: Follows commands  Safety/Judgement: Awareness of environment  Functional Mobility Training:  Bed Mobility:  Rolling: Stand-by assistance  Supine to Sit: Stand-by assistance  Sit to Supine: Stand-by assistance  Scooting: Stand-by assistance        Transfers:  Sit to Stand: Contact guard assistance  Stand to Sit: Contact guard assistance  Stand Pivot Transfers: Contact guard assistance     Bed to Chair: Contact guard assistance Balance:  Sitting: Intact  Sitting - Static: Good (unsupported)  Sitting - Dynamic: Good (unsupported)  Standing: Impaired; With support  Standing - Static: Fair  Standing - Dynamic : Fair  Ambulation/Gait Training:  Distance (ft): 100 Feet (ft)  Assistive Device: Gait belt  Ambulation - Level of Assistance: Contact guard assistance     Gait Description (WDL): Exceptions to WDL  Gait Abnormalities: Path deviations        Base of Support: Widened     Speed/Nereida: Pace decreased (<100 feet/min)  Step Length: Right shortened;Left shortened             Therapeutic Exercises:    Instructed patient to continue active range of motion exercise on both legs while up on chair or on bed. Pain Ratin/10    Activity Tolerance:   Good    After treatment patient left in no apparent distress:   Sitting in chair, Heels elevated for pressure relief, and Call bell within reach    COMMUNICATION/COLLABORATION:   The patients plan of care was discussed with: Registered nurse. Shanice Solis PT,WCC.    Time Calculation: 24 mins

## 2021-01-12 NOTE — PROGRESS NOTES
1/12/2021 4:19 PM CM called back to pt's daughter, Colonel Pulido she is waiting to hear back from pt's son and pt's nephew on their availability to assist pt at home. Colonel Puldio reported she will follow up with CM on 1/13 regarding pt's assistance at home. 1/12/2021  4:11 PM CM called and lvm with [facility]([phone]), lvm with Ms. Charbel Valente regarding possible services for pt at home. CM also called and [facility] was told they do work with pt's that have Medicaid needing personal care. [phone] spoke with Charbel Keith who reported she will contact CM back on 1/13 regarding availability. CM called to [facility]. Personal care, pt's home address is out of their service area. CM called and lvm with [facility] for personal care. 1/12/2021  12:09 PM Pt continues with sitter and behaviors, noted behaviors have improved. CM called to [facility](pt's previous rehab placement), lvm with Case Management Director, Leila Dakins regarding pt's UAI. Received call back from Leila Dakins and pt's UAI was faxed to CM. Leila Dakins reported pt was arranged with personal care and home health through [address]. CM called and lvm with pt's daughter, Colonel Pulido at [phone]. CM called to [address], they had declined pt for personal care as they do not have staffing. [facility] also declined pt for home health, they are not in network with pt's insurance. Home health referrals also sent and declined by [facility], [facility] , [facility], [facility] and [facility] due to "[employer] CM has exhausted all options for home health agencies that service pt's home address in [facility].  Elizabeth Boston, BSW

## 2021-01-12 NOTE — PROGRESS NOTES
Comprehensive Nutrition Assessment    Type and Reason for Visit: Reassess    Nutrition Recommendations/Plan:   1. Continue cardiac diet. Nutrition Assessment:     1/12: F/u. Pt continues with good po intake. Breakfast tray in room- all of banana and orange juice consumed, 50% eggs. Sitter in room says she has been with pt for several days and pt has been eating very well. Recorded intakes good, mostly % meals. Pt reports good appetite. No N/V. Labs- -109. Meds- reviewed. 1/5: 73 y/o female admitted with AMS. PMH includes DM. Pt noted to be confused. Answered some questions appropriately at time of visit. No family in room to get full nutrition hx. Pt says her appetite is good. Stated \"I eat, but not that much. \" Per sitter, pt ate eggs, fruit, and a bite of oatmeal for breakfast this AM. Recorded intake of 85%. Sitter says she was with pt yesterday and pt was too sleepy all day, didn't eat anything for breakfast or lunch. 0% recorded intake of dinner as well. Intakes on 1/3 were good, % meals. No significant weight changes noted per EMR. No c/o N/V. Labs- vit. D 14.7, A1c 6.3 (10/24/2020)  Meds- cefepime, vit. D3, Protonix. Intakes:  Patient Vitals for the past 168 hrs:   % Diet Eaten   01/11/21 1942 75 %   01/10/21 2000 25 %   01/10/21 1246 100 %   01/10/21 0800 100 %   01/10/21 0400 0 %   01/09/21 1714 50 %   01/09/21 1237 50 %   01/09/21 0840 50 %   01/07/21 1802 30 %   01/06/21 2205 40 %     Weight hx: Wt Readings from Last 10 Encounters:   01/01/21 79.2 kg (174 lb 11.2 oz)   10/24/20 80.5 kg (177 lb 6.4 oz)   05/25/20 84.4 kg (186 lb)   09/27/18 99.3 kg (219 lb)   06/09/18 99.4 kg (219 lb 2.2 oz)   06/08/18 99.8 kg (220 lb 0.3 oz)   12/09/14 88.5 kg (195 lb 1.7 oz)     Malnutrition Assessment:  Malnutrition Status: unable to assess    Estimated Daily Nutrient Needs:  Energy (kcal): 0896(5241 x 1.3 AF); Weight Used for Energy Requirements: Current  Protein (g): 79(1-1.2 g/kg);  Weight Used for Protein Requirements: Current  Fluid (ml/day): 8697; Method Used for Fluid Requirements: 1 ml/kcal    Nutrition Related Findings:  LBM 1/10; 1+ non-pitting edema to all extremities     Wounds:    None       Current Nutrition Therapies:  DIET CARDIAC Regular    Anthropometric Measures:  · Height:  5' 4\" (162.6 cm)  · Current Body Wt:  79.2 kg (174 lb 9.7 oz)   · Admission Body Wt:       · Usual Body Wt:        · Ideal Body Wt:  120 lbs:  145.5 %   · Adjusted Body Weight:   ; Weight Adjustment for: No adjustment    · BMI Category:  Obese class 1 (BMI 30.0-34. 9)       Nutrition Diagnosis:   · No nutrition diagnosis at this time related to   as evidenced by      Nutrition Interventions:   Food and/or Nutrient Delivery: Continue current diet  Nutrition Education and Counseling: No recommendations at this time  Coordination of Nutrition Care: Continue to monitor while inpatient    Goals:  PO intake >75% meals next 5-7 days       Nutrition Monitoring and Evaluation:   Behavioral-Environmental Outcomes: None identified  Food/Nutrient Intake Outcomes: Food and nutrient intake  Physical Signs/Symptoms Outcomes: Biochemical data, Weight, Meal time behavior    Discharge Planning:    Continue current diet     Electronically signed by Jose D Deras RDN on 1/12/2021     Contact: [phone]

## 2021-01-13 ENCOUNTER — APPOINTMENT (OUTPATIENT)
Dept: GENERAL RADIOLOGY | Age: 71
DRG: 871 | End: 2021-01-13
Attending: FAMILY MEDICINE
Payer: MEDICARE

## 2021-01-13 LAB
ALBUMIN SERPL-MCNC: 3.2 G/DL (ref 3.5–5)
ALBUMIN/GLOB SERPL: 0.6 {RATIO} (ref 1.1–2.2)
ALP SERPL-CCNC: 69 U/L (ref 45–117)
ALT SERPL-CCNC: 13 U/L (ref 12–78)
ANION GAP SERPL CALC-SCNC: 4 MMOL/L (ref 5–15)
AST SERPL-CCNC: 24 U/L (ref 15–37)
BASOPHILS # BLD: 0.1 K/UL (ref 0–0.1)
BASOPHILS NFR BLD: 1 % (ref 0–1)
BILIRUB SERPL-MCNC: 0.5 MG/DL (ref 0.2–1)
BUN SERPL-MCNC: 13 MG/DL (ref 6–20)
BUN/CREAT SERPL: 15 (ref 12–20)
CALCIUM SERPL-MCNC: 9.8 MG/DL (ref 8.5–10.1)
CHLORIDE SERPL-SCNC: 107 MMOL/L (ref 97–108)
CO2 SERPL-SCNC: 27 MMOL/L (ref 21–32)
CREAT SERPL-MCNC: 0.89 MG/DL (ref 0.55–1.02)
DIFFERENTIAL METHOD BLD: ABNORMAL
EOSINOPHIL # BLD: 0.4 K/UL (ref 0–0.4)
EOSINOPHIL NFR BLD: 4 % (ref 0–7)
ERYTHROCYTE [DISTWIDTH] IN BLOOD BY AUTOMATED COUNT: 13.1 % (ref 11.5–14.5)
GLOBULIN SER CALC-MCNC: 5 G/DL (ref 2–4)
GLUCOSE SERPL-MCNC: 117 MG/DL (ref 65–100)
HCT VFR BLD AUTO: 35.8 % (ref 35–47)
HGB BLD-MCNC: 11.5 G/DL (ref 11.5–16)
IMM GRANULOCYTES # BLD AUTO: 0 K/UL (ref 0–0.04)
IMM GRANULOCYTES NFR BLD AUTO: 0 % (ref 0–0.5)
LYMPHOCYTES # BLD: 2.9 K/UL (ref 0.8–3.5)
LYMPHOCYTES NFR BLD: 30 % (ref 12–49)
MCH RBC QN AUTO: 31.6 PG (ref 26–34)
MCHC RBC AUTO-ENTMCNC: 32.1 G/DL (ref 30–36.5)
MCV RBC AUTO: 98.4 FL (ref 80–99)
MONOCYTES # BLD: 0.5 K/UL (ref 0–1)
MONOCYTES NFR BLD: 6 % (ref 5–13)
NEUTS SEG # BLD: 5.8 K/UL (ref 1.8–8)
NEUTS SEG NFR BLD: 59 % (ref 32–75)
NRBC # BLD: 0 K/UL (ref 0–0.01)
NRBC BLD-RTO: 0 PER 100 WBC
PLATELET # BLD AUTO: 274 K/UL (ref 150–400)
PMV BLD AUTO: 10.6 FL (ref 8.9–12.9)
POTASSIUM SERPL-SCNC: 4.2 MMOL/L (ref 3.5–5.1)
PROT SERPL-MCNC: 8.2 G/DL (ref 6.4–8.2)
RBC # BLD AUTO: 3.64 M/UL (ref 3.8–5.2)
SODIUM SERPL-SCNC: 138 MMOL/L (ref 136–145)
WBC # BLD AUTO: 9.7 K/UL (ref 3.6–11)

## 2021-01-13 PROCEDURE — 97116 GAIT TRAINING THERAPY: CPT

## 2021-01-13 PROCEDURE — 65660000000 HC RM CCU STEPDOWN

## 2021-01-13 PROCEDURE — 80053 COMPREHEN METABOLIC PANEL: CPT

## 2021-01-13 PROCEDURE — 85025 COMPLETE CBC W/AUTO DIFF WBC: CPT

## 2021-01-13 PROCEDURE — 74011250636 HC RX REV CODE- 250/636: Performed by: NURSE PRACTITIONER

## 2021-01-13 PROCEDURE — 71045 X-RAY EXAM CHEST 1 VIEW: CPT

## 2021-01-13 PROCEDURE — 97110 THERAPEUTIC EXERCISES: CPT

## 2021-01-13 PROCEDURE — 74011250637 HC RX REV CODE- 250/637: Performed by: NURSE PRACTITIONER

## 2021-01-13 PROCEDURE — 36415 COLL VENOUS BLD VENIPUNCTURE: CPT

## 2021-01-13 PROCEDURE — 74011250637 HC RX REV CODE- 250/637: Performed by: FAMILY MEDICINE

## 2021-01-13 PROCEDURE — 97535 SELF CARE MNGMENT TRAINING: CPT

## 2021-01-13 RX ADMIN — HALOPERIDOL LACTATE 2 MG: 5 INJECTION, SOLUTION INTRAMUSCULAR at 22:11

## 2021-01-13 RX ADMIN — LORAZEPAM 1 MG: 2 INJECTION INTRAMUSCULAR; INTRAVENOUS at 22:11

## 2021-01-13 RX ADMIN — LEVETIRACETAM 500 MG: 500 TABLET ORAL at 20:48

## 2021-01-13 RX ADMIN — QUETIAPINE FUMARATE 25 MG: 25 TABLET ORAL at 20:48

## 2021-01-13 RX ADMIN — QUETIAPINE FUMARATE 25 MG: 25 TABLET ORAL at 15:36

## 2021-01-13 RX ADMIN — SERTRALINE HYDROCHLORIDE 100 MG: 50 TABLET ORAL at 08:24

## 2021-01-13 RX ADMIN — LOSARTAN POTASSIUM 25 MG: 25 TABLET, FILM COATED ORAL at 17:33

## 2021-01-13 RX ADMIN — Medication 5 TABLET: at 08:24

## 2021-01-13 RX ADMIN — LEVETIRACETAM 500 MG: 500 TABLET ORAL at 08:24

## 2021-01-13 RX ADMIN — ASPIRIN 81 MG: 81 TABLET, COATED ORAL at 08:24

## 2021-01-13 RX ADMIN — PANTOPRAZOLE SODIUM 40 MG: 40 TABLET, DELAYED RELEASE ORAL at 08:24

## 2021-01-13 RX ADMIN — ATORVASTATIN CALCIUM 20 MG: 20 TABLET, FILM COATED ORAL at 08:24

## 2021-01-13 RX ADMIN — LOSARTAN POTASSIUM 25 MG: 25 TABLET, FILM COATED ORAL at 08:24

## 2021-01-13 NOTE — PROGRESS NOTES
1/13/2021 3:30 PM CM called to pt's daughter, [employer] again, had to [facility]([phone]). CM called and lvm with [facility] [employer] regarding pending SNF placement. CM also called and lvm with Jovon Barajas with [facility] to check on bed availability. CM will continue to look for SNF placement if family cannot take pt home with 24 hours assistance/supervision. 1/13/2021 12:46 PM EMR reviewed, pt remains with sitter. CM called to pt's daughter, [facility]([phone]) had to lv. CM will continue to explore pt's return home with 24 hour supervision at home with pt's daughter. CM received received return phone call from [facility] they do not have staff for personal care for pt. CM also called back to [facility], they do not have staff for personal care for pt. CM called back to [facility], they are still not able to accept pts.    Elizabeth Boston, SHONAW
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
## 2021-01-13 NOTE — PROGRESS NOTES
Bedside and Verbal shift change report given to Bethany RN (oncoming nurse) by Dco RN (offgoing nurse). Report included the following information SBAR, Kardex, ED Summary, Procedure Summary, Intake/Output, MAR, Recent Results, Cardiac Rhythm NSR and Quality Measures.

## 2021-01-13 NOTE — PROGRESS NOTES
Verbal shift change report given to Breonna Diallo (oncoming nurse) by Fede Fisher (offgoing nurse). Report included the following information SBAR, Kardex, Intake/Output, MAR and Recent Results.

## 2021-01-13 NOTE — PROGRESS NOTES
Physical Therapy    1155 chart reviewed spoke with RN. Pt resting quietly with sitter at bedside. Per chart, pt agitated and combative, at times, during hospitalization. PT will allow pt to rest and follow up once awake and alert. Gavi Dodge

## 2021-01-13 NOTE — PROGRESS NOTES
Problem: Self Care Deficits Care Plan (Adult)  Goal: *Acute Goals and Plan of Care (Insert Text)  Description:   FUNCTIONAL STATUS PRIOR TO ADMISSION: Patient recent stroke with dc to [facility] and had dc home for one day before this admission. Patient is poor historian. From chart review she was living with family and indep with ADL tasks. HOME SUPPORT: The patient lived with family. Occupational Therapy Goals  Initiated 1/5/2021  1. Patient will perform grooming standing for ADL tasks with supervision/set-up within 7 day(s). - Continue for consistency  2. Patient will perform lower body dressing with supervision/set-up within 7 day(s). -Continued below  3. Patient will perform bathing with supervision/set-up within 7 day(s). -Continued below  4. Patient will perform toilet transfers with supervision/set-up within 7 day(s). -Continued for consistency  5. Patient will perform all aspects of toileting with supervision/set-up within 7 day(s). -Continued below  6. Patient will participate in upper extremity therapeutic exercise/activities with supervision/set-up for 8 minutes within 7 day(s). -Continued below      Weekly Re-assessment 1/13/2021  1. Patient will perform grooming standing for ADL tasks with supervision/set-up within 7 day(s). 2.  Patient will perform lower body dressing with supervision/set-up within 7 day(s). 3.  Patient will perform bathing with supervision/set-up within 7 day(s). 4.  Patient will perform toilet transfers with supervision/set-up within 7 day(s). 5.  Patient will perform all aspects of toileting with supervision/set-up within 7 day(s). 6.  Patient will participate in upper extremity therapeutic exercise/activities with supervision/set-up for 8 minutes within 7 day(s).          Outcome: Progressing Towards Goal    OCCUPATIONAL THERAPY TREATMENT/WEEKLY RE-ASSESSMENT  Patient: Seferino Martinez (4 y.o. female)  Date: 1/13/2021  Diagnosis: Altered mental status [R41.82]  Sepsis ([facility] [address] [A41.9]  Leukocytosis [D72.829]  Abnormal urinalysis [R82.90]  Tachycardia [R00.0]  Dehydration [E86.0]  Lactic acidosis [E87.2]  Anemia [D64.9] <principal problem not specified>       Precautions:    Chart, occupational therapy assessment, plan of care, and goals were reviewed. ASSESSMENT  Patient continues with skilled OT services and is slowly progressing towards goals. Pt noted with good engagement this date, completing stand oral and hand hygiene with SBA for static challenges and CGA for dynamic challenges, without use of DME, benefiting from cues for task initiation and termination 2/2 noted decreased problem solving; however, pt's occupational output has been limited by intermittent episodes of patient agitation; therefore, all goals have been continued at their current levels, with patient continuing to benefit from skilled OT during acute hospitalization in an attempt at maximizing highest level of safe functional independence and reporting therapist recommending SNF rehab upon discharge. Current Level of Function Impacting Discharge (ADLs): Up to Max A dependent on cognition    Other factors to consider for discharge: episodes of agitation         PLAN :  Goals have been updated based on progression since last assessment. Patient continues to benefit from skilled intervention to address the above impairments. Continue to follow patient 3 times a week to address goals. Recommend next OT session: POC progression    Recommendation for discharge: (in order for the patient to meet his/her long term goals)  Therapy up to 5 days/week in SNF setting    This discharge recommendation:  Has not yet been discussed the attending provider and/or case management    IF patient discharges home will need the following DME: TBD       SUBJECTIVE:   Patient stated I like music.     OBJECTIVE DATA SUMMARY:   Cognitive/Behavioral Status:  Neurologic State: Alert  Orientation Level: Oriented to person;Oriented to place; Disoriented to time;Disoriented to situation  Cognition: Follows commands;Poor safety awareness; Impulsive  Perception: Appears intact  Perseveration: No perseveration noted  Safety/Judgement: Awareness of environment;Decreased insight into deficits; Decreased awareness of need for safety    Functional Mobility and Transfers for ADLs:    Transfers:  Sit to Stand: Contact guard assistance  Functional Transfers  Bathroom Mobility: Contact guard assistance  Bed to Chair: Contact guard assistance    Balance:  Sitting: Intact  Sitting - Static: Good (unsupported)  Sitting - Dynamic: Good (unsupported)  Standing: Impaired; Without support  Standing - Static: Good(without RW)  Standing - Dynamic : Fair(without walker)    ADL Intervention:  Grooming  Grooming Assistance: Stand-by assistance  Position Performed: Standing(at sink)  Washing Hands: Stand-by assistance  Brushing Teeth: Stand-by assistance  Cues: Verbal cues provided(for task initiation/task termination)    Cognitive Retraining  Safety/Judgement: Awareness of environment;Decreased insight into deficits; Decreased awareness of need for safety    Pain:  No c/o pain    Activity Tolerance:   Fair and requires rest breaks    After treatment patient left in no apparent distress:   Sitting in chair, Call bell within reach, and RN present    COMMUNICATION/COLLABORATION:   The patients plan of care was discussed with: Physical therapy assistant, Registered nurse, and Case management.      Vito Barger OT  Time Calculation: 23 mins

## 2021-01-13 NOTE — PROGRESS NOTES
Problem: Mobility Impaired (Adult and Pediatric)  Goal: *Acute Goals and Plan of Care (Insert Text)  Description: FUNCTIONAL STATUS PRIOR TO ADMISSION: Pt is a limited historian. Prior to CVA 3 weeks ago pt was independent. Pt was discharged from rehab and readmitted to hospital within 24 hours. HOME SUPPORT PRIOR TO ADMISSION: The patient lived with family members. Physical Therapy Goals  Re-Assessed 1/11/2021  1. Patient will move from supine to sit and sit to supine , scoot up and down, and roll side to side in bed with supervision/set-up within 7 day(s). 2.  Patient will transfer from bed to chair and chair to bed with minimal assistance/contact guard assist using the least restrictive device within 7 day(s). 3.  Patient will perform sit to stand with supervision/set-up within 7 day(s). 4.  Patient will ambulate with minimal assistance/contact guard assist for 150 feet with the least restrictive device within 7 day(s). 5.  Patient will improve Cash Balance score by 7 points within 7 days. Physical Therapy Goals  Initiated 1/4/2021  1. Patient will move from supine to sit and sit to supine , scoot up and down, and roll side to side in bed with minimal assistance/contact guard assist within 7 day(s). 2.  Patient will transfer from bed to chair and chair to bed with minimal assistance/contact guard assist using the least restrictive device within 7 day(s). 3.  Patient will perform sit to stand with minimal assistance/contact guard assist within 7 day(s). 4.  Patient will ambulate with minimal assistance/contact guard assist for 50 feet with the least restrictive device within 7 day(s).          Outcome: Progressing Towards Goal  Note:   PHYSICAL THERAPY TREATMENT  Patient: Bill Gross (19 y.o. female)  Date: 1/13/2021  Diagnosis: Altered mental status [R41.82]  Sepsis ([facility] [A41.9]  Leukocytosis [D72.829]  Abnormal urinalysis [R82.90]  Tachycardia [R00.0]  Dehydration [E86.0]  Lactic acidosis [E87.2]  Anemia [D64.9] <principal problem not specified>       Precautions:    Chart, physical therapy assessment, plan of care and goals were reviewed. ASSESSMENT  Patient continues with skilled PT services and progressing towards goals. Pt awake and alert, requesting therapy. Pt require CGA with HHA for gait training on level surfaces. Pt calm and redirectable with tasks. Requires demonstration for use of seated thera ex. Difficult for verbal cues only. Current Level of Function Impacting Discharge (mobility/balance): CGA    Other factors to consider for discharge: cognitive deficits         PLAN :  Patient continues to benefit from skilled intervention to address the above impairments. Continue treatment per established plan of care. to address goals. Recommendation for discharge: (in order for the patient to meet his/her long term goals)  Therapy up to 5 days/week in SNF setting or intensive home health therapy program    This discharge recommendation:  Has been made in collaboration with the attending provider and/or case management    IF patient discharges home will need the following DME: none       SUBJECTIVE:   Patient stated ok.     OBJECTIVE DATA SUMMARY:   Critical Behavior:  Neurologic State: Alert  Orientation Level: Oriented to person, Oriented to place, Disoriented to time, Disoriented to situation  Cognition: Follows commands, Poor safety awareness, Impulsive  Safety/Judgement: Awareness of environment, Decreased insight into deficits, Decreased awareness of need for safety  Functional Mobility Training:  Bed Mobility:                    Transfers:  Sit to Stand: Contact guard assistance  Stand to Sit: Contact guard assistance        Bed to Chair: Contact guard assistance                    Balance:  Sitting: Intact  Sitting - Static: Good (unsupported)  Sitting - Dynamic: Good (unsupported)  Standing: Impaired; Without support  Standing - Static: Good(without RW)  Standing - Dynamic : Fair(without walker)  Ambulation/Gait Training:  Distance (ft): 300 Feet (ft)  Assistive Device: Gait belt(HHA)  Ambulation - Level of Assistance: Contact guard assistance        Gait Abnormalities: Trunk sway increased; Path deviations        Base of Support: Widened     Speed/Nereida: Pace decreased (<100 feet/min)                       Stairs: Therapeutic Exercises:     Pain Rating:      Activity Tolerance:   Good    After treatment patient left in no apparent distress:   Sitting in chair and Call bell within reach    COMMUNICATION/COLLABORATION:   The patients plan of care was discussed with: Registered nurseShaka Santana   Time Calculation: 23 mins

## 2021-01-14 LAB
ALBUMIN SERPL-MCNC: 3.1 G/DL (ref 3.5–5)
ALBUMIN/GLOB SERPL: 0.7 {RATIO} (ref 1.1–2.2)
ALP SERPL-CCNC: 69 U/L (ref 45–117)
ALT SERPL-CCNC: 14 U/L (ref 12–78)
ANION GAP SERPL CALC-SCNC: 4 MMOL/L (ref 5–15)
AST SERPL-CCNC: 24 U/L (ref 15–37)
BASOPHILS # BLD: 0 K/UL (ref 0–0.1)
BASOPHILS NFR BLD: 0 % (ref 0–1)
BILIRUB SERPL-MCNC: 0.4 MG/DL (ref 0.2–1)
BUN SERPL-MCNC: 15 MG/DL (ref 6–20)
BUN/CREAT SERPL: 15 (ref 12–20)
CALCIUM SERPL-MCNC: 9.5 MG/DL (ref 8.5–10.1)
CHLORIDE SERPL-SCNC: 106 MMOL/L (ref 97–108)
CO2 SERPL-SCNC: 27 MMOL/L (ref 21–32)
CREAT SERPL-MCNC: 1 MG/DL (ref 0.55–1.02)
DIFFERENTIAL METHOD BLD: ABNORMAL
EOSINOPHIL # BLD: 0.3 K/UL (ref 0–0.4)
EOSINOPHIL NFR BLD: 3 % (ref 0–7)
ERYTHROCYTE [DISTWIDTH] IN BLOOD BY AUTOMATED COUNT: 13.2 % (ref 11.5–14.5)
GLOBULIN SER CALC-MCNC: 4.5 G/DL (ref 2–4)
GLUCOSE SERPL-MCNC: 124 MG/DL (ref 65–100)
HCT VFR BLD AUTO: 33.7 % (ref 35–47)
HGB BLD-MCNC: 10.9 G/DL (ref 11.5–16)
IMM GRANULOCYTES # BLD AUTO: 0 K/UL (ref 0–0.04)
IMM GRANULOCYTES NFR BLD AUTO: 0 % (ref 0–0.5)
LYMPHOCYTES # BLD: 2.7 K/UL (ref 0.8–3.5)
LYMPHOCYTES NFR BLD: 29 % (ref 12–49)
MCH RBC QN AUTO: 31.7 PG (ref 26–34)
MCHC RBC AUTO-ENTMCNC: 32.3 G/DL (ref 30–36.5)
MCV RBC AUTO: 98 FL (ref 80–99)
MONOCYTES # BLD: 0.5 K/UL (ref 0–1)
MONOCYTES NFR BLD: 6 % (ref 5–13)
NEUTS SEG # BLD: 5.8 K/UL (ref 1.8–8)
NEUTS SEG NFR BLD: 62 % (ref 32–75)
NRBC # BLD: 0 K/UL (ref 0–0.01)
NRBC BLD-RTO: 0 PER 100 WBC
PLATELET # BLD AUTO: 248 K/UL (ref 150–400)
PMV BLD AUTO: 11.1 FL (ref 8.9–12.9)
POTASSIUM SERPL-SCNC: 3.8 MMOL/L (ref 3.5–5.1)
PROT SERPL-MCNC: 7.6 G/DL (ref 6.4–8.2)
RBC # BLD AUTO: 3.44 M/UL (ref 3.8–5.2)
SODIUM SERPL-SCNC: 137 MMOL/L (ref 136–145)
WBC # BLD AUTO: 9.5 K/UL (ref 3.6–11)

## 2021-01-14 PROCEDURE — 97535 SELF CARE MNGMENT TRAINING: CPT

## 2021-01-14 PROCEDURE — 65660000000 HC RM CCU STEPDOWN

## 2021-01-14 PROCEDURE — 36415 COLL VENOUS BLD VENIPUNCTURE: CPT

## 2021-01-14 PROCEDURE — 85025 COMPLETE CBC W/AUTO DIFF WBC: CPT

## 2021-01-14 PROCEDURE — 97530 THERAPEUTIC ACTIVITIES: CPT

## 2021-01-14 PROCEDURE — 2709999900 HC NON-CHARGEABLE SUPPLY

## 2021-01-14 PROCEDURE — 74011250636 HC RX REV CODE- 250/636: Performed by: NURSE PRACTITIONER

## 2021-01-14 PROCEDURE — 74011250637 HC RX REV CODE- 250/637: Performed by: NURSE PRACTITIONER

## 2021-01-14 PROCEDURE — 80053 COMPREHEN METABOLIC PANEL: CPT

## 2021-01-14 PROCEDURE — 74011250637 HC RX REV CODE- 250/637: Performed by: FAMILY MEDICINE

## 2021-01-14 RX ADMIN — LOSARTAN POTASSIUM 25 MG: 25 TABLET, FILM COATED ORAL at 17:38

## 2021-01-14 RX ADMIN — Medication 5 TABLET: at 08:33

## 2021-01-14 RX ADMIN — LORAZEPAM 1 MG: 2 INJECTION INTRAMUSCULAR; INTRAVENOUS at 17:26

## 2021-01-14 RX ADMIN — SERTRALINE HYDROCHLORIDE 100 MG: 50 TABLET ORAL at 08:33

## 2021-01-14 RX ADMIN — LEVETIRACETAM 500 MG: 500 TABLET ORAL at 08:33

## 2021-01-14 RX ADMIN — HALOPERIDOL LACTATE 2 MG: 5 INJECTION, SOLUTION INTRAMUSCULAR at 13:48

## 2021-01-14 RX ADMIN — QUETIAPINE FUMARATE 25 MG: 25 TABLET ORAL at 17:38

## 2021-01-14 RX ADMIN — LEVETIRACETAM 500 MG: 500 TABLET ORAL at 21:15

## 2021-01-14 RX ADMIN — ASPIRIN 81 MG: 81 TABLET, COATED ORAL at 08:33

## 2021-01-14 RX ADMIN — PANTOPRAZOLE SODIUM 40 MG: 40 TABLET, DELAYED RELEASE ORAL at 08:33

## 2021-01-14 RX ADMIN — LORAZEPAM 1 MG: 2 INJECTION INTRAMUSCULAR; INTRAVENOUS at 08:33

## 2021-01-14 RX ADMIN — QUETIAPINE FUMARATE 25 MG: 25 TABLET ORAL at 21:15

## 2021-01-14 RX ADMIN — LOSARTAN POTASSIUM 25 MG: 25 TABLET, FILM COATED ORAL at 08:33

## 2021-01-14 RX ADMIN — ATORVASTATIN CALCIUM 20 MG: 20 TABLET, FILM COATED ORAL at 08:33

## 2021-01-14 NOTE — PROGRESS NOTES
1/14/2021 1:51 PM CM attempted to call pt's daughter, Moises Hernández at [phone], had to [facility]. CM called Moises Hernández at [phone], Moises Hernández relayed she has spoke with her siblings. She is aware and agreeable for pt to discharge home to her home address in [place] with care from her son, Serge's daughter and siblings. Pt's daughter is aware no home health arrangements will be able to be made due to pt's insurance. CM called to pt's MD, relayed pt will discharge home with family but will need to be switched to po medications. Pt's haldol order will be cancelled and pt's ativan will be switched to PO.     1/14/2021 1:42 PM CM received call from pt's son, Marlen Hart) who requested to speak with CM regarding pt's discharge. Pt's son reported he has spoken with his sister, Jeanette Sam and his daughter will be able to stay with pt at home to assist. Pt's son reported his daughter is 24years old and not working and will be able to assist pt. CM explained pt's behaviors and pt's son reiterated his daughter will be able to handle this at home. Pt's son also reported he will be able to assist at times as he is not working at this time. Pt's son reported pt will be able to discharge to her address in [place]. CM explained if pt discharges home due to her insurance coverage, home health will not be an option. Pt's son was understanding. 1/14/2021  12:20 PM CM received return phone call from pt's daughter, Verta Cecy. CM explained to Verta Cecy options for pt's discharge, SNF placement vs home with 24 hour supervision and support. CM relayed if pt discharges home to her home address there are no home health agencies that are in network with pt's insurance, pt's care will be sole handled by pt's family. Pt's daughter was understanding and expressed her concern with pt discharge to a SNF and not getting the care she needs.  Pt's daughter reported she would prefer for pt to discharge home to pt's address in [place] or to her daughter's home in [place]. Pt's daughter reported her son is at home and can stay with pt at home while she is working. CM reviewed with pt's daughter pt's behaviors and if pt's daughter's son will be able to handle pt in the even pt has these behaviors. Pt's daughter reported she does not think her son will be able to handle this. Pt's daughter reported she is going to talk with her brothers about possibility for pt to discharge home. Pt's daughter reported she will call CM back today. 1/14/2021 10:41 AM     CM called pt's daughter, Nisreen Robertson at [phone], had to [facility]. CM called pt's son, Kym Terry at [phone], CM relayed to pt's son pt's need for 24 hour care at home or SNF placement. Pt's son confirmed pt lives with him but he is not there during the day to assist. Pt's son reported he was aware that SNF placement was being pursued but he is leaving it up to his siblings to arrange. Pt's son reported he will talk with Nisreen Robertson as she will be the one setting up having someone at home for pt when he is working. CM requested when pt's son speaks with Nisreen Robertson he ask her to return phone calls from [facility]. Pt's son also reported he is unaware if pt has a MPOA. CM called to pt's daughter, Sammi Todd at [phone], CM relayed again the need for pt to have 24 hour care at home or SNF placement. Pt's daughter Sammi Todd reported she has MPOA over pt, CM requested she email this to CM. Pt's daughter reported she cannot email this but she will bring it to [facility]. Pt's daughter reported, the \"sibling war\" has been regarding pt's discharge but she has MPOA. CM relayed until MPOA paperwork is received all children will have collective say in discharge. Pt's daughter was understanding. Pt's daughter expressed to CM stressors between siblings and \"alterative motives\" from her siblings for pt discharging home. Pt's daughter reported none of her siblings know she has MPOA of pt.  Pt's daughter reported pt is not safe to discharge home, pt's daughter was agreeable to CM searching for SNF placement for pt in [place], [place], and [facility]. Pt's daughter is aware pt will not be allowed to have visitors at [facility] placement. Pt's daughter also asked about personal care at home, CM relayed as of right now we do not have an agency that can service pt. CM relayed possibility for consumer directed care, pt's daughter reported at this time they do not have family available that can provide this care. Pt continues to receive IM Haldol(last dose at 10PM on ) and IM Ativan(last dose 8am today). Pt will need to be switched to PO before discharging to SNF as SNFs cannot administer IM or IV sedative or antipsychotics. Pt also will need to remain sitter free for at least 24-48 hours, pt has been sitter free since this morning. CM received return phone call from [employer] with [facility], there is an option for consumer directed care for pt is pt has a family member or friend that can provide personal care for pt at home. Consumer Director  information was provided by [employer]. Listed below  [facility] George: [phone], [facility]: [phone], [facility]: [phone], Ervin Tiwari: [phone], [facility]: [phone]. CM will continue to pursue SNF placement for pt as pt does not have care at home.      SNF referrals have been sent to the following facilities:   [facility] of [address] of [address]  [address] and Rehab-denied due to behaviors  [facility] and Rehab-denied due to behaviors  [facility] and [address] at [facility] and [address] at the [facility] and [address] on the [place] [address] [address]

## 2021-01-14 NOTE — PROGRESS NOTES
Problem: Mobility Impaired (Adult and Pediatric)  Goal: *Acute Goals and Plan of Care (Insert Text)  Description: FUNCTIONAL STATUS PRIOR TO ADMISSION: Pt is a limited historian. Prior to CVA 3 weeks ago pt was independent. Pt was discharged from rehab and readmitted to hospital within 24 hours. HOME SUPPORT PRIOR TO ADMISSION: The patient lived with family members. Physical Therapy Goals  Re-Assessed 1/11/2021  1. Patient will move from supine to sit and sit to supine , scoot up and down, and roll side to side in bed with supervision/set-up within 7 day(s). 2.  Patient will transfer from bed to chair and chair to bed with minimal assistance/contact guard assist using the least restrictive device within 7 day(s). 3.  Patient will perform sit to stand with supervision/set-up within 7 day(s). 4.  Patient will ambulate with minimal assistance/contact guard assist for 150 feet with the least restrictive device within 7 day(s). 5.  Patient will improve Cash Balance score by 7 points within 7 days. Physical Therapy Goals  Initiated 1/4/2021  1. Patient will move from supine to sit and sit to supine , scoot up and down, and roll side to side in bed with minimal assistance/contact guard assist within 7 day(s). 2.  Patient will transfer from bed to chair and chair to bed with minimal assistance/contact guard assist using the least restrictive device within 7 day(s). 3.  Patient will perform sit to stand with minimal assistance/contact guard assist within 7 day(s). 4.  Patient will ambulate with minimal assistance/contact guard assist for 50 feet with the least restrictive device within 7 day(s).          Outcome: Progressing Towards Goal   PHYSICAL THERAPY TREATMENT  Patient: Myranda Moreno (5 y.o. female)  Date: 1/14/2021  Diagnosis: Altered mental status [R41.82]  Sepsis ([facility] [A41.9]  Leukocytosis [D72.829]  Abnormal urinalysis [R82.90]  Tachycardia [R00.0]  Dehydration [E86.0]  Lactic acidosis [E87.2]  Anemia [D64.9] <principal problem not specified>       Precautions:    Chart, physical therapy assessment, plan of care and goals were reviewed. ASSESSMENT  Patient continues with skilled PT services and is slowly progressing towards goals. Pt received in poor bed position lying diagonally across mattress. with seizure pads in place. Pt very lethargic and sleepy. Very difficult to arouse and keep eyes open. Wash cloth provided to face to help awaken. Maximal verbal cues needed. RN confirming that pt was agitated overnight and therefore was medicated. Symptoms per RN are residual. Pt requiring Maximal Ax2 with all bed mobility due to lethargy. Donned brief while supine. Static sitting balance with constant support. HHA x2 with sit to stand with difficult progression of LE's to chair 3' with Mod to Max Ax2. Pt set up in recliner with breakfast tray needing assistance with feeding. Prior to this tx pt walked 300' with HHA an CGA. Expect resumption of this performance with processing of med. RN alerted to pt response and need for feeding assist agreeing to address. SNF indicated for rehab with recent hx of CVA 3wks ago. Current Level of Function Impacting Discharge (mobility/balance): Max Ax2/poor    Other factors to consider for discharge: per above         PLAN :  Patient continues to benefit from skilled intervention to address the above impairments. Continue treatment per established plan of care. to address goals. Recommendation for discharge: (in order for the patient to meet his/her long term goals)  Therapy up to 5 days/week in SNF setting    This discharge recommendation:  Has been made in collaboration with the attending provider and/or case management    IF patient discharges home will need the following DME: to be determined (TBD)       SUBJECTIVE:   Patient stated yes.   to eating breakfast and getting OOB    OBJECTIVE DATA SUMMARY:   Critical Behavior:  Neurologic State: Alert  Orientation Level: Disoriented to situation, Disoriented to time  Cognition: Follows commands, Poor safety awareness, Impulsive  Safety/Judgement: Awareness of environment, Decreased insight into deficits, Decreased awareness of need for safety  Functional Mobility Training:  Bed Mobility:  Rolling: Maximum assistance;Assist x2  Supine to Sit: Maximum assistance;Assist x2     Scooting: Maximum assistance;Assist x2        Transfers:  Sit to Stand: Maximum assistance; Moderate assistance;Assist x2  Stand to Sit: Maximum assistance; Moderate assistance;Assist x2        Bed to Chair: Maximum assistance; Moderate assistance;Assist x2                    Balance:  Sitting: Impaired  Sitting - Static: Fair (occasional)  Sitting - Dynamic: Poor (constant support)  Standing: Impaired  Standing - Static: Poor  Standing - Dynamic : Poor  Ambulation/Gait Training:  Distance (ft): 3 Feet (ft)  Assistive Device: Gait belt; Other (comment)(HHaX2)  Ambulation - Level of Assistance: Maximum assistance; Moderate assistance;Assist x2        Gait Abnormalities: Decreased step clearance; Festinating gait; Path deviations              Speed/Nereida: Slow;Pace decreased (<100 feet/min); Shuffled  Step Length: Right shortened;Left shortened                    Activity Tolerance:   Fair and Poor    After treatment patient left in no apparent distress:   Sitting in chair, Call bell within reach, and Bed / chair alarm activated    COMMUNICATION/COLLABORATION:   The patients plan of care was discussed with: Registered nurse.      Ashleigh Draper PT   Time Calculation: 30 mins
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
## 2021-01-14 NOTE — PROGRESS NOTES
Problem: Self Care Deficits Care Plan (Adult)  Goal: *Acute Goals and Plan of Care (Insert Text)  Description:   FUNCTIONAL STATUS PRIOR TO ADMISSION: Patient recent stroke with dc to [facility] and had dc home for one day before this admission. Patient is poor historian. From chart review she was living with family and indep with ADL tasks. HOME SUPPORT: The patient lived with family. Occupational Therapy Goals  Initiated 1/5/2021  1. Patient will perform grooming standing for ADL tasks with supervision/set-up within 7 day(s). - Continue for consistency  2. Patient will perform lower body dressing with supervision/set-up within 7 day(s). -Continued below  3. Patient will perform bathing with supervision/set-up within 7 day(s). -Continued below  4. Patient will perform toilet transfers with supervision/set-up within 7 day(s). -Continued for consistency  5. Patient will perform all aspects of toileting with supervision/set-up within 7 day(s). -Continued below  6. Patient will participate in upper extremity therapeutic exercise/activities with supervision/set-up for 8 minutes within 7 day(s). -Continued below      Weekly Re-assessment 1/13/2021  1. Patient will perform grooming standing for ADL tasks with supervision/set-up within 7 day(s). 2.  Patient will perform lower body dressing with supervision/set-up within 7 day(s). 3.  Patient will perform bathing with supervision/set-up within 7 day(s). 4.  Patient will perform toilet transfers with supervision/set-up within 7 day(s). 5.  Patient will perform all aspects of toileting with supervision/set-up within 7 day(s). 6.  Patient will participate in upper extremity therapeutic exercise/activities with supervision/set-up for 8 minutes within 7 day(s).       Outcome: Progressing Towards Goal     OCCUPATIONAL THERAPY TREATMENT  Patient: Mata James (89 y.o. female)  Date: 1/14/2021  Diagnosis: Altered mental status [R41.82]  Sepsis ([address] [A41.9]  Leukocytosis [D72.829]  Abnormal urinalysis [R82.90]  Tachycardia [R00.0]  Dehydration [E86.0]  Lactic acidosis [E87.2]  Anemia [D64.9] <principal problem not specified>       Precautions:    Chart, occupational therapy assessment, plan of care, and goals were reviewed. ASSESSMENT  Patient continues with skilled OT services and is progressing slowly towards goals. Pt received seated in recliner, initially drowsy but alerted to voice. In supported sit in chair pt performed simple grooming and feeding ADLs with min A, required increased time and max multimodal cueing for initiation and sequencing of tasks. Also performed UE ex while seated in chair, required min physical assistance to coordinate movement, pt fatigued quickly with reps. Remained in chair at end of session with alarm set. Pt is well below her functional baseline at this time and will benefit from continued therapy to increase independence and safety. Current Level of Function Impacting Discharge (ADLs): min-total A ADLs    Other factors to consider for discharge: fall risk, confusion         PLAN :  Patient continues to benefit from skilled intervention to address the above impairments. Continue treatment per established plan of care. to address goals. Recommendation for discharge: (in order for the patient to meet his/her long term goals)  Therapy up to 5 days/week in SNF setting    This discharge recommendation:  Has been made in collaboration with the attending provider and/or case management    IF patient discharges home will need the following DME: TBD       SUBJECTIVE:   Patient stated Ok.     OBJECTIVE DATA SUMMARY:   Cognitive/Behavioral Status:  Neurologic State: Confused  Orientation Level: Oriented to person  Cognition: Decreased command following;Decreased attention/concentration  Perception: Appears intact  Perseveration: No perseveration noted  Safety/Judgement: Fall prevention;Decreased insight into deficits; Decreased awareness of need for safety    Functional Mobility and Transfers for ADLs:  Bed Mobility:  Rolling: Maximum assistance;Assist x2  Supine to Sit: Maximum assistance;Assist x2  Scooting: Maximum assistance;Assist x2    Transfers:  Sit to Stand: Maximum assistance; Moderate assistance;Assist x2     Bed to Chair: Maximum assistance; Moderate assistance;Assist x2    Balance:  Sitting: Impaired  Sitting - Static: Fair (occasional)  Sitting - Dynamic: Poor (constant support)  Standing: Impaired  Standing - Static: Poor  Standing - Dynamic : Poor    ADL Intervention:  Feeding  Food to Mouth: Minimum assistance  Drink to Mouth: Set-up    Grooming  Position Performed: Seated in chair  Washing Face: Minimum assistance  Washing Hands: Minimum assistance  Cues: Verbal cues provided;Visual cues provided; Tactile cues provided(for initiation and sequencing)      Cognitive Retraining  Safety/Judgement: Fall prevention;Decreased insight into deficits; Decreased awareness of need for safety    Therapeutic Exercises:   1x10 bicep curls and shoulder flexion to 90 degrees- pt required visual cueing and occasional physical assistance to achieve movements    Pain:  Pt reported mild R shoulder pain during session    Activity Tolerance:   Poor    After treatment patient left in no apparent distress:   Sitting in chair, Call bell within reach, and Bed / chair alarm activated    COMMUNICATION/COLLABORATION:   The patients plan of care was discussed with: Physical therapist and Registered nurse.      Asia Francois OT  Time Calculation: 24 mins

## 2021-01-15 LAB
ALBUMIN SERPL-MCNC: 3.2 G/DL (ref 3.5–5)
ALBUMIN/GLOB SERPL: 0.7 {RATIO} (ref 1.1–2.2)
ALP SERPL-CCNC: 68 U/L (ref 45–117)
ALT SERPL-CCNC: 13 U/L (ref 12–78)
ANION GAP SERPL CALC-SCNC: 3 MMOL/L (ref 5–15)
APPEARANCE UR: ABNORMAL
AST SERPL-CCNC: 28 U/L (ref 15–37)
BACTERIA URNS QL MICRO: NEGATIVE /HPF
BASOPHILS # BLD: 0 K/UL (ref 0–0.1)
BASOPHILS NFR BLD: 0 % (ref 0–1)
BILIRUB SERPL-MCNC: 0.6 MG/DL (ref 0.2–1)
BILIRUB UR QL CFM: NEGATIVE
BILIRUB UR QL: NEGATIVE
BUN SERPL-MCNC: 17 MG/DL (ref 6–20)
BUN/CREAT SERPL: 17 (ref 12–20)
CALCIUM SERPL-MCNC: 10 MG/DL (ref 8.5–10.1)
CHLORIDE SERPL-SCNC: 107 MMOL/L (ref 97–108)
CO2 SERPL-SCNC: 29 MMOL/L (ref 21–32)
COLOR UR: ABNORMAL
CREAT SERPL-MCNC: 0.99 MG/DL (ref 0.55–1.02)
DIFFERENTIAL METHOD BLD: ABNORMAL
EOSINOPHIL # BLD: 0.4 K/UL (ref 0–0.4)
EOSINOPHIL NFR BLD: 4 % (ref 0–7)
EPITH CASTS URNS QL MICRO: ABNORMAL /LPF
ERYTHROCYTE [DISTWIDTH] IN BLOOD BY AUTOMATED COUNT: 13.2 % (ref 11.5–14.5)
GLOBULIN SER CALC-MCNC: 4.8 G/DL (ref 2–4)
GLUCOSE SERPL-MCNC: 91 MG/DL (ref 65–100)
GLUCOSE UR STRIP.AUTO-MCNC: NEGATIVE MG/DL
HCT VFR BLD AUTO: 36.1 % (ref 35–47)
HGB BLD-MCNC: 11.5 G/DL (ref 11.5–16)
HGB UR QL STRIP: NEGATIVE
IMM GRANULOCYTES # BLD AUTO: 0 K/UL (ref 0–0.04)
IMM GRANULOCYTES NFR BLD AUTO: 0 % (ref 0–0.5)
KETONES UR QL STRIP.AUTO: NEGATIVE MG/DL
LEUKOCYTE ESTERASE UR QL STRIP.AUTO: ABNORMAL
LYMPHOCYTES # BLD: 2.9 K/UL (ref 0.8–3.5)
LYMPHOCYTES NFR BLD: 30 % (ref 12–49)
MCH RBC QN AUTO: 31.4 PG (ref 26–34)
MCHC RBC AUTO-ENTMCNC: 31.9 G/DL (ref 30–36.5)
MCV RBC AUTO: 98.6 FL (ref 80–99)
MONOCYTES # BLD: 0.7 K/UL (ref 0–1)
MONOCYTES NFR BLD: 7 % (ref 5–13)
NEUTS SEG # BLD: 5.7 K/UL (ref 1.8–8)
NEUTS SEG NFR BLD: 59 % (ref 32–75)
NITRITE UR QL STRIP.AUTO: NEGATIVE
NRBC # BLD: 0 K/UL (ref 0–0.01)
NRBC BLD-RTO: 0 PER 100 WBC
PH UR STRIP: 5 [PH] (ref 5–8)
PLATELET # BLD AUTO: 250 K/UL (ref 150–400)
PMV BLD AUTO: 11 FL (ref 8.9–12.9)
POTASSIUM SERPL-SCNC: 4 MMOL/L (ref 3.5–5.1)
PROT SERPL-MCNC: 8 G/DL (ref 6.4–8.2)
PROT UR STRIP-MCNC: NEGATIVE MG/DL
RBC # BLD AUTO: 3.66 M/UL (ref 3.8–5.2)
RBC #/AREA URNS HPF: ABNORMAL /HPF (ref 0–5)
SODIUM SERPL-SCNC: 139 MMOL/L (ref 136–145)
SP GR UR REFRACTOMETRY: 1.02 (ref 1–1.03)
UA: UC IF INDICATED,UAUC: ABNORMAL
UROBILINOGEN UR QL STRIP.AUTO: 0.2 EU/DL (ref 0.2–1)
WBC # BLD AUTO: 9.7 K/UL (ref 3.6–11)
WBC URNS QL MICRO: ABNORMAL /HPF (ref 0–4)

## 2021-01-15 PROCEDURE — 51798 US URINE CAPACITY MEASURE: CPT

## 2021-01-15 PROCEDURE — 74011250637 HC RX REV CODE- 250/637: Performed by: FAMILY MEDICINE

## 2021-01-15 PROCEDURE — 74011250637 HC RX REV CODE- 250/637: Performed by: NURSE PRACTITIONER

## 2021-01-15 PROCEDURE — 74011250636 HC RX REV CODE- 250/636: Performed by: NURSE PRACTITIONER

## 2021-01-15 PROCEDURE — 80053 COMPREHEN METABOLIC PANEL: CPT

## 2021-01-15 PROCEDURE — 85025 COMPLETE CBC W/AUTO DIFF WBC: CPT

## 2021-01-15 PROCEDURE — 65660000000 HC RM CCU STEPDOWN

## 2021-01-15 PROCEDURE — 81001 URINALYSIS AUTO W/SCOPE: CPT

## 2021-01-15 PROCEDURE — 36415 COLL VENOUS BLD VENIPUNCTURE: CPT

## 2021-01-15 PROCEDURE — 97116 GAIT TRAINING THERAPY: CPT

## 2021-01-15 RX ORDER — LORAZEPAM 0.5 MG/1
0.5 TABLET ORAL
Status: DISCONTINUED | OUTPATIENT
Start: 2021-01-15 | End: 2021-01-16 | Stop reason: HOSPADM

## 2021-01-15 RX ADMIN — LEVETIRACETAM 500 MG: 500 TABLET ORAL at 09:19

## 2021-01-15 RX ADMIN — Medication 5 TABLET: at 09:19

## 2021-01-15 RX ADMIN — QUETIAPINE FUMARATE 25 MG: 25 TABLET ORAL at 17:08

## 2021-01-15 RX ADMIN — QUETIAPINE FUMARATE 25 MG: 25 TABLET ORAL at 21:00

## 2021-01-15 RX ADMIN — LORAZEPAM 0.5 MG: 0.5 TABLET ORAL at 21:00

## 2021-01-15 RX ADMIN — LORAZEPAM 0.5 MG: 0.5 TABLET ORAL at 14:45

## 2021-01-15 RX ADMIN — SERTRALINE HYDROCHLORIDE 100 MG: 50 TABLET ORAL at 09:19

## 2021-01-15 RX ADMIN — PANTOPRAZOLE SODIUM 40 MG: 40 TABLET, DELAYED RELEASE ORAL at 09:19

## 2021-01-15 RX ADMIN — ASPIRIN 81 MG: 81 TABLET, COATED ORAL at 09:19

## 2021-01-15 RX ADMIN — ATORVASTATIN CALCIUM 20 MG: 20 TABLET, FILM COATED ORAL at 09:19

## 2021-01-15 RX ADMIN — LOSARTAN POTASSIUM 25 MG: 25 TABLET, FILM COATED ORAL at 10:41

## 2021-01-15 RX ADMIN — LEVETIRACETAM 500 MG: 500 TABLET ORAL at 21:00

## 2021-01-15 RX ADMIN — LORAZEPAM 1 MG: 2 INJECTION INTRAMUSCULAR; INTRAVENOUS at 07:28

## 2021-01-15 NOTE — PROGRESS NOTES
Patient resting calmly in bed. Declined working with OT at this time.      Ciaran Cueto MS, OTR/L, CSRS

## 2021-01-15 NOTE — PROGRESS NOTES
Spoke to physician this morning. Bladder scanned patient at 0730 and she had 450 in bladder. I straight cathed patient and received verbal orders that if she does not use the bathroom by herself today to straight cath her again and do a UA.

## 2021-01-15 NOTE — PROGRESS NOTES
Problem: Mobility Impaired (Adult and Pediatric)  Goal: *Acute Goals and Plan of Care (Insert Text)  Description: FUNCTIONAL STATUS PRIOR TO ADMISSION: Pt is a limited historian. Prior to CVA 3 weeks ago pt was independent. Pt was discharged from rehab and readmitted to hospital within 24 hours. HOME SUPPORT PRIOR TO ADMISSION: The patient lived with family members. Physical Therapy Goals  Re-Assessed 1/11/2021  1. Patient will move from supine to sit and sit to supine , scoot up and down, and roll side to side in bed with supervision/set-up within 7 day(s). 2.  Patient will transfer from bed to chair and chair to bed with minimal assistance/contact guard assist using the least restrictive device within 7 day(s). 3.  Patient will perform sit to stand with supervision/set-up within 7 day(s). 4.  Patient will ambulate with minimal assistance/contact guard assist for 150 feet with the least restrictive device within 7 day(s). 5.  Patient will improve Cash Balance score by 7 points within 7 days. Physical Therapy Goals  Initiated 1/4/2021  1. Patient will move from supine to sit and sit to supine , scoot up and down, and roll side to side in bed with minimal assistance/contact guard assist within 7 day(s). 2.  Patient will transfer from bed to chair and chair to bed with minimal assistance/contact guard assist using the least restrictive device within 7 day(s). 3.  Patient will perform sit to stand with minimal assistance/contact guard assist within 7 day(s). 4.  Patient will ambulate with minimal assistance/contact guard assist for 50 feet with the least restrictive device within 7 day(s).          Outcome: Progressing Towards Goal  Note:   PHYSICAL THERAPY TREATMENT  Patient: Diego Field (43 y.o. female)  Date: 1/15/2021  Diagnosis: Altered mental status [R41.82]  Sepsis ([address] [A41.9]  Leukocytosis [D72.829]  Abnormal urinalysis [R82.90]  Tachycardia [R00.0]  Dehydration [E86.0]  Lactic acidosis [E87.2]  Anemia [D64.9] <principal problem not specified>       Precautions:    Chart, physical therapy assessment, plan of care and goals were reviewed. ASSESSMENT  Patient continues with skilled PT services and slowly progressing towards goals. Pt alert and oriented to self only. Min A for gait training, today pt preferred to use RW with occasional verbal and tactile cues for path finding. Pt returned to chair with activity apron. Current Level of Function Impacting Discharge (mobility/balance): Min A     Other factors to consider for discharge:          PLAN :  Patient continues to benefit from skilled intervention to address the above impairments. Continue treatment per established plan of care. to address goals. Recommendation for discharge: (in order for the patient to meet his/her long term goals)  Therapy up to 5 days/week in SNF setting or intensive home health therapy program if family  can provide physical assistance for all mobility tasks. This discharge recommendation:  Has been made in collaboration with the attending provider and/or case management    IF patient discharges home will need the following DME: to be determined (TBD)       SUBJECTIVE:   Patient stated ok.     OBJECTIVE DATA SUMMARY:   Critical Behavior:  Neurologic State: Confused  Orientation Level: Oriented to person  Cognition: Decreased attention/concentration, Decreased command following, Impaired decision making  Safety/Judgement: Fall prevention, Decreased insight into deficits, Decreased awareness of need for safety  Functional Mobility Training:  Bed Mobility:                    Transfers:  Sit to Stand: Minimum assistance;Assist x1;Additional time  Stand to Sit: Minimum assistance;Assist x1;Additional time                             Balance:  Sitting: Intact; High guard  Sitting - Static: Good (unsupported)  Sitting - Dynamic: Fair (occasional)  Standing: Impaired; With support  Standing - Static: Constant support; Fair  Standing - Dynamic : Fair  Ambulation/Gait Training:  Distance (ft): 150 Feet (ft)  Assistive Device: Walker, rolling;Gait belt  Ambulation - Level of Assistance: Minimal assistance;Assist x1;Additional time        Gait Abnormalities: Step to gait; Decreased step clearance; Path deviations        Base of Support: Narrowed     Speed/Nereida: Slow                       Stairs: Therapeutic Exercises:   Supported standing march, heel raises  Pain Rating:  Denies pain    Activity Tolerance:   Good    After treatment patient left in no apparent distress:   Sitting in chair, Call bell within reach, and Bed / chair alarm activated    COMMUNICATION/COLLABORATION:   The patients plan of care was discussed with: Registered nurseShaka Jorge   Time Calculation: 32 mins
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
## 2021-01-15 NOTE — PROGRESS NOTES
1/15/2021 3:57 PM Pt's medications changed to po, following for pt's stability with behaviors. Noted pt have urinary retention, pt has required straight catheterization 2x today. Pt's RN reported she has sent a UA on pt. Planning for pt to return home at discharge with 24 hour family support at discharge, this was discussed with pt's daughters, Colonel Pulido and Rian Arguello and pt's sons Mary Anne and Dayanna Paz. Pt lives at home with her son, Mary Anne and Serge's daughter will be at home to assist. Home health will not be able to be arranged due to insurance limitations, pt's family is aware.  Elizabeth Boston, SHONAW

## 2021-01-16 VITALS
RESPIRATION RATE: 16 BRPM | WEIGHT: 174.7 LBS | OXYGEN SATURATION: 98 % | SYSTOLIC BLOOD PRESSURE: 128 MMHG | HEART RATE: 82 BPM | TEMPERATURE: 98.4 F | DIASTOLIC BLOOD PRESSURE: 60 MMHG | HEIGHT: 64 IN | BODY MASS INDEX: 29.82 KG/M2

## 2021-01-16 LAB
ALBUMIN SERPL-MCNC: 3.3 G/DL (ref 3.5–5)
ALBUMIN/GLOB SERPL: 0.7 {RATIO} (ref 1.1–2.2)
ALP SERPL-CCNC: 67 U/L (ref 45–117)
ALT SERPL-CCNC: 12 U/L (ref 12–78)
ANION GAP SERPL CALC-SCNC: 2 MMOL/L (ref 5–15)
AST SERPL-CCNC: 26 U/L (ref 15–37)
BASOPHILS # BLD: 0 K/UL (ref 0–0.1)
BASOPHILS NFR BLD: 0 % (ref 0–1)
BILIRUB SERPL-MCNC: 0.6 MG/DL (ref 0.2–1)
BUN SERPL-MCNC: 17 MG/DL (ref 6–20)
BUN/CREAT SERPL: 18 (ref 12–20)
CALCIUM SERPL-MCNC: 9.8 MG/DL (ref 8.5–10.1)
CHLORIDE SERPL-SCNC: 108 MMOL/L (ref 97–108)
CO2 SERPL-SCNC: 29 MMOL/L (ref 21–32)
CREAT SERPL-MCNC: 0.92 MG/DL (ref 0.55–1.02)
DIFFERENTIAL METHOD BLD: ABNORMAL
EOSINOPHIL # BLD: 0.4 K/UL (ref 0–0.4)
EOSINOPHIL NFR BLD: 4 % (ref 0–7)
ERYTHROCYTE [DISTWIDTH] IN BLOOD BY AUTOMATED COUNT: 13.1 % (ref 11.5–14.5)
GLOBULIN SER CALC-MCNC: 4.7 G/DL (ref 2–4)
GLUCOSE SERPL-MCNC: 88 MG/DL (ref 65–100)
HCT VFR BLD AUTO: 34.2 % (ref 35–47)
HGB BLD-MCNC: 11.1 G/DL (ref 11.5–16)
IMM GRANULOCYTES # BLD AUTO: 0 K/UL (ref 0–0.04)
IMM GRANULOCYTES NFR BLD AUTO: 0 % (ref 0–0.5)
LYMPHOCYTES # BLD: 2.8 K/UL (ref 0.8–3.5)
LYMPHOCYTES NFR BLD: 26 % (ref 12–49)
MCH RBC QN AUTO: 32.2 PG (ref 26–34)
MCHC RBC AUTO-ENTMCNC: 32.5 G/DL (ref 30–36.5)
MCV RBC AUTO: 99.1 FL (ref 80–99)
MONOCYTES # BLD: 0.7 K/UL (ref 0–1)
MONOCYTES NFR BLD: 6 % (ref 5–13)
NEUTS SEG # BLD: 6.7 K/UL (ref 1.8–8)
NEUTS SEG NFR BLD: 64 % (ref 32–75)
NRBC # BLD: 0 K/UL (ref 0–0.01)
NRBC BLD-RTO: 0 PER 100 WBC
PLATELET # BLD AUTO: 235 K/UL (ref 150–400)
PMV BLD AUTO: 10.7 FL (ref 8.9–12.9)
POTASSIUM SERPL-SCNC: 3.8 MMOL/L (ref 3.5–5.1)
PROT SERPL-MCNC: 8 G/DL (ref 6.4–8.2)
RBC # BLD AUTO: 3.45 M/UL (ref 3.8–5.2)
SODIUM SERPL-SCNC: 139 MMOL/L (ref 136–145)
WBC # BLD AUTO: 10.6 K/UL (ref 3.6–11)

## 2021-01-16 PROCEDURE — 85025 COMPLETE CBC W/AUTO DIFF WBC: CPT

## 2021-01-16 PROCEDURE — 36415 COLL VENOUS BLD VENIPUNCTURE: CPT

## 2021-01-16 PROCEDURE — 74011250637 HC RX REV CODE- 250/637: Performed by: FAMILY MEDICINE

## 2021-01-16 PROCEDURE — 80053 COMPREHEN METABOLIC PANEL: CPT

## 2021-01-16 RX ORDER — QUETIAPINE FUMARATE 25 MG/1
25 TABLET, FILM COATED ORAL 2 TIMES DAILY
Qty: 60 TAB | Refills: 0 | Status: SHIPPED | OUTPATIENT
Start: 2021-01-16 | End: 2021-01-29

## 2021-01-16 RX ADMIN — LEVETIRACETAM 500 MG: 500 TABLET ORAL at 09:48

## 2021-01-16 RX ADMIN — ATORVASTATIN CALCIUM 20 MG: 20 TABLET, FILM COATED ORAL at 09:48

## 2021-01-16 RX ADMIN — Medication 5 TABLET: at 09:47

## 2021-01-16 RX ADMIN — ASPIRIN 81 MG: 81 TABLET, COATED ORAL at 09:47

## 2021-01-16 RX ADMIN — LORAZEPAM 0.5 MG: 0.5 TABLET ORAL at 09:47

## 2021-01-16 RX ADMIN — SERTRALINE HYDROCHLORIDE 100 MG: 50 TABLET ORAL at 09:48

## 2021-01-16 RX ADMIN — PANTOPRAZOLE SODIUM 40 MG: 40 TABLET, DELAYED RELEASE ORAL at 09:48

## 2021-01-16 RX ADMIN — LOSARTAN POTASSIUM 25 MG: 25 TABLET, FILM COATED ORAL at 09:48

## 2021-01-16 NOTE — PROGRESS NOTES
3:33 PM  Final calls from all home healths identifying they are not able to provide care to patient either due to area, or payor source. Family aware. Daughter had catheter training with nursing at discharge, to get to PCP soon next week. 1:15 PM  Message through [employer] from [facility] saying they cannot accept patient. They suggested [facility] (cannot ) and [facility]. Call placed to [facility]. Call received from [facility] and they do not go to that area. Awaiting word from [facility] and [facility] ([facility] yesterday said no but today's rep said maybe)    12:54 PM  Call received from [address], [facility] and [facility] confiming they go to that area, and asking for the referral. Referrals sent in [employer]     [facility] said they are listed in medicare. gov but do not go to [facility],  [facility] identified they do not take that insurance and suggested [facility] and [facility]. 12:33 PM  Spoke with Dr. Karol Zurita regarding follow up by [facility] needed for this patient who is for discharge today, pending decision regarding a need for a price due to retention. Exploring agencies through [employer] gov list to identify who can follow. Placed referrals through [employer].
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
## 2021-01-16 NOTE — DISCHARGE INSTRUCTIONS
Patient Discharge Instructions    Rosie Bustos / [id number] : 1950    Admitted 2021 Discharged: 2021 10:07 AM     ACUTE DIAGNOSES:  Altered mental status [R41.82]  Sepsis ([facility] [A41.9]  Leukocytosis [D72.829]  Abnormal urinalysis [R82.90]  Tachycardia [R00.0]  Dehydration [E86.0]  Lactic acidosis [E87.2]  Anemia [D64.9]    CHRONIC MEDICAL DIAGNOSES:  Problem List as of 2021 Date Reviewed: 2018          Codes Class Noted - Resolved    Dehydration ICD-10-CM: E86.0  ICD-9-CM: 276.51  2021 - Present        Altered mental status ICD-10-CM: R41.82  ICD-9-CM: 780.97  2021 - Present        Anemia ICD-10-CM: D64.9  ICD-9-CM: 285.9  2021 - Present        Tachycardia ICD-10-CM: R00.0  ICD-9-CM: 785.0  2021 - Present        Abnormal urinalysis ICD-10-CM: R82.90  ICD-9-CM: 791.9  2021 - Present        Sepsis ([facility] ICD-10-CM: A41.9  ICD-9-CM: 038.9, 995.91  2021 - Present        Lactic acidosis ICD-10-CM: E87.2  ICD-9-CM: 276.2  10/24/2020 - Present        SIRS (systemic inflammatory response syndrome) ([facility] ICD-10-CM: R65.10  ICD-9-CM: 995.90  2018 - Present        Sinus tachycardia ICD-10-CM: R00.0  ICD-9-CM: 427.89  2018 - Present        Hypertension ICD-10-CM: I10  ICD-9-CM: 401.9  Unknown - Present        Diabetes ([facility] ICD-10-CM: E11.9  ICD-9-CM: 250.00  Unknown - Present        Anxiety and depression ICD-10-CM: F41.9, F32.9  ICD-9-CM: 300.00, 311  Unknown - Present        Seizure disorder ([facility] ICD-10-CM: G40.909  ICD-9-CM: 345.90  Unknown - Present        Vomiting ICD-10-CM: R11.10  ICD-9-CM: 787.03  2018 - Present        Hypokalemia ICD-10-CM: E87.6  ICD-9-CM: 276.8  2018 - Present        Obese ICD-10-CM: E66.9  ICD-9-CM: 278.00  Unknown - Present        DM (diabetes mellitus) ([facility] ICD-10-CM: E11.9  ICD-9-CM: 250.00  2018 - Present        Leukocytosis ICD-10-CM: I14.433  ICD-9-CM: 288.60  2018 - Present        RESOLVED: Encephalopathy acute ICD-10-CM: G93.40  ICD-9-CM: 348.30  6/7/2018 - 9/27/2018        RESOLVED: Seizure ([address] ICD-10-CM: R56.9  ICD-9-CM: 780.39  6/7/2018 - 9/27/2018        RESOLVED: Aspiration into airway ICD-10-CM: B34.909O  ICD-9-CM: 934.9  6/7/2018 - 9/27/2018        RESOLVED: Renal insufficiency ICD-10-CM: N28.9  ICD-9-CM: 593.9  6/7/2018 - 9/27/2018              DISCHARGE MEDICATIONS:         · It is important that you take the medication exactly as they are prescribed. · Keep your medication in the bottles provided by the pharmacist and keep a list of the medication names, dosages, and times to be taken in your wallet. · Do not take other medications without consulting your doctor. DIET:  Resume previous diet  ACTIVITY: Activity as tolerated    ADDITIONAL INFORMATION: If you experience any of the following symptoms then please call your primary care physician or return to the emergency room if you cannot get hold of your doctor: Fever, chills, nausea, vomiting, diarrhea, change in mentation, falling, bleeding, shortness of breath. FOLLOW UP CARE:  Dr. Bladimir Melgoza MD  you are to call and set up an appointment to see them with in 1 week. Follow-up with specialists at directed by them      Information obtained by :  I understand that if any problems occur once I am at home I am to contact my physician. I understand and acknowledge receipt of the instructions indicated above.                                                                                                                                            Physician's or R.N.'s Signature                                                                  Date/Time                                                                                                                                              Patient or Representative Signature                                                          Date/Time
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
## 2021-01-16 NOTE — PROGRESS NOTES
Problem: Falls - Risk of  Goal: *Absence of Falls  Description: Document Shyam Glasgow Fall Risk and appropriate interventions in the flowsheet.   Outcome: Progressing Towards Goal  Note: Fall Risk Interventions:  Mobility Interventions: Bed/chair exit alarm    Mentation Interventions: Bed/chair exit alarm    Medication Interventions: Bed/chair exit alarm    Elimination Interventions: Bed/chair exit alarm

## 2021-01-16 NOTE — PROGRESS NOTES
Spoke to physician this am because patient was straight cathed twice yesterday. Night nurse said she peed once last night but it was unmeasured, I bladder scanned her and she had 300 mL in bladder. Verbal orders to sit patient on toilet and if she cannot void to go home with price. Price was placed before discharge, physician notified and I went over catheter care with daughter.

## 2021-01-16 NOTE — PROGRESS NOTES
CMS Note  1/16/2021    Due to patient's medical condition, patient was unable to obtain there 2nd IMM letter. Patient will be provided a copy for their record.   Bhumi Gann CMS

## 2021-01-18 ENCOUNTER — HOSPITAL ENCOUNTER (INPATIENT)
Age: 71
LOS: 8 days | Discharge: HOME HEALTH | DRG: 872 | End: 2021-01-29
Attending: EMERGENCY MEDICINE | Admitting: FAMILY MEDICINE
Payer: MEDICARE

## 2021-01-18 ENCOUNTER — PATIENT OUTREACH (OUTPATIENT)
Dept: CASE MANAGEMENT | Age: 71
End: 2021-01-18

## 2021-01-18 DIAGNOSIS — R39.89 SUSPECTED UTI: ICD-10-CM

## 2021-01-18 DIAGNOSIS — E87.6 HYPOKALEMIA: ICD-10-CM

## 2021-01-18 DIAGNOSIS — N17.9 AKI (ACUTE KIDNEY INJURY) (HCC): Primary | ICD-10-CM

## 2021-01-18 PROBLEM — I95.9 HYPOTENSION: Status: ACTIVE | Noted: 2021-01-18

## 2021-01-18 LAB
ALBUMIN SERPL-MCNC: 3.5 G/DL (ref 3.5–5)
ALBUMIN/GLOB SERPL: 0.7 {RATIO} (ref 1.1–2.2)
ALP SERPL-CCNC: 68 U/L (ref 45–117)
ALT SERPL-CCNC: 13 U/L (ref 12–78)
ANION GAP SERPL CALC-SCNC: 6 MMOL/L (ref 5–15)
AST SERPL-CCNC: 30 U/L (ref 15–37)
BASOPHILS # BLD: 0.1 K/UL (ref 0–0.1)
BASOPHILS NFR BLD: 0 % (ref 0–1)
BILIRUB SERPL-MCNC: 0.6 MG/DL (ref 0.2–1)
BUN SERPL-MCNC: 25 MG/DL (ref 6–20)
BUN/CREAT SERPL: 15 (ref 12–20)
CALCIUM SERPL-MCNC: 9.6 MG/DL (ref 8.5–10.1)
CHLORIDE SERPL-SCNC: 103 MMOL/L (ref 97–108)
CO2 SERPL-SCNC: 27 MMOL/L (ref 21–32)
CREAT SERPL-MCNC: 1.64 MG/DL (ref 0.55–1.02)
DIFFERENTIAL METHOD BLD: ABNORMAL
EOSINOPHIL # BLD: 0.6 K/UL (ref 0–0.4)
EOSINOPHIL NFR BLD: 4 % (ref 0–7)
ERYTHROCYTE [DISTWIDTH] IN BLOOD BY AUTOMATED COUNT: 13.2 % (ref 11.5–14.5)
GLOBULIN SER CALC-MCNC: 4.8 G/DL (ref 2–4)
GLUCOSE SERPL-MCNC: 115 MG/DL (ref 65–100)
HCT VFR BLD AUTO: 33 % (ref 35–47)
HGB BLD-MCNC: 10.9 G/DL (ref 11.5–16)
IMM GRANULOCYTES # BLD AUTO: 0 K/UL (ref 0–0.04)
IMM GRANULOCYTES NFR BLD AUTO: 0 % (ref 0–0.5)
LYMPHOCYTES # BLD: 2.9 K/UL (ref 0.8–3.5)
LYMPHOCYTES NFR BLD: 23 % (ref 12–49)
MCH RBC QN AUTO: 32.2 PG (ref 26–34)
MCHC RBC AUTO-ENTMCNC: 33 G/DL (ref 30–36.5)
MCV RBC AUTO: 97.6 FL (ref 80–99)
MONOCYTES # BLD: 0.6 K/UL (ref 0–1)
MONOCYTES NFR BLD: 5 % (ref 5–13)
NEUTS SEG # BLD: 8.5 K/UL (ref 1.8–8)
NEUTS SEG NFR BLD: 68 % (ref 32–75)
NRBC # BLD: 0 K/UL (ref 0–0.01)
NRBC BLD-RTO: 0 PER 100 WBC
PLATELET # BLD AUTO: 232 K/UL (ref 150–400)
PMV BLD AUTO: 10.7 FL (ref 8.9–12.9)
POTASSIUM SERPL-SCNC: 3 MMOL/L (ref 3.5–5.1)
PROT SERPL-MCNC: 8.3 G/DL (ref 6.4–8.2)
RBC # BLD AUTO: 3.38 M/UL (ref 3.8–5.2)
SODIUM SERPL-SCNC: 136 MMOL/L (ref 136–145)
WBC # BLD AUTO: 12.7 K/UL (ref 3.6–11)

## 2021-01-18 PROCEDURE — 96375 TX/PRO/DX INJ NEW DRUG ADDON: CPT

## 2021-01-18 PROCEDURE — 36415 COLL VENOUS BLD VENIPUNCTURE: CPT

## 2021-01-18 PROCEDURE — 99283 EMERGENCY DEPT VISIT LOW MDM: CPT

## 2021-01-18 PROCEDURE — 96365 THER/PROPH/DIAG IV INF INIT: CPT

## 2021-01-18 PROCEDURE — 51702 INSERT TEMP BLADDER CATH: CPT

## 2021-01-18 PROCEDURE — 96366 THER/PROPH/DIAG IV INF ADDON: CPT

## 2021-01-18 PROCEDURE — 84443 ASSAY THYROID STIM HORMONE: CPT

## 2021-01-18 PROCEDURE — 85025 COMPLETE CBC W/AUTO DIFF WBC: CPT

## 2021-01-18 PROCEDURE — 80053 COMPREHEN METABOLIC PANEL: CPT

## 2021-01-18 RX ORDER — SODIUM CHLORIDE 9 MG/ML
100 INJECTION, SOLUTION INTRAVENOUS CONTINUOUS
Status: DISCONTINUED | OUTPATIENT
Start: 2021-01-18 | End: 2021-01-28

## 2021-01-18 RX ORDER — POTASSIUM CHLORIDE 7.45 MG/ML
10 INJECTION INTRAVENOUS
Status: COMPLETED | OUTPATIENT
Start: 2021-01-18 | End: 2021-01-19

## 2021-01-18 NOTE — ED TRIAGE NOTES
Patient accompanied by son for triage. Per son patient had a catheter placed recently and patient is complaining that it hurts.

## 2021-01-18 NOTE — PROGRESS NOTES
Patient contacted regarding recent discharge and COVID-19 risk. Discussed COVID-19 related testing which was available at this time. Test results were negative. Patient informed of results, if available? yes    Outreach made within 2 business days of discharge: Yes    Care Transition Nurse/ Ambulatory Care Manager/ LPN Care Coordinator contacted the family by telephone to perform post discharge assessment. Verified name and  with family as identifiers. Spoke with daughter Norma Frausto. [facility] to start tomorrow. Concerned because mother complains of price catheter irritating her. Urine in tube, dark yellow. Advised to encourage more water. Suggested [facility] see her asap-daughter receptive. CTN called [facility] and requested visit but their computer is down and unable to schedule visit at this time. Called daughter back, gave her the [facility] contact info and suggested she call later this afternoon to request visit. Daughter agreeable to plan. Patient has following risk factors of: recent CVA. . CTN/ACM/LPN reviewed discharge instructions, medical action plan and red flags related to discharge diagnosis. Reviewed and educated them on any new and changed medications related to discharge diagnosis. Advised obtaining a 90-day supply of all daily and as-needed medications. Advance Care Planning:   Does patient have an Advance Directive: currently not on file; patient declined education    Education provided regarding infection prevention, and signs and symptoms of COVID-19 and when to seek medical attention with family who verbalized understanding. Discussed exposure protocols and quarantine from [address] you at higher risk for severe illness  and given an opportunity for questions and concerns. The family agrees to contact the COVID-19 hotline [phone] or PCP office for questions related to their healthcare.  CTN/ACM/LPN provided contact information for future reference. From CDC: Are you at higher risk for severe illness?  Wash your hands often.  Avoid close contact (6 feet, which is about two arm lengths) with people who are sick.  Put distance between yourself and other people if COVID-19 is spreading in your community.  Clean and disinfect frequently touched surfaces.  Avoid all cruise travel and non-essential air travel.  Call your healthcare professional if you have concerns about COVID-19 and your underlying condition or if you are sick. For more information on steps you can take to protect yourself, see CDC's How to Protect Yourself      Patient/family/caregiver given information for Get[employer] Loop and agrees to enroll yes  Patient's preferred e-mail:  [email or web address]  Patient's preferred phone number: [phone]  Based on Loop alert triggers, patient will be contacted by nurse care manager for worsening symptoms. Pt will be further monitored by COVID Loop Team, pending account activation by patient.  based on severity of symptoms and risk factors.

## 2021-01-18 NOTE — PROGRESS NOTES
1/18/2021 1:31 PM [address] [place] [address] has accepted pt for home health services. CM spoke with Ilene avila who confirmed she will contact pt's daughter to arrange visit on 1/19. CM called and updated pt's daughter, Nisreen Robertson who was agreeable. 1/18/2021  12:31 PM CM received denials from [facility] and [facility] due to staffing. [facility] and [facility] have denied to insurance. CM sent referral to [facility] via [employer]. Called and notified [facility] intake of referral, they will review. CM called back to [employer] [place] [employer] requesting they reconsider pt as insurance network is limiting home health options, they cannot accept pt. CM called and lvm with [facility] intake requesting they reconsider accepting pt.     1/18/2021 11:17 AM Received call from pt's daughter, LUC([phone]). Nisreen Robertson reported pt has discharged home to her home at [address] [facility] [address] [facility], [address] [place] [address]. Pt's daughter requesting home health be arranged for pt. Pt's daughter did not have a preference of provider. CM sent referrals to [facility], [facility], [address] [facility], and [facility], notifying of pt's new address at her daughter's home. Referrals sent via [employer] and notified pt was discharged home on 1/16.  Fabiano Tuttle, SHONAW

## 2021-01-19 ENCOUNTER — APPOINTMENT (OUTPATIENT)
Dept: CT IMAGING | Age: 71
DRG: 872 | End: 2021-01-19
Attending: FAMILY MEDICINE
Payer: MEDICARE

## 2021-01-19 LAB
ALBUMIN SERPL-MCNC: 3.2 G/DL (ref 3.5–5)
ALBUMIN/GLOB SERPL: 0.7 {RATIO} (ref 1.1–2.2)
ALP SERPL-CCNC: 67 U/L (ref 45–117)
ALT SERPL-CCNC: 13 U/L (ref 12–78)
AMMONIA PLAS-SCNC: 24 UMOL/L
ANION GAP SERPL CALC-SCNC: 4 MMOL/L (ref 5–15)
APPEARANCE UR: ABNORMAL
AST SERPL-CCNC: 34 U/L (ref 15–37)
ATRIAL RATE: 92 BPM
BACTERIA URNS QL MICRO: ABNORMAL /HPF
BASOPHILS # BLD: 0.1 K/UL (ref 0–0.1)
BASOPHILS NFR BLD: 0 % (ref 0–1)
BILIRUB SERPL-MCNC: 0.8 MG/DL (ref 0.2–1)
BILIRUB UR QL CFM: NEGATIVE
BUN SERPL-MCNC: 25 MG/DL (ref 6–20)
BUN/CREAT SERPL: 16 (ref 12–20)
CALCIUM SERPL-MCNC: 9.4 MG/DL (ref 8.5–10.1)
CALCULATED P AXIS, ECG09: 49 DEGREES
CALCULATED R AXIS, ECG10: -7 DEGREES
CALCULATED T AXIS, ECG11: 44 DEGREES
CHLORIDE SERPL-SCNC: 106 MMOL/L (ref 97–108)
CO2 SERPL-SCNC: 28 MMOL/L (ref 21–32)
COLOR UR: ABNORMAL
COMMENT, HOLDF: NORMAL
CREAT SERPL-MCNC: 1.53 MG/DL (ref 0.55–1.02)
DIAGNOSIS, 93000: NORMAL
DIFFERENTIAL METHOD BLD: ABNORMAL
EOSINOPHIL # BLD: 0.5 K/UL (ref 0–0.4)
EOSINOPHIL NFR BLD: 5 % (ref 0–7)
EPITH CASTS URNS QL MICRO: ABNORMAL /LPF
ERYTHROCYTE [DISTWIDTH] IN BLOOD BY AUTOMATED COUNT: 13.3 % (ref 11.5–14.5)
GLOBULIN SER CALC-MCNC: 4.8 G/DL (ref 2–4)
GLUCOSE BLD STRIP.AUTO-MCNC: 112 MG/DL (ref 65–100)
GLUCOSE SERPL-MCNC: 103 MG/DL (ref 65–100)
GLUCOSE UR STRIP.AUTO-MCNC: NEGATIVE MG/DL
HCT VFR BLD AUTO: 31.5 % (ref 35–47)
HGB BLD-MCNC: 10.3 G/DL (ref 11.5–16)
HGB UR QL STRIP: ABNORMAL
IMM GRANULOCYTES # BLD AUTO: 0 K/UL (ref 0–0.04)
IMM GRANULOCYTES NFR BLD AUTO: 0 % (ref 0–0.5)
KETONES UR QL STRIP.AUTO: NEGATIVE MG/DL
LACTATE SERPL-SCNC: 0.9 MMOL/L (ref 0.4–2)
LEUKOCYTE ESTERASE UR QL STRIP.AUTO: ABNORMAL
LYMPHOCYTES # BLD: 2.8 K/UL (ref 0.8–3.5)
LYMPHOCYTES NFR BLD: 24 % (ref 12–49)
MAGNESIUM SERPL-MCNC: 1.8 MG/DL (ref 1.6–2.4)
MCH RBC QN AUTO: 32 PG (ref 26–34)
MCHC RBC AUTO-ENTMCNC: 32.7 G/DL (ref 30–36.5)
MCV RBC AUTO: 97.8 FL (ref 80–99)
MONOCYTES # BLD: 0.7 K/UL (ref 0–1)
MONOCYTES NFR BLD: 7 % (ref 5–13)
NEUTS SEG # BLD: 7.3 K/UL (ref 1.8–8)
NEUTS SEG NFR BLD: 64 % (ref 32–75)
NITRITE UR QL STRIP.AUTO: NEGATIVE
NRBC # BLD: 0 K/UL (ref 0–0.01)
NRBC BLD-RTO: 0 PER 100 WBC
P-R INTERVAL, ECG05: 168 MS
PH UR STRIP: 5 [PH] (ref 5–8)
PHOSPHATE SERPL-MCNC: 3.5 MG/DL (ref 2.6–4.7)
PLATELET # BLD AUTO: 234 K/UL (ref 150–400)
PMV BLD AUTO: 11.5 FL (ref 8.9–12.9)
POTASSIUM SERPL-SCNC: 3.8 MMOL/L (ref 3.5–5.1)
PROT SERPL-MCNC: 8 G/DL (ref 6.4–8.2)
PROT UR STRIP-MCNC: 100 MG/DL
Q-T INTERVAL, ECG07: 382 MS
QRS DURATION, ECG06: 88 MS
QTC CALCULATION (BEZET), ECG08: 472 MS
RBC # BLD AUTO: 3.22 M/UL (ref 3.8–5.2)
RBC #/AREA URNS HPF: ABNORMAL /HPF (ref 0–5)
SALICYLATES SERPL-MCNC: <1.7 MG/DL (ref 2.8–20)
SAMPLES BEING HELD,HOLD: NORMAL
SERVICE CMNT-IMP: ABNORMAL
SODIUM SERPL-SCNC: 138 MMOL/L (ref 136–145)
SP GR UR REFRACTOMETRY: 1.02 (ref 1–1.03)
TROPONIN I SERPL-MCNC: <0.05 NG/ML
TSH SERPL DL<=0.05 MIU/L-ACNC: 3.21 UIU/ML (ref 0.36–3.74)
UR CULT HOLD, URHOLD: NORMAL
UROBILINOGEN UR QL STRIP.AUTO: 1 EU/DL (ref 0.2–1)
VENTRICULAR RATE, ECG03: 92 BPM
WBC # BLD AUTO: 11.4 K/UL (ref 3.6–11)
WBC URNS QL MICRO: >100 /HPF (ref 0–4)
YEAST BUDDING URNS QL: PRESENT

## 2021-01-19 PROCEDURE — 36415 COLL VENOUS BLD VENIPUNCTURE: CPT

## 2021-01-19 PROCEDURE — 96376 TX/PRO/DX INJ SAME DRUG ADON: CPT

## 2021-01-19 PROCEDURE — 99218 HC RM OBSERVATION: CPT

## 2021-01-19 PROCEDURE — 85025 COMPLETE CBC W/AUTO DIFF WBC: CPT

## 2021-01-19 PROCEDURE — 80053 COMPREHEN METABOLIC PANEL: CPT

## 2021-01-19 PROCEDURE — 70450 CT HEAD/BRAIN W/O DYE: CPT

## 2021-01-19 PROCEDURE — 87186 SC STD MICRODIL/AGAR DIL: CPT

## 2021-01-19 PROCEDURE — 82140 ASSAY OF AMMONIA: CPT

## 2021-01-19 PROCEDURE — 96375 TX/PRO/DX INJ NEW DRUG ADDON: CPT

## 2021-01-19 PROCEDURE — 96365 THER/PROPH/DIAG IV INF INIT: CPT

## 2021-01-19 PROCEDURE — 80179 DRUG ASSAY SALICYLATE: CPT

## 2021-01-19 PROCEDURE — 83605 ASSAY OF LACTIC ACID: CPT

## 2021-01-19 PROCEDURE — 74011000258 HC RX REV CODE- 258: Performed by: FAMILY MEDICINE

## 2021-01-19 PROCEDURE — 74011250636 HC RX REV CODE- 250/636: Performed by: FAMILY MEDICINE

## 2021-01-19 PROCEDURE — 74011250637 HC RX REV CODE- 250/637: Performed by: FAMILY MEDICINE

## 2021-01-19 PROCEDURE — 87040 BLOOD CULTURE FOR BACTERIA: CPT

## 2021-01-19 PROCEDURE — 81001 URINALYSIS AUTO W/SCOPE: CPT

## 2021-01-19 PROCEDURE — 84484 ASSAY OF TROPONIN QUANT: CPT

## 2021-01-19 PROCEDURE — 87077 CULTURE AEROBIC IDENTIFY: CPT

## 2021-01-19 PROCEDURE — 84100 ASSAY OF PHOSPHORUS: CPT

## 2021-01-19 PROCEDURE — 94760 N-INVAS EAR/PLS OXIMETRY 1: CPT

## 2021-01-19 PROCEDURE — 93005 ELECTROCARDIOGRAM TRACING: CPT

## 2021-01-19 PROCEDURE — 83735 ASSAY OF MAGNESIUM: CPT

## 2021-01-19 PROCEDURE — 74011000250 HC RX REV CODE- 250: Performed by: FAMILY MEDICINE

## 2021-01-19 PROCEDURE — 87086 URINE CULTURE/COLONY COUNT: CPT

## 2021-01-19 PROCEDURE — 82962 GLUCOSE BLOOD TEST: CPT

## 2021-01-19 RX ORDER — ERGOCALCIFEROL 1.25 MG/1
50000 CAPSULE ORAL
Status: DISCONTINUED | OUTPATIENT
Start: 2021-01-19 | End: 2021-01-29 | Stop reason: HOSPADM

## 2021-01-19 RX ORDER — SERTRALINE HYDROCHLORIDE 50 MG/1
100 TABLET, FILM COATED ORAL DAILY
Status: DISCONTINUED | OUTPATIENT
Start: 2021-01-19 | End: 2021-01-29 | Stop reason: HOSPADM

## 2021-01-19 RX ORDER — LEVETIRACETAM 500 MG/1
500 TABLET ORAL 2 TIMES DAILY
Status: DISCONTINUED | OUTPATIENT
Start: 2021-01-19 | End: 2021-01-29 | Stop reason: HOSPADM

## 2021-01-19 RX ORDER — SODIUM CHLORIDE 0.9 % (FLUSH) 0.9 %
5-40 SYRINGE (ML) INJECTION AS NEEDED
Status: DISCONTINUED | OUTPATIENT
Start: 2021-01-19 | End: 2021-01-29 | Stop reason: HOSPADM

## 2021-01-19 RX ORDER — TAMSULOSIN HYDROCHLORIDE 0.4 MG/1
0.4 CAPSULE ORAL
COMMUNITY
End: 2021-01-29

## 2021-01-19 RX ORDER — SODIUM CHLORIDE 0.9 % (FLUSH) 0.9 %
5-40 SYRINGE (ML) INJECTION EVERY 8 HOURS
Status: DISCONTINUED | OUTPATIENT
Start: 2021-01-19 | End: 2021-01-29 | Stop reason: HOSPADM

## 2021-01-19 RX ORDER — ATORVASTATIN CALCIUM 10 MG/1
20 TABLET, FILM COATED ORAL
COMMUNITY
End: 2021-01-29

## 2021-01-19 RX ORDER — LOSARTAN POTASSIUM 50 MG/1
100 TABLET ORAL DAILY
Status: ON HOLD | COMMUNITY
End: 2021-01-29 | Stop reason: SDUPTHER

## 2021-01-19 RX ORDER — PANTOPRAZOLE SODIUM 40 MG/1
40 TABLET, DELAYED RELEASE ORAL DAILY
Status: DISCONTINUED | OUTPATIENT
Start: 2021-01-19 | End: 2021-01-29 | Stop reason: HOSPADM

## 2021-01-19 RX ORDER — ASPIRIN 81 MG/1
81 TABLET ORAL DAILY
Status: DISCONTINUED | OUTPATIENT
Start: 2021-01-19 | End: 2021-01-29 | Stop reason: HOSPADM

## 2021-01-19 RX ORDER — FAMOTIDINE 20 MG/1
20 TABLET, FILM COATED ORAL DAILY
COMMUNITY
End: 2021-01-29

## 2021-01-19 RX ORDER — ATORVASTATIN CALCIUM 20 MG/1
20 TABLET, FILM COATED ORAL DAILY
Status: DISCONTINUED | OUTPATIENT
Start: 2021-01-19 | End: 2021-01-29 | Stop reason: HOSPADM

## 2021-01-19 RX ORDER — METFORMIN HYDROCHLORIDE 500 MG/1
500 TABLET ORAL 2 TIMES DAILY WITH MEALS
COMMUNITY
End: 2021-01-29

## 2021-01-19 RX ORDER — QUETIAPINE FUMARATE 25 MG/1
25 TABLET, FILM COATED ORAL 2 TIMES DAILY
Status: DISCONTINUED | OUTPATIENT
Start: 2021-01-19 | End: 2021-01-23

## 2021-01-19 RX ADMIN — Medication 10 ML: at 18:44

## 2021-01-19 RX ADMIN — SODIUM CHLORIDE 500 ML: 9 INJECTION, SOLUTION INTRAVENOUS at 00:22

## 2021-01-19 RX ADMIN — Medication 10 ML: at 21:05

## 2021-01-19 RX ADMIN — QUETIAPINE FUMARATE 25 MG: 25 TABLET ORAL at 18:44

## 2021-01-19 RX ADMIN — POTASSIUM CHLORIDE 10 MEQ: 10 INJECTION, SOLUTION INTRAVENOUS at 04:01

## 2021-01-19 RX ADMIN — ERGOCALCIFEROL 50000 UNITS: 1.25 CAPSULE ORAL at 18:44

## 2021-01-19 RX ADMIN — PANTOPRAZOLE SODIUM 40 MG: 40 TABLET, DELAYED RELEASE ORAL at 11:05

## 2021-01-19 RX ADMIN — Medication 10 ML: at 01:59

## 2021-01-19 RX ADMIN — SERTRALINE HYDROCHLORIDE 100 MG: 50 TABLET ORAL at 11:06

## 2021-01-19 RX ADMIN — SODIUM CHLORIDE 125 ML/HR: 9 INJECTION, SOLUTION INTRAVENOUS at 04:01

## 2021-01-19 RX ADMIN — LEVETIRACETAM 500 MG: 500 TABLET ORAL at 11:06

## 2021-01-19 RX ADMIN — CEFTRIAXONE 1 G: 1 INJECTION, POWDER, FOR SOLUTION INTRAMUSCULAR; INTRAVENOUS at 21:05

## 2021-01-19 RX ADMIN — ATORVASTATIN CALCIUM 20 MG: 20 TABLET, FILM COATED ORAL at 11:06

## 2021-01-19 RX ADMIN — CEFTRIAXONE 1 G: 1 INJECTION, POWDER, FOR SOLUTION INTRAMUSCULAR; INTRAVENOUS at 02:02

## 2021-01-19 RX ADMIN — POTASSIUM CHLORIDE 10 MEQ: 10 INJECTION, SOLUTION INTRAVENOUS at 00:22

## 2021-01-19 RX ADMIN — SODIUM CHLORIDE 125 ML/HR: 9 INJECTION, SOLUTION INTRAVENOUS at 01:58

## 2021-01-19 RX ADMIN — POTASSIUM CHLORIDE 10 MEQ: 10 INJECTION, SOLUTION INTRAVENOUS at 02:13

## 2021-01-19 RX ADMIN — QUETIAPINE FUMARATE 25 MG: 25 TABLET ORAL at 11:06

## 2021-01-19 RX ADMIN — ASPIRIN 81 MG: 81 TABLET, COATED ORAL at 11:06

## 2021-01-19 RX ADMIN — LEVETIRACETAM 500 MG: 500 TABLET ORAL at 18:44

## 2021-01-19 RX ADMIN — PIPERACILLIN AND TAZOBACTAM 3.38 G: 3; .375 INJECTION, POWDER, LYOPHILIZED, FOR SOLUTION INTRAVENOUS at 22:59

## 2021-01-19 NOTE — ED NOTES
Bedside and Verbal shift change report given to Des (oncoming nurse) by Kristie Hartman (offgoing nurse). Report included the following information SBAR, ED Summary, MAR and Recent Results.

## 2021-01-19 NOTE — ED NOTES
Bedside and Verbal shift change report given to Demar Diamond (oncoming nurse) by Lisa Leong (offgoing nurse). Report included the following information SBAR, ED Summary, MAR and Recent Results.

## 2021-01-19 NOTE — PROGRESS NOTES
Daily Progress Note: 1/19/2021  Juanito Clock  PCP  Vasquez Gunter MD    Assessment/Plan:   Altered mental status with history of recent stroke  - place on neuro checks and fall precautions  - order dysphagia screen.  Keep NPO until passes the same or speech evaluation     JOSE (acute kidney injury) - likely dehydration  - continue IV fluids for hydration and repeat renal panel in a.m.  - place on strict Is and Os/ daily weights     Acute hypokalemia  - KCl 10 mEq IV over 1 hour x 3 runs and monitor and replace as needed     Hypotension  - IV fluid bolus followed by maintenance IV infusion  - monitor BP closely  - hold all BP medication for now due to hypotension noted tonight      Suprapubic pain  - replaced price  - ordered UA which is now positive for UTI. Order urine culture     UTI  - Rocephin 1 gram IV q 24 hours      History of stroke  - plan as above    Anemia  - chronic. Repeat H/H.       Leukocytosis  - repeat CBC      Generalized weakness/ debility  - place on fall precautions.  Consult PT.     VTE prophylaxis  - Lovenox sq     CODE STATUS: Donna Bowens was unable to discuss code status without the medical capacity to make her own decisions at this time.                 Problem List:  Problem List as of 1/19/2021 Date Reviewed: 9/27/2018          Codes Class Noted - Resolved    Acute hypokalemia ICD-10-CM: E87.6  ICD-9-CM: 276.8  1/18/2021 - Present        Hypotension ICD-10-CM: I95.9  ICD-9-CM: 458.9  1/18/2021 - Present        JOSE (acute kidney injury) ([facility] [address] ICD-10-CM: N17.9  ICD-9-CM: 584.9  1/18/2021 - Present        Dehydration ICD-10-CM: E86.0  ICD-9-CM: 276.51  1/1/2021 - Present        Altered mental status ICD-10-CM: R41.82  ICD-9-CM: 780.97  1/1/2021 - Present        Anemia ICD-10-CM: D64.9  ICD-9-CM: 285.9  1/1/2021 - Present        Tachycardia ICD-10-CM: R00.0  ICD-9-CM: 785.0  1/1/2021 - Present        Abnormal urinalysis ICD-10-CM: R82.90  ICD-9-CM: 791.9  1/1/2021 - Present Sepsis ([place] [id number].) ICD-10-CM: A41.9  ICD-9-CM: 038.9, 995.91  1/1/2021 - Present        Lactic acidosis ICD-10-CM: E87.2  ICD-9-CM: 276.2  10/24/2020 - Present        SIRS (systemic inflammatory response syndrome) (HCC) ICD-10-CM: R65.10  ICD-9-CM: 995.90  9/27/2018 - Present        Sinus tachycardia ICD-10-CM: R00.0  ICD-9-CM: 427.89  9/27/2018 - Present        Hypertension ICD-10-CM: I10  ICD-9-CM: 401.9  Unknown - Present        Diabetes ([place] [address].) ICD-10-CM: E11.9  ICD-9-CM: 250.00  Unknown - Present        Anxiety and depression ICD-10-CM: F41.9, F32.9  ICD-9-CM: 300.00, 311  Unknown - Present        Seizure disorder ([place] [address].) ICD-10-CM: G40.909  ICD-9-CM: 345.90  Unknown - Present        Vomiting ICD-10-CM: R11.10  ICD-9-CM: 787.03  9/27/2018 - Present        Hypokalemia ICD-10-CM: E87.6  ICD-9-CM: 276.8  9/27/2018 - Present        Obese ICD-10-CM: E66.9  ICD-9-CM: 278.00  Unknown - Present        DM (diabetes mellitus) ([place] [address].) ICD-10-CM: E11.9  ICD-9-CM: 250.00  6/7/2018 - Present        Leukocytosis ICD-10-CM: D72.829  ICD-9-CM: 288.60  6/7/2018 - Present        RESOLVED: Encephalopathy acute ICD-10-CM: G93.40  ICD-9-CM: 348.30  6/7/2018 - 9/27/2018        RESOLVED: Seizure ([facility] ICD-10-CM: R56.9  ICD-9-CM: 780.39  6/7/2018 - 9/27/2018        RESOLVED: Aspiration into airway ICD-10-CM: T17.908A  ICD-9-CM: 934.9  6/7/2018 - 9/27/2018        RESOLVED: Renal insufficiency ICD-10-CM: N28.9  ICD-9-CM: 593.9  6/7/2018 - 9/27/2018              HPI:   Mata James is a 72 y.o. female with past medical history of recent stroke, anxiety, depression, type 2 diabetes mellitus (DM), hypertension, obesity, seizure disorder presented to the ED from home with reported pain at price site. Patient is confused and not able to answer questions appropriately at this time. As such, majority of history was obtained per my review of ED and electronic medical records.   Per these reports, patient was admitted to [facility] previously with diagnosis of stroke. She was last hospitalized here at [facility] from 2021 to 2021 with diagnoses of altered mental status (AMS), sepsis secondary to health care associated pneumonia (treated with Cefepime), abnormal urinalysis with negative urine culture, dehydration. After discharge, patient has been living at home with family. She has an indwelling price catheter. Onset of pain symptoms is not documented. On arrival in the ED tonight, initial recorded vital signs were BP= 97/61, HR= 97, RR= 16, O2sat= 100% on room air. WBC  = 12,700. K = 3.0. Hemoglobin = 10.9 (compared to 11.1 on 2021). BUN= 25.  Creatinine= 1.64 (compared to 0.92 on 2021). ED provider requested admission to the hospitalist service. At time of request, I placed order for UA results awaiting collection and results. Nurse notes that patient's old price catheter was removed and a new price was inserted after arrival to the ED. (Dr Rachel Stevenson)    :  Pt is unable to give any meaningful hx. She will arouse to her name. She reports she is not in any pain. She does not know day/date or location. She can occas follow commands (). Unlike last admission (2 days ago) pt is not violent or combative. Pharmacy called me to report that multiple meds she was supposed to be taking at home were not being given - pharmacy has the list but most importantly she was not getting her seizure meds. Will get Case Management to assist in placement - the family cannot take care of this pt at home and APS may need to be consulted. Review of Systems:   Review of systems not obtained due to patient factors.     Objective:   Physical Exam:   Visit Vitals  /64 (BP 1 Location: Left arm, BP Patient Position: At rest;Supine)   Pulse 93   Temp 98.3 °F (36.8 °C)   Resp 16   Ht 5' 4\" (1.626 m)   Wt 175 lb (79.4 kg)   SpO2 98%   BMI 30.04 kg/m²      O2 Device: Room air  Temp (24hrs), Av.4 °F (36.9 °C), Min:98.2 °F (36.8 °C), Max:98.7 °F (37.1 °C)    No intake/output data recorded. 01/17 1901 - 01/19 0700  In: 1054.6 [P.O.:240; I.V.:814.6]  Out: 200 [Urine:200]  General:  Alert, occas cooperative, no distress, appears stated age. Head:  Normocephalic, without obvious abnormality, atraumatic. Eyes:  Conjunctivae/corneas clear. EOMs intact. Throat: Lips, mucosa, and tongue moist   Neck: Supple, symmetrical, trachea midline, no adenopathy, thyroid: no enlargement/tenderness/nodules, no carotid bruit and no JVD. Back:   No CVA tenderness. Lungs:   Clear to auscultation bilaterally. Chest wall:  No tenderness or deformity. Heart:  Regular rate and rhythm, S1, S2 normal, no murmur, click, rub or gallop. Abdomen:   Soft, non-tender. Bowel sounds normal. No masses,  No organomegaly. Extremities: Extremities normal, atraumatic, no cyanosis or edema. No calf tenderness or cords. Pulses: 2+ and symmetric all extremities. Skin: Skin color, texture, turgor normal. No rashes   Neurologic:   Somnolent. Alert and oriented X 1. Off and on she will  lightly to command. No cogwheeling or rigidity. Gait not tested at this time. Data Review:       Recent Days:  Recent Labs     01/19/21  0158 01/18/21  2140   WBC 11.4* 12.7*   HGB 10.3* 10.9*   HCT 31.5* 33.0*    232     Recent Labs     01/19/21  0158 01/18/21  2140    136   K 3.8 3.0*    103   CO2 28 27   * 115*   BUN 25* 25*   CREA 1.53* 1.64*   CA 9.4 9.6   MG 1.8  --    PHOS 3.5  --    ALB 3.2* 3.5   TBILI 0.8 0.6   ALT 13 13     No results for input(s): PH, PCO2, PO2, HCO3, FIO2 in the last 72 hours.     24 Hour Results:  Recent Results (from the past 24 hour(s))   CBC WITH AUTOMATED DIFF    Collection Time: 01/18/21  9:40 PM   Result Value Ref Range    WBC 12.7 (H) 3.6 - 11.0 K/uL    RBC 3.38 (L) 3.80 - 5.20 M/uL    HGB 10.9 (L) 11.5 - 16.0 g/dL    HCT 33.0 (L) 35.0 - 47.0 %    MCV 97.6 80.0 - 99.0 FL    MCH 32.2 26.0 - 34.0 PG    MCHC 33.0 30.0 - 36.5 g/dL    RDW 13.2 11.5 - 14.5 %    PLATELET 985 456 - 903 K/uL    MPV 10.7 8.9 - 12.9 FL    NRBC 0.0 0  WBC    ABSOLUTE NRBC 0.00 0.00 - 0.01 K/uL    NEUTROPHILS 68 32 - 75 %    LYMPHOCYTES 23 12 - 49 %    MONOCYTES 5 5 - 13 %    EOSINOPHILS 4 0 - 7 %    BASOPHILS 0 0 - 1 %    IMMATURE GRANULOCYTES 0 0.0 - 0.5 %    ABS. NEUTROPHILS 8.5 (H) 1.8 - 8.0 K/UL    ABS. LYMPHOCYTES 2.9 0.8 - 3.5 K/UL    ABS. MONOCYTES 0.6 0.0 - 1.0 K/UL    ABS. EOSINOPHILS 0.6 (H) 0.0 - 0.4 K/UL    ABS. BASOPHILS 0.1 0.0 - 0.1 K/UL    ABS. IMM. GRANS. 0.0 0.00 - 0.04 K/UL    DF AUTOMATED     METABOLIC PANEL, COMPREHENSIVE    Collection Time: 01/18/21  9:40 PM   Result Value Ref Range    Sodium 136 136 - 145 mmol/L    Potassium 3.0 (L) 3.5 - 5.1 mmol/L    Chloride 103 97 - 108 mmol/L    CO2 27 21 - 32 mmol/L    Anion gap 6 5 - 15 mmol/L    Glucose 115 (H) 65 - 100 mg/dL    BUN 25 (H) 6 - 20 MG/DL    Creatinine 1.64 (H) 0.55 - 1.02 MG/DL    BUN/Creatinine ratio 15 12 - 20      GFR est AA 38 (L) >60 ml/min/1.73m2    GFR est non-AA 31 (L) >60 ml/min/1.73m2    Calcium 9.6 8.5 - 10.1 MG/DL    Bilirubin, total 0.6 0.2 - 1.0 MG/DL    ALT (SGPT) 13 12 - 78 U/L    AST (SGOT) 30 15 - 37 U/L    Alk.  phosphatase 68 45 - 117 U/L    Protein, total 8.3 (H) 6.4 - 8.2 g/dL    Albumin 3.5 3.5 - 5.0 g/dL    Globulin 4.8 (H) 2.0 - 4.0 g/dL    A-G Ratio 0.7 (L) 1.1 - 2.2     TSH 3RD GENERATION    Collection Time: 01/18/21  9:40 PM   Result Value Ref Range    TSH 3.21 0.36 - 3.74 uIU/mL   URINALYSIS W/MICROSCOPIC    Collection Time: 01/19/21 12:16 AM   Result Value Ref Range    Color YELLOW/STRAW      Appearance CLOUDY (A) CLEAR      Specific gravity 1.020 1.003 - 1.030      pH (UA) 5.0 5.0 - 8.0      Protein 100 (A) NEG mg/dL    Glucose Negative NEG mg/dL    Ketone Negative NEG mg/dL    Blood LARGE (A) NEG      Urobilinogen 1.0 0.2 - 1.0 EU/dL    Nitrites Negative NEG      Leukocyte Esterase MODERATE (A) NEG      WBC >100 (H) 0 - 4 /hpf    RBC 10-20 0 - 5 /hpf    Epithelial cells FEW FEW /lpf    Bacteria 2+ (A) NEG /hpf    Budding yeast PRESENT (A) NEG     URINE CULTURE HOLD SAMPLE    Collection Time: 01/19/21 12:16 AM    Specimen: Serum; Urine   Result Value Ref Range    Urine culture hold        Urine on hold in Microbiology dept for 2 days. If unpreserved urine is submitted, it cannot be used for addtional testing after 24 hours, recollection will be required. BILIRUBIN, CONFIRM    Collection Time: 01/19/21 12:16 AM   Result Value Ref Range    Bilirubin UA, confirm Negative NEG     EKG, 12 LEAD, INITIAL    Collection Time: 01/19/21 12:52 AM   Result Value Ref Range    Ventricular Rate 92 BPM    Atrial Rate 92 BPM    P-R Interval 168 ms    QRS Duration 88 ms    Q-T Interval 382 ms    QTC Calculation (Bezet) 472 ms    Calculated P Axis 49 degrees    Calculated R Axis -7 degrees    Calculated T Axis 44 degrees    Diagnosis       Normal sinus rhythm  Cannot rule out Anterior infarct (cited on or before 12-JAN-2021)  Abnormal ECG  When compared with ECG of 12-JAN-2021 09:57,  Criteria for Inferior infarct are no longer present  Questionable change in initial forces of Septal leads     METABOLIC PANEL, COMPREHENSIVE    Collection Time: 01/19/21  1:58 AM   Result Value Ref Range    Sodium 138 136 - 145 mmol/L    Potassium 3.8 3.5 - 5.1 mmol/L    Chloride 106 97 - 108 mmol/L    CO2 28 21 - 32 mmol/L    Anion gap 4 (L) 5 - 15 mmol/L    Glucose 103 (H) 65 - 100 mg/dL    BUN 25 (H) 6 - 20 MG/DL    Creatinine 1.53 (H) 0.55 - 1.02 MG/DL    BUN/Creatinine ratio 16 12 - 20      GFR est AA 41 (L) >60 ml/min/1.73m2    GFR est non-AA 34 (L) >60 ml/min/1.73m2    Calcium 9.4 8.5 - 10.1 MG/DL    Bilirubin, total 0.8 0.2 - 1.0 MG/DL    ALT (SGPT) 13 12 - 78 U/L    AST (SGOT) 34 15 - 37 U/L    Alk.  phosphatase 67 45 - 117 U/L    Protein, total 8.0 6.4 - 8.2 g/dL    Albumin 3.2 (L) 3.5 - 5.0 g/dL    Globulin 4.8 (H) 2.0 - 4.0 g/dL    A-G Ratio 0.7 (L) 1.1 - 2.2     AMMONIA    Collection Time: 01/19/21  1:58 AM   Result Value Ref Range    Ammonia 24 <32 UMOL/L   MAGNESIUM    Collection Time: 01/19/21  1:58 AM   Result Value Ref Range    Magnesium 1.8 1.6 - 2.4 mg/dL   PHOSPHORUS    Collection Time: 01/19/21  1:58 AM   Result Value Ref Range    Phosphorus 3.5 2.6 - 4.7 MG/DL   CBC WITH AUTOMATED DIFF    Collection Time: 01/19/21  1:58 AM   Result Value Ref Range    WBC 11.4 (H) 3.6 - 11.0 K/uL    RBC 3.22 (L) 3.80 - 5.20 M/uL    HGB 10.3 (L) 11.5 - 16.0 g/dL    HCT 31.5 (L) 35.0 - 47.0 %    MCV 97.8 80.0 - 99.0 FL    MCH 32.0 26.0 - 34.0 PG    MCHC 32.7 30.0 - 36.5 g/dL    RDW 13.3 11.5 - 14.5 %    PLATELET 836 695 - 009 K/uL    MPV 11.5 8.9 - 12.9 FL    NRBC 0.0 0  WBC    ABSOLUTE NRBC 0.00 0.00 - 0.01 K/uL    NEUTROPHILS 64 32 - 75 %    LYMPHOCYTES 24 12 - 49 %    MONOCYTES 7 5 - 13 %    EOSINOPHILS 5 0 - 7 %    BASOPHILS 0 0 - 1 %    IMMATURE GRANULOCYTES 0 0.0 - 0.5 %    ABS. NEUTROPHILS 7.3 1.8 - 8.0 K/UL    ABS. LYMPHOCYTES 2.8 0.8 - 3.5 K/UL    ABS. MONOCYTES 0.7 0.0 - 1.0 K/UL    ABS. EOSINOPHILS 0.5 (H) 0.0 - 0.4 K/UL    ABS. BASOPHILS 0.1 0.0 - 0.1 K/UL    ABS. IMM. GRANS. 0.0 0.00 - 0.04 K/UL    DF AUTOMATED     TROPONIN I    Collection Time: 01/19/21  1:58 AM   Result Value Ref Range    Troponin-I, Qt. <0.05 <8.98 ng/mL   SALICYLATE    Collection Time: 01/19/21  1:58 AM   Result Value Ref Range    Salicylate level <3.1 (L) 2.8 - 20.0 MG/DL   LACTIC ACID    Collection Time: 01/19/21  1:58 AM   Result Value Ref Range    Lactic acid 0.9 0.4 - 2.0 MMOL/L   SAMPLES BEING HELD    Collection Time: 01/19/21  6:00 AM   Result Value Ref Range    SAMPLES BEING HELD 1UA     COMMENT        Add-on orders for these samples will be processed based on acceptable specimen integrity and analyte stability, which may vary by analyte.        Medications reviewed  Current Facility-Administered Medications   Medication Dose Route Frequency    sodium chloride (NS) flush 5-40 mL  5-40 mL IntraVENous Q8H    sodium chloride (NS) flush 5-40 mL  5-40 mL IntraVENous PRN    cefTRIAXone (ROCEPHIN) 1 g in sterile water (preservative free) 10 mL IV syringe  1 g IntraVENous Q24H    aspirin delayed-release tablet 81 mg  81 mg Oral DAILY    atorvastatin (LIPITOR) tablet 20 mg  20 mg Oral DAILY    levETIRAcetam (KEPPRA) tablet 500 mg  500 mg Oral BID    pantoprazole (PROTONIX) tablet 40 mg  40 mg Oral DAILY    QUEtiapine (SEROquel) tablet 25 mg  25 mg Oral BID    sertraline (ZOLOFT) tablet 100 mg  100 mg Oral DAILY    0.9% sodium chloride infusion  125 mL/hr IntraVENous CONTINUOUS     Current Outpatient Medications   Medication Sig    QUEtiapine (SEROquel) 25 mg tablet Take 1 Tab by mouth two (2) times a day.  acetaminophen (TYLENOL) 325 mg tablet Take 2 Tabs by mouth every six (6) hours as needed for Pain or Fever.  aspirin delayed-release 81 mg tablet Take 1 Tab by mouth daily.  atorvastatin (LIPITOR) 20 mg tablet Take 1 Tab by mouth daily.  sertraline (ZOLOFT) 100 mg tablet Take 1 Tab by mouth daily.  levETIRAcetam (KEPPRA) 500 mg tablet Take 1 Tab by mouth two (2) times a day.  pantoprazole (PROTONIX) 40 mg tablet Take 1 Tab by mouth daily.  losartan (COZAAR) 25 mg tablet Take 1 Tab by mouth two (2) times a day. Care Plan discussed with: Patient/Nurse/Pharmacy  Total time spent with patient and review of records: 30 minutes.   Junior Marquez MD

## 2021-01-19 NOTE — H&P
History & Physical    Date of admission: 1/18/2021    Patient name: Reji Arriola  MRN: [id number]  YOB: 1950  Age: 72 y.o. Primary care provider:  Amado Kim MD     Source of Information:  Patient, ED and electronic medical records                              Chief complaint:  Patient does not provide    History of present illness  Reji Arriola is a 72 y.o. female with past medical history of recent stroke, anxiety, depression, type 2 diabetes mellitus (DM), hypertension, obesity, seizure disorder presented to the ED from home with reported pain at price site. Patient is confused and not able to answer questions appropriately at this time. As such, majority of history was obtained per my review of ED and electronic medical records. Per these reports, patient was admitted to [facility] previously with diagnosis of stroke. She was last hospitalized here at [facility] from 1/1/2021 to 1/16/2021 with diagnoses of altered mental status (AMS), sepsis secondary to health care associated pneumonia (treated with Cefepime), abnormal urinalysis with negative urine culture, dehydration. After discharge, patient has been living at home with family. She has an indwelling price catheter. Onset of pain symptoms is not documented. On arrival in the ED tonight, initial recorded vital signs were BP= 97/61, HR= 97, RR= 16, O2sat= 100% on room air. WBC  = 12,700. K = 3.0. Hemoglobin = 10.9 (compared to 11.1 on 1/16/2021). BUN= 25.  Creatinine= 1.64 (compared to 0.92 on 1/16/2021). ED provider requested admission to the hospitalist service. At time of request, I placed order for UA results awaiting collection and results. Nurse notes that patient's old price catheter was removed and a new price was inserted after arrival to the ED.      Past Medical History:   Diagnosis Date    Anxiety and depression     Diabetes ([facility]     Hypertension     Obese     Seizure disorder ([facility]      - stroke    - pneumonia       MEDICATIONS:  Prior to Admission medications    Medication Sig Start Date End Date Taking? Authorizing Provider   QUEtiapine (SEROquel) 25 mg tablet Take 1 Tab by mouth two (2) times a day. 1/16/21   Katie Sahni MD   acetaminophen (TYLENOL) 325 mg tablet Take 2 Tabs by mouth every six (6) hours as needed for Pain or Fever. 10/26/20   Evonne Goff MD   aspirin delayed-release 81 mg tablet Take 1 Tab by mouth daily. 10/26/20   Evonne Goff MD   atorvastatin (LIPITOR) 20 mg tablet Take 1 Tab by mouth daily. 10/26/20   Evonne Goff MD   sertraline (ZOLOFT) 100 mg tablet Take 1 Tab by mouth daily. 10/26/20   Evonne Goff MD   levETIRAcetam (KEPPRA) 500 mg tablet Take 1 Tab by mouth two (2) times a day. 10/26/20   Evonne Goff MD   pantoprazole (PROTONIX) 40 mg tablet Take 1 Tab by mouth daily. 10/27/20   Evonne Goff MD   losartan (COZAAR) 25 mg tablet Take 1 Tab by mouth two (2) times a day. 10/26/20   Evonne Goff MD     ALLERGIES:  NO KNOWN DRUG ALLERGIES    FAMILY HISTORY:  Family History   Problem Relation Age of Onset    Lung Cancer Mother     No Known Problems Father          Social history  Patient resides      X  With family care              Alcohol history   X  None known           Smoking history  x  unknown                 Review of systems  Patient unable to answer questions appropriately. Physical Examination   Visit Vitals  /60   Pulse 97   Temp 98.7 °F (37.1 °C)   Resp 16   Ht 5' 4\" (1.626 m)   Wt 79.4 kg (175 lb)   SpO2 100%   BMI 30.04 kg/m²          O2 Device: Room air     General:  Obese female in no acute respiratory distress   Head:  Normocephalic, without obvious abnormality, atraumatic   Eyes:  Conjunctivae/corneas clear. Pupils 2 mm reactive bilateral   E/N/M/T: Nares normal. Septum midline.  No nasal drainage or sinus tenderness  Tongue midline  Clear oropharynx   Neck: Normal appearance and movements, symmetrical, trachea midline  No palpable adenopathy  No thyroid enlargement, tenderness or nodules  No carotid bruit   No JVD   Lungs:   Symmetrical chest expansion and respiratory effort  Clear to auscultation bilaterally   Chest wall:  No tenderness or deformity   Heart:  Regular rate and rhythm   Sounds normal; no murmur, click, rub or gallop   Abdomen:   Soft, no tenderness  No rebound, guarding, or rigidity  Non-distended  Bowel sounds normal      Back: No CVA tenderness   Extremities: Extremities normal, atraumatic  No cyanosis or edema      Pulses 2+ radial / 1+ DP bilateral pulses   Skin: No rashes or ulcers  Warm/ dry   Musculo-      skeletal: Gait not tested      Neuro: GCS 14 (E4 V4 M6). Moves all extremities x 4 with generalized weakness. No slurred speech. No facial droop. Sensation grossly intact. Not following commands well to test for pronator drift   Psych: Somnolent but arousable to loud verbal stimuli and sternal rub  Mostly non-verbal   Intermittently anxious and uncooperative       Geniturinary: King catheter in place with scant urine present     Data Review      24 Hour Results:  Recent Results (from the past 24 hour(s))   CBC WITH AUTOMATED DIFF    Collection Time: 01/18/21  9:40 PM   Result Value Ref Range    WBC 12.7 (H) 3.6 - 11.0 K/uL    RBC 3.38 (L) 3.80 - 5.20 M/uL    HGB 10.9 (L) 11.5 - 16.0 g/dL    HCT 33.0 (L) 35.0 - 47.0 %    MCV 97.6 80.0 - 99.0 FL    MCH 32.2 26.0 - 34.0 PG    MCHC 33.0 30.0 - 36.5 g/dL    RDW 13.2 11.5 - 14.5 %    PLATELET 829 395 - 916 K/uL    MPV 10.7 8.9 - 12.9 FL    NRBC 0.0 0  WBC    ABSOLUTE NRBC 0.00 0.00 - 0.01 K/uL    NEUTROPHILS 68 32 - 75 %    LYMPHOCYTES 23 12 - 49 %    MONOCYTES 5 5 - 13 %    EOSINOPHILS 4 0 - 7 %    BASOPHILS 0 0 - 1 %    IMMATURE GRANULOCYTES 0 0.0 - 0.5 %    ABS. NEUTROPHILS 8.5 (H) 1.8 - 8.0 K/UL    ABS. LYMPHOCYTES 2.9 0.8 - 3.5 K/UL    ABS. MONOCYTES 0.6 0.0 - 1.0 K/UL    ABS. EOSINOPHILS 0.6 (H) 0.0 - 0.4 K/UL    ABS. BASOPHILS 0.1 0.0 - 0.1 K/UL    ABS. IMM. GRANS. 0.0 0.00 - 0.04 K/UL    DF AUTOMATED     METABOLIC PANEL, COMPREHENSIVE    Collection Time: 01/18/21  9:40 PM   Result Value Ref Range    Sodium 136 136 - 145 mmol/L    Potassium 3.0 (L) 3.5 - 5.1 mmol/L    Chloride 103 97 - 108 mmol/L    CO2 27 21 - 32 mmol/L    Anion gap 6 5 - 15 mmol/L    Glucose 115 (H) 65 - 100 mg/dL    BUN 25 (H) 6 - 20 MG/DL    Creatinine 1.64 (H) 0.55 - 1.02 MG/DL    BUN/Creatinine ratio 15 12 - 20      GFR est AA 38 (L) >60 ml/min/1.73m2    GFR est non-AA 31 (L) >60 ml/min/1.73m2    Calcium 9.6 8.5 - 10.1 MG/DL    Bilirubin, total 0.6 0.2 - 1.0 MG/DL    ALT (SGPT) 13 12 - 78 U/L    AST (SGOT) 30 15 - 37 U/L    Alk. phosphatase 68 45 - 117 U/L    Protein, total 8.3 (H) 6.4 - 8.2 g/dL    Albumin 3.5 3.5 - 5.0 g/dL    Globulin 4.8 (H) 2.0 - 4.0 g/dL    A-G Ratio 0.7 (L) 1.1 - 2.2     URINALYSIS W/MICROSCOPIC    Collection Time: 01/19/21 12:16 AM   Result Value Ref Range    Color YELLOW/STRAW      Appearance CLOUDY (A) CLEAR      Specific gravity 1.020 1.003 - 1.030      pH (UA) 5.0 5.0 - 8.0      Protein 100 (A) NEG mg/dL    Glucose Negative NEG mg/dL    Ketone Negative NEG mg/dL    Blood LARGE (A) NEG      Urobilinogen 1.0 0.2 - 1.0 EU/dL    Nitrites Negative NEG      Leukocyte Esterase MODERATE (A) NEG      WBC >100 (H) 0 - 4 /hpf    RBC 10-20 0 - 5 /hpf    Epithelial cells FEW FEW /lpf    Bacteria 2+ (A) NEG /hpf    Budding yeast PRESENT (A) NEG     URINE CULTURE HOLD SAMPLE    Collection Time: 01/19/21 12:16 AM    Specimen: Serum; Urine   Result Value Ref Range    Urine culture hold        Urine on hold in Microbiology dept for 2 days. If unpreserved urine is submitted, it cannot be used for addtional testing after 24 hours, recollection will be required.    BILIRUBIN, CONFIRM    Collection Time: 01/19/21 12:16 AM   Result Value Ref Range    Bilirubin UA, confirm Negative NEG       Recent Labs     01/18/21 2140 01/16/21  0225   WBC 12.7* 10.6   HGB 10.9* 11.1*   HCT 33.0* 34.2*    235     Recent Labs     01/18/21 2140 01/16/21  0225    139   K 3.0* 3.8    108   CO2 27 29   * 88   BUN 25* 17   CREA 1.64* 0.92   CA 9.6 9.8   ALB 3.5 3.3*   TBILI 0.6 0.6   ALT 13 12         Assessment and Plan   1. Altered mental status       - with history of recent stroke. Concern for acute encephalopathy       - admit to telemetry       - place on neuro checks and fall precautions       - consider for neurology consult       - order dysphagia screen. Keep NPO until passes the same or speech evaluation    2. JOSE (acute kidney injury)        - likely dehydration       - continue IV fluids for hydration and repeat renal panel in a.m.        - place on strict Is and Os/ daily weights    3. Acute hypokalemia       - order KCl 10 mEq IV over 1 hour x 3 runs       - repeat K level in a.m.    4.  Hypotension       - order IV fluid bolus followed by maintenance IV infusion       - monitor BP closely       - hold all BP medication for now due to hypotension noted tonight       -order 12 lead EKG    5. Suprapubic pain       - ie, pain at price site with old indwelling price catheter which was removed and replaced with new price       - ordered UA which is now positive for UTI. Order urine culture    6. UTI       - ordered Rocephin 1 gram IV q 24 hours    7. History of stroke        - plan as above     8. Hyperlipidemia        - plan as above     9. Anemia      - chronic. Repeat H/H.      10.  Leukocytosis         - repeat CBC in a.m. 11.  Generalized weakness/ debility         - place on fall precautions. Consult PT.    12. VTE prophylaxis         - Lovenox sq     CODE STATUS:  FULL CODE. Patient was unable to discuss code status without the medical capacity to make her own decisions at this time. Signed by: Estefania Snider MD    January 19, 2021 at 1:27 AM

## 2021-01-19 NOTE — PROGRESS NOTES
Bedside and Verbal shift change report given to Dianne Tesfaye RN (oncoming nurse) by Flo Henson RN (offgoing nurse). Report included the following information SBAR, Kardex, Intake/Output and MAR.

## 2021-01-19 NOTE — ED PROVIDER NOTES
41-year-old female with history of diabetes and hypertension and seizure disorder with recent CVA for which she was seen at [facility] and subsequent admission to this hospital for altered mental status (discharged 2 days ago) presents to the emergency department today with chief complaint of pain at her King site. She is accompanied by her son who relates the history. Patient states with her daughter and son is unable to report that the patient indicated that she was having some discomfort associated with the King. There is no report of fevers or chills. The son reports that she has been falling frequently since her last hospitalization at [facility] as well as here. The history is provided by medical records and a relative. Urinary Catheter Problem   This is a new problem. There has been no fever. Her past medical history is significant for urinary catheter problem. Past Medical History:   Diagnosis Date    Anxiety and depression     Diabetes ([facility]     Hypertension     Obese     Seizure disorder ([facility]        No past surgical history on file.       Family History:   Problem Relation Age of Onset    Lung Cancer Mother     No Known Problems Father        Social History     Socioeconomic History    Marital status: LEGALLY      Spouse name: Not on file    Number of children: Not on file    Years of education: Not on file    Highest education level: Not on file   Occupational History    Not on file   Social Needs    Financial resource strain: Not on file    Food insecurity     Worry: Not on file     Inability: Not on file    Transportation needs     Medical: Not on file     Non-medical: Not on file   Tobacco Use    Smoking status: Unknown If Ever Smoked   Substance and Sexual Activity    Alcohol use: Not on file     Comment: Unknown    Drug use: Not on file    Sexual activity: Not on file   Lifestyle    Physical activity     Days per week: Not on file     Minutes per session: Not on file    Stress: Not on file   Relationships    Social connections     Talks on phone: Not on file     Gets together: Not on file     Attends Zoroastrian service: Not on file     Active member of club or organization: Not on file     Attends meetings of clubs or organizations: Not on file     Relationship status: Not on file    Intimate partner violence     Fear of current or ex partner: Not on file     Emotionally abused: Not on file     Physically abused: Not on file     Forced sexual activity: Not on file   Other Topics Concern    Not on file   Social History Narrative    Not on file         ALLERGIES: Patient has no known allergies. Review of Systems   Unable to perform ROS: Dementia       Vitals:    01/18/21 1723   BP: 97/61   Pulse: 97   Resp: 16   Temp: 98.7 °F (37.1 °C)   SpO2: 100%   Weight: 79.4 kg (175 lb)   Height: 5' 4\" (1.626 m)            Physical Exam  Vitals signs and nursing note reviewed. Constitutional:       General: She is not in acute distress. Appearance: Normal appearance. She is not ill-appearing, toxic-appearing or diaphoretic. HENT:      Head: Normocephalic and atraumatic. Nose: Nose normal.      Mouth/Throat:      Mouth: Mucous membranes are dry. Pharynx: Oropharynx is clear. Eyes:      Extraocular Movements: Extraocular movements intact. Conjunctiva/sclera: Conjunctivae normal.      Pupils: Pupils are equal, round, and reactive to light. Neck:      Musculoskeletal: Normal range of motion and neck supple. Cardiovascular:      Rate and Rhythm: Normal rate and regular rhythm. Pulses: Normal pulses. Heart sounds: Normal heart sounds. Pulmonary:      Effort: Pulmonary effort is normal.      Breath sounds: Normal breath sounds. Abdominal:      General: There is no distension. Palpations: Abdomen is soft. Tenderness: There is no abdominal tenderness.  There is no right CVA tenderness, left CVA tenderness, guarding or rebound. Musculoskeletal: Normal range of motion. General: No swelling, tenderness, deformity or signs of injury. Right lower leg: No edema. Left lower leg: No edema. Skin:     General: Skin is warm and dry. Capillary Refill: Capillary refill takes less than 2 seconds. Neurological:      Mental Status: Mental status is at baseline. Psychiatric:         Behavior: Behavior normal.          MDM  Number of Diagnoses or Management Options  Diagnosis management comments: 69-year-old female presents as above with pain associate with her King catheter. Patient is a poor historian since her recent stroke. Her evaluation is concerning for leukocytosis with JOSE and hypokalemia. I suspect she likely has UTI although urine has not yet returned. I plan to admit her to the hospital for further management. Amount and/or Complexity of Data Reviewed  Clinical lab tests: reviewed  Decide to obtain previous medical records or to obtain history from someone other than the patient: yes           Procedures          Perfect Serve Consult for Admission  10:33 PM    ED Room Number: ER05/05  Patient Name and age: Mata James 72 y.o.  female  Working Diagnosis:   1. JOSE (acute kidney injury) ([facility] [facility]    2.  Suspected UTI        COVID-19 Suspicion:  no  Sepsis present:  no  Reassessment needed: N/A  Code Status:  Full Code  Readmission: yes  Isolation Requirements:  no  Recommended Level of Care:  telemetry  Department:[facility] ED - [phone]  Other: Also with hypokalemia, AMS since recent stroke

## 2021-01-19 NOTE — PROGRESS NOTES
1/19/2021  9:15 AM  Case management note    Left daughter Yanira Roman a message to call me. Daughter returned call  GLENN kelly with [facility] via phone    Reason for Readmission:     JOSE    1/1/2021 1/16/2020 AM    Patient came back to hospital for catheter pain. She is admitted for JOSE  Patient was discharged to daughters home. They came back in 24 hr. Daughter stated mom complained of pain with price. Patient was recently hospitalized at [address] [place] [address] with CVA, went to [facility] for just 1 day and returned to hospital for same pain. Daughter wants to take patient home again, they would really like to avoid SNF if possible. Note indicates daughter received price training. Informed her they were uncomfortable and she feels she doesn't know whether its in completely or out. [facility] in [place] was where patient last got meds filled. Luca @ [place] is closest to her daughters home  [address] [facility], [address] Will continue to follow               RUR Score/Risk Level:     high    PCP: First and Last name:  [address] Yaron Hansen   Name of Practice:    Are you a current patient: Yes/No: yes   Approximate date of last visit:    Can you participate in a virtual visit with your PCP: no    Is a Care Conference indicated:   Yes, long term planning  Discharge plan    Did you attend your follow up appointment (s): If not, why not:         Resources/supports as identified by patient/family:   Patient discharged with Yanira Roman (daughter) to her home in [place]. There are other children who currently are not able to help with care at this time. Top Challenges facing patient (as identified by patient/family and CM): Finances/Medication cost?     [employer] Medicare/ [employer]  Transportation      family  Support system or lack thereof? Limited family  Living arrangements? Patient went home with daughter Yanira Roman  Self-care/ADLs/Cognition?      Unable to do self care, poor health cognition       Current Advanced Directive/Advance Care Plan:  NO AD           Plan for utilizing home health:   Patient was discharged with [facility], but they did not open case has patient came back to hospital.             Transition of Care Plan:    Based on readmission, the patient's previous Plan of Care   has been evaluated and/or modified. The current Transition of Care Plan is:     1. Home with family assistance  2. Long term planning  3. Case management to follow for discharge needs    Care Management Interventions  PCP Verified by CM: Yes(Tere Gunter)  Mode of Transport at Discharge: Self  Current Support Network: Relative's Home  Confirm Follow Up Transport: Family  The Plan for Transition of Care is Related to the Following Treatment Goals :  KRISTEN  Discharge Location  Discharge Placement: Unable to determine at this time     Readmission Assessment  Number of days since last admission?: 1-7 days  Previous disposition: Home with Family  Who is being interviewed?: Caregiver  What was the patient's/caregiver's perception as to why they think they needed to return back to the hospital?: Other (Comment)(concern for price pain)  Did you visit your Primary Care Physician after you left the hospital, before you returned this time?: No  Did you see a specialist, such as Cardiac, Pulmonary, Orthopedic Physician, etc. after you left the hospital?: No  Who advised the patient to return to the hospital?: Caregiver  Does the patient report anything that got in the way of taking their medications?: No  In our efforts to provide the best possible care to you and others like you, can you think of anything that we could have done to help you after you left the hospital the first time, so that you might not have needed to return so soon?: (clear plan on price understanding - per daughter)  Grayson Parekh

## 2021-01-19 NOTE — PROGRESS NOTES
I signed out patient to Dr. Kathleen Silver with [facility] who will take over patient's care and the primary attending service. I will sign off now at this time.

## 2021-01-19 NOTE — PROGRESS NOTES
TRANSFER - IN REPORT:    Verbal report received from [address] [place] [address], RN(name) on Raya Louie  being received from ED(unit) for routine progression of care      Report consisted of patients Situation, Background, Assessment and   Recommendations(SBAR). Information from the following report(s) SBAR, Kardex, Intake/Output, MAR and Recent Results was reviewed with the receiving nurse. Opportunity for questions and clarification was provided. Assessment completed upon patients arrival to unit and care assumed.

## 2021-01-19 NOTE — PROGRESS NOTES
Admission Medication Reconciliation:     Information obtained from:    Elan Dobbs with the patient's daughter Lucie Walker over the phone  RxQuery data available¹:  YES    Comments/Recommendations: The patient is unable to complete the interview due to AMS. The patient was admitted to [facility] from 1/1 to 1/16. She was discharged home with her daughter Lucie Walker. Prior to admission on 1/1 the patient was admitted to [address] [place] [address] for CVA and discharged to [facility]. She stayed at [facility] only one day and then left to stay with her daughter. The patient's daughter expresses confusion over the patient's home medications. She reports the patient used to live with her brother but he became ill so the patient now stays with Lucie Walker. Lucie Salcedotaz could not locate pill bottles for or was familiar with levetiracetam. The patient has not received levetiracetam since discharge due to a lack of supply. Her last dose was in the hospital on 1/15/21. Notes in the chart indicate the patient has a history of seizure. Lucie Salcedotaz could not located pill bottles for or was familiar with sertraline. The patient has not received sertraline since discharge due to a lack of supply. Her last dose was in the hospital on 1/16/21. ¹RxQuery pharmacy benefit data reflects medications filled and processed through the patient's insurance, however   this data does NOT capture whether the medication was picked up or is currently being taken by the patient. Prior to Admission Medications   Prescriptions Last Dose Informant Taking? QUEtiapine (SEROquel) 25 mg tablet  Child Yes   Sig: Take 1 Tab by mouth two (2) times a day. aspirin delayed-release 81 mg tablet 1/15/2021 Child Yes   Sig: Take 1 Tab by mouth daily. atorvastatin (LIPITOR) 10 mg tablet 1/18/2021 at Unknown time Child Yes   Sig: Take 20 mg by mouth daily (after dinner). famotidine (PEPCID) 20 mg tablet  Child Yes   Sig: Take 20 mg by mouth daily.    losartan (COZAAR) 50 mg tablet 1/18/2021 at Unknown time Child Yes   Sig: Take 100 mg by mouth daily. metFORMIN (GLUCOPHAGE) 500 mg tablet  Child Yes   Sig: Take 500 mg by mouth two (2) times daily (with meals). tamsulosin (FLOMAX) 0.4 mg capsule 1/18/2021 at Unknown time Child Yes   Sig: Take 0.4 mg by mouth nightly. Facility-Administered Medications: None         Please contact the main inpatient pharmacy with any questions or concerns at [phone] and we will direct you to the clinical pharmacist covering this patient's care while in-house.    Isabel Ramsay, PharmD, BCPS

## 2021-01-20 LAB
BACTERIA SPEC CULT: NORMAL
SERVICE CMNT-IMP: NORMAL

## 2021-01-20 PROCEDURE — 74011250636 HC RX REV CODE- 250/636: Performed by: FAMILY MEDICINE

## 2021-01-20 PROCEDURE — 96372 THER/PROPH/DIAG INJ SC/IM: CPT

## 2021-01-20 PROCEDURE — 99218 HC RM OBSERVATION: CPT

## 2021-01-20 PROCEDURE — 74011000258 HC RX REV CODE- 258: Performed by: FAMILY MEDICINE

## 2021-01-20 PROCEDURE — 74011250637 HC RX REV CODE- 250/637: Performed by: FAMILY MEDICINE

## 2021-01-20 PROCEDURE — 96376 TX/PRO/DX INJ SAME DRUG ADON: CPT

## 2021-01-20 PROCEDURE — 2709999900 HC NON-CHARGEABLE SUPPLY

## 2021-01-20 PROCEDURE — 96375 TX/PRO/DX INJ NEW DRUG ADDON: CPT

## 2021-01-20 RX ORDER — HALOPERIDOL 5 MG/ML
1 INJECTION INTRAMUSCULAR
Status: DISCONTINUED | OUTPATIENT
Start: 2021-01-20 | End: 2021-01-29 | Stop reason: HOSPADM

## 2021-01-20 RX ADMIN — SERTRALINE HYDROCHLORIDE 100 MG: 50 TABLET ORAL at 09:28

## 2021-01-20 RX ADMIN — VANCOMYCIN HYDROCHLORIDE 1750 MG: 10 INJECTION, POWDER, LYOPHILIZED, FOR SOLUTION INTRAVENOUS at 13:43

## 2021-01-20 RX ADMIN — PIPERACILLIN AND TAZOBACTAM 3.38 G: 3; .375 INJECTION, POWDER, LYOPHILIZED, FOR SOLUTION INTRAVENOUS at 22:56

## 2021-01-20 RX ADMIN — PANTOPRAZOLE SODIUM 40 MG: 40 TABLET, DELAYED RELEASE ORAL at 09:28

## 2021-01-20 RX ADMIN — QUETIAPINE FUMARATE 25 MG: 25 TABLET ORAL at 09:37

## 2021-01-20 RX ADMIN — PIPERACILLIN AND TAZOBACTAM 3.38 G: 3; .375 INJECTION, POWDER, LYOPHILIZED, FOR SOLUTION INTRAVENOUS at 16:22

## 2021-01-20 RX ADMIN — LEVETIRACETAM 500 MG: 500 TABLET ORAL at 09:28

## 2021-01-20 RX ADMIN — ATORVASTATIN CALCIUM 20 MG: 20 TABLET, FILM COATED ORAL at 09:28

## 2021-01-20 RX ADMIN — QUETIAPINE FUMARATE 25 MG: 25 TABLET ORAL at 18:04

## 2021-01-20 RX ADMIN — Medication 10 ML: at 23:00

## 2021-01-20 RX ADMIN — Medication 10 ML: at 05:08

## 2021-01-20 RX ADMIN — LEVETIRACETAM 500 MG: 500 TABLET ORAL at 18:03

## 2021-01-20 RX ADMIN — PIPERACILLIN AND TAZOBACTAM 3.38 G: 3; .375 INJECTION, POWDER, LYOPHILIZED, FOR SOLUTION INTRAVENOUS at 05:08

## 2021-01-20 RX ADMIN — HALOPERIDOL LACTATE 1 MG: 5 INJECTION, SOLUTION INTRAMUSCULAR at 22:56

## 2021-01-20 RX ADMIN — ASPIRIN 81 MG: 81 TABLET, COATED ORAL at 09:28

## 2021-01-20 NOTE — PROGRESS NOTES
Daily Progress Note: 1/20/2021  Alireza Campos MD    Assessment/Plan:   Altered mental status with history of recent stroke  - improved to baseline     JOSE (acute kidney injury) - likely dehydration  - continue IV fluids for hydration as long as she leaves IV in place     Acute hypokalemia  - monitor and replace as needed     Hypotension  - monitor BP   - hold all BP medication for now due to hypotension - restart as needed      Suprapubic pain  - replaced price  - urine culture neg so far     UTI  - Rocephin 1 gram IV q 24 hours  - urine culture neg so far      History of stroke  - plan as above    Anemia  - chronic. Repeat H/H.       Leukocytosis  - repeat CBC as able     Generalized weakness/ debility  - place on fall precautions.  Consulted PT.     VTE prophylaxis  - Lovenox sq     CODE STATUS: Yaima Longoria was unable to discuss code status without the medical capacity to make her own decisions at this time.  Pt will need long term placement.                Problem List:  Problem List as of 1/20/2021 Date Reviewed: 9/27/2018          Codes Class Noted - Resolved    Acute hypokalemia ICD-10-CM: E87.6  ICD-9-CM: 276.8  1/18/2021 - Present        Hypotension ICD-10-CM: I95.9  ICD-9-CM: 458.9  1/18/2021 - Present        JOSE (acute kidney injury) ([facility] ICD-10-CM: N17.9  ICD-9-CM: 584.9  1/18/2021 - Present        Dehydration ICD-10-CM: E86.0  ICD-9-CM: 276.51  1/1/2021 - Present        Altered mental status ICD-10-CM: R41.82  ICD-9-CM: 780.97  1/1/2021 - Present        Anemia ICD-10-CM: D64.9  ICD-9-CM: 285.9  1/1/2021 - Present        Tachycardia ICD-10-CM: R00.0  ICD-9-CM: 785.0  1/1/2021 - Present        Abnormal urinalysis ICD-10-CM: R82.90  ICD-9-CM: 791.9  1/1/2021 - Present        Sepsis ([facility] ICD-10-CM: A41.9  ICD-9-CM: 038.9, 995.91  1/1/2021 - Present        Lactic acidosis ICD-10-CM: E87.2  ICD-9-CM: 276.2  10/24/2020 - Present        SIRS (systemic inflammatory response syndrome) ([place] [address].) ICD-10-CM: R65.10  ICD-9-CM: 995.90  9/27/2018 - Present        Sinus tachycardia ICD-10-CM: R00.0  ICD-9-CM: 427.89  9/27/2018 - Present        Hypertension ICD-10-CM: I10  ICD-9-CM: 401.9  Unknown - Present        Diabetes ([facility] ICD-10-CM: E11.9  ICD-9-CM: 250.00  Unknown - Present        Anxiety and depression ICD-10-CM: F41.9, F32.9  ICD-9-CM: 300.00, 311  Unknown - Present        Seizure disorder ([place] [address].) ICD-10-CM: G40.909  ICD-9-CM: 345.90  Unknown - Present        Vomiting ICD-10-CM: R11.10  ICD-9-CM: 787.03  9/27/2018 - Present        Hypokalemia ICD-10-CM: E87.6  ICD-9-CM: 276.8  9/27/2018 - Present        Obese ICD-10-CM: E66.9  ICD-9-CM: 278.00  Unknown - Present        DM (diabetes mellitus) ([facility] ICD-10-CM: E11.9  ICD-9-CM: 250.00  6/7/2018 - Present        Leukocytosis ICD-10-CM: D72.829  ICD-9-CM: 288.60  6/7/2018 - Present        RESOLVED: Encephalopathy acute ICD-10-CM: G93.40  ICD-9-CM: 348.30  6/7/2018 - 9/27/2018        RESOLVED: Seizure ([facility] ICD-10-CM: R56.9  ICD-9-CM: 780.39  6/7/2018 - 9/27/2018        RESOLVED: Aspiration into airway ICD-10-CM: C14.882V  ICD-9-CM: 934.9  6/7/2018 - 9/27/2018        RESOLVED: Renal insufficiency ICD-10-CM: N28.9  ICD-9-CM: 593.9  6/7/2018 - 9/27/2018              HPI:   Rosie Bustos is a 72 y.o. female with past medical history of recent stroke, anxiety, depression, type 2 diabetes mellitus (DM), hypertension, obesity, seizure disorder presented to the ED from home with reported pain at price site. Patient is confused and not able to answer questions appropriately at this time. As such, majority of history was obtained per my review of ED and electronic medical records. Per these reports, patient was admitted to [facility] previously with diagnosis of stroke.   She was last hospitalized here at [facility] from 1/1/2021 to 1/16/2021 with diagnoses of altered mental status (AMS), sepsis secondary to health care associated pneumonia (treated with Cefepime), abnormal urinalysis with negative urine culture, dehydration. After discharge, patient has been living at home with family. She has an indwelling price catheter. Onset of pain symptoms is not documented. On arrival in the ED tonight, initial recorded vital signs were BP= 97/61, HR= 97, RR= 16, O2sat= 100% on room air. WBC  = 12,700. K = 3.0. Hemoglobin = 10.9 (compared to 11.1 on 1/16/2021). BUN= 25.  Creatinine= 1.64 (compared to 0.92 on 1/16/2021). ED provider requested admission to the hospitalist service. At time of request, I placed order for UA results awaiting collection and results. Nurse notes that patient's old price catheter was removed and a new price was inserted after arrival to the ED. (Dr Anne)    1/19:  Pt is unable to give any meaningful hx. She will arouse to her name. She reports she is not in any pain. She does not know day/date or location. She can occas follow commands (). Unlike last admission (2 days ago) pt is not violent or combative. Pharmacy called me to report that multiple meds she was supposed to be taking at home were not being given - pharmacy has the list but most importantly she was not getting her seizure meds. Will get Case Management to assist in placement - the family cannot take care of this pt at home and APS may need to be consulted. 1/20:  Combative, threatening and striking out at nurses, threatening harm and agitated overnight. Calmer at this moment and allows me to examine her. Refusing labs, meds and heart monitor and stripping off clothes and refusing to put them back on. With this degree of frontal lobe damage from CVA there may be little that we can do (Psy and Neuro saw last admission and report there is nothing they can add), her behavior will be unpredictable and varied. Will restart IM Haldol if nurses can get close enough to pt. Unable to get labs and cultures at this time.   She will need long term placement -perhaps in a memory unit. The family will not be able to care for her at home. 1 of 4 bottles from blood culture on  show possible gram + cocci - since she is physically better, and afeb, will hold off on repeat cultures and limit labs sticks as much as possible. Review of Systems:   Review of systems not obtained due to patient factors. Objective:   Physical Exam:   Visit Vitals  BP (!) 145/85 (BP 1 Location: Left arm, BP Patient Position: At rest;Supine)   Pulse 83   Temp 97.7 °F (36.5 °C)   Resp 18   Ht 5' 4\" (1.626 m)   Wt 73.7 kg (162 lb 6.4 oz)   SpO2 97%   BMI 27.88 kg/m²      O2 Device: Room air  Temp (24hrs), Av °F (36.7 °C), Min:97.5 °F (36.4 °C), Max:98.6 °F (37 °C)    No intake/output data recorded.  1901 -  0700  In: 2236.3 [P.O.:600; I.V.:1636.3]  Out: 1900 [Urine:1900]  General:  Alert, occas cooperative, no distress, appears stated age. Head:  Normocephalic, without obvious abnormality, atraumatic. Eyes:  Conjunctivae/corneas clear. EOMs intact. Throat: Lips, mucosa, and tongue moist   Neck: Supple, symmetrical, trachea midline, no adenopathy, thyroid: no enlargement/tenderness/nodules, no carotid bruit and no JVD. Lungs:   Clear to auscultation bilaterally. Chest wall:  No tenderness or deformity. Heart:  Regular rate and rhythm, S1, S2 normal, no murmur, click, rub or gallop. Abdomen:   Soft, non-tender. Bowel sounds normal. No masses,  No organomegaly. Extremities: Extremities normal, atraumatic, no cyanosis or edema. No calf tenderness or cords. Pulses: 2+ and symmetric all extremities. Skin: Skin color, texture, turgor normal. No rashes   Neurologic:  More alert this AM.  Alert and oriented X 1 - she thinks she is at home. She thinks the season is \"rosy. \"  She will follow 1 step commands at this time. No cogwheeling or rigidity. Gait not tested at this time.        Data Review:       Recent Days:  Recent Labs     01/19/21  0158 01/18/21  2140   WBC 11.4* 12.7*   HGB 10.3* 10.9*   HCT 31.5* 33.0*    232     Recent Labs     01/19/21  0158 01/18/21  2140    136   K 3.8 3.0*    103   CO2 28 27   * 115*   BUN 25* 25*   CREA 1.53* 1.64*   CA 9.4 9.6   MG 1.8  --    PHOS 3.5  --    ALB 3.2* 3.5   TBILI 0.8 0.6   ALT 13 13     No results for input(s): PH, PCO2, PO2, HCO3, FIO2 in the last 72 hours. 24 Hour Results:  Recent Results (from the past 24 hour(s))   GLUCOSE, POC    Collection Time: 01/19/21 12:24 PM   Result Value Ref Range    Glucose (POC) 112 (H) 65 - 100 mg/dL    Performed by Leela Heredia        Medications reviewed  Current Facility-Administered Medications   Medication Dose Route Frequency    sodium chloride (NS) flush 5-40 mL  5-40 mL IntraVENous Q8H    sodium chloride (NS) flush 5-40 mL  5-40 mL IntraVENous PRN    cefTRIAXone (ROCEPHIN) 1 g in sterile water (preservative free) 10 mL IV syringe  1 g IntraVENous Q24H    aspirin delayed-release tablet 81 mg  81 mg Oral DAILY    atorvastatin (LIPITOR) tablet 20 mg  20 mg Oral DAILY    levETIRAcetam (KEPPRA) tablet 500 mg  500 mg Oral BID    pantoprazole (PROTONIX) tablet 40 mg  40 mg Oral DAILY    QUEtiapine (SEROquel) tablet 25 mg  25 mg Oral BID    sertraline (ZOLOFT) tablet 100 mg  100 mg Oral DAILY    ergocalciferol capsule 50,000 Units  50,000 Units Oral Q7D    piperacillin-tazobactam (ZOSYN) 3.375 g in 0.9% sodium chloride (MBP/ADV) 100 mL MBP  3.375 g IntraVENous Q8H    0.9% sodium chloride infusion  100 mL/hr IntraVENous CONTINUOUS     Care Plan discussed with: Patient/Nurse/Pharmacy  Total time spent with patient and review of records: 30 minutes.   Agustina Stahl MD

## 2021-01-20 NOTE — PROGRESS NOTES
Problem: Pressure Injury - Risk of  Goal: *Prevention of pressure injury  Description: Document Fidel Scale and appropriate interventions in the flowsheet. Outcome: Progressing Towards Goal  Note: Pressure Injury Interventions:  Sensory Interventions: Assess changes in LOC, Discuss PT/OT consult with provider, Keep linens dry and wrinkle-free, Float heels, Maintain/enhance activity level, Minimize linen layers, Monitor skin under medical devices, Pressure redistribution bed/mattress (bed type), Turn and reposition approx. every two hours (pillows and wedges if needed)    Moisture Interventions: Absorbent underpads, Apply protective barrier, creams and emollients, Check for incontinence Q2 hours and as needed, Internal/External urinary devices, Limit adult briefs, Maintain skin hydration (lotion/cream), Minimize layers, Moisture barrier, Offer toileting Q_hr    Activity Interventions: Increase time out of bed, Pressure redistribution bed/mattress(bed type), PT/OT evaluation    Mobility Interventions: Float heels, HOB 30 degrees or less, Pressure redistribution bed/mattress (bed type), PT/OT evaluation, Turn and reposition approx. every two hours(pillow and wedges)    Nutrition Interventions: Discuss nutritional consult with provider, Offer support with meals,snacks and hydration, Document food/fluid/supplement intake    Friction and Shear Interventions: Apply protective barrier, creams and emollients, HOB 30 degrees or less, Lift sheet, Minimize layers, Lift team/patient mobility team                Problem: Patient Education: Go to Patient Education Activity  Goal: Patient/Family Education  Outcome: Progressing Towards Goal     Problem: Falls - Risk of  Goal: *Absence of Falls  Description: Document Ying Williamson Fall Risk and appropriate interventions in the flowsheet.   Outcome: Progressing Towards Goal  Note: Fall Risk Interventions:  Mobility Interventions: Communicate number of staff needed for ambulation/transfer, Bed/chair exit alarm, Patient to call before getting OOB    Mentation Interventions: Adequate sleep, hydration, pain control, Bed/chair exit alarm, Door open when patient unattended, Evaluate medications/consider consulting pharmacy, Gait belt with transfers/ambulation, HELP ([address]) if available, Increase mobility, More frequent rounding, Reorient patient, Room close to nurse's station, Self-releasing belt, Toileting rounds, Update white board         Elimination Interventions: Bed/chair exit alarm, Call light in reach, Stay With Me (per policy), Toilet paper/wipes in reach, Toileting schedule/hourly rounds, Patient to call for help with toileting needs              Problem: Patient Education: Go to Patient Education Activity  Goal: Patient/Family Education  Outcome: Progressing Towards Goal

## 2021-01-20 NOTE — PROGRESS NOTES
[facility] Dosing Services: Antimicrobial Stewardship Progress Note    Consult for antibiotic dosing of Vancomycin by Dr. Kesha Palacios  Pharmacist reviewed antibiotic appropriateness for 72year old , female  for indication of bacteremia  Day of Therapy 1    Plan:  Vancomycin therapy:  LD: 1750mg x1  MD: 1000mg q 24 hrs  Dose calculated to approximate a therapeutic trough of 15-20 mcg/mL. Last trough level  Date Dose & Interval Measured (mcg/mL) Extrapolated (mcg/mL)   ? ? ? ?   ? ? ? ?   ? ? ? ? Plan for level:   Regimen predicts a trough of 15-17 mcg/ml at steady-state with AUC/ANDERSON > 400. Will order trough level prior to 3rd dose (not done yet)    Pharmacy to follow daily and will make changes to dose and/or frequency based on clinical status. Other Antimicrobial  (not dosed by pharmacist)   Zosyn 3.375gm IV every 8 hrs   Cultures     1/19: Urine- NG- final  1/19: Blood- possible enterococcus, 2 of 4 bottles w/ GPC, cluster-  pending     Serum Creatinine     Lab Results   Component Value Date/Time    Creatinine 1.53 (H) 01/19/2021 01:58 AM       Creatinine Clearance Estimated Creatinine Clearance: 34.1 mL/min (A) (based on SCr of 1.53 mg/dL (H)). Procalcitonin    Lab Results   Component Value Date/Time    Procalcitonin 0.06 01/02/2021 12:42 AM        Temp   98.1 °F (36.7 °C)    WBC   Lab Results   Component Value Date/Time    WBC 11.4 (H) 01/19/2021 01:58 AM       H/H   Lab Results   Component Value Date/Time    HGB 10.3 (L) 01/19/2021 01:58 AM      Platelets Lab Results   Component Value Date/Time    PLATELET 727 88/82/0849 01:58 AM            Pharmacist: Signed Xiomara Wells

## 2021-01-20 NOTE — PROGRESS NOTES
Entered patient's room to obtain AM labs. Pt sitting up in bed, very confused and agitated. Attempted to re-orient patient. She continues to yell and threaten to hurt staff if we try to draw her blood. 3 different staff members attempted to calm her unsuccessfully. Will notify MD in am.     0167- Pt continues to be verbally aggressive with staff. Pt has taken off all of her clothes and refused to allow staff close enough to her to assist her. This RN remains bedside for safety. 9515- Pt will not leave heart monitor on. Currently holding telemetry leads and threatening to choke this RN. RN remains bedside for safety. 4250- Dr. Diaz Sanquinn bedside and update on above events. Order for prn Haldol received and the D/C telemetry. Bedside and Verbal shift change report given to [facility], RN (oncoming nurse) by Sampson Anderson RN (offgoing nurse). Report included the following information SBAR, Kardex, Intake/Output and MAR.

## 2021-01-20 NOTE — PROGRESS NOTES
TRANSFER - OUT REPORT:    Verbal report given to 5th Floor RN(name) on Shawn Conde  being transferred to 5th floor(unit) for routine progression of care       Report consisted of patients Situation, Background, Assessment and   Recommendations(SBAR). Information from the following report(s) SBAR, Kardex, ED Summary, Procedure Summary, Intake/Output, MAR, Recent Results and Quality Measures was reviewed with the receiving nurse. Lines:   Peripheral IV 01/18/21 Right Antecubital (Active)   Site Assessment Clean, dry, & intact 01/20/21 1145   Phlebitis Assessment 0 01/20/21 1145   Infiltration Assessment 0 01/20/21 1145   Dressing Status Clean, dry, & intact 01/20/21 1145   Dressing Type Transparent;Tape 01/20/21 1145   Hub Color/Line Status Pink; Infusing 01/20/21 1145   Action Taken Open ports on tubing capped 01/20/21 1145   Alcohol Cap Used Yes 01/20/21 1145        Opportunity for questions and clarification was provided.       Patient transported with:   Registered Nurse

## 2021-01-20 NOTE — PROGRESS NOTES
Transition of Care  1- cm to follow to monitor response to treatment  2- coordination of discharge plan with daughter and family  3- daughter plans to be able to transport home at discharge  4- daughter would like to address catheter need and comfort  5- referral with updated orders to be sent to [facility]    Have been talking with daughter, Denis Carbajal, today. She is a CNA and is working to provide for her Mother at home. Patient left the hospital and went to her own home over the weekend but was complaining of catheter pain, no home health could be located that would take her insurance and follow at her address, so her daughter brought her back to the hospital.  She recognizes that her Mother needs 24/7 care, and worked with  last admission to coordinate with her family to provide same. Per CM note 1/14-  Received a return phone call from [facility] with [place]. There is an option for consumer directed care for patient if patient has a family member or friend who can prvide personal care for patient at home. Consumer Director  information was provided by [facility] listed below:    Pamela Ureña [phone]  [facility] [phone]  At [place] [id number]  Janie Amaya [phone]  [facility] [phone]    Lester Kimble said she and her siblings have been discussing this option as she wants to be sure they are doing the job of caring for her Mother while she is at work. This is their plan, with [facility] which had been arranged to see patient at myriam's home, but she ended up back at the hospital.      Myriam's address is [address] [place] [id number]    They had done an extensive search for SNF care, and the list and events around that attempt are in the chart from last admission. Lester Shyanne said that she is wanting to do what she can to take care of her mother at the [place] address due to her age, and ability to see family.   She is aware of the concern due to recent readmissions, and feels like in the rehab facility and acute care facility the question of quick dehydration and urinary retention has not been fully explored. She is asking if a urologist can address this, and wants to know more about how dehydration occurs and how quickly, as she was dehydrated within a few hours. She said that her cell doesn't always ring but always alerts her to a message so anyone who calls please leave a message and she will call back. If APS is still a concern at discharge, referral to be made.  (they will not evaluate a patient while in the hospital)    Plan at this time is to coordinate home care locally with [facility] and the [address] [place] [address] that the family chooses. She is aware of observation status and phone signature obtained.

## 2021-01-21 PROBLEM — R41.82 AMS (ALTERED MENTAL STATUS): Status: ACTIVE | Noted: 2021-01-21

## 2021-01-21 PROBLEM — R10.2 SUPRAPUBIC PAIN: Status: ACTIVE | Noted: 2021-01-21

## 2021-01-21 PROCEDURE — 74011250636 HC RX REV CODE- 250/636: Performed by: FAMILY MEDICINE

## 2021-01-21 PROCEDURE — 99218 HC RM OBSERVATION: CPT

## 2021-01-21 PROCEDURE — 94760 N-INVAS EAR/PLS OXIMETRY 1: CPT

## 2021-01-21 PROCEDURE — 96376 TX/PRO/DX INJ SAME DRUG ADON: CPT

## 2021-01-21 PROCEDURE — 65270000029 HC RM PRIVATE

## 2021-01-21 PROCEDURE — 74011000258 HC RX REV CODE- 258: Performed by: FAMILY MEDICINE

## 2021-01-21 PROCEDURE — 74011250637 HC RX REV CODE- 250/637: Performed by: FAMILY MEDICINE

## 2021-01-21 RX ADMIN — QUETIAPINE FUMARATE 25 MG: 25 TABLET ORAL at 17:13

## 2021-01-21 RX ADMIN — SODIUM CHLORIDE 100 ML/HR: 9 INJECTION, SOLUTION INTRAVENOUS at 05:07

## 2021-01-21 RX ADMIN — ASPIRIN 81 MG: 81 TABLET, COATED ORAL at 08:07

## 2021-01-21 RX ADMIN — LEVETIRACETAM 500 MG: 500 TABLET ORAL at 08:07

## 2021-01-21 RX ADMIN — PIPERACILLIN AND TAZOBACTAM 3.38 G: 3; .375 INJECTION, POWDER, LYOPHILIZED, FOR SOLUTION INTRAVENOUS at 21:15

## 2021-01-21 RX ADMIN — VANCOMYCIN HYDROCHLORIDE 1000 MG: 1 INJECTION, POWDER, LYOPHILIZED, FOR SOLUTION INTRAVENOUS at 13:30

## 2021-01-21 RX ADMIN — LEVETIRACETAM 500 MG: 500 TABLET ORAL at 17:13

## 2021-01-21 RX ADMIN — Medication 10 ML: at 05:06

## 2021-01-21 RX ADMIN — SERTRALINE HYDROCHLORIDE 100 MG: 50 TABLET ORAL at 08:07

## 2021-01-21 RX ADMIN — QUETIAPINE FUMARATE 25 MG: 25 TABLET ORAL at 08:07

## 2021-01-21 RX ADMIN — Medication 10 ML: at 21:16

## 2021-01-21 RX ADMIN — PANTOPRAZOLE SODIUM 40 MG: 40 TABLET, DELAYED RELEASE ORAL at 08:07

## 2021-01-21 RX ADMIN — ATORVASTATIN CALCIUM 20 MG: 20 TABLET, FILM COATED ORAL at 08:07

## 2021-01-21 RX ADMIN — PIPERACILLIN AND TAZOBACTAM 3.38 G: 3; .375 INJECTION, POWDER, LYOPHILIZED, FOR SOLUTION INTRAVENOUS at 05:06

## 2021-01-21 RX ADMIN — PIPERACILLIN AND TAZOBACTAM 3.38 G: 3; .375 INJECTION, POWDER, LYOPHILIZED, FOR SOLUTION INTRAVENOUS at 13:30

## 2021-01-21 RX ADMIN — Medication 10 ML: at 13:30

## 2021-01-21 NOTE — PROGRESS NOTES
Bedside and Verbal shift change report given to Eva RN (oncoming nurse) by Theresa Tinoco RN (offgoing nurse). Report included the following information SBAR, Kardex, Intake/Output, MAR, Accordion and Recent Results.

## 2021-01-21 NOTE — PROGRESS NOTES
Daily Progress Note: 1/21/2021  Juanito Burak  PCP  Vasquez Gunter MD    Assessment/Plan:   Altered mental status with history of recent stroke  - improved to baseline     JOSE (acute kidney injury) - likely dehydration  - continue IV fluids for hydration as long as she leaves IV in place     Acute hypokalemia  - monitor and replace as needed     Hypotension  - monitor BP   - hold all BP medication for now due to hypotension - restart as needed      Suprapubic pain  - replaced price  - urine culture neg so far     UTI  - Rocephin 1 gram IV q 24 hours  - urine culture neg so far      History of stroke  - plan as above    Anemia  - chronic. Repeat H/H.       Leukocytosis  - repeat CBC as able     Generalized weakness/ debility  - place on fall precautions.  Consulted PT.     VTE prophylaxis  - Lovenox sq     CODE STATUS: Donna Bowens was unable to discuss code status without the medical capacity to make her own decisions at this time.  Pt will need long term placement.                Problem List:  Problem List as of 1/21/2021 Date Reviewed: 9/27/2018          Codes Class Noted - Resolved    Acute hypokalemia ICD-10-CM: E87.6  ICD-9-CM: 276.8  1/18/2021 - Present        Hypotension ICD-10-CM: I95.9  ICD-9-CM: 458.9  1/18/2021 - Present        JOSE (acute kidney injury) ([facility] ICD-10-CM: N17.9  ICD-9-CM: 584.9  1/18/2021 - Present        Dehydration ICD-10-CM: E86.0  ICD-9-CM: 276.51  1/1/2021 - Present        Altered mental status ICD-10-CM: R41.82  ICD-9-CM: 780.97  1/1/2021 - Present        Anemia ICD-10-CM: D64.9  ICD-9-CM: 285.9  1/1/2021 - Present        Tachycardia ICD-10-CM: R00.0  ICD-9-CM: 785.0  1/1/2021 - Present        Abnormal urinalysis ICD-10-CM: R82.90  ICD-9-CM: 791.9  1/1/2021 - Present        Sepsis ([facility] ICD-10-CM: A41.9  ICD-9-CM: 038.9, 995.91  1/1/2021 - Present        Lactic acidosis ICD-10-CM: E87.2  ICD-9-CM: 276.2  10/24/2020 - Present        SIRS (systemic inflammatory response syndrome) ([place] [address].) ICD-10-CM: R65.10  ICD-9-CM: 995.90  9/27/2018 - Present        Sinus tachycardia ICD-10-CM: R00.0  ICD-9-CM: 427.89  9/27/2018 - Present        Hypertension ICD-10-CM: I10  ICD-9-CM: 401.9  Unknown - Present        Diabetes ([place] [address].) ICD-10-CM: E11.9  ICD-9-CM: 250.00  Unknown - Present        Anxiety and depression ICD-10-CM: F41.9, F32.9  ICD-9-CM: 300.00, 311  Unknown - Present        Seizure disorder ([place] [address].) ICD-10-CM: G40.909  ICD-9-CM: 345.90  Unknown - Present        Vomiting ICD-10-CM: R11.10  ICD-9-CM: 787.03  9/27/2018 - Present        Hypokalemia ICD-10-CM: E87.6  ICD-9-CM: 276.8  9/27/2018 - Present        Obese ICD-10-CM: E66.9  ICD-9-CM: 278.00  Unknown - Present        DM (diabetes mellitus) ([place] [address].) ICD-10-CM: E11.9  ICD-9-CM: 250.00  6/7/2018 - Present        Leukocytosis ICD-10-CM: D72.829  ICD-9-CM: 288.60  6/7/2018 - Present        RESOLVED: Encephalopathy acute ICD-10-CM: G93.40  ICD-9-CM: 348.30  6/7/2018 - 9/27/2018        RESOLVED: Seizure ([place] [address].) ICD-10-CM: R56.9  ICD-9-CM: 780.39  6/7/2018 - 9/27/2018        RESOLVED: Aspiration into airway ICD-10-CM: Z98.246M  ICD-9-CM: 934.9  6/7/2018 - 9/27/2018        RESOLVED: Renal insufficiency ICD-10-CM: N28.9  ICD-9-CM: 593.9  6/7/2018 - 9/27/2018              HPI:   Avtar Lamas is a 72 y.o. female with past medical history of recent stroke, anxiety, depression, type 2 diabetes mellitus (DM), hypertension, obesity, seizure disorder presented to the ED from home with reported pain at price site. Patient is confused and not able to answer questions appropriately at this time. As such, majority of history was obtained per my review of ED and electronic medical records. Per these reports, patient was admitted to [facility] [place] previously with diagnosis of stroke.   She was last hospitalized here at [place] [address] from 1/1/2021 to 1/16/2021 with diagnoses of altered mental status (AMS), sepsis secondary to health care associated pneumonia (treated with Cefepime), abnormal urinalysis with negative urine culture, dehydration. After discharge, patient has been living at home with family. She has an indwelling price catheter. Onset of pain symptoms is not documented. On arrival in the ED tonight, initial recorded vital signs were BP= 97/61, HR= 97, RR= 16, O2sat= 100% on room air. WBC  = 12,700. K = 3.0. Hemoglobin = 10.9 (compared to 11.1 on 1/16/2021). BUN= 25.  Creatinine= 1.64 (compared to 0.92 on 1/16/2021). ED provider requested admission to the hospitalist service. At time of request, I placed order for UA results awaiting collection and results. Nurse notes that patient's old price catheter was removed and a new price was inserted after arrival to the ED. (Dr Newman Pi)    1/19:  Pt is unable to give any meaningful hx. She will arouse to her name. She reports she is not in any pain. She does not know day/date or location. She can occas follow commands (). Unlike last admission (2 days ago) pt is not violent or combative. Pharmacy called me to report that multiple meds she was supposed to be taking at home were not being given - pharmacy has the list but most importantly she was not getting her seizure meds. Will get Case Management to assist in placement - the family cannot take care of this pt at home and APS may need to be consulted. 1/20:  Combative, threatening and striking out at nurses, threatening harm and agitated overnight. Calmer at this moment and allows me to examine her. Refusing labs, meds and heart monitor and stripping off clothes and refusing to put them back on. With this degree of frontal lobe damage from CVA there may be little that we can do (Psy and Neuro saw last admission and report there is nothing they can add), her behavior will be unpredictable and varied. Will restart IM Haldol if nurses can get close enough to pt. Unable to get labs and cultures at this time.   She will need long term placement -perhaps in a memory unit. The family will not be able to care for her at home. 1 of 4 bottles from blood culture on  show possible gram + cocci - since she is physically better, and afeb, will hold off on repeat cultures and limit labs sticks as much as possible. :  Not as combative overnight but yelling out at times and attempting to get out of bed. She is oriented to name only. Bood Cultures growing enterococcus so antibiotics switched to Zosyn and Vanc. Urine culture with little growth. Review of Systems:   Review of systems not obtained due to patient factors. Objective:   Physical Exam:   Visit Vitals  BP (!) 157/83 (BP 1 Location: Left arm, BP Patient Position: At rest)   Pulse 92   Temp 98.3 °F (36.8 °C)   Resp 17   Ht 5' 4\" (1.626 m)   Wt 73.7 kg (162 lb 6.4 oz)   SpO2 100%   BMI 27.88 kg/m²      O2 Device: Room air  Temp (24hrs), Av.1 °F (36.7 °C), Min:97.5 °F (36.4 °C), Max:98.4 °F (36.9 °C)    1901 -  0700  In: 8843 [I.V.:1499]  Out: 750 [Urine:750]   701 - 1900  In: 2280 [P.O.:1080; I.V.:1200]  Out: 2450 [Urine:2450]  General:  Alert, occas cooperative, no distress, appears stated age. Head:  Normocephalic, without obvious abnormality, atraumatic. Eyes:  Conjunctivae/corneas clear. EOMs intact. Throat: Lips, mucosa, and tongue moist   Neck: Supple, symmetrical, trachea midline, no adenopathy, thyroid: no enlargement/tenderness/nodules, no carotid bruit and no JVD. Lungs:   Clear to auscultation bilaterally. Chest wall:  No tenderness or deformity. Heart:  Regular rate and rhythm, S1, S2 normal, no murmur, click, rub or gallop. Abdomen:   Soft, non-tender. Bowel sounds normal. No masses,  No organomegaly. Extremities: Extremities normal, atraumatic, no cyanosis or edema. No calf tenderness or cords. Pulses: 2+ and symmetric all extremities.    Skin: Skin color, texture, turgor normal. No rashes Neurologic:  More alert this AM.  Alert and oriented X 1 - she thinks she is at home. She thinks the season is \"christmas. \"  She will follow 1 step commands at this time. No cogwheeling or rigidity. Gait not tested at this time. Data Review:       Recent Days:  Recent Labs     01/19/21  0158 01/18/21  2140   WBC 11.4* 12.7*   HGB 10.3* 10.9*   HCT 31.5* 33.0*    232     Recent Labs     01/19/21  0158 01/18/21  2140    136   K 3.8 3.0*    103   CO2 28 27   * 115*   BUN 25* 25*   CREA 1.53* 1.64*   CA 9.4 9.6   MG 1.8  --    PHOS 3.5  --    ALB 3.2* 3.5   TBILI 0.8 0.6   ALT 13 13     No results for input(s): PH, PCO2, PO2, HCO3, FIO2 in the last 72 hours. 24 Hour Results:  No results found for this or any previous visit (from the past 24 hour(s)). Medications reviewed  Current Facility-Administered Medications   Medication Dose Route Frequency    haloperidol lactate (HALDOL) injection 1 mg  1 mg IntraMUSCular Q4H PRN    vancomycin (VANCOCIN) 1,000 mg in 0.9% sodium chloride 250 mL (VIAL-MATE)  1,000 mg IntraVENous Q24H    sodium chloride (NS) flush 5-40 mL  5-40 mL IntraVENous Q8H    sodium chloride (NS) flush 5-40 mL  5-40 mL IntraVENous PRN    aspirin delayed-release tablet 81 mg  81 mg Oral DAILY    atorvastatin (LIPITOR) tablet 20 mg  20 mg Oral DAILY    levETIRAcetam (KEPPRA) tablet 500 mg  500 mg Oral BID    pantoprazole (PROTONIX) tablet 40 mg  40 mg Oral DAILY    QUEtiapine (SEROquel) tablet 25 mg  25 mg Oral BID    sertraline (ZOLOFT) tablet 100 mg  100 mg Oral DAILY    ergocalciferol capsule 50,000 Units  50,000 Units Oral Q7D    piperacillin-tazobactam (ZOSYN) 3.375 g in 0.9% sodium chloride (MBP/ADV) 100 mL MBP  3.375 g IntraVENous Q8H    0.9% sodium chloride infusion  100 mL/hr IntraVENous CONTINUOUS     Care Plan discussed with: Patient/Nurse/Pharmacy  Total time spent with patient and review of records: 30 minutes.   Yvan Robles MD

## 2021-01-21 NOTE — PROGRESS NOTES
1:06 PM   01/21/21     RUR    Transition of care plan     1 Case Management to follow to monitor response to treatment and discharge needs   2  Coordination of discharge plan with daughter and family  3 Daughter plans to be able to transport home at discharge to her home   4 Referral with updated orders to be sent to [facility] for home care .  Home care will be at daughters house address is [address]  82 Juliann Gee RN CCM

## 2021-01-21 NOTE — PROGRESS NOTES
Problem: Pressure Injury - Risk of  Goal: *Prevention of pressure injury  Description: Document Fidel Scale and appropriate interventions in the flowsheet. Outcome: Progressing Towards Goal  Note: Pressure Injury Interventions:  Sensory Interventions: Assess changes in LOC    Moisture Interventions: Absorbent underpads    Activity Interventions: PT/OT evaluation    Mobility Interventions: PT/OT evaluation    Nutrition Interventions: Document food/fluid/supplement intake    Friction and Shear Interventions: HOB 30 degrees or less                Problem: Patient Education: Go to Patient Education Activity  Goal: Patient/Family Education  Outcome: Progressing Towards Goal     Problem: Falls - Risk of  Goal: *Absence of Falls  Description: Document Suzette Fall Risk and appropriate interventions in the flowsheet.   Outcome: Progressing Towards Goal  Note: Fall Risk Interventions:  Mobility Interventions: Bed/chair exit alarm    Mentation Interventions: Bed/chair exit alarm    Medication Interventions: Teach patient to arise slowly    Elimination Interventions: Bed/chair exit alarm

## 2021-01-21 NOTE — PROGRESS NOTES
Bedside, Verbal and Written shift change report given to Melissa MIGUEL (oncoming nurse) by Elvin Logan RN (offgoing nurse). Report included the following information SBAR, Kardex, ED Summary, Procedure Summary, Intake/Output, MAR, Recent Results and Med Rec Status.

## 2021-01-22 PROCEDURE — 74011250636 HC RX REV CODE- 250/636: Performed by: FAMILY MEDICINE

## 2021-01-22 PROCEDURE — 65270000029 HC RM PRIVATE

## 2021-01-22 PROCEDURE — 94760 N-INVAS EAR/PLS OXIMETRY 1: CPT

## 2021-01-22 PROCEDURE — 74011250637 HC RX REV CODE- 250/637: Performed by: FAMILY MEDICINE

## 2021-01-22 PROCEDURE — 74011000258 HC RX REV CODE- 258: Performed by: FAMILY MEDICINE

## 2021-01-22 RX ORDER — ACETAMINOPHEN 325 MG/1
650 TABLET ORAL
Status: DISCONTINUED | OUTPATIENT
Start: 2021-01-22 | End: 2021-01-29 | Stop reason: HOSPADM

## 2021-01-22 RX ADMIN — QUETIAPINE FUMARATE 25 MG: 25 TABLET ORAL at 08:40

## 2021-01-22 RX ADMIN — ACETAMINOPHEN 650 MG: 325 TABLET ORAL at 08:39

## 2021-01-22 RX ADMIN — HALOPERIDOL LACTATE 1 MG: 5 INJECTION, SOLUTION INTRAMUSCULAR at 08:40

## 2021-01-22 RX ADMIN — HALOPERIDOL LACTATE 1 MG: 5 INJECTION, SOLUTION INTRAMUSCULAR at 18:24

## 2021-01-22 RX ADMIN — ATORVASTATIN CALCIUM 20 MG: 20 TABLET, FILM COATED ORAL at 08:40

## 2021-01-22 RX ADMIN — PANTOPRAZOLE SODIUM 40 MG: 40 TABLET, DELAYED RELEASE ORAL at 08:40

## 2021-01-22 RX ADMIN — AMPICILLIN SODIUM 2 G: 2 INJECTION, POWDER, FOR SOLUTION INTRAMUSCULAR; INTRAVENOUS at 11:22

## 2021-01-22 RX ADMIN — PIPERACILLIN AND TAZOBACTAM 3.38 G: 3; .375 INJECTION, POWDER, LYOPHILIZED, FOR SOLUTION INTRAVENOUS at 05:59

## 2021-01-22 RX ADMIN — ASPIRIN 81 MG: 81 TABLET, COATED ORAL at 08:40

## 2021-01-22 RX ADMIN — Medication 10 ML: at 06:04

## 2021-01-22 RX ADMIN — LEVETIRACETAM 500 MG: 500 TABLET ORAL at 08:40

## 2021-01-22 RX ADMIN — SERTRALINE HYDROCHLORIDE 100 MG: 50 TABLET ORAL at 08:40

## 2021-01-22 NOTE — PROGRESS NOTES
Daily Progress Note: 1/22/2021  Cleavon Shoulder  PCP  Danna Gunter MD    Assessment/Plan:   Altered mental status with history of recent stroke  - improved    Sepsis - enterococcus growing in 2/4 blood cultures     JOSE (acute kidney injury) - likely dehydration  - continue IV fluids for hydration as long as she leaves IV in place     Acute hypokalemia  - monitor and replace as needed     Hypotension  - monitor BP   - hold all BP medication for now due to hypotension - restart as needed      Urinary Retention  - replaced price  - urine culture neg so far  - try with price out     UTI  - urine culture neg so far      History of stroke  - plan as above    Anemia  - chronic. Repeat H/H.       Leukocytosis  - repeat CBC as able     Generalized weakness/ debility  - place on fall precautions.  Consulted PT.     VTE prophylaxis  - Lovenox sq         Problem List:  Problem List as of 1/22/2021 Date Reviewed: 9/27/2018          Codes Class Noted - Resolved    Suprapubic pain ICD-10-CM: R10.2  ICD-9-CM: 789.09  1/21/2021 - Present        AMS (altered mental status) ICD-10-CM: R41.82  ICD-9-CM: 780.97  1/21/2021 - Present        Acute hypokalemia ICD-10-CM: E87.6  ICD-9-CM: 276.8  1/18/2021 - Present        Hypotension ICD-10-CM: I95.9  ICD-9-CM: 458.9  1/18/2021 - Present        JOSE (acute kidney injury) ([facility] ICD-10-CM: N17.9  ICD-9-CM: 584.9  1/18/2021 - Present        Dehydration ICD-10-CM: E86.0  ICD-9-CM: 276.51  1/1/2021 - Present        Altered mental status ICD-10-CM: R41.82  ICD-9-CM: 780.97  1/1/2021 - Present        Anemia ICD-10-CM: D64.9  ICD-9-CM: 285.9  1/1/2021 - Present        Tachycardia ICD-10-CM: R00.0  ICD-9-CM: 785.0  1/1/2021 - Present        Abnormal urinalysis ICD-10-CM: R82.90  ICD-9-CM: 791.9  1/1/2021 - Present        Sepsis ([facility] ICD-10-CM: A41.9  ICD-9-CM: 038.9, 995.91  1/1/2021 - Present        Lactic acidosis ICD-10-CM: E87.2  ICD-9-CM: 276.2  10/24/2020 - Present        SIRS (systemic inflammatory response syndrome) ([facility] ICD-10-CM: R65.10  ICD-9-CM: 995.90  9/27/2018 - Present        Sinus tachycardia ICD-10-CM: R00.0  ICD-9-CM: 427.89  9/27/2018 - Present        Hypertension ICD-10-CM: I10  ICD-9-CM: 401.9  Unknown - Present        Diabetes ([facility] ICD-10-CM: E11.9  ICD-9-CM: 250.00  Unknown - Present        Anxiety and depression ICD-10-CM: F41.9, F32.9  ICD-9-CM: 300.00, 311  Unknown - Present        Seizure disorder ([place] [address] ICD-10-CM: G40.909  ICD-9-CM: 345.90  Unknown - Present        Vomiting ICD-10-CM: R11.10  ICD-9-CM: 787.03  9/27/2018 - Present        Hypokalemia ICD-10-CM: E87.6  ICD-9-CM: 276.8  9/27/2018 - Present        Obese ICD-10-CM: E66.9  ICD-9-CM: 278.00  Unknown - Present        DM (diabetes mellitus) ([facility] ICD-10-CM: E11.9  ICD-9-CM: 250.00  6/7/2018 - Present        Leukocytosis ICD-10-CM: D72.829  ICD-9-CM: 288.60  6/7/2018 - Present        RESOLVED: Encephalopathy acute ICD-10-CM: G93.40  ICD-9-CM: 348.30  6/7/2018 - 9/27/2018        RESOLVED: Seizure ([facility] ICD-10-CM: R56.9  ICD-9-CM: 780.39  6/7/2018 - 9/27/2018        RESOLVED: Aspiration into airway ICD-10-CM: P74.016S  ICD-9-CM: 934.9  6/7/2018 - 9/27/2018        RESOLVED: Renal insufficiency ICD-10-CM: N28.9  ICD-9-CM: 593.9  6/7/2018 - 9/27/2018              HPI:   Gabe Fowler is a 72 y.o. female with past medical history of recent stroke, anxiety, depression, type 2 diabetes mellitus (DM), hypertension, obesity, seizure disorder presented to the ED from home with reported pain at price site. Patient is confused and not able to answer questions appropriately at this time. As such, majority of history was obtained per my review of ED and electronic medical records. Per these reports, patient was admitted to [facility] previously with diagnosis of stroke.   She was last hospitalized here at [facility] from 1/1/2021 to 1/16/2021 with diagnoses of altered mental status (AMS), sepsis secondary to health care associated pneumonia (treated with Cefepime), abnormal urinalysis with negative urine culture, dehydration. After discharge, patient has been living at home with family. She has an indwelling price catheter. Onset of pain symptoms is not documented. On arrival in the ED tonight, initial recorded vital signs were BP= 97/61, HR= 97, RR= 16, O2sat= 100% on room air. WBC  = 12,700. K = 3.0. Hemoglobin = 10.9 (compared to 11.1 on 1/16/2021). BUN= 25.  Creatinine= 1.64 (compared to 0.92 on 1/16/2021). ED provider requested admission to the hospitalist service. At time of request, I placed order for UA results awaiting collection and results. Nurse notes that patient's old price catheter was removed and a new price was inserted after arrival to the ED. (Dr Dee Dee De La Cruz)    1/19:  Pt is unable to give any meaningful hx. She will arouse to her name. She reports she is not in any pain. She does not know day/date or location. She can occas follow commands (). Unlike last admission (2 days ago) pt is not violent or combative. Pharmacy called me to report that multiple meds she was supposed to be taking at home were not being given - pharmacy has the list but most importantly she was not getting her seizure meds. Will get Case Management to assist in placement - the family cannot take care of this pt at home and APS may need to be consulted. 1/20:  Combative, threatening and striking out at nurses, threatening harm and agitated overnight. Calmer at this moment and allows me to examine her. Refusing labs, meds and heart monitor and stripping off clothes and refusing to put them back on. With this degree of frontal lobe damage from CVA there may be little that we can do (Psy and Neuro saw last admission and report there is nothing they can add), her behavior will be unpredictable and varied. Will restart IM Haldol if nurses can get close enough to pt.   Unable to get labs and cultures at this time. She will need long term placement -perhaps in a memory unit. The family will not be able to care for her at home. 1 of 4 bottles from blood culture on  show possible gram + cocci - since she is physically better, and afeb, will hold off on repeat cultures and limit labs sticks as much as possible. :  Not as combative overnight but yelling out at times and attempting to get out of bed. She is oriented to name only. Bood Cultures growing enterococcus so antibiotics switched to Zosyn and Vanc. Urine culture with little growth.      :  Better overnight per nurses. No combative behavior and less agitated and did not require IM Haldol overnight. Tmax 101. Tnow 100.2. King was placed last admission for urinary retention and has been in place long enough for a voiding trial so will have it removed and see how she does. Trying to get AM labs now if she will allow it. Review of Systems:   Review of systems not obtained due to patient factors. Objective:   Physical Exam:   Visit Vitals  BP (!) 118/50 (BP 1 Location: Right arm, BP Patient Position: At rest)   Pulse (!) 113   Temp 100.2 °F (37.9 °C)   Resp 18   Ht 5' 4\" (1.626 m)   Wt 73.7 kg (162 lb 6.4 oz)   SpO2 95%   BMI 27.88 kg/m²      O2 Device: Room air  Temp (24hrs), Av °F (37.2 °C), Min:97.6 °F (36.4 °C), Max:101.4 °F (38.6 °C)    1901 -  0700  In: 100 [I.V.:100]  Out: 400 [Urine:400]   701 -  190  In: 3215.4 [P.O.:1080; I.V.:2135.4]  Out: 2825 [Urine:2825]  General:  Alert, occas cooperative, no distress, appears stated age. Head:  Normocephalic, without obvious abnormality, atraumatic. Eyes:  Conjunctivae/corneas clear. EOMs intact. Throat: Lips, mucosa, and tongue moist   Neck: Supple, symmetrical, trachea midline, no adenopathy, thyroid: no enlargement/tenderness/nodules, no carotid bruit and no JVD. Lungs:   Clear to auscultation bilaterally.    Chest wall:  No tenderness or deformity.   Heart:  Regular rate and rhythm, S1, S2 normal, no murmur, click, rub or gallop.   Abdomen:   Soft, non-tender. Bowel sounds normal. No masses,  No organomegaly.   Extremities: Extremities normal, atraumatic, no cyanosis or edema. No calf tenderness or cords.     Pulses: 2+ and symmetric all extremities.   Skin: Skin color, texture, turgor normal. No rashes   Neurologic:  More alert this AM.  Alert and oriented X 1 - she thinks she is at home.  She will follow 1 step commands at this time.  No cogwheeling or rigidity.  Gait not tested at this time.       Data Review:       Recent Days:  No results for input(s): WBC, HGB, HCT, PLT, HGBEXT, HCTEXT, PLTEXT, HGBEXT, HCTEXT, PLTEXT in the last 72 hours.  No results for input(s): NA, K, CL, CO2, GLU, BUN, CREA, CA, MG, PHOS, ALB, TBIL, TBILI, ALT, INR, INREXT, INREXT in the last 72 hours.    No lab exists for component: SGOT  No results for input(s): PH, PCO2, PO2, HCO3, FIO2 in the last 72 hours.    24 Hour Results:  No results found for this or any previous visit (from the past 24 hour(s)).    Medications reviewed  Current Facility-Administered Medications   Medication Dose Route Frequency   • Vancomycin - Please draw trough level prior to dose due on 01/22 at 1400. Thanks!   Other ONCE   • haloperidol lactate (HALDOL) injection 1 mg  1 mg IntraMUSCular Q4H PRN   • vancomycin (VANCOCIN) 1,000 mg in 0.9% sodium chloride 250 mL (VIAL-MATE)  1,000 mg IntraVENous Q24H   • sodium chloride (NS) flush 5-40 mL  5-40 mL IntraVENous Q8H   • sodium chloride (NS) flush 5-40 mL  5-40 mL IntraVENous PRN   • aspirin delayed-release tablet 81 mg  81 mg Oral DAILY   • atorvastatin (LIPITOR) tablet 20 mg  20 mg Oral DAILY   • levETIRAcetam (KEPPRA) tablet 500 mg  500 mg Oral BID   • pantoprazole (PROTONIX) tablet 40 mg  40 mg Oral DAILY   • QUEtiapine (SEROquel) tablet 25 mg  25 mg Oral BID   • sertraline (ZOLOFT) tablet 100 mg  100 mg Oral DAILY   •  ergocalciferol capsule 50,000 Units  50,000 Units Oral Q7D    piperacillin-tazobactam (ZOSYN) 3.375 g in 0.9% sodium chloride (MBP/ADV) 100 mL MBP  3.375 g IntraVENous Q8H    0.9% sodium chloride infusion  100 mL/hr IntraVENous CONTINUOUS     Care Plan discussed with: Patient/Nurse/Pharmacy  Total time spent with patient and review of records: 30 minutes.   Frederick Duenas MD

## 2021-01-22 NOTE — PROGRESS NOTES
Transition Plan of Care  RUR 20%      Transition of care plan      1 Case Management to follow to monitor response to treatment and discharge needs   2  Coordination of discharge plan with daughter and family  3 Daughter plans to be able to transport home at discharge to her home   4 Referral with updated orders to be sent to [facility] for home care . Home care will be at daughters house address is [address] [place], [place] [address]  [id number]  5. Case Management to follow.       Prashant Londono, BS

## 2021-01-22 NOTE — PROGRESS NOTES
1200  Per Dr. Valdemar Barvo note to attempt price trial. Per pt's daughter, Ashu Flores, this is a chronic price and she was to be followed by urologist on Monday. Spoke with Dr. Leon Pierce MD stated to leave price in place due to retention. 1700  Paired blood cultures ordered. Pt has still be refusing medications and any intervention. Pt very aggressive towards staff. She needs a new IV but refuses for staff to get near. Will give dose of haldol and try again when patient more calm. 1835  Pt still verbally aggressive towards staff and refusing any interventions. 1930  Bedside and Verbal shift change report given to Libby Holloway RN (oncoming nurse) by Angele Aschoff, RN (offgoing nurse). Report included the following information SBAR, Kardex, Intake/Output, MAR, Recent Results and Med Rec Status.

## 2021-01-22 NOTE — ADT AUTH CERT NOTES
Neurology [address] Day (1/22/2021) by Dorian Abraham Review Status Review Entered Completed 1/22/2021 14:28  
  
Criteria Review Care Day: 5 Care Date: 1/22/2021 Level of Care: Inpatient Floor Guideline Day 3 Level Of Care   
(X) * Activity level acceptable 1/22/2021 14:28:25 EST by Dorian Abraham Ambulate with assistance ( ) * Complete discharge planning Clinical Status ( ) * No infection, or status acceptable   
(X) * Isolation not needed, or status acceptable 1/22/2021 14:28:25 EST by Todd Lopez   
  no isolation   
(X) * Respiratory status acceptable 1/22/2021 14:28:25 EST by Todd Lopez   
  aceptable on RA   
( ) * Pain and nausea absent or adequately managed   
(X) * Ventilatory status acceptable 1/22/2021 14:28:25 EST by Todd Lopez   
  acceptable ( ) * Neurologic problems absent or stabilized ( ) * Muscle or nerve damage absent or stable ( ) * General Discharge Criteria met Interventions   
(X) * Intake acceptable 1/22/2021 14:28:25 EST by Todd Lopez   
  Cardiac diet   
( ) * No inpatient interventions needed * Milestone Additional Notes 101.4-118/-95% RA  
  
1/22:  Better overnight per nurses.  No combative behavior and less agitated and did not require IM Haldol overnight.  Tmax 101. Tnow 100.2.  King was placed last admission for urinary retention and has been in place long enough for a voiding trial so will have it removed and see how she does.  Trying to get AM labs now if she will allow it.      
  
 Altered mental status with history of recent stroke- improved Sepsis - enterococcus growing in 2/4 blood cultures  
   
JOSE (acute kidney injury) - likely dehydration  
- continue IV fluids for hydration as long as she leaves IV in place  
   
Acute hypokalemia- monitor and replace as needed  
   
Hypotension  
- monitor BP   
- hold all BP medication for now due to hypotension - restart as needed  
    
 Urinary Retention  
- replaced price  
- urine culture neg so far  
- try with price out   
   
UTI- urine culture neg so far  
   
 History of stroke- plan as above  
   
Anemia- chronic.  Repeat H/H.    
   
Leukocytosis- repeat CBC as able  
   
Generalized weakness/ debility- place on fall precautions. Consulted PT.  
   
VTE prophylaxis- Lovenox sq General: Alert, occas cooperative, no distress, appears stated age. Head: Normocephalic, without obvious abnormality, atraumatic. Eyes: Conjunctivae/corneas clear. EOMs intact. Throat: Lips, mucosa, and tongue moist  
Neck: Supple, symmetrical, trachea midline, no adenopathy, thyroid: no enlargement/tenderness/nodules, no carotid bruit and no JVD. Lungs:   Clear to auscultation bilaterally. Chest wall: No tenderness or deformity. Heart: Regular rate and rhythm, S1, S2 normal, no murmur, click, rub or gallop. Abdomen:   Soft, non-tender. Bowel sounds normal. No masses,  No organomegaly. Extremities: Extremities normal, atraumatic, no cyanosis or edema. No calf tenderness or cords.    
Pulses: 2+ and symmetric all extremities. Skin: Skin color, texture, turgor normal. No rashes Neurologic: More alert this AM.  Alert and oriented X 1 - she thinks she is at home. Ken Morning will follow 1 step commands at this time.  No cogwheeling or rigidity.  Gait not tested at this time.    
  
Spot check pulse ox Consult mobility services Sitter as needed 0.9% sod chlor cont iv 100cc/hr Ampicillin 2g iv x1 Aspirin 82mg po x1 Lipitor 20mg po x1 Haldol 1mg IM x1 Keppra 500mg po x1 Protonix 40mg po x1 Zosyn 3.375g iv x1  
  
   
  
  
Neurology [facility] - Care Day  (1/21/2021) by Iam Farley Review Status Review Entered Completed 1/21/2021 12:38  
  
Criteria Review Care Day: 4 Care Date: 1/21/2021 Level of Care: Inpatient Floor Guideline Day 3 Level Of Care   
(X) * Activity level acceptable 1/21/2021 12:38:14 EST by Ascencion Mckeon   
  Ambulate with assistance ( ) * Complete discharge planning Clinical Status ( ) * No infection, or status acceptable   
(X) * Isolation not needed, or status acceptable 1/21/2021 12:38:14 EST by Ascencion Mckeon   
  no isolation   
(X) * Respiratory status acceptable 1/21/2021 12:38:14 EST by Ascencion Mckeon   
  acceptable on RA   
( ) * Pain and nausea absent or adequately managed   
(X) * Ventilatory status acceptable 1/21/2021 12:38:14 EST by Ascencion Mckeon   
  acceptable ( ) * Neurologic problems absent or stabilized ( ) * Muscle or nerve damage absent or stable ( ) * General Discharge Criteria met Interventions   
(X) * Intake acceptable 1/21/2021 12:38:14 EST by Ascencion Mckeon   
  Cardiac diet   
( ) * No inpatient interventions needed * Milestone Additional Notes 98.4-153/82-88-17-99% RA  
1/21:  Not as combative overnight but yelling out at times and attempting to get out of bed.  She is oriented to name only.  Bood Cultures growing enterococcus so antibiotics switched to Zosyn and Vanc.  Urine culture with little growth.    
Altered mental status with history of recent stroke- improved to baseline  
   
JOSE (acute kidney injury) - likely dehydration- continue IV fluids for hydration as long as she leaves IV in place  
   
Acute hypokalemia- monitor and replace as needed  
   
Hypotension  
- monitor BP   
- hold all BP medication for now due to hypotension - restart as needed  
    
Suprapubic pain  
- replaced price  
- urine culture neg so far  
   
UTI  
- Rocephin 1 gram IV q 24 hours  
- urine culture neg so far  
   
 History of stroke- plan as above  
   
Anemia- chronic.  Repeat H/H.    
   
Leukocytosis- repeat CBC as able  
   
Generalized weakness/ debility- place on fall precautions. Consulted PT.  
   
VTE prophylaxis- Lovenox sq General: Alert, occas cooperative, no distress, appears stated age. Head: Normocephalic, without obvious abnormality, atraumatic. Eyes: Conjunctivae/corneas clear. EOMs intact. Throat: Lips, mucosa, and tongue moist  
Neck: Supple, symmetrical, trachea midline, no adenopathy, thyroid: no enlargement/tenderness/nodules, no carotid bruit and no JVD. Lungs:   Clear to auscultation bilaterally. Chest wall: No tenderness or deformity. Heart: Regular rate and rhythm, S1, S2 normal, no murmur, click, rub or gallop. Abdomen:   Soft, non-tender. Bowel sounds normal. No masses,  No organomegaly. Extremities: Extremities normal, atraumatic, no cyanosis or edema. No calf tenderness or cords.    
Pulses: 2+ and symmetric all extremities. Skin: Skin color, texture, turgor normal. No rashes Neurologic: More alert this AM.  Alert and oriented X 1 - she thinks she is at home. Kenrick Gaitan thinks the season is \"rosy. \"  She will follow 1 step commands at this time.  No cogwheeling or rigidity.  Gait not tested at this time.    
  
Spot check pulse ox Consult mobility services Sitter as needed Daily weight  
0.9% sod chlor cont iv 100cc/hr Aspirin 81mg po x1 Lipitor 20mg po x1 Keppra 500mg po x1 Protonix 40mg po x1 Zosyn 3.375g iv x1  
  
  
  
[place] Letter of Recommendation by Demarco Dueñas Review Status Review Entered In Primary 2021 12:19  
  
Criteria Review We recommend that the following pt's hospitalization under OBSERVATION [104] status   is upgraded to INPATIENT If you agree, please place a new ADMIT order in [facility]  as recommended. 
  
Name   Tanya Hansen                 1950 [facility] #             [id number] ADMIT Date    2021 Discharge date            [unfilled]  
  
[facility]   [employer]  
  
  
  
Clinical summary        This pt returned to hospital with AMS and now we learn from her chart that she is suffering from Enterococcus bacteremia among other morbidities Vitals                           Stable Labs and Imaging       Bacteremia MCG criteria applies   YES Comments       Bacteremia  
  
  
This chart was reviewed at 12:06 PM 1/21/2021 
  
Odalis Jarrett MD MD  
Physician Advisor [facility] Cell [phone]  
  
Neurology [address] - [facility] Day (1/20/2021) by Tamia Regan Review Status Review Entered Completed 1/20/2021 16:24  
  
Criteria Review Care Day: 3 Care Date: 1/20/2021 Level of Care: Telemetry Guideline Day 3 Level Of Care   
(X) * Activity level acceptable 1/20/2021 16:24:34 EST by Sina Hamm   
  Ambulate with assistance ( ) * Complete discharge planning Clinical Status ( ) * No infection, or status acceptable 1/20/2021 16:24:34 EST by Sina Hamm   
  UTI 
- Rocephin 1 gram IV q 24 hours 
- urine culture neg so far   
(X) * Isolation not needed, or status acceptable 1/20/2021 16:24:34 EST by Sina Hamm   
  no isolation   
(X) * Respiratory status acceptable 1/20/2021 16:24:34 EST by Sina Hamm   
  acceptable on RA   
( ) * Pain and nausea absent or adequately managed   
(X) * Ventilatory status acceptable 1/20/2021 16:24:34 EST by Sina Hamm   
  acceptable ( ) * Neurologic problems absent or stabilized ( ) * Muscle or nerve damage absent or stable ( ) * General Discharge Criteria met Interventions   
(X) * Intake acceptable 1/20/2021 16:24:34 EST by Sina Hamm   
  Cardiac diet   
( ) * No inpatient interventions needed * Milestone Additional Notes 97.5-147/89-99-17-99% RA Altered mental status with history of recent stroke  
- improved to baseline  
   
JOSE (acute kidney injury) - likely dehydration- continue IV fluids for hydration as long as she leaves IV in place  
   
Acute hypokalemia- monitor and replace as needed  
   
Hypotension  
- monitor BP   
- hold all BP medication for now due to hypotension - restart as needed  
    
Suprapubic pain  
- replaced price - urine culture neg so far  
   
   
 History of stroke- plan as above  
   
Anemia- chronic.  Repeat H/H.    
   
Leukocytosis- repeat CBC as able  
   
Generalized weakness/ debility- place on fall precautions. Consulted PT.  
   
VTE prophylaxis- Lovenox sq  
   
1/20:  Combative, threatening and striking out at nurses, threatening harm and agitated overnight.  Calmer at this moment and allows me to examine her.  Refusing labs, meds and heart monitor and stripping off clothes and refusing to put them back on.  With this degree of frontal lobe damage from CVA there may be little that we can do (Psy and Neuro saw last admission and report there is nothing they can add), her behavior will be unpredictable and varied.  Will restart IM Haldol if nurses can get close enough to pt.  Unable to get labs and cultures at this time. Ioana Gtz will need long term placement -perhaps in a memory unit.  The family will not be able to care for her at home. 1 of 4 bottles from blood culture on 1/19 show possible gram + cocci - since she is physically better, and afeb, will hold off on repeat cultures and limit labs sticks as much as possible.    
  
Physical exam  
General: Alert, occas cooperative, no distress, appears stated age. Head: Normocephalic, without obvious abnormality, atraumatic. Eyes: Conjunctivae/corneas clear. EOMs intact. Throat: Lips, mucosa, and tongue moist  
Neck: Supple, symmetrical, trachea midline, no adenopathy, thyroid: no enlargement/tenderness/nodules, no carotid bruit and no JVD. Lungs:   Clear to auscultation bilaterally. Chest wall: No tenderness or deformity. Heart: Regular rate and rhythm, S1, S2 normal, no murmur, click, rub or gallop. Abdomen:   Soft, non-tender. Bowel sounds normal. No masses,  No organomegaly. Extremities: Extremities normal, atraumatic, no cyanosis or edema. No calf tenderness or cords.    
Pulses: 2+ and symmetric all extremities. Skin: Skin color, texture, turgor normal. No rashes Neurologic: More alert this AM.  Alert and oriented X 1 - she thinks she is at home. Dustin Gibbs thinks the season is \"rosy. \"  She will follow 1 step commands at this time.  No cogwheeling or rigidity.  Gait not tested at this time.    
   
  
Spot check pulse ox Sitter as needed Consult care management Daily weight Neurologic status assessment q2hrs  
0.9% sod chlor cont iv 100cc/hr Aspirin 81mg po x1 Lipitor 20mg po x1 Keppra 500mg po x1 Protonix 40mg po x1 Zosyn 3.35g iv x1 Vanco 1750mg po 1  
  
  
  
Neurology [address] - [facility] Day(1/19/2021) by Dorian Abraham Review Status Review Entered Completed 1/19/2021 13:05  
  
Criteria Review Care Day: 2 Care Date: 1/19/2021 Level of Care: Telemetry Guideline Day 2 Level Of Care (X) Floor 1/19/2021 13:05:18 EST by [address], [address] Lorelei Guzman telemetry Clinical Status   
(X) * No ICU or intermediate care needs 1/19/2021 13:05:18 EST by Todd Lopez   
  Altered mental status JOSE (acute kidney injury) * Milestone Additional Notes 97.5-131/31-81-% RA Altered mental status  
- with history of recent stroke.  Concern for acute encephalopathy  
- place on neuro checks and fall precautions  
- consider for neurology consult  
- order dysphagia screen.  Keep NPO until passes the same or speech evaluation  
   
JOSE (acute kidney injury) - likely dehydration  
- continue IV fluids for hydration and repeat renal panel in a.m.  
- place on strict Is and Os/ daily weights  
   
Acute hypokalemia  
- order KCl 10 mEq IV over 1 hour x 3 runs  
- monitor  
   
Hypotension - IV fluid bolus followed by maintenance IV infusion  
- monitor BP closely  
- hold all BP medication for now due to hypotension noted tonight  
    
Suprapubic pain  
- replaced price  
- ordered UA which is now positive for UTI.  Order urine culture  
   
UTI  
- Rocephin 1 gram IV q 24 hours  
   
  History of stroke - plan as above  
   
Anemia - chronic.  Repeat H/H.    
   
Leukocytosis  
- repeat CBC   
   
Generalized weakness/ debility - place on fall precautions. Consult PT.  
   
VTE prophylaxis - Lovenox sq CT head- No acute intracranial abnormality Normal sinus rhythm Cannot rule out Anterior infarct (cited on or before 12-JAN-2021) Abnormal ECG When compared with ECG of 12-JAN-2021 09:57,   
Criteria for Inferior infarct are no longer present Questionable change in initial forces of Septal leads Anion gap 4 glu 103 bun 25 creat 1.53 wbc 11.4 rbc 3.22 hgb 10.3 hct 31.5 Salicylate <2.5 Blood/urine culture pending Spot check pulse ox Consult care management Cardiac diet Cardiac monitoring Daily weight Neurologic status assessment q2hrs Ambulate with assistance  
0.9% sod chlor cont iv 125cc/hr Aspirin 81mg po x1 Lipitor 20mg po x1 Rocephin 1g iv x1 Keppra 500mg po x1 Protonix 40mg po x1 Pot chlor 10meq iv x2  
  
  
  
Neurology [address] - Care Day (1/18/2021) by Ofilia Kin Review Status Review Entered Completed 1/19/2021 10:17  
  
Criteria Review Care Day: 1 Care Date: 1/18/2021 Level of Care: Telemetry Guideline Day 1 Clinical Status   
(X) * Clinical Indications met [D]   
1/19/2021 10:17:37 EST by Ziyad Costa   
  Altered mental status, JOSE * Milestone Additional Notes 98.7-97/36-04-% RA  
  
ED provider note: 71-year-old female with history of diabetes and hypertension and seizure disorder with recent CVA for which she was seen at [facility] and subsequent admission to this hospital for altered mental status (discharged 2 days ago) presents to the emergency department today with chief complaint of pain at her King site. Eladia Velasquez is accompanied by her son who relates the history. Aditi Downing states with her daughter and son is unable to report that the patient indicated that she was having some discomfort associated with the King. Mirlande Dickinson is no report of fevers or chills.  The son reports that she has been falling frequently since her last hospitalization at [facility] as well as here. History and Physical:  
Chief complaint:  Patient does not provide  
   
History of present illness Bill Gross is a 72 y.o. female with past medical history of recent stroke, anxiety, depression, type 2 diabetes mellitus (DM), hypertension, obesity, seizure disorder presented to the ED from home with reported pain at price site.  Patient is confused and not able to answer questions appropriately at this time.  As such, majority of history was obtained per my review of ED and electronic medical records.  Per these reports, patient was admitted to [facility] previously with diagnosis of stroke. Domenic Ann was last hospitalized here at [facility] from 1/1/2021 to 1/16/2021 with diagnoses of altered mental status (AMS), sepsis secondary to health care associated pneumonia (treated with Cefepime), abnormal urinalysis with negative urine culture, dehydration.  After discharge, patient has been living at home with family. Domenic Ann has an indwelling price catheter.  Onset of pain symptoms is not documented.  On arrival in the ED tonight, initial recorded vital signs were BP= 97/61, HR= 97, RR= 16, O2sat= 100% on room air. [facility]  = 12,700. K = 3.0.  Hemoglobin = 10.9 (compared to 11.1 on 1/16/2021).  BUN= 25.  Creatinine= 1.64 (compared to 0.92 on 1/16/2021).  ED provider requested admission to the hospitalist service.  At time of request, I placed order for UA results awaiting collection and results.  Nurse notes that patient's old price catheter was removed and a new price was inserted after arrival to the ED. Assessment/plan: 1.  Altered mental status      - with history of recent stroke.  Concern for acute encephalopathy  
     - admit to telemetry  
     - place on neuro checks and fall precautions      - consider for neurology consult  
     - order dysphagia screen.  Keep NPO until passes the same or speech evaluation  
   
2.  JOSE (acute kidney injury)   
     - likely dehydration  
     - continue IV fluids for hydration and repeat renal panel in a.m.       - place on strict Is and Os/ daily weights  
   
3.  Acute hypokalemia  
     - order KCl 10 mEq IV over 1 hour x 3 runs      - repeat K level in a.m.  
   
4.  Hypotension  
     - order IV fluid bolus followed by maintenance IV infusion  
     - monitor BP closely  
     - hold all BP medication for now due to hypotension noted tonight  
     -order 12 lead EKG  
   
5.  Suprapubic pain  
     - ie, pain at price site with old indwelling price catheter which was removed and replaced with new price      - ordered UA which is now positive for UTI.  Order urine culture  
   
6.  UTI  
     - ordered Rocephin 1 gram IV q 24 hours  
   
7.  History of stroke  
      - plan as above  
   
8.  Hyperlipidemia  
      - plan as above  
   
9. Anemia  
    - chronic.  Repeat H/H.    
   
10.  Leukocytosis        - repeat CBC in a.m.  
   
11.  Generalized weakness/ debility  
       - place on fall precautions. Consult PT.  
   
12.  VTE prophylaxis  
       - Lovenox sq  
   
Wbc 12.7 rbc 3.38 hgb 10.9 pot 3 glu 115 bun 25 creat 1.64 Spot check pulse ox Cardiac monitoring x 24hrs Daily weight Neurologic status assessment q2hrs Ambulate with assistance Consult home health  
0.9% sod chlor cont iv 125cc/hr Pot chlor 10meq iv x1  
0.9% sod chlor cont ivf bolus 500mL x1  
  
  
Neurology [facility] - Clinical Indications for Admission to Inpatient Care by Shanice Starr Review Status Review Entered Completed 1/19/2021 10:17  
  
Criteria Review Clinical Indications for Admission to Inpatient Care Most Recent : Rogers Lopez Most Recent Date: 1/19/2021 10:17:00 EST   
(X) Hospital admission is needed for appropriate care of the patient because of  1 or more  of   
the following :   
   (X) Altered mental status that is severe or persistent (eg, encephalopathy) (23) (24) (25) (26)   
   1/19/2021 10:17:00 EST by Rogers Lopez      Patient is confused and not able to answer questions appropriately, Concern for acute encephalopathy

## 2021-01-23 LAB
ALBUMIN SERPL-MCNC: 2.8 G/DL (ref 3.5–5)
ALBUMIN/GLOB SERPL: 0.6 {RATIO} (ref 1.1–2.2)
ALP SERPL-CCNC: 55 U/L (ref 45–117)
ALT SERPL-CCNC: 9 U/L (ref 12–78)
ANION GAP SERPL CALC-SCNC: 8 MMOL/L (ref 5–15)
AST SERPL-CCNC: 20 U/L (ref 15–37)
BASOPHILS # BLD: 0.1 K/UL (ref 0–0.1)
BASOPHILS NFR BLD: 0 % (ref 0–1)
BILIRUB SERPL-MCNC: 0.9 MG/DL (ref 0.2–1)
BUN SERPL-MCNC: 8 MG/DL (ref 6–20)
BUN/CREAT SERPL: 14 (ref 12–20)
CALCIUM SERPL-MCNC: 8.7 MG/DL (ref 8.5–10.1)
CHLORIDE SERPL-SCNC: 104 MMOL/L (ref 97–108)
CO2 SERPL-SCNC: 22 MMOL/L (ref 21–32)
CREAT SERPL-MCNC: 0.59 MG/DL (ref 0.55–1.02)
DIFFERENTIAL METHOD BLD: ABNORMAL
EOSINOPHIL # BLD: 0.6 K/UL (ref 0–0.4)
EOSINOPHIL NFR BLD: 4 % (ref 0–7)
ERYTHROCYTE [DISTWIDTH] IN BLOOD BY AUTOMATED COUNT: 12.7 % (ref 11.5–14.5)
GLOBULIN SER CALC-MCNC: 4.6 G/DL (ref 2–4)
GLUCOSE SERPL-MCNC: 103 MG/DL (ref 65–100)
HCT VFR BLD AUTO: 29.8 % (ref 35–47)
HGB BLD-MCNC: 9.7 G/DL (ref 11.5–16)
IMM GRANULOCYTES # BLD AUTO: 0.1 K/UL (ref 0–0.04)
IMM GRANULOCYTES NFR BLD AUTO: 1 % (ref 0–0.5)
LYMPHOCYTES # BLD: 1.4 K/UL (ref 0.8–3.5)
LYMPHOCYTES NFR BLD: 11 % (ref 12–49)
MAGNESIUM SERPL-MCNC: 1.4 MG/DL (ref 1.6–2.4)
MCH RBC QN AUTO: 31.7 PG (ref 26–34)
MCHC RBC AUTO-ENTMCNC: 32.6 G/DL (ref 30–36.5)
MCV RBC AUTO: 97.4 FL (ref 80–99)
MONOCYTES # BLD: 0.9 K/UL (ref 0–1)
MONOCYTES NFR BLD: 7 % (ref 5–13)
NEUTS SEG # BLD: 9.9 K/UL (ref 1.8–8)
NEUTS SEG NFR BLD: 77 % (ref 32–75)
NRBC # BLD: 0 K/UL (ref 0–0.01)
NRBC BLD-RTO: 0 PER 100 WBC
PLATELET # BLD AUTO: 227 K/UL (ref 150–400)
PMV BLD AUTO: 10.7 FL (ref 8.9–12.9)
POTASSIUM SERPL-SCNC: 3.9 MMOL/L (ref 3.5–5.1)
PROT SERPL-MCNC: 7.4 G/DL (ref 6.4–8.2)
RBC # BLD AUTO: 3.06 M/UL (ref 3.8–5.2)
SODIUM SERPL-SCNC: 134 MMOL/L (ref 136–145)
WBC # BLD AUTO: 12.9 K/UL (ref 3.6–11)

## 2021-01-23 PROCEDURE — 80053 COMPREHEN METABOLIC PANEL: CPT

## 2021-01-23 PROCEDURE — 83735 ASSAY OF MAGNESIUM: CPT

## 2021-01-23 PROCEDURE — 74011250636 HC RX REV CODE- 250/636: Performed by: FAMILY MEDICINE

## 2021-01-23 PROCEDURE — 74011250637 HC RX REV CODE- 250/637: Performed by: FAMILY MEDICINE

## 2021-01-23 PROCEDURE — 94760 N-INVAS EAR/PLS OXIMETRY 1: CPT

## 2021-01-23 PROCEDURE — 65270000029 HC RM PRIVATE

## 2021-01-23 PROCEDURE — 36415 COLL VENOUS BLD VENIPUNCTURE: CPT

## 2021-01-23 PROCEDURE — 87040 BLOOD CULTURE FOR BACTERIA: CPT

## 2021-01-23 PROCEDURE — 85025 COMPLETE CBC W/AUTO DIFF WBC: CPT

## 2021-01-23 PROCEDURE — 74011000258 HC RX REV CODE- 258: Performed by: FAMILY MEDICINE

## 2021-01-23 RX ORDER — MAGNESIUM SULFATE HEPTAHYDRATE 40 MG/ML
2 INJECTION, SOLUTION INTRAVENOUS ONCE
Status: COMPLETED | OUTPATIENT
Start: 2021-01-23 | End: 2021-01-23

## 2021-01-23 RX ORDER — QUETIAPINE FUMARATE 25 MG/1
25 TABLET, FILM COATED ORAL 4 TIMES DAILY
Status: DISCONTINUED | OUTPATIENT
Start: 2021-01-23 | End: 2021-01-29 | Stop reason: HOSPADM

## 2021-01-23 RX ADMIN — LEVETIRACETAM 500 MG: 500 TABLET ORAL at 17:57

## 2021-01-23 RX ADMIN — ACETAMINOPHEN 650 MG: 325 TABLET ORAL at 14:10

## 2021-01-23 RX ADMIN — ATORVASTATIN CALCIUM 20 MG: 20 TABLET, FILM COATED ORAL at 08:39

## 2021-01-23 RX ADMIN — PANTOPRAZOLE SODIUM 40 MG: 40 TABLET, DELAYED RELEASE ORAL at 08:40

## 2021-01-23 RX ADMIN — AMPICILLIN SODIUM 2 G: 2 INJECTION, POWDER, FOR SOLUTION INTRAMUSCULAR; INTRAVENOUS at 17:57

## 2021-01-23 RX ADMIN — QUETIAPINE FUMARATE 25 MG: 25 TABLET ORAL at 14:10

## 2021-01-23 RX ADMIN — AMPICILLIN SODIUM 2 G: 2 INJECTION, POWDER, FOR SOLUTION INTRAMUSCULAR; INTRAVENOUS at 11:44

## 2021-01-23 RX ADMIN — Medication 10 ML: at 14:10

## 2021-01-23 RX ADMIN — SERTRALINE HYDROCHLORIDE 100 MG: 50 TABLET ORAL at 08:39

## 2021-01-23 RX ADMIN — SODIUM CHLORIDE 100 ML/HR: 9 INJECTION, SOLUTION INTRAVENOUS at 18:00

## 2021-01-23 RX ADMIN — ASPIRIN 81 MG: 81 TABLET, COATED ORAL at 08:39

## 2021-01-23 RX ADMIN — QUETIAPINE FUMARATE 25 MG: 25 TABLET ORAL at 21:44

## 2021-01-23 RX ADMIN — Medication 10 ML: at 21:45

## 2021-01-23 RX ADMIN — QUETIAPINE FUMARATE 25 MG: 25 TABLET ORAL at 08:39

## 2021-01-23 RX ADMIN — LEVETIRACETAM 500 MG: 500 TABLET ORAL at 08:39

## 2021-01-23 RX ADMIN — AMPICILLIN SODIUM 2 G: 2 INJECTION, POWDER, FOR SOLUTION INTRAMUSCULAR; INTRAVENOUS at 22:57

## 2021-01-23 RX ADMIN — MAGNESIUM SULFATE HEPTAHYDRATE 2 G: 40 INJECTION, SOLUTION INTRAVENOUS at 10:25

## 2021-01-23 RX ADMIN — QUETIAPINE FUMARATE 25 MG: 25 TABLET ORAL at 17:57

## 2021-01-23 NOTE — PROGRESS NOTES
[facility] Dosing Services: 1/23/21    Pharmacist Renal Dosing Progress Note for ampicillin   Physician Dr. Aruna He    The following medication: ampicillin was automatically dose-adjusted per [facility] P&T Committee Protocol, with respect to renal function. Consult provided for this   72 y.o. , female , for the indication of bacteremia. Pt Weight:   Wt Readings from Last 1 Encounters:   01/20/21 73.7 kg (162 lb 6.4 oz)         Previous Regimen Ampicillin 2g IV every 8 hours   Serum Creatinine Lab Results   Component Value Date/Time    Creatinine 0.59 01/23/2021 08:38 AM       Creatinine Clearance Estimated Creatinine Clearance: 74.6 mL/min (by C-G formula based on SCr of 0.59 mg/dL). BUN Lab Results   Component Value Date/Time    BUN 8 01/23/2021 08:38 AM       Dosage changed to:  ampicillin 2g IV every 6 hours for improved renal function and CrCl >50mL/min    Additional notes:      Pharmacy to continue to monitor patient daily. Will make dosage adjustments based upon changing renal function. Signed Seamus Lind.  Contact information:  [phone]

## 2021-01-23 NOTE — PROGRESS NOTES
1325: Patient has adamantly refused a new IV line and labs several times tonight; frequently becoming verbally aggressive. Will attempt to get labs and IV again if patient's mood changes. 4715: Pt still refusing to let me obtain her labs or a new IV.    0700: Per Dr. Vale Amaral, seroquel increased; administer seroquel and haldol if needed to insert IV and get labs. Call Dr. Vale Amaral if restraints are needed in addition to medication to get an IV and labs.

## 2021-01-23 NOTE — PROGRESS NOTES
7045  Only able to get one set of blood cultures and AM labs. Placed IV and restarted fluids. Pt was more calm with family at bedside. 1930  Bedside and Verbal shift change report given to Marisol Lester RN (oncoming nurse) by Korina Bonner RN (offgoing nurse). Report included the following information SBAR, Kardex and MAR.

## 2021-01-23 NOTE — PROGRESS NOTES
Daily Progress Note: 1/23/2021  Layman Blew  PCP  Julienne Gunter MD    Assessment/Plan:   UTI with sepsis, with continued fevers  - urine culture neg   - blood cx with enteroccocus and various gram positives  -will try to repeat blood cx today    Urinary Retention with chronic indwelling price  - replaced price  - urine culture neg so far  - has upcoming uro appt for voiding trial- family declined to try before seeing urology    Altered mental status with history of recent frontal lobe stroke  - improved to baseline combative  -seroquel- increase to QID  -haldol PRN  -soft restraints if doesn't tolerate IV restart and IV abx.    -will also ask family to come sit with her    History of stroke  - plan as above    JOSE (acute kidney injury) - likely dehydration, refusing labs  - continue IV fluids for hydration as long as she leaves IV in place     Acute hypokalemia - pt refusing labs  - monitor and replace as needed     Hypotension  - monitor BP   - hold all BP medication for now due to hypotension - restart as needed    Anemia  - chronic. Repeat H/H if pt allows     Leukocytosis  - repeat CBC as able     Generalized weakness/ debility  -  fall precautions.  Consulted PT.     VTE prophylaxis  - Lovenox sq         Problem List:  Problem List as of 1/23/2021 Date Reviewed: 9/27/2018          Codes Class Noted - Resolved    Suprapubic pain ICD-10-CM: R10.2  ICD-9-CM: 789.09  1/21/2021 - Present        AMS (altered mental status) ICD-10-CM: R41.82  ICD-9-CM: 780.97  1/21/2021 - Present        Acute hypokalemia ICD-10-CM: E87.6  ICD-9-CM: 276.8  1/18/2021 - Present        Hypotension ICD-10-CM: I95.9  ICD-9-CM: 458.9  1/18/2021 - Present        JOSE (acute kidney injury) ([facility] [address] ICD-10-CM: N17.9  ICD-9-CM: 584.9  1/18/2021 - Present        Dehydration ICD-10-CM: E86.0  ICD-9-CM: 276.51  1/1/2021 - Present        Altered mental status ICD-10-CM: R41.82  ICD-9-CM: 780.97  1/1/2021 - Present        Anemia ICD-10-CM: D64.9  ICD-9-CM: 285.9  1/1/2021 - Present        Tachycardia ICD-10-CM: R00.0  ICD-9-CM: 785.0  1/1/2021 - Present        Abnormal urinalysis ICD-10-CM: R82.90  ICD-9-CM: 791.9  1/1/2021 - Present        Sepsis ([facility] ICD-10-CM: A41.9  ICD-9-CM: 038.9, 995.91  1/1/2021 - Present        Lactic acidosis ICD-10-CM: E87.2  ICD-9-CM: 276.2  10/24/2020 - Present        SIRS (systemic inflammatory response syndrome) (HCC) ICD-10-CM: R65.10  ICD-9-CM: 995.90  9/27/2018 - Present        Sinus tachycardia ICD-10-CM: R00.0  ICD-9-CM: 427.89  9/27/2018 - Present        Hypertension ICD-10-CM: I10  ICD-9-CM: 401.9  Unknown - Present        Diabetes ([facility] ICD-10-CM: E11.9  ICD-9-CM: 250.00  Unknown - Present        Anxiety and depression ICD-10-CM: F41.9, F32.9  ICD-9-CM: 300.00, 311  Unknown - Present        Seizure disorder ([facility] ICD-10-CM: G40.909  ICD-9-CM: 345.90  Unknown - Present        Vomiting ICD-10-CM: R11.10  ICD-9-CM: 787.03  9/27/2018 - Present        Hypokalemia ICD-10-CM: E87.6  ICD-9-CM: 276.8  9/27/2018 - Present        Obese ICD-10-CM: E66.9  ICD-9-CM: 278.00  Unknown - Present        DM (diabetes mellitus) ([facility] ICD-10-CM: E11.9  ICD-9-CM: 250.00  6/7/2018 - Present        Leukocytosis ICD-10-CM: D72.829  ICD-9-CM: 288.60  6/7/2018 - Present        RESOLVED: Encephalopathy acute ICD-10-CM: G93.40  ICD-9-CM: 348.30  6/7/2018 - 9/27/2018        RESOLVED: Seizure ([address] ICD-10-CM: R56.9  ICD-9-CM: 780.39  6/7/2018 - 9/27/2018        RESOLVED: Aspiration into airway ICD-10-CM: T17.908A  ICD-9-CM: 934.9  6/7/2018 - 9/27/2018        RESOLVED: Renal insufficiency ICD-10-CM: N28.9  ICD-9-CM: 593.9  6/7/2018 - 9/27/2018              HPI:   Yordan Morrison is a 72 y.o. female with past medical history of recent stroke, anxiety, depression, type 2 diabetes mellitus (DM), hypertension, obesity, seizure disorder presented to the ED from home with reported pain at price site.   Patient is confused and not able to answer questions appropriately at this time. As such, majority of history was obtained per my review of ED and electronic medical records. Per these reports, patient was admitted to [facility] previously with diagnosis of stroke. She was last hospitalized here at [facility] from 1/1/2021 to 1/16/2021 with diagnoses of altered mental status (AMS), sepsis secondary to health care associated pneumonia (treated with Cefepime), abnormal urinalysis with negative urine culture, dehydration. After discharge, patient has been living at home with family. She has an indwelling price catheter. Onset of pain symptoms is not documented. On arrival in the ED tonight, initial recorded vital signs were BP= 97/61, HR= 97, RR= 16, O2sat= 100% on room air. WBC  = 12,700. K = 3.0. Hemoglobin = 10.9 (compared to 11.1 on 1/16/2021). BUN= 25.  Creatinine= 1.64 (compared to 0.92 on 1/16/2021). ED provider requested admission to the hospitalist service. At time of request, I placed order for UA results awaiting collection and results. Nurse notes that patient's old price catheter was removed and a new price was inserted after arrival to the ED. (Dr Danya Ortega)    1/19:  Pt is unable to give any meaningful hx. She will arouse to her name. She reports she is not in any pain. She does not know day/date or location. She can occas follow commands (). Unlike last admission (2 days ago) pt is not violent or combative. Pharmacy called me to report that multiple meds she was supposed to be taking at home were not being given - pharmacy has the list but most importantly she was not getting her seizure meds. Will get Case Management to assist in placement - the family cannot take care of this pt at home and APS may need to be consulted. 1/20:  Combative, threatening and striking out at nurses, threatening harm and agitated overnight. Calmer at this moment and allows me to examine her.   Refusing labs, meds and heart monitor and stripping off clothes and refusing to put them back on. With this degree of frontal lobe damage from CVA there may be little that we can do (Psy and Neuro saw last admission and report there is nothing they can add), her behavior will be unpredictable and varied. Will restart IM Haldol if nurses can get close enough to pt. Unable to get labs and cultures at this time. She will need long term placement -perhaps in a memory unit. The family will not be able to care for her at home. 1 of 4 bottles from blood culture on 1/19 show possible gram + cocci - since she is physically better, and afeb, will hold off on repeat cultures and limit labs sticks as much as possible. 1/21:  Not as combative overnight but yelling out at times and attempting to get out of bed. She is oriented to name only. Bood Cultures growing enterococcus so antibiotics switched to Zosyn and Vanc. Urine culture with little growth.      1/22:  Better overnight per nurses. No combative behavior and less agitated and did not require IM Haldol overnight. Tmax 101. Tnow 100.2. King was placed last admission for urinary retention and has been in place long enough for a voiding trial so will have it removed and see how she does. Trying to get AM labs now if she will allow it.        1/23: Tmax 101.4 yesterday. Afebrile currently. No IV access or labs allowed by patient despite IM haldol. Not able to complete her IV abx. Was too combative when they attempted to replace IV and get blood cx. Threatening to assault staff and call the police. She is calm and pleasant this AM.  Reported that she would let the nurse attempt an IV so we can treat her UTI. I spoke with Fabby Guerrero and asked her to come sit with mother. She said she babysits a grandson and can't come because she doesn't have alternate childcare. Will reach out to pt's son to see if he can come sit with mother to keep her calm.     Review of Systems: Review of systems not obtained due to patient factors. Objective:   Physical Exam:   Visit Vitals  BP (!) 142/70 (BP 1 Location: Left arm, BP Patient Position: At rest)   Pulse 97   Temp 99 °F (37.2 °C)   Resp 18   Ht 5' 4\" (1.626 m)   Wt 73.7 kg (162 lb 6.4 oz)   SpO2 97%   BMI 27.88 kg/m²      O2 Device: Room air  Temp (24hrs), Av.4 °F (36.9 °C), Min:97.9 °F (36.6 °C), Max:99 °F (37.2 °C)    No intake/output data recorded.  0701 -  1900  In: 1896.4 [P.O.:360; I.V.:1536.4]  Out: 1624 [Urine:2275]  General:  Alert, cooperative and calm, no distress, appears stated age. Head:  Normocephalic, without obvious abnormality, atraumatic. Eyes:  Conjunctivae/corneas clear. EOMs intact. Throat: Lips, mucosa, and tongue moist   Neck: Supple, symmetrical, trachea midline, no adenopathy, thyroid: no enlargement/tenderness/nodules, no carotid bruit and no JVD. Lungs:   Clear to auscultation bilaterally. Chest wall:  No tenderness or deformity. Heart:  Regular rate and rhythm, S1, S2 normal, no murmur, click, rub or gallop. Abdomen:   Soft, non-tender. Bowel sounds normal. No masses,  No organomegaly. : price in place   Extremities: Extremities normal, atraumatic, no cyanosis or edema. No calf tenderness or cords. Pulses: 2+ and symmetric all extremities. Skin: Skin color, texture, turgor normal. No rashes   Neurologic:  More alert this AM.  Alert and oriented X 2. She will follow 1 step commands at this time. No cogwheeling or rigidity. Gait not tested at this time. Data Review:       Recent Days:  No results for input(s): WBC, HGB, HCT, PLT, HGBEXT, HCTEXT, PLTEXT, HGBEXT, HCTEXT, PLTEXT in the last 72 hours. No results for input(s): NA, K, CL, CO2, GLU, BUN, CREA, CA, MG, PHOS, ALB, TBIL, TBILI, ALT, INR, INREXT, INREXT in the last 72 hours. No lab exists for component: SGOT  No results for input(s): PH, PCO2, PO2, HCO3, FIO2 in the last 72 hours.     24 Hour Results:  No results found for this or any previous visit (from the past 24 hour(s)). Medications reviewed  Current Facility-Administered Medications   Medication Dose Route Frequency    acetaminophen (TYLENOL) tablet 650 mg  650 mg Oral Q6H PRN    ampicillin (OMNIPEN) 2 g in 0.9% sodium chloride (MBP/ADV) 100 mL MBP  2 g IntraVENous Q8H    haloperidol lactate (HALDOL) injection 1 mg  1 mg IntraMUSCular Q4H PRN    sodium chloride (NS) flush 5-40 mL  5-40 mL IntraVENous Q8H    sodium chloride (NS) flush 5-40 mL  5-40 mL IntraVENous PRN    aspirin delayed-release tablet 81 mg  81 mg Oral DAILY    atorvastatin (LIPITOR) tablet 20 mg  20 mg Oral DAILY    levETIRAcetam (KEPPRA) tablet 500 mg  500 mg Oral BID    pantoprazole (PROTONIX) tablet 40 mg  40 mg Oral DAILY    QUEtiapine (SEROquel) tablet 25 mg  25 mg Oral BID    sertraline (ZOLOFT) tablet 100 mg  100 mg Oral DAILY    ergocalciferol capsule 50,000 Units  50,000 Units Oral Q7D    0.9% sodium chloride infusion  100 mL/hr IntraVENous CONTINUOUS     Care Plan discussed with: Patient/Nurse/Family  Total time spent with patient and review of records: 30 minutes.   Jerald Elizalde MD

## 2021-01-24 LAB
ALBUMIN SERPL-MCNC: 2.4 G/DL (ref 3.5–5)
ALBUMIN/GLOB SERPL: 0.5 {RATIO} (ref 1.1–2.2)
ALP SERPL-CCNC: 54 U/L (ref 45–117)
ALT SERPL-CCNC: 10 U/L (ref 12–78)
ANION GAP SERPL CALC-SCNC: 8 MMOL/L (ref 5–15)
AST SERPL-CCNC: 24 U/L (ref 15–37)
BASOPHILS # BLD: 0.1 K/UL (ref 0–0.1)
BASOPHILS NFR BLD: 1 % (ref 0–1)
BILIRUB SERPL-MCNC: 0.8 MG/DL (ref 0.2–1)
BUN SERPL-MCNC: 5 MG/DL (ref 6–20)
BUN/CREAT SERPL: 10 (ref 12–20)
CALCIUM SERPL-MCNC: 8.5 MG/DL (ref 8.5–10.1)
CHLORIDE SERPL-SCNC: 109 MMOL/L (ref 97–108)
CO2 SERPL-SCNC: 23 MMOL/L (ref 21–32)
CREAT SERPL-MCNC: 0.52 MG/DL (ref 0.55–1.02)
DIFFERENTIAL METHOD BLD: ABNORMAL
EOSINOPHIL # BLD: 0.5 K/UL (ref 0–0.4)
EOSINOPHIL NFR BLD: 6 % (ref 0–7)
ERYTHROCYTE [DISTWIDTH] IN BLOOD BY AUTOMATED COUNT: 12.9 % (ref 11.5–14.5)
GLOBULIN SER CALC-MCNC: 4.5 G/DL (ref 2–4)
GLUCOSE SERPL-MCNC: 66 MG/DL (ref 65–100)
HCT VFR BLD AUTO: 28.2 % (ref 35–47)
HGB BLD-MCNC: 9.3 G/DL (ref 11.5–16)
IMM GRANULOCYTES # BLD AUTO: 0 K/UL (ref 0–0.04)
IMM GRANULOCYTES NFR BLD AUTO: 0 % (ref 0–0.5)
LYMPHOCYTES # BLD: 1.6 K/UL (ref 0.8–3.5)
LYMPHOCYTES NFR BLD: 18 % (ref 12–49)
MAGNESIUM SERPL-MCNC: 1.9 MG/DL (ref 1.6–2.4)
MCH RBC QN AUTO: 31.7 PG (ref 26–34)
MCHC RBC AUTO-ENTMCNC: 33 G/DL (ref 30–36.5)
MCV RBC AUTO: 96.2 FL (ref 80–99)
MONOCYTES # BLD: 0.7 K/UL (ref 0–1)
MONOCYTES NFR BLD: 8 % (ref 5–13)
NEUTS SEG # BLD: 6.1 K/UL (ref 1.8–8)
NEUTS SEG NFR BLD: 67 % (ref 32–75)
NRBC # BLD: 0 K/UL (ref 0–0.01)
NRBC BLD-RTO: 0 PER 100 WBC
PLATELET # BLD AUTO: 253 K/UL (ref 150–400)
PMV BLD AUTO: 10.3 FL (ref 8.9–12.9)
POTASSIUM SERPL-SCNC: 3.5 MMOL/L (ref 3.5–5.1)
PROT SERPL-MCNC: 6.9 G/DL (ref 6.4–8.2)
RBC # BLD AUTO: 2.93 M/UL (ref 3.8–5.2)
SODIUM SERPL-SCNC: 140 MMOL/L (ref 136–145)
WBC # BLD AUTO: 9.1 K/UL (ref 3.6–11)

## 2021-01-24 PROCEDURE — 65270000029 HC RM PRIVATE

## 2021-01-24 PROCEDURE — 83735 ASSAY OF MAGNESIUM: CPT

## 2021-01-24 PROCEDURE — 36415 COLL VENOUS BLD VENIPUNCTURE: CPT

## 2021-01-24 PROCEDURE — 74011250636 HC RX REV CODE- 250/636: Performed by: FAMILY MEDICINE

## 2021-01-24 PROCEDURE — 51798 US URINE CAPACITY MEASURE: CPT

## 2021-01-24 PROCEDURE — 80053 COMPREHEN METABOLIC PANEL: CPT

## 2021-01-24 PROCEDURE — 74011250637 HC RX REV CODE- 250/637: Performed by: FAMILY MEDICINE

## 2021-01-24 PROCEDURE — 85025 COMPLETE CBC W/AUTO DIFF WBC: CPT

## 2021-01-24 PROCEDURE — 74011000258 HC RX REV CODE- 258: Performed by: FAMILY MEDICINE

## 2021-01-24 RX ORDER — POLYETHYLENE GLYCOL 3350 17 G/17G
17 POWDER, FOR SOLUTION ORAL DAILY
Status: DISCONTINUED | OUTPATIENT
Start: 2021-01-24 | End: 2021-01-29 | Stop reason: HOSPADM

## 2021-01-24 RX ADMIN — QUETIAPINE FUMARATE 25 MG: 25 TABLET ORAL at 12:36

## 2021-01-24 RX ADMIN — ASPIRIN 81 MG: 81 TABLET, COATED ORAL at 08:35

## 2021-01-24 RX ADMIN — PANTOPRAZOLE SODIUM 40 MG: 40 TABLET, DELAYED RELEASE ORAL at 08:35

## 2021-01-24 RX ADMIN — ATORVASTATIN CALCIUM 20 MG: 20 TABLET, FILM COATED ORAL at 08:35

## 2021-01-24 RX ADMIN — Medication 10 ML: at 22:57

## 2021-01-24 RX ADMIN — ACETAMINOPHEN 650 MG: 325 TABLET ORAL at 14:05

## 2021-01-24 RX ADMIN — SODIUM CHLORIDE 100 ML/HR: 9 INJECTION, SOLUTION INTRAVENOUS at 08:35

## 2021-01-24 RX ADMIN — QUETIAPINE FUMARATE 25 MG: 25 TABLET ORAL at 17:53

## 2021-01-24 RX ADMIN — AMPICILLIN SODIUM 2 G: 2 INJECTION, POWDER, FOR SOLUTION INTRAMUSCULAR; INTRAVENOUS at 05:22

## 2021-01-24 RX ADMIN — LEVETIRACETAM 500 MG: 500 TABLET ORAL at 17:54

## 2021-01-24 RX ADMIN — AMPICILLIN SODIUM 2 G: 2 INJECTION, POWDER, FOR SOLUTION INTRAMUSCULAR; INTRAVENOUS at 16:53

## 2021-01-24 RX ADMIN — Medication 10 ML: at 12:37

## 2021-01-24 RX ADMIN — POLYETHYLENE GLYCOL 3350 17 G: 17 POWDER, FOR SOLUTION ORAL at 10:04

## 2021-01-24 RX ADMIN — Medication 10 ML: at 05:23

## 2021-01-24 RX ADMIN — QUETIAPINE FUMARATE 25 MG: 25 TABLET ORAL at 08:35

## 2021-01-24 RX ADMIN — SERTRALINE HYDROCHLORIDE 100 MG: 50 TABLET ORAL at 08:35

## 2021-01-24 RX ADMIN — AMPICILLIN SODIUM 2 G: 2 INJECTION, POWDER, FOR SOLUTION INTRAMUSCULAR; INTRAVENOUS at 10:04

## 2021-01-24 RX ADMIN — LEVETIRACETAM 500 MG: 500 TABLET ORAL at 08:35

## 2021-01-24 RX ADMIN — AMPICILLIN SODIUM 2 G: 2 INJECTION, POWDER, FOR SOLUTION INTRAMUSCULAR; INTRAVENOUS at 22:54

## 2021-01-24 NOTE — PROGRESS NOTES
0900  Orders to remove price and attempt voiding trial.     0915  Price removed no difficulties noted. 1630  Patient was able to get out of bed with maximum assistance and 2 staff members to the bedside commode. Patient still yet to void. Daughter also at bedside. Bladder scan showed 133. Notified Dr. Bernardo Maher. MD states to give her a few more hours and if bladder scan > 400 then replace price. 1830  Bladder scan only showed 199. Will continue to monitor. 1930  Bedside and Verbal shift change report given to Juan Pereira RN (oncoming nurse) by Yayo Soliman RN (offgoing nurse). Report included the following information SBAR, Kardex and Intake/Output.

## 2021-01-24 NOTE — PROGRESS NOTES
Daily Progress Note: 1/24/2021  Avtar Lamas  PCP  Vj Gunter MD    Assessment/Plan:   UTI with sepsis, with continued fevers  - urine culture neg   - blood cx with enteroccocus and various gram positives  -repeat blood cx NG x1d    Urinary Retention with chronic indwelling price  - replaced price  - urine culture neg so far  - da requested voiding trial today    Altered mental status with history of recent frontal lobe stroke  - improved to baseline combative  -seroquel- increased to QID  -haldol PRN  -soft restraints if doesn't tolerate IV restart and IV abx.    -encouraged family to sit with her    History of stroke  - plan as above    JOSE (acute kidney injury) - resolved  - continue IV fluids for hydration as long as she leaves IV in place     Acute hypokalemia - pt refusing labs  - monitor and replace as needed     Hypotension  - monitor BP   - hold all BP medication for now due to hypotension - restart as needed    Anemia- stable  - chronic. Repeat H/H if pt allows     Leukocytosis  - resolved, monitor     Generalized weakness/ debility  -  fall precautions. Consulted PT.     Constipation  -add miralax     VTE prophylaxis  - Lovenox sq         Problem List:  Problem List as of 1/24/2021 Date Reviewed: 9/27/2018          Codes Class Noted - Resolved    Suprapubic pain ICD-10-CM: R10.2  ICD-9-CM: 789.09  1/21/2021 - Present        AMS (altered mental status) ICD-10-CM: R41.82  ICD-9-CM: 780.97  1/21/2021 - Present        Acute hypokalemia ICD-10-CM: E87.6  ICD-9-CM: 276.8  1/18/2021 - Present        Hypotension ICD-10-CM: I95.9  ICD-9-CM: 458.9  1/18/2021 - Present        JOSE (acute kidney injury) ([facility] ICD-10-CM: N17.9  ICD-9-CM: 584.9  1/18/2021 - Present        Dehydration ICD-10-CM: E86.0  ICD-9-CM: 276.51  1/1/2021 - Present        Altered mental status ICD-10-CM: R41.82  ICD-9-CM: 780.97  1/1/2021 - Present        Anemia ICD-10-CM: D64.9  ICD-9-CM: 285.9  1/1/2021 - Present        Tachycardia ICD-10-CM: R00.0  ICD-9-CM: 785.0  1/1/2021 - Present        Abnormal urinalysis ICD-10-CM: R82.90  ICD-9-CM: 791.9  1/1/2021 - Present        Sepsis ([facility] ICD-10-CM: A41.9  ICD-9-CM: 038.9, 995.91  1/1/2021 - Present        Lactic acidosis ICD-10-CM: E87.2  ICD-9-CM: 276.2  10/24/2020 - Present        SIRS (systemic inflammatory response syndrome) (HCC) ICD-10-CM: R65.10  ICD-9-CM: 995.90  9/27/2018 - Present        Sinus tachycardia ICD-10-CM: R00.0  ICD-9-CM: 427.89  9/27/2018 - Present        Hypertension ICD-10-CM: I10  ICD-9-CM: 401.9  Unknown - Present        Diabetes ([facility] ICD-10-CM: E11.9  ICD-9-CM: 250.00  Unknown - Present        Anxiety and depression ICD-10-CM: F41.9, F32.9  ICD-9-CM: 300.00, 311  Unknown - Present        Seizure disorder ([facility] ICD-10-CM: G40.909  ICD-9-CM: 345.90  Unknown - Present        Vomiting ICD-10-CM: R11.10  ICD-9-CM: 787.03  9/27/2018 - Present        Hypokalemia ICD-10-CM: E87.6  ICD-9-CM: 276.8  9/27/2018 - Present        Obese ICD-10-CM: E66.9  ICD-9-CM: 278.00  Unknown - Present        DM (diabetes mellitus) ([facility] ICD-10-CM: E11.9  ICD-9-CM: 250.00  6/7/2018 - Present        Leukocytosis ICD-10-CM: D72.829  ICD-9-CM: 288.60  6/7/2018 - Present        RESOLVED: Encephalopathy acute ICD-10-CM: G93.40  ICD-9-CM: 348.30  6/7/2018 - 9/27/2018        RESOLVED: Seizure ([facility] ICD-10-CM: R56.9  ICD-9-CM: 780.39  6/7/2018 - 9/27/2018        RESOLVED: Aspiration into airway ICD-10-CM: T17.908A  ICD-9-CM: 934.9  6/7/2018 - 9/27/2018        RESOLVED: Renal insufficiency ICD-10-CM: N28.9  ICD-9-CM: 593.9  6/7/2018 - 9/27/2018              HPI:   Santana Hu is a 72 y.o. female with past medical history of recent stroke, anxiety, depression, type 2 diabetes mellitus (DM), hypertension, obesity, seizure disorder presented to the ED from home with reported pain at price site. Patient is confused and not able to answer questions appropriately at this time.   As such, majority of history was obtained per my review of ED and electronic medical records. Per these reports, patient was admitted to [facility] previously with diagnosis of stroke. She was last hospitalized here at [facility] from 1/1/2021 to 1/16/2021 with diagnoses of altered mental status (AMS), sepsis secondary to health care associated pneumonia (treated with Cefepime), abnormal urinalysis with negative urine culture, dehydration. After discharge, patient has been living at home with family. She has an indwelling price catheter. Onset of pain symptoms is not documented. On arrival in the ED tonight, initial recorded vital signs were BP= 97/61, HR= 97, RR= 16, O2sat= 100% on room air. WBC  = 12,700. K = 3.0. Hemoglobin = 10.9 (compared to 11.1 on 1/16/2021). BUN= 25.  Creatinine= 1.64 (compared to 0.92 on 1/16/2021). ED provider requested admission to the hospitalist service. At time of request, I placed order for UA results awaiting collection and results. Nurse notes that patient's old price catheter was removed and a new price was inserted after arrival to the ED. (Dr Anastacia Paredes)    1/19:  Pt is unable to give any meaningful hx. She will arouse to her name. She reports she is not in any pain. She does not know day/date or location. She can occas follow commands (). Unlike last admission (2 days ago) pt is not violent or combative. Pharmacy called me to report that multiple meds she was supposed to be taking at home were not being given - pharmacy has the list but most importantly she was not getting her seizure meds. Will get Case Management to assist in placement - the family cannot take care of this pt at home and APS may need to be consulted. 1/20:  Combative, threatening and striking out at nurses, threatening harm and agitated overnight. Calmer at this moment and allows me to examine her.   Refusing labs, meds and heart monitor and stripping off clothes and refusing to put them back on. With this degree of frontal lobe damage from CVA there may be little that we can do (Psy and Neuro saw last admission and report there is nothing they can add), her behavior will be unpredictable and varied. Will restart IM Haldol if nurses can get close enough to pt. Unable to get labs and cultures at this time. She will need long term placement -perhaps in a memory unit. The family will not be able to care for her at home. 1 of 4 bottles from blood culture on 1/19 show possible gram + cocci - since she is physically better, and afeb, will hold off on repeat cultures and limit labs sticks as much as possible. 1/21:  Not as combative overnight but yelling out at times and attempting to get out of bed. She is oriented to name only. Bood Cultures growing enterococcus so antibiotics switched to Zosyn and Vanc. Urine culture with little growth.      1/22:  Better overnight per nurses. No combative behavior and less agitated and did not require IM Haldol overnight. Tmax 101. Tnow 100.2. King was placed last admission for urinary retention and has been in place long enough for a voiding trial so will have it removed and see how she does. Trying to get AM labs now if she will allow it.        1/23: Tmax 101.4 yesterday. Afebrile currently. No IV access or labs allowed by patient despite IM haldol. Not able to complete her IV abx. Was too combative when they attempted to replace IV and get blood cx. Threatening to assault staff and call the police. She is calm and pleasant this AM.  Reported that she would let the nurse attempt an IV so we can treat her UTI. I spoke with Tiffani Pérez and asked her to come sit with mother. She said she babysits a grandson and can't come because she doesn't have alternate childcare. Will reach out to pt's son to see if he can come sit with mother to keep her calm.     1/24: tolerated IV and lab draw yesterday with increased seroquel and family support at bedside. Pt notes her belly is achy. No BM in a few days. I spoke with Reginald Barbour. She notes that she was misunderstood and she does want pt to have voiding trial here instead of having to see uro outpt. Da notes that Mrs Faye Hearn really doesn't tolerate the price. Review of Systems:   Review of systems not obtained due to patient factors. Objective:   Physical Exam:   Visit Vitals  /73 (BP 1 Location: Left arm, BP Patient Position: At rest)   Pulse 84   Temp 98.1 °F (36.7 °C)   Resp 16   Ht 5' 4\" (1.626 m)   Wt 73.7 kg (162 lb 6.4 oz)   SpO2 99%   BMI 27.88 kg/m²      O2 Device: Room air  Temp (24hrs), Av.3 °F (36.8 °C), Min:97.4 °F (36.3 °C), Max:98.8 °F (37.1 °C)    No intake/output data recorded.  1901 -  0700  In: 5321 [P.O.:320; I.V.:1400]  Out: 1600 [Urine:1600]  General:  Alert, cooperative and calm, no distress, appears stated age. Head:  Normocephalic, without obvious abnormality, atraumatic. Eyes:  Conjunctivae/corneas clear. EOMs intact. Throat: Lips, mucosa, and tongue moist   Neck: Supple, symmetrical, trachea midline, no adenopathy, thyroid: no enlargement/tenderness/nodules, no carotid bruit and no JVD. Lungs:   Clear to auscultation bilaterally. Chest wall:  No tenderness or deformity. Heart:  Regular rate and rhythm, S1, S2 normal, no murmur, click, rub or gallop. Abdomen:   Soft, non-tender, nondistended. Bowel sounds normal. No masses,  No organomegaly. : price in place   Extremities: Extremities normal, atraumatic, no cyanosis or edema. No calf tenderness or cords. Pulses: 2+ and symmetric all extremities. Skin: Skin color, texture, turgor normal. No rashes   Neurologic:  More alert this AM.  Alert and oriented X 2. She will follow 1 step commands at this time. Gait not tested at this time.        Data Review:       Recent Days:  Recent Labs     21  0533 21  0838   WBC 9.1 12.9*   HGB 9.3* 9.7*   HCT 28.2* 29.8*    227 Recent Labs     01/24/21  0533 01/23/21  0838    134*   K 3.5 3.9   * 104   CO2 23 22   GLU 66 103*   BUN 5* 8   CREA 0.52* 0.59   CA 8.5 8.7   MG 1.9 1.4*   ALB 2.4* 2.8*   TBILI 0.8 0.9   ALT 10* 9*     No results for input(s): PH, PCO2, PO2, HCO3, FIO2 in the last 72 hours. 24 Hour Results:  Recent Results (from the past 24 hour(s))   CBC WITH AUTOMATED DIFF    Collection Time: 01/23/21  8:38 AM   Result Value Ref Range    WBC 12.9 (H) 3.6 - 11.0 K/uL    RBC 3.06 (L) 3.80 - 5.20 M/uL    HGB 9.7 (L) 11.5 - 16.0 g/dL    HCT 29.8 (L) 35.0 - 47.0 %    MCV 97.4 80.0 - 99.0 FL    MCH 31.7 26.0 - 34.0 PG    MCHC 32.6 30.0 - 36.5 g/dL    RDW 12.7 11.5 - 14.5 %    PLATELET 822 628 - 256 K/uL    MPV 10.7 8.9 - 12.9 FL    NRBC 0.0 0  WBC    ABSOLUTE NRBC 0.00 0.00 - 0.01 K/uL    NEUTROPHILS 77 (H) 32 - 75 %    LYMPHOCYTES 11 (L) 12 - 49 %    MONOCYTES 7 5 - 13 %    EOSINOPHILS 4 0 - 7 %    BASOPHILS 0 0 - 1 %    IMMATURE GRANULOCYTES 1 (H) 0.0 - 0.5 %    ABS. NEUTROPHILS 9.9 (H) 1.8 - 8.0 K/UL    ABS. LYMPHOCYTES 1.4 0.8 - 3.5 K/UL    ABS. MONOCYTES 0.9 0.0 - 1.0 K/UL    ABS. EOSINOPHILS 0.6 (H) 0.0 - 0.4 K/UL    ABS. BASOPHILS 0.1 0.0 - 0.1 K/UL    ABS. IMM. GRANS. 0.1 (H) 0.00 - 0.04 K/UL    DF AUTOMATED     METABOLIC PANEL, COMPREHENSIVE    Collection Time: 01/23/21  8:38 AM   Result Value Ref Range    Sodium 134 (L) 136 - 145 mmol/L    Potassium 3.9 3.5 - 5.1 mmol/L    Chloride 104 97 - 108 mmol/L    CO2 22 21 - 32 mmol/L    Anion gap 8 5 - 15 mmol/L    Glucose 103 (H) 65 - 100 mg/dL    BUN 8 6 - 20 MG/DL    Creatinine 0.59 0.55 - 1.02 MG/DL    BUN/Creatinine ratio 14 12 - 20      GFR est AA >60 >60 ml/min/1.73m2    GFR est non-AA >60 >60 ml/min/1.73m2    Calcium 8.7 8.5 - 10.1 MG/DL    Bilirubin, total 0.9 0.2 - 1.0 MG/DL    ALT (SGPT) 9 (L) 12 - 78 U/L    AST (SGOT) 20 15 - 37 U/L    Alk.  phosphatase 55 45 - 117 U/L    Protein, total 7.4 6.4 - 8.2 g/dL    Albumin 2.8 (L) 3.5 - 5.0 g/dL Globulin 4.6 (H) 2.0 - 4.0 g/dL    A-G Ratio 0.6 (L) 1.1 - 2.2     MAGNESIUM    Collection Time: 01/23/21  8:38 AM   Result Value Ref Range    Magnesium 1.4 (L) 1.6 - 2.4 mg/dL   CBC WITH AUTOMATED DIFF    Collection Time: 01/24/21  5:33 AM   Result Value Ref Range    WBC 9.1 3.6 - 11.0 K/uL    RBC 2.93 (L) 3.80 - 5.20 M/uL    HGB 9.3 (L) 11.5 - 16.0 g/dL    HCT 28.2 (L) 35.0 - 47.0 %    MCV 96.2 80.0 - 99.0 FL    MCH 31.7 26.0 - 34.0 PG    MCHC 33.0 30.0 - 36.5 g/dL    RDW 12.9 11.5 - 14.5 %    PLATELET 963 985 - 570 K/uL    MPV 10.3 8.9 - 12.9 FL    NRBC 0.0 0  WBC    ABSOLUTE NRBC 0.00 0.00 - 0.01 K/uL    NEUTROPHILS 67 32 - 75 %    LYMPHOCYTES 18 12 - 49 %    MONOCYTES 8 5 - 13 %    EOSINOPHILS 6 0 - 7 %    BASOPHILS 1 0 - 1 %    IMMATURE GRANULOCYTES 0 0.0 - 0.5 %    ABS. NEUTROPHILS 6.1 1.8 - 8.0 K/UL    ABS. LYMPHOCYTES 1.6 0.8 - 3.5 K/UL    ABS. MONOCYTES 0.7 0.0 - 1.0 K/UL    ABS. EOSINOPHILS 0.5 (H) 0.0 - 0.4 K/UL    ABS. BASOPHILS 0.1 0.0 - 0.1 K/UL    ABS. IMM. GRANS. 0.0 0.00 - 0.04 K/UL    DF AUTOMATED     METABOLIC PANEL, COMPREHENSIVE    Collection Time: 01/24/21  5:33 AM   Result Value Ref Range    Sodium 140 136 - 145 mmol/L    Potassium 3.5 3.5 - 5.1 mmol/L    Chloride 109 (H) 97 - 108 mmol/L    CO2 23 21 - 32 mmol/L    Anion gap 8 5 - 15 mmol/L    Glucose 66 65 - 100 mg/dL    BUN 5 (L) 6 - 20 MG/DL    Creatinine 0.52 (L) 0.55 - 1.02 MG/DL    BUN/Creatinine ratio 10 (L) 12 - 20      GFR est AA >60 >60 ml/min/1.73m2    GFR est non-AA >60 >60 ml/min/1.73m2    Calcium 8.5 8.5 - 10.1 MG/DL    Bilirubin, total 0.8 0.2 - 1.0 MG/DL    ALT (SGPT) 10 (L) 12 - 78 U/L    AST (SGOT) 24 15 - 37 U/L    Alk.  phosphatase 54 45 - 117 U/L    Protein, total 6.9 6.4 - 8.2 g/dL    Albumin 2.4 (L) 3.5 - 5.0 g/dL    Globulin 4.5 (H) 2.0 - 4.0 g/dL    A-G Ratio 0.5 (L) 1.1 - 2.2     MAGNESIUM    Collection Time: 01/24/21  5:33 AM   Result Value Ref Range    Magnesium 1.9 1.6 - 2.4 mg/dL       Medications reviewed  Current Facility-Administered Medications   Medication Dose Route Frequency    QUEtiapine (SEROquel) tablet 25 mg  25 mg Oral QID    ampicillin (OMNIPEN) 2 g in 0.9% sodium chloride (MBP/ADV) 100 mL MBP  2 g IntraVENous Q6H    acetaminophen (TYLENOL) tablet 650 mg  650 mg Oral Q6H PRN    haloperidol lactate (HALDOL) injection 1 mg  1 mg IntraMUSCular Q4H PRN    sodium chloride (NS) flush 5-40 mL  5-40 mL IntraVENous Q8H    sodium chloride (NS) flush 5-40 mL  5-40 mL IntraVENous PRN    aspirin delayed-release tablet 81 mg  81 mg Oral DAILY    atorvastatin (LIPITOR) tablet 20 mg  20 mg Oral DAILY    levETIRAcetam (KEPPRA) tablet 500 mg  500 mg Oral BID    pantoprazole (PROTONIX) tablet 40 mg  40 mg Oral DAILY    sertraline (ZOLOFT) tablet 100 mg  100 mg Oral DAILY    ergocalciferol capsule 50,000 Units  50,000 Units Oral Q7D    0.9% sodium chloride infusion  100 mL/hr IntraVENous CONTINUOUS     Care Plan discussed with: Patient/Nurse  Total time spent with patient and review of records: 30 minutes.   Yefri Addison MD

## 2021-01-25 LAB
ALBUMIN SERPL-MCNC: 2.3 G/DL (ref 3.5–5)
ALBUMIN/GLOB SERPL: 0.6 {RATIO} (ref 1.1–2.2)
ALP SERPL-CCNC: 55 U/L (ref 45–117)
ALT SERPL-CCNC: 11 U/L (ref 12–78)
ANION GAP SERPL CALC-SCNC: 6 MMOL/L (ref 5–15)
AST SERPL-CCNC: 21 U/L (ref 15–37)
BASOPHILS # BLD: 0 K/UL (ref 0–0.1)
BASOPHILS NFR BLD: 0 % (ref 0–1)
BILIRUB SERPL-MCNC: 0.4 MG/DL (ref 0.2–1)
BUN SERPL-MCNC: 3 MG/DL (ref 6–20)
BUN/CREAT SERPL: 6 (ref 12–20)
CALCIUM SERPL-MCNC: 8.4 MG/DL (ref 8.5–10.1)
CHLORIDE SERPL-SCNC: 112 MMOL/L (ref 97–108)
CO2 SERPL-SCNC: 26 MMOL/L (ref 21–32)
CREAT SERPL-MCNC: 0.48 MG/DL (ref 0.55–1.02)
DIFFERENTIAL METHOD BLD: ABNORMAL
EOSINOPHIL # BLD: 0.5 K/UL (ref 0–0.4)
EOSINOPHIL NFR BLD: 7 % (ref 0–7)
ERYTHROCYTE [DISTWIDTH] IN BLOOD BY AUTOMATED COUNT: 12.9 % (ref 11.5–14.5)
GLOBULIN SER CALC-MCNC: 4 G/DL (ref 2–4)
GLUCOSE SERPL-MCNC: 85 MG/DL (ref 65–100)
HCT VFR BLD AUTO: 25.3 % (ref 35–47)
HGB BLD-MCNC: 8.3 G/DL (ref 11.5–16)
IMM GRANULOCYTES # BLD AUTO: 0 K/UL (ref 0–0.04)
IMM GRANULOCYTES NFR BLD AUTO: 0 % (ref 0–0.5)
LYMPHOCYTES # BLD: 1.8 K/UL (ref 0.8–3.5)
LYMPHOCYTES NFR BLD: 26 % (ref 12–49)
MAGNESIUM SERPL-MCNC: 1.7 MG/DL (ref 1.6–2.4)
MCH RBC QN AUTO: 31.7 PG (ref 26–34)
MCHC RBC AUTO-ENTMCNC: 32.8 G/DL (ref 30–36.5)
MCV RBC AUTO: 96.6 FL (ref 80–99)
MONOCYTES # BLD: 0.6 K/UL (ref 0–1)
MONOCYTES NFR BLD: 8 % (ref 5–13)
NEUTS SEG # BLD: 4.1 K/UL (ref 1.8–8)
NEUTS SEG NFR BLD: 59 % (ref 32–75)
NRBC # BLD: 0 K/UL (ref 0–0.01)
NRBC BLD-RTO: 0 PER 100 WBC
PLATELET # BLD AUTO: 274 K/UL (ref 150–400)
POTASSIUM SERPL-SCNC: 3.2 MMOL/L (ref 3.5–5.1)
PROT SERPL-MCNC: 6.3 G/DL (ref 6.4–8.2)
RBC # BLD AUTO: 2.62 M/UL (ref 3.8–5.2)
RBC MORPH BLD: ABNORMAL
SODIUM SERPL-SCNC: 144 MMOL/L (ref 136–145)
WBC # BLD AUTO: 7 K/UL (ref 3.6–11)

## 2021-01-25 PROCEDURE — 74011250636 HC RX REV CODE- 250/636: Performed by: FAMILY MEDICINE

## 2021-01-25 PROCEDURE — 65270000029 HC RM PRIVATE

## 2021-01-25 PROCEDURE — 74011250637 HC RX REV CODE- 250/637: Performed by: FAMILY MEDICINE

## 2021-01-25 PROCEDURE — 36415 COLL VENOUS BLD VENIPUNCTURE: CPT

## 2021-01-25 PROCEDURE — 51798 US URINE CAPACITY MEASURE: CPT

## 2021-01-25 PROCEDURE — 77030005513 HC CATH URETH FOL11 MDII -B

## 2021-01-25 PROCEDURE — 74011000258 HC RX REV CODE- 258: Performed by: FAMILY MEDICINE

## 2021-01-25 PROCEDURE — 83735 ASSAY OF MAGNESIUM: CPT

## 2021-01-25 PROCEDURE — 80053 COMPREHEN METABOLIC PANEL: CPT

## 2021-01-25 PROCEDURE — 85025 COMPLETE CBC W/AUTO DIFF WBC: CPT

## 2021-01-25 RX ADMIN — AMPICILLIN SODIUM 2 G: 2 INJECTION, POWDER, FOR SOLUTION INTRAMUSCULAR; INTRAVENOUS at 11:47

## 2021-01-25 RX ADMIN — SERTRALINE HYDROCHLORIDE 100 MG: 50 TABLET ORAL at 09:45

## 2021-01-25 RX ADMIN — QUETIAPINE FUMARATE 25 MG: 25 TABLET ORAL at 17:21

## 2021-01-25 RX ADMIN — ATORVASTATIN CALCIUM 20 MG: 20 TABLET, FILM COATED ORAL at 09:45

## 2021-01-25 RX ADMIN — SODIUM CHLORIDE 100 ML/HR: 9 INJECTION, SOLUTION INTRAVENOUS at 13:53

## 2021-01-25 RX ADMIN — AMPICILLIN SODIUM 2 G: 2 INJECTION, POWDER, FOR SOLUTION INTRAMUSCULAR; INTRAVENOUS at 23:06

## 2021-01-25 RX ADMIN — AMPICILLIN SODIUM 2 G: 2 INJECTION, POWDER, FOR SOLUTION INTRAMUSCULAR; INTRAVENOUS at 17:21

## 2021-01-25 RX ADMIN — LEVETIRACETAM 500 MG: 500 TABLET ORAL at 09:45

## 2021-01-25 RX ADMIN — PANTOPRAZOLE SODIUM 40 MG: 40 TABLET, DELAYED RELEASE ORAL at 09:45

## 2021-01-25 RX ADMIN — ASPIRIN 81 MG: 81 TABLET, COATED ORAL at 09:45

## 2021-01-25 RX ADMIN — QUETIAPINE FUMARATE 25 MG: 25 TABLET ORAL at 09:45

## 2021-01-25 RX ADMIN — Medication 10 ML: at 05:10

## 2021-01-25 RX ADMIN — AMPICILLIN SODIUM 2 G: 2 INJECTION, POWDER, FOR SOLUTION INTRAMUSCULAR; INTRAVENOUS at 05:08

## 2021-01-25 RX ADMIN — POLYETHYLENE GLYCOL 3350 17 G: 17 POWDER, FOR SOLUTION ORAL at 09:45

## 2021-01-25 RX ADMIN — LEVETIRACETAM 500 MG: 500 TABLET ORAL at 17:21

## 2021-01-25 NOTE — PROGRESS NOTES
0700: Bedside and Verbal shift change report given to Ross aBuer (oncoming nurse) by Vinayak Camarillo (offgoing nurse). Report included the following information SBAR, Kardex, Intake/Output, MAR, Accordion, Recent Results and Med Rec Status.

## 2021-01-25 NOTE — PROGRESS NOTES
2318: Pt has unmeasurable urine occurrence, bed was soaked with urine. Purewick placed at this time. 0600: Pt has not voided again since purewick placed. Bladder scan performed 612ml shown in bladder. Price catheter placed at this time. Pt tolerated. 0700: MD made aware of urinary retention and price placement.

## 2021-01-25 NOTE — PROGRESS NOTES
Transition of Care Plan:  RUR25%   1 Case Management to follow to monitor response to treatment and discharge needs   2  King   3 Daughter plans to be able to transport home at discharge to her home   4 Pt is set up with [address] [place] [address] at pt's [address] is [address]  [phone]  5. Case Management to follow  RENETTA Mcknight RN.

## 2021-01-25 NOTE — PROGRESS NOTES
Daily Progress Note: 1/25/2021  Layman Blew  PCP  Julienne Gunter MD    Assessment/Plan:   UTI with sepsis, with continued fevers  - urine culture neg   - blood cx with enteroccocus and various gram positives  - repeat blood cx NGSF    Urinary Retention with chronic indwelling price  - replaced price  - urine culture neg so far  - da requested voiding trial    Altered mental status with history of recent frontal lobe stroke  - improved to baseline combative  - seroquel- increased to QID  - haldol PRN  - soft restraints if doesn't tolerate IV restart and IV abx.    - encouraged family to sit with her    History of stroke  - plan as above    JOSE (acute kidney injury) - resolved  - continue IV fluids for hydration as long as she leaves IV in place     Acute hypokalemia - pt refusing labs  - monitor and replace as needed     Hypotension  - monitor BP   - hold all BP medication for now due to hypotension - restart as needed    Anemia- stable  - chronic. Repeat H/H if pt allows     Leukocytosis  - resolved, monitor     Generalized weakness/ debility  -  fall precautions. Consulted PT.     Constipation  -add miralax     VTE prophylaxis  - Lovenox sq         Problem List:  Problem List as of 1/25/2021 Date Reviewed: 9/27/2018          Codes Class Noted - Resolved    Suprapubic pain ICD-10-CM: R10.2  ICD-9-CM: 789.09  1/21/2021 - Present        AMS (altered mental status) ICD-10-CM: R41.82  ICD-9-CM: 780.97  1/21/2021 - Present        Acute hypokalemia ICD-10-CM: E87.6  ICD-9-CM: 276.8  1/18/2021 - Present        Hypotension ICD-10-CM: I95.9  ICD-9-CM: 458.9  1/18/2021 - Present        JOSE (acute kidney injury) ([facility] ICD-10-CM: N17.9  ICD-9-CM: 584.9  1/18/2021 - Present        Dehydration ICD-10-CM: E86.0  ICD-9-CM: 276.51  1/1/2021 - Present        Altered mental status ICD-10-CM: R41.82  ICD-9-CM: 780.97  1/1/2021 - Present        Anemia ICD-10-CM: D64.9  ICD-9-CM: 285.9  1/1/2021 - Present        Tachycardia ICD-10-CM: R00.0  ICD-9-CM: 785.0  1/1/2021 - Present        Abnormal urinalysis ICD-10-CM: R82.90  ICD-9-CM: 791.9  1/1/2021 - Present        Sepsis ([facility] ICD-10-CM: A41.9  ICD-9-CM: 038.9, 995.91  1/1/2021 - Present        Lactic acidosis ICD-10-CM: E87.2  ICD-9-CM: 276.2  10/24/2020 - Present        SIRS (systemic inflammatory response syndrome) (HCC) ICD-10-CM: R65.10  ICD-9-CM: 995.90  9/27/2018 - Present        Sinus tachycardia ICD-10-CM: R00.0  ICD-9-CM: 427.89  9/27/2018 - Present        Hypertension ICD-10-CM: I10  ICD-9-CM: 401.9  Unknown - Present        Diabetes ([facility] ICD-10-CM: E11.9  ICD-9-CM: 250.00  Unknown - Present        Anxiety and depression ICD-10-CM: F41.9, F32.9  ICD-9-CM: 300.00, 311  Unknown - Present        Seizure disorder ([facility] ICD-10-CM: G40.909  ICD-9-CM: 345.90  Unknown - Present        Vomiting ICD-10-CM: R11.10  ICD-9-CM: 787.03  9/27/2018 - Present        Hypokalemia ICD-10-CM: E87.6  ICD-9-CM: 276.8  9/27/2018 - Present        Obese ICD-10-CM: E66.9  ICD-9-CM: 278.00  Unknown - Present        DM (diabetes mellitus) ([facility] ICD-10-CM: E11.9  ICD-9-CM: 250.00  6/7/2018 - Present        Leukocytosis ICD-10-CM: D72.829  ICD-9-CM: 288.60  6/7/2018 - Present        RESOLVED: Encephalopathy acute ICD-10-CM: G93.40  ICD-9-CM: 348.30  6/7/2018 - 9/27/2018        RESOLVED: Seizure ([facility] ICD-10-CM: R56.9  ICD-9-CM: 780.39  6/7/2018 - 9/27/2018        RESOLVED: Aspiration into airway ICD-10-CM: T17.908A  ICD-9-CM: 934.9  6/7/2018 - 9/27/2018        RESOLVED: Renal insufficiency ICD-10-CM: N28.9  ICD-9-CM: 593.9  6/7/2018 - 9/27/2018              HPI:   Regina Yeung is a 72 y.o. female with past medical history of recent stroke, anxiety, depression, type 2 diabetes mellitus (DM), hypertension, obesity, seizure disorder presented to the ED from home with reported pain at price site. Patient is confused and not able to answer questions appropriately at this time.   As such, majority of history was obtained per my review of ED and electronic medical records. Per these reports, patient was admitted to [facility] previously with diagnosis of stroke. She was last hospitalized here at [facility] from 1/1/2021 to 1/16/2021 with diagnoses of altered mental status (AMS), sepsis secondary to health care associated pneumonia (treated with Cefepime), abnormal urinalysis with negative urine culture, dehydration. After discharge, patient has been living at home with family. She has an indwelling price catheter. Onset of pain symptoms is not documented. On arrival in the ED tonight, initial recorded vital signs were BP= 97/61, HR= 97, RR= 16, O2sat= 100% on room air. WBC  = 12,700. K = 3.0. Hemoglobin = 10.9 (compared to 11.1 on 1/16/2021). BUN= 25.  Creatinine= 1.64 (compared to 0.92 on 1/16/2021). ED provider requested admission to the hospitalist service. At time of request, I placed order for UA results awaiting collection and results. Nurse notes that patient's old price catheter was removed and a new price was inserted after arrival to the ED. (Dr Johnathon Lee)    1/19:  Pt is unable to give any meaningful hx. She will arouse to her name. She reports she is not in any pain. She does not know day/date or location. She can occas follow commands (). Unlike last admission (2 days ago) pt is not violent or combative. Pharmacy called me to report that multiple meds she was supposed to be taking at home were not being given - pharmacy has the list but most importantly she was not getting her seizure meds. Will get Case Management to assist in placement - the family cannot take care of this pt at home and APS may need to be consulted. 1/20:  Combative, threatening and striking out at nurses, threatening harm and agitated overnight. Calmer at this moment and allows me to examine her.   Refusing labs, meds and heart monitor and stripping off clothes and refusing to put them back on. With this degree of frontal lobe damage from CVA there may be little that we can do (Psy and Neuro saw last admission and report there is nothing they can add), her behavior will be unpredictable and varied. Will restart IM Haldol if nurses can get close enough to pt. Unable to get labs and cultures at this time. She will need long term placement -perhaps in a memory unit. The family will not be able to care for her at home. 1 of 4 bottles from blood culture on 1/19 show possible gram + cocci - since she is physically better, and afeb, will hold off on repeat cultures and limit labs sticks as much as possible. 1/21:  Not as combative overnight but yelling out at times and attempting to get out of bed. She is oriented to name only. Bood Cultures growing enterococcus so antibiotics switched to Zosyn and Vanc. Urine culture with little growth.      1/22:  Better overnight per nurses. No combative behavior and less agitated and did not require IM Haldol overnight. Tmax 101. Tnow 100.2. King was placed last admission for urinary retention and has been in place long enough for a voiding trial so will have it removed and see how she does. Trying to get AM labs now if she will allow it.        1/23: Tmax 101.4 yesterday. Afebrile currently. No IV access or labs allowed by patient despite IM haldol. Not able to complete her IV abx. Was too combative when they attempted to replace IV and get blood cx. Threatening to assault staff and call the police. She is calm and pleasant this AM.  Reported that she would let the nurse attempt an IV so we can treat her UTI. I spoke with Sanjeev Langley and asked her to come sit with mother. She said she babysits a grandson and can't come because she doesn't have alternate childcare. Will reach out to pt's son to see if he can come sit with mother to keep her calm.     1/24: tolerated IV and lab draw yesterday with increased seroquel and family support at bedside. Pt notes her belly is achy. No BM in a few days. I spoke with Annel Desai. She notes that she was misunderstood and she does want pt to have voiding trial here instead of having to see uro outpt. Da notes that Mrs Janette Lambert really doesn't tolerate the price. :  No complaints this AM and calmer at this moment. Oriented only to self. Voiding trial failed - incont large amount of urine last night and 650cc in bladder this AM on bladder scan - price replaced. Afebrile. WBC down to normal.  K+ low - replacing. Review of Systems:   Review of systems not obtained due to patient factors. Objective:   Physical Exam:   Visit Vitals  BP (!) 158/90 (BP 1 Location: Left arm, BP Patient Position: At rest)   Pulse 91   Temp 97.9 °F (36.6 °C)   Resp 18   Ht 5' 4\" (1.626 m)   Wt 73.7 kg (162 lb 6.4 oz)   SpO2 99%   BMI 27.88 kg/m²      O2 Device: Room air  Temp (24hrs), Av.2 °F (36.8 °C), Min:97.9 °F (36.6 °C), Max:98.5 °F (36.9 °C)    No intake/output data recorded.  1901 -  0700  In: 4055 [P.O.:500; I.V.:3555]  Out: 1600 [Urine:1600]  General:  Alert, cooperative and calm at this moment, no distress, appears stated age. Head:  Normocephalic, without obvious abnormality, atraumatic. Eyes:  Conjunctivae/corneas clear. EOMs intact. Throat: Lips, mucosa, and tongue moist   Neck: Supple, symmetrical, trachea midline, no adenopathy, thyroid: no enlargement/tenderness/nodules, no carotid bruit and no JVD. Lungs:   Clear to auscultation bilaterally. Chest wall:  No tenderness or deformity. Heart:  Regular rate and rhythm, S1, S2 normal, no murmur, click, rub or gallop. Abdomen:   Soft, non-tender, nondistended. Bowel sounds normal. No masses,  No organomegaly. : price in place   Extremities: Extremities normal, atraumatic, no cyanosis or edema. No calf tenderness or cords. Pulses: 2+ and symmetric all extremities.    Skin: Skin color, texture, turgor normal. No rashes Neurologic:  More alert this AM.  Alert and oriented X 1. She will follow 1 step commands at this time. Gait not tested at this time. Data Review:       Recent Days:  Recent Labs     01/25/21 0426 01/24/21  0533 01/23/21  0838   WBC 7.0 9.1 12.9*   HGB 8.3* 9.3* 9.7*   HCT 25.3* 28.2* 29.8*    253 227     Recent Labs     01/25/21 0426 01/24/21  0533 01/23/21  0838    140 134*   K 3.2* 3.5 3.9   * 109* 104   CO2 26 23 22   GLU 85 66 103*   BUN 3* 5* 8   CREA 0.48* 0.52* 0.59   CA 8.4* 8.5 8.7   MG 1.7 1.9 1.4*   ALB 2.3* 2.4* 2.8*   TBILI 0.4 0.8 0.9   ALT 11* 10* 9*     No results for input(s): PH, PCO2, PO2, HCO3, FIO2 in the last 72 hours. 24 Hour Results:  Recent Results (from the past 24 hour(s))   MAGNESIUM    Collection Time: 01/25/21  4:26 AM   Result Value Ref Range    Magnesium 1.7 1.6 - 2.4 mg/dL   CBC WITH AUTOMATED DIFF    Collection Time: 01/25/21  4:26 AM   Result Value Ref Range    WBC 7.0 3.6 - 11.0 K/uL    RBC 2.62 (L) 3.80 - 5.20 M/uL    HGB 8.3 (L) 11.5 - 16.0 g/dL    HCT 25.3 (L) 35.0 - 47.0 %    MCV 96.6 80.0 - 99.0 FL    MCH 31.7 26.0 - 34.0 PG    MCHC 32.8 30.0 - 36.5 g/dL    RDW 12.9 11.5 - 14.5 %    PLATELET 208 927 - 905 K/uL    NRBC 0.0 0  WBC    ABSOLUTE NRBC 0.00 0.00 - 0.01 K/uL    NEUTROPHILS 59 32 - 75 %    LYMPHOCYTES 26 12 - 49 %    MONOCYTES 8 5 - 13 %    EOSINOPHILS 7 0 - 7 %    BASOPHILS 0 0 - 1 %    IMMATURE GRANULOCYTES 0 0.0 - 0.5 %    ABS. NEUTROPHILS 4.1 1.8 - 8.0 K/UL    ABS. LYMPHOCYTES 1.8 0.8 - 3.5 K/UL    ABS. MONOCYTES 0.6 0.0 - 1.0 K/UL    ABS. EOSINOPHILS 0.5 (H) 0.0 - 0.4 K/UL    ABS. BASOPHILS 0.0 0.0 - 0.1 K/UL    ABS. IMM.  GRANS. 0.0 0.00 - 0.04 K/UL    DF SMEAR SCANNED      RBC COMMENTS NORMOCYTIC, NORMOCHROMIC     METABOLIC PANEL, COMPREHENSIVE    Collection Time: 01/25/21  4:26 AM   Result Value Ref Range    Sodium 144 136 - 145 mmol/L    Potassium 3.2 (L) 3.5 - 5.1 mmol/L    Chloride 112 (H) 97 - 108 mmol/L    CO2 26 21 - 32 mmol/L    Anion gap 6 5 - 15 mmol/L    Glucose 85 65 - 100 mg/dL    BUN 3 (L) 6 - 20 MG/DL    Creatinine 0.48 (L) 0.55 - 1.02 MG/DL    BUN/Creatinine ratio 6 (L) 12 - 20      GFR est AA >60 >60 ml/min/1.73m2    GFR est non-AA >60 >60 ml/min/1.73m2    Calcium 8.4 (L) 8.5 - 10.1 MG/DL    Bilirubin, total 0.4 0.2 - 1.0 MG/DL    ALT (SGPT) 11 (L) 12 - 78 U/L    AST (SGOT) 21 15 - 37 U/L    Alk. phosphatase 55 45 - 117 U/L    Protein, total 6.3 (L) 6.4 - 8.2 g/dL    Albumin 2.3 (L) 3.5 - 5.0 g/dL    Globulin 4.0 2.0 - 4.0 g/dL    A-G Ratio 0.6 (L) 1.1 - 2.2         Medications reviewed  Current Facility-Administered Medications   Medication Dose Route Frequency    polyethylene glycol (MIRALAX) packet 17 g  17 g Oral DAILY    QUEtiapine (SEROquel) tablet 25 mg  25 mg Oral QID    ampicillin (OMNIPEN) 2 g in 0.9% sodium chloride (MBP/ADV) 100 mL MBP  2 g IntraVENous Q6H    acetaminophen (TYLENOL) tablet 650 mg  650 mg Oral Q6H PRN    haloperidol lactate (HALDOL) injection 1 mg  1 mg IntraMUSCular Q4H PRN    sodium chloride (NS) flush 5-40 mL  5-40 mL IntraVENous Q8H    sodium chloride (NS) flush 5-40 mL  5-40 mL IntraVENous PRN    aspirin delayed-release tablet 81 mg  81 mg Oral DAILY    atorvastatin (LIPITOR) tablet 20 mg  20 mg Oral DAILY    levETIRAcetam (KEPPRA) tablet 500 mg  500 mg Oral BID    pantoprazole (PROTONIX) tablet 40 mg  40 mg Oral DAILY    sertraline (ZOLOFT) tablet 100 mg  100 mg Oral DAILY    ergocalciferol capsule 50,000 Units  50,000 Units Oral Q7D    0.9% sodium chloride infusion  100 mL/hr IntraVENous CONTINUOUS     Care Plan discussed with: Patient/Nurse  Total time spent with patient and review of records: 30 minutes.   Aline Mario MD

## 2021-01-25 NOTE — PROGRESS NOTES
Physician Progress Note      PATIENT:               Ronak Nguyễn  CSN #:                  [id number]  :                       1950  ADMIT DATE:       2021 7:15 PM  [address] [place] DATE:  RESPONDING  PROVIDER #:        Sandra RODARTE MD          QUERY Devinmaryjane [facility] Team:    This patient was admitted for UTI. The diagnosis of Sepsis is also now documented as of 2021    If possible, please document in progress notes and discharge summary the present on admission status of Sepsis, or if now after carful study, if the diagnosis of Sepsis has been ruled out: The medical record reflects the following:  Risk Factors:  Recent stroke, chronic indwelling price, Dm, UTI  Clinical Indicators: On admission WBC were noted @ 12. HR in the 90s--, initially afebrile, but documented Fever starting on  @ 101.4, BC taken on  are 2:4  + Staph , lactic acid assessed on  @ 0.9, urine cx is negative . Treatment: IV Omnipen, urine cx , blood cx on , lactic assessed, Repeat blood cx. negative. price changed  Options provided:  -- Yes, Sepsis was present at the time of the order to admit to the hospital  -- No, Sepsis was not present on admission and developed during the inpatient stay  -- No, Sepsis. Sepsis has now been ruled out after carful study  -- Other - I will add my own diagnosis  -- Disagree - Not applicable / Not valid  -- Disagree - Clinically unable to determine / Unknown  -- Refer to Clinical Documentation Reviewer    PROVIDER RESPONSE TEXT:    No, Sepsis has been ruled out after carful study.     Query created by: Selma Cormier on 2021 9:28 AM      Electronically signed by:  Lane Bernal MD 2021 12:05 PM

## 2021-01-26 LAB
BACTERIA SPEC CULT: ABNORMAL
SERVICE CMNT-IMP: ABNORMAL

## 2021-01-26 PROCEDURE — 74011250637 HC RX REV CODE- 250/637: Performed by: FAMILY MEDICINE

## 2021-01-26 PROCEDURE — 74011250636 HC RX REV CODE- 250/636: Performed by: FAMILY MEDICINE

## 2021-01-26 PROCEDURE — 65270000029 HC RM PRIVATE

## 2021-01-26 PROCEDURE — 51798 US URINE CAPACITY MEASURE: CPT

## 2021-01-26 PROCEDURE — 74011000258 HC RX REV CODE- 258: Performed by: FAMILY MEDICINE

## 2021-01-26 RX ADMIN — AMPICILLIN SODIUM 2 G: 2 INJECTION, POWDER, FOR SOLUTION INTRAMUSCULAR; INTRAVENOUS at 05:01

## 2021-01-26 RX ADMIN — AMPICILLIN SODIUM 2 G: 2 INJECTION, POWDER, FOR SOLUTION INTRAMUSCULAR; INTRAVENOUS at 23:24

## 2021-01-26 RX ADMIN — QUETIAPINE FUMARATE 25 MG: 25 TABLET ORAL at 18:05

## 2021-01-26 RX ADMIN — ATORVASTATIN CALCIUM 20 MG: 20 TABLET, FILM COATED ORAL at 09:28

## 2021-01-26 RX ADMIN — Medication 10 ML: at 23:24

## 2021-01-26 RX ADMIN — SODIUM CHLORIDE 100 ML/HR: 9 INJECTION, SOLUTION INTRAVENOUS at 03:34

## 2021-01-26 RX ADMIN — PANTOPRAZOLE SODIUM 40 MG: 40 TABLET, DELAYED RELEASE ORAL at 09:29

## 2021-01-26 RX ADMIN — QUETIAPINE FUMARATE 25 MG: 25 TABLET ORAL at 09:28

## 2021-01-26 RX ADMIN — POLYETHYLENE GLYCOL 3350 17 G: 17 POWDER, FOR SOLUTION ORAL at 09:30

## 2021-01-26 RX ADMIN — Medication 10 ML: at 18:07

## 2021-01-26 RX ADMIN — QUETIAPINE FUMARATE 25 MG: 25 TABLET ORAL at 23:24

## 2021-01-26 RX ADMIN — SERTRALINE HYDROCHLORIDE 100 MG: 50 TABLET ORAL at 09:28

## 2021-01-26 RX ADMIN — LEVETIRACETAM 500 MG: 500 TABLET ORAL at 18:05

## 2021-01-26 RX ADMIN — ERGOCALCIFEROL 50000 UNITS: 1.25 CAPSULE ORAL at 18:05

## 2021-01-26 RX ADMIN — ASPIRIN 81 MG: 81 TABLET, COATED ORAL at 09:28

## 2021-01-26 RX ADMIN — LEVETIRACETAM 500 MG: 500 TABLET ORAL at 09:28

## 2021-01-26 RX ADMIN — AMPICILLIN SODIUM 2 G: 2 INJECTION, POWDER, FOR SOLUTION INTRAMUSCULAR; INTRAVENOUS at 18:06

## 2021-01-26 RX ADMIN — AMPICILLIN SODIUM 2 G: 2 INJECTION, POWDER, FOR SOLUTION INTRAMUSCULAR; INTRAVENOUS at 12:10

## 2021-01-26 RX ADMIN — QUETIAPINE FUMARATE 25 MG: 25 TABLET ORAL at 12:10

## 2021-01-26 NOTE — CONSULTS
New Urology Consult Note    Patient: Beau Valdovinos MRN: [id number]  SSN: [id number]    YOB: 1950  Age: 72 y.o. Sex: female            Assessment:     Beau Valdovinos is a 72 y.o. female with history of recent CVA, DM, psychiatric history and urinary retention during hospitalization earlier this month. She was discharged with a catheter and presented to the ED on 1/18/2021 with complaints of pain at the catheter site. She is currently admitted for sepsis/UTI (negative urine culture this admission and last), JOSE (resolved), AMS, constipation, and urinary retention (failed voiding trial 1/25/2021). Recommendations:     1. Urinary retention likely multifactorial. History of recent CVA, DM, limited mobility, acute illness, constipation. Continue price catheter for 5-7 days for bladder rest.  2. Management of constipation per primary team.  3. Will arrange outpatient follow up with urogyn for voiding trial. Will likely require urodynamics and possible cystoscopy outpatient. No need for urology to follow. Will add outpatient follow up information to patient discharge instructions. Thank you for this consult. Please contact [facility] with any further questions/concerns. Isamar Arriaga NP [phone]    History of Present Illness:     Reason for Consult:  urinary retention    Beau Valdovinos is seen in consultation for reasons noted above at the request of Honorio Diamond MD.    This is a 72 y.o. female with a history as outlined above. Chart reviewed and noted to have AMS. Alert and oriented to person and place for me. Likely not a reliable historian. She denies prior urological complaints. Onset of urinary retention earlier this month during hospitalization, improved with price catheter. No associated symptoms. Urine culture negative. CT abdomen/pelvis 10/2020 personally reviewed, no significant  findings. Urine culture was negative. Has been afebrile. Creatinine 0.48. Attempted voiding trial yesterday, replaced for bladder scan 650cc. Subjective     Past Medical History  Past Medical History:   Diagnosis Date    Anxiety and depression     Diabetes ([facility] [address]     Hypertension     Obese     Seizure disorder [facility])        Past Surgical History:   No past surgical history on file.     Medication:  Current Facility-Administered Medications   Medication Dose Route Frequency Provider Last Rate Last Admin    polyethylene glycol (MIRALAX) packet 17 g  17 g Oral DAILY Janeth Proctor MD   17 g at 01/25/21 0945    QUEtiapine (SEROquel) tablet 25 mg  25 mg Oral QID Oralee Lesch C, MD   Stopped at 01/25/21 2200    ampicillin (OMNIPEN) 2 g in 0.9% sodium chloride (MBP/ADV) 100 mL MBP  2 g IntraVENous Q6H Karlee Sahni  mL/hr at 01/26/21 0501 2 g at 01/26/21 0501    acetaminophen (TYLENOL) tablet 650 mg  650 mg Oral Q6H PRN Jose Fiore MD   650 mg at 01/24/21 1405    haloperidol lactate (HALDOL) injection 1 mg  1 mg IntraMUSCular Q4H PRN Jose Firoe MD   1 mg at 01/22/21 1824    sodium chloride (NS) flush 5-40 mL  5-40 mL IntraVENous Q8H Claude Miles MD   10 mL at 01/25/21 0510    sodium chloride (NS) flush 5-40 mL  5-40 mL IntraVENous PRN Claude Miles MD        aspirin delayed-release tablet 81 mg  81 mg Oral DAILY Claude Miles MD   81 mg at 01/25/21 0945    atorvastatin (LIPITOR) tablet 20 mg  20 mg Oral DAILY Claude Miles MD   20 mg at 01/25/21 0945    levETIRAcetam (KEPPRA) tablet 500 mg  500 mg Oral BID Claude Miles MD   500 mg at 01/25/21 1721    pantoprazole (PROTONIX) tablet 40 mg  40 mg Oral DAILY Claude Miles MD   40 mg at 01/25/21 0945    sertraline (ZOLOFT) tablet 100 mg  100 mg Oral DAILY Claude Miles MD   100 mg at 01/25/21 0945    ergocalciferol capsule 50,000 Units  50,000 Units Oral Luis Jean MD   50,000 Units at 01/19/21 1844    0.9% sodium chloride infusion 100 mL/hr IntraVENous CONTINUOUS Sadaf Mae  mL/hr at 01/26/21 0334 100 mL/hr at 01/26/21 0334       Allergies:  No Known Allergies    Social History:  Social History     Tobacco Use    Smoking status: Unknown If Ever Smoked   Substance Use Topics    Alcohol use: Not on file     Comment: Unknown    Drug use: Not on file       Family History  Family History   Problem Relation Age of Onset    Lung Cancer Mother     No Known Problems Father        Review of Systems  ROS from attending provider note from 1/25/20201 reviewed and changes (other than per HPI) include : none. Objective:     Vital signs in last 24 hours:  Visit Vitals  /85 (BP 1 Location: Left arm, BP Patient Position: At rest)   Pulse 82   Temp 97.9 °F (36.6 °C)   Resp 17   Ht 5' 4\" (1.626 m)   Wt 73.7 kg (162 lb 6.4 oz)   SpO2 99%   BMI 27.88 kg/m²       Intake/Output last 3 shifts:  Date 01/25/21 0700 - 01/26/21 0659 01/26/21 0700 - 01/27/21 0659   Shift 9500-8421 0584-4514 24 Hour Total 0046-2481 0032-3601 24 Hour Total   INTAKE   P.O. 120  120        P. O. 120  120      I. V.(mL/kg/hr)  0 0        Volume (0.9% sodium chloride infusion)  0 0      Shift Total(mL/kg) 120(1.6) 0(0) 120(1.6)      OUTPUT   Urine(mL/kg/hr) 1275(1.4) 300 1575        Urine Output (mL) (Urinary Catheter 01/25/21 Price) 4293 109 7232      Stool           Stool Occurrence(s)  1 x 1 x      Shift Total(mL/kg) 1275(17.3) 300(4.1) 1575(21.4)      NET -0166 -141 -5125      Weight (kg) 73.7 73.7 73.7 73.7 73.7 73.7         Physical Exam  General: NAD  Head: Atraumatic  ENT: normal ears  Neck: no tracheal deviation  Cardiac: not tachycardic   Pulmonary: Normal work of breathing   Abdomen: soft, NTTP, nondistended, no suprapubic fullness or tenderness  : no CVA tenderness, price catheter with clear, yellow urine  Extremities: moves all  Gait: not observed  Neuro: alert and oriented to person and place  Mood/Affect: normal    Lab/Imaging Review:       Most Recent Labs:  Lab Results   Component Value Date/Time    WBC 7.0 01/25/2021 04:26 AM    HGB 8.3 (L) 01/25/2021 04:26 AM    HCT 25.3 (L) 01/25/2021 04:26 AM    PLATELET 513 21/09/1052 04:26 AM    MCV 96.6 01/25/2021 04:26 AM        Lab Results   Component Value Date/Time    Sodium 144 01/25/2021 04:26 AM    Potassium 3.2 (L) 01/25/2021 04:26 AM    Chloride 112 (H) 01/25/2021 04:26 AM    CO2 26 01/25/2021 04:26 AM    Anion gap 6 01/25/2021 04:26 AM    Glucose 85 01/25/2021 04:26 AM    BUN 3 (L) 01/25/2021 04:26 AM    Creatinine 0.48 (L) 01/25/2021 04:26 AM    BUN/Creatinine ratio 6 (L) 01/25/2021 04:26 AM    GFR est AA >60 01/25/2021 04:26 AM    GFR est non-AA >60 01/25/2021 04:26 AM    Calcium 8.4 (L) 01/25/2021 04:26 AM    Bilirubin, total 0.4 01/25/2021 04:26 AM    Alk.  phosphatase 55 01/25/2021 04:26 AM    Protein, total 6.3 (L) 01/25/2021 04:26 AM    Albumin 2.3 (L) 01/25/2021 04:26 AM    Globulin 4.0 01/25/2021 04:26 AM    A-G Ratio 0.6 (L) 01/25/2021 04:26 AM    ALT (SGPT) 11 (L) 01/25/2021 04:26 AM        No results found for: PSA, PSA2, PSAR1, Jestine Ramal, PSAR3, [id number], [id number], 81391, PSAEXT     COAGS:  No results found for: APTT, PTP, INR, INREXT    Lab Results   Component Value Date/Time    Hemoglobin A1c 6.3 (H) 10/24/2020 05:09 AM        Lab Results   Component Value Date/Time    Troponin-I, Qt. <0.05 01/19/2021 01:58 AM          Urine/Blood Cultures:  Results     Procedure Component Value Units Date/Time    CULTURE, BLOOD [[id number]] Collected: 01/23/21 0900    Order Status: Completed Specimen: Blood Updated: 01/23/21 1239    CULTURE, BLOOD, PAIRED [[id number]]     Order Status: Canceled Specimen: Blood     CULTURE, BLOOD, PAIRED [[id number]]     Order Status: Canceled Specimen: Blood     CULTURE, BLOOD, PAIRED [[id number]]     Order Status: Canceled Specimen: Blood     CULTURE, URINE [[id number]] Collected: 01/19/21 0600    Order Status: Completed Specimen: Cath Urine Updated: 01/20/21 8961 Special Requests: NO SPECIAL REQUESTS        Culture result:       No significant growth, <10,000 CFU/mL          CULTURE, BLOOD, PAIRED [[id number]]  (Abnormal)  (Susceptibility) Collected: 01/19/21 0158    Order Status: Completed Specimen: Blood Updated: 01/21/21 1252     Special Requests: NO SPECIAL REQUESTS        Culture result:       ENTEROCOCCUS FAECIUM GROWING IN 1 OF 4 BOTTLES DRAWN (SITE = R ARM)                  PRELIMINARY REPORT OF GRAM POSITIVE COCCI IN PAIRS AND CHAINS GROWING IN 1 OF 4 BOTTLES DRAWN CALLED TO AND READ BACK BY KALIA MIGUEL AT 2118 ON 1/19/21. HJR                  STAPHYLOCOCCUS SPECIES, COAGULASE NEGATIVE GROWING IN THE 2ND OF 4 BOTTLES DRAWN (SITE = R ARM)                  REMAINING BOTTLE(S) HAS/HAVE NO GROWTH SO FAR          Susceptibility      Enterococcus faecium     ANDERSON (Preliminary)     Ampicillin ($) Susceptible     Gentamicin Synergy S Susceptible     Linezolid ($$$$$) Susceptible     Streptomycin Synergy Susceptible     Vancomycin ($) Susceptible                    MICRO TRACKING [[id number]] Collected: 01/19/21 0158    Order Status: Completed Updated: 01/19/21 1559    MICRO TRACKING [[id number]] Collected: 01/19/21 0158    Order Status: Completed Updated: 01/20/21 0338    URINE CULTURE HOLD SAMPLE [[id number]] Collected: 01/19/21 0016    Order Status: Completed Specimen: Urine from Serum Updated: 01/19/21 0020     Urine culture hold       Urine on hold in Microbiology dept for 2 days. If unpreserved urine is submitted, it cannot be used for addtional testing after 24 hours, recollection will be required. IMAGING:  No results found.         Signed By: Varun Dixon NP  - January 26, 2021

## 2021-01-26 NOTE — PROGRESS NOTES
Comprehensive Nutrition Assessment    Type and Reason for Visit: Initial, RD nutrition re-screen/LOS    Nutrition Recommendations/Plan:   1. Continue cardiac diet. Nutrition Assessment:     1/26: 71 y/o female admitted with JOSE. PMH includes DM, CVA. Pt noted to be confused, no family in room at time of visit. Familiar with pt form recent admission, RD assessments on 1/5 and 1/12. During previous admission pt was eating well, mostly % meals. Current intakes appear mostly good too, >/= 50% meals past few days. Intakes first couple days of admission were fair, ~25% meals but appear to be improving. Pt ate 100% breakfast this AM. Weight loss noted, 7% x3 weeks which is considered significant for time. Weight loss may be d/t fluid changes, but unable to confirm without full nutrition hx form pt/family. Will continue to monitor weight trends and intakes. Labs- K 3.2, Hgb 8.3. Meds- Protonix, Miralax, Seroquel. Intakes:  Patient Vitals for the past 168 hrs:   % Diet Eaten   01/26/21 0734 100 %   01/25/21 1845 85 %   01/24/21 1314 0 %   01/24/21 0753 75 %   01/23/21 1757 50 %   01/23/21 0719 50 %   01/21/21 1822 20 %   01/21/21 1300 10 %   01/21/21 0900 25 %   01/20/21 1208 25 %   01/20/21 0825 50 %     Weight hx: Wt Readings from Last 10 Encounters:   01/20/21 73.7 kg (162 lb 6.4 oz)   01/01/21 79.2 kg (174 lb 11.2 oz)   10/24/20 80.5 kg (177 lb 6.4 oz)   05/25/20 84.4 kg (186 lb)   09/27/18 99.3 kg (219 lb)   06/09/18 99.4 kg (219 lb 2.2 oz)   06/08/18 99.8 kg (220 lb 0.3 oz)   12/09/14 88.5 kg (195 lb 1.7 oz)     Malnutrition Assessment:  Malnutrition Status:  Unable to assess    Estimated Daily Nutrient Needs:  Energy (kcal): 7722(6018 x 1.3 AF); Weight Used for Energy Requirements: Current  Protein (g): 74(1-1.2 g/kg);  Weight Used for Protein Requirements: Current  Fluid (ml/day): 1621; Method Used for Fluid Requirements: 1 ml/kcal    Nutrition Related Findings:  LBM 1/25; 1+ LE edema      Wounds:    None Current Nutrition Therapies:  DIET CARDIAC Regular    Anthropometric Measures:  · Height:  5' 4\" (162.6 cm)  · Current Body Wt:  73.7 kg (162 lb 7.7 oz)   · Admission Body Wt:       · Usual Body Wt:        · Ideal Body Wt:  120 lbs:  135.4 %   · Adjusted Body Weight:   ; Weight Adjustment for: No adjustment   · Adjusted BMI:       · BMI Category: Overweight (BMI 25.0-29. 9)       Nutrition Diagnosis:   · No nutrition diagnosis at this time related to   as evidenced by        Nutrition Interventions:   Food and/or Nutrient Delivery: Continue current diet  Nutrition Education and Counseling: No recommendations at this time  Coordination of Nutrition Care: Continue to monitor while inpatient    Goals:  PO intake >75% meals next 5-7 days       Nutrition Monitoring and Evaluation:   Behavioral-Environmental Outcomes: None identified  Food/Nutrient Intake Outcomes: Food and nutrient intake  Physical Signs/Symptoms Outcomes: Weight, Biochemical data    Discharge Planning:    Continue current diet     Electronically signed by Martine White RDN on 1/26/2021 at 11:14 AM    Contact: [phone]

## 2021-01-26 NOTE — PROGRESS NOTES
Daily Progress Note: 1/26/2021  Bill Gross  PCP  Perla Gunter MD    Assessment/Plan:   UTI with sepsis  - urine culture neg   - blood cx with enteroccocus and various gram positives  - repeat blood cx NGSF    Urinary Retention with chronic indwelling price  - replaced price  - urine culture neg so far  - da requested voiding trial    Altered mental status with history of recent frontal lobe stroke  - improved to baseline combative  - seroquel- increased to QID  - haldol PRN  - soft restraints if doesn't tolerate IV restart and IV abx.    - encouraged family to sit with her    History of stroke  - plan as above    JOSE (acute kidney injury) - resolved  - continue IV fluids for hydration as long as she leaves IV in place     Acute hypokalemia - pt refusing labs  - monitor and replace as needed     Hypotension  - monitor BP   - hold all BP medication for now due to hypotension - restart as needed    Anemia- stable  - chronic. Repeat H/H if pt allows     Leukocytosis  - resolved, monitor     Generalized weakness/ debility  -  fall precautions. Consulted PT.     Constipation  -add miralax     VTE prophylaxis  - Lovenox sq         Problem List:  Problem List as of 1/26/2021 Date Reviewed: 9/27/2018          Codes Class Noted - Resolved    Suprapubic pain ICD-10-CM: R10.2  ICD-9-CM: 789.09  1/21/2021 - Present        AMS (altered mental status) ICD-10-CM: R41.82  ICD-9-CM: 780.97  1/21/2021 - Present        Acute hypokalemia ICD-10-CM: E87.6  ICD-9-CM: 276.8  1/18/2021 - Present        Hypotension ICD-10-CM: I95.9  ICD-9-CM: 458.9  1/18/2021 - Present        JOSE (acute kidney injury) ([facility] ICD-10-CM: N17.9  ICD-9-CM: 584.9  1/18/2021 - Present        Dehydration ICD-10-CM: E86.0  ICD-9-CM: 276.51  1/1/2021 - Present        Altered mental status ICD-10-CM: R41.82  ICD-9-CM: 780.97  1/1/2021 - Present        Anemia ICD-10-CM: D64.9  ICD-9-CM: 285.9  1/1/2021 - Present        Tachycardia ICD-10-CM: R00.0  ICD-9-CM: 785.0  1/1/2021 - Present        Abnormal urinalysis ICD-10-CM: R82.90  ICD-9-CM: 791.9  1/1/2021 - Present        Sepsis ([facility] ICD-10-CM: A41.9  ICD-9-CM: 038.9, 995.91  1/1/2021 - Present        Lactic acidosis ICD-10-CM: E87.2  ICD-9-CM: 276.2  10/24/2020 - Present        SIRS (systemic inflammatory response syndrome) (HCC) ICD-10-CM: R65.10  ICD-9-CM: 995.90  9/27/2018 - Present        Sinus tachycardia ICD-10-CM: R00.0  ICD-9-CM: 427.89  9/27/2018 - Present        Hypertension ICD-10-CM: I10  ICD-9-CM: 401.9  Unknown - Present        Diabetes ([facility] ICD-10-CM: E11.9  ICD-9-CM: 250.00  Unknown - Present        Anxiety and depression ICD-10-CM: F41.9, F32.9  ICD-9-CM: 300.00, 311  Unknown - Present        Seizure disorder ([facility] ICD-10-CM: G40.909  ICD-9-CM: 345.90  Unknown - Present        Vomiting ICD-10-CM: R11.10  ICD-9-CM: 787.03  9/27/2018 - Present        Hypokalemia ICD-10-CM: E87.6  ICD-9-CM: 276.8  9/27/2018 - Present        Obese ICD-10-CM: E66.9  ICD-9-CM: 278.00  Unknown - Present        DM (diabetes mellitus) ([facility] ICD-10-CM: E11.9  ICD-9-CM: 250.00  6/7/2018 - Present        Leukocytosis ICD-10-CM: D72.829  ICD-9-CM: 288.60  6/7/2018 - Present        RESOLVED: Encephalopathy acute ICD-10-CM: G93.40  ICD-9-CM: 348.30  6/7/2018 - 9/27/2018        RESOLVED: Seizure ([facility] ICD-10-CM: R56.9  ICD-9-CM: 780.39  6/7/2018 - 9/27/2018        RESOLVED: Aspiration into airway ICD-10-CM: T17.908A  ICD-9-CM: 934.9  6/7/2018 - 9/27/2018        RESOLVED: Renal insufficiency ICD-10-CM: N28.9  ICD-9-CM: 593.9  6/7/2018 - 9/27/2018              HPI:   Gabe Fowler is a 72 y.o. female with past medical history of recent stroke, anxiety, depression, type 2 diabetes mellitus (DM), hypertension, obesity, seizure disorder presented to the ED from home with reported pain at price site. Patient is confused and not able to answer questions appropriately at this time.   As such, majority of history was obtained per my review of ED and electronic medical records. Per these reports, patient was admitted to [facility] previously with diagnosis of stroke. She was last hospitalized here at [facility] from 1/1/2021 to 1/16/2021 with diagnoses of altered mental status (AMS), sepsis secondary to health care associated pneumonia (treated with Cefepime), abnormal urinalysis with negative urine culture, dehydration. After discharge, patient has been living at home with family. She has an indwelling price catheter. Onset of pain symptoms is not documented. On arrival in the ED tonight, initial recorded vital signs were BP= 97/61, HR= 97, RR= 16, O2sat= 100% on room air. WBC  = 12,700. K = 3.0. Hemoglobin = 10.9 (compared to 11.1 on 1/16/2021). BUN= 25.  Creatinine= 1.64 (compared to 0.92 on 1/16/2021). ED provider requested admission to the hospitalist service. At time of request, I placed order for UA results awaiting collection and results. Nurse notes that patient's old price catheter was removed and a new price was inserted after arrival to the ED. (Dr Roderick Neil)    1/19:  Pt is unable to give any meaningful hx. She will arouse to her name. She reports she is not in any pain. She does not know day/date or location. She can occas follow commands (). Unlike last admission (2 days ago) pt is not violent or combative. Pharmacy called me to report that multiple meds she was supposed to be taking at home were not being given - pharmacy has the list but most importantly she was not getting her seizure meds. Will get Case Management to assist in placement - the family cannot take care of this pt at home and APS may need to be consulted. 1/20:  Combative, threatening and striking out at nurses, threatening harm and agitated overnight. Calmer at this moment and allows me to examine her. Refusing labs, meds and heart monitor and stripping off clothes and refusing to put them back on.   With this degree of frontal lobe damage from CVA there may be little that we can do (Psy and Neuro saw last admission and report there is nothing they can add), her behavior will be unpredictable and varied. Will restart IM Haldol if nurses can get close enough to pt. Unable to get labs and cultures at this time. She will need long term placement -perhaps in a memory unit. The family will not be able to care for her at home. 1 of 4 bottles from blood culture on 1/19 show possible gram + cocci - since she is physically better, and afeb, will hold off on repeat cultures and limit labs sticks as much as possible. 1/21:  Not as combative overnight but yelling out at times and attempting to get out of bed. She is oriented to name only. Bood Cultures growing enterococcus so antibiotics switched to Zosyn and Vanc. Urine culture with little growth.      1/22:  Better overnight per nurses. No combative behavior and less agitated and did not require IM Haldol overnight. Tmax 101. Tnow 100.2. King was placed last admission for urinary retention and has been in place long enough for a voiding trial so will have it removed and see how she does. Trying to get AM labs now if she will allow it.        1/23: Tmax 101.4 yesterday. Afebrile currently. No IV access or labs allowed by patient despite IM haldol. Not able to complete her IV abx. Was too combative when they attempted to replace IV and get blood cx. Threatening to assault staff and call the police. She is calm and pleasant this AM.  Reported that she would let the nurse attempt an IV so we can treat her UTI. I spoke with Brad Eid and asked her to come sit with mother. She said she babysits a grandson and can't come because she doesn't have alternate childcare. Will reach out to pt's son to see if he can come sit with mother to keep her calm. 1/24: tolerated IV and lab draw yesterday with increased seroquel and family support at bedside.   Pt notes her belly is achy. No BM in a few days. I spoke with Paloma Morales. She notes that she was misunderstood and she does want pt to have voiding trial here instead of having to see uro outpt. Da notes that Mrs Olive Gibson really doesn't tolerate the price. :  No complaints this AM and calmer at this moment. Oriented only to self. Voiding trial failed - incont large amount of urine last night and 650cc in bladder this AM on bladder scan - price replaced. Afebrile. WBC down to normal.  K+ low - replacing.      :  No complaints. A little more oriented this AM - knows she is in a hospital but not which one. Nurses report minor agitation overnight. Daughter insisted on urology consult although not much likely can be done. Pt refused labs this AM but nurses/phlebotomist trying again. Review of Systems:   Review of systems not obtained due to patient factors. Objective:   Physical Exam:   Visit Vitals  /85 (BP 1 Location: Left arm, BP Patient Position: At rest)   Pulse 82   Temp 97.9 °F (36.6 °C)   Resp 17   Ht 5' 4\" (1.626 m)   Wt 73.7 kg (162 lb 6.4 oz)   SpO2 99%   BMI 27.88 kg/m²      O2 Device: Room air  Temp (24hrs), Av.3 °F (36.8 °C), Min:97.9 °F (36.6 °C), Max:98.8 °F (37.1 °C)    1901 -  0700  In: -   Out: 300 [Urine:300]   701 - 1900  In: 7842 [P.O.:620; I.V.:2355]  Out: 0578 [Urine:1275]  General:  Alert, cooperative and calm at this moment, no distress, appears stated age. Head:  Normocephalic, without obvious abnormality, atraumatic. Eyes:  Conjunctivae/corneas clear. EOMs intact. Throat: Lips, mucosa, and tongue moist   Neck: Supple, symmetrical, trachea midline, no adenopathy, thyroid: no enlargement/tenderness/nodules, no carotid bruit and no JVD. Lungs:   Clear to auscultation bilaterally. Chest wall:  No tenderness or deformity. Heart:  Regular rate and rhythm, S1, S2 normal, no murmur, click, rub or gallop.    Abdomen:   Soft, non-tender, nondistended. Bowel sounds normal. No masses,  No organomegaly. : price in place   Extremities: Extremities normal, atraumatic, no cyanosis or edema. No calf tenderness or cords. Pulses: 2+ and symmetric all extremities. Skin: Skin color, texture, turgor normal. No rashes   Neurologic:  More alert this AM.  Alert and oriented X 2. She will follow 1 step commands at this time. Gait not tested at this time. Data Review:       Recent Days:  Recent Labs     01/25/21  0426 01/24/21  0533 01/23/21  0838   WBC 7.0 9.1 12.9*   HGB 8.3* 9.3* 9.7*   HCT 25.3* 28.2* 29.8*    253 227     Recent Labs     01/25/21 0426 01/24/21  0533 01/23/21  0838    140 134*   K 3.2* 3.5 3.9   * 109* 104   CO2 26 23 22   GLU 85 66 103*   BUN 3* 5* 8   CREA 0.48* 0.52* 0.59   CA 8.4* 8.5 8.7   MG 1.7 1.9 1.4*   ALB 2.3* 2.4* 2.8*   TBILI 0.4 0.8 0.9   ALT 11* 10* 9*     No results for input(s): PH, PCO2, PO2, HCO3, FIO2 in the last 72 hours. 24 Hour Results:  No results found for this or any previous visit (from the past 24 hour(s)).     Medications reviewed  Current Facility-Administered Medications   Medication Dose Route Frequency    polyethylene glycol (MIRALAX) packet 17 g  17 g Oral DAILY    QUEtiapine (SEROquel) tablet 25 mg  25 mg Oral QID    ampicillin (OMNIPEN) 2 g in 0.9% sodium chloride (MBP/ADV) 100 mL MBP  2 g IntraVENous Q6H    acetaminophen (TYLENOL) tablet 650 mg  650 mg Oral Q6H PRN    haloperidol lactate (HALDOL) injection 1 mg  1 mg IntraMUSCular Q4H PRN    sodium chloride (NS) flush 5-40 mL  5-40 mL IntraVENous Q8H    sodium chloride (NS) flush 5-40 mL  5-40 mL IntraVENous PRN    aspirin delayed-release tablet 81 mg  81 mg Oral DAILY    atorvastatin (LIPITOR) tablet 20 mg  20 mg Oral DAILY    levETIRAcetam (KEPPRA) tablet 500 mg  500 mg Oral BID    pantoprazole (PROTONIX) tablet 40 mg  40 mg Oral DAILY    sertraline (ZOLOFT) tablet 100 mg  100 mg Oral DAILY    ergocalciferol capsule 50,000 Units  50,000 Units Oral Q7D    0.9% sodium chloride infusion  100 mL/hr IntraVENous CONTINUOUS     Care Plan discussed with: Patient/Nurse  Total time spent with patient and review of records: 30 minutes.   Meryle Spaniel, MD

## 2021-01-26 NOTE — PROGRESS NOTES
Transition of Care Plan:  RUR26%   1 Case Management to follow to monitor response to treatment and discharge needs   2  King to remain (urology was consulted)   3 Daughter plans to be able to transport home at discharge to her home   4 Pt is set up with [address] [place] [address] at pt's [address] is [address]  [phone]  5. Case Management to follow  L. Jolanda Kanner, RN.

## 2021-01-26 NOTE — PROGRESS NOTES
Bedside and Verbal shift change report given to Shiraz Maradiaga RN and Odessa Dominguez [facility] (oncoming nurse) by Caron Hodge RN (offgoing nurse). Report included the following information SBAR, Kardex, Intake/Output, MAR, Accordion and Recent Results.

## 2021-01-26 NOTE — PROGRESS NOTES
0700: Bedside and Verbal shift change report given to PIPPA Purcell RN (oncoming nurse) by DANIELLE Singh RN (offgoing nurse). Report included the following information SBAR, Kardex, Intake/Output, MAR, Accordion, Recent Results and Med Rec Status.

## 2021-01-27 LAB
ALBUMIN SERPL-MCNC: 2.3 G/DL (ref 3.5–5)
ALBUMIN/GLOB SERPL: 0.6 {RATIO} (ref 1.1–2.2)
ALP SERPL-CCNC: 52 U/L (ref 45–117)
ALT SERPL-CCNC: 8 U/L (ref 12–78)
ANION GAP SERPL CALC-SCNC: 6 MMOL/L (ref 5–15)
AST SERPL-CCNC: 19 U/L (ref 15–37)
BASOPHILS # BLD: 0 K/UL (ref 0–0.1)
BASOPHILS NFR BLD: 0 % (ref 0–1)
BILIRUB SERPL-MCNC: 0.5 MG/DL (ref 0.2–1)
BUN SERPL-MCNC: 2 MG/DL (ref 6–20)
BUN/CREAT SERPL: 4 (ref 12–20)
CALCIUM SERPL-MCNC: 8.5 MG/DL (ref 8.5–10.1)
CHLORIDE SERPL-SCNC: 107 MMOL/L (ref 97–108)
CO2 SERPL-SCNC: 29 MMOL/L (ref 21–32)
CREAT SERPL-MCNC: 0.56 MG/DL (ref 0.55–1.02)
DIFFERENTIAL METHOD BLD: ABNORMAL
EOSINOPHIL # BLD: 0.4 K/UL (ref 0–0.4)
EOSINOPHIL NFR BLD: 6 % (ref 0–7)
ERYTHROCYTE [DISTWIDTH] IN BLOOD BY AUTOMATED COUNT: 12.9 % (ref 11.5–14.5)
GLOBULIN SER CALC-MCNC: 4.1 G/DL (ref 2–4)
GLUCOSE SERPL-MCNC: 80 MG/DL (ref 65–100)
HCT VFR BLD AUTO: 25.1 % (ref 35–47)
HGB BLD-MCNC: 8.2 G/DL (ref 11.5–16)
IMM GRANULOCYTES # BLD AUTO: 0 K/UL (ref 0–0.04)
IMM GRANULOCYTES NFR BLD AUTO: 0 % (ref 0–0.5)
LYMPHOCYTES # BLD: 2.1 K/UL (ref 0.8–3.5)
LYMPHOCYTES NFR BLD: 27 % (ref 12–49)
MAGNESIUM SERPL-MCNC: 1.4 MG/DL (ref 1.6–2.4)
MCH RBC QN AUTO: 31.1 PG (ref 26–34)
MCHC RBC AUTO-ENTMCNC: 32.7 G/DL (ref 30–36.5)
MCV RBC AUTO: 95.1 FL (ref 80–99)
MONOCYTES # BLD: 0.5 K/UL (ref 0–1)
MONOCYTES NFR BLD: 6 % (ref 5–13)
NEUTS SEG # BLD: 4.6 K/UL (ref 1.8–8)
NEUTS SEG NFR BLD: 61 % (ref 32–75)
NRBC # BLD: 0 K/UL (ref 0–0.01)
NRBC BLD-RTO: 0 PER 100 WBC
PLATELET # BLD AUTO: 270 K/UL (ref 150–400)
PMV BLD AUTO: 10 FL (ref 8.9–12.9)
POTASSIUM SERPL-SCNC: 2.8 MMOL/L (ref 3.5–5.1)
PROT SERPL-MCNC: 6.4 G/DL (ref 6.4–8.2)
RBC # BLD AUTO: 2.64 M/UL (ref 3.8–5.2)
SODIUM SERPL-SCNC: 142 MMOL/L (ref 136–145)
WBC # BLD AUTO: 7.7 K/UL (ref 3.6–11)

## 2021-01-27 PROCEDURE — 74011250636 HC RX REV CODE- 250/636: Performed by: FAMILY MEDICINE

## 2021-01-27 PROCEDURE — 74011250637 HC RX REV CODE- 250/637: Performed by: FAMILY MEDICINE

## 2021-01-27 PROCEDURE — 74011000258 HC RX REV CODE- 258: Performed by: FAMILY MEDICINE

## 2021-01-27 PROCEDURE — 85025 COMPLETE CBC W/AUTO DIFF WBC: CPT

## 2021-01-27 PROCEDURE — 36415 COLL VENOUS BLD VENIPUNCTURE: CPT

## 2021-01-27 PROCEDURE — 80053 COMPREHEN METABOLIC PANEL: CPT

## 2021-01-27 PROCEDURE — 83735 ASSAY OF MAGNESIUM: CPT

## 2021-01-27 PROCEDURE — 77030038269 HC DRN EXT URIN PURWCK BARD -A

## 2021-01-27 PROCEDURE — 65270000029 HC RM PRIVATE

## 2021-01-27 RX ORDER — POTASSIUM CHLORIDE 750 MG/1
40 TABLET, FILM COATED, EXTENDED RELEASE ORAL 2 TIMES DAILY
Status: DISPENSED | OUTPATIENT
Start: 2021-01-27 | End: 2021-01-29

## 2021-01-27 RX ADMIN — QUETIAPINE FUMARATE 25 MG: 25 TABLET ORAL at 22:14

## 2021-01-27 RX ADMIN — Medication 10 ML: at 05:24

## 2021-01-27 RX ADMIN — LEVETIRACETAM 500 MG: 500 TABLET ORAL at 18:13

## 2021-01-27 RX ADMIN — Medication 10 ML: at 22:15

## 2021-01-27 RX ADMIN — QUETIAPINE FUMARATE 25 MG: 25 TABLET ORAL at 18:13

## 2021-01-27 RX ADMIN — AMPICILLIN SODIUM 2 G: 2 INJECTION, POWDER, FOR SOLUTION INTRAMUSCULAR; INTRAVENOUS at 18:17

## 2021-01-27 RX ADMIN — POTASSIUM CHLORIDE 40 MEQ: 750 TABLET, FILM COATED, EXTENDED RELEASE ORAL at 18:13

## 2021-01-27 RX ADMIN — AMPICILLIN SODIUM 2 G: 2 INJECTION, POWDER, FOR SOLUTION INTRAMUSCULAR; INTRAVENOUS at 11:39

## 2021-01-27 RX ADMIN — Medication 10 ML: at 18:18

## 2021-01-27 RX ADMIN — AMPICILLIN SODIUM 2 G: 2 INJECTION, POWDER, FOR SOLUTION INTRAMUSCULAR; INTRAVENOUS at 22:15

## 2021-01-27 RX ADMIN — QUETIAPINE FUMARATE 25 MG: 25 TABLET ORAL at 12:03

## 2021-01-27 RX ADMIN — AMPICILLIN SODIUM 2 G: 2 INJECTION, POWDER, FOR SOLUTION INTRAMUSCULAR; INTRAVENOUS at 05:24

## 2021-01-27 NOTE — PROGRESS NOTES
Patient is more calmer at this time. Staff at bedside. Daughter updated. Allowed writer to administer IV ABT.

## 2021-01-27 NOTE — PROGRESS NOTES
Patient refusing all medications, verbally aggressive towards writer. Attempted to administer meds several times with education on importance of taking meds as prescribed. Patient continues to refuse. Md notified of med refusal. Called daughter for update, message left to return call to writer. Bed alarm in place and functioning properly.

## 2021-01-27 NOTE — PROGRESS NOTES
Transition of Care Plan - RUR23%:  1. Case Management to follow for discharge needs.   2  When medically stable, the plan is for patient to discharge home with family assistance and home health provided by [facility].  Patient will be staying at her daughter's home.  That address is:  [address] [place], [place] [address] , [id number].  3. Transportation at discharge: daughter    4. Outpatient follow-up    Razia Peacock LCSW

## 2021-01-27 NOTE — PROGRESS NOTES
Bedside and Verbal shift change report given to Kassandra (oncoming nurse) by Pawan (offgoing nurse). Report included the following information SBAR, Kardex and MAR.

## 2021-01-27 NOTE — PROGRESS NOTES
0700: Bedside and Verbal shift change report given to Justo Jacob RN  (oncoming nurse) by Isabella Chatterjee RN (offgoing nurse). Report included the following information SBAR, Kardex, Intake/Output, MAR, Accordion, Recent Results and Med Rec Status.

## 2021-01-27 NOTE — PROGRESS NOTES
Problem: Pressure Injury - Risk of  Goal: *Prevention of pressure injury  Description: Document Fidel Scale and appropriate interventions in the flowsheet. Outcome: Progressing Towards Goal  Note: Pressure Injury Interventions:  Sensory Interventions: Turn and reposition approx. every two hours (pillows and wedges if needed), Minimize linen layers, Keep linens dry and wrinkle-free    Moisture Interventions: Minimize layers, Check for incontinence Q2 hours and as needed    Activity Interventions: Pressure redistribution bed/mattress(bed type)    Mobility Interventions: Pressure redistribution bed/mattress (bed type), Turn and reposition approx.  every two hours(pillow and wedges)    Nutrition Interventions: Document food/fluid/supplement intake, Offer support with meals,snacks and hydration    Friction and Shear Interventions: HOB 30 degrees or less, Lift sheet

## 2021-01-27 NOTE — PROGRESS NOTES
Daily Progress Note: 1/27/2021  Regina Yeung  PCP  Kate Gunter MD    Assessment/Plan:   UTI with sepsis  - urine culture neg   - blood cx with enteroccocus and various gram positives  - repeat blood cx NGSF    Urinary Retention with chronic indwelling price  - replaced price  - urine culture neg so far  - da requested voiding trial    Altered mental status with history of recent frontal lobe stroke  - improved to baseline combative  - seroquel- increased to QID  - haldol PRN  - soft restraints if doesn't tolerate IV restart and IV abx.    - encouraged family to sit with her    History of stroke  - plan as above    JOSE (acute kidney injury) - resolved  - continue IV fluids for hydration as long as she leaves IV in place     Acute hypokalemia - pt refusing labs  - monitor and replace as needed     Hypotension  - monitor BP   - hold all BP medication for now due to hypotension - restart as needed    Anemia- stable  - chronic. Repeat H/H if pt allows     Leukocytosis  - resolved, monitor     Generalized weakness/ debility  -  fall precautions. Consulted PT.     Constipation  -add miralax     VTE prophylaxis  - Lovenox sq         Problem List:  Problem List as of 1/27/2021 Date Reviewed: 9/27/2018          Codes Class Noted - Resolved    Suprapubic pain ICD-10-CM: R10.2  ICD-9-CM: 789.09  1/21/2021 - Present        AMS (altered mental status) ICD-10-CM: R41.82  ICD-9-CM: 780.97  1/21/2021 - Present        Acute hypokalemia ICD-10-CM: E87.6  ICD-9-CM: 276.8  1/18/2021 - Present        Hypotension ICD-10-CM: I95.9  ICD-9-CM: 458.9  1/18/2021 - Present        JOSE (acute kidney injury) ([facility] [address] ICD-10-CM: N17.9  ICD-9-CM: 584.9  1/18/2021 - Present        Dehydration ICD-10-CM: E86.0  ICD-9-CM: 276.51  1/1/2021 - Present        Altered mental status ICD-10-CM: R41.82  ICD-9-CM: 780.97  1/1/2021 - Present        Anemia ICD-10-CM: D64.9  ICD-9-CM: 285.9  1/1/2021 - Present        Tachycardia ICD-10-CM: R00.0  ICD-9-CM: 785.0  1/1/2021 - Present        Abnormal urinalysis ICD-10-CM: R82.90  ICD-9-CM: 791.9  1/1/2021 - Present        Sepsis ([facility] ICD-10-CM: A41.9  ICD-9-CM: 038.9, 995.91  1/1/2021 - Present        Lactic acidosis ICD-10-CM: E87.2  ICD-9-CM: 276.2  10/24/2020 - Present        SIRS (systemic inflammatory response syndrome) (HCC) ICD-10-CM: R65.10  ICD-9-CM: 995.90  9/27/2018 - Present        Sinus tachycardia ICD-10-CM: R00.0  ICD-9-CM: 427.89  9/27/2018 - Present        Hypertension ICD-10-CM: I10  ICD-9-CM: 401.9  Unknown - Present        Diabetes ([facility] ICD-10-CM: E11.9  ICD-9-CM: 250.00  Unknown - Present        Anxiety and depression ICD-10-CM: F41.9, F32.9  ICD-9-CM: 300.00, 311  Unknown - Present        Seizure disorder ([facility] ICD-10-CM: G40.909  ICD-9-CM: 345.90  Unknown - Present        Vomiting ICD-10-CM: R11.10  ICD-9-CM: 787.03  9/27/2018 - Present        Hypokalemia ICD-10-CM: E87.6  ICD-9-CM: 276.8  9/27/2018 - Present        Obese ICD-10-CM: E66.9  ICD-9-CM: 278.00  Unknown - Present        DM (diabetes mellitus) ([facility] ICD-10-CM: E11.9  ICD-9-CM: 250.00  6/7/2018 - Present        Leukocytosis ICD-10-CM: D72.829  ICD-9-CM: 288.60  6/7/2018 - Present        RESOLVED: Encephalopathy acute ICD-10-CM: G93.40  ICD-9-CM: 348.30  6/7/2018 - 9/27/2018        RESOLVED: Seizure ([facility] ICD-10-CM: R56.9  ICD-9-CM: 780.39  6/7/2018 - 9/27/2018        RESOLVED: Aspiration into airway ICD-10-CM: T17.908A  ICD-9-CM: 934.9  6/7/2018 - 9/27/2018        RESOLVED: Renal insufficiency ICD-10-CM: N28.9  ICD-9-CM: 593.9  6/7/2018 - 9/27/2018              HPI:   Danae Romo is a 72 y.o. female with past medical history of recent stroke, anxiety, depression, type 2 diabetes mellitus (DM), hypertension, obesity, seizure disorder presented to the ED from home with reported pain at price site. Patient is confused and not able to answer questions appropriately at this time.   As such, majority of history was obtained per my review of ED and electronic medical records. Per these reports, patient was admitted to [facility] previously with diagnosis of stroke. She was last hospitalized here at [facility] from 1/1/2021 to 1/16/2021 with diagnoses of altered mental status (AMS), sepsis secondary to health care associated pneumonia (treated with Cefepime), abnormal urinalysis with negative urine culture, dehydration. After discharge, patient has been living at home with family. She has an indwelling price catheter. Onset of pain symptoms is not documented. On arrival in the ED tonight, initial recorded vital signs were BP= 97/61, HR= 97, RR= 16, O2sat= 100% on room air. WBC  = 12,700. K = 3.0. Hemoglobin = 10.9 (compared to 11.1 on 1/16/2021). BUN= 25.  Creatinine= 1.64 (compared to 0.92 on 1/16/2021). ED provider requested admission to the hospitalist service. At time of request, I placed order for UA results awaiting collection and results. Nurse notes that patient's old price catheter was removed and a new price was inserted after arrival to the ED. (Dr William Valenzuela)    1/19:  Pt is unable to give any meaningful hx. She will arouse to her name. She reports she is not in any pain. She does not know day/date or location. She can occas follow commands (). Unlike last admission (2 days ago) pt is not violent or combative. Pharmacy called me to report that multiple meds she was supposed to be taking at home were not being given - pharmacy has the list but most importantly she was not getting her seizure meds. Will get Case Management to assist in placement - the family cannot take care of this pt at home and APS may need to be consulted. 1/20:  Combative, threatening and striking out at nurses, threatening harm and agitated overnight. Calmer at this moment and allows me to examine her. Refusing labs, meds and heart monitor and stripping off clothes and refusing to put them back on.   With this degree of frontal lobe damage from CVA there may be little that we can do (Psy and Neuro saw last admission and report there is nothing they can add), her behavior will be unpredictable and varied. Will restart IM Haldol if nurses can get close enough to pt. Unable to get labs and cultures at this time. She will need long term placement -perhaps in a memory unit. The family will not be able to care for her at home. 1 of 4 bottles from blood culture on 1/19 show possible gram + cocci - since she is physically better, and afeb, will hold off on repeat cultures and limit labs sticks as much as possible. 1/21:  Not as combative overnight but yelling out at times and attempting to get out of bed. She is oriented to name only. Bood Cultures growing enterococcus so antibiotics switched to Zosyn and Vanc. Urine culture with little growth.      1/22:  Better overnight per nurses. No combative behavior and less agitated and did not require IM Haldol overnight. Tmax 101. Tnow 100.2. King was placed last admission for urinary retention and has been in place long enough for a voiding trial so will have it removed and see how she does. Trying to get AM labs now if she will allow it.        1/23: Tmax 101.4 yesterday. Afebrile currently. No IV access or labs allowed by patient despite IM haldol. Not able to complete her IV abx. Was too combative when they attempted to replace IV and get blood cx. Threatening to assault staff and call the police. She is calm and pleasant this AM.  Reported that she would let the nurse attempt an IV so we can treat her UTI. I spoke with Nadeen Conde and asked her to come sit with mother. She said she babysits a grandson and can't come because she doesn't have alternate childcare. Will reach out to pt's son to see if he can come sit with mother to keep her calm. 1/24: tolerated IV and lab draw yesterday with increased seroquel and family support at bedside.   Pt notes her belly is achy. No BM in a few days. I spoke with Lazaro Koenig. She notes that she was misunderstood and she does want pt to have voiding trial here instead of having to see uro outpt. Da notes that Mrs Sonya Ceja really doesn't tolerate the price. :  No complaints this AM and calmer at this moment. Oriented only to self. Voiding trial failed - incont large amount of urine last night and 650cc in bladder this AM on bladder scan - price replaced. Afebrile. WBC down to normal.  K+ low - replacing.      :  No complaints. A little more oriented this AM - knows she is in a hospital but not which one. Nurses report minor agitation overnight. Daughter insisted on urology consult although not much likely can be done. Pt refused labs this AM but nurses/phlebotomist trying again. :   No complaints of pain. Does not know her location today. Last night pt did not sleep at all per nurses. Pulled price out yesterday and trying to leave it out to see if she can void. Pulls IVs out but she will need IV antibiotics due to sepsis so will try to keep replacing IVs.  K+ 2.8 this AM - replacing. Afeb. WBC down to 7K. If she remains afeb another day will try po meds. Review of Systems:   Review of systems not obtained due to patient factors. Objective:   Physical Exam:   Visit Vitals  BP (!) 152/73 (BP 1 Location: Left arm, BP Patient Position: At rest)   Pulse 75   Temp 98.4 °F (36.9 °C)   Resp 16   Ht 5' 4\" (1.626 m)   Wt 73.7 kg (162 lb 6.4 oz)   SpO2 98%   BMI 27.88 kg/m²      O2 Device: Room air  Temp (24hrs), Av.3 °F (36.8 °C), Min:97.4 °F (36.3 °C), Max:98.6 °F (37 °C)    No intake/output data recorded.  07 -  1900  In: 1891.7 [P.O.:420; I.V.:1471.7]  Out: 1619 [Urine:2675]  General:  Alert, cooperative and calm at this moment, no distress, appears stated age. Head:  Normocephalic, without obvious abnormality, atraumatic. Eyes:  Conjunctivae/corneas clear. EOMs intact. Throat: Lips, mucosa, and tongue moist   Neck: Supple, symmetrical, trachea midline, no adenopathy, thyroid: no enlargement/tenderness/nodules, no carotid bruit and no JVD. Lungs:   Clear to auscultation bilaterally. Chest wall:  No tenderness or deformity. Heart:  Regular rate and rhythm, S1, S2 normal, no murmur, click, rub or gallop. Abdomen:   Soft, non-tender, nondistended. Bowel sounds normal. No masses,  No organomegaly. : price in place   Extremities: Extremities normal, atraumatic, no cyanosis or edema. No calf tenderness or cords. Pulses: 2+ and symmetric all extremities. Skin: Skin color, texture, turgor normal. No rashes   Neurologic:  More alert this AM.  Alert and oriented X 2. She will follow 1 step commands at this time. Gait not tested at this time. Data Review:       Recent Days:  Recent Labs     01/27/21  0352 01/25/21  0426   WBC 7.7 7.0   HGB 8.2* 8.3*   HCT 25.1* 25.3*    274     Recent Labs     01/27/21  0352 01/25/21  0426    144   K 2.8* 3.2*    112*   CO2 29 26   GLU 80 85   BUN 2* 3*   CREA 0.56 0.48*   CA 8.5 8.4*   MG 1.4* 1.7   ALB 2.3* 2.3*   TBILI 0.5 0.4   ALT 8* 11*     No results for input(s): PH, PCO2, PO2, HCO3, FIO2 in the last 72 hours.     24 Hour Results:  Recent Results (from the past 24 hour(s))   MAGNESIUM    Collection Time: 01/27/21  3:52 AM   Result Value Ref Range    Magnesium 1.4 (L) 1.6 - 2.4 mg/dL   CBC WITH AUTOMATED DIFF    Collection Time: 01/27/21  3:52 AM   Result Value Ref Range    WBC 7.7 3.6 - 11.0 K/uL    RBC 2.64 (L) 3.80 - 5.20 M/uL    HGB 8.2 (L) 11.5 - 16.0 g/dL    HCT 25.1 (L) 35.0 - 47.0 %    MCV 95.1 80.0 - 99.0 FL    MCH 31.1 26.0 - 34.0 PG    MCHC 32.7 30.0 - 36.5 g/dL    RDW 12.9 11.5 - 14.5 %    PLATELET 713 310 - 546 K/uL    MPV 10.0 8.9 - 12.9 FL    NRBC 0.0 0  WBC    ABSOLUTE NRBC 0.00 0.00 - 0.01 K/uL    NEUTROPHILS 61 32 - 75 %    LYMPHOCYTES 27 12 - 49 %    MONOCYTES 6 5 - 13 % EOSINOPHILS 6 0 - 7 %    BASOPHILS 0 0 - 1 %    IMMATURE GRANULOCYTES 0 0.0 - 0.5 %    ABS. NEUTROPHILS 4.6 1.8 - 8.0 K/UL    ABS. LYMPHOCYTES 2.1 0.8 - 3.5 K/UL    ABS. MONOCYTES 0.5 0.0 - 1.0 K/UL    ABS. EOSINOPHILS 0.4 0.0 - 0.4 K/UL    ABS. BASOPHILS 0.0 0.0 - 0.1 K/UL    ABS. IMM. GRANS. 0.0 0.00 - 0.04 K/UL    DF AUTOMATED     METABOLIC PANEL, COMPREHENSIVE    Collection Time: 01/27/21  3:52 AM   Result Value Ref Range    Sodium 142 136 - 145 mmol/L    Potassium 2.8 (L) 3.5 - 5.1 mmol/L    Chloride 107 97 - 108 mmol/L    CO2 29 21 - 32 mmol/L    Anion gap 6 5 - 15 mmol/L    Glucose 80 65 - 100 mg/dL    BUN 2 (L) 6 - 20 MG/DL    Creatinine 0.56 0.55 - 1.02 MG/DL    BUN/Creatinine ratio 4 (L) 12 - 20      GFR est AA >60 >60 ml/min/1.73m2    GFR est non-AA >60 >60 ml/min/1.73m2    Calcium 8.5 8.5 - 10.1 MG/DL    Bilirubin, total 0.5 0.2 - 1.0 MG/DL    ALT (SGPT) 8 (L) 12 - 78 U/L    AST (SGOT) 19 15 - 37 U/L    Alk.  phosphatase 52 45 - 117 U/L    Protein, total 6.4 6.4 - 8.2 g/dL    Albumin 2.3 (L) 3.5 - 5.0 g/dL    Globulin 4.1 (H) 2.0 - 4.0 g/dL    A-G Ratio 0.6 (L) 1.1 - 2.2         Medications reviewed  Current Facility-Administered Medications   Medication Dose Route Frequency    polyethylene glycol (MIRALAX) packet 17 g  17 g Oral DAILY    QUEtiapine (SEROquel) tablet 25 mg  25 mg Oral QID    ampicillin (OMNIPEN) 2 g in 0.9% sodium chloride (MBP/ADV) 100 mL MBP  2 g IntraVENous Q6H    acetaminophen (TYLENOL) tablet 650 mg  650 mg Oral Q6H PRN    haloperidol lactate (HALDOL) injection 1 mg  1 mg IntraMUSCular Q4H PRN    sodium chloride (NS) flush 5-40 mL  5-40 mL IntraVENous Q8H    sodium chloride (NS) flush 5-40 mL  5-40 mL IntraVENous PRN    aspirin delayed-release tablet 81 mg  81 mg Oral DAILY    atorvastatin (LIPITOR) tablet 20 mg  20 mg Oral DAILY    levETIRAcetam (KEPPRA) tablet 500 mg  500 mg Oral BID    pantoprazole (PROTONIX) tablet 40 mg  40 mg Oral DAILY    sertraline (ZOLOFT) tablet 100 mg  100 mg Oral DAILY    ergocalciferol capsule 50,000 Units  50,000 Units Oral Q7D    0.9% sodium chloride infusion  100 mL/hr IntraVENous CONTINUOUS     Care Plan discussed with: Patient/Nurse  Total time spent with patient and review of records: 30 minutes.   Polo Jarrett MD

## 2021-01-28 LAB
ALBUMIN SERPL-MCNC: 2.4 G/DL (ref 3.5–5)
ALBUMIN/GLOB SERPL: 0.6 {RATIO} (ref 1.1–2.2)
ALP SERPL-CCNC: 53 U/L (ref 45–117)
ALT SERPL-CCNC: 9 U/L (ref 12–78)
ANION GAP SERPL CALC-SCNC: 6 MMOL/L (ref 5–15)
AST SERPL-CCNC: 22 U/L (ref 15–37)
BASOPHILS # BLD: 0 K/UL (ref 0–0.1)
BASOPHILS NFR BLD: 0 % (ref 0–1)
BILIRUB SERPL-MCNC: 0.5 MG/DL (ref 0.2–1)
BUN SERPL-MCNC: 2 MG/DL (ref 6–20)
BUN/CREAT SERPL: 3 (ref 12–20)
CALCIUM SERPL-MCNC: 8.4 MG/DL (ref 8.5–10.1)
CHLORIDE SERPL-SCNC: 107 MMOL/L (ref 97–108)
CO2 SERPL-SCNC: 30 MMOL/L (ref 21–32)
CREAT SERPL-MCNC: 0.6 MG/DL (ref 0.55–1.02)
DIFFERENTIAL METHOD BLD: ABNORMAL
EOSINOPHIL # BLD: 0.4 K/UL (ref 0–0.4)
EOSINOPHIL NFR BLD: 5 % (ref 0–7)
ERYTHROCYTE [DISTWIDTH] IN BLOOD BY AUTOMATED COUNT: 12.8 % (ref 11.5–14.5)
GLOBULIN SER CALC-MCNC: 4.1 G/DL (ref 2–4)
GLUCOSE SERPL-MCNC: 92 MG/DL (ref 65–100)
HCT VFR BLD AUTO: 26.7 % (ref 35–47)
HGB BLD-MCNC: 9 G/DL (ref 11.5–16)
IMM GRANULOCYTES # BLD AUTO: 0 K/UL (ref 0–0.04)
IMM GRANULOCYTES NFR BLD AUTO: 0 % (ref 0–0.5)
LYMPHOCYTES # BLD: 1.6 K/UL (ref 0.8–3.5)
LYMPHOCYTES NFR BLD: 21 % (ref 12–49)
MAGNESIUM SERPL-MCNC: 1.5 MG/DL (ref 1.6–2.4)
MCH RBC QN AUTO: 31.9 PG (ref 26–34)
MCHC RBC AUTO-ENTMCNC: 33.7 G/DL (ref 30–36.5)
MCV RBC AUTO: 94.7 FL (ref 80–99)
MONOCYTES # BLD: 0.4 K/UL (ref 0–1)
MONOCYTES NFR BLD: 6 % (ref 5–13)
NEUTS SEG # BLD: 4.9 K/UL (ref 1.8–8)
NEUTS SEG NFR BLD: 68 % (ref 32–75)
NRBC # BLD: 0 K/UL (ref 0–0.01)
NRBC BLD-RTO: 0 PER 100 WBC
PLATELET # BLD AUTO: 305 K/UL (ref 150–400)
PMV BLD AUTO: 9.7 FL (ref 8.9–12.9)
POTASSIUM SERPL-SCNC: 3.3 MMOL/L (ref 3.5–5.1)
PROT SERPL-MCNC: 6.5 G/DL (ref 6.4–8.2)
RBC # BLD AUTO: 2.82 M/UL (ref 3.8–5.2)
SODIUM SERPL-SCNC: 143 MMOL/L (ref 136–145)
WBC # BLD AUTO: 7.4 K/UL (ref 3.6–11)

## 2021-01-28 PROCEDURE — 74011250637 HC RX REV CODE- 250/637: Performed by: FAMILY MEDICINE

## 2021-01-28 PROCEDURE — 51798 US URINE CAPACITY MEASURE: CPT

## 2021-01-28 PROCEDURE — 36415 COLL VENOUS BLD VENIPUNCTURE: CPT

## 2021-01-28 PROCEDURE — 85025 COMPLETE CBC W/AUTO DIFF WBC: CPT

## 2021-01-28 PROCEDURE — 74011250636 HC RX REV CODE- 250/636: Performed by: FAMILY MEDICINE

## 2021-01-28 PROCEDURE — 80053 COMPREHEN METABOLIC PANEL: CPT

## 2021-01-28 PROCEDURE — 65270000029 HC RM PRIVATE

## 2021-01-28 PROCEDURE — 83735 ASSAY OF MAGNESIUM: CPT

## 2021-01-28 PROCEDURE — 74011000258 HC RX REV CODE- 258: Performed by: FAMILY MEDICINE

## 2021-01-28 PROCEDURE — 77030005513 HC CATH URETH FOL11 MDII -B

## 2021-01-28 RX ORDER — AMOXICILLIN AND CLAVULANATE POTASSIUM 500; 125 MG/1; MG/1
1 TABLET, FILM COATED ORAL
Status: DISCONTINUED | OUTPATIENT
Start: 2021-01-28 | End: 2021-01-29 | Stop reason: HOSPADM

## 2021-01-28 RX ADMIN — QUETIAPINE FUMARATE 25 MG: 25 TABLET ORAL at 17:26

## 2021-01-28 RX ADMIN — Medication 10 ML: at 21:29

## 2021-01-28 RX ADMIN — QUETIAPINE FUMARATE 25 MG: 25 TABLET ORAL at 21:28

## 2021-01-28 RX ADMIN — ATORVASTATIN CALCIUM 20 MG: 20 TABLET, FILM COATED ORAL at 09:13

## 2021-01-28 RX ADMIN — LEVETIRACETAM 500 MG: 500 TABLET ORAL at 17:26

## 2021-01-28 RX ADMIN — POTASSIUM CHLORIDE 40 MEQ: 750 TABLET, FILM COATED, EXTENDED RELEASE ORAL at 09:13

## 2021-01-28 RX ADMIN — POLYETHYLENE GLYCOL 3350 17 G: 17 POWDER, FOR SOLUTION ORAL at 09:13

## 2021-01-28 RX ADMIN — ASPIRIN 81 MG: 81 TABLET, COATED ORAL at 09:13

## 2021-01-28 RX ADMIN — Medication 10 ML: at 13:36

## 2021-01-28 RX ADMIN — AMOXICILLIN AND CLAVULANATE POTASSIUM 1 TABLET: 500; 125 TABLET, FILM COATED ORAL at 17:26

## 2021-01-28 RX ADMIN — POTASSIUM CHLORIDE 40 MEQ: 750 TABLET, FILM COATED, EXTENDED RELEASE ORAL at 17:26

## 2021-01-28 RX ADMIN — Medication 10 ML: at 05:36

## 2021-01-28 RX ADMIN — PANTOPRAZOLE SODIUM 40 MG: 40 TABLET, DELAYED RELEASE ORAL at 09:13

## 2021-01-28 RX ADMIN — AMPICILLIN SODIUM 2 G: 2 INJECTION, POWDER, FOR SOLUTION INTRAMUSCULAR; INTRAVENOUS at 05:36

## 2021-01-28 RX ADMIN — SERTRALINE HYDROCHLORIDE 100 MG: 50 TABLET ORAL at 09:13

## 2021-01-28 RX ADMIN — LEVETIRACETAM 500 MG: 500 TABLET ORAL at 09:13

## 2021-01-28 RX ADMIN — QUETIAPINE FUMARATE 25 MG: 25 TABLET ORAL at 09:13

## 2021-01-28 RX ADMIN — AMOXICILLIN AND CLAVULANATE POTASSIUM 1 TABLET: 500; 125 TABLET, FILM COATED ORAL at 09:13

## 2021-01-28 RX ADMIN — AMOXICILLIN AND CLAVULANATE POTASSIUM 1 TABLET: 500; 125 TABLET, FILM COATED ORAL at 13:35

## 2021-01-28 RX ADMIN — QUETIAPINE FUMARATE 25 MG: 25 TABLET ORAL at 13:35

## 2021-01-28 NOTE — PROGRESS NOTES
Urology Progress Note    Patient: Suhail Curry MRN: [id number]  SSN: [id number]    YOB: 1950  Age: 72 y.o. Sex: female            Assessment:     Suhail Curry is a 72 y.o. female with history of recent CVA, DM, psychiatric history and urinary retention during hospitalization earlier this month. She was discharged with a catheter and presented to the ED on 1/18/2021 with complaints of pain at the catheter site. She is currently admitted for sepsis/UTI (negative urine culture this admission and last), JOSE (resolved), AMS, constipation, and urinary retention (failed voiding trial 1/25/2021). Patient removed catheter. Plan:     1. Asked nursing to check PVR. May be overflow incontinence. 2. If she is retaining consider CIC versus placing price catheter. 3. If she requires price catheter can consider positioning and taping so she cannot reach, diaper or mesh underwear to make it harder to reach, or decoy price catheter. Please call if needed. Subjective:     Spoke to admitting provider. Patient removal of catheter. Has been incontinent of large volume of urine per nursing. No PVRs documented. Objective:     Visit Vitals  /77 (BP 1 Location: Left arm, BP Patient Position: At rest;Supine)   Pulse 83   Temp 98.5 °F (36.9 °C)   Resp 18   Ht 5' 4\" (1.626 m)   Wt 73.7 kg (162 lb 6.4 oz)   SpO2 96%   BMI 27.88 kg/m²         Intake/Output Summary (Last 24 hours) at 1/28/2021 0751  Last data filed at 1/27/2021 1824  Gross per 24 hour   Intake 500 ml   Output 0 ml   Net 500 ml       Physical Exam  General: asleep  Respiratory: no distress    No results found for this or any previous visit (from the past 24 hour(s)).       Signed By: Lillie Hsu NP - January 28, 2021

## 2021-01-28 NOTE — PROGRESS NOTES
Transition of Care Plan - RUR23%:  1. Case Management to follow for discharge needs.   2  When medically stable, the plan is for patient to discharge home with family assistance [facility] Patient will be staying at her daughter's home. That address is:  [address] [place] [address], [id number].  3. Transportation at discharge: daughter    4. Outpatient follow-up  5. If temp remains stable, plan is to d/c to home on 1/29. RENETTA Morgan

## 2021-01-28 NOTE — PROGRESS NOTES
Bedside and Verbal shift change report given to Angelo Mccall RN (oncoming nurse) by Gerald Rodgers RN (offgoing nurse). Report included the following information SBAR, Kardex, MAR and Accordion.

## 2021-01-28 NOTE — PROGRESS NOTES
Bedside shift change report given to Destiney Stephens (oncoming nurse) by Hector Barone (offgoing nurse). Report included the following information SBAR, Kardex, MAR and Recent Results.

## 2021-01-28 NOTE — PROGRESS NOTES
Daily Progress Note: 1/28/2021  Juanito Clock  PCP  Vasquez Gunter MD    Assessment/Plan:   UTI with sepsis  - urine culture neg   - blood cx with enteroccocus and various gram positives  - repeat blood cx NGSF  - switched to po Augmentin 1/28    Urinary Retention with chronic indwelling price  - pt pulled price out  - urine culture neg so far    Altered mental status with history of recent frontal lobe stroke  - improved to baseline combative  - seroquel- increased to QID  - haldol PRN  - soft restraints if doesn't tolerate IV restart and IV abx.    - encouraged family to sit with her    History of stroke  - plan as above    JOSE (acute kidney injury) - resolved  - continue IV fluids for hydration as long as she leaves IV in place     Acute hypokalemia - pt refusing labs  - monitor and replace as needed     Hypotension  - monitor BP   - hold all BP medication for now due to hypotension - restart as needed    Anemia- stable  - chronic. Repeat H/H if pt allows     Leukocytosis  - resolved, monitor     Generalized weakness/ debility  -  fall precautions. Consulted PT.     Constipation  -add miralax     VTE prophylaxis  - Lovenox sq         Problem List:  Problem List as of 1/28/2021 Date Reviewed: 9/27/2018          Codes Class Noted - Resolved    Suprapubic pain ICD-10-CM: R10.2  ICD-9-CM: 789.09  1/21/2021 - Present        AMS (altered mental status) ICD-10-CM: R41.82  ICD-9-CM: 780.97  1/21/2021 - Present        Acute hypokalemia ICD-10-CM: E87.6  ICD-9-CM: 276.8  1/18/2021 - Present        Hypotension ICD-10-CM: I95.9  ICD-9-CM: 458.9  1/18/2021 - Present        JOSE (acute kidney injury) ([facility] ICD-10-CM: N17.9  ICD-9-CM: 584.9  1/18/2021 - Present        Dehydration ICD-10-CM: E86.0  ICD-9-CM: 276.51  1/1/2021 - Present        Altered mental status ICD-10-CM: R41.82  ICD-9-CM: 780.97  1/1/2021 - Present        Anemia ICD-10-CM: D64.9  ICD-9-CM: 285.9  1/1/2021 - Present        Tachycardia ICD-10-CM: R00.0  ICD-9-CM: 785.0  1/1/2021 - Present        Abnormal urinalysis ICD-10-CM: R82.90  ICD-9-CM: 791.9  1/1/2021 - Present        Sepsis ([facility] ICD-10-CM: A41.9  ICD-9-CM: 038.9, 995.91  1/1/2021 - Present        Lactic acidosis ICD-10-CM: E87.2  ICD-9-CM: 276.2  10/24/2020 - Present        SIRS (systemic inflammatory response syndrome) (HCC) ICD-10-CM: R65.10  ICD-9-CM: 995.90  9/27/2018 - Present        Sinus tachycardia ICD-10-CM: R00.0  ICD-9-CM: 427.89  9/27/2018 - Present        Hypertension ICD-10-CM: I10  ICD-9-CM: 401.9  Unknown - Present        Diabetes ([facility] ICD-10-CM: E11.9  ICD-9-CM: 250.00  Unknown - Present        Anxiety and depression ICD-10-CM: F41.9, F32.9  ICD-9-CM: 300.00, 311  Unknown - Present        Seizure disorder ([facility] ICD-10-CM: G40.909  ICD-9-CM: 345.90  Unknown - Present        Vomiting ICD-10-CM: R11.10  ICD-9-CM: 787.03  9/27/2018 - Present        Hypokalemia ICD-10-CM: E87.6  ICD-9-CM: 276.8  9/27/2018 - Present        Obese ICD-10-CM: E66.9  ICD-9-CM: 278.00  Unknown - Present        DM (diabetes mellitus) ([facility] ICD-10-CM: E11.9  ICD-9-CM: 250.00  6/7/2018 - Present        Leukocytosis ICD-10-CM: D72.829  ICD-9-CM: 288.60  6/7/2018 - Present        RESOLVED: Encephalopathy acute ICD-10-CM: G93.40  ICD-9-CM: 348.30  6/7/2018 - 9/27/2018        RESOLVED: Seizure ([facility] ICD-10-CM: R56.9  ICD-9-CM: 780.39  6/7/2018 - 9/27/2018        RESOLVED: Aspiration into airway ICD-10-CM: D08.685G  ICD-9-CM: 934.9  6/7/2018 - 9/27/2018        RESOLVED: Renal insufficiency ICD-10-CM: N28.9  ICD-9-CM: 593.9  6/7/2018 - 9/27/2018            HPI:   Yordan Morrison is a 72 y.o. female with past medical history of recent stroke, anxiety, depression, type 2 diabetes mellitus (DM), hypertension, obesity, seizure disorder presented to the ED from home with reported pain at price site. Patient is confused and not able to answer questions appropriately at this time.   As such, majority of history was obtained per my review of ED and electronic medical records. Per these reports, patient was admitted to [facility] previously with diagnosis of stroke. She was last hospitalized here at [facility] from 1/1/2021 to 1/16/2021 with diagnoses of altered mental status (AMS), sepsis secondary to health care associated pneumonia (treated with Cefepime), abnormal urinalysis with negative urine culture, dehydration. After discharge, patient has been living at home with family. She has an indwelling price catheter. Onset of pain symptoms is not documented. On arrival in the ED tonight, initial recorded vital signs were BP= 97/61, HR= 97, RR= 16, O2sat= 100% on room air. WBC  = 12,700. K = 3.0. Hemoglobin = 10.9 (compared to 11.1 on 1/16/2021). BUN= 25.  Creatinine= 1.64 (compared to 0.92 on 1/16/2021). ED provider requested admission to the hospitalist service. At time of request, I placed order for UA results awaiting collection and results. Nurse notes that patient's old price catheter was removed and a new price was inserted after arrival to the ED. (Dr Sherryle Rattler)    1/19:  Pt is unable to give any meaningful hx. She will arouse to her name. She reports she is not in any pain. She does not know day/date or location. She can occas follow commands (). Unlike last admission (2 days ago) pt is not violent or combative. Pharmacy called me to report that multiple meds she was supposed to be taking at home were not being given - pharmacy has the list but most importantly she was not getting her seizure meds. Will get Case Management to assist in placement - the family cannot take care of this pt at home and APS may need to be consulted. 1/20:  Combative, threatening and striking out at nurses, threatening harm and agitated overnight. Calmer at this moment and allows me to examine her. Refusing labs, meds and heart monitor and stripping off clothes and refusing to put them back on. With this degree of frontal lobe damage from CVA there may be little that we can do (Psy and Neuro saw last admission and report there is nothing they can add), her behavior will be unpredictable and varied. Will restart IM Haldol if nurses can get close enough to pt. Unable to get labs and cultures at this time. She will need long term placement -perhaps in a memory unit. The family will not be able to care for her at home. 1 of 4 bottles from blood culture on 1/19 show possible gram + cocci - since she is physically better, and afeb, will hold off on repeat cultures and limit labs sticks as much as possible. 1/21:  Not as combative overnight but yelling out at times and attempting to get out of bed. She is oriented to name only. Bood Cultures growing enterococcus so antibiotics switched to Zosyn and Vanc. Urine culture with little growth.      1/22:  Better overnight per nurses. No combative behavior and less agitated and did not require IM Haldol overnight. Tmax 101. Tnow 100.2. King was placed last admission for urinary retention and has been in place long enough for a voiding trial so will have it removed and see how she does. Trying to get AM labs now if she will allow it.        1/23: Tmax 101.4 yesterday. Afebrile currently. No IV access or labs allowed by patient despite IM haldol. Not able to complete her IV abx. Was too combative when they attempted to replace IV and get blood cx. Threatening to assault staff and call the police. She is calm and pleasant this AM.  Reported that she would let the nurse attempt an IV so we can treat her UTI. I spoke with Chriss Ritchie and asked her to come sit with mother. She said she babysits a grandson and can't come because she doesn't have alternate childcare. Will reach out to pt's son to see if he can come sit with mother to keep her calm. 1/24: tolerated IV and lab draw yesterday with increased seroquel and family support at bedside.   Pt notes her belly is achy. No BM in a few days. I spoke with Apple Arnol. She notes that she was misunderstood and she does want pt to have voiding trial here instead of having to see uro outpt. Da notes that Mrs Kehinde Reyes really doesn't tolerate the price. :  No complaints this AM and calmer at this moment. Oriented only to self. Voiding trial failed - incont large amount of urine last night and 650cc in bladder this AM on bladder scan - price replaced. Afebrile. WBC down to normal.  K+ low - replacing.      :  No complaints. A little more oriented this AM - knows she is in a hospital but not which one. Nurses report minor agitation overnight. Daughter insisted on urology consult although not much likely can be done. Pt refused labs this AM but nurses/phlebotomist trying again. :   No complaints of pain. Does not know her location today. Last night pt did not sleep at all per nurses. Pulled price out yesterday and trying to leave it out to see if she can void. Pulls IVs out but she will need IV antibiotics due to sepsis so will try to keep replacing IVs.  K+ 2.8 this AM - replacing. Afeb. WBC down to 7K. If she remains afeb another day will try po meds. :  Seems calmer at this moment. Will DC IV meds and fluids and switch to po Augmentin and if temp remain down for 24 hrs then DC to home tomorrow. Awaiting AM labs. Review of Systems:   Review of systems not obtained due to patient factors. Objective:   Physical Exam:   Visit Vitals  BP (!) 158/96 (BP 1 Location: Left arm, BP Patient Position: At rest)   Pulse 68   Temp 98.8 °F (37.1 °C)   Resp 18   Ht 5' 4\" (1.626 m)   Wt 73.7 kg (162 lb 6.4 oz)   SpO2 100%   BMI 27.88 kg/m²      O2 Device: Room air  Temp (24hrs), Av.7 °F (37.1 °C), Min:98.1 °F (36.7 °C), Max:99.4 °F (37.4 °C)    No intake/output data recorded. 701 - 1900  In: 2171.7 [P.O.:600;  I.V.:1571.7]  Out: 1100 [Urine:1100]  General:  Alert, cooperative and calm at this moment, no distress, appears stated age. Head:  Normocephalic, without obvious abnormality, atraumatic. Eyes:  Conjunctivae/corneas clear. EOMs intact. Throat: Lips, mucosa, and tongue moist   Neck: Supple, symmetrical, trachea midline, no adenopathy, thyroid: no enlargement/tenderness/nodules, no carotid bruit and no JVD. Lungs:   Clear to auscultation bilaterally. Chest wall:  No tenderness or deformity. Heart:  Regular rate and rhythm, S1, S2 normal, no murmur, click, rub or gallop. Abdomen:   Soft, non-tender, nondistended. Bowel sounds normal. No masses,  No organomegaly. : price in place   Extremities: Extremities normal, atraumatic, no cyanosis or edema. No calf tenderness or cords. Pulses: 2+ and symmetric all extremities. Skin: Skin color, texture, turgor normal. No rashes   Neurologic:  More alert this AM.  Alert and oriented X 2. She will follow 1 step commands at this time. Gait not tested at this time. Data Review:       Recent Days:  Recent Labs     01/27/21  0352   WBC 7.7   HGB 8.2*   HCT 25.1*        Recent Labs     01/27/21  0352      K 2.8*      CO2 29   GLU 80   BUN 2*   CREA 0.56   CA 8.5   MG 1.4*   ALB 2.3*   TBILI 0.5   ALT 8*     No results for input(s): PH, PCO2, PO2, HCO3, FIO2 in the last 72 hours. 24 Hour Results:  No results found for this or any previous visit (from the past 24 hour(s)).     Medications reviewed  Current Facility-Administered Medications   Medication Dose Route Frequency    amoxicillin-clavulanate (AUGMENTIN) 500-125 mg per tablet 1 Tab  1 Tab Oral TIDPC    potassium chloride SR (KLOR-CON 10) tablet 40 mEq  40 mEq Oral BID    polyethylene glycol (MIRALAX) packet 17 g  17 g Oral DAILY    QUEtiapine (SEROquel) tablet 25 mg  25 mg Oral QID    acetaminophen (TYLENOL) tablet 650 mg  650 mg Oral Q6H PRN    haloperidol lactate (HALDOL) injection 1 mg  1 mg IntraMUSCular Q4H PRN    sodium chloride (NS) flush 5-40 mL  5-40 mL IntraVENous Q8H    sodium chloride (NS) flush 5-40 mL  5-40 mL IntraVENous PRN    aspirin delayed-release tablet 81 mg  81 mg Oral DAILY    atorvastatin (LIPITOR) tablet 20 mg  20 mg Oral DAILY    levETIRAcetam (KEPPRA) tablet 500 mg  500 mg Oral BID    pantoprazole (PROTONIX) tablet 40 mg  40 mg Oral DAILY    sertraline (ZOLOFT) tablet 100 mg  100 mg Oral DAILY    ergocalciferol capsule 50,000 Units  50,000 Units Oral Q7D     Care Plan discussed with: Patient/Nurse  Total time spent with patient and review of records: 30 minutes.   Josh Michael MD

## 2021-01-29 VITALS
RESPIRATION RATE: 16 BRPM | WEIGHT: 168.21 LBS | DIASTOLIC BLOOD PRESSURE: 83 MMHG | HEART RATE: 75 BPM | BODY MASS INDEX: 28.72 KG/M2 | OXYGEN SATURATION: 95 % | HEIGHT: 64 IN | SYSTOLIC BLOOD PRESSURE: 143 MMHG | TEMPERATURE: 98.4 F

## 2021-01-29 LAB
ALBUMIN SERPL-MCNC: 2.5 G/DL (ref 3.5–5)
ALBUMIN/GLOB SERPL: 0.6 {RATIO} (ref 1.1–2.2)
ALP SERPL-CCNC: 57 U/L (ref 45–117)
ALT SERPL-CCNC: 10 U/L (ref 12–78)
ANION GAP SERPL CALC-SCNC: 6 MMOL/L (ref 5–15)
AST SERPL-CCNC: 20 U/L (ref 15–37)
BACTERIA SPEC CULT: NORMAL
BASOPHILS # BLD: 0 K/UL (ref 0–0.1)
BASOPHILS NFR BLD: 0 % (ref 0–1)
BILIRUB SERPL-MCNC: 0.5 MG/DL (ref 0.2–1)
BUN SERPL-MCNC: 4 MG/DL (ref 6–20)
BUN/CREAT SERPL: 7 (ref 12–20)
CALCIUM SERPL-MCNC: 8.8 MG/DL (ref 8.5–10.1)
CHLORIDE SERPL-SCNC: 108 MMOL/L (ref 97–108)
CO2 SERPL-SCNC: 29 MMOL/L (ref 21–32)
CREAT SERPL-MCNC: 0.54 MG/DL (ref 0.55–1.02)
DIFFERENTIAL METHOD BLD: ABNORMAL
EOSINOPHIL # BLD: 0.5 K/UL (ref 0–0.4)
EOSINOPHIL NFR BLD: 5 % (ref 0–7)
ERYTHROCYTE [DISTWIDTH] IN BLOOD BY AUTOMATED COUNT: 12.9 % (ref 11.5–14.5)
GLOBULIN SER CALC-MCNC: 4.4 G/DL (ref 2–4)
GLUCOSE SERPL-MCNC: 72 MG/DL (ref 65–100)
HCT VFR BLD AUTO: 27 % (ref 35–47)
HGB BLD-MCNC: 8.7 G/DL (ref 11.5–16)
IMM GRANULOCYTES # BLD AUTO: 0 K/UL (ref 0–0.04)
IMM GRANULOCYTES NFR BLD AUTO: 0 % (ref 0–0.5)
LYMPHOCYTES # BLD: 2.2 K/UL (ref 0.8–3.5)
LYMPHOCYTES NFR BLD: 24 % (ref 12–49)
MCH RBC QN AUTO: 31.8 PG (ref 26–34)
MCHC RBC AUTO-ENTMCNC: 32.2 G/DL (ref 30–36.5)
MCV RBC AUTO: 98.5 FL (ref 80–99)
MONOCYTES # BLD: 0.4 K/UL (ref 0–1)
MONOCYTES NFR BLD: 4 % (ref 5–13)
NEUTS SEG # BLD: 6 K/UL (ref 1.8–8)
NEUTS SEG NFR BLD: 67 % (ref 32–75)
NRBC # BLD: 0 K/UL (ref 0–0.01)
NRBC BLD-RTO: 0 PER 100 WBC
PLATELET # BLD AUTO: 364 K/UL (ref 150–400)
POTASSIUM SERPL-SCNC: 3.8 MMOL/L (ref 3.5–5.1)
PROT SERPL-MCNC: 6.9 G/DL (ref 6.4–8.2)
RBC # BLD AUTO: 2.74 M/UL (ref 3.8–5.2)
RBC MORPH BLD: ABNORMAL
SERVICE CMNT-IMP: NORMAL
SODIUM SERPL-SCNC: 143 MMOL/L (ref 136–145)
WBC # BLD AUTO: 9.1 K/UL (ref 3.6–11)

## 2021-01-29 PROCEDURE — 74011250637 HC RX REV CODE- 250/637: Performed by: FAMILY MEDICINE

## 2021-01-29 PROCEDURE — 85025 COMPLETE CBC W/AUTO DIFF WBC: CPT

## 2021-01-29 PROCEDURE — 94760 N-INVAS EAR/PLS OXIMETRY 1: CPT

## 2021-01-29 PROCEDURE — 36415 COLL VENOUS BLD VENIPUNCTURE: CPT

## 2021-01-29 PROCEDURE — 80053 COMPREHEN METABOLIC PANEL: CPT

## 2021-01-29 RX ORDER — LEVETIRACETAM 500 MG/1
500 TABLET ORAL 2 TIMES DAILY
Qty: 60 TAB | Refills: 0 | Status: SHIPPED | OUTPATIENT
Start: 2021-01-29 | End: 2021-06-17

## 2021-01-29 RX ORDER — QUETIAPINE FUMARATE 25 MG/1
25 TABLET, FILM COATED ORAL 4 TIMES DAILY
Qty: 100 TAB | Refills: 0 | Status: ON HOLD | OUTPATIENT
Start: 2021-01-29 | End: 2021-06-17 | Stop reason: SDUPTHER

## 2021-01-29 RX ORDER — LANOLIN ALCOHOL/MO/W.PET/CERES
400 CREAM (GRAM) TOPICAL 2 TIMES DAILY
Status: DISCONTINUED | OUTPATIENT
Start: 2021-01-29 | End: 2021-01-29 | Stop reason: HOSPADM

## 2021-01-29 RX ORDER — ERGOCALCIFEROL 1.25 MG/1
50000 CAPSULE ORAL
Qty: 3 CAP | Refills: 0 | Status: SHIPPED | OUTPATIENT
Start: 2021-02-02

## 2021-01-29 RX ORDER — LANOLIN ALCOHOL/MO/W.PET/CERES
400 CREAM (GRAM) TOPICAL DAILY
Qty: 30 TAB | Refills: 0 | Status: SHIPPED | OUTPATIENT
Start: 2021-01-29

## 2021-01-29 RX ORDER — ATORVASTATIN CALCIUM 20 MG/1
20 TABLET, FILM COATED ORAL DAILY
Qty: 30 TAB | Refills: 0 | Status: SHIPPED | OUTPATIENT
Start: 2021-01-29

## 2021-01-29 RX ORDER — LOSARTAN POTASSIUM 50 MG/1
50 TABLET ORAL DAILY
Qty: 30 TAB | Refills: 0 | Status: SHIPPED | OUTPATIENT
Start: 2021-01-29

## 2021-01-29 RX ORDER — PANTOPRAZOLE SODIUM 40 MG/1
40 TABLET, DELAYED RELEASE ORAL DAILY
Qty: 30 TAB | Refills: 0 | Status: SHIPPED | OUTPATIENT
Start: 2021-01-29

## 2021-01-29 RX ORDER — ACETAMINOPHEN 325 MG/1
650 TABLET ORAL
Qty: 30 TAB | Refills: 0 | Status: SHIPPED
Start: 2021-01-29

## 2021-01-29 RX ORDER — AMOXICILLIN AND CLAVULANATE POTASSIUM 500; 125 MG/1; MG/1
1 TABLET, FILM COATED ORAL
Qty: 21 TAB | Refills: 0 | Status: SHIPPED | OUTPATIENT
Start: 2021-01-29 | End: 2021-06-17

## 2021-01-29 RX ORDER — POTASSIUM CHLORIDE 750 MG/1
10 TABLET, EXTENDED RELEASE ORAL DAILY
Qty: 10 TAB | Refills: 0 | Status: SHIPPED | OUTPATIENT
Start: 2021-01-29 | End: 2021-06-17

## 2021-01-29 RX ADMIN — Medication 20 ML: at 14:00

## 2021-01-29 RX ADMIN — LEVETIRACETAM 500 MG: 500 TABLET ORAL at 08:26

## 2021-01-29 RX ADMIN — AMOXICILLIN AND CLAVULANATE POTASSIUM 1 TABLET: 500; 125 TABLET, FILM COATED ORAL at 08:27

## 2021-01-29 RX ADMIN — PANTOPRAZOLE SODIUM 40 MG: 40 TABLET, DELAYED RELEASE ORAL at 08:27

## 2021-01-29 RX ADMIN — SERTRALINE HYDROCHLORIDE 100 MG: 50 TABLET ORAL at 08:27

## 2021-01-29 RX ADMIN — AMOXICILLIN AND CLAVULANATE POTASSIUM 1 TABLET: 500; 125 TABLET, FILM COATED ORAL at 12:48

## 2021-01-29 RX ADMIN — ATORVASTATIN CALCIUM 20 MG: 20 TABLET, FILM COATED ORAL at 08:27

## 2021-01-29 RX ADMIN — QUETIAPINE FUMARATE 25 MG: 25 TABLET ORAL at 08:27

## 2021-01-29 RX ADMIN — ASPIRIN 81 MG: 81 TABLET, COATED ORAL at 08:26

## 2021-01-29 RX ADMIN — Medication 400 MG: at 08:27

## 2021-01-29 RX ADMIN — Medication 10 ML: at 06:03

## 2021-01-29 RX ADMIN — QUETIAPINE FUMARATE 25 MG: 25 TABLET ORAL at 12:48

## 2021-01-29 RX ADMIN — POLYETHYLENE GLYCOL 3350 17 G: 17 POWDER, FOR SOLUTION ORAL at 08:26

## 2021-01-29 NOTE — PROGRESS NOTES
Bedside and Verbal shift change report given to Suleman Sesay RN (oncoming nurse) by Tamra Rosa RN (offgoing nurse). Report included the following information SBAR, Kardex, MAR and Accordion.

## 2021-01-29 NOTE — DISCHARGE INSTRUCTIONS
Patient Discharge Instructions    Shawn Barreramichelle / [id number] : 1950    Admitted 2021 Discharged: 2021 6:58 AM     ACUTE DIAGNOSES:  JOSE (acute kidney injury) ([facility] [N17.9]  Altered mental status [R41.82]  Acute hypokalemia [E87.6]  Hypotension [I95.9]  AMS (altered mental status) [R41.82]  Suprapubic pain [R10.2]    CHRONIC MEDICAL DIAGNOSES:  Problem List as of 2021 Date Reviewed: 2018          Codes Class Noted - Resolved    Suprapubic pain ICD-10-CM: R10.2  ICD-9-CM: 789.09  2021 - Present        AMS (altered mental status) ICD-10-CM: R41.82  ICD-9-CM: 780.97  2021 - Present        Acute hypokalemia ICD-10-CM: E87.6  ICD-9-CM: 276.8  2021 - Present        Hypotension ICD-10-CM: I95.9  ICD-9-CM: 458.9  2021 - Present        JOSE (acute kidney injury) ([facility] ICD-10-CM: N17.9  ICD-9-CM: 584.9  2021 - Present        Dehydration ICD-10-CM: E86.0  ICD-9-CM: 276.51  2021 - Present        Altered mental status ICD-10-CM: R41.82  ICD-9-CM: 780.97  2021 - Present        Anemia ICD-10-CM: D64.9  ICD-9-CM: 285.9  2021 - Present        Tachycardia ICD-10-CM: R00.0  ICD-9-CM: 785.0  2021 - Present        Abnormal urinalysis ICD-10-CM: R82.90  ICD-9-CM: 791.9  2021 - Present        Sepsis ([facility] ICD-10-CM: A41.9  ICD-9-CM: 038.9, 995.91  2021 - Present        Lactic acidosis ICD-10-CM: E87.2  ICD-9-CM: 276.2  10/24/2020 - Present        SIRS (systemic inflammatory response syndrome) ([facility] ICD-10-CM: R65.10  ICD-9-CM: 995.90  2018 - Present        Sinus tachycardia ICD-10-CM: R00.0  ICD-9-CM: 427.89  2018 - Present        Hypertension ICD-10-CM: I10  ICD-9-CM: 401.9  Unknown - Present        Diabetes ([facility] ICD-10-CM: E11.9  ICD-9-CM: 250.00  Unknown - Present        Anxiety and depression ICD-10-CM: F41.9, F32.9  ICD-9-CM: 300.00, 311  Unknown - Present        Seizure disorder ([facility] ICD-10-CM: G40.909  ICD-9-CM: 345.90  Unknown - Present Vomiting ICD-10-CM: R11.10  ICD-9-CM: 787.03  9/27/2018 - Present        Hypokalemia ICD-10-CM: E87.6  ICD-9-CM: 276.8  9/27/2018 - Present        Obese ICD-10-CM: E66.9  ICD-9-CM: 278.00  Unknown - Present        DM (diabetes mellitus) ([facility] ICD-10-CM: E11.9  ICD-9-CM: 250.00  6/7/2018 - Present        Leukocytosis ICD-10-CM: N27.855  ICD-9-CM: 288.60  6/7/2018 - Present        RESOLVED: Encephalopathy acute ICD-10-CM: G93.40  ICD-9-CM: 348.30  6/7/2018 - 9/27/2018        RESOLVED: Seizure ([facility] ICD-10-CM: R56.9  ICD-9-CM: 780.39  6/7/2018 - 9/27/2018        RESOLVED: Aspiration into airway ICD-10-CM: C98.886G  ICD-9-CM: 934.9  6/7/2018 - 9/27/2018        RESOLVED: Renal insufficiency ICD-10-CM: N28.9  ICD-9-CM: 593.9  6/7/2018 - 9/27/2018              DISCHARGE MEDICATIONS:         · It is important that you take the medication exactly as they are prescribed. · Keep your medication in the bottles provided by the pharmacist and keep a list of the medication names, dosages, and times to be taken in your wallet. · Do not take other medications without consulting your doctor. DIET:  Diabetic Diet  ACTIVITY: Activity as tolerated    ADDITIONAL INFORMATION: If you experience any of the following symptoms then please call your primary care physician or return to the emergency room if you cannot get hold of your doctor: Fever, chills, nausea, vomiting, diarrhea, change in mentation, falling, bleeding, shortness of breath. FOLLOW UP CARE:  Dr. Brendon Ely MD  you are to call and set up an appointment to see them with in 1 week. Follow-up with specialists at directed by them      Information obtained by :  I understand that if any problems occur once I am at home I am to contact my physician. I understand and acknowledge receipt of the instructions indicated above. Physician's or R.N.'s Signature                                                                  Date/Time                                                                                                                                              Patient or Representative Signature                                                          Date/Time

## 2021-01-29 NOTE — PROGRESS NOTES
Daily Progress Note and Discharge Note: 1/29/2021  Cordella Poag  PCP  Amy Gunter MD    Assessment/Plan:   UTI with sepsis  - urine culture neg   - blood cx with enteroccocus and various gram positives  - repeat blood cx NGSF  - switched to po Augmentin 1/28 - cont for 7 more days    Urinary Retention with chronic indwelling price  - pt pulled price out, replaced due to continued urinary retention  - urine culture neg so far  - urology has seen and recommends cont price   - follow up with urology as they direct    Altered mental status with history of recent frontal lobe stroke  - at baseline combativeness  - seroquel- increased to QID  - haldol PRN inpt    History of stroke  - plan as above    JOSE (acute kidney injury) - resolved     Acute hypokalemia/hypomagnesemia - pt often refusing labs  - monitor as best as possible oupt  - outpt follow up w/i 1 wk   - cont Mag and K+ supplement outpt     Hypotension - resolved    Anemia- stable  - chronic. Repeat H/H outpt     Leukocytosis  - resolved, monitor     Generalized weakness/ debility  -  fall precautions. Consulted PT.     Constipation  -OTC meds        Problem List:  Problem List as of 1/29/2021 Date Reviewed: 9/27/2018          Codes Class Noted - Resolved    Suprapubic pain ICD-10-CM: R10.2  ICD-9-CM: 789.09  1/21/2021 - Present        AMS (altered mental status) ICD-10-CM: R41.82  ICD-9-CM: 780.97  1/21/2021 - Present        Acute hypokalemia ICD-10-CM: E87.6  ICD-9-CM: 276.8  1/18/2021 - Present        Hypotension ICD-10-CM: I95.9  ICD-9-CM: 458.9  1/18/2021 - Present        JOSE (acute kidney injury) ([facility] [address] ICD-10-CM: N17.9  ICD-9-CM: 584.9  1/18/2021 - Present        Dehydration ICD-10-CM: E86.0  ICD-9-CM: 276.51  1/1/2021 - Present        Altered mental status ICD-10-CM: R41.82  ICD-9-CM: 780.97  1/1/2021 - Present        Anemia ICD-10-CM: D64.9  ICD-9-CM: 285.9  1/1/2021 - Present        Tachycardia ICD-10-CM: R00.0  ICD-9-CM: 785.0  1/1/2021 - Present Abnormal urinalysis ICD-10-CM: R82.90  ICD-9-CM: 791.9  1/1/2021 - Present        Sepsis ([place] [id number].) ICD-10-CM: A41.9  ICD-9-CM: 038.9, 995.91  1/1/2021 - Present        Lactic acidosis ICD-10-CM: E87.2  ICD-9-CM: 276.2  10/24/2020 - Present        SIRS (systemic inflammatory response syndrome) (HCC) ICD-10-CM: R65.10  ICD-9-CM: 995.90  9/27/2018 - Present        Sinus tachycardia ICD-10-CM: R00.0  ICD-9-CM: 427.89  9/27/2018 - Present        Hypertension ICD-10-CM: I10  ICD-9-CM: 401.9  Unknown - Present        Diabetes ([place] [address] ICD-10-CM: E11.9  ICD-9-CM: 250.00  Unknown - Present        Anxiety and depression ICD-10-CM: F41.9, F32.9  ICD-9-CM: 300.00, 311  Unknown - Present        Seizure disorder ([place] [address] ICD-10-CM: G40.909  ICD-9-CM: 345.90  Unknown - Present        Vomiting ICD-10-CM: R11.10  ICD-9-CM: 787.03  9/27/2018 - Present        Hypokalemia ICD-10-CM: E87.6  ICD-9-CM: 276.8  9/27/2018 - Present        Obese ICD-10-CM: E66.9  ICD-9-CM: 278.00  Unknown - Present        DM (diabetes mellitus) ([place] [address] ICD-10-CM: E11.9  ICD-9-CM: 250.00  6/7/2018 - Present        Leukocytosis ICD-10-CM: D72.829  ICD-9-CM: 288.60  6/7/2018 - Present        RESOLVED: Encephalopathy acute ICD-10-CM: G93.40  ICD-9-CM: 348.30  6/7/2018 - 9/27/2018        RESOLVED: Seizure ([place] [address] ICD-10-CM: R56.9  ICD-9-CM: 780.39  6/7/2018 - 9/27/2018        RESOLVED: Aspiration into airway ICD-10-CM: Q28.914M  ICD-9-CM: 934.9  6/7/2018 - 9/27/2018        RESOLVED: Renal insufficiency ICD-10-CM: N28.9  ICD-9-CM: 593.9  6/7/2018 - 9/27/2018            HPI:   Santana Hu is a 72 y.o. female with past medical history of recent stroke, anxiety, depression, type 2 diabetes mellitus (DM), hypertension, obesity, seizure disorder presented to the ED from home with reported pain at price site. Patient is confused and not able to answer questions appropriately at this time.   As such, majority of history was obtained per my review of ED and electronic medical records. Per these reports, patient was admitted to [facility] previously with diagnosis of stroke. She was last hospitalized here at [facility] from 1/1/2021 to 1/16/2021 with diagnoses of altered mental status (AMS), sepsis secondary to health care associated pneumonia (treated with Cefepime), abnormal urinalysis with negative urine culture, dehydration. After discharge, patient has been living at home with family. She has an indwelling price catheter. Onset of pain symptoms is not documented. On arrival in the ED tonight, initial recorded vital signs were BP= 97/61, HR= 97, RR= 16, O2sat= 100% on room air. WBC  = 12,700. K = 3.0. Hemoglobin = 10.9 (compared to 11.1 on 1/16/2021). BUN= 25.  Creatinine= 1.64 (compared to 0.92 on 1/16/2021). ED provider requested admission to the hospitalist service. At time of request, I placed order for UA results awaiting collection and results. Nurse notes that patient's old price catheter was removed and a new price was inserted after arrival to the ED. (Dr Lisbeth Rivas)    1/19:  Pt is unable to give any meaningful hx. She will arouse to her name. She reports she is not in any pain. She does not know day/date or location. She can occas follow commands (). Unlike last admission (2 days ago) pt is not violent or combative. Pharmacy called me to report that multiple meds she was supposed to be taking at home were not being given - pharmacy has the list but most importantly she was not getting her seizure meds. Will get Case Management to assist in placement - the family cannot take care of this pt at home and APS may need to be consulted. 1/20:  Combative, threatening and striking out at nurses, threatening harm and agitated overnight. Calmer at this moment and allows me to examine her. Refusing labs, meds and heart monitor and stripping off clothes and refusing to put them back on.   With this degree of frontal lobe damage from CVA there may be little that we can do (Psy and Neuro saw last admission and report there is nothing they can add), her behavior will be unpredictable and varied. Will restart IM Haldol if nurses can get close enough to pt. Unable to get labs and cultures at this time. She will need long term placement -perhaps in a memory unit. The family will not be able to care for her at home. 1 of 4 bottles from blood culture on 1/19 show possible gram + cocci - since she is physically better, and afeb, will hold off on repeat cultures and limit labs sticks as much as possible. 1/21:  Not as combative overnight but yelling out at times and attempting to get out of bed. She is oriented to name only. Bood Cultures growing enterococcus so antibiotics switched to Zosyn and Vanc. Urine culture with little growth.      1/22:  Better overnight per nurses. No combative behavior and less agitated and did not require IM Haldol overnight. Tmax 101. Tnow 100.2. King was placed last admission for urinary retention and has been in place long enough for a voiding trial so will have it removed and see how she does. Trying to get AM labs now if she will allow it.        1/23: Tmax 101.4 yesterday. Afebrile currently. No IV access or labs allowed by patient despite IM haldol. Not able to complete her IV abx. Was too combative when they attempted to replace IV and get blood cx. Threatening to assault staff and call the police. She is calm and pleasant this AM.  Reported that she would let the nurse attempt an IV so we can treat her UTI. I spoke with Mulugeta Watkins and asked her to come sit with mother. She said she babysits a grandson and can't come because she doesn't have alternate childcare. Will reach out to pt's son to see if he can come sit with mother to keep her calm. 1/24: tolerated IV and lab draw yesterday with increased seroquel and family support at bedside. Pt notes her belly is achy.   No BM in a few days.  I spoke with Jem Malhotra. She notes that she was misunderstood and she does want pt to have voiding trial here instead of having to see uro outpt. Da notes that Mrs Carmen Dobson really doesn't tolerate the price. 1/25:  No complaints this AM and calmer at this moment. Oriented only to self. Voiding trial failed - incont large amount of urine last night and 650cc in bladder this AM on bladder scan - price replaced. Afebrile. WBC down to normal.  K+ low - replacing.      1/26:  No complaints. A little more oriented this AM - knows she is in a hospital but not which one. Nurses report minor agitation overnight. Daughter insisted on urology consult although not much likely can be done. Pt refused labs this AM but nurses/phlebotomist trying again. 1/27:   No complaints of pain. Does not know her location today. Last night pt did not sleep at all per nurses. Pulled price out yesterday and trying to leave it out to see if she can void. Pulls IVs out but she will need IV antibiotics due to sepsis so will try to keep replacing IVs.  K+ 2.8 this AM - replacing. Afeb. WBC down to 7K. If she remains afeb another day will try po meds. 1/28:  Seems calmer at this moment. Will DC IV meds and fluids and switch to po Augmentin and if temp remain down for 24 hrs then DC to home tomorrow. Awaiting AM labs. 1/29:  Calmer and will answer questions this AM.  She reports she has no pain and no complaints. Tmax 98.5. K+ improving. Will cont Augmentin x 7 days outpt. Hold Metformin for now and PCP to decide on tx in light of renal insuffiencey - for now just diabetic diet. Resume Cozaar as BP has been trending up but at lower dose and adjust as per PCP. Follow up in office with Dr Michael Mesa (or other in office) early next wk. Follow up with Urology as they direct. 1220:  K+ ok on today's labs. Cont Mag oxide and K+ outpt and follow up with PCP early next wk for recheck of labs.       Review of Systems:   Review of systems not obtained due to patient factors.    Objective:   Physical Exam:   Visit Vitals  BP (!) 143/83 (BP 1 Location: Left upper arm, BP Patient Position: Lying right side)   Pulse 75   Temp 98.4 °F (36.9 °C)   Resp 16   Ht 5' 4\" (1.626 m)   Wt 76.3 kg (168 lb 3.4 oz)   SpO2 95%   BMI 28.87 kg/m²      O2 Device: Room air  Temp (24hrs), Av.2 °F (36.8 °C), Min:98 °F (36.7 °C), Max:98.4 °F (36.9 °C)    701 - 1900  In: 240 [P.O.:240]  Out: -    1901 -  0700  In: 480 [P.O.:480]  Out: 1225 [Urine:1225]  General:  Alert, cooperative and calm at this moment, no distress, appears stated age.   Head:  Normocephalic, without obvious abnormality, atraumatic.   Eyes:  Conjunctivae/corneas clear. EOMs intact.   Throat: Lips, mucosa, and tongue moist   Neck: Supple, symmetrical, trachea midline, no adenopathy, thyroid: no enlargement/tenderness/nodules, no carotid bruit and no JVD.   Lungs:   Clear to auscultation bilaterally.   Chest wall:  No tenderness or deformity.   Heart:  Regular rate and rhythm, S1, S2 normal, no murmur, click, rub or gallop.   Abdomen:   Soft, non-tender, nondistended. Bowel sounds normal. No masses,  No organomegaly.  : price in place   Extremities: Extremities normal, atraumatic, no cyanosis or edema. No calf tenderness or cords.     Pulses: 2+ and symmetric all extremities.   Skin: Skin color, texture, turgor normal. No rashes   Neurologic:  More alert this AM.  Alert and oriented X 1. She will follow 1 step commands at this time.  Gait not tested at this time.       Data Review:       Recent Days:  Recent Labs     21  0626 21  0737 21  0352   WBC 9.1 7.4 7.7   HGB 8.7* 9.0* 8.2*   HCT 27.0* 26.7* 25.1*    305 270     Recent Labs     21  0626 21  0737 21  0352    143 142   K 3.8 3.3* 2.8*    107 107   CO2 29 30 29   GLU 72 92 80   BUN 4* 2* 2*   CREA 0.54* 0.60 0.56   CA 8.8 8.4* 8.5   MG  --  1.5* 1.4*  ALB 2.5* 2.4* 2.3*   TBILI 0.5 0.5 0.5   ALT 10* 9* 8*     No results for input(s): PH, PCO2, PO2, HCO3, FIO2 in the last 72 hours. 24 Hour Results:  Recent Results (from the past 24 hour(s))   CBC WITH AUTOMATED DIFF    Collection Time: 01/29/21  6:26 AM   Result Value Ref Range    WBC 9.1 3.6 - 11.0 K/uL    RBC 2.74 (L) 3.80 - 5.20 M/uL    HGB 8.7 (L) 11.5 - 16.0 g/dL    HCT 27.0 (L) 35.0 - 47.0 %    MCV 98.5 80.0 - 99.0 FL    MCH 31.8 26.0 - 34.0 PG    MCHC 32.2 30.0 - 36.5 g/dL    RDW 12.9 11.5 - 14.5 %    PLATELET 906 227 - 356 K/uL    NRBC 0.0 0  WBC    ABSOLUTE NRBC 0.00 0.00 - 0.01 K/uL    NEUTROPHILS 67 32 - 75 %    LYMPHOCYTES 24 12 - 49 %    MONOCYTES 4 (L) 5 - 13 %    EOSINOPHILS 5 0 - 7 %    BASOPHILS 0 0 - 1 %    IMMATURE GRANULOCYTES 0 0.0 - 0.5 %    ABS. NEUTROPHILS 6.0 1.8 - 8.0 K/UL    ABS. LYMPHOCYTES 2.2 0.8 - 3.5 K/UL    ABS. MONOCYTES 0.4 0.0 - 1.0 K/UL    ABS. EOSINOPHILS 0.5 (H) 0.0 - 0.4 K/UL    ABS. BASOPHILS 0.0 0.0 - 0.1 K/UL    ABS. IMM. GRANS. 0.0 0.00 - 0.04 K/UL    DF SMEAR SCANNED      RBC COMMENTS NORMOCYTIC, NORMOCHROMIC     METABOLIC PANEL, COMPREHENSIVE    Collection Time: 01/29/21  6:26 AM   Result Value Ref Range    Sodium 143 136 - 145 mmol/L    Potassium 3.8 3.5 - 5.1 mmol/L    Chloride 108 97 - 108 mmol/L    CO2 29 21 - 32 mmol/L    Anion gap 6 5 - 15 mmol/L    Glucose 72 65 - 100 mg/dL    BUN 4 (L) 6 - 20 MG/DL    Creatinine 0.54 (L) 0.55 - 1.02 MG/DL    BUN/Creatinine ratio 7 (L) 12 - 20      GFR est AA >60 >60 ml/min/1.73m2    GFR est non-AA >60 >60 ml/min/1.73m2    Calcium 8.8 8.5 - 10.1 MG/DL    Bilirubin, total 0.5 0.2 - 1.0 MG/DL    ALT (SGPT) 10 (L) 12 - 78 U/L    AST (SGOT) 20 15 - 37 U/L    Alk.  phosphatase 57 45 - 117 U/L    Protein, total 6.9 6.4 - 8.2 g/dL    Albumin 2.5 (L) 3.5 - 5.0 g/dL    Globulin 4.4 (H) 2.0 - 4.0 g/dL    A-G Ratio 0.6 (L) 1.1 - 2.2         Medications reviewed  Current Facility-Administered Medications   Medication Dose Route Frequency    magnesium oxide (MAG-OX) tablet 400 mg  400 mg Oral BID    amoxicillin-clavulanate (AUGMENTIN) 500-125 mg per tablet 1 Tab  1 Tab Oral TIDPC    polyethylene glycol (MIRALAX) packet 17 g  17 g Oral DAILY    QUEtiapine (SEROquel) tablet 25 mg  25 mg Oral QID    acetaminophen (TYLENOL) tablet 650 mg  650 mg Oral Q6H PRN    haloperidol lactate (HALDOL) injection 1 mg  1 mg IntraMUSCular Q4H PRN    sodium chloride (NS) flush 5-40 mL  5-40 mL IntraVENous Q8H    sodium chloride (NS) flush 5-40 mL  5-40 mL IntraVENous PRN    aspirin delayed-release tablet 81 mg  81 mg Oral DAILY    atorvastatin (LIPITOR) tablet 20 mg  20 mg Oral DAILY    levETIRAcetam (KEPPRA) tablet 500 mg  500 mg Oral BID    pantoprazole (PROTONIX) tablet 40 mg  40 mg Oral DAILY    sertraline (ZOLOFT) tablet 100 mg  100 mg Oral DAILY    ergocalciferol capsule 50,000 Units  50,000 Units Oral Q7D     Care Plan discussed with: Patient/Nurse/urology  Total time spent with patient and review of records: 30 minutes.   Fadumo Pereira MD

## 2021-01-29 NOTE — PROGRESS NOTES
Bedside and Verbal shift change report given to Kirstie Lemon  (oncoming nurse) by Sara Baltazar  (offgoing nurse). Report included the following information SBAR and Kardex.

## 2021-01-29 NOTE — DISCHARGE SUMMARY
Physician Discharge Summary     Patient ID:    Brianne Garvey  [id number]  51 y.o.  1950  Selvin Motta MD    Admit date: 1/18/2021  Discharge date and time: 1/29/2021  Admission Diagnoses: JOSE (acute kidney injury) ([facility] [address] [N17.9]; Altered mental status [R41.82]; Acute hypokalemia [E87.6]; Hypotension [I95.9]; AMS (altered mental status) [R41.82]; Suprapubic pain [R10.2]  Discharge Medications:   Current Discharge Medication List      START taking these medications    Details   acetaminophen (TYLENOL) 325 mg tablet Take 2 Tabs by mouth every six (6) hours as needed for Fever. Qty: 30 Tab, Refills: 0      amoxicillin-clavulanate (AUGMENTIN) 500-125 mg per tablet Take 1 Tab by mouth three (3) times daily (after meals). Qty: 21 Tab, Refills: 0      ergocalciferol (ERGOCALCIFEROL) 1,250 mcg (50,000 unit) capsule Take 1 Cap by mouth every seven (7) days. Qty: 3 Cap, Refills: 0      magnesium oxide (MAG-OX) 400 mg tablet Take 1 Tab by mouth daily. Qty: 30 Tab, Refills: 0      potassium chloride (KLOR-CON) 10 mEq tablet Take 1 Tab by mouth daily. Qty: 10 Tab, Refills: 0         CONTINUE these medications which have CHANGED    Details   levETIRAcetam (KEPPRA) 500 mg tablet Take 1 Tab by mouth two (2) times a day. Qty: 60 Tab, Refills: 0      pantoprazole (PROTONIX) 40 mg tablet Take 1 Tab by mouth daily. Qty: 30 Tab, Refills: 0      QUEtiapine (SEROquel) 25 mg tablet Take 1 Tab by mouth four (4) times daily. Qty: 100 Tab, Refills: 0      atorvastatin (LIPITOR) 20 mg tablet Take 1 Tab by mouth daily. Qty: 30 Tab, Refills: 0      losartan (COZAAR) 50 mg tablet Take 1 Tab by mouth daily. Qty: 30 Tab, Refills: 0         CONTINUE these medications which have NOT CHANGED    Details   aspirin delayed-release 81 mg tablet Take 1 Tab by mouth daily.   Qty: 30 Tab, Refills: 0         STOP taking these medications       metFORMIN (GLUCOPHAGE) 500 mg tablet Comments:   Reason for Stopping:         tamsulosin (FLOMAX) 0.4 mg capsule Comments:   Reason for Stopping:         famotidine (PEPCID) 20 mg tablet Comments:   Reason for Stopping:         sertraline (ZOLOFT) 100 mg tablet Comments:   Reason for Stopping: Follow up Care:    1. Norma Stratton MD with in 1 weeks  2. specialists as directed. Diet:  Diabetic Diet  Disposition:  Home. Advanced Directive:  Discharge Exam:  [See today's progress note.]  CONSULTATIONS: Neurology and Psychiatry    Significant Diagnostic Studies:   Recent Labs     01/28/21  0737 01/27/21  0352   WBC 7.4 7.7   HGB 9.0* 8.2*   HCT 26.7* 25.1*    270     Recent Labs     01/28/21  0737 01/27/21  0352    142   K 3.3* 2.8*    107   CO2 30 29   BUN 2* 2*   CREA 0.60 0.56   GLU 92 80   CA 8.4* 8.5   MG 1.5* 1.4*     Recent Labs     01/28/21  0737 01/27/21  0352   ALT 9* 8*   AP 53 52   TBILI 0.5 0.5   TP 6.5 6.4   ALB 2.4* 2.3*   GLOB 4.1* 4.1*     Lab Results   Component Value Date/Time    Glucose (POC) 112 (H) 01/19/2021 12:24 PM    Glucose (POC) 147 (H) 10/26/2020 11:53 AM    Glucose (POC) 109 (H) 10/26/2020 07:40 AM    Glucose (POC) 126 (H) 10/25/2020 08:48 PM    Glucose (POC) 129 (H) 10/25/2020 04:49 PM     Lab Results   Component Value Date/Time    TSH 3.21 01/18/2021 09:40 PM       HOSPITAL COURSE & TREATMENT RENDERED:   1.  See today's progress note:  Daily Progress Note and Discharge Note: 1/29/2021  Alireza Campos MD         Assessment/Plan:   UTI with sepsis  - urine culture neg   - blood cx with enteroccocus and various gram positives  - repeat blood cx NGSF  - switched to po Augmentin 1/28 - cont for 7 more days     Urinary Retention with chronic indwelling price  - pt pulled price out, replaced due to continued urinary retention  - urine culture neg so far  - urology has seen and recommends cont price   - follow up with urology as they direct     Altered mental status with history of recent frontal lobe stroke  - at baseline combativeness  - seroquel- increased to QID  - haldol PRN inpt     History of stroke  - plan as above     JOSE (acute kidney injury) - resolved     Acute hypokalemia/hypomagnesemia - pt often refusing labs  - monitor as best as possible oupt  - outpt follow up w/i 1 wk   - cont Mag and K+ supplement outpt      Hypotension - resolved     Anemia- stable  - chronic.  Repeat H/H outpt     Leukocytosis  - resolved, monitor     Generalized weakness/ debility  -  fall precautions. Consulted PT.     Constipation  -OTC meds         Problem List:             Problem List as of 1/29/2021 Date Reviewed: 9/27/2018           Codes Class Noted - Resolved     Suprapubic pain ICD-10-CM: R10.2  ICD-9-CM: 789.09   1/21/2021 - Present           AMS (altered mental status) ICD-10-CM: R41.82  ICD-9-CM: 780.97   1/21/2021 - Present           Acute hypokalemia ICD-10-CM: E87.6  ICD-9-CM: 276.8   1/18/2021 - Present           Hypotension ICD-10-CM: I95.9  ICD-9-CM: 907. 9   1/18/2021 - Present           JOSE (acute kidney injury) ([facility] [address] ICD-10-CM: N17.9  ICD-9-CM: 524. 9   1/18/2021 - Present           Dehydration ICD-10-CM: E86.0  ICD-9-CM: 276.51   1/1/2021 - Present           Altered mental status ICD-10-CM: R41.82  ICD-9-CM: 780.97   1/1/2021 - Present           Anemia ICD-10-CM: D64.9  ICD-9-CM: 757. 9   1/1/2021 - Present           Tachycardia ICD-10-CM: R00.0  ICD-9-CM: 214. 0   1/1/2021 - Present           Abnormal urinalysis ICD-10-CM: R82.90  ICD-9-CM: 791.9   1/1/2021 - Present           Sepsis (HCC) ICD-10-CM: A41.9  ICD-9-CM: 038.9, 995.91   1/1/2021 - Present           Lactic acidosis ICD-10-CM: E87.2  ICD-9-CM: 276.2   10/24/2020 - Present           SIRS (systemic inflammatory response syndrome) (HCC) ICD-10-CM: R65.10  ICD-9-CM: 995.90   9/27/2018 - Present           Sinus tachycardia ICD-10-CM: R00.0  ICD-9-CM: 427.89   9/27/2018 - Present           Hypertension ICD-10-CM: I10  ICD-9-CM: 401.9   Unknown - Present           Diabetes ([facility] ICD-10-CM: E11.9  ICD-9-CM: 250.00   Unknown - Present           Anxiety and depression ICD-10-CM: F41.9, F32.9  ICD-9-CM: 300.00, 311   Unknown - Present           Seizure disorder ([facility] ICD-10-CM: G40.909  ICD-9-CM: 345.90   Unknown - Present           Vomiting ICD-10-CM: R11.10  ICD-9-CM: 787.03   9/27/2018 - Present           Hypokalemia ICD-10-CM: E87.6  ICD-9-CM: 276.8   9/27/2018 - Present           Obese ICD-10-CM: E66.9  ICD-9-CM: 278.00   Unknown - Present           DM (diabetes mellitus) ([facility] ICD-10-CM: E11.9  ICD-9-CM: 250.00   6/7/2018 - Present           Leukocytosis ICD-10-CM: X70.623  ICD-9-CM: 288.60   6/7/2018 - Present           RESOLVED: Encephalopathy acute ICD-10-CM: G93.40  ICD-9-CM: 348.30   6/7/2018 - 9/27/2018           RESOLVED: Seizure ([facility] ICD-10-CM: R56.9  ICD-9-CM: 780.39   6/7/2018 - 9/27/2018           RESOLVED: Aspiration into airway ICD-10-CM: T17.908A  ICD-9-CM: 505. 9   6/7/2018 - 9/27/2018           RESOLVED: Renal insufficiency ICD-10-CM: N28.9  ICD-9-CM: 593.9   6/7/2018 - 9/27/2018                HPI:   Joby Snider a 72 y.o. female with past medical history of recent stroke, anxiety, depression, type 2 diabetes mellitus (DM), hypertension, obesity, seizure disorder presented to the ED from home with reported pain at price site.  Patient is confused and not able to answer questions appropriately at this time.  As such, majority of history was obtained per my review of ED and electronic medical records.  Per these reports, patient was admitted to [facility] previously with diagnosis of stroke. Antonio Slaughter was last hospitalized here at [facility] from 1/1/2021 to 1/16/2021 with diagnoses of altered mental status (AMS), sepsis secondary to health care associated pneumonia (treated with Cefepime), abnormal urinalysis with negative urine culture, dehydration.  After discharge, patient has been living at home with family. Antonio Slaughter has an indwelling price catheter.  Onset of pain symptoms is not documented.  On arrival in the ED tonight, initial recorded vital signs were BP= 97/61, HR= 97, RR= 16, O2sat= 100% on room air.  WBC  = 12,700. K = 3.0.  Hemoglobin = 10.9 (compared to 11.1 on 1/16/2021).  BUN= 25.  Creatinine= 1.64 (compared to 0.92 on 1/16/2021).  ED provider requested admission to the hospitalist service.  At time of request, I placed order for UA results awaiting collection and results.  Nurse notes that patient's old price catheter was removed and a new price was inserted after arrival to the ED. (Dr Fred Ross)     1/19:  Pt is unable to give any meaningful hx. She will arouse to her name. She reports she is not in any pain. She does not know day/date or location. She can occas follow commands (). Unlike last admission (2 days ago) pt is not violent or combative. Pharmacy called me to report that multiple meds she was supposed to be taking at home were not being given - pharmacy has the list but most importantly she was not getting her seizure meds. Will get Case Management to assist in placement - the family cannot take care of this pt at home and APS may need to be consulted.        1/20:  Combative, threatening and striking out at nurses, threatening harm and agitated overnight. Calmer at this moment and allows me to examine her. Refusing labs, meds and heart monitor and stripping off clothes and refusing to put them back on. With this degree of frontal lobe damage from CVA there may be little that we can do (Psy and Neuro saw last admission and report there is nothing they can add), her behavior will be unpredictable and varied. Will restart IM Haldol if nurses can get close enough to pt. Unable to get labs and cultures at this time. She will need long term placement -perhaps in a memory unit. The family will not be able to care for her at home.  1 of 4 bottles from blood culture on 1/19 show possible gram + cocci - since she is physically better, and afeb, will hold off on repeat cultures and limit labs sticks as much as possible.       1/21:  Not as combative overnight but yelling out at times and attempting to get out of bed. She is oriented to name only. Bood Cultures growing enterococcus so antibiotics switched to Zosyn and Vanc. Urine culture with little growth.       1/22:  Better overnight per nurses. No combative behavior and less agitated and did not require IM Haldol overnight. Tmax 101. Tnow 100.2. Price was placed last admission for urinary retention and has been in place long enough for a voiding trial so will have it removed and see how she does. Trying to get AM labs now if she will allow it.         1/23: Tmax 101.4 yesterday. Afebrile currently. No IV access or labs allowed by patient despite IM haldol. Not able to complete her IV abx. Was too combative when they attempted to replace IV and get blood cx. Threatening to assault staff and call the police. She is calm and pleasant this AM.  Reported that she would let the nurse attempt an IV so we can treat her UTI. I spoke with Fariba Yancey and asked her to come sit with mother. She said she babysits a grandson and can't come because she doesn't have alternate childcare. Will reach out to pt's son to see if he can come sit with mother to keep her calm.     1/24: tolerated IV and lab draw yesterday with increased seroquel and family support at bedside. Pt notes her belly is achy. No BM in a few days. I spoke with Beckie Elaine. She notes that she was misunderstood and she does want pt to have voiding trial here instead of having to see uro outpt. Rajan notes that Mrs Jerry Davidson really doesn't tolerate the price.       1/25:  No complaints this AM and calmer at this moment. Oriented only to self. Voiding trial failed - incont large amount of urine last night and 650cc in bladder this AM on bladder scan - price replaced. Afebrile.  WBC down to normal.  K+ low - replacing.       :  No complaints. A little more oriented this AM - knows she is in a hospital but not which one. Nurses report minor agitation overnight. Daughter insisted on urology consult although not much likely can be done. Pt refused labs this AM but nurses/phlebotomist trying again.     :   No complaints of pain. Does not know her location today. Last night pt did not sleep at all per nurses. Pulled price out yesterday and trying to leave it out to see if she can void. Pulls IVs out but she will need IV antibiotics due to sepsis so will try to keep replacing IVs.  K+ 2.8 this AM - replacing. Afeb. WBC down to 7K. If she remains afeb another day will try po meds.     :  Seems calmer at this moment. Will DC IV meds and fluids and switch to po Augmentin and if temp remain down for 24 hrs then DC to home tomorrow. Awaiting AM labs.    :  Mardella Reasons and will answer questions this AM.  She reports she has no pain and no complaints. Tmax 98.5. K+ improving. Will cont Augmentin x 7 days outpt. Hold Metformin for now and PCP to decide on tx in light of renal insuffiencey - for now just diabetic diet. Resume Cozaar as BP has been trending up but at lower dose and adjust as per PCP. Follow up in office with Dr Poly Cabello (or other in office) early next wk. Follow up with Urology as they direct. 1220:  K+ ok on today's labs.   Cont Mag oxide and K+ outpt and follow up with PCP early next wk for recheck of labs.       Review of Systems:   Review of systems not obtained due to patient factors.     Objective:   Physical Exam:   Visit Vitals  BP (!) 143/83 (BP 1 Location: Left upper arm, BP Patient Position: Lying right side)   Pulse 75   Temp 98.4 °F (36.9 °C)   Resp 16   Ht 5' 4\" (1.626 m)   Wt 76.3 kg (168 lb 3.4 oz)   SpO2 95%   BMI 28.87 kg/m²      O2 Device: Room air  Temp (24hrs), Av.2 °F (36.8 °C), Min:98 °F (36.7 °C), Max:98.4 °F (36.9 °C)    701 -  190  In: 240 [P.O.:240]  Out: -    01/27 1901 - 01/29 0700  In: 480 [P.O.:480]  Out: 1225 [Urine:1225]  General:  Alert, cooperative and calm at this moment, no distress, appears stated age. Head:  Normocephalic, without obvious abnormality, atraumatic. Eyes:  Conjunctivae/corneas clear. EOMs intact. Throat: Lips, mucosa, and tongue moist   Neck: Supple, symmetrical, trachea midline, no adenopathy, thyroid: no enlargement/tenderness/nodules, no carotid bruit and no JVD. Lungs:   Clear to auscultation bilaterally. Chest wall:  No tenderness or deformity. Heart:  Regular rate and rhythm, S1, S2 normal, no murmur, click, rub or gallop. Abdomen:   Soft, non-tender, nondistended. Bowel sounds normal. No masses,  No organomegaly. : price in place   Extremities: Extremities normal, atraumatic, no cyanosis or edema. No calf tenderness or cords. Pulses: 2+ and symmetric all extremities. Skin: Skin color, texture, turgor normal. No rashes   Neurologic:  More alert this AM.  Alert and oriented X 1. She will follow 1 step commands at this time. Gait not tested at this time.        Signed:  Patricia Salazar MD  1/29/2021  6:59 AM

## 2021-01-29 NOTE — PROGRESS NOTES
Attempted to schedule hospital follow up PCP appointment. Awaiting call back from nurse with appointment information. Nubia Nj, Care Management Specialist.     Hospital follow-up PCP transitional care appointment has been scheduled with Dr. Anthony Harrington for Thursday, 2/4/21 at 10:45 a.m. Pending patient discharge.   Nubia Nj, Care Management Specialist.

## 2021-01-29 NOTE — PROGRESS NOTES
Transition Plan of Care  RUR 26%      Plan  Patient is ready for discharge. HH set up with [facility] and AVS has been sent in [employer].    2nd IM letter signed and received, copy on chart.  [facility] on AVS.    Care Management Interventions  PCP Verified by CM: Yes(Tere Gunter)  Mode of Transport at Discharge: Self  Current Support Network: Relative's Home  Confirm Follow Up Transport: Family  The Plan for Transition of Care is Related to the Following Treatment Goals : KRISTEN  Discharge Location  Discharge Placement: Home with home health      SHONA Lam

## 2021-02-01 ENCOUNTER — TELEPHONE (OUTPATIENT)
Dept: CASE MANAGEMENT | Age: 71
End: 2021-02-01

## 2021-02-01 NOTE — TELEPHONE ENCOUNTER
2/1/21 9:36 AM--spoke briefly to pt's daughter, Piedad Vizcaino, and introduced myself and the purpose of my call. Per notes, pt is staying with her at this time. She tells me her mother is about the same as when she was discharged. I asked if she has time for a short conversation and she states that she is occupied at the moment with helping her mother. I asked if I may call back tomorrow afternoon and she is agreeable to this. 2/2/21 2:16 PM--attempted twice more to reach pt's daughter on her phone but she is not answering at this time. This concludes this episode of care.

## 2021-06-13 ENCOUNTER — APPOINTMENT (OUTPATIENT)
Dept: CT IMAGING | Age: 71
End: 2021-06-13
Attending: EMERGENCY MEDICINE
Payer: MEDICARE

## 2021-06-13 ENCOUNTER — HOSPITAL ENCOUNTER (OUTPATIENT)
Age: 71
Setting detail: OBSERVATION
Discharge: HOME | End: 2021-06-17
Attending: EMERGENCY MEDICINE | Admitting: HOSPITALIST
Payer: MEDICARE

## 2021-06-13 DIAGNOSIS — R41.0 CONFUSION: Primary | ICD-10-CM

## 2021-06-13 DIAGNOSIS — R41.0 DELIRIUM: ICD-10-CM

## 2021-06-13 DIAGNOSIS — R41.82 ALTERED MENTAL STATUS, UNSPECIFIED ALTERED MENTAL STATUS TYPE: ICD-10-CM

## 2021-06-13 LAB
ALBUMIN SERPL-MCNC: 3.6 G/DL (ref 3.5–5)
ALBUMIN/GLOB SERPL: 0.9 {RATIO} (ref 1.1–2.2)
ALP SERPL-CCNC: 77 U/L (ref 45–117)
ALT SERPL-CCNC: 10 U/L (ref 12–78)
ANION GAP SERPL CALC-SCNC: 7 MMOL/L (ref 5–15)
APPEARANCE UR: CLEAR
AST SERPL-CCNC: 6 U/L (ref 15–37)
BACTERIA URNS QL MICRO: NEGATIVE /HPF
BASOPHILS # BLD: 0 K/UL (ref 0–0.1)
BASOPHILS NFR BLD: 1 % (ref 0–1)
BILIRUB SERPL-MCNC: 0.5 MG/DL (ref 0.2–1)
BILIRUB UR QL: NEGATIVE
BUN SERPL-MCNC: 8 MG/DL (ref 6–20)
BUN/CREAT SERPL: 13 (ref 12–20)
CALCIUM SERPL-MCNC: 9.6 MG/DL (ref 8.5–10.1)
CHLORIDE SERPL-SCNC: 110 MMOL/L (ref 97–108)
CO2 SERPL-SCNC: 25 MMOL/L (ref 21–32)
COLOR UR: NORMAL
COMMENT, HOLDF: NORMAL
CREAT SERPL-MCNC: 0.61 MG/DL (ref 0.55–1.02)
DIFFERENTIAL METHOD BLD: ABNORMAL
EOSINOPHIL # BLD: 0.2 K/UL (ref 0–0.4)
EOSINOPHIL NFR BLD: 3 % (ref 0–7)
EPITH CASTS URNS QL MICRO: NORMAL /LPF
ERYTHROCYTE [DISTWIDTH] IN BLOOD BY AUTOMATED COUNT: 14.1 % (ref 11.5–14.5)
GLOBULIN SER CALC-MCNC: 4.1 G/DL (ref 2–4)
GLUCOSE BLD STRIP.AUTO-MCNC: 161 MG/DL (ref 65–117)
GLUCOSE SERPL-MCNC: 107 MG/DL (ref 65–100)
GLUCOSE UR STRIP.AUTO-MCNC: NEGATIVE MG/DL
HCT VFR BLD AUTO: 40.4 % (ref 35–47)
HGB BLD-MCNC: 13 G/DL (ref 11.5–16)
HGB UR QL STRIP: NEGATIVE
HYALINE CASTS URNS QL MICRO: NORMAL /LPF (ref 0–5)
IMM GRANULOCYTES # BLD AUTO: 0 K/UL (ref 0–0.04)
IMM GRANULOCYTES NFR BLD AUTO: 0 % (ref 0–0.5)
KETONES UR QL STRIP.AUTO: NEGATIVE MG/DL
LEUKOCYTE ESTERASE UR QL STRIP.AUTO: NEGATIVE
LYMPHOCYTES # BLD: 1.7 K/UL (ref 0.8–3.5)
LYMPHOCYTES NFR BLD: 22 % (ref 12–49)
MCH RBC QN AUTO: 30.3 PG (ref 26–34)
MCHC RBC AUTO-ENTMCNC: 32.2 G/DL (ref 30–36.5)
MCV RBC AUTO: 94.2 FL (ref 80–99)
MONOCYTES # BLD: 0.3 K/UL (ref 0–1)
MONOCYTES NFR BLD: 4 % (ref 5–13)
NEUTS SEG # BLD: 5.5 K/UL (ref 1.8–8)
NEUTS SEG NFR BLD: 70 % (ref 32–75)
NITRITE UR QL STRIP.AUTO: NEGATIVE
NRBC # BLD: 0 K/UL (ref 0–0.01)
NRBC BLD-RTO: 0 PER 100 WBC
PH UR STRIP: 7.5 [PH] (ref 5–8)
PLATELET # BLD AUTO: 219 K/UL (ref 150–400)
PMV BLD AUTO: 10.7 FL (ref 8.9–12.9)
POTASSIUM SERPL-SCNC: 4.1 MMOL/L (ref 3.5–5.1)
PROT SERPL-MCNC: 7.7 G/DL (ref 6.4–8.2)
PROT UR STRIP-MCNC: NEGATIVE MG/DL
RBC # BLD AUTO: 4.29 M/UL (ref 3.8–5.2)
RBC #/AREA URNS HPF: NORMAL /HPF (ref 0–5)
SAMPLES BEING HELD,HOLD: NORMAL
SERVICE CMNT-IMP: ABNORMAL
SODIUM SERPL-SCNC: 142 MMOL/L (ref 136–145)
SP GR UR REFRACTOMETRY: 1.01 (ref 1–1.03)
TROPONIN I SERPL-MCNC: <0.05 NG/ML
UA: UC IF INDICATED,UAUC: NORMAL
UROBILINOGEN UR QL STRIP.AUTO: 0.2 EU/DL (ref 0.2–1)
WBC # BLD AUTO: 7.8 K/UL (ref 3.6–11)
WBC URNS QL MICRO: NORMAL /HPF (ref 0–4)

## 2021-06-13 PROCEDURE — 93005 ELECTROCARDIOGRAM TRACING: CPT

## 2021-06-13 PROCEDURE — 36600 WITHDRAWAL OF ARTERIAL BLOOD: CPT

## 2021-06-13 PROCEDURE — 74011250636 HC RX REV CODE- 250/636: Performed by: HOSPITALIST

## 2021-06-13 PROCEDURE — 99218 HC RM OBSERVATION: CPT

## 2021-06-13 PROCEDURE — 36415 COLL VENOUS BLD VENIPUNCTURE: CPT

## 2021-06-13 PROCEDURE — 70450 CT HEAD/BRAIN W/O DYE: CPT

## 2021-06-13 PROCEDURE — 82962 GLUCOSE BLOOD TEST: CPT

## 2021-06-13 PROCEDURE — 74011000258 HC RX REV CODE- 258: Performed by: HOSPITALIST

## 2021-06-13 PROCEDURE — 96374 THER/PROPH/DIAG INJ IV PUSH: CPT

## 2021-06-13 PROCEDURE — 80053 COMPREHEN METABOLIC PANEL: CPT

## 2021-06-13 PROCEDURE — 81001 URINALYSIS AUTO W/SCOPE: CPT

## 2021-06-13 PROCEDURE — 84484 ASSAY OF TROPONIN QUANT: CPT

## 2021-06-13 PROCEDURE — 74011250637 HC RX REV CODE- 250/637: Performed by: HOSPITALIST

## 2021-06-13 PROCEDURE — 99285 EMERGENCY DEPT VISIT HI MDM: CPT

## 2021-06-13 PROCEDURE — 85025 COMPLETE CBC W/AUTO DIFF WBC: CPT

## 2021-06-13 PROCEDURE — 87086 URINE CULTURE/COLONY COUNT: CPT

## 2021-06-13 RX ORDER — POLYETHYLENE GLYCOL 3350 17 G/17G
17 POWDER, FOR SOLUTION ORAL DAILY PRN
Status: DISCONTINUED | OUTPATIENT
Start: 2021-06-13 | End: 2021-06-17 | Stop reason: HOSPADM

## 2021-06-13 RX ORDER — ACETAMINOPHEN 325 MG/1
650 TABLET ORAL
Status: DISCONTINUED | OUTPATIENT
Start: 2021-06-13 | End: 2021-06-17 | Stop reason: HOSPADM

## 2021-06-13 RX ORDER — DEXTROSE 50 % IN WATER (D50W) INTRAVENOUS SYRINGE
25-50 AS NEEDED
Status: DISCONTINUED | OUTPATIENT
Start: 2021-06-13 | End: 2021-06-17 | Stop reason: HOSPADM

## 2021-06-13 RX ORDER — ONDANSETRON 2 MG/ML
4 INJECTION INTRAMUSCULAR; INTRAVENOUS
Status: DISCONTINUED | OUTPATIENT
Start: 2021-06-13 | End: 2021-06-17 | Stop reason: HOSPADM

## 2021-06-13 RX ORDER — ATORVASTATIN CALCIUM 10 MG/1
20 TABLET, FILM COATED ORAL DAILY
Status: DISCONTINUED | OUTPATIENT
Start: 2021-06-14 | End: 2021-06-17 | Stop reason: HOSPADM

## 2021-06-13 RX ORDER — LANOLIN ALCOHOL/MO/W.PET/CERES
400 CREAM (GRAM) TOPICAL DAILY
Status: DISCONTINUED | OUTPATIENT
Start: 2021-06-14 | End: 2021-06-17 | Stop reason: HOSPADM

## 2021-06-13 RX ORDER — LEVETIRACETAM 500 MG/1
500 TABLET ORAL 2 TIMES DAILY
Status: DISCONTINUED | OUTPATIENT
Start: 2021-06-14 | End: 2021-06-16

## 2021-06-13 RX ORDER — ACETAMINOPHEN 650 MG/1
650 SUPPOSITORY RECTAL
Status: DISCONTINUED | OUTPATIENT
Start: 2021-06-13 | End: 2021-06-17 | Stop reason: HOSPADM

## 2021-06-13 RX ORDER — ENOXAPARIN SODIUM 100 MG/ML
40 INJECTION SUBCUTANEOUS DAILY
Status: DISCONTINUED | OUTPATIENT
Start: 2021-06-14 | End: 2021-06-17 | Stop reason: HOSPADM

## 2021-06-13 RX ORDER — LOSARTAN POTASSIUM 50 MG/1
50 TABLET ORAL DAILY
Status: DISCONTINUED | OUTPATIENT
Start: 2021-06-14 | End: 2021-06-17 | Stop reason: HOSPADM

## 2021-06-13 RX ORDER — ASPIRIN 81 MG/1
81 TABLET ORAL DAILY
Status: DISCONTINUED | OUTPATIENT
Start: 2021-06-14 | End: 2021-06-17 | Stop reason: HOSPADM

## 2021-06-13 RX ORDER — INSULIN LISPRO 100 [IU]/ML
INJECTION, SOLUTION INTRAVENOUS; SUBCUTANEOUS
Status: DISCONTINUED | OUTPATIENT
Start: 2021-06-14 | End: 2021-06-17 | Stop reason: HOSPADM

## 2021-06-13 RX ORDER — QUETIAPINE FUMARATE 25 MG/1
25 TABLET, FILM COATED ORAL 4 TIMES DAILY
Status: DISCONTINUED | OUTPATIENT
Start: 2021-06-13 | End: 2021-06-17 | Stop reason: HOSPADM

## 2021-06-13 RX ORDER — ONDANSETRON 4 MG/1
4 TABLET, ORALLY DISINTEGRATING ORAL
Status: DISCONTINUED | OUTPATIENT
Start: 2021-06-13 | End: 2021-06-17 | Stop reason: HOSPADM

## 2021-06-13 RX ORDER — PANTOPRAZOLE SODIUM 40 MG/1
40 TABLET, DELAYED RELEASE ORAL DAILY
Status: DISCONTINUED | OUTPATIENT
Start: 2021-06-14 | End: 2021-06-17 | Stop reason: HOSPADM

## 2021-06-13 RX ORDER — SODIUM CHLORIDE 0.9 % (FLUSH) 0.9 %
5-40 SYRINGE (ML) INJECTION AS NEEDED
Status: DISCONTINUED | OUTPATIENT
Start: 2021-06-13 | End: 2021-06-17 | Stop reason: HOSPADM

## 2021-06-13 RX ORDER — SODIUM CHLORIDE 0.9 % (FLUSH) 0.9 %
5-40 SYRINGE (ML) INJECTION EVERY 8 HOURS
Status: DISCONTINUED | OUTPATIENT
Start: 2021-06-13 | End: 2021-06-17 | Stop reason: HOSPADM

## 2021-06-13 RX ORDER — MAGNESIUM SULFATE 100 %
4 CRYSTALS MISCELLANEOUS AS NEEDED
Status: DISCONTINUED | OUTPATIENT
Start: 2021-06-13 | End: 2021-06-17 | Stop reason: HOSPADM

## 2021-06-13 RX ADMIN — QUETIAPINE FUMARATE 25 MG: 25 TABLET ORAL at 22:42

## 2021-06-13 RX ADMIN — Medication 10 ML: at 23:00

## 2021-06-13 RX ADMIN — SODIUM CHLORIDE 1.5 G: 900 INJECTION INTRAVENOUS at 22:38

## 2021-06-13 NOTE — ED PROVIDER NOTES
Patient is here for altered mental status. History comes from the adult daughter at the bedside. For the past week or so patient has had increasing confusion. No falls or trauma. Patient denies any complaints. Daughter states that whenever she gets like this is usually due to urinary tract infections. Ever, she has had 3 strokes in the past.  No blood thinners. Patient denies any complaints. Past Medical History:   Diagnosis Date    Anxiety and depression     Diabetes ([facility] [address]     Hypertension     Obese     Seizure disorder ([facility] [address]        No past surgical history on file. Family History:   Problem Relation Age of Onset    Lung Cancer Mother     No Known Problems Father        Social History     Socioeconomic History    Marital status: LEGALLY      Spouse name: Not on file    Number of children: Not on file    Years of education: Not on file    Highest education level: Not on file   Occupational History    Not on file   Tobacco Use    Smoking status: Unknown If Ever Smoked   Substance and Sexual Activity    Alcohol use: Not on file     Comment: Unknown    Drug use: Not on file    Sexual activity: Not on file   Other Topics Concern    Not on file   Social History Narrative    Not on file     Social Determinants of Health     Financial Resource Strain:     Difficulty of Paying Living Expenses:    Food Insecurity:     Worried About Running Out of Food in the Last Year:     Ran Out of Food in the Last Year:    Transportation Needs:     Lack of Transportation (Medical):      Lack of Transportation (Non-Medical):    Physical Activity:     Days of Exercise per Week:     Minutes of Exercise per Session:    Stress:     Feeling of Stress :    Social Connections:     Frequency of Communication with Friends and Family:     Frequency of Social Gatherings with Friends and Family:     Attends Yarsani Services:     Active Member of Clubs or Organizations:     Attends Club or Organization Meetings:     Marital Status:    Intimate Partner Violence:     Fear of Current or Ex-Partner:     Emotionally Abused:     Physically Abused:     Sexually Abused: ALLERGIES: Patient has no known allergies. Review of Systems   Constitutional: Negative for chills, diaphoresis, fatigue and fever. HENT: Negative for rhinorrhea and sinus pain. Eyes: Negative for discharge and itching. Respiratory: Negative for cough, shortness of breath and wheezing. Cardiovascular: Negative for chest pain, palpitations and leg swelling. Gastrointestinal: Negative for abdominal pain, constipation, diarrhea, nausea and vomiting. Genitourinary: Negative for dysuria, flank pain and hematuria. Musculoskeletal: Negative for arthralgias, back pain, gait problem, joint swelling, myalgias, neck pain and neck stiffness. Skin: Negative for color change, pallor and rash. Neurological: Negative for dizziness, speech difficulty, light-headedness, numbness and headaches. Psychiatric/Behavioral: Positive for confusion. Vitals:    06/13/21 1300 06/13/21 1330 06/13/21 1345 06/13/21 1420   BP: (!) 150/101 137/79 (!) 145/86 (!) 149/73   Pulse: 86 77 76 78   Resp: 23 18 15 13   Temp:       SpO2: 97% 97% 97% 98%   Weight:                Physical Exam  Vitals and nursing note reviewed. Constitutional:       General: She is not in acute distress. Appearance: She is well-developed. She is not ill-appearing, toxic-appearing or diaphoretic. HENT:      Head: Normocephalic and atraumatic. Mouth/Throat:      Mouth: Mucous membranes are moist.      Pharynx: Oropharynx is clear. Eyes:      General: No visual field deficit or scleral icterus. Extraocular Movements: Extraocular movements intact. Pupils: Pupils are equal, round, and reactive to light. Pupils are equal.      Comments: Pupils 3 mm and reactive bilaterally. No nystagmus. No proptosis.    Neck:      Meningeal: Brudzinski's sign and Kernig's sign absent. Cardiovascular:      Rate and Rhythm: Normal rate and regular rhythm. Heart sounds: Normal heart sounds. No murmur heard. No friction rub. No gallop. Pulmonary:      Effort: Pulmonary effort is normal.      Breath sounds: Normal breath sounds. Abdominal:      General: Bowel sounds are normal.      Palpations: Abdomen is soft. Musculoskeletal:         General: No swelling or tenderness. Normal range of motion. Cervical back: Normal range of motion and neck supple. No rigidity. Lymphadenopathy:      Cervical: No cervical adenopathy. Skin:     General: Skin is warm and dry. Capillary Refill: Capillary refill takes less than 2 seconds. Neurological:      Mental Status: She is alert. Cranial Nerves: No cranial nerve deficit, dysarthria or facial asymmetry. Sensory: No sensory deficit. Motor: No weakness. Deep Tendon Reflexes: Reflexes normal.      Comments: Oriented to name and place. Does not know the year. Is able to identify adult daughter at the bedside. Sensation to light touch intact in all dermatomes of the upper extremities bilaterally and the L4 L5-S1 dermatomes bilaterally. 2+ equal biceps and patellar reflexes bilaterally. Psychiatric:         Mood and Affect: Mood normal.         Behavior: Behavior normal.          [facility]  ED Course as of Jun 13 1614   Sun Jun 13, 2021   1233 Patient examined. With hypertension otherwise vital signs normal.  Has had increasing confusion over the past week or so. No head injuries or recent illnesses. Daughter at the bedside states that she gets like this when she has urinary tract infections. However she does have history of strokes. Here no focal neurologic deficits. Does not know the year. Will get CT head. No need for stroke alert given the length of symptoms. Will check urine. Will get labs. No obvious signs of infection such as meningitis or encephalitis.     [AF]   1414 EKG with normal sinus rhythm, rate 82, normal MN interval , QRS, nonspecific ST changes, normal QTC.     [AF]   3781 Pt signed out to evening team    [AF]      ED Course User Index  [AF] Ariane Finley DO       Procedures

## 2021-06-13 NOTE — H&P
History & Physical    Primary Care Provider: Maxx Bhatt MD  Source of Information: Patient and daughter     History of Presenting Illness:   Bessy Bravo is a 72 y.o. female who presents with AMS     72 AAF with h/o CVA,  SZ, HTN, DM GERD and depression is here for altered mental status. History comes from the adult daughter at the bedside. For the past week or so patient has had increasing confusion. No falls or trauma. Patient denies any complaints. Daughter states that whenever she gets like this is usually due to urinary tract infections. she has had 3 strokes in the past. Ever since the stroke, her memory and cognition function gradually getting worse. She had admissions in the past with sepsis/bacteremia due to UTI.   + dysuria and urinary frequency     No blood thinners. Patient denies any complaints  No new medications      Review of Systems:  General: no fever, no changes of weight   HEENT: no headache, no vision changes, no nose discharge, no hearing changes   RES: no wheezing, no cough, no sob  CVS: no cp, no palpitation. Muscular: no joint swelling, no muscle pain, no leg swelling  Skin: no rash, no itching   GI: no vomiting, no diarrhea  : HPI  no hematuria  Hemo: no gum bleeding, no petechial   Neuro: no sensation changes, no focal weakness , hpi   Endo: no polydipsia   Psych: denied depression     Past Medical History:   Diagnosis Date    Anxiety and depression     Diabetes ([facility] [address]     Hypertension     Obese     Seizure disorder ([facility] [address]       No past surgical history on file. Prior to Admission medications    Medication Sig Start Date End Date Taking? Authorizing Provider   levETIRAcetam (KEPPRA) 500 mg tablet Take 1 Tab by mouth two (2) times a day. 1/29/21   Sandra Christie MD   pantoprazole (PROTONIX) 40 mg tablet Take 1 Tab by mouth daily.  1/29/21   Sandra Christie MD   QUEtiapine (SEROquel) 25 mg tablet Take 1 Tab by mouth four (4) times daily. 1/29/21   Victoriano Fregoso MD   acetaminophen (TYLENOL) 325 mg tablet Take 2 Tabs by mouth every six (6) hours as needed for Fever. 1/29/21   Victoriano Fregoso MD   amoxicillin-clavulanate (AUGMENTIN) 500-125 mg per tablet Take 1 Tab by mouth three (3) times daily (after meals). 1/29/21   Victoriano Fregoso MD   ergocalciferol (ERGOCALCIFEROL) 1,250 mcg (50,000 unit) capsule Take 1 Cap by mouth every seven (7) days. 2/2/21   Victoriano Fregoso MD   magnesium oxide (MAG-OX) 400 mg tablet Take 1 Tab by mouth daily. 1/29/21   Victoriano Fregoso MD   potassium chloride (KLOR-CON) 10 mEq tablet Take 1 Tab by mouth daily. 1/29/21   Victoriano Fregoso MD   atorvastatin (LIPITOR) 20 mg tablet Take 1 Tab by mouth daily. 1/29/21   Victoriano Fregoso MD   losartan (COZAAR) 50 mg tablet Take 1 Tab by mouth daily. 1/29/21   Victoriano Fregoso MD   aspirin delayed-release 81 mg tablet Take 1 Tab by mouth daily. 10/26/20   Victoriano Fregoso MD     No Known Allergies   Family History   Problem Relation Age of Onset    Lung Cancer Mother     No Known Problems Father         SOCIAL HISTORY:  Patient resides:  Independently    Assisted Living    SNF    With family care X      Smoking history:   None X   Former    Chronic      Alcohol history:   None X   Social    Chronic      Ambulates:   Independently    w/cane X   w/walker X   w/wc    CODE STATUS:  DNR    Full X   Other      Objective:     Physical Exam:     Visit Vitals  BP (!) 149/73   Pulse 78   Temp 98.1 °F (36.7 °C)   Resp 13   Wt 81.6 kg (180 lb)   SpO2 98%   BMI 30.90 kg/m²      O2 Device: None (Room air)    General:  Alert, cooperative, no distress, appears stated age. Head:  Normocephalic, without obvious abnormality, atraumatic. Eyes:  Conjunctivae/corneas clear. PERRL, EOMs intact. Nose: Nares normal. Septum midline. Mucosa normal. No drainage or sinus tenderness.    Throat: Lips, mucosa, and tongue normal. Teeth and gums normal.   Neck: Supple, symmetrical, trachea midline, no adenopathy, thyroid: no enlargement/tenderness/nodules, no carotid bruit and no JVD. Lungs:   Clear to auscultation bilaterally. Chest wall:  No tenderness or deformity. Heart:  Regular rate and rhythm, S1, S2 normal, no murmur, click, rub or gallop. Abdomen:   Soft, non-tender. Bowel sounds normal. No masses,  No organomegaly. Extremities: Extremities normal, atraumatic, no cyanosis or edema. Pulses: 2+ and symmetric all extremities. Skin: Skin color, texture, turgor normal. No rashes or lesions   Neurologic: CNII-XII intact. None focal. O to name only. Not to place and date            Data Review:     Recent Days:  Recent Labs     06/13/21  1741   WBC 7.8   HGB 13.0   HCT 40.4        Recent Labs     06/13/21  1741      K 4.1   *   CO2 25   *   BUN 8   CREA 0.61   CA 9.6   ALB 3.6   ALT 10*     No results for input(s): PH, PCO2, PO2, HCO3, FIO2 in the last 72 hours. 24 Hour Results:  Recent Results (from the past 24 hour(s))   CBC WITH AUTOMATED DIFF    Collection Time: 06/13/21  5:41 PM   Result Value Ref Range    WBC 7.8 3.6 - 11.0 K/uL    RBC 4.29 3.80 - 5.20 M/uL    HGB 13.0 11.5 - 16.0 g/dL    HCT 40.4 35.0 - 47.0 %    MCV 94.2 80.0 - 99.0 FL    MCH 30.3 26.0 - 34.0 PG    MCHC 32.2 30.0 - 36.5 g/dL    RDW 14.1 11.5 - 14.5 %    PLATELET 222 303 - 871 K/uL    MPV 10.7 8.9 - 12.9 FL    NRBC 0.0 0  WBC    ABSOLUTE NRBC 0.00 0.00 - 0.01 K/uL    NEUTROPHILS 70 32 - 75 %    LYMPHOCYTES 22 12 - 49 %    MONOCYTES 4 (L) 5 - 13 %    EOSINOPHILS 3 0 - 7 %    BASOPHILS 1 0 - 1 %    IMMATURE GRANULOCYTES 0 0.0 - 0.5 %    ABS. NEUTROPHILS 5.5 1.8 - 8.0 K/UL    ABS. LYMPHOCYTES 1.7 0.8 - 3.5 K/UL    ABS. MONOCYTES 0.3 0.0 - 1.0 K/UL    ABS. EOSINOPHILS 0.2 0.0 - 0.4 K/UL    ABS. BASOPHILS 0.0 0.0 - 0.1 K/UL    ABS. IMM.  GRANS. 0.0 0.00 - 0.04 K/UL    DF AUTOMATED     METABOLIC PANEL, COMPREHENSIVE    Collection Time: 06/13/21  5:41 PM Result Value Ref Range    Sodium 142 136 - 145 mmol/L    Potassium 4.1 3.5 - 5.1 mmol/L    Chloride 110 (H) 97 - 108 mmol/L    CO2 25 21 - 32 mmol/L    Anion gap 7 5 - 15 mmol/L    Glucose 107 (H) 65 - 100 mg/dL    BUN 8 6 - 20 MG/DL    Creatinine 0.61 0.55 - 1.02 MG/DL    BUN/Creatinine ratio 13 12 - 20      GFR est AA >60 >60 ml/min/1.73m2    GFR est non-AA >60 >60 ml/min/1.73m2    Calcium 9.6 8.5 - 10.1 MG/DL    Bilirubin, total 0.5 0.2 - 1.0 MG/DL    ALT (SGPT) 10 (L) 12 - 78 U/L    AST (SGOT) 6 (L) 15 - 37 U/L    Alk. phosphatase 77 45 - 117 U/L    Protein, total 7.7 6.4 - 8.2 g/dL    Albumin 3.6 3.5 - 5.0 g/dL    Globulin 4.1 (H) 2.0 - 4.0 g/dL    A-G Ratio 0.9 (L) 1.1 - 2.2     URINALYSIS W/ REFLEX CULTURE    Collection Time: 06/13/21  5:41 PM    Specimen: Urine   Result Value Ref Range    Color YELLOW/STRAW      Appearance CLEAR CLEAR      Specific gravity 1.007 1.003 - 1.030      pH (UA) 7.5 5.0 - 8.0      Protein Negative NEG mg/dL    Glucose Negative NEG mg/dL    Ketone Negative NEG mg/dL    Bilirubin Negative NEG      Blood Negative NEG      Urobilinogen 0.2 0.2 - 1.0 EU/dL    Nitrites Negative NEG      Leukocyte Esterase Negative NEG      UA:UC IF INDICATED CULTURE NOT INDICATED BY UA RESULT CNI      WBC 0-4 0 - 4 /hpf    RBC 0-5 0 - 5 /hpf    Epithelial cells FEW FEW /lpf    Bacteria Negative NEG /hpf    Hyaline cast 0-2 0 - 5 /lpf   TROPONIN I    Collection Time: 06/13/21  5:41 PM   Result Value Ref Range    Troponin-I, Qt. <0.05 <0.05 ng/mL   SAMPLES BEING HELD    Collection Time: 06/13/21  5:41 PM   Result Value Ref Range    SAMPLES BEING HELD 1red     COMMENT        Add-on orders for these samples will be processed based on acceptable specimen integrity and analyte stability, which may vary by analyte. Imaging:   CT HEAD WO CONT    Result Date: 6/13/2021  1. No acute intracranial abnormality. 2. Microvascular ischemic and age-related change greater than expected for age.  3. Encephalomalacia in the left frontal and posterior temporal lobes, suggesting prior old ischemia, stable since January 2021. Assessment:     Active Problems:    AMS (altered mental status) (1/21/2021)           Plan:     1. AMS/acute metabolic encephalopathy: could due to early UTI on top of her vascular dementia. UA (neg), but with her risk of enterococcus bacteremia and symptoms, would give a short course of abx. May change to po in am if stable. PT/OT   2. Sz: continue keppra  3. DM: ssi.   5. HTN: continue losartan.    Dispo: pt will dc to her daughter's home        Signed By: Enzo Chavarria MD     June 13, 2021

## 2021-06-13 NOTE — ED NOTES
[facility] Consult for Admission  6:46 PM    ED Room Number: ER22/22  Patient Name and age: Deepali Pan 72 y.o.  female  Working Diagnosis:   1. Confusion    2.  Altered mental status, unspecified altered mental status type        COVID-19 Suspicion:  no  Sepsis present:  no  Reassessment needed: no  Code Status:  Full Code  Readmission: no  Isolation Requirements:  no  Recommended Level of Care:  med/surg  Department:[facility] Adult ED - 21   Other:

## 2021-06-13 NOTE — ED NOTES
Waiting on Dr Horacio Duarte to come start US IV and get blood work.  Pt has been stuck multiple time by 4 RNs

## 2021-06-13 NOTE — ED TRIAGE NOTES
Triage: Pt arrives ambulatory from home accompanied by family who reports pt is confused. Pt had a recent UTI she was treated for but is now not recognizing people or places. Pt had a recent stroke as well.

## 2021-06-13 NOTE — ED NOTES
Attempted labs/IV access in triage x2 without success. Patient also given hat for urine collection but was unable to collect.

## 2021-06-14 LAB
ALBUMIN SERPL-MCNC: 3.6 G/DL (ref 3.5–5)
ALBUMIN/GLOB SERPL: 0.8 {RATIO} (ref 1.1–2.2)
ALP SERPL-CCNC: 76 U/L (ref 45–117)
ALT SERPL-CCNC: 11 U/L (ref 12–78)
ANION GAP SERPL CALC-SCNC: 7 MMOL/L (ref 5–15)
AST SERPL-CCNC: 12 U/L (ref 15–37)
ATRIAL RATE: 82 BPM
BASOPHILS # BLD: 0 K/UL (ref 0–0.1)
BASOPHILS NFR BLD: 0 % (ref 0–1)
BILIRUB SERPL-MCNC: 0.5 MG/DL (ref 0.2–1)
BUN SERPL-MCNC: 7 MG/DL (ref 6–20)
BUN/CREAT SERPL: 10 (ref 12–20)
CALCIUM SERPL-MCNC: 9.7 MG/DL (ref 8.5–10.1)
CALCULATED P AXIS, ECG09: 65 DEGREES
CALCULATED R AXIS, ECG10: -11 DEGREES
CALCULATED T AXIS, ECG11: 48 DEGREES
CHLORIDE SERPL-SCNC: 107 MMOL/L (ref 97–108)
CO2 SERPL-SCNC: 28 MMOL/L (ref 21–32)
CREAT SERPL-MCNC: 0.7 MG/DL (ref 0.55–1.02)
DIAGNOSIS, 93000: NORMAL
DIFFERENTIAL METHOD BLD: ABNORMAL
EOSINOPHIL # BLD: 0.4 K/UL (ref 0–0.4)
EOSINOPHIL NFR BLD: 5 % (ref 0–7)
ERYTHROCYTE [DISTWIDTH] IN BLOOD BY AUTOMATED COUNT: 14 % (ref 11.5–14.5)
GLOBULIN SER CALC-MCNC: 4.4 G/DL (ref 2–4)
GLUCOSE BLD STRIP.AUTO-MCNC: 124 MG/DL (ref 65–117)
GLUCOSE BLD STRIP.AUTO-MCNC: 126 MG/DL (ref 65–117)
GLUCOSE BLD STRIP.AUTO-MCNC: 311 MG/DL (ref 65–117)
GLUCOSE BLD STRIP.AUTO-MCNC: 78 MG/DL (ref 65–117)
GLUCOSE SERPL-MCNC: 132 MG/DL (ref 65–100)
HCT VFR BLD AUTO: 41.9 % (ref 35–47)
HGB BLD-MCNC: 13.6 G/DL (ref 11.5–16)
IMM GRANULOCYTES # BLD AUTO: 0 K/UL (ref 0–0.04)
IMM GRANULOCYTES NFR BLD AUTO: 0 % (ref 0–0.5)
LYMPHOCYTES # BLD: 2.6 K/UL (ref 0.8–3.5)
LYMPHOCYTES NFR BLD: 29 % (ref 12–49)
MCH RBC QN AUTO: 30.8 PG (ref 26–34)
MCHC RBC AUTO-ENTMCNC: 32.5 G/DL (ref 30–36.5)
MCV RBC AUTO: 94.8 FL (ref 80–99)
MONOCYTES # BLD: 0.4 K/UL (ref 0–1)
MONOCYTES NFR BLD: 4 % (ref 5–13)
NEUTS SEG # BLD: 5.5 K/UL (ref 1.8–8)
NEUTS SEG NFR BLD: 62 % (ref 32–75)
NRBC # BLD: 0 K/UL (ref 0–0.01)
NRBC BLD-RTO: 0 PER 100 WBC
P-R INTERVAL, ECG05: 168 MS
PLATELET # BLD AUTO: 223 K/UL (ref 150–400)
PMV BLD AUTO: 10.8 FL (ref 8.9–12.9)
POTASSIUM SERPL-SCNC: 3.7 MMOL/L (ref 3.5–5.1)
PROT SERPL-MCNC: 8 G/DL (ref 6.4–8.2)
Q-T INTERVAL, ECG07: 368 MS
QRS DURATION, ECG06: 74 MS
QTC CALCULATION (BEZET), ECG08: 429 MS
RBC # BLD AUTO: 4.42 M/UL (ref 3.8–5.2)
SERVICE CMNT-IMP: ABNORMAL
SERVICE CMNT-IMP: NORMAL
SODIUM SERPL-SCNC: 142 MMOL/L (ref 136–145)
VENTRICULAR RATE, ECG03: 82 BPM
WBC # BLD AUTO: 8.9 K/UL (ref 3.6–11)

## 2021-06-14 PROCEDURE — 95816 EEG AWAKE AND DROWSY: CPT | Performed by: PSYCHIATRY & NEUROLOGY

## 2021-06-14 PROCEDURE — 76937 US GUIDE VASCULAR ACCESS: CPT

## 2021-06-14 PROCEDURE — 96376 TX/PRO/DX INJ SAME DRUG ADON: CPT

## 2021-06-14 PROCEDURE — 99222 1ST HOSP IP/OBS MODERATE 55: CPT | Performed by: PSYCHIATRY & NEUROLOGY

## 2021-06-14 PROCEDURE — 80053 COMPREHEN METABOLIC PANEL: CPT

## 2021-06-14 PROCEDURE — 74011636637 HC RX REV CODE- 636/637: Performed by: HOSPITALIST

## 2021-06-14 PROCEDURE — 96372 THER/PROPH/DIAG INJ SC/IM: CPT

## 2021-06-14 PROCEDURE — 83036 HEMOGLOBIN GLYCOSYLATED A1C: CPT

## 2021-06-14 PROCEDURE — 74011250636 HC RX REV CODE- 250/636: Performed by: HOSPITALIST

## 2021-06-14 PROCEDURE — 99218 HC RM OBSERVATION: CPT

## 2021-06-14 PROCEDURE — 82962 GLUCOSE BLOOD TEST: CPT

## 2021-06-14 PROCEDURE — 85025 COMPLETE CBC W/AUTO DIFF WBC: CPT

## 2021-06-14 PROCEDURE — 77030020847 HC STATLOK BARD -A

## 2021-06-14 PROCEDURE — 96375 TX/PRO/DX INJ NEW DRUG ADDON: CPT

## 2021-06-14 PROCEDURE — 74011250637 HC RX REV CODE- 250/637: Performed by: HOSPITALIST

## 2021-06-14 PROCEDURE — 74011250636 HC RX REV CODE- 250/636: Performed by: NURSE PRACTITIONER

## 2021-06-14 PROCEDURE — 74011000258 HC RX REV CODE- 258: Performed by: HOSPITALIST

## 2021-06-14 PROCEDURE — 77030020365 HC SOL INJ SOD CL 0.9% 50ML

## 2021-06-14 PROCEDURE — 36415 COLL VENOUS BLD VENIPUNCTURE: CPT

## 2021-06-14 PROCEDURE — C1751 CATH, INF, PER/CENT/MIDLINE: HCPCS

## 2021-06-14 RX ORDER — HALOPERIDOL 5 MG/ML
3 INJECTION INTRAMUSCULAR ONCE
Status: COMPLETED | OUTPATIENT
Start: 2021-06-14 | End: 2021-06-14

## 2021-06-14 RX ORDER — HALOPERIDOL 5 MG/ML
3 INJECTION INTRAMUSCULAR ONCE
Status: DISCONTINUED | OUTPATIENT
Start: 2021-06-14 | End: 2021-06-14

## 2021-06-14 RX ADMIN — SODIUM CHLORIDE 1.5 G: 900 INJECTION INTRAVENOUS at 11:25

## 2021-06-14 RX ADMIN — ENOXAPARIN SODIUM 40 MG: 40 INJECTION SUBCUTANEOUS at 09:39

## 2021-06-14 RX ADMIN — ASPIRIN 81 MG: 81 TABLET, COATED ORAL at 09:40

## 2021-06-14 RX ADMIN — MAGNESIUM GLUCONATE 500 MG ORAL TABLET 400 MG: 500 TABLET ORAL at 09:40

## 2021-06-14 RX ADMIN — QUETIAPINE FUMARATE 25 MG: 25 TABLET ORAL at 09:40

## 2021-06-14 RX ADMIN — SODIUM CHLORIDE 1.5 G: 900 INJECTION INTRAVENOUS at 04:19

## 2021-06-14 RX ADMIN — ATORVASTATIN CALCIUM 20 MG: 10 TABLET, FILM COATED ORAL at 09:40

## 2021-06-14 RX ADMIN — INSULIN LISPRO 4 UNITS: 100 INJECTION, SOLUTION INTRAVENOUS; SUBCUTANEOUS at 11:30

## 2021-06-14 RX ADMIN — LEVETIRACETAM 500 MG: 500 TABLET ORAL at 09:40

## 2021-06-14 RX ADMIN — QUETIAPINE FUMARATE 25 MG: 25 TABLET ORAL at 13:40

## 2021-06-14 RX ADMIN — LOSARTAN POTASSIUM 50 MG: 50 TABLET, FILM COATED ORAL at 09:40

## 2021-06-14 RX ADMIN — PANTOPRAZOLE SODIUM 40 MG: 40 TABLET, DELAYED RELEASE ORAL at 09:40

## 2021-06-14 RX ADMIN — HALOPERIDOL LACTATE 3 MG: 5 INJECTION, SOLUTION INTRAMUSCULAR at 04:06

## 2021-06-14 RX ADMIN — HALOPERIDOL LACTATE 3 MG: 5 INJECTION, SOLUTION INTRAMUSCULAR at 17:11

## 2021-06-14 NOTE — CONSULTS
NEUROLOGY CONSULT NOTE    Name Pan Resendiz Age 72 y.o. MRN [id number]  1950     Consulting Physician: Beth Ferreira MD      Chief Complaint:  AMS     Assessment:     · Active Problems:  ·   AMS (altered mental status) (2021)  ·   ·   72year old AAF with a h/o HTN, DM, depression, GERD, remote L MCA and R pontine infarcts, seizure d/o, prior admission 2940 with metabolic encephalopathy associated with UTI admitted with acute on chronic encephalopathy. Lower suspicion for subclinical seizure activity but will obtain routine EEG. CT head without acute intracranial pathology. She appropriately follows commands today, although is slightly disoriented with unclear baseline. L facial droop on examination also present during prior admission 2021. No new focal deficits appreciated to suggest acute ischemia/pathology. Possible metabolic encephalopathy with superimposed vascular dementia. Recommendations:   Routine EEG  Continue LEV 500mg BID for seizure prophylaxis  TSH, B12, Ammonia levels  Would avoid excessive use of sedatives/neuroleptics if possible. Continue ASA/statin therapy for stroke prevention. Thank you very much for this referral. I appreciate the opportunity to participate in this patient's care. History of Present Illness: This is a 72 y.o.  female, we were asked to see for AMS. PMH notable for HTN, DM, depression, GERD, remote L MCA and R pontine infarcts, seizure d/o, prior admission 9907 with metabolic encephalopathy associated with UTI. She is admitted with AMS x 1 week according to reports. Patient is unable to add to history due to continued AMS. She was diagnosed with a recent UTI. Per review of the medical record, she has experienced some baseline confusion since her prior strokes. UA is negative this admission. CT head was reviewed without acute process, noted remote L fronto-temporal infarct. No reported seizure activity.  She is maintained on LEV 500mg BID PTA for seizure prophylaxis. It appears she is also receiving Seroquel 100mg/daily prior to admission. No Known Allergies     Prior to Admission medications    Medication Sig Start Date End Date Taking? Authorizing Provider   levETIRAcetam (KEPPRA) 500 mg tablet Take 1 Tab by mouth two (2) times a day. 1/29/21   Beatriz Anderson MD   pantoprazole (PROTONIX) 40 mg tablet Take 1 Tab by mouth daily. 1/29/21   Beatriz Anderson MD   QUEtiapine (SEROquel) 25 mg tablet Take 1 Tab by mouth four (4) times daily. 1/29/21   Beatriz Anderson MD   acetaminophen (TYLENOL) 325 mg tablet Take 2 Tabs by mouth every six (6) hours as needed for Fever. 1/29/21   Beatriz Anderson MD   amoxicillin-clavulanate (AUGMENTIN) 500-125 mg per tablet Take 1 Tab by mouth three (3) times daily (after meals). 1/29/21   Beatriz Anderson MD   ergocalciferol (ERGOCALCIFEROL) 1,250 mcg (50,000 unit) capsule Take 1 Cap by mouth every seven (7) days. 2/2/21   Beatriz Anderson MD   magnesium oxide (MAG-OX) 400 mg tablet Take 1 Tab by mouth daily. 1/29/21   Beatriz Anderson MD   potassium chloride (KLOR-CON) 10 mEq tablet Take 1 Tab by mouth daily. 1/29/21   Beatriz Anderson MD   atorvastatin (LIPITOR) 20 mg tablet Take 1 Tab by mouth daily. 1/29/21   Beatriz Anderson MD   losartan (COZAAR) 50 mg tablet Take 1 Tab by mouth daily. 1/29/21   Beatriz Anderson MD   aspirin delayed-release 81 mg tablet Take 1 Tab by mouth daily.  10/26/20   Beatriz Anderson MD       Past Medical History:   Diagnosis Date    Anxiety and depression     Diabetes ([facility] [address]     Hypertension     Obese     Seizure disorder ([facility] [address]       PSH: unable to obtain    Social History     Tobacco Use    Smoking status: Unknown If Ever Smoked   Substance Use Topics    Alcohol use: Not on file     Comment: Unknown        Family History   Problem Relation Age of Onset    Lung Cancer Mother     No Known Problems Father Review of Systems:   Comprehensive review of systems performed and negative except for as listed above. Exam:     Visit Vitals  /79 (BP 1 Location: Right upper arm)   Pulse 77   Temp 98.1 °F (36.7 °C)   Resp 17   Wt 180 lb (81.6 kg)   SpO2 98%   BMI 30.90 kg/m²        General: Well developed, well nourished. Patient in no apparent distress   Head: Normocephalic, atraumatic, anicteric sclera   Lungs:  Clear to auscultation bilaterally, No wheezes or rubs   Cardiac: Regular rate and rhythm with no murmurs. Abd: Bowel sounds were audible. No tenderness on palpation   Ext: No pedal edema   Skin: No overt signs of rash     Neurological Exam:  Mental Status: Alert and oriented to person only. Speech: Fluent no aphasia or dysarthria. Cranial Nerves:   Intact visual fields. Facial sensation is normal. Facial movement is asymmetric-left facial droop. Palate is midline. Normal sternocleidomastoid strength. Tongue is midline. Hearing is intact bilaterally. Eyes: PERRL, EOM's full, no nystagmus, no ptosis. Motor:  Full and symmetric strength of upper and lower proximal and distal muscles. Normal bulk and tone. Reflexes:   Deep tendon reflexes 1+/4 and symmetrical.  Plantar response is downgoing b/l. Sensory:   Symmetrically intact  with no perceived deficits modalities involving small or large fibers. Gait:  Gait is deferred    Tremor:   No tremor noted. Cerebellar:  Finger to nose and heel over shin to knee was demonstrated competently. Neurovascular: No carotid bruits. Imaging  CT Results (maximum last 3): Results from [place] encounter on 06/13/21    CT HEAD WO CONT    Narrative  EXAM: CT HEAD WO CONT    INDICATION: confusion    COMPARISON: 1/19/2020. CONTRAST: None. TECHNIQUE: Unenhanced CT of the head was performed using 5 mm images. Brain and  bone windows were generated. Coronal and sagittal reformats.  CT dose reduction  was achieved through use of a standardized protocol tailored for this  examination and automatic exposure control for dose modulation. FINDINGS:  Generalized prominence of cerebral sulci and ventricles is mildly increased,  upper normal to greater than expected for age. Periventricular white matter  low-density is moderate and diffuse, greater than expected for age, with  encephalomalacia in the left posterior temporal and possibly the left frontal  lobes, stable with volume loss. . There is no intracranial hemorrhage,  extra-axial collection, or mass effect. The basilar cisterns are open. No CT  evidence of acute infarct. The bone windows demonstrate no abnormalities. Minimal mucoperiosteal thickening  in the sphenoid air cells. .    Impression  1. No acute intracranial abnormality. 2. Microvascular ischemic and age-related change greater than expected for age. 3. Encephalomalacia in the left frontal and posterior temporal lobes, suggesting  prior old ischemia, stable since January 2021. Results from [place] encounter on 01/18/21    CT HEAD WO CONT    Narrative  EXAM:  CT HEAD WO CONT    INDICATION: Altered mental status. COMPARISON: MRI 1/5/2021. CT 1/1/2021. TECHNIQUE: Axial noncontrast head CT from foramen magnum to vertex. Coronal and  sagittal reformatted images were obtained. CT dose reduction was achieved  through use of a standardized protocol tailored for this examination and  automatic exposure control for dose modulation. Adaptive statistical iterative  reconstruction (ASIR) was utilized. FINDINGS:  There is diffuse age-related parenchymal volume loss. The ventricles  and sulci are age-appropriate without hydrocephalus. There is no mass effect or  midline shift. There is no intracranial hemorrhage or extra-axial fluid  collection. Scattered foci of low attenuation in the periventricular white  matter represent stable chronic microvascular ischemic changes. Chronic infarcts  are unchanged.  There is no new abnormal parenchymal attenuation. The basal  cisterns are patent. The osseous structures are intact. The visualized paranasal sinuses and mastoid  air cells are clear. Impression  IMPRESSION:  No acute intracranial abnormality. Results from [place] encounter on 01/01/21    CT HEAD WO CONT    Narrative  EXAM:  CT HEAD WO CONT    INDICATION: Altered mental status    COMPARISON: MRI 6/8/2018. CT 6/7/2018    TECHNIQUE: Axial noncontrast head CT from foramen magnum to vertex. Coronal and  sagittal reformatted images were obtained. CT dose reduction was achieved  through use of a standardized protocol tailored for this examination and  automatic exposure control for dose modulation. FINDINGS:  There is diffuse age-related parenchymal volume loss. The ventricles  and sulci are age-appropriate without hydrocephalus. There is no mass effect or  midline shift. There is no intracranial hemorrhage or extra-axial fluid  collection. Scattered foci of low attenuation in the periventricular white  matter represent stable chronic microvascular ischemic changes. There are stable  chronic infarcts in the left frontal and parietal lobes. There is no new  abnormal parenchymal attenuation. The basal cisterns are patent. A left scalp lesion measuring 1.6 cm is incompletely characterized. The osseous  structures are intact. The visualized paranasal sinuses and mastoid air cells  are clear. Impression  IMPRESSION:  1. No acute intracranial abnormality. Stable chronic left frontal and parietal  infarcts. 2. Incompletely characterized 1.6 cm left scalp lesion. MRI Results (maximum last 3): Results from [place] encounter on 01/01/21    MRI BRAIN WO CONT    Addendum 1/5/2021  4:05 PM  Addendum: Note that mild DWI hyperintensity associated with the patchy signal  abnormality in the chad is favored to represent T2 shine through, with no  correlate noted on ADC map.     Narrative  EXAM: MRI BRAIN WO CONT    INDICATION: altered mental status, possible UTI, hx of seizure, stroke 3 weeks  ago    COMPARISON: MRI brain 6/8/2018, CT head 1/1/2021. CONTRAST: None. TECHNIQUE:  Multiplanar multisequence acquisition without contrast of the brain. FINDINGS:  Mild generalized parenchymal volume loss with commensurate dilation of the sulci  and ventricular system, unchanged. Scattered periventricular deep white matter  T2/FLAIR hyperintensities, and marked patchy T2/FLAIR hyperintensity in the  chad, consistent with mild to moderate chronic microvascular ischemic disease,  unchanged. There are unchanged chronic infarcts in the left frontal lobe, left  temporo-occipital lobe, and right chad. Small chronic microhemorrhage in the  right thalamus. There is no acute infarct, hemorrhage, extra-axial fluid  collection, or mass effect. There is no cerebellar tonsillar herniation. Expected arterial flow-voids are present. Mild mucosal thickening in the left maxillary sinus without air-fluid level. The  mastoid air cells and middle ears are clear. Bilateral lens implants with  bilateral globe staphylomas. No significant osseous or scalp lesions are  identified. Impression  IMPRESSION:    1. No acute intracranial abnormality. 2. Unchanged mild generalized parenchymal volume loss and mild to moderate  chronic microvascular ischemic disease. Unchanged chronic infarcts in the left  frontal lobe, left temporo-occipital lobe, and right chad. Results from [place] encounter on 06/07/18    MRI BRAIN W WO CONT    Narrative  Clinical indication: Encephalopathy. Technical factors: Diffusion imaging, sagittal T1-weighted, axial load  T1-weighted pre and post 19 cc IV dotarem T2-weighted FLAIR gradient echo  coronal T1-weighted postcontrast no prior. There is no extra-axial fluid collection hemorrhage or shift of the midline.   There is some encephalomalacia seen in the left parietal lobe inferiorly. Diffusion imaging show likely subacute to acute likely ischemic remote infarct  also seen in the left frontal convexity. Remote ischemic major flow voids in the  vessels at the base of the brain are present. Ventricular size is normal for  age. There is no abnormal enhancement or masses her. Impression  IMPRESSION: Area of encephalomalacia likely relate to remote ischemic changes  left frontal convexity, left inferior parietal lobe. Suspect minimal recent  likely subacute ischemic changes medial left temporal lobe. Changes medially in  the left temporal lobe. Lab Review  Lab Results   Component Value Date/Time    WBC 8.9 06/14/2021 09:18 AM    HCT 41.9 06/14/2021 09:18 AM    HGB 13.6 06/14/2021 09:18 AM    PLATELET 027 31/64/1957 09:18 AM     Lab Results   Component Value Date/Time    Sodium 142 06/14/2021 09:18 AM    Potassium 3.7 06/14/2021 09:18 AM    Chloride 107 06/14/2021 09:18 AM    CO2 28 06/14/2021 09:18 AM    Glucose 132 (H) 06/14/2021 09:18 AM    BUN 7 06/14/2021 09:18 AM    Creatinine 0.70 06/14/2021 09:18 AM    Calcium 9.7 06/14/2021 09:18 AM     No components found for: TROPQUANT  No results found for: LORENA    Signed:  Cinthia [id number].  Wilda Msos DO  6/14/2021  3:16 PM

## 2021-06-14 NOTE — PROGRESS NOTES
Midline Insertion and Progress Note    PRE-PROCEDURE VERIFICATION  Correct Procedure: yes  Correct Site:  yes  Temperature: Temp: 98 °F (36.7 °C), Temperature Source: Temp Source: Oral  Recent Labs     06/14/21  0918 06/13/21  1741   BUN  --  8   CREA  --  0.61    219   WBC 8.9 7.8     Allergies: Patient has no known allergies. PROCEDURE DETAIL  A single lumen midline IV catheter was started for desire for reliable access. The following documentation is in addition to the Midline properties in the lines/airways flowsheet :  Xylocaine 1% used intradermally  Lot #: [id number]  Catheter Total Length: 15 (cm)  External Catheter Length: 1 (cm)  Circumference: 35 (cm)  Vein Selection for Midline :right brachial  Complication Related to Insertion: none    Line is okay to use.

## 2021-06-14 NOTE — PROGRESS NOTES
[address] [facility] Adult  Hospitalist Group                                                                                          Hospitalist Progress Note  Emilia Miller MD  Answering service: [phone] from in house phone        Date of Service:  2021  NAME:  Henry Nunes  :  1950  MRN:  [id number]      Admission Summary:   72 AAF with h/o CVA,  SZ, HTN, DM GERD and depression is here for altered mental status. Interval history / Subjective:   Patient seen and examined    She became very agitated when I started talking to her, difficult to understand her speech,got out of bed by herself and walked out of room,code atlas called. She ripped her IV -Im haldol given       Assessment & Plan:     # Encephalopathy vs vascular dementia with behavioral disturbance:  -UA looks clean,urine culture pending. CT head no acute issues, chronic changes seen  -started on empiric unasyn  -EEG pending  -neurology followng    #History of stroke:  -on aspirin and statin    #HTN:  -losartan    -on seroquel 25 mg 4 times PTA  -on keppra     Code status: full  DVT prophylaxis: lovenox    Care Plan discussed with:patient's nurse  Anticipated Disposition: tbd  Anticipated Discharge: tbd     Hospital Problems  Date Reviewed: 2018        Codes Class Noted POA    AMS (altered mental status) ICD-10-CM: R41.82  ICD-9-CM: 780.97  2021 Unknown                Review of Systems:   Unable to obtain      Vital Signs:    Last 24hrs VS reviewed since prior progress note.  Most recent are:  Visit Vitals  /79 (BP 1 Location: Right upper arm)   Pulse 77   Temp 98.1 °F (36.7 °C)   Resp 17   Wt 81.6 kg (180 lb)   SpO2 98%   BMI 30.90 kg/m²       No intake or output data in the 24 hours ending 21 4887     Physical Examination:     I had a face to face encounter with this patient and independently examined them on 2021 as outlined below:          Constitutional:  agitated   ENT:  Oral mucosa moist,EOMI,anicteric sclera   Resp:  she did not let me examine   CV:  she did not let me examine    GI:  did not let me examine    Musculoskeletal:  No LE edema    Neurologic:  Moves all extremities,alert,awake  Pysch: agitated            Data Review:    Review and/or order of clinical lab test  Review and/or order of tests in the radiology section of CPT  Review and/or order of tests in the medicine section of CPT      Labs:     Recent Labs     06/14/21 0918 06/13/21  1741   WBC 8.9 7.8   HGB 13.6 13.0   HCT 41.9 40.4    219     Recent Labs     06/14/21 0918 06/13/21  1741    142   K 3.7 4.1    110*   CO2 28 25   BUN 7 8   CREA 0.70 0.61   * 107*   CA 9.7 9.6     Recent Labs     06/14/21 0918 06/13/21  1741   ALT 11* 10*   AP 76 77   TBILI 0.5 0.5   TP 8.0 7.7   ALB 3.6 3.6   GLOB 4.4* 4.1*     No results for input(s): INR, PTP, APTT, INREXT in the last 72 hours. No results for input(s): FE, TIBC, PSAT, FERR in the last 72 hours. Lab Results   Component Value Date/Time    Folate 7.5 01/02/2021 04:13 AM      No results for input(s): PH, PCO2, PO2 in the last 72 hours.   Recent Labs     06/13/21 1741   TROIQ <0.05     Lab Results   Component Value Date/Time    Cholesterol, total 153 06/09/2018 04:49 AM    HDL Cholesterol 48 06/09/2018 04:49 AM    LDL, calculated 85.8 06/09/2018 04:49 AM    Triglyceride 96 06/09/2018 04:49 AM    CHOL/HDL Ratio 3.2 06/09/2018 04:49 AM     Lab Results   Component Value Date/Time    Glucose (POC) 78 06/14/2021 04:24 PM    Glucose (POC) 311 (H) 06/14/2021 11:58 AM    Glucose (POC) 124 (H) 06/14/2021 07:11 AM    Glucose (POC) 161 (H) 06/13/2021 10:43 PM    Glucose (POC) 112 (H) 01/19/2021 12:24 PM     Lab Results   Component Value Date/Time    Color YELLOW/STRAW 06/13/2021 05:41 PM    Appearance CLEAR 06/13/2021 05:41 PM    Specific gravity 1.007 06/13/2021 05:41 PM    Specific gravity 1.025 05/25/2020 09:11 PM    pH (UA) 7.5 06/13/2021 05:41 PM    Protein Negative 06/13/2021 05:41 PM    Glucose Negative 06/13/2021 05:41 PM    Ketone Negative 06/13/2021 05:41 PM    Bilirubin Negative 06/13/2021 05:41 PM    Urobilinogen 0.2 06/13/2021 05:41 PM    Nitrites Negative 06/13/2021 05:41 PM    Leukocyte Esterase Negative 06/13/2021 05:41 PM    Epithelial cells FEW 06/13/2021 05:41 PM    Bacteria Negative 06/13/2021 05:41 PM    WBC 0-4 06/13/2021 05:41 PM    RBC 0-5 06/13/2021 05:41 PM         Medications Reviewed:     Current Facility-Administered Medications   Medication Dose Route Frequency    aspirin delayed-release tablet 81 mg  81 mg Oral DAILY    atorvastatin (LIPITOR) tablet 20 mg  20 mg Oral DAILY    levETIRAcetam (KEPPRA) tablet 500 mg  500 mg Oral BID    losartan (COZAAR) tablet 50 mg  50 mg Oral DAILY    magnesium oxide (MAG-OX) tablet 400 mg  400 mg Oral DAILY    pantoprazole (PROTONIX) tablet 40 mg  40 mg Oral DAILY    QUEtiapine (SEROquel) tablet 25 mg  25 mg Oral QID    sodium chloride (NS) flush 5-40 mL  5-40 mL IntraVENous Q8H    sodium chloride (NS) flush 5-40 mL  5-40 mL IntraVENous PRN    acetaminophen (TYLENOL) tablet 650 mg  650 mg Oral Q6H PRN    Or    acetaminophen (TYLENOL) suppository 650 mg  650 mg Rectal Q6H PRN    polyethylene glycol (MIRALAX) packet 17 g  17 g Oral DAILY PRN    ondansetron (ZOFRAN ODT) tablet 4 mg  4 mg Oral Q8H PRN    Or    ondansetron (ZOFRAN) injection 4 mg  4 mg IntraVENous Q6H PRN    enoxaparin (LOVENOX) injection 40 mg  40 mg SubCUTAneous DAILY    ampicillin-sulbactam (UNASYN) 1.5 g in 0.9% sodium chloride (MBP/ADV) 50 mL MBP  1.5 g IntraVENous Q6H    glucose chewable tablet 16 g  4 Tablet Oral PRN    dextrose (D50W) injection syrg 12.5-25 g  25-50 mL IntraVENous PRN    glucagon (GLUCAGEN) injection 1 mg  1 mg IntraMUSCular PRN    insulin lispro (HUMALOG) injection   SubCUTAneous AC&HS     ______________________________________________________________________  EXPECTED LENGTH OF STAY: - - -  ACTUAL LENGTH OF STAY:          0                 Paolo Dey MD

## 2021-06-14 NOTE — PROGRESS NOTES
Verbal order with read back given by Dr. David Rondon to modify intravenous Haldol order to intramuscular.

## 2021-06-14 NOTE — ROUTINE PROCESS
TRANSFER - OUT REPORT: 
 
Verbal report given to Urmila MIGUEL(name) on Yael Miguel  being transferred 5S(unit) for routine progression of care Report consisted of patients Situation, Background, Assessment and  
Recommendations(SBAR). Information from the following report(s) SBAR, ED Summary and MAR was reviewed with the receiving nurse. Lines:  
Peripheral IV 06/13/21 Right Wrist (Active) Site Assessment Clean, dry, & intact 06/13/21 2200 Dressing Status Clean, dry, & intact 06/13/21 2200 Opportunity for questions and clarification was provided. Patient transported with: 
 Registered Nurse

## 2021-06-14 NOTE — ROUTINE PROCESS
TRANSFER - IN REPORT: 
 
Verbal report received from [facility] (name) on Pan Resendiz  being received from ED(unit) for routine progression of care Report consisted of patients Situation, Background, Assessment and  
Recommendations(SBAR). Information from the following report(s) ED Summary was reviewed with the receiving nurse. Opportunity for questions and clarification was provided. Assessment completed upon patients arrival to unit and care assumed.

## 2021-06-14 NOTE — ED NOTES
Tried to call report to [facility] for room 576. Called x3 and no one would . 4th time called someone answerd but on hold for 5 mins.  Report given to Priyanka at [phone]
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
## 2021-06-14 NOTE — ED NOTES
Contacted Martin PICKARD, explained about pt not having IV access he said to send pt up to floor and have them call ccu when pt arrives and ask them to look for IV. And to tell RN that is aware of situation and will be by to talk to RN.

## 2021-06-14 NOTE — PROGRESS NOTES
Pt pulled out IV. NP Mr. Hanna was notified and stated nurse needed to call ccu nurse  restarting IV. CCU nurse stated they were busy and could not help. Nursing supervisor was called and stated leaving IV out and waited for patient's daughter coming and will find somebody to restart IV and draw morning  Labs.

## 2021-06-14 NOTE — PROGRESS NOTES
Physical Therapy    Consult received and chart reviewed. Attempted to see patient for PT evaluation, but she is very sleepy and difficult to arouse. Per sitter, she has been sleepy for the last several hours. We will follow up once she is alert and able to participate.     Sukhdev Garcia, PT

## 2021-06-14 NOTE — ROUTINE PROCESS
Pt is agitated and nurse was unable to obtain blood sample. Pt is hard stick and  ER nurses could not obtain blood sample from peripheral vein. Np Mr. Hanna is aware.

## 2021-06-14 NOTE — ROUTINE PROCESS
Primary Nurse Katherine Rosa RN and Julia Nam RN performed a dual skin assessment on this patient No impairment noted Fidel score is 19

## 2021-06-14 NOTE — PROGRESS NOTES
Observation notice provided in writing to patient and/or caregiver as well as verbal explanation of the policy. Patients who are in outpatient status also receive the Observation notice. Patient has received notice and or patient representative has received via secure email, fax, or certified mail based on patient representative's preference. Patient sleeping. Pending assessment by PT/OT. Full assessment by CM to follow.      Theone Hodgkin RN/CRM

## 2021-06-14 NOTE — PROGRESS NOTES
Occupational therapy [id number] -   [phone]    Orders acknowledged and chart reviewed in prep for OT evaluation. Noted patient having midline placed. Will defer and f/u later in AM/PM as able and appropriate. Thank you. Mana King MS, OTR/L

## 2021-06-14 NOTE — FORENSIC NURSE
FNE responded to Code [place]. Mary Kelsey MD at bedside with verbal order for IM Haldol to be given. Patient safely escorted back to room and IM Haldol given. Bed alarm in place and door left open with patient room across from nursing station.

## 2021-06-15 LAB
AMMONIA PLAS-SCNC: 24 UMOL/L
BACTERIA SPEC CULT: NORMAL
COMMENT, HOLDF: NORMAL
EST. AVERAGE GLUCOSE BLD GHB EST-MCNC: 117 MG/DL
GLUCOSE BLD STRIP.AUTO-MCNC: 106 MG/DL (ref 65–117)
GLUCOSE BLD STRIP.AUTO-MCNC: 115 MG/DL (ref 65–117)
GLUCOSE BLD STRIP.AUTO-MCNC: 142 MG/DL (ref 65–117)
GLUCOSE BLD STRIP.AUTO-MCNC: 247 MG/DL (ref 65–117)
GLUCOSE BLD STRIP.AUTO-MCNC: 77 MG/DL (ref 65–117)
HBA1C MFR BLD: 5.7 % (ref 4–5.6)
SAMPLES BEING HELD,HOLD: NORMAL
SERVICE CMNT-IMP: ABNORMAL
SERVICE CMNT-IMP: ABNORMAL
SERVICE CMNT-IMP: NORMAL
TSH SERPL DL<=0.05 MIU/L-ACNC: 1.21 UIU/ML (ref 0.36–3.74)
VIT B12 SERPL-MCNC: 187 PG/ML (ref 193–986)

## 2021-06-15 PROCEDURE — 36415 COLL VENOUS BLD VENIPUNCTURE: CPT

## 2021-06-15 PROCEDURE — 82140 ASSAY OF AMMONIA: CPT

## 2021-06-15 PROCEDURE — 82962 GLUCOSE BLOOD TEST: CPT

## 2021-06-15 PROCEDURE — 84443 ASSAY THYROID STIM HORMONE: CPT

## 2021-06-15 PROCEDURE — 74011250636 HC RX REV CODE- 250/636: Performed by: HOSPITALIST

## 2021-06-15 PROCEDURE — 82607 VITAMIN B-12: CPT

## 2021-06-15 PROCEDURE — 96372 THER/PROPH/DIAG INJ SC/IM: CPT

## 2021-06-15 PROCEDURE — 97165 OT EVAL LOW COMPLEX 30 MIN: CPT

## 2021-06-15 PROCEDURE — 74011250636 HC RX REV CODE- 250/636: Performed by: NURSE PRACTITIONER

## 2021-06-15 PROCEDURE — 99218 HC RM OBSERVATION: CPT

## 2021-06-15 PROCEDURE — 99214 OFFICE O/P EST MOD 30 MIN: CPT | Performed by: NURSE PRACTITIONER

## 2021-06-15 PROCEDURE — 74011636637 HC RX REV CODE- 636/637: Performed by: HOSPITALIST

## 2021-06-15 PROCEDURE — 74011250637 HC RX REV CODE- 250/637: Performed by: HOSPITALIST

## 2021-06-15 RX ORDER — CYANOCOBALAMIN 1000 UG/ML
1000 INJECTION, SOLUTION INTRAMUSCULAR; SUBCUTANEOUS ONCE
Status: COMPLETED | OUTPATIENT
Start: 2021-06-15 | End: 2021-06-15

## 2021-06-15 RX ADMIN — ATORVASTATIN CALCIUM 20 MG: 10 TABLET, FILM COATED ORAL at 10:57

## 2021-06-15 RX ADMIN — ENOXAPARIN SODIUM 40 MG: 40 INJECTION SUBCUTANEOUS at 10:58

## 2021-06-15 RX ADMIN — INSULIN LISPRO 2 UNITS: 100 INJECTION, SOLUTION INTRAVENOUS; SUBCUTANEOUS at 07:12

## 2021-06-15 RX ADMIN — QUETIAPINE FUMARATE 25 MG: 25 TABLET ORAL at 21:59

## 2021-06-15 RX ADMIN — LEVETIRACETAM 500 MG: 500 TABLET ORAL at 12:12

## 2021-06-15 RX ADMIN — LOSARTAN POTASSIUM 50 MG: 50 TABLET, FILM COATED ORAL at 10:57

## 2021-06-15 RX ADMIN — ASPIRIN 81 MG: 81 TABLET, COATED ORAL at 10:58

## 2021-06-15 RX ADMIN — MAGNESIUM GLUCONATE 500 MG ORAL TABLET 400 MG: 500 TABLET ORAL at 10:58

## 2021-06-15 RX ADMIN — QUETIAPINE FUMARATE 25 MG: 25 TABLET ORAL at 17:00

## 2021-06-15 RX ADMIN — LEVETIRACETAM 500 MG: 500 TABLET ORAL at 00:53

## 2021-06-15 RX ADMIN — CYANOCOBALAMIN 1000 MCG: 1000 INJECTION, SOLUTION INTRAMUSCULAR; SUBCUTANEOUS at 12:11

## 2021-06-15 RX ADMIN — PANTOPRAZOLE SODIUM 40 MG: 40 TABLET, DELAYED RELEASE ORAL at 10:58

## 2021-06-15 RX ADMIN — QUETIAPINE FUMARATE 25 MG: 25 TABLET ORAL at 00:53

## 2021-06-15 RX ADMIN — QUETIAPINE FUMARATE 25 MG: 25 TABLET ORAL at 12:12

## 2021-06-15 NOTE — PROGRESS NOTES
Problem: Falls - Risk of  Goal: *Absence of Falls  Description: Document Ange Fung Fall Risk and appropriate interventions in the flowsheet.   Outcome: Progressing Towards Goal  Note: Fall Risk Interventions:  Mobility Interventions: Communicate number of staff needed for ambulation/transfer    Mentation Interventions: Bed/chair exit alarm, Family/sitter at bedside    Medication Interventions: Patient to call before getting OOB    Elimination Interventions: Patient to call for help with toileting needs, Stay With Me (per policy)

## 2021-06-15 NOTE — PROGRESS NOTES
Occupational Therapy Note:     Pt received in bed sleeping. She did not rouse. Pt can be argumentative and combative at times. Due to pt sleeping soundly, will allow pt to rest and will follow up as appropriate.         Val Portillo, OT

## 2021-06-15 NOTE — PROGRESS NOTES
Physical Therapy    Occupational Therapy assessed patient earlier. Patient was walking independently to the bathroom and back with a compensatory but stable, gait pattern. No mobility needs at this time and order completed.   Baldev Shetty

## 2021-06-15 NOTE — PROCEDURES
[address]  EEG    Name:  Blake Huston  MR#:  [id number]  :  1950  ACCOUNT #:  [de-identified]  DATE OF SERVICE:  2021      REQUESTING PHYSICIAN:  Tom Burr DO    HISTORY:  The patient is 62-year-old female who is being evaluated for altered mental status. DESCRIPTION:  This is an 18-channel EEG performed on an awake patient. The dominant posterior background rhythm consists of symmetric well-modulated medium voltage rhythms in the 9-10 Hz frequency range out of the posterior head region. Drowsiness and sleep states were not recorded. Photic stimulation elicits a symmetric driving response. Hyperventilation was not performed. EEG SUMMARY:  Normal study. CLINICAL INTERPRETATION:  This was a normal EEG during wakeful state of the patient. No lateralizing or epileptiform features were noted.         Wendie Tobar MD      [id number]  D:  2021 17:18  T:  2021 22:19  JOB #:  [id number]

## 2021-06-15 NOTE — PROGRESS NOTES
Bedside and Verbal shift change report given to Tommie Frankel RN (oncoming nurse) by Kaye Morales RN (offgoing nurse). Report included the following information SBAR, Kardex, Intake/Output, MAR and Recent Results.

## 2021-06-15 NOTE — PROGRESS NOTES
Neurology Progress Note    Patient ID:  Yaquelin Chuy  [id number]  25 y.o.  1950  Initial HPI:  This is a 72 y.o.  female, we were asked to see for AMS. PMH notable for HTN, DM, depression, GERD, remote L MCA and R pontine infarcts, seizure d/o, prior admission 7/6987 with metabolic encephalopathy associated with UTI. She is admitted with AMS x 1 week according to reports. Patient is unable to add to history due to continued AMS. She was diagnosed with a recent UTI. Per review of the medical record, she has experienced some baseline confusion since her prior strokes. UA is negative this admission. CT head was reviewed without acute process, noted remote L fronto-temporal infarct. No reported seizure activity. She is maintained on LEV 500mg BID PTA for seizure prophylaxis. It appears she is also receiving Seroquel 100mg/daily prior to admission.      Interval 6/16/2021   She is more alert today  She is no longer agitated; very pleasant compared to yesterday. She denies headache, weakness, pain, vision changes. Objective:    All records in [facility] reviewed and noted    ROS:  Unable to obtain     Meds:  Current Facility-Administered Medications   Medication Dose Route Frequency    aspirin delayed-release tablet 81 mg  81 mg Oral DAILY    atorvastatin (LIPITOR) tablet 20 mg  20 mg Oral DAILY    levETIRAcetam (KEPPRA) tablet 500 mg  500 mg Oral BID    losartan (COZAAR) tablet 50 mg  50 mg Oral DAILY    magnesium oxide (MAG-OX) tablet 400 mg  400 mg Oral DAILY    pantoprazole (PROTONIX) tablet 40 mg  40 mg Oral DAILY    QUEtiapine (SEROquel) tablet 25 mg  25 mg Oral QID    sodium chloride (NS) flush 5-40 mL  5-40 mL IntraVENous Q8H    sodium chloride (NS) flush 5-40 mL  5-40 mL IntraVENous PRN    acetaminophen (TYLENOL) tablet 650 mg  650 mg Oral Q6H PRN    Or    acetaminophen (TYLENOL) suppository 650 mg  650 mg Rectal Q6H PRN    polyethylene glycol (MIRALAX) packet 17 g  17 g Oral DAILY PRN    ondansetron (ZOFRAN ODT) tablet 4 mg  4 mg Oral Q8H PRN    Or    ondansetron (ZOFRAN) injection 4 mg  4 mg IntraVENous Q6H PRN    enoxaparin (LOVENOX) injection 40 mg  40 mg SubCUTAneous DAILY    ampicillin-sulbactam (UNASYN) 1.5 g in 0.9% sodium chloride (MBP/ADV) 50 mL MBP  1.5 g IntraVENous Q6H    glucose chewable tablet 16 g  4 Tablet Oral PRN    dextrose (D50W) injection syrg 12.5-25 g  25-50 mL IntraVENous PRN    glucagon (GLUCAGEN) injection 1 mg  1 mg IntraMUSCular PRN    insulin lispro (HUMALOG) injection   SubCUTAneous AC&HS       Imaging:  CT Results (most recent):  Results from Hospital Encounter encounter on 06/13/21    CT HEAD WO CONT    Narrative  EXAM: CT HEAD WO CONT    INDICATION: confusion    COMPARISON: 1/19/2020. CONTRAST: None. TECHNIQUE: Unenhanced CT of the head was performed using 5 mm images. Brain and  bone windows were generated. Coronal and sagittal reformats. CT dose reduction  was achieved through use of a standardized protocol tailored for this  examination and automatic exposure control for dose modulation. FINDINGS:  Generalized prominence of cerebral sulci and ventricles is mildly increased,  upper normal to greater than expected for age. Periventricular white matter  low-density is moderate and diffuse, greater than expected for age, with  encephalomalacia in the left posterior temporal and possibly the left frontal  lobes, stable with volume loss. . There is no intracranial hemorrhage,  extra-axial collection, or mass effect. The basilar cisterns are open. No CT  evidence of acute infarct. The bone windows demonstrate no abnormalities. Minimal mucoperiosteal thickening  in the sphenoid air cells. .    Impression  1. No acute intracranial abnormality. 2. Microvascular ischemic and age-related change greater than expected for age.   3. Encephalomalacia in the left frontal and posterior temporal lobes, suggesting  prior old ischemia, stable since January 2021. Lab Review   Recent Results (from the past 24 hour(s))   CBC WITH AUTOMATED DIFF    Collection Time: 06/14/21  9:18 AM   Result Value Ref Range    WBC 8.9 3.6 - 11.0 K/uL    RBC 4.42 3.80 - 5.20 M/uL    HGB 13.6 11.5 - 16.0 g/dL    HCT 41.9 35.0 - 47.0 %    MCV 94.8 80.0 - 99.0 FL    MCH 30.8 26.0 - 34.0 PG    MCHC 32.5 30.0 - 36.5 g/dL    RDW 14.0 11.5 - 14.5 %    PLATELET 288 982 - 210 K/uL    MPV 10.8 8.9 - 12.9 FL    NRBC 0.0 0  WBC    ABSOLUTE NRBC 0.00 0.00 - 0.01 K/uL    NEUTROPHILS 62 32 - 75 %    LYMPHOCYTES 29 12 - 49 %    MONOCYTES 4 (L) 5 - 13 %    EOSINOPHILS 5 0 - 7 %    BASOPHILS 0 0 - 1 %    IMMATURE GRANULOCYTES 0 0.0 - 0.5 %    ABS. NEUTROPHILS 5.5 1.8 - 8.0 K/UL    ABS. LYMPHOCYTES 2.6 0.8 - 3.5 K/UL    ABS. MONOCYTES 0.4 0.0 - 1.0 K/UL    ABS. EOSINOPHILS 0.4 0.0 - 0.4 K/UL    ABS. BASOPHILS 0.0 0.0 - 0.1 K/UL    ABS. IMM. GRANS. 0.0 0.00 - 0.04 K/UL    DF AUTOMATED     METABOLIC PANEL, COMPREHENSIVE    Collection Time: 06/14/21  9:18 AM   Result Value Ref Range    Sodium 142 136 - 145 mmol/L    Potassium 3.7 3.5 - 5.1 mmol/L    Chloride 107 97 - 108 mmol/L    CO2 28 21 - 32 mmol/L    Anion gap 7 5 - 15 mmol/L    Glucose 132 (H) 65 - 100 mg/dL    BUN 7 6 - 20 MG/DL    Creatinine 0.70 0.55 - 1.02 MG/DL    BUN/Creatinine ratio 10 (L) 12 - 20      GFR est AA >60 >60 ml/min/1.73m2    GFR est non-AA >60 >60 ml/min/1.73m2    Calcium 9.7 8.5 - 10.1 MG/DL    Bilirubin, total 0.5 0.2 - 1.0 MG/DL    ALT (SGPT) 11 (L) 12 - 78 U/L    AST (SGOT) 12 (L) 15 - 37 U/L    Alk.  phosphatase 76 45 - 117 U/L    Protein, total 8.0 6.4 - 8.2 g/dL    Albumin 3.6 3.5 - 5.0 g/dL    Globulin 4.4 (H) 2.0 - 4.0 g/dL    A-G Ratio 0.8 (L) 1.1 - 2.2     GLUCOSE, POC    Collection Time: 06/14/21 11:58 AM   Result Value Ref Range    Glucose (POC) 311 (H) 65 - 117 mg/dL    Performed by Noé Dominguez    GLUCOSE, POC    Collection Time: 06/14/21  4:24 PM   Result Value Ref Range    Glucose (POC) 78 65 - 117 mg/dL    Performed by [id number], POC    Collection Time: 06/14/21 10:00 PM   Result Value Ref Range    Glucose (POC) 126 (H) 65 - 117 mg/dL    Performed by Mohamud Milner    AMMONIA    Collection Time: 06/15/21  5:27 AM   Result Value Ref Range    Ammonia 24 <32 UMOL/L   TSH 3RD GENERATION    Collection Time: 06/15/21  5:27 AM   Result Value Ref Range    TSH 1.21 0.36 - 3.74 uIU/mL   VITAMIN B12    Collection Time: 06/15/21  5:27 AM   Result Value Ref Range    Vitamin B12 187 (L) 193 - 986 pg/mL   SAMPLES BEING HELD    Collection Time: 06/15/21  5:27 AM   Result Value Ref Range    SAMPLES BEING HELD 1pst     COMMENT        Add-on orders for these samples will be processed based on acceptable specimen integrity and analyte stability, which may vary by analyte.    GLUCOSE, POC    Collection Time: 06/15/21  6:01 AM   Result Value Ref Range    Glucose (POC) 247 (H) 65 - 117 mg/dL    Performed by Sharmila Garcia        Exam:  Visit Vitals  /74 (BP 1 Location: Right upper arm, BP Patient Position: At rest)   Pulse 85   Temp 97.4 °F (36.3 °C)   Resp 16   Wt 180 lb (81.6 kg)   SpO2 97%   BMI 30.90 kg/m²     Gen: Well developed  CV: RRR  Lungs: non labored breathing  Abd: non distending  Neuro: A&O x self and location, She was able to tell me the year how unable to tell me her birthday or how old she is , slurred speech  CN II-XII: PERRL, EOMI, face symmetric, tongue/palate midline  Motor: strength 5/5 all four ext  Sensory: intact to LT  Gait: deferred     Assessment:     Patient Active Problem List   Diagnosis Code    DM (diabetes mellitus) ([facility] [address] E11.9    Leukocytosis D72.829    SIRS (systemic inflammatory response syndrome) ([facility]) R65.10    Sinus tachycardia R00.0    Hypertension I10    Diabetes ([facility] [address] E11.9    Anxiety and depression F41.9, F32.9    Seizure disorder ([facility]) G40.909    Vomiting R11.10    Hypokalemia E87.6    Obese E66.9    Lactic acidosis E87.2    Dehydration E86.0    Altered mental status R41.82    Anemia D64.9    Tachycardia R00.0    Abnormal urinalysis R82.90    Sepsis (HCC) A41.9    Acute hypokalemia E87.6    Hypotension I95.9    JOSE (acute kidney injury) ([facility] [address] N17.9    Suprapubic pain R10.2    AMS (altered mental status) R41.82       Plan:   A 30-year-old AAF presented with AMS, which was most likely caused by metabolic encephalopathy with superimposed vascular dementia. CTH is unrevealing.  After reviewing the chart, she appears to be oriented to self at  baseline. The EEG is normal. During the examination, she is able to follow commands , she is very pleasant today, and able to answer more question appropriately. Psych recommend switching keppra to Depakote for a mood stabilizer. I am okay with the switching to Depakote 250mg BID. She has a vitamin B12 deficiency of 187,  1000 mg IM x weekly for four weeks, then monthly after that. Thank you for allowing the Neurology Service the pleasure of participating in the care of your patient. This patient will be discussed with my collaborating care team physician  and she may have further recommendations regarding this patient's care.        Signed:  Storm Rooney NP  6/15/2021  7:48 AM

## 2021-06-15 NOTE — CONSULTS
[address]    Name:  Ji Santana  MR#:  [id number]  :  1950  ACCOUNT #:  [de-identified]  DATE OF SERVICE:  06/15/2021    BEHAVIORAL HEALTH CONSULTATION    CHIEF COMPLAINT:  \"I am fine. \"    HISTORY OF PRESENT ILLNESS:  The patient is a 61-year-old female who is currently being seen in the medical unit for psychiatric consultation for evaluation of dementia and agitation. Her admission to the medical unit is well documented on her chart. She is a poor historian. The patient is only alert and oriented x2. Her past medical history is significant for hypertension, diabetes and GERD. She presented to the emergency room with altered mentation. She also has a diagnosis of depression. She was sleeping initially when I came in, but was able to wake up when I called her name. She states that she is doing fine, but she has been sleeping a lot. She was diagnosed with a recent UTI. Per review of the medical record, she has experienced some baseline confusion since her prior strokes. She states that she is angry because her mother got her to the hospital because her back was hurting, but she tells me that her back is fine now. She has been agitated in the unit; code [place] had been called. She was agitated just this morning and has a hard time answering questions. She also has been irritable to mostly everybody. She had to be given Haldol IM last night yesterday after she ripped her IV. She has been argumentative and combative as per the report of the nursing staff. She tries to get out of the bed. Neurology is following her and probably has vascular dementia. She is currently on Seroquel 25 mg q.i.d. and Keppra for seizures. She denies suicidal ideations, homicidal ideation, auditory or visual hallucinations. PAST MEDICAL HISTORY:  See H and P. PAST PSYCHIATRIC HISTORY:  Reportedly, she has a history of depression. PSYCHOSOCIAL HISTORY:  She is .   She reports that she has no children, but according to her chart, she has a daughter. MENTAL STATUS EXAMINATION:  The patient was currently sleeping when I came in, but was able to wake up when I called her name. She is only alert and oriented to self and place. Speech is garbled. Thought process is confabulation. She denies suicidal ideation, homicidal ideation, auditory or visual hallucinations. Memory is impaired. Intelligence is below average. Insight is poor. Judgment is poor. ASSESSMENT AND PLAN: Ms. Alba Hunter meets the criteria for Delirium most likely caused by encephalopathy with superimposed vascular dementia. She was diagnosed with a recent urinary tract infection, Unasyn was just discontinued. The patient has been drowsy but tends to get agitated and combative when she's awake. I do recommend to change the Keppra to Depakote since Depakote can also help with mood lability, but I would defer this to Neurology and the primary team, and then once she gets the Depakote, we can decrease the Seroquel to b.i.d. dosing if appropriate. Thank you for this kind consult. Please call with questions.       JALEN HERNADEZ NP [email or web address]  D:  06/15/2021 16:45  T:  06/15/2021 18:28  JOB #:  [id number]

## 2021-06-15 NOTE — PROGRESS NOTES
[address] [facility] Adult  Hospitalist Group                                                                                          Hospitalist Progress Note  Lorie Dickerson MD  Answering service: [phone] from in house phone        Date of Service:  6/15/2021  NAME:  Bessy Bravo  :  1950  MRN:  [id number]      Admission Summary:   72 AAF with h/o CVA,  SZ, HTN, DM GERD and depression is here for altered mental status. Interval history / Subjective:   Patient seen and examined    She was slightly more calm today,oriented X herself,hospital  Speaks very rapidly,mostly says \"leave me alone\". Assessment & Plan:     # vascular dementia with behavioral disturbance:  -UA looks clean,urine culture negative. CT head no acute issues, chronic changes seen  -discontinue  unasyn  -EEG wnl yesterday  -neurology following  Psych consulted for management of agitation- she is already on 25 mg 4 times seroquel currently    #History of stroke:  -on aspirin and statin    #HTN:  -losartan    -on seroquel 25 mg 4 times PTA  -on keppra     Code status: full  DVT prophylaxis: lovenox    Care Plan discussed with:patient's nurse  Anticipated Disposition: tbd  Anticipated Discharge: tbd     Hospital Problems  Date Reviewed: 2018        Codes Class Noted POA    AMS (altered mental status) ICD-10-CM: R41.82  ICD-9-CM: 780.97  2021 Unknown                Review of Systems:   Unable to obtain      Vital Signs:    Last 24hrs VS reviewed since prior progress note.  Most recent are:  Visit Vitals  /73 (BP 1 Location: Right upper arm, BP Patient Position: At rest)   Pulse 82   Temp 97.4 °F (36.3 °C)   Resp 16   Wt 81.6 kg (180 lb)   SpO2 96%   BMI 30.90 kg/m²       No intake or output data in the 24 hours ending 06/15/21 1423     Physical Examination:     I had a face to face encounter with this patient and independently examined them on 6/15/2021 as outlined below:          Constitutional:  calm ENT:  Oral mucosa moist,EOMI,anicteric sclera   Resp:  clear to auscultation b/l   CV:  S1,S2 wnl,regular rythym and normal rate    GI:  soft ,non tender    Musculoskeletal:  No LE edema    Neurologic:  Moves all extremities,alert,awake  Pysch: calm            Data Review:    Review and/or order of clinical lab test  Review and/or order of tests in the radiology section of CPT  Review and/or order of tests in the medicine section of CPT      Labs:     Recent Labs     06/14/21 0918 06/13/21  1741   WBC 8.9 7.8   HGB 13.6 13.0   HCT 41.9 40.4    219     Recent Labs     06/14/21 0918 06/13/21  1741    142   K 3.7 4.1    110*   CO2 28 25   BUN 7 8   CREA 0.70 0.61   * 107*   CA 9.7 9.6     Recent Labs     06/14/21 0918 06/13/21 1741   ALT 11* 10*   AP 76 77   TBILI 0.5 0.5   TP 8.0 7.7   ALB 3.6 3.6   GLOB 4.4* 4.1*     No results for input(s): INR, PTP, APTT, INREXT, INREXT in the last 72 hours. No results for input(s): FE, TIBC, PSAT, FERR in the last 72 hours. Lab Results   Component Value Date/Time    Folate 7.5 01/02/2021 04:13 AM      No results for input(s): PH, PCO2, PO2 in the last 72 hours.   Recent Labs     06/13/21 1741   TROIQ <0.05     Lab Results   Component Value Date/Time    Cholesterol, total 153 06/09/2018 04:49 AM    HDL Cholesterol 48 06/09/2018 04:49 AM    LDL, calculated 85.8 06/09/2018 04:49 AM    Triglyceride 96 06/09/2018 04:49 AM    CHOL/HDL Ratio 3.2 06/09/2018 04:49 AM     Lab Results   Component Value Date/Time    Glucose (POC) 106 06/15/2021 11:10 AM    Glucose (POC) 247 (H) 06/15/2021 06:01 AM    Glucose (POC) 126 (H) 06/14/2021 10:00 PM    Glucose (POC) 78 06/14/2021 04:24 PM    Glucose (POC) 311 (H) 06/14/2021 11:58 AM     Lab Results   Component Value Date/Time    Color YELLOW/STRAW 06/13/2021 05:41 PM    Appearance CLEAR 06/13/2021 05:41 PM    Specific gravity 1.007 06/13/2021 05:41 PM    Specific gravity 1.025 05/25/2020 09:11 PM    pH (UA) 7.5 06/13/2021 05:41 PM    Protein Negative 06/13/2021 05:41 PM    Glucose Negative 06/13/2021 05:41 PM    Ketone Negative 06/13/2021 05:41 PM    Bilirubin Negative 06/13/2021 05:41 PM    Urobilinogen 0.2 06/13/2021 05:41 PM    Nitrites Negative 06/13/2021 05:41 PM    Leukocyte Esterase Negative 06/13/2021 05:41 PM    Epithelial cells FEW 06/13/2021 05:41 PM    Bacteria Negative 06/13/2021 05:41 PM    WBC 0-4 06/13/2021 05:41 PM    RBC 0-5 06/13/2021 05:41 PM         Medications Reviewed:     Current Facility-Administered Medications   Medication Dose Route Frequency    aspirin delayed-release tablet 81 mg  81 mg Oral DAILY    atorvastatin (LIPITOR) tablet 20 mg  20 mg Oral DAILY    levETIRAcetam (KEPPRA) tablet 500 mg  500 mg Oral BID    losartan (COZAAR) tablet 50 mg  50 mg Oral DAILY    magnesium oxide (MAG-OX) tablet 400 mg  400 mg Oral DAILY    pantoprazole (PROTONIX) tablet 40 mg  40 mg Oral DAILY    QUEtiapine (SEROquel) tablet 25 mg  25 mg Oral QID    sodium chloride (NS) flush 5-40 mL  5-40 mL IntraVENous Q8H    sodium chloride (NS) flush 5-40 mL  5-40 mL IntraVENous PRN    acetaminophen (TYLENOL) tablet 650 mg  650 mg Oral Q6H PRN    Or    acetaminophen (TYLENOL) suppository 650 mg  650 mg Rectal Q6H PRN    polyethylene glycol (MIRALAX) packet 17 g  17 g Oral DAILY PRN    ondansetron (ZOFRAN ODT) tablet 4 mg  4 mg Oral Q8H PRN    Or    ondansetron (ZOFRAN) injection 4 mg  4 mg IntraVENous Q6H PRN    enoxaparin (LOVENOX) injection 40 mg  40 mg SubCUTAneous DAILY    ampicillin-sulbactam (UNASYN) 1.5 g in 0.9% sodium chloride (MBP/ADV) 50 mL MBP  1.5 g IntraVENous Q6H    glucose chewable tablet 16 g  4 Tablet Oral PRN    dextrose (D50W) injection syrg 12.5-25 g  25-50 mL IntraVENous PRN    glucagon (GLUCAGEN) injection 1 mg  1 mg IntraMUSCular PRN    insulin lispro (HUMALOG) injection   SubCUTAneous AC&HS     ______________________________________________________________________  EXPECTED LENGTH OF STAY: - - -  ACTUAL LENGTH OF STAY:          0                 Delvis Lim MD

## 2021-06-16 LAB
ANION GAP SERPL CALC-SCNC: 7 MMOL/L (ref 5–15)
BUN SERPL-MCNC: 14 MG/DL (ref 6–20)
BUN/CREAT SERPL: 16 (ref 12–20)
CALCIUM SERPL-MCNC: 9.4 MG/DL (ref 8.5–10.1)
CHLORIDE SERPL-SCNC: 107 MMOL/L (ref 97–108)
CO2 SERPL-SCNC: 25 MMOL/L (ref 21–32)
CREAT SERPL-MCNC: 0.86 MG/DL (ref 0.55–1.02)
GLUCOSE BLD STRIP.AUTO-MCNC: 112 MG/DL (ref 65–117)
GLUCOSE BLD STRIP.AUTO-MCNC: 469 MG/DL (ref 65–117)
GLUCOSE BLD STRIP.AUTO-MCNC: 73 MG/DL (ref 65–117)
GLUCOSE BLD STRIP.AUTO-MCNC: 89 MG/DL (ref 65–117)
GLUCOSE BLD STRIP.AUTO-MCNC: >600 MG/DL (ref 65–117)
GLUCOSE SERPL-MCNC: 60 MG/DL (ref 65–100)
POTASSIUM SERPL-SCNC: 4.2 MMOL/L (ref 3.5–5.1)
SERVICE CMNT-IMP: ABNORMAL
SERVICE CMNT-IMP: ABNORMAL
SERVICE CMNT-IMP: NORMAL
SODIUM SERPL-SCNC: 139 MMOL/L (ref 136–145)

## 2021-06-16 PROCEDURE — 74011250637 HC RX REV CODE- 250/637: Performed by: NURSE PRACTITIONER

## 2021-06-16 PROCEDURE — 74011636637 HC RX REV CODE- 636/637: Performed by: HOSPITALIST

## 2021-06-16 PROCEDURE — 74011250636 HC RX REV CODE- 250/636: Performed by: HOSPITALIST

## 2021-06-16 PROCEDURE — 96372 THER/PROPH/DIAG INJ SC/IM: CPT

## 2021-06-16 PROCEDURE — 36415 COLL VENOUS BLD VENIPUNCTURE: CPT

## 2021-06-16 PROCEDURE — 74011250637 HC RX REV CODE- 250/637: Performed by: HOSPITALIST

## 2021-06-16 PROCEDURE — 99218 HC RM OBSERVATION: CPT

## 2021-06-16 PROCEDURE — 80048 BASIC METABOLIC PNL TOTAL CA: CPT

## 2021-06-16 PROCEDURE — 82962 GLUCOSE BLOOD TEST: CPT

## 2021-06-16 RX ORDER — DIVALPROEX SODIUM 250 MG/1
250 TABLET, DELAYED RELEASE ORAL EVERY 12 HOURS
Status: DISCONTINUED | OUTPATIENT
Start: 2021-06-16 | End: 2021-06-17 | Stop reason: HOSPADM

## 2021-06-16 RX ADMIN — DIVALPROEX SODIUM 250 MG: 250 TABLET, DELAYED RELEASE ORAL at 16:59

## 2021-06-16 RX ADMIN — INSULIN LISPRO 12 UNITS: 100 INJECTION, SOLUTION INTRAVENOUS; SUBCUTANEOUS at 12:21

## 2021-06-16 RX ADMIN — PANTOPRAZOLE SODIUM 40 MG: 40 TABLET, DELAYED RELEASE ORAL at 09:34

## 2021-06-16 RX ADMIN — QUETIAPINE FUMARATE 25 MG: 25 TABLET ORAL at 22:12

## 2021-06-16 RX ADMIN — ATORVASTATIN CALCIUM 20 MG: 10 TABLET, FILM COATED ORAL at 09:34

## 2021-06-16 RX ADMIN — QUETIAPINE FUMARATE 25 MG: 25 TABLET ORAL at 17:00

## 2021-06-16 RX ADMIN — ASPIRIN 81 MG: 81 TABLET, COATED ORAL at 09:34

## 2021-06-16 RX ADMIN — LEVETIRACETAM 500 MG: 500 TABLET ORAL at 12:21

## 2021-06-16 RX ADMIN — ENOXAPARIN SODIUM 40 MG: 40 INJECTION SUBCUTANEOUS at 09:34

## 2021-06-16 RX ADMIN — QUETIAPINE FUMARATE 25 MG: 25 TABLET ORAL at 09:34

## 2021-06-16 RX ADMIN — LOSARTAN POTASSIUM 50 MG: 50 TABLET, FILM COATED ORAL at 09:34

## 2021-06-16 RX ADMIN — QUETIAPINE FUMARATE 25 MG: 25 TABLET ORAL at 12:21

## 2021-06-16 RX ADMIN — LEVETIRACETAM 500 MG: 500 TABLET ORAL at 01:22

## 2021-06-16 RX ADMIN — MAGNESIUM GLUCONATE 500 MG ORAL TABLET 400 MG: 500 TABLET ORAL at 09:34

## 2021-06-16 NOTE — PROGRESS NOTES
CHIDI: Discharge plan TBD. The patient lives at home with her son. The patient's daughter Brice Lugo plans on the patient to discharge to home with family to transport when stable for discharge. RUR: N/A    1. The patient is from home and lives with her son. 2. Neurology, Psych, Hospitalist, PT/OT following. 3. The patient plans to discharge home with family to transport when stable for discharge. Observation notice provided in writing to patient and/or caregiver as well as verbal explanation of the policy. Patients who are in outpatient status also receive the Observation notice. Patient has received notice and or patient representative has received via secure email, fax, or certified mail based on patient representative's preference. Care Management Interventions  PCP Verified by CM: Yes  Palliative Care Criteria Met (RRAT>21 & CHF Dx)?: No  Mode of Transport at Discharge: Other (see comment)  MyChart Signup: No  Discharge Durable Medical Equipment: No  Health Maintenance Reviewed: Yes  Physical Therapy Consult: Yes  Occupational Therapy Consult: Yes  Speech Therapy Consult: No  Current Support Network: Other (Lives with son.)  Confirm Follow Up Transport: Family  The Plan for Transition of Care is Related to the Following Treatment Goals : The patient plans to discharge home with home health with family to [facility]. [place] Resource Information Provided?: No  Discharge Location  Discharge Placement: Home with family assistance     Reason for Admission:  Altered Mental Status                   RUR Score:    N/A                 Plan for utilizing home health:    No indications at this time. PCP: First and Last- name:  Gissell Rausch MD, phone: [phone]     Name of Practice:    Are you a current patient: Yes/No: Yes   Approximate date of last visit: Over a year ago.    Can you participate in a virtual visit with your PCP: Yes                    Current Advanced Directive/Advance Care Plan: Full Code      Healthcare Decision Maker:   Click here to complete [address] including selection of the Healthcare Decision Maker Relationship (ie \"Primary\")                             Transition of Care Plan:       The CM met with patient and she was in a state of confusion. CM then called her daughter-, Dante Magallon ([phone])- by phone and the daughter plans for the patient to return home with family care and assistance when stable for discharge. The daughter states that the patient is not at baseline with her mental state and is concerned and would like to speak to the physician. The daughter states that at baseline, the patient was able to perform ADL's and be left alone during the day. The daughter states that she and family would be able to be home with the patient to assist with needs provide supervision for safety. CM to follow up with MD. Plan TBD. CM following for discharge needs.     Sonia Pabon RN/CRM

## 2021-06-16 NOTE — PROGRESS NOTES
Bedside shift change report given to Cami Montoya (oncoming nurse) by Dashawn Barrera (offgoing nurse). Report included the following information SBAR, Kardex, Intake/Output, MAR and Recent Results.

## 2021-06-17 VITALS
BODY MASS INDEX: 30.9 KG/M2 | RESPIRATION RATE: 18 BRPM | WEIGHT: 180 LBS | HEART RATE: 100 BPM | TEMPERATURE: 97.4 F | OXYGEN SATURATION: 100 % | DIASTOLIC BLOOD PRESSURE: 87 MMHG | SYSTOLIC BLOOD PRESSURE: 129 MMHG

## 2021-06-17 LAB
GLUCOSE BLD STRIP.AUTO-MCNC: 123 MG/DL (ref 65–117)
GLUCOSE BLD STRIP.AUTO-MCNC: 208 MG/DL (ref 65–117)
GLUCOSE BLD STRIP.AUTO-MCNC: 87 MG/DL (ref 65–117)
SERVICE CMNT-IMP: ABNORMAL
SERVICE CMNT-IMP: ABNORMAL
SERVICE CMNT-IMP: NORMAL

## 2021-06-17 PROCEDURE — 99218 HC RM OBSERVATION: CPT

## 2021-06-17 PROCEDURE — 74011250637 HC RX REV CODE- 250/637: Performed by: HOSPITALIST

## 2021-06-17 PROCEDURE — 82962 GLUCOSE BLOOD TEST: CPT

## 2021-06-17 PROCEDURE — 74011250637 HC RX REV CODE- 250/637: Performed by: NURSE PRACTITIONER

## 2021-06-17 RX ORDER — DIVALPROEX SODIUM 250 MG/1
250 TABLET, DELAYED RELEASE ORAL EVERY 12 HOURS
Qty: 60 TABLET | Refills: 1 | Status: SHIPPED | OUTPATIENT
Start: 2021-06-17

## 2021-06-17 RX ORDER — LANOLIN ALCOHOL/MO/W.PET/CERES
1000 CREAM (GRAM) TOPICAL DAILY
Qty: 30 TABLET | Refills: 2 | Status: SHIPPED | OUTPATIENT
Start: 2021-06-17

## 2021-06-17 RX ORDER — QUETIAPINE FUMARATE 25 MG/1
25 TABLET, FILM COATED ORAL 2 TIMES DAILY
Qty: 60 TABLET | Refills: 0 | Status: SHIPPED | OUTPATIENT
Start: 2021-06-17

## 2021-06-17 RX ORDER — TRAZODONE HYDROCHLORIDE 50 MG/1
50 TABLET ORAL
Qty: 30 TABLET | Refills: 0 | Status: SHIPPED | OUTPATIENT
Start: 2021-06-17

## 2021-06-17 RX ADMIN — ASPIRIN 81 MG: 81 TABLET, COATED ORAL at 10:33

## 2021-06-17 RX ADMIN — DIVALPROEX SODIUM 250 MG: 250 TABLET, DELAYED RELEASE ORAL at 05:46

## 2021-06-17 RX ADMIN — PANTOPRAZOLE SODIUM 40 MG: 40 TABLET, DELAYED RELEASE ORAL at 10:33

## 2021-06-17 RX ADMIN — QUETIAPINE FUMARATE 25 MG: 25 TABLET ORAL at 10:33

## 2021-06-17 RX ADMIN — ATORVASTATIN CALCIUM 20 MG: 10 TABLET, FILM COATED ORAL at 10:33

## 2021-06-17 RX ADMIN — QUETIAPINE FUMARATE 25 MG: 25 TABLET ORAL at 18:14

## 2021-06-17 RX ADMIN — LOSARTAN POTASSIUM 50 MG: 50 TABLET, FILM COATED ORAL at 10:33

## 2021-06-17 RX ADMIN — DIVALPROEX SODIUM 250 MG: 250 TABLET, DELAYED RELEASE ORAL at 18:15

## 2021-06-17 RX ADMIN — MAGNESIUM GLUCONATE 500 MG ORAL TABLET 400 MG: 500 TABLET ORAL at 10:33

## 2021-06-17 NOTE — PROGRESS NOTES
Bedside and Verbal shift change report given to Colton Camarillo [facility] nurse) by Arma Galeazzi, RN (offgoing nurse). Report included the following information SBAR, Kardex, ED Summary, OR Summary, Procedure Summary, Intake/Output, MAR and Recent Results.

## 2021-06-17 NOTE — PROGRESS NOTES
[address] [facility] Adult  Hospitalist Group                                                                                          Hospitalist Progress Note  Jenny Sanchez MD  Answering service: [phone] from in house phone        Date of Service:  2021  NAME:  Coleen Capone  :  1950  MRN:  [id number]      Admission Summary:   72 AAF with h/o CVA,  SZ, HTN, DM GERD and depression is here for altered mental status. Interval history / Subjective:   Patient seen and examined    She remains calm, denied any headache,nasuea/vomtiing     Assessment & Plan:     # vascular dementia with behavioral disturbance:  -UA looks clean,urine culture negative. CT head no acute issues, chronic changes seen  -discontinued  unasyn  -EEG wnl yesterday  -neurology following  Discussed with psychiatry and neurology team- plan to switch keppra to depakote and decrease seroquel 25 mg TID,discussed with patient's daughter- she said family unable to take patient home till tomorrow    #History of stroke:  -on aspirin and statin    #HTN:  -losartan    -on seroquel 25 mg 4 times PTA  -on keppra  PTA      Glucose level elevated at lunch 600s -received lispro,discussed with RN to check BMP afternoon and finger stick at the same time    Code status: full  DVT prophylaxis: lovenox    Care Plan discussed with:patient's nurse  Anticipated Disposition:home  Anticipated Discharge: likely tomorrow     Hospital Problems  Date Reviewed: 2018        Codes Class Noted POA    AMS (altered mental status) ICD-10-CM: R41.82  ICD-9-CM: 780.97  2021 Unknown                Review of Systems:   As per HPI      Vital Signs:    Last 24hrs VS reviewed since prior progress note.  Most recent are:  Visit Vitals  /69 (BP 1 Location: Right upper arm, BP Patient Position: At rest)   Pulse 92   Temp 97.3 °F (36.3 °C)   Resp 16   Wt 81.6 kg (180 lb)   SpO2 98%   BMI 30.90 kg/m²         Intake/Output Summary (Last 24 hours) at 6/16/2021 2158  Last data filed at 6/16/2021 0850  Gross per 24 hour   Intake 320 ml   Output    Net 320 ml        Physical Examination:     I had a face to face encounter with this patient and independently examined them on 6/16/2021 as outlined below:          Constitutional:  calm   ENT:  Oral mucosa moist,EOMI,anicteric sclera   Resp:  clear to auscultation b/l   CV:  S1,S2 wnl,regular rythym and normal rate    GI:  soft ,non tender    Musculoskeletal:  No LE edema    Neurologic:  Moves all extremities,alert,awake,oriented X 2  Pysch: calm            Data Review:    Review and/or order of clinical lab test  Review and/or order of tests in the radiology section of CPT  Review and/or order of tests in the medicine section of CPT      Labs:     Recent Labs     06/14/21  0918   WBC 8.9   HGB 13.6   HCT 41.9        Recent Labs     06/16/21  1645 06/14/21  0918    142   K 4.2 3.7    107   CO2 25 28   BUN 14 7   CREA 0.86 0.70   GLU 60* 132*   CA 9.4 9.7     Recent Labs     06/14/21  0918   ALT 11*   AP 76   TBILI 0.5   TP 8.0   ALB 3.6   GLOB 4.4*     No results for input(s): INR, PTP, APTT, INREXT, INREXT in the last 72 hours. No results for input(s): FE, TIBC, PSAT, FERR in the last 72 hours. Lab Results   Component Value Date/Time    Folate 7.5 01/02/2021 04:13 AM      No results for input(s): PH, PCO2, PO2 in the last 72 hours. No results for input(s): CPK, CKNDX, TROIQ in the last 72 hours.     No lab exists for component: CPKMB  Lab Results   Component Value Date/Time    Cholesterol, total 153 06/09/2018 04:49 AM    HDL Cholesterol 48 06/09/2018 04:49 AM    LDL, calculated 85.8 06/09/2018 04:49 AM    Triglyceride 96 06/09/2018 04:49 AM    CHOL/HDL Ratio 3.2 06/09/2018 04:49 AM     Lab Results   Component Value Date/Time    Glucose (POC) 89 06/16/2021 09:07 PM    Glucose (POC) 112 06/16/2021 04:43 PM    Glucose (POC) >600 (HH) 06/16/2021 12:12 PM    Glucose (POC) 469 (H) 06/16/2021 12:07 PM    Glucose (POC) 73 06/16/2021 06:38 AM     Lab Results   Component Value Date/Time    Color YELLOW/STRAW 06/13/2021 05:41 PM    Appearance CLEAR 06/13/2021 05:41 PM    Specific gravity 1.007 06/13/2021 05:41 PM    Specific gravity 1.025 05/25/2020 09:11 PM    pH (UA) 7.5 06/13/2021 05:41 PM    Protein Negative 06/13/2021 05:41 PM    Glucose Negative 06/13/2021 05:41 PM    Ketone Negative 06/13/2021 05:41 PM    Bilirubin Negative 06/13/2021 05:41 PM    Urobilinogen 0.2 06/13/2021 05:41 PM    Nitrites Negative 06/13/2021 05:41 PM    Leukocyte Esterase Negative 06/13/2021 05:41 PM    Epithelial cells FEW 06/13/2021 05:41 PM    Bacteria Negative 06/13/2021 05:41 PM    WBC 0-4 06/13/2021 05:41 PM    RBC 0-5 06/13/2021 05:41 PM         Medications Reviewed:     Current Facility-Administered Medications   Medication Dose Route Frequency    divalproex DR (DEPAKOTE) tablet 250 mg  250 mg Oral Q12H    aspirin delayed-release tablet 81 mg  81 mg Oral DAILY    atorvastatin (LIPITOR) tablet 20 mg  20 mg Oral DAILY    losartan (COZAAR) tablet 50 mg  50 mg Oral DAILY    magnesium oxide (MAG-OX) tablet 400 mg  400 mg Oral DAILY    pantoprazole (PROTONIX) tablet 40 mg  40 mg Oral DAILY    QUEtiapine (SEROquel) tablet 25 mg  25 mg Oral QID    sodium chloride (NS) flush 5-40 mL  5-40 mL IntraVENous Q8H    sodium chloride (NS) flush 5-40 mL  5-40 mL IntraVENous PRN    acetaminophen (TYLENOL) tablet 650 mg  650 mg Oral Q6H PRN    Or    acetaminophen (TYLENOL) suppository 650 mg  650 mg Rectal Q6H PRN    polyethylene glycol (MIRALAX) packet 17 g  17 g Oral DAILY PRN    ondansetron (ZOFRAN ODT) tablet 4 mg  4 mg Oral Q8H PRN    Or    ondansetron (ZOFRAN) injection 4 mg  4 mg IntraVENous Q6H PRN    enoxaparin (LOVENOX) injection 40 mg  40 mg SubCUTAneous DAILY    glucose chewable tablet 16 g  4 Tablet Oral PRN    dextrose (D50W) injection syrg 12.5-25 g  25-50 mL IntraVENous PRN    glucagon (GLUCAGEN) injection 1 mg  1 mg IntraMUSCular PRN    insulin lispro (HUMALOG) injection   SubCUTAneous AC&HS     ______________________________________________________________________  EXPECTED LENGTH OF STAY: - - -  ACTUAL LENGTH OF STAY:          0                 Lorie Dickerson MD

## 2021-06-17 NOTE — PROGRESS NOTES
[facility] Adult  Hospitalist Group                                                                                          Hospitalist Progress Note  Maxim Brown MD  Answering service: [phone] from in house phone        Date of Service:  2021  NAME:  Shaun Ahumada  :  1950  MRN:  [id number]      Admission Summary:   72 AAF with h/o CVA,  SZ, HTN, DM GERD and depression is here for altered mental status. Interval history / Subjective:   F/u Vascular dementia  No new issues     Assessment & Plan:     # vascular dementia with behavioral disturbance:  -UA looks clean,urine culture negative. CT head no acute issues, chronic changes seen  -discontinued  unasyn  -EEG wnl   -Appreciate Neurology  Discussed with psychiatry and neurology team- now keppra switched to depakote and decrease seroquel 25 mg TID  -Discharge today    #History of stroke:  -on aspirin and statin    #HTN:  -losartan    Low Vit b12  -replace at discharge      Code status: full  DVT prophylaxis: lovenox    Care Plan discussed with:patient's nurse  Anticipated Disposition:home  Anticipated Discharge: likely tomorrow     Hospital Problems  Date Reviewed: 2021        Codes Class Noted POA    * (Principal) AMS (altered mental status) ICD-10-CM: R41.82  ICD-9-CM: 780.97  2021 Yes                Review of Systems:   As per HPI      Vital Signs:    Last 24hrs VS reviewed since prior progress note.  Most recent are:  Visit Vitals  BP (!) 152/102 (BP 1 Location: Right upper arm)   Pulse 80   Temp 97.3 °F (36.3 °C)   Resp 18   Wt 81.6 kg (180 lb)   SpO2 98%   BMI 30.90 kg/m²       No intake or output data in the 24 hours ending 21 1353     Physical Examination:     I had a face to face encounter with this patient and independently examined them on 2021 as outlined below:          Constitutional:  calm   ENT:  Oral mucosa moist,EOMI,anicteric sclera   Resp:  clear to auscultation b/l   CV:  S1,S2 wnl,regular rythym and normal rate    GI:  soft ,non tender    Musculoskeletal:  No LE edema    Neurologic:  Moves all extremities,alert,awake,oriented X 2  Pysch: calm            Data Review:    Review and/or order of clinical lab test  Review and/or order of tests in the radiology section of CPT  Review and/or order of tests in the medicine section of CPT      Labs:     No results for input(s): WBC, HGB, HCT, PLT, HGBEXT, HCTEXT, PLTEXT, HGBEXT, HCTEXT, PLTEXT in the last 72 hours. Recent Labs     06/16/21  1645      K 4.2      CO2 25   BUN 14   CREA 0.86   GLU 60*   CA 9.4     No results for input(s): ALT, AP, TBIL, TBILI, TP, ALB, GLOB, GGT, AML, LPSE in the last 72 hours. No lab exists for component: SGOT, GPT, AMYP, HLPSE  No results for input(s): INR, PTP, APTT, INREXT, INREXT in the last 72 hours. No results for input(s): FE, TIBC, PSAT, FERR in the last 72 hours. Lab Results   Component Value Date/Time    Folate 7.5 01/02/2021 04:13 AM      No results for input(s): PH, PCO2, PO2 in the last 72 hours. No results for input(s): CPK, CKNDX, TROIQ in the last 72 hours.     No lab exists for component: CPKMB  Lab Results   Component Value Date/Time    Cholesterol, total 153 06/09/2018 04:49 AM    HDL Cholesterol 48 06/09/2018 04:49 AM    LDL, calculated 85.8 06/09/2018 04:49 AM    Triglyceride 96 06/09/2018 04:49 AM    CHOL/HDL Ratio 3.2 06/09/2018 04:49 AM     Lab Results   Component Value Date/Time    Glucose (POC) 208 (H) 06/17/2021 11:33 AM    Glucose (POC) 87 06/17/2021 05:50 AM    Glucose (POC) 89 06/16/2021 09:07 PM    Glucose (POC) 112 06/16/2021 04:43 PM    Glucose (POC) >600 (HH) 06/16/2021 12:12 PM     Lab Results   Component Value Date/Time    Color YELLOW/STRAW 06/13/2021 05:41 PM    Appearance CLEAR 06/13/2021 05:41 PM    Specific gravity 1.007 06/13/2021 05:41 PM    Specific gravity 1.025 05/25/2020 09:11 PM    pH (UA) 7.5 06/13/2021 05:41 PM    Protein Negative 06/13/2021 05:41 PM    Glucose Negative 06/13/2021 05:41 PM    Ketone Negative 06/13/2021 05:41 PM    Bilirubin Negative 06/13/2021 05:41 PM    Urobilinogen 0.2 06/13/2021 05:41 PM    Nitrites Negative 06/13/2021 05:41 PM    Leukocyte Esterase Negative 06/13/2021 05:41 PM    Epithelial cells FEW 06/13/2021 05:41 PM    Bacteria Negative 06/13/2021 05:41 PM    WBC 0-4 06/13/2021 05:41 PM    RBC 0-5 06/13/2021 05:41 PM         Medications Reviewed:     Current Facility-Administered Medications   Medication Dose Route Frequency    divalproex DR (DEPAKOTE) tablet 250 mg  250 mg Oral Q12H    aspirin delayed-release tablet 81 mg  81 mg Oral DAILY    atorvastatin (LIPITOR) tablet 20 mg  20 mg Oral DAILY    losartan (COZAAR) tablet 50 mg  50 mg Oral DAILY    magnesium oxide (MAG-OX) tablet 400 mg  400 mg Oral DAILY    pantoprazole (PROTONIX) tablet 40 mg  40 mg Oral DAILY    QUEtiapine (SEROquel) tablet 25 mg  25 mg Oral QID    sodium chloride (NS) flush 5-40 mL  5-40 mL IntraVENous Q8H    sodium chloride (NS) flush 5-40 mL  5-40 mL IntraVENous PRN    acetaminophen (TYLENOL) tablet 650 mg  650 mg Oral Q6H PRN    Or    acetaminophen (TYLENOL) suppository 650 mg  650 mg Rectal Q6H PRN    polyethylene glycol (MIRALAX) packet 17 g  17 g Oral DAILY PRN    ondansetron (ZOFRAN ODT) tablet 4 mg  4 mg Oral Q8H PRN    Or    ondansetron (ZOFRAN) injection 4 mg  4 mg IntraVENous Q6H PRN    enoxaparin (LOVENOX) injection 40 mg  40 mg SubCUTAneous DAILY    glucose chewable tablet 16 g  4 Tablet Oral PRN    dextrose (D50W) injection syrg 12.5-25 g  25-50 mL IntraVENous PRN    glucagon (GLUCAGEN) injection 1 mg  1 mg IntraMUSCular PRN    insulin lispro (HUMALOG) injection   SubCUTAneous AC&HS     ______________________________________________________________________  EXPECTED LENGTH OF STAY: - - -  ACTUAL LENGTH OF STAY:          Breonna Hamilton MD

## 2021-06-17 NOTE — DISCHARGE INSTRUCTIONS
Discharge Instructions       PATIENT ID: Yisel Saez  MRN: [id number]   YOB: 1950    DATE OF ADMISSION: 6/13/2021 12:08 PM    DATE OF DISCHARGE: 6/17/2021    PRIMARY CARE PROVIDER: Gabriella Duffy MD     ATTENDING PHYSICIAN: Reina Downs MD  DISCHARGING PROVIDER: Jose Montiel MD    To contact this individual call [phone] and ask the  to page. If unavailable ask to be transferred the Adult Hospitalist Department. DISCHARGE DIAGNOSES   Vascular dementia    CONSULTATIONS: IP CONSULT TO HOSPITALIST  IP CONSULT TO NEUROLOGY  IP CONSULT TO PSYCHIATRY    PROCEDURES/SURGERIES: * No surgery found *    PENDING TEST RESULTS:   At the time of discharge the following test results are still pending: none    FOLLOW UP APPOINTMENTS:   Follow-up Information     Follow up With Specialties Details Why Contact Info    Gabriella Duffy MD Family Medicine In 1 week  [address] [place] [address], DO Neurology In 4 weeks  [place] [phone]             ADDITIONAL CARE RECOMMENDATIONS:   Follow up with PMD  Follow up with Neurology    DIET: Cardiac Diet    ACTIVITY: Activity as tolerated      DISCHARGE MEDICATIONS:   See Medication Reconciliation Form    · It is important that you take the medication exactly as they are prescribed. · Keep your medication in the bottles provided by the pharmacist and keep a list of the medication names, dosages, and times to be taken in your wallet. · Do not take other medications without consulting your doctor. NOTIFY YOUR PHYSICIAN FOR ANY OF THE FOLLOWING:   Fever over 101 degrees for 24 hours. Chest pain, shortness of breath, fever, chills, nausea, vomiting, diarrhea, change in mentation, falling, weakness, bleeding. Severe pain or pain not relieved by medications. Or, any other signs or symptoms that you may have questions about.       DISPOSITION:  x Home With:   OT  PT  [facility]  RN       SNF/Inpatient Rehab/LTAC    Independent/assisted living    Hospice    Other:     CDMP Checked:   Yes x     PROBLEM LIST Updated:  Yes x       Signed:   Germán Stevens MD  6/17/2021  1:59 PM

## 2021-06-17 NOTE — PROGRESS NOTES
BMP ordered per Dr. Armando Sarkar following a phone conversation related to her high BG and humalog administration for lunch, BMP showed BG 60 while fingerstick from same time showed , I informed Dr. Armando Sarkar via [employer]

## 2021-06-17 NOTE — DISCHARGE SUMMARY
Discharge Summary       PATIENT ID: Toño Kee  MRN: [id number]   YOB: 1950    DATE OF ADMISSION: 6/13/2021 12:08 PM    DATE OF DISCHARGE: 6/17/2021   PRIMARY CARE PROVIDER: Killian Cunningham MD     ATTENDING PHYSICIAN: Dr Nicki Drake  DISCHARGING PROVIDER: Nicki Drake MD    To contact this individual call [phone] and ask the  to page. If unavailable ask to be transferred the Adult Hospitalist Department. CONSULTATIONS: IP CONSULT TO HOSPITALIST  IP CONSULT TO NEUROLOGY  IP CONSULT TO PSYCHIATRY    PROCEDURES/SURGERIES: * No surgery found *    ADMITTING [address] [place] [address] COURSE:   # vascular dementia with behavioral disturbance:  -UA looks clean,urine culture negative. CT head no acute issues, chronic changes seen  -discontinued  unasyn  -EEG wnl   -Appreciate Neurology  Discussed with psychiatry and neurology team- now keppra switched to depakote and decreased seroquel 25 mg to BID  -Discharge today    #History of stroke:  -on aspirin and statin    #HTN:  -losartan    Low Vit b12  -replace at discharge        [address] / PLAN:      1. Vascular dementia     ADDITIONAL CARE RECOMMENDATIONS:   Follow up with PMD  Follow up with Neurology     PENDING TEST RESULTS:   At the time of discharge the following test results are still pending: none    FOLLOW UP APPOINTMENTS:    Follow-up Information     Follow up With Specialties Details Why Contact Info    Killian Cunningham MD Family Medicine In 1 week  [address] [place] [address] 71, DO Neurology In 4 weeks  [place] [address]  [phone]               DIET: Cardiac Diet    ACTIVITY: Activity as tolerated    DISCHARGE MEDICATIONS:  Current Discharge Medication List      START taking these medications    Details   divalprojose BERKOWITZ (DEPAKOTE) 250 mg tablet Take 1 Tablet by mouth every twelve (12) hours.   Qty: 60 Tablet, Refills: 1  Start date: 6/17/2021         CONTINUE these medications which have NOT CHANGED    Details   pantoprazole (PROTONIX) 40 mg tablet Take 1 Tab by mouth daily. Qty: 30 Tab, Refills: 0      QUEtiapine (SEROquel) 25 mg tablet Take 1 Tab by mouth four (4) times daily. Qty: 100 Tab, Refills: 0      acetaminophen (TYLENOL) 325 mg tablet Take 2 Tabs by mouth every six (6) hours as needed for Fever. Qty: 30 Tab, Refills: 0      ergocalciferol (ERGOCALCIFEROL) 1,250 mcg (50,000 unit) capsule Take 1 Cap by mouth every seven (7) days. Qty: 3 Cap, Refills: 0      magnesium oxide (MAG-OX) 400 mg tablet Take 1 Tab by mouth daily. Qty: 30 Tab, Refills: 0      atorvastatin (LIPITOR) 20 mg tablet Take 1 Tab by mouth daily. Qty: 30 Tab, Refills: 0      losartan (COZAAR) 50 mg tablet Take 1 Tab by mouth daily. Qty: 30 Tab, Refills: 0      aspirin delayed-release 81 mg tablet Take 1 Tab by mouth daily. Qty: 30 Tab, Refills: 0         STOP taking these medications       levETIRAcetam (KEPPRA) 500 mg tablet Comments:   Reason for Stopping:         amoxicillin-clavulanate (AUGMENTIN) 500-125 mg per tablet Comments:   Reason for Stopping:         potassium chloride (KLOR-CON) 10 mEq tablet Comments:   Reason for Stopping:                 NOTIFY YOUR PHYSICIAN FOR ANY OF THE FOLLOWING:   Fever over 101 degrees for 24 hours. Chest pain, shortness of breath, fever, chills, nausea, vomiting, diarrhea, change in mentation, falling, weakness, bleeding. Severe pain or pain not relieved by medications. Or, any other signs or symptoms that you may have questions about.     DISPOSITION:  x  Home With:   OT  PT  HH  RN       Long term SNF/Inpatient Rehab    Independent/assisted living    Hospice    Other:       PATIENT CONDITION AT DISCHARGE:     Functional status    Poor     Deconditioned    x Independent      Cognition     Lucid     Forgetful    x Dementia      Catheters/lines (plus indication)    King     PICC     PEG    x None      Code status   x Full code     DNR      PHYSICAL EXAMINATION AT DISCHARGE:  Please see progress note       CHRONIC MEDICAL DIAGNOSES:  Problem List as of 6/17/2021 Date Reviewed: 6/17/2021        Codes Class Noted - Resolved    Suprapubic pain ICD-10-CM: R10.2  ICD-9-CM: 789.09  1/21/2021 - Present        * (Principal) AMS (altered mental status) ICD-10-CM: R41.82  ICD-9-CM: 780.97  1/21/2021 - Present        Acute hypokalemia ICD-10-CM: E87.6  ICD-9-CM: 276.8  1/18/2021 - Present        Hypotension ICD-10-CM: I95.9  ICD-9-CM: 458.9  1/18/2021 - Present        JOSE (acute kidney injury) ([facility] ICD-10-CM: N17.9  ICD-9-CM: 584.9  1/18/2021 - Present        Dehydration ICD-10-CM: E86.0  ICD-9-CM: 276.51  1/1/2021 - Present        Altered mental status ICD-10-CM: R41.82  ICD-9-CM: 780.97  1/1/2021 - Present        Anemia ICD-10-CM: D64.9  ICD-9-CM: 285.9  1/1/2021 - Present        Tachycardia ICD-10-CM: R00.0  ICD-9-CM: 785.0  1/1/2021 - Present        Abnormal urinalysis ICD-10-CM: R82.90  ICD-9-CM: 791.9  1/1/2021 - Present        Sepsis ([facility] ICD-10-CM: A41.9  ICD-9-CM: 038.9, 995.91  1/1/2021 - Present        Lactic acidosis ICD-10-CM: E87.2  ICD-9-CM: 276.2  10/24/2020 - Present        SIRS (systemic inflammatory response syndrome) ([facility] ICD-10-CM: R65.10  ICD-9-CM: 995.90  9/27/2018 - Present        Sinus tachycardia ICD-10-CM: R00.0  ICD-9-CM: 427.89  9/27/2018 - Present        Hypertension ICD-10-CM: I10  ICD-9-CM: 401.9  Unknown - Present        Diabetes ([facility] ICD-10-CM: E11.9  ICD-9-CM: 250.00  Unknown - Present        Anxiety and depression ICD-10-CM: F41.9, F32.9  ICD-9-CM: 300.00, 311  Unknown - Present        Seizure disorder ([facility] ICD-10-CM: G40.909  ICD-9-CM: 345.90  Unknown - Present        Vomiting ICD-10-CM: R11.10  ICD-9-CM: 787.03  9/27/2018 - Present        Hypokalemia ICD-10-CM: E87.6  ICD-9-CM: 276.8  9/27/2018 - Present        Obese ICD-10-CM: E66.9  ICD-9-CM: 278.00  Unknown - Present        DM (diabetes mellitus) ([facility] ICD-10-CM: E11.9  ICD-9-CM: 250.00  6/7/2018 - Present        Leukocytosis ICD-10-CM: F22.622  ICD-9-CM: 288.60  6/7/2018 - Present        RESOLVED: Encephalopathy acute ICD-10-CM: G93.40  ICD-9-CM: 348.30  6/7/2018 - 9/27/2018        RESOLVED: Seizure ([facility] ICD-10-CM: R56.9  ICD-9-CM: 721.98  6/7/2018 - 9/27/2018        RESOLVED: Aspiration into airway ICD-10-CM: O32.568V  ICD-9-CM: 934.9  6/7/2018 - 9/27/2018        RESOLVED: Renal insufficiency ICD-10-CM: N28.9  ICD-9-CM: 593.9  6/7/2018 - 9/27/2018              Greater than 38 minutes were spent with the patient on counseling and coordination of care    Signed:   Kartik Yeung MD  6/17/2021  1:59 PM   .

## 2021-06-17 NOTE — PROGRESS NOTES
Spiritual Care Assessment/Progress Note  [facility]      NAME: Nate Christie      MRN: [id number]  AGE: 72 y.o.  SEX: female  Evangelical Affiliation: Buddhist   Language: English     6/17/2021     Total Time (in minutes): 55     Spiritual Assessment begun in [facility] through conversation with:         [x]Patient        [] Family    [] Friend(s)        Reason for Consult: Initial/Spiritual assessment, patient floor, Request by staff     Spiritual beliefs: (Please include comment if needed)     [x] Identifies with a janice tradition:  Buddhist        [] Supported by a janice community:            [] Claims no spiritual orientation:           [] Seeking spiritual identity:                [] Adheres to an individual form of spirituality:           [] Not able to assess:                           Identified resources for coping:      [x] Prayer                               [] Music                  [] Guided Imagery     [] Family/friends                 [] Pet visits     [] Devotional reading                         [] Unknown     [] Other:                                               Interventions offered during this visit: (See comments for more details)    Patient Interventions: Affirmation of emotions/emotional suffering, Affirmation of janice, Catharsis/review of pertinent events in supportive environment, Coping skills reviewed/reinforced, Iconic (affirming the presence of God/Higher Power), Prayer (actual), Normalization of emotional/spiritual concerns           Plan of Care:     [] Support spiritual and/or cultural needs    [] Support AMD and/or advance care planning process      [] Support grieving process   [] Coordinate Rites and/or Rituals    [] Coordination with community clergy   [] No spiritual needs identified at this time   [] Detailed Plan of Care below (See Comments)  [] Make referral to Music Therapy  [] Make referral to Pet Therapy     [] Make referral to Addiction services  [] Make referral to [facility]  [] Make referral to Spiritual Care Partner  [] No future visits requested        [x] Follow up upon further referrals     Comments:  visit for initial spiritual assessment.  requested by staff, patient stated she wished to commit suicide and had a plan. Met patient in room 576. Patient reclining in bed, but sat up on edge of bed for this visit. Some eye contact, tearful during visit. Provided spiritual presence and listening as she spoke of her present thoughts, feelings, and concerns. Patient stated her main concern was that she felt tired and weary as though she was dying inside due to family issues and not being able to keep up with all of the demands from her children. Her main concern now is to return home. When asked says she has to get home because she has things to do and says she has to fix dinner for her family and for her mother. Patient also stated that she does not want to live any longer and has thought about ending her life because her family does not care about her. Provided her a safe environment to voice her feelings and, afterward, she said she was feeling much better and consented to prayer. A prayer was offered and she completed the prayer by thanking God for the staff and this  and the help they are providing. She, then, spoke a little about her janice, which led to a discussion of how she uses her janice in times of need. She agreed to allow the staff to assist her and agreed to remain in her room unless someone was there to assist her, but attempted to leave the unit within minutes of this visit. This  returned and continued to provide care and support. She stated she felt better and did not need anything at this time and expressed gratitude for this visit. Staff were with providing care at the conclusion of this visit. Rev.  Kwesi Huerta MDiv, [facility], [facility]   paging service: [phone])

## 2021-06-17 NOTE — PROGRESS NOTES
Music Therapy Assessment  [address] [id number]     1950  72 y.o.  female    Patient Telephone Number: There is no home phone number on file. Islam Affiliation: Jew   Language: English   Patient Active Problem List    Diagnosis Date Noted    Suprapubic pain 01/21/2021    AMS (altered mental status) 01/21/2021    Acute hypokalemia 01/18/2021    Hypotension 01/18/2021    JOSE (acute kidney injury) ([facility] 01/18/2021    Dehydration 01/01/2021    Altered mental status 01/01/2021    Anemia 01/01/2021    Tachycardia 01/01/2021    Abnormal urinalysis 01/01/2021    Sepsis ([facility] 01/01/2021    Lactic acidosis 10/24/2020    SIRS (systemic inflammatory response syndrome) ([facility] 09/27/2018    Sinus tachycardia 09/27/2018    Vomiting 09/27/2018    Hypokalemia 09/27/2018    Hypertension     Diabetes ([facility]     Anxiety and depression     Seizure disorder ([facility]     Obese     DM (diabetes mellitus) ([facility] 06/07/2018    Leukocytosis 06/07/2018        Date: 6/17/2021            Total Time (in minutes): 26          [facility] 5S1 ORTHO JOINT    Mental Status:   [x] Alert [  ] Forgetful [x]  Confused  [  ] Minimally responsive  [  ] Sleeping    Communication Status: [  ] Impaired Speech [  ] Nonverbal -N/A    Physical Status:   [  ] Oxygen in use  [  ] Hard of Hearing [  ] Vision Impaired  [x] Ambulatory  [  ] Ambulatory with assistance [  ] Non-ambulatory     Music Preferences, Background: Spirituals and \"oldied but goodies\" from the 60's and 66's.     Clinical Problem addressed: Positive social interaction and reduced agitation    Goal(s) met in session:  Physical/Pain management (Scale of 1-10):    Pre-session rating: Pt denied pain  Post-session rating: PT denied pain  [  ] Increased relaxation   [  ] Affected breathing patterns  [  ] Decreased muscle tension   [  ] Decreased agitation  [  ] Affected heart rate    [  ] Increased alertness     Emotional/Psychological:  [  ] Increased self-expression   [  ] Decreased aggressive behavior   [  ] Decreased feelings of stress  [  ] Discussed healthy coping skills     [  ] Improved mood    [  ] Decreased withdrawn behavior     Social:  [  ] Decreased feelings of isolation/loneliness [x] Positive social interaction   [  ] Provided support and/or comfort for family/friends    Spiritual:  [x] Spiritual support    [  ] Expressed peace  [  ] Expressed janice    [  ] Discussed beliefs    Techniques Utilized (Check all that apply):   [  ] Procedural support MT [  ] Music for relaxation [x] Patient preferred music  [  ] Rose analysis  [x] Song choice  [  ] Music for validation  [  ] Entrainment  [x] Movement to music [  ] Guided visualization  [  ] Sonbea Din  [  ] Patient instrument playing [  ] Susan Zhao writing  [x] Sing along   [x] Improvisation  [  ] Sensory stimulation  [  ] Active Listening  [x] Music for spiritual support [  ] Making of CDs as gifts    Session Observations:  Referred by Unit RN; Patient (pt) was alert in the hallway, sitting in her chair when this music therapist eligible (MTE) approached her. MTE introduced self and asked how she was feeling. She presented with confusion and agitation throughout the session, but responded to this saying she \"was doing fine\". MTE asked if she'd be open to a music therapy session and she agreed to this. A nurse assisted the pt into her room, and MTE sat at pt's chairside. Pt said she enjoyed spirituals and MTE offered song options. Pt chose How Great Thou Art and Sweet By and By. MTE sang and played these with [employer]. Pt increased self-expression in response to the music as evidenced by (AEB) singing along with the MTE. The MTE noticed the pt increased in agitation AEB wanting to get out of her seat and find her nurse. MTE offered the pt an eggshaker, and encouraged her to stay seated and play along with the music. MTE sang and played [employer]aceyefren Carson [employer] with [employer].  Pt further increased self-expression in response to the music AEB singing and playing along with her shaker. MTE sang and played [employer] on Me with guitar, and further encouraged the pt to shake her shaker along with the music. Pt played along during this time, and also had moments singing independently along with the guitar. Pt presented with agitation at the closing of the music AEB trying to get up and leave her room. MTE notified her nurse of this. Once she was seated in her chair, MTE provided the egg shaker again to encourage her to create music and keep her seated. She began singing Lean on Me independently while shaking her shaker in the hallway's chair. MTE exited at this time.      Nai Ivory, Music Therapist Eligible   Spiritual Care Department

## 2021-06-18 ENCOUNTER — PATIENT OUTREACH (OUTPATIENT)
Dept: CASE MANAGEMENT | Age: 71
End: 2021-06-18

## 2021-06-18 NOTE — PROGRESS NOTES
Care Transitions Initial Call    Call within 2 business days of discharge: Yes     Patient: Sherif Arce Patient : 1950 MRN: [id number]    Last Discharge [address]       Complaint Diagnosis Description Type Department Provider    21 Altered mental status Confusion . .. ED to Hosp-Admission (Discharged) (ADMIT) [id number] Aretha Guerrero MD; Dereck Mejia DO;. .. Was this an external facility discharge? No     Challenges to be reviewed by the provider   Additional needs identified to be addressed with provider: yes  advance care planning- Patient does not have a n ACP or HIPAA on file. She may have 1 on Dr Skylar Evans. Method of communication with provider : none    Discussed COVID-19 related testing which was not done at this time. Test results were not done. Patient informed of results, if available? no     Advance Care Planning:   Does patient have an Advance Directive: not on file. Inpatient Readmission Risk score: Unplanned Readmit Risk Score: 32    Was this a readmission? no   Patient stated reason for the admission:     Patients top risk factors for readmission: functional cognitive ability, falls and lack of knowledge about disease   Interventions to address risk factors: Scheduled appointment with [facility] [place] office and waiting on return call and Assessment and support for treatment adherence and medication management-dementia    Care Transition Nurse (CTN) contacted the family by telephone to perform post hospital discharge assessment. Verified name and  with family as identifiers. Provided introduction to self, and explanation of the CTN role. CTN reviewed discharge instructions, medical action plan and red flags with family who verbalized understanding. Were discharge instructions available to patient? yes. Reviewed appropriate site of care based on symptoms and resources available to patient including: PCP.  Family given an opportunity to ask questions and does not have any further questions or concerns at this time. The family agrees to contact the PCP office for questions related to their healthcare. Medication reconciliation was performed with family, who verbalizes understanding of administration of home medications. Referral to Pharm D needed: no     Home Health/Outpatient orders at discharge: none    Durable Medical Equipment ordered at discharge: None    Covid Risk Education- did not get to discuss COVID or Vaccines    EDiscussed follow-up appointments. If no appointment was previously scheduled, appointment scheduling offered: yes. Is follow up appointment scheduled within 7 days of discharge? Working on appt with PCP office. [facility] follow up appointment(s): No future appointments. [facility] follow up appointment(s): Dr Sarah Rhodes PCP    Plan for follow-up call in 5-7 days based on severity of symptoms and risk factors. Plan for next call: self management-Fall and flight risk and follow up appointment-Working on appt with PCP office  CTN provided contact information for future needs. Goals Addressed                 This Visit's Progress     Attends follow-up appointments as directed. 06/18/21   -Patient does not have an [facility] appt scheduled with PCP office. Called Dr Latonia Hernandez office for [place] appt, next available is week of 6/28 which is at least 11 days out. Staff sending message to provider. Monitor status of PCP appt on next call. Laurence Pandey MSN, RN, CCM / Care Transition Nurse / [phone]        Understands red flags post discharge. 06/18/21   -Called and spoke to patient daughter William Cantu who states that she is her mother's POA, her mother is sleeping, patient does not have an HIPAA on chart.     -William Cantu is upset that her mother was not started on any medication for dementia when she was admitted. Daughter wanted me to call Hospitalist and voice her concerns. Patient daughter was unable to make an appt with PCP office.   I have called office  to request an appt and to let them know of daughter being upset about no dementia medications being started while patient was admitted      -Patient is now living with daughter Ofelia Jimenez and her wife in [place]. Working on getting prior medications from [place] transferred to [place] drug store.      -Patient has fallen in past and is a flight risk per daughter.      -Patient is eating and drinking well. Monitor mental status, PO intake, further falls? Any unexpected leaving home? On next call.   Lacie Miller MSN, RN, CCM / Care Transition Nurse / [phone]

## 2021-06-20 ENCOUNTER — HOSPITAL ENCOUNTER (OUTPATIENT)
Age: 71
Setting detail: OBSERVATION
Discharge: HOME | End: 2021-06-21
Attending: EMERGENCY MEDICINE | Admitting: INTERNAL MEDICINE
Payer: MEDICARE

## 2021-06-20 ENCOUNTER — APPOINTMENT (OUTPATIENT)
Dept: GENERAL RADIOLOGY | Age: 71
End: 2021-06-20
Attending: EMERGENCY MEDICINE
Payer: MEDICARE

## 2021-06-20 ENCOUNTER — APPOINTMENT (OUTPATIENT)
Dept: CT IMAGING | Age: 71
End: 2021-06-20
Attending: EMERGENCY MEDICINE
Payer: MEDICARE

## 2021-06-20 DIAGNOSIS — F01.518 VASCULAR DEMENTIA WITH BEHAVIOR DISTURBANCE: Primary | ICD-10-CM

## 2021-06-20 PROBLEM — R41.82 CHANGE IN MENTAL STATUS: Status: ACTIVE | Noted: 2021-06-20

## 2021-06-20 LAB
ALBUMIN SERPL-MCNC: 3.2 G/DL (ref 3.5–5)
ALBUMIN/GLOB SERPL: 0.8 {RATIO} (ref 1.1–2.2)
ALP SERPL-CCNC: 68 U/L (ref 45–117)
ALT SERPL-CCNC: 10 U/L (ref 12–78)
ANION GAP SERPL CALC-SCNC: 4 MMOL/L (ref 5–15)
APPEARANCE UR: CLEAR
AST SERPL-CCNC: 9 U/L (ref 15–37)
BACTERIA URNS QL MICRO: NEGATIVE /HPF
BASOPHILS # BLD: 0 K/UL (ref 0–0.1)
BASOPHILS NFR BLD: 1 % (ref 0–1)
BILIRUB SERPL-MCNC: 0.3 MG/DL (ref 0.2–1)
BILIRUB UR QL: NEGATIVE
BUN SERPL-MCNC: 14 MG/DL (ref 6–20)
BUN/CREAT SERPL: 19 (ref 12–20)
CALCIUM SERPL-MCNC: 8.8 MG/DL (ref 8.5–10.1)
CHLORIDE SERPL-SCNC: 106 MMOL/L (ref 97–108)
CO2 SERPL-SCNC: 29 MMOL/L (ref 21–32)
COLOR UR: NORMAL
CREAT SERPL-MCNC: 0.74 MG/DL (ref 0.55–1.02)
DIFFERENTIAL METHOD BLD: ABNORMAL
EOSINOPHIL # BLD: 0.3 K/UL (ref 0–0.4)
EOSINOPHIL NFR BLD: 5 % (ref 0–7)
EPITH CASTS URNS QL MICRO: NORMAL /LPF
ERYTHROCYTE [DISTWIDTH] IN BLOOD BY AUTOMATED COUNT: 14 % (ref 11.5–14.5)
GLOBULIN SER CALC-MCNC: 4.2 G/DL (ref 2–4)
GLUCOSE SERPL-MCNC: 134 MG/DL (ref 65–100)
GLUCOSE UR STRIP.AUTO-MCNC: NEGATIVE MG/DL
HCT VFR BLD AUTO: 36.4 % (ref 35–47)
HGB BLD-MCNC: 11.7 G/DL (ref 11.5–16)
HGB UR QL STRIP: NEGATIVE
HYALINE CASTS URNS QL MICRO: NORMAL /LPF (ref 0–5)
IMM GRANULOCYTES # BLD AUTO: 0 K/UL (ref 0–0.04)
IMM GRANULOCYTES NFR BLD AUTO: 0 % (ref 0–0.5)
KETONES UR QL STRIP.AUTO: NEGATIVE MG/DL
LEUKOCYTE ESTERASE UR QL STRIP.AUTO: NEGATIVE
LYMPHOCYTES # BLD: 1.9 K/UL (ref 0.8–3.5)
LYMPHOCYTES NFR BLD: 31 % (ref 12–49)
MCH RBC QN AUTO: 31 PG (ref 26–34)
MCHC RBC AUTO-ENTMCNC: 32.1 G/DL (ref 30–36.5)
MCV RBC AUTO: 96.3 FL (ref 80–99)
MONOCYTES # BLD: 0.5 K/UL (ref 0–1)
MONOCYTES NFR BLD: 7 % (ref 5–13)
NEUTS SEG # BLD: 3.4 K/UL (ref 1.8–8)
NEUTS SEG NFR BLD: 56 % (ref 32–75)
NITRITE UR QL STRIP.AUTO: NEGATIVE
NRBC # BLD: 0 K/UL (ref 0–0.01)
NRBC BLD-RTO: 0 PER 100 WBC
PH UR STRIP: 5.5 [PH] (ref 5–8)
PLATELET # BLD AUTO: 170 K/UL (ref 150–400)
PMV BLD AUTO: 10.7 FL (ref 8.9–12.9)
POTASSIUM SERPL-SCNC: 4.2 MMOL/L (ref 3.5–5.1)
PROT SERPL-MCNC: 7.4 G/DL (ref 6.4–8.2)
PROT UR STRIP-MCNC: NEGATIVE MG/DL
RBC # BLD AUTO: 3.78 M/UL (ref 3.8–5.2)
RBC #/AREA URNS HPF: NORMAL /HPF (ref 0–5)
SODIUM SERPL-SCNC: 139 MMOL/L (ref 136–145)
SP GR UR REFRACTOMETRY: 1.01 (ref 1–1.03)
UA: UC IF INDICATED,UAUC: NORMAL
UROBILINOGEN UR QL STRIP.AUTO: 0.2 EU/DL (ref 0.2–1)
WBC # BLD AUTO: 6.2 K/UL (ref 3.6–11)
WBC URNS QL MICRO: NORMAL /HPF (ref 0–4)

## 2021-06-20 PROCEDURE — 81001 URINALYSIS AUTO W/SCOPE: CPT

## 2021-06-20 PROCEDURE — 96376 TX/PRO/DX INJ SAME DRUG ADON: CPT

## 2021-06-20 PROCEDURE — 70450 CT HEAD/BRAIN W/O DYE: CPT

## 2021-06-20 PROCEDURE — 36415 COLL VENOUS BLD VENIPUNCTURE: CPT

## 2021-06-20 PROCEDURE — 74011250636 HC RX REV CODE- 250/636

## 2021-06-20 PROCEDURE — 74011250636 HC RX REV CODE- 250/636: Performed by: EMERGENCY MEDICINE

## 2021-06-20 PROCEDURE — 99218 HC RM OBSERVATION: CPT

## 2021-06-20 PROCEDURE — 71045 X-RAY EXAM CHEST 1 VIEW: CPT

## 2021-06-20 PROCEDURE — 96375 TX/PRO/DX INJ NEW DRUG ADDON: CPT

## 2021-06-20 PROCEDURE — 85025 COMPLETE CBC W/AUTO DIFF WBC: CPT

## 2021-06-20 PROCEDURE — 80053 COMPREHEN METABOLIC PANEL: CPT

## 2021-06-20 PROCEDURE — 96374 THER/PROPH/DIAG INJ IV PUSH: CPT

## 2021-06-20 PROCEDURE — 99285 EMERGENCY DEPT VISIT HI MDM: CPT

## 2021-06-20 RX ORDER — ACETAMINOPHEN 650 MG/1
650 SUPPOSITORY RECTAL
Status: DISCONTINUED | OUTPATIENT
Start: 2021-06-20 | End: 2021-06-21 | Stop reason: HOSPADM

## 2021-06-20 RX ORDER — ONDANSETRON 2 MG/ML
4 INJECTION INTRAMUSCULAR; INTRAVENOUS
Status: DISCONTINUED | OUTPATIENT
Start: 2021-06-20 | End: 2021-06-21 | Stop reason: HOSPADM

## 2021-06-20 RX ORDER — HALOPERIDOL 5 MG/ML
5 INJECTION INTRAMUSCULAR
Status: COMPLETED | OUTPATIENT
Start: 2021-06-20 | End: 2021-06-20

## 2021-06-20 RX ORDER — ENOXAPARIN SODIUM 100 MG/ML
40 INJECTION SUBCUTANEOUS DAILY
Status: DISCONTINUED | OUTPATIENT
Start: 2021-06-21 | End: 2021-06-21 | Stop reason: HOSPADM

## 2021-06-20 RX ORDER — LORAZEPAM 2 MG/ML
INJECTION INTRAMUSCULAR
Status: COMPLETED
Start: 2021-06-20 | End: 2021-06-20

## 2021-06-20 RX ORDER — ONDANSETRON 4 MG/1
4 TABLET, ORALLY DISINTEGRATING ORAL
Status: DISCONTINUED | OUTPATIENT
Start: 2021-06-20 | End: 2021-06-21 | Stop reason: HOSPADM

## 2021-06-20 RX ORDER — ACETAMINOPHEN 325 MG/1
650 TABLET ORAL
Status: DISCONTINUED | OUTPATIENT
Start: 2021-06-20 | End: 2021-06-21 | Stop reason: HOSPADM

## 2021-06-20 RX ORDER — SODIUM CHLORIDE 0.9 % (FLUSH) 0.9 %
5-40 SYRINGE (ML) INJECTION EVERY 8 HOURS
Status: DISCONTINUED | OUTPATIENT
Start: 2021-06-20 | End: 2021-06-21 | Stop reason: HOSPADM

## 2021-06-20 RX ORDER — LORAZEPAM 2 MG/ML
0.5 INJECTION INTRAMUSCULAR ONCE
Status: COMPLETED | OUTPATIENT
Start: 2021-06-20 | End: 2021-06-20

## 2021-06-20 RX ORDER — MELATONIN 3 MG
3 CAPSULE ORAL
Qty: 30 CAPSULE | Refills: 0 | Status: SHIPPED | OUTPATIENT
Start: 2021-06-20

## 2021-06-20 RX ORDER — POLYETHYLENE GLYCOL 3350 17 G/17G
17 POWDER, FOR SOLUTION ORAL DAILY PRN
Status: DISCONTINUED | OUTPATIENT
Start: 2021-06-20 | End: 2021-06-21 | Stop reason: HOSPADM

## 2021-06-20 RX ORDER — SODIUM CHLORIDE 0.9 % (FLUSH) 0.9 %
5-40 SYRINGE (ML) INJECTION AS NEEDED
Status: DISCONTINUED | OUTPATIENT
Start: 2021-06-20 | End: 2021-06-21 | Stop reason: HOSPADM

## 2021-06-20 RX ORDER — HALOPERIDOL 5 MG/ML
2 INJECTION INTRAMUSCULAR
Status: DISCONTINUED | OUTPATIENT
Start: 2021-06-20 | End: 2021-06-21 | Stop reason: HOSPADM

## 2021-06-20 RX ADMIN — HALOPERIDOL LACTATE 5 MG: 5 INJECTION, SOLUTION INTRAMUSCULAR at 12:36

## 2021-06-20 RX ADMIN — Medication 10 ML: at 17:06

## 2021-06-20 RX ADMIN — LORAZEPAM 0.5 MG: 2 INJECTION INTRAMUSCULAR; INTRAVENOUS at 12:04

## 2021-06-20 RX ADMIN — LORAZEPAM 0.5 MG: 2 INJECTION INTRAMUSCULAR; INTRAVENOUS at 12:05

## 2021-06-20 RX ADMIN — HALOPERIDOL LACTATE 5 MG: 5 INJECTION, SOLUTION INTRAMUSCULAR at 11:04

## 2021-06-20 NOTE — H&P
Hospitalist Admission Note    NAME: Baron Grossman   :  1950   MRN:  [id number]     Date/Time:  2021 1:40 PM    Patient PCP: Christopher Castillo MD  ______________________________________________________________________  Given the patient's current clinical presentation, I have a high level of concern for decompensation if discharged from the emergency department. Complex decision making was performed, which includes reviewing the patient's available past medical records, laboratory results, and x-ray films. My assessment of this patient's clinical condition and my plan of care is as follows. Assessment / Plan:    Change Mental status most likely secondary to worsening vascular dementia  Admit patient for [facility] management consultation for patient need placement  PT OT evaluation  Patient was discharged 2 days ago from [facility] and EEG was normal and CT showed no acute abnormality  Continue Depakote and Seroquel     HTN   Continue Cozaar    Hyperlipidemiacontinue Lipitor    History of stroke continue aspirin    Code Status: Full   Surrogate Decision Maker:    DVT Prophylaxis: Lovenox   GI Prophylaxis: not indicated          Subjective:   CHIEF COMPLAINT: change mental status     HISTORY OF PRESENT ILLNESS:     72years old female from home with past medical history significant for vascular dementia, hypertension, seizure disorders presented to the hospital for evaluation of worsening mental status, patient was recently discharged from [facility] for the similar complaint other work-up was negative for any acute changes and patient medication was adjusted, blood work in ED show no significant acute abnormality, CT scan was done show no acute abnormality. We were asked to admit for work up and evaluation of the above problems.      Past Medical History:   Diagnosis Date    Anxiety and depression     Diabetes ([address]     Hypertension     Obese     Seizure disorder [facility])         History reviewed. No pertinent surgical history. Social History     Tobacco Use    Smoking status: Unknown If Ever Smoked    Smokeless tobacco: Never Used   Substance Use Topics    Alcohol use: Not on file     Comment: Unknown        Family History   Problem Relation Age of Onset    Lung Cancer Mother     No Known Problems Father      No Known Allergies     Prior to Admission medications    Medication Sig Start Date End Date Taking? Authorizing Provider   melatonin 3 mg cap capsule Take 1 Capsule by mouth nightly. 6/20/21  Yes Liv Maxwell MD   divalproex DR (DEPAKOTE) 250 mg tablet Take 1 Tablet by mouth every twelve (12) hours. 6/17/21   Akil Manning MD   cyanocobalamin (Vitamin B-12) 1,000 mcg tablet Take 1 Tablet by mouth daily. 6/17/21   Akil Manning MD   QUEtiapine (SEROquel) 25 mg tablet Take 1 Tablet by mouth two (2) times a day. 6/17/21   Akil Manning MD   traZODone (DESYREL) 50 mg tablet Take 1 Tablet by mouth nightly. Patient not taking: Reported on 6/18/2021 6/17/21   Akil Manning MD   pantoprazole (PROTONIX) 40 mg tablet Take 1 Tab by mouth daily. Patient not taking: Reported on 6/18/2021 1/29/21   Halima Nice MD   acetaminophen (TYLENOL) 325 mg tablet Take 2 Tabs by mouth every six (6) hours as needed for Fever. 1/29/21   Halima Nice MD   ergocalciferol (ERGOCALCIFEROL) 1,250 mcg (50,000 unit) capsule Take 1 Cap by mouth every seven (7) days. Patient not taking: Reported on 6/18/2021 2/2/21   Halima Nice MD   magnesium oxide (MAG-OX) 400 mg tablet Take 1 Tab by mouth daily. Patient not taking: Reported on 6/18/2021 1/29/21   Halima Nice MD   atorvastatin (LIPITOR) 20 mg tablet Take 1 Tab by mouth daily. Patient not taking: Reported on 6/18/2021 1/29/21   [address], MD   losartan (COZAAR) 50 mg tablet Take 1 Tab by mouth daily.   Patient not taking: Reported on 6/18/2021 1/29/21   Halima Nice MD   aspirin delayed-release 81 mg tablet Take 1 Tab by mouth daily. 10/26/20   Edy Young MD       REVIEW OF SYSTEMS:     I am not able to complete the review of systems because: The patient is intubated and sedated   y The patient has altered mental status due to his acute medical problems    The patient has baseline aphasia from prior stroke(s)   y The patient has baseline dementia and is not reliable historian    The patient is in acute medical distress and unable to provide information           Total of 12 systems reviewed as follows:       POSITIVE= underlined text  Negative = text not underlined  General:  fever, chills, sweats, generalized weakness, weight loss/gain,      loss of appetite   Eyes:    blurred vision, eye pain, loss of vision, double vision  ENT:    rhinorrhea, pharyngitis   Respiratory:   cough, sputum production, SOB, VAL[place]LLO, wheezing, pleuritic pain   Cardiology:   chest pain, palpitations, orthopnea, PND, edema, syncope   Gastrointestinal:  abdominal pain , N/V, diarrhea, dysphagia, constipation, bleeding   Genitourinary:  frequency, urgency, dysuria, hematuria, incontinence   Muskuloskeletal :  arthralgia, myalgia, back pain  Hematology:  easy bruising, nose or gum bleeding, lymphadenopathy   Dermatological: rash, ulceration, pruritis, color change / jaundice  Endocrine:   hot flashes or polydipsia   Neurological:  headache, dizziness, confusion, focal weakness, paresthesia,     Speech difficulties, memory loss, gait difficulty  Psychological: Feelings of anxiety, depression, agitation    Objective:   VITALS:    Visit Vitals  /69   Pulse 87   Temp 97.5 °F (36.4 °C)   Resp 17   Wt 81.6 kg (180 lb)   SpO2 97%   BMI 30.90 kg/m²       PHYSICAL EXAM:    General:    Confused   HEENT: Atraumatic, anicteric sclerae, pink conjunctivae     No oral ulcers, mucosa moist, throat clear, dentition fair  Neck:  Supple, symmetrical,  thyroid: non tender  Lungs:   Clear to auscultation bilaterally. No Wheezing or Rhonchi. No rales. Chest wall:  No tenderness  No Accessory muscle use. Heart:   Regular  rhythm,  No  murmur   No edema  Abdomen:   Soft, non-tender. Not distended. Bowel sounds normal  Extremities: No cyanosis. No clubbing,      Skin turgor normal, Capillary refill normal, Radial dial pulse 2+  Skin:     Not pale. Not Jaundiced  No rashes     Neurologic:confused _______________________________________________________________________  Care Plan discussed with:    Comments   Patient y    Family      RN y    Care Manager                    Consultant:      _______________________________________________________________________  Expected  Disposition:   Home with Family y   HH/PT/OT/RN    SNF/LTC    DEA    ________________________________________________________________________  TOTAL TIME:  54 Minutes    Critical Care Provided     Minutes non procedure based      Comments    y Reviewed previous records   >50% of visit spent in counseling and coordination of care y Discussion with patient and/or family and questions answered       ________________________________________________________________________  Signed: Alyce Mcardle, MD    Procedures: see electronic medical records for all procedures/Xrays and details which were not copied into this note but were reviewed prior to creation of Plan.     LAB DATA REVIEWED:    Recent Results (from the past 24 hour(s))   CBC WITH AUTOMATED DIFF    Collection Time: 06/20/21 10:59 AM   Result Value Ref Range    WBC 6.2 3.6 - 11.0 K/uL    RBC 3.78 (L) 3.80 - 5.20 M/uL    HGB 11.7 11.5 - 16.0 g/dL    HCT 36.4 35.0 - 47.0 %    MCV 96.3 80.0 - 99.0 FL    MCH 31.0 26.0 - 34.0 PG    MCHC 32.1 30.0 - 36.5 g/dL    RDW 14.0 11.5 - 14.5 %    PLATELET 248 711 - 965 K/uL    MPV 10.7 8.9 - 12.9 FL    NRBC 0.0 0  WBC    ABSOLUTE NRBC 0.00 0.00 - 0.01 K/uL    NEUTROPHILS 56 32 - 75 %    LYMPHOCYTES 31 12 - 49 %    MONOCYTES 7 5 - 13 %    EOSINOPHILS 5 0 - 7 %    BASOPHILS 1 0 - 1 % IMMATURE GRANULOCYTES 0 0.0 - 0.5 %    ABS. NEUTROPHILS 3.4 1.8 - 8.0 K/UL    ABS. LYMPHOCYTES 1.9 0.8 - 3.5 K/UL    ABS. MONOCYTES 0.5 0.0 - 1.0 K/UL    ABS. EOSINOPHILS 0.3 0.0 - 0.4 K/UL    ABS. BASOPHILS 0.0 0.0 - 0.1 K/UL    ABS. IMM. GRANS. 0.0 0.00 - 0.04 K/UL    DF AUTOMATED     METABOLIC PANEL, COMPREHENSIVE    Collection Time: 06/20/21 10:59 AM   Result Value Ref Range    Sodium 139 136 - 145 mmol/L    Potassium 4.2 3.5 - 5.1 mmol/L    Chloride 106 97 - 108 mmol/L    CO2 29 21 - 32 mmol/L    Anion gap 4 (L) 5 - 15 mmol/L    Glucose 134 (H) 65 - 100 mg/dL    BUN 14 6 - 20 MG/DL    Creatinine 0.74 0.55 - 1.02 MG/DL    BUN/Creatinine ratio 19 12 - 20      GFR est AA >60 >60 ml/min/1.73m2    GFR est non-AA >60 >60 ml/min/1.73m2    Calcium 8.8 8.5 - 10.1 MG/DL    Bilirubin, total 0.3 0.2 - 1.0 MG/DL    ALT (SGPT) 10 (L) 12 - 78 U/L    AST (SGOT) 9 (L) 15 - 37 U/L    Alk.  phosphatase 68 45 - 117 U/L    Protein, total 7.4 6.4 - 8.2 g/dL    Albumin 3.2 (L) 3.5 - 5.0 g/dL    Globulin 4.2 (H) 2.0 - 4.0 g/dL    A-G Ratio 0.8 (L) 1.1 - 2.2     URINALYSIS W/ REFLEX CULTURE    Collection Time: 06/20/21 10:59 AM    Specimen: Urine   Result Value Ref Range    Color YELLOW/STRAW      Appearance CLEAR CLEAR      Specific gravity 1.014 1.003 - 1.030      pH (UA) 5.5 5.0 - 8.0      Protein Negative NEG mg/dL    Glucose Negative NEG mg/dL    Ketone Negative NEG mg/dL    Bilirubin Negative NEG      Blood Negative NEG      Urobilinogen 0.2 0.2 - 1.0 EU/dL    Nitrites Negative NEG      Leukocyte Esterase Negative NEG      WBC 0-4 0 - 4 /hpf    RBC 0-5 0 - 5 /hpf    Epithelial cells FEW FEW /lpf    Bacteria Negative NEG /hpf    UA:UC IF INDICATED CULTURE NOT INDICATED BY UA RESULT CNI      Hyaline cast 0-2 0 - 5 /lpf

## 2021-06-20 NOTE — PROGRESS NOTES
This CM was informed by ER nurse  that patient would not be discharging, but instead be admitted to observation for placement. Patient family stated they can not take her home and need temporary placement until they can decide long term solution. CM met with family and patient previously at bedside and had long discussion about options. Family initially against any placement. Patient has Medicaid insurance in place. CM placed call to both [facility] and [facility] to inquire about respite care. Both facilities stated that they would review and call back tomorrow if bed available.      SHONA FrazierN, RN, BSW   RN Care Manager

## 2021-06-20 NOTE — ED NOTES
TRANSFER - OUT REPORT:    Verbal report given to MYRIAM Alexander(name) on Irine Call  being transferred to Onc(unit) for routine progression of care       Report consisted of patients Situation, Background, Assessment and   Recommendations(SBAR). Information from the following report(s) SBAR, ED Summary, [facility] and Recent Results was reviewed with the receiving nurse. Lines:   Peripheral IV 06/20/21 Left Antecubital (Active)        Opportunity for questions and clarification was provided.       Patient transported with:   [employer]

## 2021-06-20 NOTE — ED NOTES
Pt arrives from home escorted by daughters for eval after discharge from [facility]. Pt dx with dementia and given Seroquel. Daughters say Pt is more aggressive on the Seroquel. Daughter reports Pt is not sleeping and is wandering around and out of the house.

## 2021-06-20 NOTE — DISCHARGE INSTRUCTIONS
Please try taking two 25 mg of seroquel (total of 50 mg) at night only to help with sleep. You can also try medications such as melatonin which was sent to your pharmacy. Please follow-up with your primary care doctor and neurology doctor. Patient Education        Alzheimer's Disease: Care Instructions  Overview     Alzheimer's disease is a type of dementia. It causes memory loss and affects judgment, language, and behavior. You may have trouble making decisions or may get lost in places that you used to know well. Alzheimer's disease is different than mild memory loss that occurs with aging. It's not clear what causes Alzheimer's disease. It's the most common form of dementia in older adults. Although there is no cure at this time, medicine in some cases may slow memory loss for a while. Other medicines may help with sleep, depression, or behavior changes. Alzheimer's disease is different for everyone. Some people can function well for a long time. In the early stage of the disease, you can do things at home to make life easier and safer. You also can keep doing your hobbies and other activities. Many people find comfort in planning now for their future needs. Follow-up care is a key part of your treatment and safety. Be sure to make and go to all appointments, and call your doctor if you are having problems. It's also a good idea to know your test results and keep a list of the medicines you take. How can you care for yourself at home? Taking care of yourself  · If your doctor gives you medicines, take them exactly as prescribed. Call your doctor if you think you are having a problem with your medicine. You will get more details on the medicines your doctor prescribes. · Eat a balanced diet. Get plenty of whole grains, fruits, and vegetables every day. If you are not hungry at mealtimes, eat snacks at midmorning and in the afternoon.  Try drinks such as Boost, Ensure, or Sustacal if you are having trouble keeping your weight up. · Stay active. Exercise such as walking may slow the decline of your mental abilities. Try to stay active mentally too. Read and work crossword puzzles if you enjoy these activities. · If you have trouble sleeping, do not nap during the day. Get regular exercise (but not within several hours of bedtime). Drink a glass of warm milk or caffeine-free herbal tea before going to bed. · Ask your doctor about support groups and other resources in your area. They can help people who have Alzheimer's disease and their families. · Be patient. You may find that a task takes you longer than it used to. · If you have not already done so, make a list of advance directives. Advance directives are instructions to your doctor and family members about what kind of care you want if you become unable to speak or express yourself. Talk to a  about making a will, if you do not already have one. Keeping schedules  · Develop a routine. You will feel less frustrated or confused if you have a clear, simple plan of what to do every day. ? Make lists of your medicines and when to take them. ? Write down appointments and other tasks in a calendar. ? Put sticky notes around the house to help you remember events and other things you have to do. ? Schedule activities and tasks for times of the day when you are best able to handle them. Staying safe  · Tell someone when you are going out and where you are going. Let the person know when you will be back. Before you go out alone, write down where you are going, how to get there, and how to get back home. Do this even if you have gone there many times before. Take someone along with you when possible. · Make your home safe. Tack down rugs, put no-slip tape in the tub, use handrails, and put safety switches on stoves and appliances. · Have a family member or other caregiver tell you whether you are driving badly.  Deciding to stop driving is very hard for many people. Driving helps you feel independent. Your state 's license bureau can do a driving test if there is any question. Plan for other means of getting around when you are no longer able to drive. · Use strong lighting, especially at night. Put night-lights in bedrooms, hallways, and bathrooms. · Lower the hot water temperature setting to 120°F or lower to avoid burns. When should you call for help? Call 911 anytime you think you may need emergency care. For example, call if:    · You are lost and do not know whom to call.     · You are injured and do not know whom to call. Call your doctor now or seek immediate medical care if:    · Your symptoms suddenly get much worse. Watch closely for changes in your health, and be sure to contact your doctor if:    · You want more information about how you can take care of yourself. Where can you learn more? Go to http://mkCoworkingONdanny.info/  Enter Y179 in the search box to learn more about \"Alzheimer's Disease: Care Instructions. \"  Current as of: September 23, 2020               Content Version: 12.8  © 0566-1260 [employer]. Care instructions adapted under license by [employer] (which disclaims liability or warranty for this information). If you have questions about a medical condition or this instruction, always ask your healthcare professional. [place] [address] any warranty or liability for your use of this information. Patient Education        Dementia: Care Instructions  Your Care Instructions     Dementia is a loss of mental skills that affects your daily life. It is different than the occasional trouble with memory that is part of aging. You may find it hard to remember things that you feel you should be able to remember. Or you may feel that your mind is just not working as well as usual.  Finding out that you have dementia is a shock.  You may be afraid and worried about how the condition will change your life. Although there is no cure at this time, medicine may slow memory loss and improve thinking for a while. Other medicines may be able to help you sleep or cope with depression and behavior changes. Dementia often gets worse slowly. But it can get worse quickly. As dementia gets worse, it may become harder to do common things that take planning, like making a list and going shopping. Over time, the disease may make it hard for you to take care of yourself. Some people with dementia need others to help care for them. Dementia is different for everyone. You may be able to function well for a long time. In the early stage of the condition, you can do things at home to make life easier and safer. You also can keep doing your hobbies and other activities. Many people find comfort in planning now for their future needs. Follow-up care is a key part of your treatment and safety. Be sure to make and go to all appointments, and call your doctor if you are having problems. It's also a good idea to know your test results and keep a list of the medicines you take. How can you care for yourself at home? · Take your medicines exactly as prescribed. Call your doctor if you think you are having a problem with your medicine. · Eat healthy foods. Eat lots of whole grains, fruits, and vegetables every day. If you are not hungry, try snacks or nutritional drinks such as Boost, Ensure, or Sustacal.  · If you have problems sleeping:  ? Try not to nap too close to your bedtime. ? Exercise regularly. Walking is a good choice. ? Try a glass of warm milk or caffeine-free herbal tea before bed. · Do tasks and activities during the time of day when you feel your best. It may help to develop a daily routine. · Post labels, lists, and sticky notes to help you remember things. Write your activities on a calendar you can easily find. Put your clock where you can easily see it. · Stay active.  Take walks in familiar places, or with friends or loved ones. Try to stay active mentally too. Read and work crossword puzzles if you enjoy these activities. · Do not drive unless you can pass an on-road driving test. If you are not sure if you are safe to drive, your state 's license bureau can test you. · Keep a cordless phone and a flashlight with new batteries by your bed. If possible, put a phone in each of the main rooms of your house, or carry a cell phone in case you fall and cannot reach a phone. Or, you can wear a device around your neck or wrist. You push a button that sends a signal for help. Acknowledge your emotions and plan for the future  · Talk openly and honestly with your doctor. · Let yourself grieve. It is common to feel angry, scared, frustrated, anxious, or depressed. · Get emotional support from family, friends, a support group, or a counselor experienced in working with people who have dementia. · Ask for help if you need it. · Tell your doctor how you feel. You may feel upset, angry, or worried at times. Many things can cause this, including poor sleep, medicine side effects, confusion, and pain. Your doctor may be able to help you. · Plan for the future. ? Talk to your family and doctor about preparing a living will and other important papers while you can make decisions. These papers tell your doctors how to care for you at the end of your life. ? Consider naming a person to make decisions about your care if you are not able to. When should you call for help? Call 911 anytime you think you may need emergency care. For example, call if:    · You are lost and do not know whom to call.     · You are injured and do not know whom to call. Call your doctor now or seek immediate medical care if:    · You are more confused or upset than usual.     · You feel like you could hurt yourself because your mind is not working well.    Watch closely for changes in your health, and be sure to contact your doctor if you have any problems. Where can you learn more? Go to http://www.gray.com/  Enter J839 in the search box to learn more about \"Dementia: Care Instructions. \"  Current as of: September 23, 2020               Content Version: 12.8  © 1082-7563 [employer]. Care instructions adapted under license by [employer] (which disclaims liability or warranty for this information). If you have questions about a medical condition or this instruction, always ask your healthcare professional. [place] [address] any warranty or liability for your use of this information. Patient Education        Helping a Person With Alzheimer's Disease: Care Instructions  Your Care Instructions     Alzheimer's disease is a type of dementia. It affects memory, intelligence, judgment, language, and behavior. It is not clear what causes this disease. But it is the most common form of dementia in older adults. It may take many years to develop. Alzheimer's disease is different than mild memory loss that occurs with aging. Family members usually notice symptoms first. But the person also may realize that something is wrong. Follow-up care is a key part of your loved one's treatment and safety. Be sure to make and go to all appointments, and call your doctor if your loved one is having problems. It's also a good idea to know your loved one's test results and keep a list of the medicines he or she takes. How can you care for your loved one at home? · Develop a routine. The person will feel less frustrated or confused with a clear, simple daily plan. Remind him or her about important facts and events. · Be patient. It may take longer for the person to complete a task than it used to. · Help the person eat a balanced diet. Serve plenty of whole grains, fruits, and vegetables every day.  If the person is not eating well at mealtimes, give snacks at midmorning and in the afternoon. Offer drinks such as Boost, Ensure, or Sustacal if he or she is losing weight. · Encourage exercise. Walking and other activity may slow the decline of mental ability. Help the person keep an active mind. Encourage hobbies such as reading and crossword puzzles. · Take steps to help if the person is sundowning. This is the restless behavior and trouble with sleeping that may occur in late afternoon and at night. Try not to let the person nap during the day. Offer a glass of warm milk or caffeine-free tea before bedtime. · Ask family members and friends for help. You may need breaks where others can help care for the person. · Talk to the person's doctor about what resources are available for help in your area. · Review all of the person's medicines with his or her doctor. · For as long as the person is able, allow him or her to make decisions about activities, food, clothing, and other choices. Let the person be independent, even if tasks take more time or are not done perfectly. Tailor tasks to the person's abilities. For example, if cooking is no longer safe, ask for other help. He or she can help set the table or make simple dishes such as a salad. When the person needs help, offer it gently. Keeping safe  · Make your home (or the person's home) safe. Tack down rugs, and put no-slip tape in the tub. Install handrails, and put safety switches on stoves and appliances. Keep rooms free of clutter. Make sure walkways around furniture are clear. Do not move furniture around, because the person may become confused. · Use locks on doors and cupboards. Lock up knives, scissors, medicines, cleaning supplies, and other dangerous things. · Do not let the person drive or cook if he or she cannot do it safely.  A person with Alzheimer's should not drive unless he or she is able to pass an on-road driving test. Your state 's license bureau can do a driving test if there is any question. · Get medical alert jewelry for the person so you can be contacted if he or she wanders away. If possible, provide a safe place for wandering, such as an enclosed yard or garden. When should you call for help? Call 911 anytime you think you may need emergency care. For example, call if:    · A person who has Alzheimer's disease has disappeared.     · A person who has Alzheimer's disease is seriously injured. Call your doctor now or seek immediate medical care if:    · The person you are caring for suddenly sees or hears things that are not there (hallucinates).     · The person you are caring for has a sudden, drastic change in his or her behavior. Watch closely for changes in your loved one's health, and be sure to contact the doctor if:    · A person who has Alzheimer's disease gradually gets worse or has symptoms that could cause injury.     · You need help caring for a person with Alzheimer's disease.     · The person has problems with his or her medicine. Where can you learn more? Go to http://www.gray.com/  Enter W445 in the search box to learn more about \"Helping a Person With Alzheimer's Disease: Care Instructions. \"  Current as of: 2020               Content Version: 12.8   [employer]. Care instructions adapted under license by [employer] (which disclaims liability or warranty for this information). If you have questions about a medical condition or this instruction, always ask your healthcare professional. [place] [address] any warranty or liability for your use of this information.                HOSPITALIST DISCHARGE INSTRUCTIONS    NAME: Kristan Restrepo   :  1950   MRN:  [id number]     Date/Time:  2021 10:19 AM    ADMIT DATE: 2021   DISCHARGE DATE: 2021     Attending Physician: Davonte Blackman NP    DISCHARGE DIAGNOSIS:  Change Mental status most likely secondary to worsening vascular dementia  HTN   Hyperlipidemia  History of stroke      Medications: Per above medication reconciliation. OK to hold seroquel for a few days to see if aggressive behavior stops. Pain Management: per above medications    Recommended diet: Regular Diet    Recommended activity: Activity as tolerated    Wound care: None    Indwelling devices:  None    Supplemental Oxygen: None    Required Lab work: none    Glucose management:  None    Code status: Full      It is important with dementia patients to keep them on a routine and in familiar places. Unfamiliar environments can increase confusion and cause behavioral changes. Outside physician follow up: Follow-up Information     Follow up With Specialties Details Why Contact Info    Nicolasa Luis MD Family Medicine In 3 days  [address]  [address] [facility], [facility] [address], DO Neurology In 2 weeks  [address] [facility] [address]            [facility] to avoid frequent ED visits. [facility] can treat; pains, sprains, cuts, wounds, high fevers, upper respiratory infections and much more. There medical team is equipped with all the tools necessary to provide advanced medical care in the comfort of you home, workplace, or location of need. The medical team consists of doctors, nurse practitioners, and EMTs. [facility] is available 7 days per week 9 am to 9 pm.   Request care by calling [phone] or by going online at [phone] [employer] unar    Information obtained by :  I understand that if any problems occur once I am at home I am to contact my physician. I understand and acknowledge receipt of the instructions indicated above.                                                                                                                                            Physician's or R.N.'s Signature Date/Time                                                                                                                                              Patient or Repres

## 2021-06-20 NOTE — PROGRESS NOTES
Bedside shift change report given to grayson (oncoming nurse) by cami (offgoing nurse). Report included the following information SBAR, Kardex, ED Summary, Intake/Output, MAR and Recent Results. Patient admitted near end of shift. Unable to perform dual skin assessment due to patient aggression. Family left shortly after arrival to unit. If staff attempt to touch or interact with patient, patient will hit and cuss at staff. Patient is very aggressive and unredirectional. Code atlas called. Tele sitter placed in room, patient does not respond to it. Sitter placed in room. Patient attempts to hit and kick sitter. RN called family to see if anyone could stay the night with patient, family said no. Prn haldol ordered from ER MD, complete ekg before administering.

## 2021-06-20 NOTE — ED PROVIDER NOTES
EMERGENCY DEPARTMENT HISTORY AND PHYSICAL EXAM      Date: 6/20/2021  Patient Name: Yael Miguel    History of Presenting Illness     Chief Complaint   Patient presents with    Dementia     Presents to triage via wheelchair, accompanied by family, due to increased confusion and violent behavior. She was admitted to [facility] 6/13 to 6/17 for these sx and was dx with dementia - sent home with Seroquel 25 mg BID and Trazodone for sleep - this isn't helping       History Provided By: Patient and Patient's Daughter    HPI: Yael Miguel, 72 y.o. female presents to the ED with cc of dementia. 70-year-old female presents emergency department with a chief complaint of dementia. Patient is accompanied by her daughter. Patient recently mated to [facility] where she was discharged 2 days ago. Patient had a full work-up there seen by neurology and psychiatry, thought to be secondary to delirium secondary to vascular dementia. Patient was switched to Depakote and Seroquel. Patient taking Seroquel twice daily. Daughter reports patient had a fall after discharge from the hospital, reports increasing confusion. Reports that she was discharged from [facility] without anything to help \"slow down. \"  They believe the medications are making her worse. Aggressive outburst and wandering at home. Especially patient's daughter is at work. Patient has no complaints. There are no other complaints, changes, or physical findings at this time. PCP: Yael Cedeno MD    No current facility-administered medications on file prior to encounter. Current Outpatient Medications on File Prior to Encounter   Medication Sig Dispense Refill    divalproex DR (DEPAKOTE) 250 mg tablet Take 1 Tablet by mouth every twelve (12) hours. 60 Tablet 1    cyanocobalamin (Vitamin B-12) 1,000 mcg tablet Take 1 Tablet by mouth daily. 30 Tablet 2    QUEtiapine (SEROquel) 25 mg tablet Take 1 Tablet by mouth two (2) times a day.  60 Tablet 0    traZODone (DESYREL) 50 mg tablet Take 1 Tablet by mouth nightly. (Patient not taking: Reported on 6/18/2021) 30 Tablet 0    pantoprazole (PROTONIX) 40 mg tablet Take 1 Tab by mouth daily. (Patient not taking: Reported on 6/18/2021) 30 Tab 0    acetaminophen (TYLENOL) 325 mg tablet Take 2 Tabs by mouth every six (6) hours as needed for Fever. 30 Tab 0    ergocalciferol (ERGOCALCIFEROL) 1,250 mcg (50,000 unit) capsule Take 1 Cap by mouth every seven (7) days. (Patient not taking: Reported on 6/18/2021) 3 Cap 0    magnesium oxide (MAG-OX) 400 mg tablet Take 1 Tab by mouth daily. (Patient not taking: Reported on 6/18/2021) 30 Tab 0    atorvastatin (LIPITOR) 20 mg tablet Take 1 Tab by mouth daily. (Patient not taking: Reported on 6/18/2021) 30 Tab 0    losartan (COZAAR) 50 mg tablet Take 1 Tab by mouth daily. (Patient not taking: Reported on 6/18/2021) 30 Tab 0    aspirin delayed-release 81 mg tablet Take 1 Tab by mouth daily. 30 Tab 0       Past History     Past Medical History:  Past Medical History:   Diagnosis Date    Anxiety and depression     Diabetes ([address]     Hypertension     Obese     Seizure disorder ([address]        Past Surgical History:  History reviewed. No pertinent surgical history. Family History:  Family History   Problem Relation Age of Onset    Lung Cancer Mother     No Known Problems Father        Social History:  Social History     Tobacco Use    Smoking status: Unknown If Ever Smoked    Smokeless tobacco: Never Used   Substance Use Topics    Alcohol use: Not on file     Comment: Unknown    Drug use: Not on file       Allergies:  No Known Allergies      Review of Systems   Review of Systems   Unable to perform ROS: Dementia       Physical Exam   Physical Exam  Vitals and nursing note reviewed. Constitutional:       Comments: 72 YOF, resting in bed, no distress   HENT:      Head: Normocephalic and atraumatic.    Cardiovascular:      Rate and Rhythm: Normal rate and regular rhythm. Heart sounds: No murmur heard. No friction rub. No gallop. Pulmonary:      Effort: Pulmonary effort is normal.      Breath sounds: Normal breath sounds. Comments: Not hypoxic  Abdominal:      Palpations: Abdomen is soft. Tenderness: There is no abdominal tenderness. Musculoskeletal:         General: Normal range of motion. Cervical back: Normal range of motion. Skin:     General: Skin is warm. Capillary Refill: Capillary refill takes less than 2 seconds. Neurological:      Mental Status: She is alert. Comments: Pleasantly confused, no focal deficits   Psychiatric:         Mood and Affect: Mood normal.         Diagnostic Study Results     Labs -     Recent Results (from the past 12 hour(s))   CBC WITH AUTOMATED DIFF    Collection Time: 06/20/21 10:59 AM   Result Value Ref Range    WBC 6.2 3.6 - 11.0 K/uL    RBC 3.78 (L) 3.80 - 5.20 M/uL    HGB 11.7 11.5 - 16.0 g/dL    HCT 36.4 35.0 - 47.0 %    MCV 96.3 80.0 - 99.0 FL    MCH 31.0 26.0 - 34.0 PG    MCHC 32.1 30.0 - 36.5 g/dL    RDW 14.0 11.5 - 14.5 %    PLATELET 556 607 - 033 K/uL    MPV 10.7 8.9 - 12.9 FL    NRBC 0.0 0  WBC    ABSOLUTE NRBC 0.00 0.00 - 0.01 K/uL    NEUTROPHILS 56 32 - 75 %    LYMPHOCYTES 31 12 - 49 %    MONOCYTES 7 5 - 13 %    EOSINOPHILS 5 0 - 7 %    BASOPHILS 1 0 - 1 %    IMMATURE GRANULOCYTES 0 0.0 - 0.5 %    ABS. NEUTROPHILS 3.4 1.8 - 8.0 K/UL    ABS. LYMPHOCYTES 1.9 0.8 - 3.5 K/UL    ABS. MONOCYTES 0.5 0.0 - 1.0 K/UL    ABS. EOSINOPHILS 0.3 0.0 - 0.4 K/UL    ABS. BASOPHILS 0.0 0.0 - 0.1 K/UL    ABS. IMM.  GRANS. 0.0 0.00 - 0.04 K/UL    DF AUTOMATED     METABOLIC PANEL, COMPREHENSIVE    Collection Time: 06/20/21 10:59 AM   Result Value Ref Range    Sodium 139 136 - 145 mmol/L    Potassium 4.2 3.5 - 5.1 mmol/L    Chloride 106 97 - 108 mmol/L    CO2 29 21 - 32 mmol/L    Anion gap 4 (L) 5 - 15 mmol/L    Glucose 134 (H) 65 - 100 mg/dL    BUN 14 6 - 20 MG/DL    Creatinine 0.74 0.55 - 1.02 MG/DL BUN/Creatinine ratio 19 12 - 20      GFR est AA >60 >60 ml/min/1.73m2    GFR est non-AA >60 >60 ml/min/1.73m2    Calcium 8.8 8.5 - 10.1 MG/DL    Bilirubin, total 0.3 0.2 - 1.0 MG/DL    ALT (SGPT) 10 (L) 12 - 78 U/L    AST (SGOT) 9 (L) 15 - 37 U/L    Alk. phosphatase 68 45 - 117 U/L    Protein, total 7.4 6.4 - 8.2 g/dL    Albumin 3.2 (L) 3.5 - 5.0 g/dL    Globulin 4.2 (H) 2.0 - 4.0 g/dL    A-G Ratio 0.8 (L) 1.1 - 2.2     URINALYSIS W/ REFLEX CULTURE    Collection Time: 06/20/21 10:59 AM    Specimen: Urine   Result Value Ref Range    Color YELLOW/STRAW      Appearance CLEAR CLEAR      Specific gravity 1.014 1.003 - 1.030      pH (UA) 5.5 5.0 - 8.0      Protein Negative NEG mg/dL    Glucose Negative NEG mg/dL    Ketone Negative NEG mg/dL    Bilirubin Negative NEG      Blood Negative NEG      Urobilinogen 0.2 0.2 - 1.0 EU/dL    Nitrites Negative NEG      Leukocyte Esterase Negative NEG      WBC 0-4 0 - 4 /hpf    RBC 0-5 0 - 5 /hpf    Epithelial cells FEW FEW /lpf    Bacteria Negative NEG /hpf    UA:UC IF INDICATED CULTURE NOT INDICATED BY UA RESULT CNI      Hyaline cast 0-2 0 - 5 /lpf       Radiologic Studies -   XR CHEST PORT   Final Result   No acute cardiopulmonary process. CT HEAD WO CONT   Final Result   No acute intracranial abnormality identified. No significant change. CT Results  (Last 48 hours)               06/20/21 0904  CT HEAD WO CONT Final result    Impression:  No acute intracranial abnormality identified. No significant change. Narrative:  EXAM: CT HEAD WO CONT       INDICATION: fall, h.o dementia, r.o subdural       COMPARISON: 6/13/2021. CONTRAST: None. TECHNIQUE: Unenhanced CT of the head was performed using 5 mm images. Brain and   bone windows were generated. Coronal and sagittal reformats. CT dose reduction   was achieved through use of a standardized protocol tailored for this   examination and automatic exposure control for dose modulation. FINDINGS:   There is prominence of the ventricles and sulci diffusely. There are changes   small vessel disease periventricular white matter. There is an old left frontal   and old left posterior temporal occipital infarct. No hemorrhage mass or   definite acute infarction identified. Study is mildly compromised by motion. .       The bone windows demonstrate no abnormalities. The visualized portions of the   paranasal sinuses and mastoid air cells are clear. CXR Results  (Last 48 hours)               06/20/21 0906  XR CHEST PORT Final result    Impression:  No acute cardiopulmonary process. Narrative:  EXAM:  XR CHEST PORT       INDICATION:   ams       COMPARISON: Chest radiograph 1/13/2021. FINDINGS: AP radiograph of the chest was obtained. No evidence of focal consolidation. No pleural effusion or pneumothorax. Heart,   tammie, mediastinum are within normal limits. No acute osseous abnormalities. Medical Decision Making   I am the first provider for this patient. I reviewed the vital signs, available nursing notes, past medical history, past surgical history, family history and social history. Vital Signs-Reviewed the patient's vital signs. Patient Vitals for the past 12 hrs:   Temp Pulse Resp BP SpO2   06/20/21 0830  87 17 104/69    06/20/21 0814     97 %   06/20/21 0748  84 16 114/69 97 %   06/20/21 0716 97.5 °F (36.4 °C) 100 12 (!) 96/49 100 %     Records Reviewed: Nursing Notes and Old Medical Records    Provider Notes (Medical Decision Making):     43-year-old female presents emergency department with a chief complaint of dementia. Vitals are notable for mild hypotension, this improved prior to my assessment. Given her age, recent admission, new medication, check basic screening labs as well as urinalysis. Given report of fall, will CT head.     Unfortunately had a farooq discussion with the patient's daughter at bedside, given her recent admission to [facility], with a diagnosis of vascular dementia, this is likely more chronic problem, and it appears they are having difficulty managing the side effects of it. Have a low suspicion for organic process underlying the patient's presentation today. Discussed case management for skilled nursing facility placement with daughter who states \"I work in a nursing home, and I know what they do with people, I will take her home. \"    Unfortunately think if ED work-up is negative, I do not believe she will need criteria for inpatient admission. ED Course:   Initial assessment performed. The patients presenting problems have been discussed, and they are in agreement with the care plan formulated and outlined with them. I have encouraged them to ask questions as they arise throughout their visit. ED Course as of Jun 20 1610   Sun Jun 20, 2021   0900 Discussed with care management, as patient has Medicaid, she may qualify for caregivers, however this would be something that would have to be discussed as an outpatient with SW.    [MB]   1010 Discussed with case management, family is adamant that they do not want her in long-term care. Case management discussed that there is no medical admission as otherwise AP social mission for placement. [MB]   2192 Updated patient and family. [MB]   1140 UA is negative for infection. [MB]   1233 We will try another dose of Haldol, patient is still agitated, screaming, unfortunate there is no medical reason for admission. Will discharge during this time. [MB]   1320 Patient feels they cannot care for her, they have requested to speak to case management about placement. Discussed with case management, not possible from the ED. Will discuss with hospitalist for observation. [MB]      ED Course User Index  [MB] MD Issac Frausto MD      Disposition:    Admitted      Diagnosis     Clinical Impression:   1.  Vascular dementia with behavior disturbance [facility])        Attestations:    Alicia Murphy MD    Please note that this dictation was completed with [employer], the computer voice recognition software. Quite often unanticipated grammatical, syntax, homophones, and other interpretive errors are inadvertently transcribed by the computer software. Please disregard these errors. Please excuse any errors that have escaped final proofreading. Thank you.

## 2021-06-20 NOTE — PROGRESS NOTES
CM acknowledged consult for patient due to wandering behaviors and dementia. CM met with patient and her daughters at the bedside to discuss options and resources for patient. Patient was recently discharged from [facility] 3 days ago. Family states that they believe her medication is making her more aggressive. CM gave and explained the following resources for patient   1. [facility]  2. [employer]  3. List agencies of private pay caregivers     CM also encouraged family to contact patient's  at [address] [place] [address] to see if patient can be screened for LTC benefits that would provide caregivers in the home or place patient in LTC facility. Family expressed they would like patient to be observed in hospital. CM explained that if physician did not find a medical reason to admit to hospital patient may be discharged. CM encouraged family to place call to [facility] as well as DSS to obtain screening for caregivers provided through [facility]. CM informed MD of information given to family.      Bartolo Bills, SHONAN, RN, BSW   RN Care Manager

## 2021-06-21 VITALS
SYSTOLIC BLOOD PRESSURE: 104 MMHG | DIASTOLIC BLOOD PRESSURE: 66 MMHG | WEIGHT: 180 LBS | OXYGEN SATURATION: 96 % | RESPIRATION RATE: 20 BRPM | HEART RATE: 75 BPM | HEIGHT: 64 IN | TEMPERATURE: 97.8 F | BODY MASS INDEX: 30.73 KG/M2

## 2021-06-21 PROCEDURE — 96372 THER/PROPH/DIAG INJ SC/IM: CPT

## 2021-06-21 PROCEDURE — 74011250637 HC RX REV CODE- 250/637: Performed by: INTERNAL MEDICINE

## 2021-06-21 PROCEDURE — 99218 HC RM OBSERVATION: CPT

## 2021-06-21 PROCEDURE — 74011250636 HC RX REV CODE- 250/636: Performed by: INTERNAL MEDICINE

## 2021-06-21 RX ORDER — LOSARTAN POTASSIUM 50 MG/1
50 TABLET ORAL DAILY
Status: DISCONTINUED | OUTPATIENT
Start: 2021-06-21 | End: 2021-06-21 | Stop reason: HOSPADM

## 2021-06-21 RX ORDER — ATORVASTATIN CALCIUM 20 MG/1
20 TABLET, FILM COATED ORAL DAILY
Status: DISCONTINUED | OUTPATIENT
Start: 2021-06-21 | End: 2021-06-21 | Stop reason: HOSPADM

## 2021-06-21 RX ORDER — DIVALPROEX SODIUM 250 MG/1
250 TABLET, DELAYED RELEASE ORAL EVERY 12 HOURS
Status: DISCONTINUED | OUTPATIENT
Start: 2021-06-21 | End: 2021-06-21 | Stop reason: HOSPADM

## 2021-06-21 RX ORDER — TRAZODONE HYDROCHLORIDE 50 MG/1
50 TABLET ORAL
Status: DISCONTINUED | OUTPATIENT
Start: 2021-06-21 | End: 2021-06-21 | Stop reason: HOSPADM

## 2021-06-21 RX ORDER — PANTOPRAZOLE SODIUM 40 MG/1
40 TABLET, DELAYED RELEASE ORAL DAILY
Status: DISCONTINUED | OUTPATIENT
Start: 2021-06-21 | End: 2021-06-21 | Stop reason: HOSPADM

## 2021-06-21 RX ORDER — ASPIRIN 81 MG/1
81 TABLET ORAL DAILY
Status: DISCONTINUED | OUTPATIENT
Start: 2021-06-21 | End: 2021-06-21 | Stop reason: HOSPADM

## 2021-06-21 RX ORDER — QUETIAPINE FUMARATE 25 MG/1
25 TABLET, FILM COATED ORAL 2 TIMES DAILY
Status: DISCONTINUED | OUTPATIENT
Start: 2021-06-21 | End: 2021-06-21 | Stop reason: HOSPADM

## 2021-06-21 RX ADMIN — LOSARTAN POTASSIUM 50 MG: 50 TABLET, FILM COATED ORAL at 09:50

## 2021-06-21 RX ADMIN — ENOXAPARIN SODIUM 40 MG: 40 INJECTION SUBCUTANEOUS at 09:52

## 2021-06-21 RX ADMIN — PANTOPRAZOLE SODIUM 40 MG: 40 TABLET, DELAYED RELEASE ORAL at 09:50

## 2021-06-21 RX ADMIN — QUETIAPINE FUMARATE 25 MG: 25 TABLET ORAL at 09:49

## 2021-06-21 RX ADMIN — DIVALPROEX SODIUM 250 MG: 250 TABLET, DELAYED RELEASE ORAL at 09:50

## 2021-06-21 RX ADMIN — ASPIRIN 81 MG: 81 TABLET, COATED ORAL at 09:50

## 2021-06-21 RX ADMIN — Medication 10 ML: at 00:58

## 2021-06-21 RX ADMIN — ATORVASTATIN CALCIUM 20 MG: 20 TABLET, FILM COATED ORAL at 09:50

## 2021-06-21 RX ADMIN — Medication 10 ML: at 06:00

## 2021-06-21 NOTE — PROGRESS NOTES
Physical Therapy    Received PT consult. Spoke with NP and CM. They state pt is now being d/c'd with family, currently, and no PT eval is needed. Will complete order.     Kehinde Aragon, PT

## 2021-06-21 NOTE — DISCHARGE SUMMARY
Hospitalist Discharge Summary     Patient ID:  Pan Resendiz  [id number]  72 y.o.  1950 6/20/2021    PCP on record: Radha Gonzalez MD    Admit date: 6/20/2021  Discharge date and time: 6/21/2021    DISCHARGE DIAGNOSIS:    Change Mental status most likely secondary to worsening vascular dementia  HTN   Hyperlipidemia  History of stroke        CONSULTATIONS:  IP CONSULT TO HOSPITALIST    Excerpted HPI from H&P of Rayburn Severe, MD:  72years old female from home with past medical history significant for vascular dementia, hypertension, seizure disorders presented to the hospital for evaluation of worsening mental status, patient was recently discharged from [facility] for the similar complaint other work-up was negative for any acute changes and patient medication was adjusted, blood work in ED show no significant acute abnormality, CT scan was done show no acute abnormality.     We were asked to admit for work up and evaluation of the above problems. ______________________________________________________________________  DISCHARGE SUMMARY/HOSPITAL COURSE:  for full details see H&P, daily progress notes, labs, consult notes. Change Mental status most likely secondary to worsening vascular dementia  History of Seizures   -Patient recently admitted to [facility] and seen by [facility] and Neurology. Neurology recommended decreasing seroquel to BID and changed to depakote.   -Continue PTA depakote   -Daughter Rosendo Junior stated that she has moved patient into her home as [place], they were unable to establish private aids in the home. She feels like patient has become more aggressive since starting seroquel and wants to trial her mother off seroquel. If behavior improves then stop seroquel.  Instructed to follow up with Neurology for further medication titration.    -Recommended follow up with Neurologist seen at [facility]   -Continue melatonin as needed for sleep -Patient appears at baseline mentation this morning. HTN Continue Cozaar  Hyperlipidemia continue Lipitor  History of stroke continue aspirin     Patient seen at bedside. She remains confused and at her baseline. Discussed hospitalization and discharge with daughter Josh Riley via phone. She verbalized understanding. No questions at this time. _____________________________________________________________________  Patient seen and examined by me on discharge day. Pertinent Findings:  Gen:    Not in distress  Chest: Clear lungs  CVS:   Regular rhythm. No edema  Abd:  Soft, not distended, not tender  Neuro:  Alert and oriented x1-2  _______________________________________________________________________  DISCHARGE MEDICATIONS:   Current Discharge Medication List      START taking these medications    Details   melatonin 3 mg cap capsule Take 1 Capsule by mouth nightly. Qty: 30 Capsule, Refills: 0  Start date: 6/20/2021         CONTINUE these medications which have NOT CHANGED    Details   divalproex DR (DEPAKOTE) 250 mg tablet Take 1 Tablet by mouth every twelve (12) hours. Qty: 60 Tablet, Refills: 1      cyanocobalamin (Vitamin B-12) 1,000 mcg tablet Take 1 Tablet by mouth daily. Qty: 30 Tablet, Refills: 2      QUEtiapine (SEROquel) 25 mg tablet Take 1 Tablet by mouth two (2) times a day. Qty: 60 Tablet, Refills: 0      traZODone (DESYREL) 50 mg tablet Take 1 Tablet by mouth nightly. Qty: 30 Tablet, Refills: 0      pantoprazole (PROTONIX) 40 mg tablet Take 1 Tab by mouth daily. Qty: 30 Tab, Refills: 0      acetaminophen (TYLENOL) 325 mg tablet Take 2 Tabs by mouth every six (6) hours as needed for Fever. Qty: 30 Tab, Refills: 0      ergocalciferol (ERGOCALCIFEROL) 1,250 mcg (50,000 unit) capsule Take 1 Cap by mouth every seven (7) days. Qty: 3 Cap, Refills: 0      magnesium oxide (MAG-OX) 400 mg tablet Take 1 Tab by mouth daily.   Qty: 30 Tab, Refills: 0      atorvastatin (LIPITOR) 20 mg tablet Take 1 Tab by mouth daily. Qty: 30 Tab, Refills: 0      losartan (COZAAR) 50 mg tablet Take 1 Tab by mouth daily. Qty: 30 Tab, Refills: 0      aspirin delayed-release 81 mg tablet Take 1 Tab by mouth daily. Qty: 30 Tab, Refills: 0               Patient Follow Up Instructions:    Activity: Activity as tolerated  Diet: Regular Diet  Wound Care: None needed    Follow-up Information     Follow up With Specialties Details Why Contact Shruthi Dos Santos MD Family Medicine In 3 days  [address]  [address] [facility], Levonia Osler, DO Neurology In 2 weeks  [place] [address]  [phone]          ________________________________________________________________    Risk of deterioration: Low    Condition at Discharge:  Stable  __________________________________________________________________    Disposition  Home with family, no needs    ____________________________________________________________________    Code Status: Full Code  ___________________________________________________________________      Total time in minutes spent coordinating this discharge (includes going over instructions, follow-up, prescriptions, and preparing report for sign off to her PCP) :  >30 minutes    Signed:  Marivel Page NP

## 2021-06-21 NOTE — PROGRESS NOTES
Transition of Care Plan:    RUR: N/A, observation status   Disposition: home w/ family   Follow up appointments: PCP, Neurology   DME needed: N/A  Transportation at Discharge: family   Talisha Joyner or means to access home: yes  IM Medicare letter: N/A  Caregiver Contact: Cesia Cooper, daughter ([phone])  Discharge Caregiver contacted prior to discharge? Yes, left VM     CM aware of discharge order. Noted that family has now decided to take pt home rather than SNF/respite. CM was informed that family would be here at [facility] for discharge. Pt was discharged from unit prior to CM's ability to meet with pt/family at bedside to review d/c plan. CM left VM for daughter, Cesia Cooper, for follow up concerning appointments and follow up care. CM will offer to make follow up appointments upon return call. Awaiting response. Otherwise, pt is ready for d/c from a CM standpoint. Assigned RN informed. Care Management Interventions  PCP Verified by CM: Yes  Palliative Care Criteria Met (RRAT>21 & CHF Dx)?: No  Mode of Transport at Discharge:  Other (see comment) (family)  Transition of Care Consult (CM Consult): Discharge Planning  Discharge Durable Medical Equipment: No  Physical Therapy Consult: Yes  Occupational Therapy Consult: No  Speech Therapy Consult: No  Current Support Network: Relative's Home  Confirm Follow Up Transport: Family  The Patient and/or Patient Representative was Provided with a Choice of Provider and Agrees with the Discharge Plan?: Yes  Name of the Patient Representative Who was Provided with a Choice of Provider and Agrees with the Discharge Plan: Cesia Cooper (daughter)  Freedom of Choice List was Provided with Basic Dialogue that Supports the Patient's Individualized Plan of Care/Goals, Treatment Preferences and Shares the Quality Data Associated with the Providers?: No  [place] Resource Information Provided?: No  Discharge Location  Discharge Placement: Home with family assistance    Guerojuan Paez MSW   Care Manager, [facility]  [phone]

## 2021-06-21 NOTE — PROGRESS NOTES
End of Shift Note    Bedside shift change report given to Catherine (oncoming nurse) by Mariann Harrington RN (offgoing nurse). Report included the following information SBAR, Kardex, Intake/Output, MAR and Cardiac Rhythm NSR    Shift worked:  7p-7a     Shift summary and any significant changes:     Pt aggressive at beginning of shift and trying to swing arm. Pt became more redirectable once tech came to sit with her. Pt eventually fell asleep without halodol needed to be given  Incontinence care, dual skin check, meds given, labs - PICC team called  Sitter and tely sitter in room - sitter left once pt fell asleep around [phone]  Spoke with pts daughter during shift too  Possible DC to nursing home    Concerns for physician to address: None     Zone phone for oncoming shift:  [phone]       Activity:  Activity Level: Up with Assistance  Number times ambulated in hallways past shift: 0  Number of times OOB to chair past shift: 0    Cardiac:   Cardiac Monitoring: Yes      Cardiac Rhythm: Sinus Rhythm    Access:   Current line(s): PIV     Genitourinary:   Urinary status: Incontinence brief    Respiratory:   O2 Device: None (Room air)  Chronic home O2 use?: NO  Incentive spirometer at bedside: NO     GI:     Current diet:  ADULT DIET Dysphagia - Soft & Bite Sized  Passing flatus: YES  Tolerating current diet: YES       Pain Management:   Patient states pain is manageable on current regimen: YES    Skin:  Fidel Score: 17  Interventions: limit briefs and internal/external urinary devices    Patient Safety:  Fall Score:  Total Score: 3  Interventions: bed/chair alarm, gripper socks and tele sitter   High Fall Risk: Yes    Length of Stay:  Expected LOS: - - -  Actual LOS: 0      Mariann Harrington RN
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
## 2021-06-21 NOTE — PROGRESS NOTES
Spoke with pt daughter Wili Canchola about update on the pt. I told her the hospitalist still is doing their rounds and has not come to see the pt yet this morning and no discharge orders are currently in. She stated that the pt will be picked up by her brother and that they did not want to her to discharge to a nursing home. This should be followed up by case management.

## 2021-06-21 NOTE — PROGRESS NOTES
Problem: Falls - Risk of  Goal: *Absence of Falls  Description: Document Rodriguez Nilo Fall Risk and appropriate interventions in the flowsheet.   Outcome: Progressing Towards Goal  Note: Fall Risk Interventions:  Mobility Interventions: Bed/chair exit alarm    Mentation Interventions: Bed/chair exit alarm         Elimination Interventions: Call light in reach

## 2021-06-21 NOTE — PROGRESS NOTES
I have reviewed discharge instructions with the patient and daughter. The patient and daughter verbalized understanding. Discharge medications reviewed with patient and daughter and appropriate educational materials and side effects teaching were provided. Follow-up appointments reviewed. Opportunity for questions and clarification was provided. Venous access removed without difficulty. Patient's belongings gathered and sent with patient. Patient is ready for discharge.      Lacey Hernandez

## 2021-06-21 NOTE — PROGRESS NOTES
Problem: Falls - Risk of  Goal: *Absence of Falls  Description: Document Leonie Cerna Fall Risk and appropriate interventions in the flowsheet. 6/21/2021 1318 by Baylee Garcia  Outcome: Resolved/Met  Note: Fall Risk Interventions:  Mobility Interventions: Bed/chair exit alarm    Mentation Interventions: Bed/chair exit alarm         Elimination Interventions: Call light in reach           6/21/2021 1058 by Catherine Chambers  Outcome: Progressing Towards Goal  Note: Fall Risk Interventions:  Mobility Interventions: Bed/chair exit alarm    Mentation Interventions: Bed/chair exit alarm         Elimination Interventions: Call light in reach              Problem: Patient Education: Go to Patient Education Activity  Goal: Patient/Family Education  Outcome: Resolved/Met     Problem: Pressure Injury - Risk of  Goal: *Prevention of pressure injury  Description: Document Fidel Scale and appropriate interventions in the flowsheet.   Outcome: Resolved/Met  Note: Pressure Injury Interventions:  Sensory Interventions: Assess changes in LOC    Moisture Interventions: Absorbent underpads    Activity Interventions: Pressure redistribution bed/mattress(bed type)    Mobility Interventions: Pressure redistribution bed/mattress (bed type)    Nutrition Interventions: Document food/fluid/supplement intake                     Problem: Patient Education: Go to Patient Education Activity  Goal: Patient/Family Education  Outcome: Resolved/Met

## 2021-06-23 ENCOUNTER — PATIENT OUTREACH (OUTPATIENT)
Dept: CASE MANAGEMENT | Age: 71
End: 2021-06-23

## 2021-06-23 NOTE — PROGRESS NOTES
Care Transitions Initial Call    Call within 2 business days of discharge: Yes     Patient: Yisel Call Patient : 1950 MRN: [id number]    Last Discharge [address]       Complaint Diagnosis Description Type Department Provider    21 Dementia Vascular dementia with behavior disturbance [facility]) ED to Hosp-Admission (Discharged) (ADMIT) Damián Romero MD; Terence David. .. Was this an external facility discharge? No     Challenges to be reviewed by the provider   Additional needs identified to be addressed with provider: yes  advance care planning- Patient does not have an ACP or HIPAA on EPIC         Method of communication with provider : phone    Discussed COVID-19 related testing which was not done at this time. Test results were not done. Patient informed of results, if available? no       Inpatient Readmission Risk score: Unplanned Readmit Risk Score: 32    Was this a readmission? yes   Patient stated reason for the admission: AMS    Patients top risk factors for readmission: functional cognitive ability, medical condition-denentia, support system and caregiver stress   Interventions to address risk factors: Scheduled appointment with PCP-Called and waiting on appt. and Assessment and support for treatment adherence and medication management-dementia    Care Transition Nurse (CTN) contacted the family daughter Karthikeyan by telephone to perform post hospital discharge assessment. Verified name and  with family as identifiers. Provided introduction to self, and explanation of the CTN role. CTN reviewed discharge instructions, medical action plan and red flags with family who verbalized understanding. Were discharge instructions available to patient? yes.  Reviewed appropriate site of care based on symptoms and resources available to patient including: PCP and Specialist. Family given an opportunity to ask questions and does not have any further questions or concerns at this time. The family agrees to contact the PCP office for questions related to their healthcare. Home Health/Outpatient orders at discharge: none    Durable Medical Equipment ordered at discharge: None    Discussed follow-up appointments. If no appointment was previously scheduled, appointment scheduling offered: yes. Is follow up appointment scheduled within 7 days of discharge? no.   [facility] follow up appointment(s):   Future Appointments   Date Time Provider Tristen Vela   8/3/2021  8:00 AM Flo Medina DO [facility] BS [facility]     Non-[facility] follow up appointment(s):Dr Gunter 7/1/21 at 4:15pm  Dr Clary Gudino Neuro- Aug 3rd at [address] [facility] for follow-up call in 5-7 days based on severity of symptoms and risk factors. Plan for next call: self management-dementia and follow up appointment-PCP and Neurology  CTN provided contact information for future needs. Goals Addressed                 This Visit's Progress     Attends follow-up appointments as directed. Not on track     06/18/21   -Patient does not have an CHIDI appt scheduled with PCP office. Called Dr Aliyah Mc office for [facility]CA AYON appt, next available is week of 6/28 which is at least 11 days out. Staff sending message to provider. Monitor status of PCP appt on next call. Maxim LOERA, RN, CCM / Care Transition Nurse / [phone]     06/23/21   I called and scheduled an appt with Dr Monica De Dios 7/1 at 4:15pm, she is on vacation next week. -Called and scheduled an appt with Neuro Dr Clary Gudino 8/3 at [address] to let her know of appt and dates. She stated the 7/1 appt is not good with her, she requested that I send her email with appointments dates and time to [email or web address]  Also gave her phone # to change if she needs to. Monitor status of PCP and Neuro appt on next call. Maxim LOERA, RN, CCM / Care Transition Nurse / [phone]        Understands red flags post discharge.         06/18/21   -Called and spoke to patient daughter Virgie Marroquin who states that she is her mother's POA, her mother is sleeping, patient does not have an HIPAA on chart.     -Virgie Marroquin is upset that her mother was not started on any medication for dementia when she was admitted. Daughter wanted me to call Hospitalist and voice her concerns. Patient daughter was unable to make an appt with PCP office. I have called office  to request an appt and to let them know of daughter being upset about no dementia medications being started while patient was admitted      -Patient is now living with daughter Virgie Marroquin and her wife in [facility]. Working on getting prior medications from [employer] transferred to [facility] [employer].      -Patient has fallen in past and is a flight risk per daughter.      -Patient is eating and drinking well. Monitor mental status, PO intake, further falls? Any unexpected leaving home? On next call. Bri Ribeiro MSN, RN, CCM / Care Transition Nurse / [phone]     06/23/21 Patient readmitted to [facility] 6/20-21/21 AMS    Called and spoke to patient daughter Virgie Marroquin, family is having issues, a family member took patient back to her home in [facility]. Patient daughter does not know if someone is with her at all times. Daughter does not want her in SNF but have aides come to the home and care for her mother. Discussed that she needs to call [address] [facility] and have her mother screen for UAI. Monitor on next call where patient is living, with who, started UAi process?, PO intake, mental status and safety on next call.       -

## 2021-07-08 ENCOUNTER — PATIENT OUTREACH (OUTPATIENT)
Dept: CASE MANAGEMENT | Age: 71
End: 2021-07-08

## 2021-07-08 NOTE — PROGRESS NOTES
Goals Addressed                 This Visit's Progress     Understands red flags post discharge. 06/18/21   -Called and spoke to patient daughter Yaa Hope who states that she is her mother's POA, her mother is sleeping, patient does not have an HIPAA on chart.     -Yaa Hope is upset that her mother was not started on any medication for dementia when she was admitted. Daughter wanted me to call Hospitalist and voice her concerns. Patient daughter was unable to make an appt with PCP office. I have called office  to request an appt and to let them know of daughter being upset about no dementia medications being started while patient was admitted      -Patient is now living with daughter Yaa Hope and her wife in [facility]. Working on getting prior medications from [employer] transferred to [employer] drug [employer].      -Patient has fallen in past and is a flight risk per daughter.      -Patient is eating and drinking well. Monitor mental status, PO intake, further falls? Any unexpected leaving home? On next call. Clarissa Bardales MSN, RN, CCM / Care Transition Nurse / [phone]     06/23/21 Patient readmitted to [facility] 6/20-21/21 AMS    Called and spoke to patient daughter Yaa Hope, family is having issues, a family member took patient back to her home in [facility]. Patient daughter does not know if someone is with her at all times. Daughter does not want her in SNF but have aides come to the home and care for her mother. Discussed that she needs to call [address] [facility] and have her mother screen for UAI. Monitor on next call where patient is living, with who, started UAI process?, PO intake, mental status and safety on next call. Clarissa Bardales MSN, RN, CCM / Care Transition Nurse / [phone]     07/08/21   Care Transitions Outreach Attempt-LMTCB.     Clarissa Bardales MSN, RN, CCM / Care Transition Nurse / [phone]

## 2021-07-13 ENCOUNTER — PATIENT OUTREACH (OUTPATIENT)
Dept: CASE MANAGEMENT | Age: 71
End: 2021-07-13

## 2021-07-13 NOTE — PROGRESS NOTES
Goals      Attends follow-up appointments as directed. 06/18/21   -Patient does not have an CHIDI appt scheduled with PCP office. Called Dr Marion Wick office for [facility] AYON appt, next available is week of 6/28 which is at least 11 days out. Staff sending message to provider. Monitor status of PCP appt on next call. Giancarlo LOERA, RN, CCM / Care Transition Nurse / [phone]     06/23/21   I called and scheduled an appt with Dr Jelani Hubbard 7/1 at 4:15pm, she is on vacation next week. -Called and scheduled an appt with Neuro Dr Irish Cassidy 8/3 at [address] to let her know of appt and dates. She stated the 7/1 appt is not good with her, she requested that I send her email with appointments dates and time to [email or web address]  Also gave her phone # to change if she needs to. Monitor status of PCP and Neuro appt on next call. Giancarlo LOERA, RN, CCM / Care Transition Nurse / [phone]       07/13/21     Care Transitions Outreach Attempt-LMTCB. Giancarlo LOERA, RN, CCM / Care Transition Nurse / [phone]         Understands red flags post discharge. 06/18/21   -Called and spoke to patient daughter Christina Lyles who states that she is her mother's POA, her mother is sleeping, patient does not have an HIPAA on chart.     -Christina Lyles is upset that her mother was not started on any medication for dementia when she was admitted. Daughter wanted me to call Hospitalist and voice her concerns. Patient daughter was unable to make an appt with PCP office. I have called office  to request an appt and to let them know of daughter being upset about no dementia medications being started while patient was admitted      -Patient is now living with daughter Christina Lyles and her wife in [place]. Working on getting prior medications from [facility] transferred to [employer] [facility].      -Patient has fallen in past and is a flight risk per daughter.      -Patient is eating and drinking well.       Monitor mental status, PO intake, further falls? Any unexpected leaving home? On next call. Janna LOERA, RN, CCM / Care Transition Nurse / [phone]     06/23/21 Patient readmitted to [facility] 6/20-21/21 AMS    Called and spoke to patient daughter Dayanna Latif, family is having issues, a family member took patient back to her home in [facility]. Patient daughter does not know if someone is with her at all times. Daughter does not want her in SNF but have aides come to the home and care for her mother. Discussed that she needs to call [address] [facility] and have her mother screen for UAI. Monitor on next call where patient is living, with who, started UAI process?, PO intake, mental status and safety on next call. Janna LOERA, RN, CCM / Care Transition Nurse / [phone]     07/08/21   Care Transitions Outreach Attempt-LMTCB. Janna LOERA, RN, CCM / Care Transition Nurse / [phone]      07/13/21   Care Transitions Outreach Attempt-LMTCB.     Janna LOERA, RN, CCM / Care Transition Nurse / [phone]

## 2021-07-21 ENCOUNTER — PATIENT OUTREACH (OUTPATIENT)
Dept: CASE MANAGEMENT | Age: 71
End: 2021-07-21

## 2021-07-21 NOTE — PROGRESS NOTES
Patient has graduated from the Transitions of Care Coordination  program on 7/21/21. Patient/family has the ability to self-manage at this time Care management goals have been completed. Patient was not referred to the [facility] team for further management. Goals Addressed                 This Visit's Progress     COMPLETED: Attends follow-up appointments as directed. 06/18/21   -Patient does not have an CHIDI appt scheduled with PCP office. Called Dr Patt Mejia office for [facility] AYON appt, next available is week of 6/28 which is at least 11 days out. Staff sending message to provider. Monitor status of PCP appt on next call. Mitchel LOERA, RN, CCM / Care Transition Nurse / [phone]     06/23/21   I called and scheduled an appt with Dr Tellez 7/1 at 4:15pm, she is on vacation next week. -Called and scheduled an appt with Neuro Dr Eddie Umanzor 8/3 at [address] to let her know of appt and dates. She stated the 7/1 appt is not good with her, she requested that I send her email with appointments dates and time to [email or web address]  Also gave her phone # to change if she needs to. Monitor status of PCP and Neuro appt on next call. Mitchel LOERA, RN, CCM / Care Transition Nurse / [phone]       07/13/21     Care Transitions Outreach Attempt-LMTCB. Mitchel LOERA, RN, CCM / Care Transition Nurse / [phone]      07/21/21   Called Dr Tellez office and patient was seen on 7/1 and she still has her Neurology apt scheduled per EMR. Mitchel LOERA, RN, CCM / Care Transition Nurse / [phone]        COMPLETED: Understands red flags post discharge. 06/18/21   -Called and spoke to patient daughter Tiesha Bentley who states that she is her mother's POA, her mother is sleeping, patient does not have an HIPAA on chart.     -Tiesha Bentley is upset that her mother was not started on any medication for dementia when she was admitted.   Daughter wanted me to call Hospitalist and voice her concerns. Patient daughter was unable to make an appt with PCP office. I have called office  to request an appt and to let them know of daughter being upset about no dementia medications being started while patient was admitted      -Patient is now living with daughter Holly Quinteros and her wife in [place]. Working on getting prior medications from [employer] transferred to [facility] drug [facility].      -Patient has fallen in past and is a flight risk per daughter.      -Patient is eating and drinking well. Monitor mental status, PO intake, further falls? Any unexpected leaving home? On next call. Fady LOERA, RN, CCM / Care Transition Nurse / [phone]     06/23/21 Patient readmitted to [id number] [place] [address] 6/20-21/21 AMS    Called and spoke to patient daughter Holly Quinteros, family is having issues, a family member took patient back to her home in [facility]. Patient daughter does not know if someone is with her at all times. Daughter does not want her in SNF but have aides come to the home and care for her mother. Discussed that she needs to call [address] [facility] and have her mother screen for UAI. Monitor on next call where patient is living, with who, started UAI process?, PO intake, mental status and safety on next call. Fady LOERA, RN, CCM / Care Transition Nurse / [phone]     07/08/21   Care Transitions Outreach Attempt-LMTCB. Fady LOERA, RN, CCM / Care Transition Nurse / [phone]      07/13/21   Care Transitions Outreach Attempt-LMTCB. Fady LOERA, RN, CCM / Care Transition Nurse / [phone]      07/21/21   Care Transitions Outreach Attempt-LMTCB. Fady LOERA, RN, CCM / Care Transition Nurse / [phone]              Patient has Care Transition Nurse's contact information for any further questions, concerns, or needs.   Patients upcoming visits:    Future Appointments   Date Time Provider Tristen Vela   8/3/2021  8:00 AM Emma Lynn BS [facility]

## 2022-03-18 PROBLEM — D72.829 LEUKOCYTOSIS: Status: ACTIVE | Noted: 2018-06-07

## 2022-03-18 PROBLEM — R10.2 SUPRAPUBIC PAIN: Status: ACTIVE | Noted: 2021-01-21

## 2022-03-19 PROBLEM — E86.0 DEHYDRATION: Status: ACTIVE | Noted: 2021-01-01

## 2022-03-19 PROBLEM — D64.9 ANEMIA: Status: ACTIVE | Noted: 2021-01-01

## 2022-03-19 PROBLEM — R41.82 CHANGE IN MENTAL STATUS: Status: ACTIVE | Noted: 2021-06-20

## 2022-03-19 PROBLEM — R82.90 ABNORMAL URINALYSIS: Status: ACTIVE | Noted: 2021-01-01

## 2022-03-19 PROBLEM — E87.6 HYPOKALEMIA: Status: ACTIVE | Noted: 2018-09-27

## 2022-03-19 PROBLEM — R00.0 TACHYCARDIA: Status: ACTIVE | Noted: 2021-01-01

## 2022-03-19 PROBLEM — A41.9 SEPSIS (HCC): Status: ACTIVE | Noted: 2021-01-01

## 2022-03-19 PROBLEM — R65.10 SIRS (SYSTEMIC INFLAMMATORY RESPONSE SYNDROME) (HCC): Status: ACTIVE | Noted: 2018-09-27

## 2022-03-19 PROBLEM — R41.82 AMS (ALTERED MENTAL STATUS): Status: ACTIVE | Noted: 2021-01-21

## 2022-03-19 PROBLEM — R41.82 ALTERED MENTAL STATUS: Status: ACTIVE | Noted: 2021-01-01

## 2022-03-19 PROBLEM — R00.0 SINUS TACHYCARDIA: Status: ACTIVE | Noted: 2018-09-27

## 2022-03-19 PROBLEM — N17.9 AKI (ACUTE KIDNEY INJURY) (HCC): Status: ACTIVE | Noted: 2021-01-18

## 2022-03-19 PROBLEM — E87.20 LACTIC ACIDOSIS: Status: ACTIVE | Noted: 2020-10-24

## 2022-03-20 PROBLEM — E11.9 DM (DIABETES MELLITUS) (HCC): Status: ACTIVE | Noted: 2018-06-07

## 2022-03-20 PROBLEM — I95.9 HYPOTENSION: Status: ACTIVE | Noted: 2021-01-18

## 2022-03-20 PROBLEM — R11.10 VOMITING: Status: ACTIVE | Noted: 2018-09-27

## 2022-03-20 PROBLEM — E87.6 ACUTE HYPOKALEMIA: Status: ACTIVE | Noted: 2021-01-18

## 2022-12-27 ENCOUNTER — APPOINTMENT (OUTPATIENT)
Dept: GENERAL RADIOLOGY | Age: 72
End: 2022-12-27
Attending: EMERGENCY MEDICINE
Payer: MEDICARE

## 2022-12-27 ENCOUNTER — HOSPITAL ENCOUNTER (INPATIENT)
Age: 72
LOS: 3 days | Discharge: HOME | End: 2022-12-30
Attending: EMERGENCY MEDICINE | Admitting: FAMILY MEDICINE
Payer: MEDICARE

## 2022-12-27 ENCOUNTER — APPOINTMENT (OUTPATIENT)
Dept: CT IMAGING | Age: 72
End: 2022-12-27
Attending: EMERGENCY MEDICINE
Payer: MEDICARE

## 2022-12-27 ENCOUNTER — APPOINTMENT (OUTPATIENT)
Dept: ULTRASOUND IMAGING | Age: 72
End: 2022-12-27
Attending: FAMILY MEDICINE
Payer: MEDICARE

## 2022-12-27 DIAGNOSIS — R41.82 ALTERED MENTAL STATUS, UNSPECIFIED ALTERED MENTAL STATUS TYPE: Primary | ICD-10-CM

## 2022-12-27 PROBLEM — J96.01 ACUTE RESPIRATORY FAILURE WITH HYPOXIA (HCC): Status: ACTIVE | Noted: 2022-12-27

## 2022-12-27 LAB
ALBUMIN SERPL-MCNC: 3.5 G/DL (ref 3.5–5)
ALBUMIN/GLOB SERPL: 0.8 (ref 1.1–2.2)
ALP SERPL-CCNC: 85 U/L (ref 45–117)
ALT SERPL-CCNC: 14 U/L (ref 12–78)
AMMONIA PLAS-SCNC: 18 UMOL/L
ANION GAP SERPL CALC-SCNC: 6 MMOL/L (ref 5–15)
APPEARANCE UR: CLEAR
AST SERPL-CCNC: 9 U/L (ref 15–37)
BACTERIA URNS QL MICRO: NEGATIVE /HPF
BASOPHILS # BLD: 0 K/UL (ref 0–0.1)
BASOPHILS NFR BLD: 0 % (ref 0–1)
BILIRUB SERPL-MCNC: 0.5 MG/DL (ref 0.2–1)
BILIRUB UR QL: NEGATIVE
BUN SERPL-MCNC: 20 MG/DL (ref 6–20)
BUN/CREAT SERPL: 13 (ref 12–20)
CALCIUM SERPL-MCNC: 9 MG/DL (ref 8.5–10.1)
CHLORIDE SERPL-SCNC: 102 MMOL/L (ref 97–108)
CO2 SERPL-SCNC: 30 MMOL/L (ref 21–32)
COLOR UR: NORMAL
COMMENT, HOLDF: NORMAL
CREAT SERPL-MCNC: 1.5 MG/DL (ref 0.55–1.02)
CREAT UR-MCNC: 153 MG/DL
DIFFERENTIAL METHOD BLD: NORMAL
EOSINOPHIL # BLD: 0.4 K/UL (ref 0–0.4)
EOSINOPHIL NFR BLD: 4 % (ref 0–7)
EPITH CASTS URNS QL MICRO: NORMAL /LPF
ERYTHROCYTE [DISTWIDTH] IN BLOOD BY AUTOMATED COUNT: 13.2 % (ref 11.5–14.5)
ETHANOL SERPL-MCNC: <10 MG/DL
GLOBULIN SER CALC-MCNC: 4.4 G/DL (ref 2–4)
GLUCOSE SERPL-MCNC: 199 MG/DL (ref 65–100)
GLUCOSE UR STRIP.AUTO-MCNC: NEGATIVE MG/DL
HCT VFR BLD AUTO: 39.8 % (ref 35–47)
HGB BLD-MCNC: 13.1 G/DL (ref 11.5–16)
HGB UR QL STRIP: NEGATIVE
HYALINE CASTS URNS QL MICRO: NORMAL /LPF (ref 0–5)
IMM GRANULOCYTES # BLD AUTO: 0 K/UL (ref 0–0.04)
IMM GRANULOCYTES NFR BLD AUTO: 0 % (ref 0–0.5)
KETONES UR QL STRIP.AUTO: NEGATIVE MG/DL
LEUKOCYTE ESTERASE UR QL STRIP.AUTO: NEGATIVE
LYMPHOCYTES # BLD: 2.1 K/UL (ref 0.8–3.5)
LYMPHOCYTES NFR BLD: 22 % (ref 12–49)
MCH RBC QN AUTO: 30.9 PG (ref 26–34)
MCHC RBC AUTO-ENTMCNC: 32.9 G/DL (ref 30–36.5)
MCV RBC AUTO: 93.9 FL (ref 80–99)
MONOCYTES # BLD: 0.6 K/UL (ref 0–1)
MONOCYTES NFR BLD: 6 % (ref 5–13)
NEUTS SEG # BLD: 6.7 K/UL (ref 1.8–8)
NEUTS SEG NFR BLD: 68 % (ref 32–75)
NITRITE UR QL STRIP.AUTO: NEGATIVE
NRBC # BLD: 0 K/UL (ref 0–0.01)
NRBC BLD-RTO: 0 PER 100 WBC
PH UR STRIP: 5 (ref 5–8)
PLATELET # BLD AUTO: 289 K/UL (ref 150–400)
PMV BLD AUTO: 10.6 FL (ref 8.9–12.9)
POTASSIUM SERPL-SCNC: 3.4 MMOL/L (ref 3.5–5.1)
PROT SERPL-MCNC: 7.9 G/DL (ref 6.4–8.2)
PROT UR STRIP-MCNC: NEGATIVE MG/DL
RBC # BLD AUTO: 4.24 M/UL (ref 3.8–5.2)
RBC #/AREA URNS HPF: NORMAL /HPF (ref 0–5)
SAMPLES BEING HELD,HOLD: NORMAL
SODIUM SERPL-SCNC: 138 MMOL/L (ref 136–145)
SP GR UR REFRACTOMETRY: 1.02 (ref 1–1.03)
TSH SERPL DL<=0.05 MIU/L-ACNC: 1.21 UIU/ML (ref 0.36–3.74)
UR CULT HOLD, URHOLD: NORMAL
UROBILINOGEN UR QL STRIP.AUTO: 0.2 EU/DL (ref 0.2–1)
WBC # BLD AUTO: 9.8 K/UL (ref 3.6–11)
WBC URNS QL MICRO: NORMAL /HPF (ref 0–4)

## 2022-12-27 PROCEDURE — 65660000001 HC RM ICU INTERMED STEPDOWN

## 2022-12-27 PROCEDURE — 93005 ELECTROCARDIOGRAM TRACING: CPT

## 2022-12-27 PROCEDURE — 81001 URINALYSIS AUTO W/SCOPE: CPT

## 2022-12-27 PROCEDURE — 85025 COMPLETE CBC W/AUTO DIFF WBC: CPT

## 2022-12-27 PROCEDURE — 80053 COMPREHEN METABOLIC PANEL: CPT

## 2022-12-27 PROCEDURE — 99285 EMERGENCY DEPT VISIT HI MDM: CPT

## 2022-12-27 PROCEDURE — 73502 X-RAY EXAM HIP UNI 2-3 VIEWS: CPT

## 2022-12-27 PROCEDURE — 36415 COLL VENOUS BLD VENIPUNCTURE: CPT

## 2022-12-27 PROCEDURE — 82077 ASSAY SPEC XCP UR&BREATH IA: CPT

## 2022-12-27 PROCEDURE — 74011250636 HC RX REV CODE- 250/636: Performed by: EMERGENCY MEDICINE

## 2022-12-27 PROCEDURE — 84443 ASSAY THYROID STIM HORMONE: CPT

## 2022-12-27 PROCEDURE — 70450 CT HEAD/BRAIN W/O DYE: CPT

## 2022-12-27 PROCEDURE — 82140 ASSAY OF AMMONIA: CPT

## 2022-12-27 PROCEDURE — G0378 HOSPITAL OBSERVATION PER HR: HCPCS

## 2022-12-27 PROCEDURE — 76770 US EXAM ABDO BACK WALL COMP: CPT

## 2022-12-27 PROCEDURE — 82570 ASSAY OF URINE CREATININE: CPT

## 2022-12-27 RX ORDER — SODIUM CHLORIDE 0.9 % (FLUSH) 0.9 %
5-40 SYRINGE (ML) INJECTION EVERY 8 HOURS
Status: DISCONTINUED | OUTPATIENT
Start: 2022-12-27 | End: 2022-12-30 | Stop reason: HOSPADM

## 2022-12-27 RX ORDER — ACETAMINOPHEN 650 MG/1
650 SUPPOSITORY RECTAL
Status: DISCONTINUED | OUTPATIENT
Start: 2022-12-27 | End: 2022-12-30 | Stop reason: HOSPADM

## 2022-12-27 RX ORDER — SODIUM CHLORIDE 0.9 % (FLUSH) 0.9 %
5-40 SYRINGE (ML) INJECTION AS NEEDED
Status: DISCONTINUED | OUTPATIENT
Start: 2022-12-27 | End: 2022-12-30 | Stop reason: HOSPADM

## 2022-12-27 RX ORDER — ONDANSETRON 4 MG/1
4 TABLET, ORALLY DISINTEGRATING ORAL
Status: DISCONTINUED | OUTPATIENT
Start: 2022-12-27 | End: 2022-12-30 | Stop reason: HOSPADM

## 2022-12-27 RX ORDER — POLYETHYLENE GLYCOL 3350 17 G/17G
17 POWDER, FOR SOLUTION ORAL DAILY PRN
Status: DISCONTINUED | OUTPATIENT
Start: 2022-12-27 | End: 2022-12-30 | Stop reason: HOSPADM

## 2022-12-27 RX ORDER — ACETAMINOPHEN 325 MG/1
650 TABLET ORAL
Status: DISCONTINUED | OUTPATIENT
Start: 2022-12-27 | End: 2022-12-30 | Stop reason: HOSPADM

## 2022-12-27 RX ORDER — HEPARIN SODIUM 5000 [USP'U]/ML
5000 INJECTION, SOLUTION INTRAVENOUS; SUBCUTANEOUS EVERY 8 HOURS
Status: DISCONTINUED | OUTPATIENT
Start: 2022-12-27 | End: 2022-12-30 | Stop reason: HOSPADM

## 2022-12-27 RX ORDER — ONDANSETRON 2 MG/ML
4 INJECTION INTRAMUSCULAR; INTRAVENOUS
Status: DISCONTINUED | OUTPATIENT
Start: 2022-12-27 | End: 2022-12-30 | Stop reason: HOSPADM

## 2022-12-27 RX ADMIN — SODIUM CHLORIDE 500 ML: 9 INJECTION, SOLUTION INTRAVENOUS at 19:12

## 2022-12-27 NOTE — ED PROVIDER NOTES
81-year-old female presents emergency department chief complaint of decreased urinary output, and right hip pain with ambulation. Patient is a difficult historian, states that she has had a history of stroke in the past and she has been having pain with ambulation for the last several weeks. Family members at bedside then stated that patient's also been having these intermittent episodes of significant confusion for the last several days, patient presented similarly when she had a prior stroke. They state that the reason why they brought her in today is due to these intermittent episodes of confusion. They state the confusion can last several minutes to an hour and then resolve on their own. Patient has not had a fever or chills at home, lives with family members but is unaccompanied for majority of the day. Patient states she does not have pain when she pushes on her leg rather she only has pain when she attempts to ambulate and is having difficulty moving    The history is provided by the patient. Extremity Weakness   This is a new problem. The current episode started more than 2 days ago. The pain is present in the right lower leg. The quality of the pain is described as intermittent. Pertinent negatives include no numbness, full range of motion, no stiffness, no itching and no back pain. Urinary Retention   Associated symptoms include arthralgias. Pertinent negatives include no fever, no dysuria, no chest pain and no back pain. Past Medical History:   Diagnosis Date    Anxiety and depression     Diabetes ([address]     Hypertension     Obese     Seizure disorder ([address]        History reviewed. No pertinent surgical history.       Family History:   Problem Relation Age of Onset    Lung Cancer Mother     No Known Problems Father        Social History     Socioeconomic History    Marital status: LEGALLY      Spouse name: Not on file    Number of children: Not on file    Years of education: Not on file    Highest education level: Not on file   Occupational History    Not on file   Tobacco Use    Smoking status: Unknown    Smokeless tobacco: Never   Substance and Sexual Activity    Alcohol use: Not on file     Comment: Unknown    Drug use: Not on file    Sexual activity: Not on file   Other Topics Concern    Not on file   Social History Narrative    Not on file     Social Determinants of Health     Financial Resource Strain: Not on file   Food Insecurity: Not on file   Transportation Needs: Not on file   Physical Activity: Not on file   Stress: Not on file   Social Connections: Not on file   Intimate Partner Violence: Not on file   Housing Stability: Not on file         ALLERGIES: Patient has no known allergies. Review of Systems   Constitutional:  Positive for activity change and fatigue. Negative for chills, diaphoresis and fever. HENT:  Negative for sinus pressure. Respiratory:  Negative for chest tightness and shortness of breath. Cardiovascular:  Negative for chest pain, palpitations and leg swelling. Gastrointestinal:  Negative for abdominal distention and abdominal pain. Genitourinary:  Positive for decreased urine volume and urgency. Negative for dysuria, flank pain and vaginal pain. Musculoskeletal:  Positive for arthralgias. Negative for back pain and stiffness. Skin:  Negative for itching. Neurological:  Positive for weakness. Negative for numbness. Psychiatric/Behavioral:  Positive for confusion. All other systems reviewed and are negative. Vitals:    12/27/22 1249   BP: 102/72   Pulse: 96   Resp: 20   Temp: 98.9 °F (37.2 °C)   SpO2: 100%            Physical Exam  Vitals and nursing note reviewed. Constitutional:       Appearance: She is normal weight. Comments: Noted attempting to ambulate to restroom but is unable to place full weight on right lower extremity   HENT:      Nose: Nose normal.   Eyes:      Extraocular Movements: Extraocular movements intact. Pupils: Pupils are equal, round, and reactive to light. Cardiovascular:      Rate and Rhythm: Normal rate and regular rhythm. Pulmonary:      Effort: Pulmonary effort is normal.      Breath sounds: Normal breath sounds. Abdominal:      General: Abdomen is flat. Bowel sounds are normal.   Musculoskeletal:      Cervical back: Normal range of motion. Comments: Normal range of motion of right lower extremity when lying flat however when she attempts to place weight on it she has pain and unable to fully weight-bear on right leg pain appears to be mainly in the right hip   Skin:     General: Skin is warm and dry. Neurological:      Mental Status: She is alert. Comments: pleasantly confused, no obvious lateralizing signs        MDM    ED Course as of 12/27/22 1946   Tue Dec 27, 2022   1945 EKG: Normal sinus rhythm, rate of 90, , QRS 74, QTc of 469   [KP]      ED Course User Index  [KP] Conchita Corley DO   CBC is within normal limits, urinalysis was negative for infection, potassium slightly low at 3.4, creatinine slightly elevated at 1.50, BUN of 20 CT scan of the head shows multiple prior infarcts    X-ray pelvis shows degenerative changes to right hip, ossified masses of unknown etiology noted in central region, patient has a history of calcified uterine fibroids per CT scan in 218    71-year-old -American female with history of stroke and seizure disorder in the past presents emergency department with multiple complaints. Patient initially reports has had difficulty ambulating on right lower extremity secondary to pain for several weeks. She also states that she will attempt to walk and her leg just gives out. Reports that she has decreased urinary output but has constant urge to void. Family members at bedside states that she has had intermittent episodes of significant confusion over the last several days, the episodes last anywhere from several seconds to over an hour.   This is consistent when she has had prior CVA. Is currently not on any blood thinners. CBC metabolic panel within normal limits, urinalysis was negative for infection. ET scan of the head shows multiple prior infarcts, x-ray of pelvis shows degenerative disc disease and masses most likely consistent with calcified fibroids. Difficult to obtain complete baseline mental status at this time as patient is pleasantly confused but family members report she is having increased confusion    Perfect Serve Consult for Admission  7:52 PM    ED Room Number: ER07/07  Patient Name and age: Yordan Morrison 71 y.o.  female  Working Diagnosis:   1.  Altered mental status, unspecified altered mental status type        COVID-19 Suspicion:  no  Sepsis present:  no  Reassessment needed: no  Code Status:  Full Code  Readmission: no  Isolation Requirements:  no  Recommended Level of Care:  telemetry  Department:[facility] ED - 21   Other:            Procedures

## 2022-12-27 NOTE — ED TRIAGE NOTES
Pt c/o RIGHT leg weakness since Radha. Pt also reports urinating once in 2 days. Pt reports she continues to drink fluids but is unable to urinate and does not feel like she needs to.

## 2022-12-28 ENCOUNTER — APPOINTMENT (OUTPATIENT)
Dept: VASCULAR SURGERY | Age: 72
End: 2022-12-28
Attending: FAMILY MEDICINE
Payer: MEDICARE

## 2022-12-28 LAB
ALBUMIN SERPL-MCNC: 3.2 G/DL (ref 3.5–5)
AMPHET UR QL SCN: NEGATIVE
ANION GAP SERPL CALC-SCNC: 6 MMOL/L (ref 5–15)
ANION GAP SERPL CALC-SCNC: 8 MMOL/L (ref 5–15)
ATRIAL RATE: 90 BPM
BARBITURATES UR QL SCN: NEGATIVE
BASOPHILS # BLD: 0 K/UL (ref 0–0.1)
BASOPHILS NFR BLD: 0 % (ref 0–1)
BENZODIAZ UR QL: NEGATIVE
BUN SERPL-MCNC: 27 MG/DL (ref 6–20)
BUN SERPL-MCNC: 28 MG/DL (ref 6–20)
BUN/CREAT SERPL: 13 (ref 12–20)
BUN/CREAT SERPL: 15 (ref 12–20)
CALCIUM SERPL-MCNC: 8.9 MG/DL (ref 8.5–10.1)
CALCIUM SERPL-MCNC: 9.1 MG/DL (ref 8.5–10.1)
CALCULATED P AXIS, ECG09: 62 DEGREES
CALCULATED R AXIS, ECG10: 29 DEGREES
CALCULATED T AXIS, ECG11: 54 DEGREES
CANNABINOIDS UR QL SCN: NEGATIVE
CHLORIDE SERPL-SCNC: 103 MMOL/L (ref 97–108)
CHLORIDE SERPL-SCNC: 106 MMOL/L (ref 97–108)
CHLORIDE UR-SCNC: 63 MMOL/L
CK SERPL-CCNC: 58 U/L (ref 26–192)
CO2 SERPL-SCNC: 26 MMOL/L (ref 21–32)
CO2 SERPL-SCNC: 27 MMOL/L (ref 21–32)
COCAINE UR QL SCN: NEGATIVE
CREAT SERPL-MCNC: 1.86 MG/DL (ref 0.55–1.02)
CREAT SERPL-MCNC: 2.24 MG/DL (ref 0.55–1.02)
DIAGNOSIS, 93000: NORMAL
DIFFERENTIAL METHOD BLD: ABNORMAL
DRUG SCRN COMMENT,DRGCM: NORMAL
EOSINOPHIL # BLD: 0.2 K/UL (ref 0–0.4)
EOSINOPHIL NFR BLD: 2 % (ref 0–7)
ERYTHROCYTE [DISTWIDTH] IN BLOOD BY AUTOMATED COUNT: 13 % (ref 11.5–14.5)
EST. AVERAGE GLUCOSE BLD GHB EST-MCNC: 166 MG/DL
GLUCOSE BLD STRIP.AUTO-MCNC: 117 MG/DL (ref 65–117)
GLUCOSE BLD STRIP.AUTO-MCNC: 139 MG/DL (ref 65–117)
GLUCOSE SERPL-MCNC: 145 MG/DL (ref 65–100)
GLUCOSE SERPL-MCNC: 166 MG/DL (ref 65–100)
HBA1C MFR BLD: 7.4 % (ref 4–5.6)
HCT VFR BLD AUTO: 40.4 % (ref 35–47)
HGB BLD-MCNC: 13.4 G/DL (ref 11.5–16)
IMM GRANULOCYTES # BLD AUTO: 0 K/UL (ref 0–0.04)
IMM GRANULOCYTES NFR BLD AUTO: 0 % (ref 0–0.5)
LYMPHOCYTES # BLD: 1.6 K/UL (ref 0.8–3.5)
LYMPHOCYTES NFR BLD: 15 % (ref 12–49)
MAGNESIUM SERPL-MCNC: 2.1 MG/DL (ref 1.6–2.4)
MCH RBC QN AUTO: 30.7 PG (ref 26–34)
MCHC RBC AUTO-ENTMCNC: 33.2 G/DL (ref 30–36.5)
MCV RBC AUTO: 92.4 FL (ref 80–99)
METHADONE UR QL: NEGATIVE
MONOCYTES # BLD: 0.8 K/UL (ref 0–1)
MONOCYTES NFR BLD: 8 % (ref 5–13)
NEUTS SEG # BLD: 8.1 K/UL (ref 1.8–8)
NEUTS SEG NFR BLD: 75 % (ref 32–75)
NRBC # BLD: 0 K/UL (ref 0–0.01)
NRBC BLD-RTO: 0 PER 100 WBC
OPIATES UR QL: NEGATIVE
P-R INTERVAL, ECG05: 172 MS
PCP UR QL: NEGATIVE
PHOSPHATE SERPL-MCNC: 4 MG/DL (ref 2.6–4.7)
PLATELET # BLD AUTO: 218 K/UL (ref 150–400)
PMV BLD AUTO: 10.7 FL (ref 8.9–12.9)
POTASSIUM SERPL-SCNC: 3.3 MMOL/L (ref 3.5–5.1)
POTASSIUM SERPL-SCNC: 3.5 MMOL/L (ref 3.5–5.1)
POTASSIUM UR-SCNC: 10 MMOL/L
PROT UR-MCNC: 6 MG/DL (ref 0–11.9)
Q-T INTERVAL, ECG07: 384 MS
QRS DURATION, ECG06: 74 MS
QTC CALCULATION (BEZET), ECG08: 469 MS
RBC # BLD AUTO: 4.37 M/UL (ref 3.8–5.2)
SERVICE CMNT-IMP: ABNORMAL
SERVICE CMNT-IMP: NORMAL
SODIUM SERPL-SCNC: 138 MMOL/L (ref 136–145)
SODIUM SERPL-SCNC: 138 MMOL/L (ref 136–145)
VENTRICULAR RATE, ECG03: 90 BPM
WBC # BLD AUTO: 10.9 K/UL (ref 3.6–11)

## 2022-12-28 PROCEDURE — 84156 ASSAY OF PROTEIN URINE: CPT

## 2022-12-28 PROCEDURE — 85025 COMPLETE CBC W/AUTO DIFF WBC: CPT

## 2022-12-28 PROCEDURE — 93971 EXTREMITY STUDY: CPT

## 2022-12-28 PROCEDURE — 74011250637 HC RX REV CODE- 250/637: Performed by: FAMILY MEDICINE

## 2022-12-28 PROCEDURE — G0378 HOSPITAL OBSERVATION PER HR: HCPCS

## 2022-12-28 PROCEDURE — 80307 DRUG TEST PRSMV CHEM ANLYZR: CPT

## 2022-12-28 PROCEDURE — 74011000250 HC RX REV CODE- 250: Performed by: FAMILY MEDICINE

## 2022-12-28 PROCEDURE — 82550 ASSAY OF CK (CPK): CPT

## 2022-12-28 PROCEDURE — 74011250636 HC RX REV CODE- 250/636: Performed by: INTERNAL MEDICINE

## 2022-12-28 PROCEDURE — 83735 ASSAY OF MAGNESIUM: CPT

## 2022-12-28 PROCEDURE — 82436 ASSAY OF URINE CHLORIDE: CPT

## 2022-12-28 PROCEDURE — 74011250636 HC RX REV CODE- 250/636: Performed by: FAMILY MEDICINE

## 2022-12-28 PROCEDURE — 83036 HEMOGLOBIN GLYCOSYLATED A1C: CPT

## 2022-12-28 PROCEDURE — 80069 RENAL FUNCTION PANEL: CPT

## 2022-12-28 PROCEDURE — 82962 GLUCOSE BLOOD TEST: CPT

## 2022-12-28 PROCEDURE — 96372 THER/PROPH/DIAG INJ SC/IM: CPT

## 2022-12-28 PROCEDURE — 36415 COLL VENOUS BLD VENIPUNCTURE: CPT

## 2022-12-28 PROCEDURE — 65660000001 HC RM ICU INTERMED STEPDOWN

## 2022-12-28 PROCEDURE — 84133 ASSAY OF URINE POTASSIUM: CPT

## 2022-12-28 PROCEDURE — 80048 BASIC METABOLIC PNL TOTAL CA: CPT

## 2022-12-28 RX ORDER — LANOLIN ALCOHOL/MO/W.PET/CERES
3 CREAM (GRAM) TOPICAL
Status: DISCONTINUED | OUTPATIENT
Start: 2022-12-28 | End: 2022-12-30 | Stop reason: HOSPADM

## 2022-12-28 RX ORDER — LOSARTAN POTASSIUM 50 MG/1
50 TABLET ORAL DAILY
Status: DISCONTINUED | OUTPATIENT
Start: 2022-12-28 | End: 2022-12-30 | Stop reason: HOSPADM

## 2022-12-28 RX ORDER — DIVALPROEX SODIUM 125 MG/1
250 TABLET, DELAYED RELEASE ORAL EVERY 12 HOURS
Status: DISCONTINUED | OUTPATIENT
Start: 2022-12-28 | End: 2022-12-30 | Stop reason: HOSPADM

## 2022-12-28 RX ORDER — IBUPROFEN 200 MG
4 TABLET ORAL AS NEEDED
Status: DISCONTINUED | OUTPATIENT
Start: 2022-12-28 | End: 2022-12-30 | Stop reason: HOSPADM

## 2022-12-28 RX ORDER — LANOLIN ALCOHOL/MO/W.PET/CERES
400 CREAM (GRAM) TOPICAL DAILY
Status: DISCONTINUED | OUTPATIENT
Start: 2022-12-28 | End: 2022-12-30 | Stop reason: HOSPADM

## 2022-12-28 RX ORDER — ASPIRIN 81 MG/1
81 TABLET ORAL DAILY
Status: DISCONTINUED | OUTPATIENT
Start: 2022-12-28 | End: 2022-12-30 | Stop reason: HOSPADM

## 2022-12-28 RX ORDER — PANTOPRAZOLE SODIUM 40 MG/1
40 TABLET, DELAYED RELEASE ORAL DAILY
Status: DISCONTINUED | OUTPATIENT
Start: 2022-12-28 | End: 2022-12-30 | Stop reason: HOSPADM

## 2022-12-28 RX ORDER — SODIUM CHLORIDE, SODIUM LACTATE, POTASSIUM CHLORIDE, CALCIUM CHLORIDE 600; 310; 30; 20 MG/100ML; MG/100ML; MG/100ML; MG/100ML
75 INJECTION, SOLUTION INTRAVENOUS CONTINUOUS
Status: DISCONTINUED | OUTPATIENT
Start: 2022-12-28 | End: 2022-12-30 | Stop reason: HOSPADM

## 2022-12-28 RX ORDER — ATORVASTATIN CALCIUM 20 MG/1
20 TABLET, FILM COATED ORAL DAILY
Status: DISCONTINUED | OUTPATIENT
Start: 2022-12-28 | End: 2022-12-30 | Stop reason: HOSPADM

## 2022-12-28 RX ORDER — INSULIN LISPRO 100 [IU]/ML
INJECTION, SOLUTION INTRAVENOUS; SUBCUTANEOUS
Status: DISCONTINUED | OUTPATIENT
Start: 2022-12-28 | End: 2022-12-30 | Stop reason: HOSPADM

## 2022-12-28 RX ADMIN — ASPIRIN 81 MG: 81 TABLET, COATED ORAL at 08:00

## 2022-12-28 RX ADMIN — DIVALPROEX SODIUM 250 MG: 125 TABLET, DELAYED RELEASE ORAL at 16:05

## 2022-12-28 RX ADMIN — DIVALPROEX SODIUM 250 MG: 125 TABLET, DELAYED RELEASE ORAL at 03:37

## 2022-12-28 RX ADMIN — SODIUM CHLORIDE, PRESERVATIVE FREE 10 ML: 5 INJECTION INTRAVENOUS at 21:16

## 2022-12-28 RX ADMIN — HEPARIN SODIUM 5000 UNITS: 5000 INJECTION INTRAVENOUS; SUBCUTANEOUS at 02:08

## 2022-12-28 RX ADMIN — SODIUM CHLORIDE, POTASSIUM CHLORIDE, SODIUM LACTATE AND CALCIUM CHLORIDE 75 ML/HR: 600; 310; 30; 20 INJECTION, SOLUTION INTRAVENOUS at 11:23

## 2022-12-28 RX ADMIN — HEPARIN SODIUM 5000 UNITS: 5000 INJECTION INTRAVENOUS; SUBCUTANEOUS at 16:05

## 2022-12-28 RX ADMIN — Medication 400 MG: at 08:00

## 2022-12-28 RX ADMIN — SODIUM CHLORIDE, PRESERVATIVE FREE 10 ML: 5 INJECTION INTRAVENOUS at 16:05

## 2022-12-28 RX ADMIN — SODIUM CHLORIDE, POTASSIUM CHLORIDE, SODIUM LACTATE AND CALCIUM CHLORIDE 75 ML/HR: 600; 310; 30; 20 INJECTION, SOLUTION INTRAVENOUS at 21:17

## 2022-12-28 RX ADMIN — Medication 3 MG: at 21:16

## 2022-12-28 RX ADMIN — HEPARIN SODIUM 5000 UNITS: 5000 INJECTION INTRAVENOUS; SUBCUTANEOUS at 08:00

## 2022-12-28 RX ADMIN — ATORVASTATIN CALCIUM 20 MG: 20 TABLET, FILM COATED ORAL at 08:00

## 2022-12-28 RX ADMIN — PANTOPRAZOLE SODIUM 40 MG: 40 TABLET, DELAYED RELEASE ORAL at 08:00

## 2022-12-28 NOTE — H&P
[address] [facility] Adult  Hospitalist Group  History and Physical    Date of Service:  12/27/2022  Primary Care Provider: Nadege Costello MD  Source of information: The patient and Chart review    Chief Complaint: Extremity Weakness and Urinary Retention      History of Presenting Illness:   Max Pelayo is a 71 y.o. female with pmhx DM II, HTN, past stroke, seizure disorder, and anxiety/depression, who presents with altered mental status. Patient is only oriented to self and knows that she is in the hospital. She does endorse swelling in the RLE. In the ED, VSS. Labs show glucose 199, creatinine 1.5 (0.7-0.8). CT head shows no evidence of acute infarct or ICH. It does show periventricular white matter disease, and multiple prior infarctions. Right hip X-ray with no acute findings. In the ED, she received 500cc ns     REVIEW OF SYSTEMS:  Review of systems not obtained due to patient factors. , acute encephalopathy on chronic dementia    Past Medical History:   Diagnosis Date    Anxiety and depression     Diabetes ([facility]     Hypertension     Obese     Seizure disorder ([facility]       History reviewed. No pertinent surgical history. Prior to Admission medications    Medication Sig Start Date End Date Taking? Authorizing Provider   melatonin 3 mg cap capsule Take 1 Capsule by mouth nightly. 6/20/21   Becky Trujillo MD   divalproex DR (DEPAKOTE) 250 mg tablet Take 1 Tablet by mouth every twelve (12) hours. 6/17/21   Chinmay Milian MD   cyanocobalamin (Vitamin B-12) 1,000 mcg tablet Take 1 Tablet by mouth daily. 6/17/21   Chinmay Milian MD   QUEtiapine (SEROquel) 25 mg tablet Take 1 Tablet by mouth two (2) times a day. 6/17/21   Chinmay Milian MD   traZODone (DESYREL) 50 mg tablet Take 1 Tablet by mouth nightly. 6/17/21   Chinmay Milian MD   pantoprazole (PROTONIX) 40 mg tablet Take 1 Tab by mouth daily.  1/29/21   Ramona Finn MD   acetaminophen (TYLENOL) 325 mg tablet Take 2 Tabs by mouth every six (6) hours as needed for Fever. 1/29/21   Bogdan Delgado MD   ergocalciferol (ERGOCALCIFEROL) 1,250 mcg (50,000 unit) capsule Take 1 Cap by mouth every seven (7) days. 2/2/21   Bogdan Delgado MD   magnesium oxide (MAG-OX) 400 mg tablet Take 1 Tab by mouth daily. 1/29/21   Bogdan Delgado MD   atorvastatin (LIPITOR) 20 mg tablet Take 1 Tab by mouth daily. 1/29/21   Bogdan Delgado MD   losartan (COZAAR) 50 mg tablet Take 1 Tab by mouth daily. 1/29/21   Bogdan Delgado MD   aspirin delayed-release 81 mg tablet Take 1 Tab by mouth daily. 10/26/20   Bogdan Delgado MD     No Known Allergies   Family History   Problem Relation Age of Onset    Lung Cancer Mother     No Known Problems Father       Social History:  reports that she has an unknown smoking status. She has never used smokeless tobacco.   Social Determinants of Health     Tobacco Use: Unknown    Smoking Tobacco Use: Unknown    Smokeless Tobacco Use: Never    Passive Exposure: Not on file   Alcohol Use: Not on file   Financial Resource Strain: Not on file   Food Insecurity: Not on file   Transportation Needs: Not on file   Physical Activity: Not on file   Stress: Not on file   Social Connections: Not on file   Intimate Partner Violence: Not on file   Depression: Not on file   Housing Stability: Not on file        Family and social history were personally reviewed, all pertinent and relevant details are outlined as above.     Objective:   Visit Vitals  /72 (BP 1 Location: Left upper arm, BP Patient Position: Sitting)   Pulse 96   Temp 98.9 °F (37.2 °C)   Resp 20   SpO2 100%      O2 Device: None (Room air)    PHYSICAL EXAM:   General: Alert x oriented to person, and place only, awake, no acute distress, resting in bed, pleasant female, appears to be stated age  HEENT: PEERL, EOMI, moist mucus membranes  Neck: Supple, no JVD, no meningeal signs  Chest: Clear to auscultation bilaterally   CVS: RRR, S1 S2 heard, no murmurs/rubs/gallops  Abd: Soft, non-tender, non-distended, +bowel sounds   Ext: No clubbing, no cyanosis, no edema  Neuro/Psych: Pleasant mood and affect, CN 2-12 grossly intact, sensory grossly within normal limit, Strength 5/5 in all extremities  Cap refill: Brisk, less than 3 seconds  Pulses: 2+ radial pulses  Skin: Warm, dry, without rashes or lesions    Data Review: All diagnostic labs and studies have been reviewed. Abnormal Labs Reviewed   METABOLIC PANEL, COMPREHENSIVE - Abnormal; Notable for the following components:       Result Value    Potassium 3.4 (*)     Glucose 199 (*)     Creatinine 1.50 (*)     eGFR 37 (*)     AST (SGOT) 9 (*)     Globulin 4.4 (*)     A-G Ratio 0.8 (*)     All other components within normal limits       All Micro Results       Procedure Component Value Units Date/Time    URINE CULTURE HOLD SAMPLE [[id number]] Collected: 12/27/22 1907    Order Status: Completed Specimen: Urine Updated: 12/27/22 1920     Urine culture hold       Urine on hold in Microbiology dept for 2 days. If unpreserved urine is submitted, it cannot be used for addtional testing after 24 hours, recollection will be required. IMAGING:   XR HIP RT W OR WO PELV 2-3 VWS   Final Result   No acute abnormality. CT HEAD WO CONT   Final Result   1. No evidence of acute infarct or intracranial hemorrhage. 2. Periventricular white matter disease is likely secondary to chronic small   vessel ischemic changes. 3. Multiple prior infarctions.                ECG/ECHO:    Results for orders placed or performed during the hospital encounter of 12/27/22   EKG, 12 LEAD, INITIAL   Result Value Ref Range    Ventricular Rate 90 BPM    Atrial Rate 90 BPM    P-R Interval 172 ms    QRS Duration 74 ms    Q-T Interval 384 ms    QTC Calculation (Bezet) 469 ms    Calculated P Axis 62 degrees    Calculated R Axis 29 degrees    Calculated T Axis 54 degrees    Diagnosis       Normal sinus rhythm  Septal infarct (cited on or before 27-DEC-2022)  Abnormal ECG  When compared with ECG of 13-JUN-2021 13:03,  Questionable change in initial forces of Anterior leads          Assessment:   Given the patient's current clinical presentation, there is a high level of concern for decompensation if discharged from the emergency department. Complex decision making was performed, which includes reviewing the patient's available past medical records, laboratory results, and imaging studies. Active Problems:    Acute respiratory failure with hypoxia (HCC) (12/27/2022)        Plan:     #Acute Encephalopathy on Chronic Dementia  -CT head negative, UA negative, alert and oriented to person and place only  -check TSH, ETOH, UDS, and ammonia  -hold sedating drugs (trazodone, and seroquel)  -need to establish baseline from daughter    #JOSE  -creatinine 1.5 (0.7-0.8)  -check renal US, Neelima and Ucr  -s/p 500cc  -avoid renal toxin, avoid hypotension, renally dose medications    #Hx stroke  #seizure disorder  -continue home depakote    #DM II  -ISS  -continue home lipitor    #HTN  -continue home losartan    #GERD  #hypomagnesemia  -continue PPI  -Mg 1.5  -continue home supplement      DIET: DIET ONE TIME MESSAGE  DIET NPO   ISOLATION PRECAUTIONS: There are currently no Active Isolations  CODE STATUS: Full Code   DVT PROPHYLAXIS: Heparin  ANTICIPATED DISCHARGE: 24-48 hours. ANTICIPATED DISPOSITION: Home with Home Healthcare  EMERGENCY CONTACT/SURROGATE DECISION MAKER: Ml Green (daughter)    Signed By: Norine Goodpasture, MD     December 27, 2022         Please note that this dictation may have been completed with Dragon, the computer voice recognition software. Quite often unanticipated grammatical, syntax, homophones, and other interpretive errors are inadvertently transcribed by the computer software. Please disregard these errors. Please excuse any errors that have escaped final proofreading.

## 2022-12-28 NOTE — CONSULTS
NEPHROLOGY CONSULT NOTE     Patient: Sukhjinder Landaverde MRN: [id number]  PCP: Mendez Shaw MD   :     1950  Age:   71 y.o. Sex:  female      Referring physician: Lila Zavala, *    Reason for consultation:     Acute kidney injury. Ms. Prabhakar Neumann was seen and examined in room 2345 0118 at [facility] this morning. She could not provide all the details surrounding her admission to the hospital.    The database in [address] [place] [address] was reviewed as well. Admission Date: 2022  6:21 PM  LOS: 1 day         DISCUSSION / PLAN :   Acute kidney injury could be explained on the basis of the following factors:    Volume depletion-this is possible. There was a history of poor oral intake and decreased urine output prior to admission. ARB-Mr. Prabhakar Neumann was on losartan prior to admission. ACE inhibitors, ARB's and Renin antagonists can cause a hemodynamic reduction in GFR. ATN-this is a possibility as well. It is unclear Ms Cosme Barraza taking any NSAIDs or any other nephrotoxic agents prior to admission. Acute glomerulonephritis and acute interstitial nephritis seems unlikely at this time. The initial urinalysis was bland. There was no proteinuria. Obstruction-seems unlikely at this time. There was no suggestion of obstructive uropathy on the renal ultrasound. Recommendations:    Urine electrolytes. Start lactated Ringer's at 76 cc/hour . This should hopefully provide some volume resuscitation and hopefully improve her GFR. Hold ARB's, ACE inhibitors and renin antagonist at this time. Dose medications for her GFR. Avoid NSAIDs and IV contrast agents. Check a renal panel this afternoon approximately 4 PM. Potassium supplements could be considered if she remains hypokalemic. There is no indication this time for acute dialysis therapy. Active Problems / Assessment AAActive  :    Active Problems:    Acute respiratory failure with hypoxia ([address] (2022)    Acute kidney injury. Hypokalemia. Subjective:     \" My leg \"    HPI:       Ms. Michelle Calabrese is a 76-year-old lady with a history of CVA, diabetes mellitus, hypertension, seizures, depression and anxiety. She was brought to the hospital yesterday by her family as there was a concern about her mental status. It seems there was a change from her baseline. Additionally, there was a suggestion of a decrease in her urine output. Ms. Michelle Calabrese denied any history of fever, chills, headache, diplopia, nausea, vomiting, diarrhea or abdominal pain. There was no suggestion of chest pain. She could not recall any history of weight loss. There was no history of syncope. There was no history of dysuria nor hematuria. Her blood pressure on presentation was 102/72. The BUN was 20 with a creatinine of 1.50 overnight. This morning her BUN was 28 with a creatinine 2.24. The urinalysis was bland on presentation. The renal ultrasound was negative for hydronephrosis. Ms. Edgardo Doshi could not recall any prior history of renal disease. There is no known family history of renal disease. The database in [facility] was reviewed. It revealed the following:    September 27, 2018-BUN 9, creatinine 0.99. September 30, 2018-BUN 7 creatinine 0.65.    June 7, 2019-BUN 6, creatinine 1.20. May 25, 2020-BUN 15, creatinine 1.26.    January 1, 2021 BUN 18, creatinine 1.34.    January 29 ,2021-BUN 4, creatinine 0.54. Past Medical Hx:   Past Medical History:   Diagnosis Date    Anxiety and depression     Diabetes ([facility] [address]     Hypertension     Obese     Seizure disorder ([facility] [address] [address]         Past Surgical Hx:   History reviewed. No pertinent surgical history. Medications:  Prior to Admission medications    Medication Sig Start Date End Date Taking? Authorizing Provider   melatonin 3 mg cap capsule Take 1 Capsule by mouth nightly.  6/20/21   Carlitos Hickey MD   divalproex  (DEPAKOTE) 250 mg tablet Take 1 Tablet by mouth every twelve (12) hours. 6/17/21   Reno Jones MD   cyanocobalamin (Vitamin B-12) 1,000 mcg tablet Take 1 Tablet by mouth daily. 6/17/21   Reno Jones MD   QUEtiapine (SEROquel) 25 mg tablet Take 1 Tablet by mouth two (2) times a day. 6/17/21   Reno Jones MD   traZODone (DESYREL) 50 mg tablet Take 1 Tablet by mouth nightly. 6/17/21   Reno Jones MD   pantoprazole (PROTONIX) 40 mg tablet Take 1 Tab by mouth daily. 1/29/21   Candace Navarro MD   acetaminophen (TYLENOL) 325 mg tablet Take 2 Tabs by mouth every six (6) hours as needed for Fever. 1/29/21   Candace Navarro MD   ergocalciferol (ERGOCALCIFEROL) 1,250 mcg (50,000 unit) capsule Take 1 Cap by mouth every seven (7) days. 2/2/21   Candace Navarro MD   magnesium oxide (MAG-OX) 400 mg tablet Take 1 Tab by mouth daily. 1/29/21   Candace Navarro MD   atorvastatin (LIPITOR) 20 mg tablet Take 1 Tab by mouth daily. 1/29/21   Candace Navarro MD   losartan (COZAAR) 50 mg tablet Take 1 Tab by mouth daily. 1/29/21   Candace Navarro MD   aspirin delayed-release 81 mg tablet Take 1 Tab by mouth daily. 10/26/20   Candace Navarro MD       No Known Allergies    Social Hx:  reports that she has an unknown smoking status. She has never used smokeless tobacco.     Family History   Problem Relation Age of Onset    Lung Cancer Mother     No Known Problems Father              Review of Systems:      See HPI.        Objective:    Vitals:    Vitals:    12/28/22 0559 12/28/22 0600 12/28/22 0700 12/28/22 1000   BP:  (!) 151/81 (!) 169/77    Pulse: 79 82 71 87   Resp:  15 16    Temp:   98.7 °F (37.1 °C)    SpO2:  99% 100%    Weight:         I&O's:  12/27 0701 - 12/28 0700  In: 500 [I.V.:500]  Out: -   Visit Vitals  BP (!) 169/77 (BP 1 Location: Right upper arm, BP Patient Position: At rest)   Pulse 87   Temp 98.7 °F (37.1 °C)   Resp 16   Wt 77.6 kg (171 lb 1.6 oz)   SpO2 100%   BMI 29.37 kg/m²       Physical Exam:      Ms. Jeannine Costa was seen and examined in room 422 this morning [address]: She was lying in bed quite comfortably. Her Nurse entered the room during my evaluation. Head: Normocephalic. Mucous membranes: Moist and anicteric. Neck: No JVD elevation. Cardiovascular: S1, S2, no S3 gallop, no pericardial rub. Respiratory: Breath sounds vesicular, no wheezes, no rales, no rhonchi. Abdomen: Not distended, soft, nontender, no guarding. Lower extremities: No pretibial edema. No ankle swelling. Feet: Dorsalis pedis pulses-normal caliber bilaterally. No plantar ulcers observed. Neuro: Awake and answering most questions appropriately.     Psychiatric: Calm        Laboratory Results:    Lab Results   Component Value Date    BUN 28 (H) 12/28/2022     12/28/2022    K 3.3 (L) 12/28/2022     12/28/2022    CO2 27 12/28/2022       Lab Results   Component Value Date    BUN 28 (H) 12/28/2022    BUN 20 12/27/2022    BUN 14 06/20/2021    BUN 14 06/16/2021    BUN 7 06/14/2021    K 3.3 (L) 12/28/2022    K 3.4 (L) 12/27/2022    K 4.2 06/20/2021    K 4.2 06/16/2021    K 3.7 06/14/2021       Lab Results   Component Value Date    WBC 10.9 12/28/2022    RBC 4.37 12/28/2022    HGB 13.4 12/28/2022    HCT 40.4 12/28/2022    MCV 92.4 12/28/2022    MCH 30.7 12/28/2022    RDW 13.0 12/28/2022     12/28/2022       Lab Results   Component Value Date    PHOS 3.5 01/19/2021       Urine dipstick:   Lab Results   Component Value Date/Time    Color YELLOW/STRAW 12/27/2022 07:07 PM    Appearance CLEAR 12/27/2022 07:07 PM    Specific gravity 1.016 12/27/2022 07:07 PM    Specific gravity 1.025 05/25/2020 09:11 PM    pH (UA) 5.0 12/27/2022 07:07 PM    Protein Negative 12/27/2022 07:07 PM    Glucose Negative 12/27/2022 07:07 PM    Ketone Negative 12/27/2022 07:07 PM    Bilirubin Negative 12/27/2022 07:07 PM    Urobilinogen 0.2 12/27/2022 07:07 PM    Nitrites Negative 12/27/2022 07:07 PM    Leukocyte Esterase Negative 12/27/2022 07:07 PM Epithelial cells FEW 12/27/2022 07:07 PM    Bacteria Negative 12/27/2022 07:07 PM    WBC 0-4 12/27/2022 07:07 PM    RBC 0-5 12/27/2022 07:07 PM       I have reviewed the following:         Pertinent labs      Care Plan discussed with: Ms Cinthya Petty reviewed. Thank you for allowing us to participate in the care of this patient. We will follow patient. Please dont hesitate to call with any questions    Christiano Zabala MD  12/28/2022    [place] [address] [address]  Phone - [phone]   Fax - [phone]  [email or web address]

## 2022-12-28 NOTE — PROGRESS NOTES
[id number] [facility] Adult  Hospitalist Group                                                                                          Hospitalist Progress Note  Rayna Diane MD  Answering service: [phone] from in house phone        Date of Service:  2022  NAME:  Sukhjinder Landaverde  :  1950  MRN:  [id number]      Admission Summary:   Sukhjinder Landaverde is a 71 y.o. female with pmhx DM II, HTN, past stroke, seizure disorder, and anxiety/depression, who presents with altered mental status. Patient is only oriented to self and knows that she is in the hospital. She does endorse swelling in the RLE. In the ED, VSS. Labs show glucose 199, creatinine 1.5 (0.7-0.8). CT head shows no evidence of acute infarct or ICH. It does show periventricular white matter disease, and multiple prior infarctions. Right hip X-ray with no acute findings. In the ED, she received 500cc ns     Interval history / Subjective:   Patient seen and examined earlier this morning by me for acute encephalopathy on chronic dementia and JOSE. Patient is confused, pleasantly, at the time of exam.  No acute events since arrival.     Assessment & Plan:     #Acute Encephalopathy on Chronic Dementia  -CT head negative, UA negative, alert and oriented to person and place only  -check TSH, ETOH, UDS, and ammonia  -hold sedating drugs (trazodone, and seroquel)  -need to establish baseline from daughter  -hold sedating drugs  Patient pleasantly confused  Chronic dementia  Per daughter, patient is normally able to carry a conversation so we will continue to monitor, but this looks to be mostly chronic     #JOSE  -creatinine 1.5 (0.7-0.8)  -check renal US, Neelima and Ucr  -s/p 500cc  -avoid renal toxin, avoid hypotension, renally dose medications  -worsened overnight  Per daughter, the patient has been taking a lot of NSAIDs for her leg pain. So this may be due to AIN from NSAID use.   Hold all nephrotoxic medications  Nephrology on board, appreciate recommendations     #Hx stroke  #seizure disorder  -continue home depakote     #DM II  -ISS  -continue home lipitor     #HTN  -continue home losartan  12/28-holding losartan and all other nephrotoxic medications due to worsening renal function     #GERD  #hypomagnesemia  -continue PPI  -Mg 1.5  -continue home supplement     Code status: Full  Prophylaxis: Heparin  Care Plan discussed with: Patient, nurse, family  Anticipated Disposition: TBD     Hospital Problems  Date Reviewed: 6/17/2021            Codes Class Noted POA    Acute respiratory failure with hypoxia [facility]) ICD-10-CM: J96.01  ICD-9-CM: 518.81  12/27/2022 Unknown             Review of Systems:   A comprehensive review of systems was negative except for that written in the HPI. Vital Signs:    Last 24hrs VS reviewed since prior progress note. Most recent are:  Visit Vitals  /82   Pulse 74   Temp 97.7 °F (36.5 °C)   Resp 14   Wt 77.6 kg (171 lb 1.6 oz)   SpO2 100%   BMI 29.37 kg/m²         Intake/Output Summary (Last 24 hours) at 12/28/2022 1321  Last data filed at 12/27/2022 2217  Gross per 24 hour   Intake 500 ml   Output --   Net 500 ml        Physical Examination:     I had a face to face encounter with this patient and independently examined them on 12/28/2022 as outlined below:          Constitutional:  No acute distress, pleasant, confused   ENT:  Oral mucosa moist, oropharynx benign. Resp:  CTA bilaterally. No wheezing/rhonchi/rales. No accessory muscle use. CV:  Regular rhythm, normal rate, no murmurs, gallops, rubs    GI:  Soft, non distended, non tender. normoactive bowel sounds, no hepatosplenomegaly     Musculoskeletal:  No edema, warm, 2+ pulses throughout. Moves all extremities without any issues. Right lower extremity movement not impaired no decreased range of motion. Neurologic:  Moves all extremities.   Oriented to self            Data Review:    Review and/or order of clinical lab test  Review and/or order of tests in the radiology section of CPT  Review and/or order of tests in the medicine section of [facility]      Labs:     Recent Labs     12/28/22  0326 12/27/22  1317   WBC 10.9 9.8   HGB 13.4 13.1   HCT 40.4 39.8    289     Recent Labs     12/28/22  0326 12/27/22  1317    138   K 3.3* 3.4*    102   CO2 27 30   BUN 28* 20   CREA 2.24* 1.50*   * 199*   CA 8.9 9.0   MG 2.1  --      Recent Labs     12/27/22  1317   ALT 14   AP 85   TBILI 0.5   TP 7.9   ALB 3.5   GLOB 4.4*     No results for input(s): INR, PTP, APTT, INREXT in the last 72 hours. No results for input(s): FE, TIBC, PSAT, FERR in the last 72 hours. Lab Results   Component Value Date/Time    Folate 7.5 01/02/2021 04:13 AM      No results for input(s): PH, PCO2, PO2 in the last 72 hours.   Recent Labs     12/28/22  0326   CPK 58     Lab Results   Component Value Date/Time    Cholesterol, total 153 06/09/2018 04:49 AM    HDL Cholesterol 48 06/09/2018 04:49 AM    LDL, calculated 85.8 06/09/2018 04:49 AM    Triglyceride 96 06/09/2018 04:49 AM    CHOL/HDL Ratio 3.2 06/09/2018 04:49 AM     Lab Results   Component Value Date/Time    Glucose (POC) 139 (H) 12/28/2022 11:20 AM    Glucose (POC) 123 (H) 06/17/2021 04:52 PM    Glucose (POC) 208 (H) 06/17/2021 11:33 AM    Glucose (POC) 87 06/17/2021 05:50 AM    Glucose (POC) 89 06/16/2021 09:07 PM     Lab Results   Component Value Date/Time    Color YELLOW/STRAW 12/27/2022 07:07 PM    Appearance CLEAR 12/27/2022 07:07 PM    Specific gravity 1.016 12/27/2022 07:07 PM    Specific gravity 1.025 05/25/2020 09:11 PM    pH (UA) 5.0 12/27/2022 07:07 PM    Protein Negative 12/27/2022 07:07 PM    Glucose Negative 12/27/2022 07:07 PM    Ketone Negative 12/27/2022 07:07 PM    Bilirubin Negative 12/27/2022 07:07 PM    Urobilinogen 0.2 12/27/2022 07:07 PM    Nitrites Negative 12/27/2022 07:07 PM    Leukocyte Esterase Negative 12/27/2022 07:07 PM    Epithelial cells FEW 12/27/2022 07:07 PM    Bacteria Negative 12/27/2022 07:07 PM    WBC 0-4 12/27/2022 07:07 PM    RBC 0-5 12/27/2022 07:07 PM         Medications Reviewed:     Current Facility-Administered Medications   Medication Dose Route Frequency    glucose chewable tablet 16 g  4 Tablet Oral PRN    glucagon (GLUCAGEN) injection 1 mg  1 mg IntraMUSCular PRN    dextrose 10 % infusion 0-250 mL  0-250 mL IntraVENous PRN    insulin lispro (HUMALOG) injection   SubCUTAneous AC&HS    pantoprazole (PROTONIX) tablet 40 mg  40 mg Oral DAILY    magnesium oxide (MAG-OX) tablet 400 mg  400 mg Oral DAILY    [Held by provider] losartan (COZAAR) tablet 50 mg  50 mg Oral DAILY    divalproex DR (DEPAKOTE) tablet 250 mg  250 mg Oral Q12H    atorvastatin (LIPITOR) tablet 20 mg  20 mg Oral DAILY    aspirin delayed-release tablet 81 mg  81 mg Oral DAILY    melatonin tablet 3 mg  3 mg Oral QHS    lactated Ringers infusion  75 mL/hr IntraVENous CONTINUOUS    sodium chloride (NS) flush 5-40 mL  5-40 mL IntraVENous Q8H    sodium chloride (NS) flush 5-40 mL  5-40 mL IntraVENous PRN    acetaminophen (TYLENOL) tablet 650 mg  650 mg Oral Q6H PRN    Or    acetaminophen (TYLENOL) suppository 650 mg  650 mg Rectal Q6H PRN    polyethylene glycol (MIRALAX) packet 17 g  17 g Oral DAILY PRN    ondansetron (ZOFRAN ODT) tablet 4 mg  4 mg Oral Q8H PRN    Or    ondansetron (ZOFRAN) injection 4 mg  4 mg IntraVENous Q6H PRN    heparin (porcine) injection 5,000 Units  5,000 Units SubCUTAneous Q8H     ______________________________________________________________________  EXPECTED LENGTH OF STAY: - - -  ACTUAL LENGTH OF STAY:          Lan Acevedo MD

## 2022-12-28 NOTE — ED NOTES
TRANSFER - OUT REPORT:    Verbal report given to Karson Muniz RN(name) on Diego Imus  being transferred to [facility]) for routine progression of care       Report consisted of patients Situation, Background, Assessment and   Recommendations(SBAR). Information from the following report(s) SBAR, ED Summary, Procedure Summary, Intake/Output, MAR, Recent Results, Med Rec Status and Cardiac Rhythm nsr was reviewed with the receiving nurse. Lines:   Peripheral IV 12/27/22 Right Antecubital (Active)   Site Assessment Clean, dry, & intact 12/27/22 1315   Phlebitis Assessment 0 12/27/22 1315   Infiltration Assessment 0 12/27/22 1315   Dressing Status Clean, dry, & intact 12/27/22 1315   Dressing Type Transparent 12/27/22 1315   Hub Color/Line Status Pink 12/27/22 1315   Action Taken Blood drawn 12/27/22 1315        Opportunity for questions and clarification was provided.       Patient transported with:   Monitor  Registered Nurse

## 2022-12-28 NOTE — PROGRESS NOTES
Problem: Pressure Injury - Risk of  Goal: *Prevention of pressure injury  Description: Document Fidel Scale and appropriate interventions in the flowsheet. Outcome: Progressing Towards Goal  Note: Pressure Injury Interventions:  Sensory Interventions: Assess changes in LOC, Assess need for specialty bed, Avoid rigorous massage over bony prominences, Check visual cues for pain, Discuss PT/OT consult with provider, Float heels, Keep linens dry and wrinkle-free, Maintain/enhance activity level, Minimize linen layers, Monitor skin under medical devices, Pad between skin to skin, Pressure redistribution bed/mattress (bed type), Sit a 90-degree angle/use footstool if needed, Turn and reposition approx. every two hours (pillows and wedges if needed)    Moisture Interventions: Absorbent underpads, Apply protective barrier, creams and emollients, Assess need for specialty bed, Check for incontinence Q2 hours and as needed, Contain wound drainage, Limit adult briefs, Maintain skin hydration (lotion/cream), Minimize layers, Moisture barrier, Offer toileting Q_hr    Activity Interventions: Assess need for specialty bed, Increase time out of bed, Pressure redistribution bed/mattress(bed type), PT/OT evaluation    Mobility Interventions: Assess need for specialty bed, Float heels, HOB 30 degrees or less, Pressure redistribution bed/mattress (bed type), Turn and reposition approx.  every two hours(pillow and wedges), PT/OT evaluation    Nutrition Interventions: Document food/fluid/supplement intake    Friction and Shear Interventions: Apply protective barrier, creams and emollients, Feet elevated on foot rest, Foam dressings/transparent film/skin sealants, HOB 30 degrees or less, Lift sheet, Minimize layers, Lift team/patient mobility team, Sit at 90-degree angle, Transfer aides:transfer board/Ny lift/ceiling lift, Transferring/repositioning devices

## 2022-12-29 LAB
ANION GAP SERPL CALC-SCNC: 8 MMOL/L (ref 5–15)
BASOPHILS # BLD: 0 K/UL (ref 0–0.1)
BASOPHILS NFR BLD: 0 % (ref 0–1)
BUN SERPL-MCNC: 28 MG/DL (ref 6–20)
BUN/CREAT SERPL: 16 (ref 12–20)
CALCIUM SERPL-MCNC: 9.2 MG/DL (ref 8.5–10.1)
CHLORIDE SERPL-SCNC: 104 MMOL/L (ref 97–108)
CO2 SERPL-SCNC: 28 MMOL/L (ref 21–32)
CREAT SERPL-MCNC: 1.7 MG/DL (ref 0.55–1.02)
DIFFERENTIAL METHOD BLD: NORMAL
EOSINOPHIL # BLD: 0.3 K/UL (ref 0–0.4)
EOSINOPHIL NFR BLD: 4 % (ref 0–7)
ERYTHROCYTE [DISTWIDTH] IN BLOOD BY AUTOMATED COUNT: 13 % (ref 11.5–14.5)
GLUCOSE BLD STRIP.AUTO-MCNC: 129 MG/DL (ref 65–117)
GLUCOSE BLD STRIP.AUTO-MCNC: 131 MG/DL (ref 65–117)
GLUCOSE BLD STRIP.AUTO-MCNC: 134 MG/DL (ref 65–117)
GLUCOSE BLD STRIP.AUTO-MCNC: 144 MG/DL (ref 65–117)
GLUCOSE SERPL-MCNC: 166 MG/DL (ref 65–100)
HCT VFR BLD AUTO: 36.9 % (ref 35–47)
HGB BLD-MCNC: 12.2 G/DL (ref 11.5–16)
IMM GRANULOCYTES # BLD AUTO: 0 K/UL (ref 0–0.04)
IMM GRANULOCYTES NFR BLD AUTO: 0 % (ref 0–0.5)
LYMPHOCYTES # BLD: 2.4 K/UL (ref 0.8–3.5)
LYMPHOCYTES NFR BLD: 28 % (ref 12–49)
MAGNESIUM SERPL-MCNC: 2.2 MG/DL (ref 1.6–2.4)
MCH RBC QN AUTO: 31 PG (ref 26–34)
MCHC RBC AUTO-ENTMCNC: 33.1 G/DL (ref 30–36.5)
MCV RBC AUTO: 93.7 FL (ref 80–99)
MONOCYTES # BLD: 0.5 K/UL (ref 0–1)
MONOCYTES NFR BLD: 6 % (ref 5–13)
NEUTS SEG # BLD: 5.4 K/UL (ref 1.8–8)
NEUTS SEG NFR BLD: 62 % (ref 32–75)
NRBC # BLD: 0 K/UL (ref 0–0.01)
NRBC BLD-RTO: 0 PER 100 WBC
PLATELET # BLD AUTO: 203 K/UL (ref 150–400)
PMV BLD AUTO: 10.4 FL (ref 8.9–12.9)
POTASSIUM SERPL-SCNC: 3.4 MMOL/L (ref 3.5–5.1)
RBC # BLD AUTO: 3.94 M/UL (ref 3.8–5.2)
SERVICE CMNT-IMP: ABNORMAL
SODIUM SERPL-SCNC: 140 MMOL/L (ref 136–145)
WBC # BLD AUTO: 8.8 K/UL (ref 3.6–11)

## 2022-12-29 PROCEDURE — 82962 GLUCOSE BLOOD TEST: CPT

## 2022-12-29 PROCEDURE — 83735 ASSAY OF MAGNESIUM: CPT

## 2022-12-29 PROCEDURE — 74011000250 HC RX REV CODE- 250: Performed by: FAMILY MEDICINE

## 2022-12-29 PROCEDURE — G0378 HOSPITAL OBSERVATION PER HR: HCPCS

## 2022-12-29 PROCEDURE — 74011250637 HC RX REV CODE- 250/637: Performed by: INTERNAL MEDICINE

## 2022-12-29 PROCEDURE — 80048 BASIC METABOLIC PNL TOTAL CA: CPT

## 2022-12-29 PROCEDURE — 85025 COMPLETE CBC W/AUTO DIFF WBC: CPT

## 2022-12-29 PROCEDURE — 96372 THER/PROPH/DIAG INJ SC/IM: CPT

## 2022-12-29 PROCEDURE — 74011250637 HC RX REV CODE- 250/637: Performed by: FAMILY MEDICINE

## 2022-12-29 PROCEDURE — 74011250636 HC RX REV CODE- 250/636: Performed by: FAMILY MEDICINE

## 2022-12-29 PROCEDURE — 74011250636 HC RX REV CODE- 250/636: Performed by: INTERNAL MEDICINE

## 2022-12-29 PROCEDURE — 65660000001 HC RM ICU INTERMED STEPDOWN

## 2022-12-29 PROCEDURE — 36415 COLL VENOUS BLD VENIPUNCTURE: CPT

## 2022-12-29 RX ADMIN — PANTOPRAZOLE SODIUM 40 MG: 40 TABLET, DELAYED RELEASE ORAL at 09:52

## 2022-12-29 RX ADMIN — Medication 400 MG: at 09:52

## 2022-12-29 RX ADMIN — DIVALPROEX SODIUM 250 MG: 125 TABLET, DELAYED RELEASE ORAL at 16:29

## 2022-12-29 RX ADMIN — SODIUM CHLORIDE, PRESERVATIVE FREE 10 ML: 5 INJECTION INTRAVENOUS at 21:35

## 2022-12-29 RX ADMIN — HEPARIN SODIUM 5000 UNITS: 5000 INJECTION INTRAVENOUS; SUBCUTANEOUS at 02:33

## 2022-12-29 RX ADMIN — SODIUM CHLORIDE, PRESERVATIVE FREE 10 ML: 5 INJECTION INTRAVENOUS at 06:32

## 2022-12-29 RX ADMIN — HEPARIN SODIUM 5000 UNITS: 5000 INJECTION INTRAVENOUS; SUBCUTANEOUS at 09:53

## 2022-12-29 RX ADMIN — SODIUM CHLORIDE, POTASSIUM CHLORIDE, SODIUM LACTATE AND CALCIUM CHLORIDE 75 ML/HR: 600; 310; 30; 20 INJECTION, SOLUTION INTRAVENOUS at 16:28

## 2022-12-29 RX ADMIN — HEPARIN SODIUM 5000 UNITS: 5000 INJECTION INTRAVENOUS; SUBCUTANEOUS at 16:29

## 2022-12-29 RX ADMIN — Medication 3 MG: at 21:34

## 2022-12-29 RX ADMIN — POTASSIUM BICARBONATE 20 MEQ: 782 TABLET, EFFERVESCENT ORAL at 09:53

## 2022-12-29 RX ADMIN — ASPIRIN 81 MG: 81 TABLET, COATED ORAL at 09:52

## 2022-12-29 RX ADMIN — ATORVASTATIN CALCIUM 20 MG: 20 TABLET, FILM COATED ORAL at 09:52

## 2022-12-29 RX ADMIN — DIVALPROEX SODIUM 250 MG: 125 TABLET, DELAYED RELEASE ORAL at 03:50

## 2022-12-29 NOTE — PROGRESS NOTES
Physician Progress Note      PATIENT:               Sheridan Villavicencio  [facility] #:                  [id number]  :                       1950  ADMIT DATE:       2022 6:21 PM  [address] [place] DATE:  RESPONDING  PROVIDER #:        Wilian Cedeño MD          QUERY TEXT:    Patient admitted with decreased urinary output. Noted documentation of acute respiratory failure with hypoxia. In order to support the diagnosis of acute respiratory failure with hypoxia, please include additional clinical indicators in your documentation. Or please document if the diagnosis of acute respiratory failure wih hypoxia has been ruled out after further study. The medical record reflects the following:  Risk Factors: acute respiratory failure with hypoxia    Clinical Indicators:  H&P   Active Problems:  Acute respiratory failure with hypoxia    Chest: Clear to auscultation bilaterally    RR 14-20  O2 sat % ra    no chest xray or ct of the chest      Treatment:  no treatment      Thank you,  Yaritza Easley RN [facility]  Clinical Documentation  [phone] or via [employer]      Acute Respiratory Failure Clinical Indicators per [employer] Training Guide and Quick Reference Guide:  pO2 < 60 mmHg or SpO2 (pulse oximetry) < 91% breathing room air  pCO2 > 50 and pH < 7.35  P/F ratio (pO2 / FIO2) < 300  pO2 decrease or pCO2 increase by 10 mmHg from baseline (if known)  Supplemental oxygen of 40% or more  Presence of respiratory distress, tachypnea, dyspnea, shortness of breath, wheezing  Unable to speak in complete sentences  Use of accessory muscles to breathe  Extreme anxiety and feeling of impending doom  Tripod position  Confusion/altered mental status/obtunded  Options provided:  -- Acute Respiratory Failure with hypoxia as evidenced by, Please document evidence.   -- Acute Respiratory Failure with hypoxia ruled out after study  -- Other - I will add my own diagnosis  -- Disagree - Not applicable / Not valid  -- Disagree - Clinically unable to determine / Unknown  -- Refer to Clinical Documentation Reviewer    PROVIDER RESPONSE TEXT:    Provider is clinically unable to determine a response to this query. patient does not have hypoxia on my evaluation, would recommend you send this quiery to provider who did have patient prior to determination of this    Query created by: Zehra Singh on 12/29/2022 11:05 AM      QUERY TEXT:    Pt admitted with confusion and has encephalopathy documented. If possible, please document in progress notes and discharge summary further specificity regarding the type of encephalopathy:      The medical record reflects the following:  Risk Factors: encephalopathy    Clinical Indicators:  pn 12/28  Acute Encephalopathy on Chronic Dementia  -CT head negative, UA negative, alert and oriented to person and place only  hold sedating drugs (trazodone, and seroquel)    drug screen neg      Treatment:  hold sedating drugs  monitoring labs      Thank you,  Alycia Acuña RN [facility]  Clinical Documentation  [phone] or via [address] provided:  -- Metabolic encephalopathy  -- Toxic encephalopathy  -- Other - I will add my own diagnosis  -- Disagree - Not applicable / Not valid  -- Disagree - Clinically unable to determine / Unknown  -- Refer to Clinical Documentation Reviewer    PROVIDER RESPONSE TEXT:    This patient has metabolic encephalopathy.     Query created by: Zehra Singh on 12/29/2022 11:24 AM      Electronically signed by:  Camilla Pelayo MD 12/29/2022 5:02 PM

## 2022-12-29 NOTE — PROGRESS NOTES
2000 Report received from Jannet Wilder, [facility]. Patient alert and pleasantly confused. Pt notified to not get up without assistance, pt verbalized understanding, bed alarm active. Pt tends to be impulsive when she has to void but redirectable. [id number] Bedside shift change report given to Delfino RN by Luke Kaminski RN. Report included the following information SBAR, Kardex, MAR, Accordion, and Recent Results and Cardiac Rhythm NSR. Problem: Pressure Injury - Risk of  Goal: *Prevention of pressure injury  Description: Document Fidel Scale and appropriate interventions in the flowsheet. Outcome: Progressing Towards Goal  Note: Pressure Injury Interventions:  Sensory Interventions: Assess changes in LOC, Minimize linen layers    Moisture Interventions: Absorbent underpads, Minimize layers    Activity Interventions: Assess need for specialty bed, Pressure redistribution bed/mattress(bed type)    Mobility Interventions: PT/OT evaluation, Turn and reposition approx. every two hours(pillow and wedges)    Nutrition Interventions: Document food/fluid/supplement intake, Discuss nutritional consult with provider    Friction and Shear Interventions: HOB 30 degrees or less    Problem: Diabetes Self-Management  Goal: *Disease process and treatment process  Description: Define diabetes and identify own type of diabetes; list 3 options for treating diabetes.   Outcome: Progressing Towards Goal     Problem: Acute Renal Failure: Day 1  Goal: Diagnostic Test/Procedures  Outcome: Progressing Towards Goal

## 2022-12-29 NOTE — PROGRESS NOTES
Renal Progress Note    NAME:  Hussein Perez   :   1950   MRN:   [id number]     Date/Time:  2022 1:51 PM      Assessment:     Acute Kidney Injury --> sec to vol depletion --> improving  Altered mental status --> Improving  Hypokalemia  DM       Plan:     Continue volume resuscitation. Replete K. Dose meds for her GFR. Follow lytes in the am (). Subjective:         F/U - JOSE - 22 - Vilma     No new complaints.       Review of Systems:  Y  N       Y  N  []         []          Fever/chills                                               []         []          Chest Pain  []         []          Cough                                                       []         []          Diarrhea   []         []          Sputum                                                     []         []          Constipation  []         []          SOB/VALLECILLO                                                []         []          Nausea/Vomit  []         []          Abd Pain                                                    []         []          Tolerating PT  []         []          Dysuria                                                      []         []          Tolerating Diet     []        Unable to obtain  ROS due to  []        mental status change  []        sedated   []        intubated    Medications reviewed:  Current Facility-Administered Medications   Medication Dose Route Frequency    glucose chewable tablet 16 g  4 Tablet Oral PRN    glucagon (GLUCAGEN) injection 1 mg  1 mg IntraMUSCular PRN    dextrose 10 % infusion 0-250 mL  0-250 mL IntraVENous PRN    insulin lispro (HUMALOG) injection   SubCUTAneous AC&HS    pantoprazole (PROTONIX) tablet 40 mg  40 mg Oral DAILY    magnesium oxide (MAG-OX) tablet 400 mg  400 mg Oral DAILY    [Held by provider] losartan (COZAAR) tablet 50 mg  50 mg Oral DAILY    divalproex DR (DEPAKOTE) tablet 250 mg  250 mg Oral Q12H    atorvastatin (LIPITOR) tablet 20 mg 20 mg Oral DAILY    aspirin delayed-release tablet 81 mg  81 mg Oral DAILY    melatonin tablet 3 mg  3 mg Oral QHS    lactated Ringers infusion  75 mL/hr IntraVENous CONTINUOUS    sodium chloride (NS) flush 5-40 mL  5-40 mL IntraVENous Q8H    sodium chloride (NS) flush 5-40 mL  5-40 mL IntraVENous PRN    acetaminophen (TYLENOL) tablet 650 mg  650 mg Oral Q6H PRN    Or    acetaminophen (TYLENOL) suppository 650 mg  650 mg Rectal Q6H PRN    polyethylene glycol (MIRALAX) packet 17 g  17 g Oral DAILY PRN    ondansetron (ZOFRAN ODT) tablet 4 mg  4 mg Oral Q8H PRN    Or    ondansetron (ZOFRAN) injection 4 mg  4 mg IntraVENous Q6H PRN    heparin (porcine) injection 5,000 Units  5,000 Units SubCUTAneous Q8H        Objective:   Vitals:  Visit Vitals  /70 (BP 1 Location: Left upper arm, BP Patient Position: At rest)   Pulse 80   Temp 97.8 °F (36.6 °C)   Resp 14   Wt 77.5 kg (170 lb 13.7 oz)   SpO2 100%   BMI 29.33 kg/m²     Temp (24hrs), Av °F (36.7 °C), Min:97.6 °F (36.4 °C), Max:98.3 °F (36.8 °C)      O2 Device: None (Room air)    Last 24hr Input/Output:    Intake/Output Summary (Last 24 hours) at 2022 1351  Last data filed at 2022 0334  Gross per 24 hour   Intake 1453.75 ml   Output --   Net 1453.75 ml        PHYSICAL EXAM:    Seen in Room 422      General:    Sleeping in bed comfortably. Head:   Normocephalic    Eyes:   No icterus. Lungs:   Clear to auscultation , no wheezes, no rales. Heart:   No S 3 gallop , No pericardial rub. Abdomen:   Not distended. Psych:  Not anxious or agitated. Neurologic: Arousable and interacting appropriately.          []        Telemetry Reviewed     []        NSR []        PAC/PVCs   []        Afib  []        Paced   []        NSVT   []        King []        NGT  []        Intubated on vent    Lab Data Reviewed:    Recent Results (from the past 24 hour(s))   GLUCOSE, POC    Collection Time: 22  3:54 PM   Result Value Ref Range    Glucose (POC) 117 65 - 117 mg/dL    Performed by Bri Alba    PROTEIN URINE, RANDOM    Collection Time: 12/28/22  4:09 PM   Result Value Ref Range    Protein, urine random 6 0.0 - 11.9 mg/dL   POTASSIUM, UR, RANDOM    Collection Time: 12/28/22  4:09 PM   Result Value Ref Range    Potassium urine, random 10 MMOL/L   CHLORIDE, URINE RANDOM    Collection Time: 12/28/22  4:09 PM   Result Value Ref Range    Chloride,urine random 63 MMOL/L   RENAL FUNCTION PANEL    Collection Time: 12/28/22  4:09 PM   Result Value Ref Range    Sodium 138 136 - 145 mmol/L    Potassium 3.5 3.5 - 5.1 mmol/L    Chloride 106 97 - 108 mmol/L    CO2 26 21 - 32 mmol/L    Anion gap 6 5 - 15 mmol/L    Glucose 145 (H) 65 - 100 mg/dL    BUN 27 (H) 6 - 20 MG/DL    Creatinine 1.86 (H) 0.55 - 1.02 MG/DL    BUN/Creatinine ratio 15 12 - 20      eGFR 29 (L) >60 ml/min/1.73m2    Calcium 9.1 8.5 - 10.1 MG/DL    Phosphorus 4.0 2.6 - 4.7 MG/DL    Albumin 3.2 (L) 3.5 - 5.0 g/dL   METABOLIC PANEL, BASIC    Collection Time: 12/29/22 12:26 AM   Result Value Ref Range    Sodium 140 136 - 145 mmol/L    Potassium 3.4 (L) 3.5 - 5.1 mmol/L    Chloride 104 97 - 108 mmol/L    CO2 28 21 - 32 mmol/L    Anion gap 8 5 - 15 mmol/L    Glucose 166 (H) 65 - 100 mg/dL    BUN 28 (H) 6 - 20 MG/DL    Creatinine 1.70 (H) 0.55 - 1.02 MG/DL    BUN/Creatinine ratio 16 12 - 20      eGFR 32 (L) >60 ml/min/1.73m2    Calcium 9.2 8.5 - 10.1 MG/DL   MAGNESIUM    Collection Time: 12/29/22 12:26 AM   Result Value Ref Range    Magnesium 2.2 1.6 - 2.4 mg/dL   CBC WITH AUTOMATED DIFF    Collection Time: 12/29/22 12:26 AM   Result Value Ref Range    WBC 8.8 3.6 - 11.0 K/uL    RBC 3.94 3.80 - 5.20 M/uL    HGB 12.2 11.5 - 16.0 g/dL    HCT 36.9 35.0 - 47.0 %    MCV 93.7 80.0 - 99.0 FL    MCH 31.0 26.0 - 34.0 PG    MCHC 33.1 30.0 - 36.5 g/dL    RDW 13.0 11.5 - 14.5 %    PLATELET 352 089 - 415 K/uL    MPV 10.4 8.9 - 12.9 FL    NRBC 0.0 0  WBC    ABSOLUTE NRBC 0.00 0.00 - 0.01 K/uL    NEUTROPHILS 62 32 - 75 %    LYMPHOCYTES 28 12 - 49 %    MONOCYTES 6 5 - 13 %    EOSINOPHILS 4 0 - 7 %    BASOPHILS 0 0 - 1 %    IMMATURE GRANULOCYTES 0 0.0 - 0.5 %    ABS. NEUTROPHILS 5.4 1.8 - 8.0 K/UL    ABS. LYMPHOCYTES 2.4 0.8 - 3.5 K/UL    ABS. MONOCYTES 0.5 0.0 - 1.0 K/UL    ABS. EOSINOPHILS 0.3 0.0 - 0.4 K/UL    ABS. BASOPHILS 0.0 0.0 - 0.1 K/UL    ABS. IMM.  GRANS. 0.0 0.00 - 0.04 K/UL    DF AUTOMATED     GLUCOSE, POC    Collection Time: 12/29/22  8:30 AM   Result Value Ref Range    Glucose (POC) 134 (H) 65 - 117 mg/dL    Performed by Emma Madden, POC    Collection Time: 12/29/22 12:10 PM   Result Value Ref Range    Glucose (POC) 129 (H) 65 - 117 mg/dL    Performed by Elisabet Qureshi        Total time spent with patient:  []        15   []        25   []        35   []         __ minutes    []        Critical Care Provided    Care Plan discussed with:        [x]        Patient   []        Family    []        Care Manager   []        Consultant/Specialist :      []          >50% of visit spent in counseling and coordination of care   (Discussed []        CODE status,  []        Care Plan, []        D/C Planning)    ___________________________________________________    Attending Physician: Jeri Millan MD

## 2022-12-29 NOTE — PROGRESS NOTES
[facility] Adult  Hospitalist Group                                                                                          Hospitalist Progress Note  Franklin Galeano MD  Answering service: [phone] from in house phone        Date of Service:  2022  NAME:  Gabe Fowler  :  1950  MRN:  [id number]      Admission Summary:   Gabe Fowler is a 71 y.o. female with pmhx DM II, HTN, past stroke, seizure disorder, and anxiety/depression, who presents with altered mental status. Patient is only oriented to self and knows that she is in the hospital. She does endorse swelling in the RLE. In the ED, VSS. Labs show glucose 199, creatinine 1.5 (0.7-0.8). CT head shows no evidence of acute infarct or ICH. It does show periventricular white matter disease, and multiple prior infarctions. Right hip X-ray with no acute findings. In the ED, she received 500cc ns     Interval history / Subjective:   Patient seen and examined earlier, no acute complaints feels well. Jose improving      Assessment & Plan:     #Acute metabolic Encephalopathy on Chronic Dementia improved   -CT head negative, UA negative, alert and oriented to person and place only  -Could have been secondary to dehydration/JOSE  -TSH, ethanol drug screen ammonia negative  -hold sedating drugs (trazodone, and seroquel)  -Patient currently mentating at [employer]    #JOSE  -creatinine 1.5 (0.7-0.8)  Per daughter, the patient has been taking a lot of NSAIDs for her leg pain. So this may be due to AIN from NSAID use.   Hold all nephrotoxic medications  Nephrology on board, appreciate recommendations  -cont ivf      #Hx stroke  #seizure disorder  -continue home depakote     #DM II  -ISS  -continue home lipitor     #HTN  -continue home losartan  -holding losartan and all other nephrotoxic medications due to worsening renal function     #GERD  #hypomagnesemia  -continue PPI  -Mg 1.5  -continue home supplement    PTOT Code status: Full  Prophylaxis: Heparin  Care Plan discussed with: Patient, nurse, family  Anticipated Disposition: 24 to 48 hours once JOSE improved     Hospital Problems  Date Reviewed: 6/17/2021            Codes Class Noted POA    Acute respiratory failure with hypoxia [facility]) ICD-10-CM: J96.01  ICD-9-CM: 518.81  12/27/2022 Unknown           Review of Systems:   A comprehensive review of systems was negative except for that written in the HPI. Vital Signs:    Last 24hrs VS reviewed since prior progress note. Most recent are:  Visit Vitals  /84 (BP 1 Location: Left upper arm, BP Patient Position: At rest)   Pulse 77   Temp 97.4 °F (36.3 °C)   Resp 14   Wt 77.5 kg (170 lb 13.7 oz)   SpO2 100%   BMI 29.33 kg/m²         Intake/Output Summary (Last 24 hours) at 12/29/2022 1543  Last data filed at 12/29/2022 0467  Gross per 24 hour   Intake 1453.75 ml   Output --   Net 1453.75 ml          Physical Examination:     I had a face to face encounter with this patient and independently examined them on 12/29/2022 as outlined below:          Constitutional:  No acute distress, pleasantly demented    ENT:  Oral mucosa moist, oropharynx benign. Resp:  CTA bilaterally. No wheezing/rhonchi/rales. No accessory muscle use. CV:  Regular rhythm, normal rate, no murmurs, gallops, rubs    GI:  Soft, non distended, non tender. normoactive bowel sounds, no hepatosplenomegaly     Musculoskeletal:  No edema, warm, 2+ pulses throughout. Moves all extremities without any issues. Right lower extremity movement not impaired no decreased range of motion. Neurologic:  Moves all extremities.   Oriented to self            Data Review:    Review and/or order of clinical lab test  Review and/or order of tests in the radiology section of CPT  Review and/or order of tests in the medicine section of CPT      Labs:     Recent Labs     12/29/22  0026 12/28/22  0326   WBC 8.8 10.9   HGB 12.2 13.4   HCT 36.9 40.4    218 Recent Labs     12/29/22  0026 12/28/22  1609 12/28/22  0326    138 138   K 3.4* 3.5 3.3*    106 103   CO2 28 26 27   BUN 28* 27* 28*   CREA 1.70* 1.86* 2.24*   * 145* 166*   CA 9.2 9.1 8.9   MG 2.2  --  2.1   PHOS  --  4.0  --        Recent Labs     12/28/22  1609 12/27/22  1317   ALT  --  14   AP  --  85   TBILI  --  0.5   TP  --  7.9   ALB 3.2* 3.5   GLOB  --  4.4*       No results for input(s): INR, PTP, APTT, INREXT, INREXT in the last 72 hours. No results for input(s): FE, TIBC, PSAT, FERR in the last 72 hours. Lab Results   Component Value Date/Time    Folate 7.5 01/02/2021 04:13 AM        No results for input(s): PH, PCO2, PO2 in the last 72 hours.   Recent Labs     12/28/22  0326   CPK 58       Lab Results   Component Value Date/Time    Cholesterol, total 153 06/09/2018 04:49 AM    HDL Cholesterol 48 06/09/2018 04:49 AM    LDL, calculated 85.8 06/09/2018 04:49 AM    Triglyceride 96 06/09/2018 04:49 AM    CHOL/HDL Ratio 3.2 06/09/2018 04:49 AM     Lab Results   Component Value Date/Time    Glucose (POC) 129 (H) 12/29/2022 12:10 PM    Glucose (POC) 134 (H) 12/29/2022 08:30 AM    Glucose (POC) 117 12/28/2022 03:54 PM    Glucose (POC) 139 (H) 12/28/2022 11:20 AM    Glucose (POC) 123 (H) 06/17/2021 04:52 PM     Lab Results   Component Value Date/Time    Color YELLOW/STRAW 12/27/2022 07:07 PM    Appearance CLEAR 12/27/2022 07:07 PM    Specific gravity 1.016 12/27/2022 07:07 PM    Specific gravity 1.025 05/25/2020 09:11 PM    pH (UA) 5.0 12/27/2022 07:07 PM    Protein Negative 12/27/2022 07:07 PM    Glucose Negative 12/27/2022 07:07 PM    Ketone Negative 12/27/2022 07:07 PM    Bilirubin Negative 12/27/2022 07:07 PM    Urobilinogen 0.2 12/27/2022 07:07 PM    Nitrites Negative 12/27/2022 07:07 PM    Leukocyte Esterase Negative 12/27/2022 07:07 PM    Epithelial cells FEW 12/27/2022 07:07 PM    Bacteria Negative 12/27/2022 07:07 PM    WBC 0-4 12/27/2022 07:07 PM    RBC 0-5 12/27/2022 07:07 PM         Medications Reviewed:     Current Facility-Administered Medications   Medication Dose Route Frequency    glucose chewable tablet 16 g  4 Tablet Oral PRN    glucagon (GLUCAGEN) injection 1 mg  1 mg IntraMUSCular PRN    dextrose 10 % infusion 0-250 mL  0-250 mL IntraVENous PRN    insulin lispro (HUMALOG) injection   SubCUTAneous AC&HS    pantoprazole (PROTONIX) tablet 40 mg  40 mg Oral DAILY    magnesium oxide (MAG-OX) tablet 400 mg  400 mg Oral DAILY    [Held by provider] losartan (COZAAR) tablet 50 mg  50 mg Oral DAILY    divalproex DR (DEPAKOTE) tablet 250 mg  250 mg Oral Q12H    atorvastatin (LIPITOR) tablet 20 mg  20 mg Oral DAILY    aspirin delayed-release tablet 81 mg  81 mg Oral DAILY    melatonin tablet 3 mg  3 mg Oral QHS    lactated Ringers infusion  75 mL/hr IntraVENous CONTINUOUS    sodium chloride (NS) flush 5-40 mL  5-40 mL IntraVENous Q8H    sodium chloride (NS) flush 5-40 mL  5-40 mL IntraVENous PRN    acetaminophen (TYLENOL) tablet 650 mg  650 mg Oral Q6H PRN    Or    acetaminophen (TYLENOL) suppository 650 mg  650 mg Rectal Q6H PRN    polyethylene glycol (MIRALAX) packet 17 g  17 g Oral DAILY PRN    ondansetron (ZOFRAN ODT) tablet 4 mg  4 mg Oral Q8H PRN    Or    ondansetron (ZOFRAN) injection 4 mg  4 mg IntraVENous Q6H PRN    heparin (porcine) injection 5,000 Units  5,000 Units SubCUTAneous Q8H     ______________________________________________________________________  EXPECTED LENGTH OF STAY: 2d 4h  ACTUAL LENGTH OF STAY:          2                 Umu Page MD

## 2022-12-29 NOTE — PROGRESS NOTES
Transition of Care Plan  RUR 12%    Disposition   Home pending medical progress and recommendations If rehab she will need authorization as she has [employer] Medicare/ Medicaid    Transportation  Family pending progress    [address]  Will arrange home health if ordered    Patient lives in [facility] and has [address], [facility] and [place] [address] services the area and are likely to be in network with insurance    Medical follow up  PCP and specialist    Contact  Daughter  Judy Hernandez [phone]  Son Lauren Bhatt [phone]  [facility] [phone]    Reason for Admission:  Acute respiratory failure with hypoxia  Hx of CVA, diabetes, seizure disorder, anxiety , GERD, hypertension                      RUR Score:       12%              Plan for utilizing home health:    will arrange if ordered and patient agrees       PCP: First and Last name:  Shimon Lang MD     [facility]   Are you a current patient: Yes/No: yes   Approximate date of last visit: several months ago   Can you participate in a virtual visit with your PCP:                     Current Advanced Directive/Advance Care Plan: Full Code      Healthcare Decision Maker:   Click here to complete [address] including selection of the Healthcare Decision Maker Relationship (ie \"Primary\")             Primary Decision Maker: Orion Bojorquez - Daughter - [phone]                  Transition of Care Plan:    Home pending medical progress and recommendations. Therapy not ordered yet. Cm met with patient in her room to introduce self and explain role. Patient was pleasant but confused. .   Cm talked with her daughter Sabrina Deluna on the phone and she confirmed demographics, PCP and insurance. [facility]ures [facility]    Prior to admission  patient was self care and independent with adl's and iadl's.   [facility]   None  [facility]  Carlos LUZ and cane    Patient lives in one level home with her son, Jim Middleton. Jim Middleton works during the day but transports patient to appointments and daily errands. Daughter Sunny Cordova and DIL Jason Coleman assist with transportation and errands. Patient does not cook-- Family prepares meals an does the housekeeping. Sunny Cordova confirmed that patient is independent with ADL's and Iadl's    They assist as needed. CM will follow patient's medical progress and assist with any needs that arise. Patient will be provided with 2nd Medicare letter prior to discharge. Care Management Interventions  PCP Verified by CM: Yes  Mode of Transport at Discharge: Other (see comment) (family)  Transition of Care Consult (CM Consult):  Other  Discharge Durable Medical Equipment: No  Physical Therapy Consult: No  Occupational Therapy Consult: No  Support Systems: Child(adrienne), Other Family Member(s)  Confirm Follow Up Transport: Family  Discharge Location  Patient Expects to be Discharged to[de-identified] Home with family assistance (will arrange home health if ordered  )

## 2022-12-30 VITALS
SYSTOLIC BLOOD PRESSURE: 134 MMHG | HEART RATE: 83 BPM | TEMPERATURE: 97.9 F | RESPIRATION RATE: 17 BRPM | DIASTOLIC BLOOD PRESSURE: 65 MMHG | WEIGHT: 170.86 LBS | BODY MASS INDEX: 29.33 KG/M2 | OXYGEN SATURATION: 99 %

## 2022-12-30 LAB
ANION GAP SERPL CALC-SCNC: 5 MMOL/L (ref 5–15)
BASOPHILS # BLD: 0 K/UL (ref 0–0.1)
BASOPHILS NFR BLD: 0 % (ref 0–1)
BUN SERPL-MCNC: 27 MG/DL (ref 6–20)
BUN/CREAT SERPL: 21 (ref 12–20)
CALCIUM SERPL-MCNC: 9.4 MG/DL (ref 8.5–10.1)
CHLORIDE SERPL-SCNC: 106 MMOL/L (ref 97–108)
CO2 SERPL-SCNC: 33 MMOL/L (ref 21–32)
CREAT SERPL-MCNC: 1.29 MG/DL (ref 0.55–1.02)
DIFFERENTIAL METHOD BLD: ABNORMAL
EOSINOPHIL # BLD: 0.3 K/UL (ref 0–0.4)
EOSINOPHIL NFR BLD: 4 % (ref 0–7)
ERYTHROCYTE [DISTWIDTH] IN BLOOD BY AUTOMATED COUNT: 13 % (ref 11.5–14.5)
GLUCOSE BLD STRIP.AUTO-MCNC: 114 MG/DL (ref 65–117)
GLUCOSE BLD STRIP.AUTO-MCNC: 167 MG/DL (ref 65–117)
GLUCOSE BLD STRIP.AUTO-MCNC: 202 MG/DL (ref 65–117)
GLUCOSE SERPL-MCNC: 159 MG/DL (ref 65–100)
HCT VFR BLD AUTO: 34 % (ref 35–47)
HGB BLD-MCNC: 11.3 G/DL (ref 11.5–16)
IMM GRANULOCYTES # BLD AUTO: 0 K/UL (ref 0–0.04)
IMM GRANULOCYTES NFR BLD AUTO: 0 % (ref 0–0.5)
LYMPHOCYTES # BLD: 2.5 K/UL (ref 0.8–3.5)
LYMPHOCYTES NFR BLD: 31 % (ref 12–49)
MAGNESIUM SERPL-MCNC: 2.2 MG/DL (ref 1.6–2.4)
MCH RBC QN AUTO: 31.2 PG (ref 26–34)
MCHC RBC AUTO-ENTMCNC: 33.2 G/DL (ref 30–36.5)
MCV RBC AUTO: 93.9 FL (ref 80–99)
MONOCYTES # BLD: 0.6 K/UL (ref 0–1)
MONOCYTES NFR BLD: 7 % (ref 5–13)
NEUTS SEG # BLD: 4.6 K/UL (ref 1.8–8)
NEUTS SEG NFR BLD: 58 % (ref 32–75)
NRBC # BLD: 0 K/UL (ref 0–0.01)
NRBC BLD-RTO: 0 PER 100 WBC
PLATELET # BLD AUTO: 214 K/UL (ref 150–400)
PMV BLD AUTO: 10.3 FL (ref 8.9–12.9)
POTASSIUM SERPL-SCNC: 4 MMOL/L (ref 3.5–5.1)
RBC # BLD AUTO: 3.62 M/UL (ref 3.8–5.2)
SERVICE CMNT-IMP: ABNORMAL
SERVICE CMNT-IMP: ABNORMAL
SERVICE CMNT-IMP: NORMAL
SODIUM SERPL-SCNC: 144 MMOL/L (ref 136–145)
WBC # BLD AUTO: 8.1 K/UL (ref 3.6–11)

## 2022-12-30 PROCEDURE — 80048 BASIC METABOLIC PNL TOTAL CA: CPT

## 2022-12-30 PROCEDURE — 97535 SELF CARE MNGMENT TRAINING: CPT

## 2022-12-30 PROCEDURE — 97116 GAIT TRAINING THERAPY: CPT

## 2022-12-30 PROCEDURE — G0378 HOSPITAL OBSERVATION PER HR: HCPCS

## 2022-12-30 PROCEDURE — 97161 PT EVAL LOW COMPLEX 20 MIN: CPT

## 2022-12-30 PROCEDURE — 74011250636 HC RX REV CODE- 250/636: Performed by: INTERNAL MEDICINE

## 2022-12-30 PROCEDURE — 96372 THER/PROPH/DIAG INJ SC/IM: CPT

## 2022-12-30 PROCEDURE — 83735 ASSAY OF MAGNESIUM: CPT

## 2022-12-30 PROCEDURE — 85025 COMPLETE CBC W/AUTO DIFF WBC: CPT

## 2022-12-30 PROCEDURE — 74011250637 HC RX REV CODE- 250/637: Performed by: FAMILY MEDICINE

## 2022-12-30 PROCEDURE — 36415 COLL VENOUS BLD VENIPUNCTURE: CPT

## 2022-12-30 PROCEDURE — 74011250636 HC RX REV CODE- 250/636: Performed by: FAMILY MEDICINE

## 2022-12-30 PROCEDURE — 74011000250 HC RX REV CODE- 250: Performed by: FAMILY MEDICINE

## 2022-12-30 PROCEDURE — 97530 THERAPEUTIC ACTIVITIES: CPT

## 2022-12-30 PROCEDURE — 82962 GLUCOSE BLOOD TEST: CPT

## 2022-12-30 PROCEDURE — 97165 OT EVAL LOW COMPLEX 30 MIN: CPT

## 2022-12-30 RX ADMIN — DIVALPROEX SODIUM 250 MG: 125 TABLET, DELAYED RELEASE ORAL at 02:22

## 2022-12-30 RX ADMIN — PANTOPRAZOLE SODIUM 40 MG: 40 TABLET, DELAYED RELEASE ORAL at 09:42

## 2022-12-30 RX ADMIN — ASPIRIN 81 MG: 81 TABLET, COATED ORAL at 09:42

## 2022-12-30 RX ADMIN — HEPARIN SODIUM 5000 UNITS: 5000 INJECTION INTRAVENOUS; SUBCUTANEOUS at 02:22

## 2022-12-30 RX ADMIN — HEPARIN SODIUM 5000 UNITS: 5000 INJECTION INTRAVENOUS; SUBCUTANEOUS at 09:42

## 2022-12-30 RX ADMIN — Medication 400 MG: at 09:42

## 2022-12-30 RX ADMIN — SODIUM CHLORIDE, PRESERVATIVE FREE 10 ML: 5 INJECTION INTRAVENOUS at 06:11

## 2022-12-30 RX ADMIN — SODIUM CHLORIDE, POTASSIUM CHLORIDE, SODIUM LACTATE AND CALCIUM CHLORIDE 75 ML/HR: 600; 310; 30; 20 INJECTION, SOLUTION INTRAVENOUS at 06:35

## 2022-12-30 RX ADMIN — ATORVASTATIN CALCIUM 20 MG: 20 TABLET, FILM COATED ORAL at 09:42

## 2022-12-30 NOTE — PROGRESS NOTES
Transition Plan of Care  RUR 12%-Low  Disposition-discharging home today. Patient states daughter will transport. No skilled needs. Medicare pt has received, reviewed, and signed 2nd IM letter informing them of their right to appeal the discharge. Signed copy has been placed on pt bedside chart.

## 2022-12-30 NOTE — PROGRESS NOTES
Problem: Pressure Injury - Risk of  Goal: *Prevention of pressure injury  Description: Document Fidel Scale and appropriate interventions in the flowsheet. Outcome: Progressing Towards Goal  Note: Pressure Injury Interventions:  Sensory Interventions: Assess changes in LOC, Assess need for specialty bed, Discuss PT/OT consult with provider, Float heels, Minimize linen layers, Turn and reposition approx. every two hours (pillows and wedges if needed)    Moisture Interventions: Absorbent underpads, Apply protective barrier, creams and emollients, Check for incontinence Q2 hours and as needed, Minimize layers    Activity Interventions: PT/OT evaluation    Mobility Interventions: PT/OT evaluation    Nutrition Interventions: Document food/fluid/supplement intake    Friction and Shear Interventions: Minimize layers, Transferring/repositioning devices                Problem: Patient Education: Go to Patient Education Activity  Goal: Patient/Family Education  Outcome: Progressing Towards Goal     Problem: Falls - Risk of  Goal: *Absence of Falls  Description: Document Suzette Fall Risk and appropriate interventions in the flowsheet.   Outcome: Progressing Towards Goal  Note: Fall Risk Interventions:  Mobility Interventions: Assess mobility with egress test, Bed/chair exit alarm, OT consult for ADLs, PT Consult for mobility concerns, PT Consult for assist device competence, Utilize walker, cane, or other assistive device         Medication Interventions: Assess postural VS orthostatic hypotension, Bed/chair exit alarm, Evaluate medications/consider consulting pharmacy, Patient to call before getting OOB, Teach patient to arise slowly, Utilize gait belt for transfers/ambulation    Elimination Interventions: Call light in reach, Bed/chair exit alarm, Toilet paper/wipes in reach, Toileting schedule/hourly rounds              Problem: Patient Education: Go to Patient Education Activity  Goal: Patient/Family Education  Outcome: Progressing Towards Goal     Problem: Diabetes Self-Management  Goal: *Disease process and treatment process  Description: Define diabetes and identify own type of diabetes; list 3 options for treating diabetes.   Outcome: Progressing Towards Goal     Problem: Acute Renal Failure: Day 1  Goal: Off Pathway (Use only if patient is Off Pathway)  Outcome: Progressing Towards Goal

## 2022-12-30 NOTE — PROGRESS NOTES
D/c instructions provided and explained to pt and daughter/caregiver Aracely Hong). Verbalized understanding. Personal belongings with family. Pt transferred via wheelchair to private vehicle.

## 2022-12-30 NOTE — PROGRESS NOTES
PHYSICAL THERAPY EVALUATION/DISCHARGE  Patient: Shawn Conde (40 y.o. female)  Date: 12/30/2022  Primary Diagnosis: Acute respiratory failure with hypoxia (HCC) [J96.01]       Precautions:     ASSESSMENT  Based on the objective data described below, the patient presents with overall mobility at/near baseline function s/p admission with acute respiratory failure. At baseline, pt is independent with mobility and ADLs. She lives with son who works during the day. Daughter and DIL are also involved in her care. Pt with mildly unsteady gait and impaired cognition at baseline. This date, she transferred supine>sit independently, sit<>stand with supervision, and ambulated 100 ft with supervision. During ambulation, she demonstrated mild trunk sway and path deviation which she reports is baseline. Pt  has no further PT needs at this time, will sign off. Functional Outcome Measure: The patient scored 100/100 on the Barthel outcome measure. Other factors to consider for discharge: at baseline, impaired cognition      Further skilled acute physical therapy is not indicated at this time. PLAN :  Recommendation for discharge: (in order for the patient to meet his/her long term goals)  No skilled physical therapy/ follow up rehabilitation needs identified at this time. This discharge recommendation:  Has not yet been discussed the attending provider and/or case management    IF patient discharges home will need the following DME: owns [facility]        SUBJECTIVE:   Patient stated I like to watch TV and dance.     OBJECTIVE DATA SUMMARY:   HISTORY:    Past Medical History:   Diagnosis Date    Anxiety and depression     Diabetes ([facility] [address]     Hypertension     Obese     Seizure disorder ([facility] [address]    History reviewed. No pertinent surgical history. Prior level of function: Pt with baseline impaired cognition and mildly unsteady gait. Lives with son who works during the day. Daughter and DIL also involved in care.   Pt independent with basic mobility and self care tasks. Home Situation  Home Environment: Private residence  # Steps to Enter: 0  One/Two Story Residence: Two story, live on 1st floor  Living Alone: No  Support Systems: Child(adrienne)  Patient Expects to be Discharged to[de-identified] Home with family assistance (will arrange home health if ordered  )  Current DME Used/Available at Home: Cane, straight  Tub or Shower Type: Tub/Shower combination    EXAMINATION/PRESENTATION/DECISION MAKING:   Critical Behavior:  Neurologic State: Alert  Orientation Level: Disoriented to situation, Oriented to place, Disoriented to time, Oriented to person  Cognition: Follows commands     Hearing: Auditory  Auditory Impairment: None    Range Of Motion:  AROM: Within functional limits           PROM: Within functional limits           Strength:    Strength: Within functional limits                    Tone & Sensation:   Tone: Normal              Sensation: Intact               Coordination:  Coordination: Within functional limits  Vision:      Functional Mobility:  Bed Mobility:     Supine to Sit: Independent  Sit to Supine:  (up in chair)     Transfers:  Sit to Stand: Supervision  Stand to Sit: Supervision        Bed to Chair: Supervision              Balance:   Sitting: Intact  Standing: Impaired; Without support  Standing - Static: Good  Standing - Dynamic : Good  Ambulation/Gait Training:  Distance (ft): 100 Feet (ft)  Assistive Device: Gait belt  Ambulation - Level of Assistance: Supervision  Gait Abnormalities: Path deviations;Trunk sway increased    Functional Measure:  Barthel Index:    Bathin  Bladder: 10  Bowels: 10  Groomin  Dressing: 10  Feeding: 10  Mobility: 15  Stairs: 10  Toilet Use: 10  Transfer (Bed to Chair and Back): 15  Total: 100/100       The Barthel ADL Index: Guidelines  1. The index should be used as a record of what a patient does, not as a record of what a patient could do.   2. The main aim is to establish degree of independence from any help, physical or verbal, however minor and for whatever reason. 3. The need for supervision renders the patient not independent. 4. A patient's performance should be established using the best available evidence. Asking the patient, friends/relatives and nurses are the usual sources, but direct observation and common sense are also important. However direct testing is not needed. 5. Usually the patient's performance over the preceding 24-48 hours is important, but occasionally longer periods will be relevant. 6. Middle categories imply that the patient supplies over 50 per cent of the effort. 7. Use of aids to be independent is allowed. Score Interpretation (from [address] [facility])    Independent   60-79 Minimally independent   40-59 Partially dependent   20-39 Very dependent   <20 Totally dependent     -Arnold Hill., Barthel, D.W. (1965). Functional evaluation: the Barthel Index. [facility] ([address] (1997). The Barthel activities of daily living index: self-reporting versus actual performance in the old (> or = 75 years). Journal [address] 45(7), [facility], J.SAJIF, Jeanette Cabral., [facility]. (1999). Measuring the change in disability after inpatient rehabilitation; comparison of the responsiveness of the Barthel Index and Functional [place] Measure. Journal of Neurology, Neurosurgery, and Psychiatry, 66(4), 272-898. VY Naik.HANS, TANNA Hernandes, & Bud Dobbs, M.A. (2004) Assessment of post-stroke quality of life in cost-effectiveness studies: The usefulness of the Barthel Index and the EuroQoL-5D.  Quality of Life Research, 13, 824-32     Activity Tolerance:   Good    After treatment patient left in no apparent distress:   Sitting in chair, Call bell within reach, and Bed / chair alarm activated    COMMUNICATION/EDUCATION:   The patients plan of care was discussed with: Occupational therapist and Registered nurse. Fall prevention education was provided and the patient/caregiver indicated understanding., Patient/family have participated as able in goal setting and plan of care. , and Patient/family agree to work toward stated goals and plan of care.     Thank you for this referral.  Patria Bell, PT, DPT   Time Calculation: 30 mins

## 2022-12-30 NOTE — PROGRESS NOTES
OCCUPATIONAL THERAPY EVALUATION/DISCHARGE  Patient: Sukhjinder Landaverde (39 y.o. female)  Date: 12/30/2022  Primary Diagnosis: Acute respiratory failure with hypoxia (HCC) [J96.01]       Precautions: fall       ASSESSMENT  Based on the objective data described below, the patient presents with decreased balance and stamina, baseline, and poor memory. She is able to complete basic ADLs at set. up to supervision level for safety, anticipate increased independent in her home environment. Patient is AAOx2 and follows commands, teaching back safety with calling out for staff. She had 0/2 recall for words at post 5 minutes and post 3 minutes which is baseline due to her memory deficits. Family assists with IADLs at baseline. She has needed support, would encourage family to check in during the day more often at dc. No further acute care OT needs. DC when medically stable with family. Current Level of Function (ADLs/self-care): set/up     Functional Outcome Measure: The patient scored 100 on the Barthel outcome measure which is indicative of no impairment. Other factors to consider for discharge: supportive family     PLAN :  Recommend with staff: OOB for meals    Recommendation for discharge: (in order for the patient to meet his/her long term goals)  No skilled occupational therapy/ follow up rehabilitation needs identified at this time. This discharge recommendation:  Has not yet been discussed the attending provider and/or case management    IF patient discharges home will need the following DME: none       SUBJECTIVE:   Patient stated I can't remember, maybe it was a beatrice.     OBJECTIVE DATA SUMMARY:   HISTORY:   Past Medical History:   Diagnosis Date    Anxiety and depression     Diabetes ([facility]     Hypertension     Obese     Seizure disorder ([facility]    History reviewed. No pertinent surgical history. Prior Level of Function/Environment/Context: home with family for assistance.  Lives with son who works but DIL and daughter available to help during the day  Expanded or extensive additional review of patient history:   Home Situation  Home Environment: Private residence  # Steps to Enter: 0  One/Two Story Residence: Two story, live on 1st floor  Living Alone: No  Support Systems: Child(adrienne)  Patient Expects to be Discharged to[de-identified] Home with family assistance (will arrange home health if ordered  )  Current DME Used/Available at Home: Cane, straight  Tub or Shower Type: Tub/Shower combination    Hand dominance: Right    EXAMINATION OF PERFORMANCE DEFICITS:  Cognitive/Behavioral Status:  Neurologic State: Alert  Orientation Level: Disoriented to situation;Oriented to place; Disoriented to time;Oriented to person  Cognition: Follows commands  Perception: Appears intact  Perseveration: No perseveration noted  Safety/Judgement: Good awareness of safety precautions; Awareness of environment    Skin: visible areas intact    Edema: none noted    Hearing: Auditory  Auditory Impairment: None    Vision/Perceptual:                           Acuity: Within Defined Limits    Corrective Lenses: Glasses    Range of Motion:    AROM: Within functional limits  PROM: Within functional limits                      Strength:    Strength: Within functional limits                Coordination:  Coordination: Within functional limits  Fine Motor Skills-Upper: Right Intact; Left Impaired (old CVA-functional)    Gross Motor Skills-Upper: Right Intact; Left Intact    Tone & Sensation:    Tone: Normal  Sensation: Intact                      Balance:  Sitting: Intact  Standing: Impaired; Without support  Standing - Static: Good  Standing - Dynamic : Good    Functional Mobility and Transfers for ADLs:  Bed Mobility:  Supine to Sit: Independent  Sit to Supine:  (up in chair)    Transfers:  Sit to Stand: Supervision  Stand to Sit: Supervision  Bed to Chair: Supervision  Bathroom Mobility: Supervision/set up  Toilet Transfer : Supervision    ADL Assessment:  Feeding: Setup    Oral Facial Hygiene/Grooming: Supervision         Type of Bath: Patient refused    Upper Body Dressing: Setup    Lower Body Dressing: Setup    Toileting: Supervision                ADL Intervention and task modifications:     Provided detailed energy conservation education and explanation. Explained energy in physical and visual terms \"like a water bottle that gets spent throughout the day. \" The patient was able to verbalize understanding and teach back basic principles by the end of the session. Heavy focus on pacing through activities, never starting something that can be terminated, and taking seated breaks throughout the day. Discussion of home safety and not doing IADLs with current mental status      Cognitive Retraining  Safety/Judgement: Good awareness of safety precautions; Awareness of environment       Functional Measure:    Barthel Index:  Bathin  Bladder: 10  Bowels: 10  Groomin  Dressing: 10  Feeding: 10  Mobility: 15  Stairs: 10  Toilet Use: 10  Transfer (Bed to Chair and Back): 15  Total: 100/100      The Barthel ADL Index: Guidelines  1. The index should be used as a record of what a patient does, not as a record of what a patient could do. 2. The main aim is to establish degree of independence from any help, physical or verbal, however minor and for whatever reason. 3. The need for supervision renders the patient not independent. 4. A patient's performance should be established using the best available evidence. Asking the patient, friends/relatives and nurses are the usual sources, but direct observation and common sense are also important. However direct testing is not needed. 5. Usually the patient's performance over the preceding 24-48 hours is important, but occasionally longer periods will be relevant. 6. Middle categories imply that the patient supplies over 50 per cent of the effort. 7. Use of aids to be independent is allowed.     Score Interpretation (from [facility])    Independent   60-79 Minimally independent   40-59 Partially dependent   20-39 Very dependent   <20 Totally dependent     -Arnold Hill., Barthel, D.W. (1965). Functional evaluation: the Barthel Index. [facility] ([address] (1997). The Barthel activities of daily living index: self-reporting versus actual performance in the old (> or = 75 years). Journal [address] 45(7), [facility], J.OC.IVETTE.F, Magda King., Thaddeus Zaman. (1999). Measuring the change in disability after inpatient rehabilitation; comparison of the responsiveness of the Barthel Index and Functional Ross Measure. Journal of Neurology, Neurosurgery, and Psychiatry, 66(4), 060-44. Darline HAWA Lopez, TANNA Hernandes, & Nicolas Stein M.A. (2004) Assessment of post-stroke quality of life in cost-effectiveness studies: The usefulness of the Barthel Index and the EuroQoL-5D. Quality of Life Research, 15, 197-83         Occupational Therapy Evaluation Charge Determination   History Examination Decision-Making   LOW Complexity : Brief history review  LOW Complexity : 1-3 performance deficits relating to physical, cognitive , or psychosocial skils that result in activity limitations and / or participation restrictions  LOW Complexity : No comorbidities that affect functional and no verbal or physical assistance needed to complete eval tasks       Based on the above components, the patient evaluation is determined to be of the following complexity level: LOW   Pain Rating:  none    Activity Tolerance:   Good    After treatment patient left in no apparent distress:    Sitting in chair, Call bell within reach, and Bed / chair alarm activated    COMMUNICATION/EDUCATION:   The patients plan of care was discussed with: Physical therapist and Registered nurse.      Thank you for this referral.  Ismael Neighbours  Time Calculation: 23 mins

## 2022-12-30 NOTE — DISCHARGE SUMMARY
Discharge Summary       PATIENT ID: Juanito Sumner  MRN: [id number]   YOB: 1950    DATE OF ADMISSION: 12/27/2022  6:21 PM    DATE OF DISCHARGE: 12/30/22    PRIMARY CARE PROVIDER: Carmen Perry MD     ATTENDING PHYSICIAN: Samara Jacobson MD   DISCHARGING PROVIDER: Samara Jacobson MD    To contact this individual call [phone] and ask the  to page. If unavailable ask to be transferred the Adult Hospitalist Department. CONSULTATIONS: IP CONSULT TO HOSPITALIST  IP CONSULT TO NEPHROLOGY    PROCEDURES/SURGERIES: * No surgery found *    ADMITTING DIAGNOSES & HOSPITAL COURSE:   HPI: Marilee Og is a 71 y.o. female with pmhx DM II, HTN, past stroke, seizure disorder, and anxiety/depression, who presents with altered mental status. Patient is only oriented to self and knows that she is in the hospital. She does endorse swelling in the RLE. In the ED, VSS. Labs show glucose 199, creatinine 1.5 (0.7-0.8). CT head shows no evidence of acute infarct or ICH. It does show periventricular white matter disease, and multiple prior infarctions. Right hip X-ray with no acute findings. In the ED, she received 500cc ns\"        DISCHARGE DIAGNOSES / PLAN:      #Acute metabolic Encephalopathy on Chronic Dementia improved   -CT head negative, UA negative, alert and oriented to person and place only  -Could have been secondary to dehydration/JOSE  -TSH, ethanol drug screen ammonia negative  -resume trazodone, and seroquel on dc   -Patient currently mentating at baseline     #JOSE improved   -creatinine 1.5 (0.7-0.8)  Per daughter, the patient has been taking a lot of NSAIDs for her leg pain. So this may be due to AIN from NSAID use.   Hold all nephrotoxic medications  Nephrology on board, appreciate recommendations  -better on ivf, cr near normalized, encourage adequate po hydration, counseled to remain off NSAIDs/ace for now      #Hx stroke  #seizure disorder  -continue home depakote     #DM II  -ISS  -continue home lipitor     #HTN  -continue home losartan  12/28-holding losartan and all other nephrotoxic medications due to worsening renal function     #GERD  #hypomagnesemia  -continue PPI  -Mg 1.5  -continue home supplement     PTOT    Follow-up with PCP, nephrology outpatient     Code status: Full  Prophylaxis: Heparin  Care Plan discussed with: Patient, nurse, family  Anticipated Disposition: dc home         FOLLOW UP APPOINTMENTS:    Follow-up Information       Follow up With Specialties Details Why Contact Info    Deann Sauceda MD Family Medicine Call in 1 day(s)  [address] [facility], Adelso Méndez MD Family Medicine   [address] [address] [phone]      Lata Brumfield MD Nephrology Call in 1 day(s)  [address] [facility] [phone]               ADDITIONAL CARE RECOMMENDATIONS: Repeat CBC, BMP 3 to 5 days    DIET: Resume previous diet    ACTIVITY: Activity as tolerated        DISCHARGE MEDICATIONS:  Current Discharge Medication List        CONTINUE these medications which have NOT CHANGED    Details   melatonin 3 mg cap capsule Take 1 Capsule by mouth nightly. Qty: 30 Capsule, Refills: 0      divalproex DR (DEPAKOTE) 250 mg tablet Take 1 Tablet by mouth every twelve (12) hours. Qty: 60 Tablet, Refills: 1      cyanocobalamin (Vitamin B-12) 1,000 mcg tablet Take 1 Tablet by mouth daily. Qty: 30 Tablet, Refills: 2      QUEtiapine (SEROquel) 25 mg tablet Take 1 Tablet by mouth two (2) times a day. Qty: 60 Tablet, Refills: 0      traZODone (DESYREL) 50 mg tablet Take 1 Tablet by mouth nightly. Qty: 30 Tablet, Refills: 0      pantoprazole (PROTONIX) 40 mg tablet Take 1 Tab by mouth daily. Qty: 30 Tab, Refills: 0      acetaminophen (TYLENOL) 325 mg tablet Take 2 Tabs by mouth every six (6) hours as needed for Fever.   Qty: 30 Tab, Refills: 0      ergocalciferol (ERGOCALCIFEROL) 1,250 mcg (50,000 unit) capsule Take 1 Cap by mouth every seven (7) days. Qty: 3 Cap, Refills: 0      magnesium oxide (MAG-OX) 400 mg tablet Take 1 Tab by mouth daily. Qty: 30 Tab, Refills: 0      atorvastatin (LIPITOR) 20 mg tablet Take 1 Tab by mouth daily. Qty: 30 Tab, Refills: 0      aspirin delayed-release 81 mg tablet Take 1 Tab by mouth daily. Qty: 30 Tab, Refills: 0           STOP taking these medications       losartan (COZAAR) 50 mg tablet Comments:   Reason for Stopping:                 NOTIFY YOUR PHYSICIAN FOR ANY OF THE FOLLOWING:   Fever over 101 degrees for 24 hours. Chest pain, shortness of breath, fever, chills, nausea, vomiting, diarrhea, change in mentation, falling, weakness, bleeding. Severe pain or pain not relieved by medications. Or, any other signs or symptoms that you may have questions about. DISPOSITION:    Home With:   OT  PT  HH  RN       Long term SNF/Inpatient Rehab   x Independent/assisted living    Hospice    Other:       PATIENT CONDITION AT DISCHARGE:     Functional status    Poor    x Deconditioned     Independent      Cognition     Lucid     Forgetful    x Dementia      Catheters/lines (plus indication)    King     PICC     PEG    x None      Code status   x  Full code     DNR      PHYSICAL EXAMINATION AT DISCHARGE:  I had a face to face encounter with this patient and independently examined them on 12/30/2022 as outlined below:                                                       Constitutional:  No acute distress, pleasantly demented    ENT:  Oral mucosa moist, oropharynx benign. Resp:  CTA bilaterally. No wheezing/rhonchi/rales. No accessory muscle use. CV:  Regular rhythm, normal rate, no murmurs, gallops, rubs    GI:  Soft, non distended, non tender. normoactive bowel sounds, no hepatosplenomegaly     Musculoskeletal:  No edema, warm, 2+ pulses throughout. Moves all extremities without any issues.   Right lower extremity movement not impaired no decreased range of motion. Neurologic:  Moves all extremities. Oriented to self         CHRONIC MEDICAL DIAGNOSES:  Problem List as of 12/30/2022 Date Reviewed: 6/17/2021            Codes Class Noted - Resolved    Acute respiratory failure with hypoxia [facility]) ICD-10-CM: J96.01  ICD-9-CM: 518.81  12/27/2022 - Present        Change in mental status ICD-10-CM: R41.82  ICD-9-CM: 780.97  6/20/2021 - Present        Suprapubic pain ICD-10-CM: R10.2  ICD-9-CM: 789.09  1/21/2021 - Present        AMS (altered mental status) ICD-10-CM: R41.82  ICD-9-CM: 780.97  1/21/2021 - Present        Acute hypokalemia ICD-10-CM: E87.6  ICD-9-CM: 276.8  1/18/2021 - Present        Hypotension ICD-10-CM: I95.9  ICD-9-CM: 458.9  1/18/2021 - Present        JOSE (acute kidney injury) ([facility] ICD-10-CM: N17.9  ICD-9-CM: 584.9  1/18/2021 - Present        Dehydration ICD-10-CM: E86.0  ICD-9-CM: 276.51  1/1/2021 - Present        Altered mental status ICD-10-CM: R41.82  ICD-9-CM: 780.97  1/1/2021 - Present        Anemia ICD-10-CM: D64.9  ICD-9-CM: 285.9  1/1/2021 - Present        Tachycardia ICD-10-CM: R00.0  ICD-9-CM: 785.0  1/1/2021 - Present        Abnormal urinalysis ICD-10-CM: R82.90  ICD-9-CM: 791.9  1/1/2021 - Present        Sepsis ([facility] ICD-10-CM: A41.9  ICD-9-CM: 038.9, 995.91  1/1/2021 - Present        Lactic acidosis ICD-10-CM: E87.20  ICD-9-CM: 276.2  10/24/2020 - Present        SIRS (systemic inflammatory response syndrome) ([place] [address].) ICD-10-CM: R65.10  ICD-9-CM: 995.90  9/27/2018 - Present        Sinus tachycardia ICD-10-CM: R00.0  ICD-9-CM: 427.89  9/27/2018 - Present        Hypertension ICD-10-CM: I10  ICD-9-CM: 401.9  Unknown - Present        Diabetes ([place] [address].) ICD-10-CM: E11.9  ICD-9-CM: 250.00  Unknown - Present        Anxiety and depression ICD-10-CM: F41.9, F32. A  ICD-9-CM: 300.00, 311  Unknown - Present        Seizure disorder ([place] [address].) ICD-10-CM: G40.909  ICD-9-CM: 345.90  Unknown - Present        Vomiting ICD-10-CM: R11.10  ICD-9-CM: 787.03 9/27/2018 - Present        Hypokalemia ICD-10-CM: E87.6  ICD-9-CM: 276.8  9/27/2018 - Present        Obese ICD-10-CM: E66.9  ICD-9-CM: 278.00  Unknown - Present        DM (diabetes mellitus) ([facility] ICD-10-CM: E11.9  ICD-9-CM: 250.00  6/7/2018 - Present        Leukocytosis ICD-10-CM: Z32.772  ICD-9-CM: 288.60  6/7/2018 - Present        RESOLVED: Encephalopathy acute ICD-10-CM: G93.40  ICD-9-CM: 348.30  6/7/2018 - 9/27/2018        RESOLVED: Seizure ([facility] ICD-10-CM: R56.9  ICD-9-CM: 224.32  6/7/2018 - 9/27/2018        RESOLVED: Aspiration into airway ICD-10-CM: S96.881C  ICD-9-CM: 934.9  6/7/2018 - 9/27/2018        RESOLVED: Renal insufficiency ICD-10-CM: N28.9  ICD-9-CM: 593.9  6/7/2018 - 9/27/2018           Greater than 40 minutes were spent with the patient on counseling and coordination of care    Signed:   Shaji Linares MD  12/30/2022  1:16 PM

## 2023-05-25 NOTE — FORENSIC NURSE
FNE responded to Code [place]. PCT advised there was no sitter at this time but patient was within sight if PCT at nurses station. Pastoral Care at bedside and able to deescalate patient. FNE made aware that patient was pending psych consult. If patient's behavior continues, recommend care conference. Operative Note      Patient: Mariel Laws. YOB: 1971  MRN: [id number]    Date of Procedure: 5/25/2023    Pre-Op Diagnosis Codes:     * Rectal bleeding [K62.5]    Post-Op Diagnosis:  Postpolypectomy site clipped with hemoclip x 1, and moderately enlarged external hemorrhoids       Procedure(s):  COLONOSCOPY DIAGNOSTIC, clip    Surgeon(s):  Mir Vega MD    Assistant:   * No surgical staff found *    Anesthesia: Monitor Anesthesia Care    Estimated Blood Loss (mL): Minimal    Complications: None    Specimens:   * No specimens in log *    Implants:  * No implants in log *      Drains: * No LDAs found *    Findings:   1-Normal examined terminal ileum mucosa. 2-Postpolypectomy site in ascending colon with ulcer with flat pigmented spot s/p hemoclip x 1. The rest of the examined colonic mucosa was normal.   3-Moderately enlarged external hemorrhoids noted on retroflexion. RECOMMENDATIONS:  1-AVOID NSAIDS FOR 7 DAYS  2-HIGH FIBER DIET  3-FOLLOW UP WITH PRIMARY GI PHYSICIAN IN 12 WEEKS. Detailed Description of Procedure:   After informed consents was obtained from the patient, the patient was brought to the endoscopy suite. The patient was monitored with EKG, pulse ox, and capnography by Anesthesia. MAC anesthesia was performed for the duration of the procedure. A perianal and digital rectal exam was perform prior to insertion of the scope. The scope was inserted into the rectum and advanced to the terminal ileum. The scope was then withdrawn from the terminal ileum (10 cm proximal to I.C. valve) examining the entire mucosa all the way back to the rectum. The scope was retroflexed in the rectum to view the anorectal junction and distal rectum, anteflexed and removed from the patient. The patient tolerated the procedure well and will be transferred to the recovery room in stable condition.       Electronically signed by Mir Vega MD on 5/25/2023 at 3:21 PM

## 2023-09-27 ENCOUNTER — APPOINTMENT (OUTPATIENT)
Facility: HOSPITAL | Age: 73
End: 2023-09-27
Payer: MEDICARE

## 2023-09-27 ENCOUNTER — HOSPITAL ENCOUNTER (EMERGENCY)
Facility: HOSPITAL | Age: 73
Discharge: HOME | End: 2023-09-27
Attending: EMERGENCY MEDICINE
Payer: MEDICARE

## 2023-09-27 VITALS
HEART RATE: 105 BPM | SYSTOLIC BLOOD PRESSURE: 147 MMHG | OXYGEN SATURATION: 99 % | WEIGHT: 169.53 LBS | RESPIRATION RATE: 16 BRPM | TEMPERATURE: 98.1 F | DIASTOLIC BLOOD PRESSURE: 91 MMHG | BODY MASS INDEX: 29.1 KG/M2

## 2023-09-27 DIAGNOSIS — M16.0 ARTHRITIS OF BOTH HIPS: Primary | ICD-10-CM

## 2023-09-27 DIAGNOSIS — F01.50 VASCULAR DEMENTIA, UNSPECIFIED DEMENTIA SEVERITY, UNSPECIFIED WHETHER BEHAVIORAL, PSYCHOTIC, OR MOOD DISTURBANCE OR ANXIETY (HCC): ICD-10-CM

## 2023-09-27 LAB
ALBUMIN SERPL-MCNC: 4 G/DL (ref 3.5–5)
ALBUMIN/GLOB SERPL: 0.8 (ref 1.1–2.2)
ALP SERPL-CCNC: 73 U/L (ref 45–117)
ALT SERPL-CCNC: 16 U/L (ref 12–78)
AMPHET UR QL SCN: NEGATIVE
ANION GAP SERPL CALC-SCNC: 7 MMOL/L (ref 5–15)
APPEARANCE UR: CLEAR
AST SERPL-CCNC: 16 U/L (ref 15–37)
BACTERIA URNS QL MICRO: NEGATIVE /HPF
BARBITURATES UR QL SCN: NEGATIVE
BASOPHILS # BLD: 0 K/UL (ref 0–0.1)
BASOPHILS NFR BLD: 0 % (ref 0–1)
BENZODIAZ UR QL: NEGATIVE
BILIRUB SERPL-MCNC: 0.7 MG/DL (ref 0.2–1)
BILIRUB UR QL: NEGATIVE
BUN SERPL-MCNC: 18 MG/DL (ref 6–20)
BUN/CREAT SERPL: 16 (ref 12–20)
CALCIUM SERPL-MCNC: 9.8 MG/DL (ref 8.5–10.1)
CANNABINOIDS UR QL SCN: NEGATIVE
CHLORIDE SERPL-SCNC: 107 MMOL/L (ref 97–108)
CO2 SERPL-SCNC: 26 MMOL/L (ref 21–32)
COCAINE UR QL SCN: NEGATIVE
COLOR UR: NORMAL
COMMENT:: NORMAL
CREAT SERPL-MCNC: 1.11 MG/DL (ref 0.55–1.02)
DIFFERENTIAL METHOD BLD: ABNORMAL
EOSINOPHIL # BLD: 0.1 K/UL (ref 0–0.4)
EOSINOPHIL NFR BLD: 1 % (ref 0–7)
EPITH CASTS URNS QL MICRO: NORMAL /LPF
ERYTHROCYTE [DISTWIDTH] IN BLOOD BY AUTOMATED COUNT: 12.9 % (ref 11.5–14.5)
ETHANOL SERPL-MCNC: <10 MG/DL (ref 0–0.08)
GLOBULIN SER CALC-MCNC: 5.1 G/DL (ref 2–4)
GLUCOSE SERPL-MCNC: 154 MG/DL (ref 65–100)
GLUCOSE UR STRIP.AUTO-MCNC: NEGATIVE MG/DL
HCT VFR BLD AUTO: 41.7 % (ref 35–47)
HGB BLD-MCNC: 13.7 G/DL (ref 11.5–16)
HGB UR QL STRIP: NEGATIVE
HYALINE CASTS URNS QL MICRO: NORMAL /LPF (ref 0–2)
IMM GRANULOCYTES # BLD AUTO: 0 K/UL (ref 0–0.04)
IMM GRANULOCYTES NFR BLD AUTO: 0 % (ref 0–0.5)
KETONES UR QL STRIP.AUTO: NEGATIVE MG/DL
LEUKOCYTE ESTERASE UR QL STRIP.AUTO: NEGATIVE
LYMPHOCYTES # BLD: 2 K/UL (ref 0.8–3.5)
LYMPHOCYTES NFR BLD: 17 % (ref 12–49)
Lab: NORMAL
MAGNESIUM SERPL-MCNC: 2.1 MG/DL (ref 1.6–2.4)
MCH RBC QN AUTO: 30.9 PG (ref 26–34)
MCHC RBC AUTO-ENTMCNC: 32.9 G/DL (ref 30–36.5)
MCV RBC AUTO: 93.9 FL (ref 80–99)
METHADONE UR QL: NEGATIVE
MONOCYTES # BLD: 0.6 K/UL (ref 0–1)
MONOCYTES NFR BLD: 5 % (ref 5–13)
NEUTS SEG # BLD: 8.5 K/UL (ref 1.8–8)
NEUTS SEG NFR BLD: 77 % (ref 32–75)
NITRITE UR QL STRIP.AUTO: NEGATIVE
NRBC # BLD: 0 K/UL (ref 0–0.01)
NRBC BLD-RTO: 0 PER 100 WBC
OPIATES UR QL: NEGATIVE
PCP UR QL: NEGATIVE
PH UR STRIP: 5.5 (ref 5–8)
PLATELET # BLD AUTO: 242 K/UL (ref 150–400)
PMV BLD AUTO: 10.6 FL (ref 8.9–12.9)
POTASSIUM SERPL-SCNC: 4.1 MMOL/L (ref 3.5–5.1)
PROT SERPL-MCNC: 9.1 G/DL (ref 6.4–8.2)
PROT UR STRIP-MCNC: NEGATIVE MG/DL
RBC # BLD AUTO: 4.44 M/UL (ref 3.8–5.2)
RBC #/AREA URNS HPF: NORMAL /HPF (ref 0–5)
SODIUM SERPL-SCNC: 140 MMOL/L (ref 136–145)
SP GR UR REFRACTOMETRY: 1.01 (ref 1–1.03)
SPECIMEN HOLD: NORMAL
URINE CULTURE IF INDICATED: NORMAL
UROBILINOGEN UR QL STRIP.AUTO: 1 EU/DL (ref 0.2–1)
WBC # BLD AUTO: 11.2 K/UL (ref 3.6–11)
WBC URNS QL MICRO: NORMAL /HPF (ref 0–4)

## 2023-09-27 PROCEDURE — 80053 COMPREHEN METABOLIC PANEL: CPT

## 2023-09-27 PROCEDURE — 82077 ASSAY SPEC XCP UR&BREATH IA: CPT

## 2023-09-27 PROCEDURE — 36415 COLL VENOUS BLD VENIPUNCTURE: CPT

## 2023-09-27 PROCEDURE — 85025 COMPLETE CBC W/AUTO DIFF WBC: CPT

## 2023-09-27 PROCEDURE — 6370000000 HC RX 637 (ALT 250 FOR IP)

## 2023-09-27 PROCEDURE — 71045 X-RAY EXAM CHEST 1 VIEW: CPT

## 2023-09-27 PROCEDURE — 83735 ASSAY OF MAGNESIUM: CPT

## 2023-09-27 PROCEDURE — 80307 DRUG TEST PRSMV CHEM ANLYZR: CPT

## 2023-09-27 PROCEDURE — 81001 URINALYSIS AUTO W/SCOPE: CPT

## 2023-09-27 PROCEDURE — 73502 X-RAY EXAM HIP UNI 2-3 VIEWS: CPT

## 2023-09-27 PROCEDURE — 70450 CT HEAD/BRAIN W/O DYE: CPT

## 2023-09-27 PROCEDURE — 99284 EMERGENCY DEPT VISIT MOD MDM: CPT

## 2023-09-27 RX ORDER — LIDOCAINE 4 G/G
1 PATCH TOPICAL DAILY
Status: DISCONTINUED | OUTPATIENT
Start: 2023-09-27 | End: 2023-09-27 | Stop reason: HOSPADM

## 2023-09-27 RX ORDER — DIVALPROEX SODIUM 250 MG/1
250 TABLET, DELAYED RELEASE ORAL EVERY 12 HOURS
Qty: 90 TABLET | Refills: 4 | Status: SHIPPED | OUTPATIENT
Start: 2023-09-27 | End: 2023-10-27

## 2023-09-27 RX ORDER — QUETIAPINE FUMARATE 25 MG/1
25 TABLET, FILM COATED ORAL 2 TIMES DAILY
Qty: 60 TABLET | Refills: 4 | Status: SHIPPED | OUTPATIENT
Start: 2023-09-27

## 2023-09-27 RX ORDER — LOSARTAN POTASSIUM AND HYDROCHLOROTHIAZIDE 25; 100 MG/1; MG/1
1 TABLET ORAL DAILY
COMMUNITY

## 2023-09-27 RX ORDER — AMLODIPINE BESYLATE 10 MG/1
10 TABLET ORAL DAILY
COMMUNITY

## 2023-09-27 RX ORDER — TRAZODONE HYDROCHLORIDE 50 MG/1
50 TABLET ORAL NIGHTLY
Qty: 30 TABLET | Refills: 0 | Status: SHIPPED | OUTPATIENT
Start: 2023-09-27 | End: 2023-10-27

## 2023-09-27 NOTE — ED TRIAGE NOTES
Son brings in patient for complaints of patient possibly having dementia. Son states that patient has been leaving the house. Son states that patient has a nurse that comes into the house and told patient to follow up with PCP about concerns, son states that PCP is not concerned for dementia. Patient saw PCP on 9/26. Reports that son stays with patient at night time, patient is alone during the day. Son is unsure of how long symptoms have been going on. Pt reports right sided hip pain that radiates into her leg.

## 2023-09-27 NOTE — ED NOTES
Patient does not appear to be in any acute distress/shows no evidence of clinical instability at this time. Provider has reviewed discharge instructions with the patient/family. The patient/family verbalized understanding instructions as well as need for follow up for any further symptoms. Discharge papers given, education provided, and any questions answered. Patient discharged by provider.       Opal Franco RN  09/27/23 [id number]

## 2023-09-27 NOTE — CARE COORDINATION
CARE MANAGEMENT NOTE      CM received consult for [address] [place], [place] [address] services. CM coordinated with MD and nurse to determine [facility]. MD clarified that family was interested in personal care due to Pt's dementia and concerns for leaving the house. CM met with Pt and son in room. CM determined by speaking with son that Pt has Medicaid. Son also reports that his spouse is Pt's paid caregiver from 4p-8p daily. Son is interested in increasing Medicaid caregiver hours. CM encouraged son to reach out to Pt's personal care agency or [facility] to determine if Pt can obtain more hours and/or what needs to be done to enroll Pt. CM provided son with [address] [place] [address] Dr Hughes contact number. Son voiced understanding. Son asking about completing POA paperwork with Pt. CM recommended son speak to PCP to determine if Pt is able to sign legal documents. If Pt is unable to sign legal documents, Pt is not able to complete POA paperwork and son would need to obtain guardianship. CM suggested speaking to [address] [place] [address] Dr Hughes to determine if they have any programs to help family obtain POA or guardianship. CM also provided son with contact information for [facility] who might be able to assist or guide son with legal services. Son voiced understanding.      BRYAN updated MD.     _____________________________________  Jeffrey Saleem, [address] Management   Available via [facility]  9/27/2023   1:26 PM

## 2023-09-27 NOTE — ED PROVIDER NOTES
[facility] EMERGENCY DEPT  EMERGENCY DEPARTMENT ENCOUNTER      Pt Name: Shana Dai  MRN: [id number]  [address] [facility] 1950  Date of evaluation: 9/27/2023  Provider: Yonathan Pike MD    CHIEF COMPLAINT       Chief Complaint   Patient presents with    Memory Loss         HISTORY OF PRESENT ILLNESS   (Location/Symptom, Timing/Onset, Context/Setting, Quality, Duration, Modifying Factors, Severity)  Note limiting factors. Patient is a 15-year-old female with past medical history significant for diabetes, hypertension, seizure disorder and vascular dementia who presents with her son for evaluation of behavioral disturbances. Son at bedside states that he recently took over helping to care for her. Per chart as of a couple years ago patient was living with a daughter however the son notes that this daughter is not really in the picture anymore and despite living in [facility] does not dissipate in caring for her mother anymore. He states he is just starting to  the pieces and try to figure out who to have her see and had to get her help. He states he is not looking for her to be placed in a facility however is looking for help to get increased care at home. He states that during the day he brings her food 3 times a day however there are hours during the periods when he is at work that no one is with her. Patient also notes hip pain but denies any recent falls. The history is provided by the patient and a relative. History limited by: Dementia. Review of External Medical Records:     Nursing Notes were reviewed. REVIEW OF SYSTEMS    (2-9 systems for level 4, 10 or more for level 5)     Review of Systems   Unable to perform ROS: Dementia       Except as noted above the remainder of the review of systems was reviewed and negative.        PAST MEDICAL HISTORY     Past Medical History:   Diagnosis Date    Anxiety and depression     Diabetes ([address])     Hypertension     Obese     Seizure disorder ([address]) Pankaj Navarrete [address] [facility]  [phone]    Schedule an appointment as soon as possible for a visit       Kelli Gordon, [address] [facility]  [address]  [address] [facility]  [phone]    Schedule an appointment as soon as possible for a visit   As needed      DISCHARGE MEDICATIONS:  Discharge Medication List as of 9/27/2023 12:55 PM            (Please note that portions of this note were completed with a voice recognition program.  Efforts were made to edit the dictations but occasionally words are mis-transcribed.)    Todd Garcia MD (electronically signed)  Emergency Attending Physician            Todd Garcia MD  09/27/23 1160

## 2023-09-27 NOTE — ED TRIAGE NOTES
Patient presents with her son, who provides majority of the history given patient's limited insight and poor recall. Patient's son reports that memory changes have been ongoing times months, but have been getting progressively worse. Yesterday, the patient left the house alone and police were activated in order to bring her back. She lives at home by herself. Family tries to alternate that somebody is with her at all times, however there are often periods of time throughout the day that the patient is unaccompanied. Son brought patient to the PCP yesterday and was told \"nothing is wrong\". He is here with the goal of figuring out what is going on. Also complaining of right hip/leg pain x weeks. No known falls but could have unwitnessed falls. Rx'd meloxicam yesterday without improvement of pain. PCP note \"the patient's dementia is longstanding and is due to microvascular changes. Has been hospitalized with AMS in the past. She is a recovering alcoholic which has contributed to memory issues. \"    10:26 AM  I have evaluated the patient as the Provider in Rapid Medical Evaluation (RME). I have reviewed her vital signs and the triage nurse assessment. I have talked with the patient and any available family and advised that I am the provider in triage and have ordered the appropriate study to initiate their work up based on the clinical presentation during my assessment. I have advised that the patient will be accommodated in the Main ED as soon as possible. I have also requested to contact the triage nurse or myself immediately if the patient experiences any changes in their condition during this brief waiting period.   Ajay Brewer PA-C

## 2024-03-08 ENCOUNTER — HOSPITAL ENCOUNTER (EMERGENCY)
Facility: HOSPITAL | Age: 74
Discharge: HOME | End: 2024-03-08
Attending: EMERGENCY MEDICINE
Payer: MEDICARE

## 2024-03-08 ENCOUNTER — APPOINTMENT (OUTPATIENT)
Facility: HOSPITAL | Age: 74
End: 2024-03-08
Payer: MEDICARE

## 2024-03-08 VITALS
RESPIRATION RATE: 18 BRPM | TEMPERATURE: 97.9 F | SYSTOLIC BLOOD PRESSURE: 105 MMHG | BODY MASS INDEX: 27.36 KG/M2 | HEIGHT: 66 IN | HEART RATE: 90 BPM | DIASTOLIC BLOOD PRESSURE: 63 MMHG | OXYGEN SATURATION: 99 %

## 2024-03-08 DIAGNOSIS — M54.50 ACUTE LEFT-SIDED LOW BACK PAIN WITHOUT SCIATICA: Primary | ICD-10-CM

## 2024-03-08 LAB
ALBUMIN SERPL-MCNC: 3.5 G/DL (ref 3.5–5)
ALBUMIN/GLOB SERPL: 0.8 (ref 1.1–2.2)
ALP SERPL-CCNC: 56 U/L (ref 45–117)
ALT SERPL-CCNC: 7 U/L (ref 12–78)
ANION GAP SERPL CALC-SCNC: 3 MMOL/L (ref 5–15)
AST SERPL-CCNC: 11 U/L (ref 15–37)
BASOPHILS # BLD: 0 K/UL (ref 0–0.1)
BASOPHILS NFR BLD: 0 % (ref 0–1)
BILIRUB SERPL-MCNC: 0.4 MG/DL (ref 0.2–1)
BUN SERPL-MCNC: 42 MG/DL (ref 6–20)
BUN/CREAT SERPL: 23 (ref 12–20)
CALCIUM SERPL-MCNC: 9.5 MG/DL (ref 8.5–10.1)
CHLORIDE SERPL-SCNC: 104 MMOL/L (ref 97–108)
CO2 SERPL-SCNC: 29 MMOL/L (ref 21–32)
CREAT SERPL-MCNC: 1.81 MG/DL (ref 0.55–1.02)
DIFFERENTIAL METHOD BLD: ABNORMAL
EOSINOPHIL # BLD: 0.1 K/UL (ref 0–0.4)
EOSINOPHIL NFR BLD: 2 % (ref 0–7)
ERYTHROCYTE [DISTWIDTH] IN BLOOD BY AUTOMATED COUNT: 11.9 % (ref 11.5–14.5)
GLOBULIN SER CALC-MCNC: 4.4 G/DL (ref 2–4)
GLUCOSE SERPL-MCNC: 131 MG/DL (ref 65–100)
HCT VFR BLD AUTO: 33.8 % (ref 35–47)
HGB BLD-MCNC: 11.5 G/DL (ref 11.5–16)
IMM GRANULOCYTES # BLD AUTO: 0 K/UL (ref 0–0.04)
IMM GRANULOCYTES NFR BLD AUTO: 0 % (ref 0–0.5)
LYMPHOCYTES # BLD: 1.3 K/UL (ref 0.8–3.5)
LYMPHOCYTES NFR BLD: 19 % (ref 12–49)
MCH RBC QN AUTO: 32.2 PG (ref 26–34)
MCHC RBC AUTO-ENTMCNC: 34 G/DL (ref 30–36.5)
MCV RBC AUTO: 94.7 FL (ref 80–99)
MONOCYTES # BLD: 0.5 K/UL (ref 0–1)
MONOCYTES NFR BLD: 7 % (ref 5–13)
NEUTS SEG # BLD: 4.9 K/UL (ref 1.8–8)
NEUTS SEG NFR BLD: 72 % (ref 32–75)
NRBC # BLD: 0 K/UL (ref 0–0.01)
NRBC BLD-RTO: 0 PER 100 WBC
PLATELET # BLD AUTO: 173 K/UL (ref 150–400)
PMV BLD AUTO: 11 FL (ref 8.9–12.9)
POTASSIUM SERPL-SCNC: 3.6 MMOL/L (ref 3.5–5.1)
PROT SERPL-MCNC: 7.9 G/DL (ref 6.4–8.2)
RBC # BLD AUTO: 3.57 M/UL (ref 3.8–5.2)
SODIUM SERPL-SCNC: 136 MMOL/L (ref 136–145)
WBC # BLD AUTO: 6.8 K/UL (ref 3.6–11)

## 2024-03-08 PROCEDURE — 99284 EMERGENCY DEPT VISIT MOD MDM: CPT

## 2024-03-08 PROCEDURE — 36415 COLL VENOUS BLD VENIPUNCTURE: CPT

## 2024-03-08 PROCEDURE — 80053 COMPREHEN METABOLIC PANEL: CPT

## 2024-03-08 PROCEDURE — 74176 CT ABD & PELVIS W/O CONTRAST: CPT

## 2024-03-08 PROCEDURE — 85025 COMPLETE CBC W/AUTO DIFF WBC: CPT

## 2024-03-08 RX ORDER — NAPROXEN 500 MG/1
500 TABLET ORAL EVERY 12 HOURS PRN
Qty: 20 TABLET | Refills: 0 | Status: SHIPPED | OUTPATIENT
Start: 2024-03-08

## 2024-03-08 RX ORDER — LIDOCAINE 50 MG/G
1 PATCH TOPICAL DAILY
Qty: 10 PATCH | Refills: 0 | Status: SHIPPED | OUTPATIENT
Start: 2024-03-08 | End: 2024-03-18

## 2024-03-08 ASSESSMENT — PAIN DESCRIPTION - ORIENTATION: ORIENTATION: RIGHT

## 2024-03-08 ASSESSMENT — PAIN - FUNCTIONAL ASSESSMENT: PAIN_FUNCTIONAL_ASSESSMENT: 0-10

## 2024-03-08 ASSESSMENT — PAIN DESCRIPTION - LOCATION: LOCATION: BACK;LEG

## 2024-03-08 ASSESSMENT — PAIN SCALES - GENERAL: PAINLEVEL_OUTOF10: 5

## 2024-03-08 ASSESSMENT — PAIN DESCRIPTION - DESCRIPTORS: DESCRIPTORS: ACHING

## 2024-03-09 NOTE — ED PROVIDER NOTES
[facility] EMERGENCY DEPT  EMERGENCY DEPARTMENT ENCOUNTER      Pt Name: Peggy Sheehan  MRN: [id number]  Birthdate 1950  Date of evaluation: 3/8/2024  Provider: Gianluca Daniels MD      HISTORY OF PRESENT ILLNESS      73-year-old female with history of anxiety, diabetes, hypertension, prior stroke presents to the emergency department family with concern for left-sided flank and back pain radiating down her left leg.  She reports that she has pain radiating to the back of her left leg.  Pain started today.  She denies any history of sciatica or lower back problems.  There is no injury.  No fevers.    The history is provided by the patient, medical records and a relative.           Nursing Notes were reviewed.    REVIEW OF SYSTEMS         Review of Systems        PAST MEDICAL HISTORY     Past Medical History:   Diagnosis Date    Anxiety and depression     Diabetes (HCC)     Hypertension     Obese     Seizure disorder (HCC)          SURGICAL HISTORY     No past surgical history on file.      CURRENT MEDICATIONS       Previous Medications    ACETAMINOPHEN (TYLENOL) 325 MG TABLET    Take 650 mg by mouth every 6 hours as needed    AMLODIPINE (NORVASC) 10 MG TABLET    Take 1 tablet by mouth daily    ASPIRIN 81 MG EC TABLET    Take 81 mg by mouth daily    ATORVASTATIN (LIPITOR) 20 MG TABLET    Take 20 mg by mouth daily    CYANOCOBALAMIN 1000 MCG TABLET    Take 1,000 mcg by mouth daily    DIVALPROEX (DEPAKOTE) 250 MG DR TABLET    Take 1 tablet by mouth in the morning and 1 tablet in the evening.    ERGOCALCIFEROL (ERGOCALCIFEROL) 1.25 MG (36762 UT) CAPSULE    Take 50,000 Units by mouth every 7 days    LOSARTAN-HYDROCHLOROTHIAZIDE (HYZAAR) 100-25 MG PER TABLET    Take 1 tablet by mouth daily    MAGNESIUM OXIDE (MAG-OX) 400 MG TABLET    Take 400 mg by mouth daily    MELATONIN 3 MG CAPS    Take 1 capsule by mouth nightly    PANTOPRAZOLE (PROTONIX) 40 MG TABLET    Take 40 mg by mouth daily    QUETIAPINE (SEROQUEL) 25 MG

## 2024-03-09 NOTE — ED TRIAGE NOTES
Pt's visitor reports pt c/o pain in R leg radiating up to her lower back starting today. Pt's visitor reports hx strokes and kidney issues. Pt's visitor reports pt is at her baseline mentation and speech due to previous strokes

## 2024-04-20 NOTE — PROGRESS NOTES
OCCUPATIONAL THERAPY EVALUATION/DISCHARGE  Patient: Yaquelin Vera (89 y.o. female)  Date: 6/15/2021  Primary Diagnosis: AMS (altered mental status) [R41.82]       Precautions:   Fall    ASSESSMENT  Based on the objective data described below, the patient presents with continued confusion and decreased safety awareness but followed commands well enough for toileting and grooming at the sink. She progressed to the bathroom with supervision and did well with all activities. She has poor safety awareness overall but this is her baseline mentation. She mobilized to the bathroom with supervision without LOB. She does have an unsteady gait pattern but anticipate this is her baseline due to h/o multiple CVA's. Per notes, she lives with her family who brought her in. Pt does not require acute care services at this time as she is physically operating at her baseline. She will require supervision and assistance at discharge due to mentation. Pt is cleared for discharge home. Recommend home with support from family/friends as needed with basic ADL and IADL activities. Pt will require 24/7 supervision and assistance at home to maintain safety with all functional activities including but not limited to bathing, dressing, and toileting. Current Level of Function (ADLs/self-care): supervision    Functional Outcome Measure: The patient scored 60 on the Barthel Index outcome measure which is indicative of 40% impairment with ADL activities. Other factors to consider for discharge: cognition     PLAN :  Recommend with staff: OOB to chair as tolerated, progress to the bathroom    Recommendation for discharge: (in order for the patient to meet his/her long term goals)  No skilled occupational therapy/ follow up rehabilitation needs identified at this time.     This discharge recommendation:  Has been made in collaboration with the attending provider and/or case management    IF patient discharges home will need the following DME: None       SUBJECTIVE:   Patient stated I ain't got no money.     OBJECTIVE DATA SUMMARY:   HISTORY:   Past Medical History:   Diagnosis Date    Anxiety and depression     Diabetes ([facility] [address]     Hypertension     Obese     Seizure disorder ([facility] [address]    No past surgical history on file. Prior Level of Function/Environment/Context: Pt has assistance at home per notes. Expanded or extensive additional review of patient history:   Home Situation  Home Environment: Private residence  Current DME Used/Available at Home:  (unable to provide any information about PLOF)    Hand dominance: Right    EXAMINATION OF PERFORMANCE DEFICITS:  Cognitive/Behavioral Status:  Neurologic State: Alert;Confused  Orientation Level: Oriented to person;Disoriented to time;Disoriented to place; Disoriented to situation  Cognition: Poor safety awareness  Perception: Appears intact  Perseveration: Perseverates during ADLS;Perseverates during conversation  Safety/Judgement: Decreased awareness of environment;Decreased awareness of need for safety;Decreased awareness of need for assistance;Decreased insight into deficits    Skin: see nursing notes    Edema: none noted    Hearing: Auditory  Auditory Impairment: None    Vision/Perceptual:                           Acuity: Within Defined Limits         Range of Motion:    AROM: Within functional limits  PROM: Within functional limits                      Strength:    Strength: Within functional limits                Coordination:  Coordination: Within functional limits  Fine Motor Skills-Upper: Left Intact; Right Intact    Gross Motor Skills-Upper: Left Intact; Right Intact    Tone & Sensation:    Tone: Normal  Sensation: Intact                      Balance:  Sitting: Intact  Standing: Intact    Functional Mobility and Transfers for ADLs:  Bed Mobility:  Rolling: Supervision  Supine to Sit: Supervision  Sit to Supine: Supervision  Scooting: Supervision    Transfers:  Sit to Stand: Supervision  Stand to Sit: Supervision  Bed to Chair: Supervision  Bathroom Mobility: Minimum assistance  Toilet Transfer : Supervision    ADL Assessment:  Feeding: Independent    Oral Facial Hygiene/Grooming: Supervision (standing at the sink)    Bathing: Setup (requires directions)    Upper Body Dressing: Setup    Lower Body Dressing: Setup    Toileting: Setup                ADL Intervention and task modifications:     Progressed OOB to bathroom for toileting and grooming at the sink. No LOB with activities. She does sway when walking but this appears to be her baseline. She bent over and picked up paper towels from the floor that she dropped without difficulty. She returned to EOB to eat lunch. She feeds herself independently. Cognitive Retraining  Safety/Judgement: Decreased awareness of environment;Decreased awareness of need for safety;Decreased awareness of need for assistance;Decreased insight into deficits    Functional Measure:  Barthel Index:    Bathin  Bladder: 10  Bowels: 10  Groomin  Dressin  Feeding: 10  Mobility: 5  Stairs: 0  Toilet Use: 5  Transfer (Bed to Chair and Back): 10  Total: 60/100        The Barthel ADL Index: Guidelines  1. The index should be used as a record of what a patient does, not as a record of what a patient could do. 2. The main aim is to establish degree of independence from any help, physical or verbal, however minor and for whatever reason. 3. The need for supervision renders the patient not independent. 4. A patient's performance should be established using the best available evidence. Asking the patient, friends/relatives and nurses are the usual sources, but direct observation and common sense are also important. However direct testing is not needed. 5. Usually the patient's performance over the preceding 24-48 hours is important, but occasionally longer periods will be relevant.   6. Middle categories imply that the patient supplies over 50 per cent of the effort. 7. Use of aids to be independent is allowed. Tylor Byrne., Barthel, DShakaW. ([id number]). Functional evaluation: the Barthel Index. [address] [facility] (14)2. KYLAH Cedeño, Olga Parrish., Merline Sexton., Shannan, [address] [facility] (1999). Measuring the change indisability after inpatient rehabilitation; comparison of the responsiveness of the Barthel Index and Functional Sherman Measure. Journal of Neurology, Neurosurgery, and Psychiatry, 66(4), 702-548. Teresa Ryder, N.J.A, [place],  LESTER.IVETTE, & Debbie Vaughan M.A. (2004.) Assessment of post-stroke quality of life in cost-effectiveness studies: The usefulness of the Barthel Index and the EuroQoL-5D. Quality of Life Research, 15, 932-00         Occupational Therapy Evaluation Charge Determination   History Examination Decision-Making   LOW Complexity : Brief history review  LOW Complexity : 1-3 performance deficits relating to physical, cognitive , or psychosocial skils that result in activity limitations and / or participation restrictions  LOW Complexity : No comorbidities that affect functional and no verbal or physical assistance needed to complete eval tasks       Based on the above components, the patient evaluation is determined to be of the following complexity level: LOW   Pain Rating:  No pain    Activity Tolerance:   Good    After treatment patient left in no apparent distress:    EOB with sitter in the room    COMMUNICATION/EDUCATION:   The patients plan of care was discussed with: Physical therapist and Registered nurse.      Thank you for this referral.  Shelby Greenwood OT  Time Calculation: 15 mins  used

## 2024-12-15 ENCOUNTER — APPOINTMENT (OUTPATIENT)
Dept: VASCULAR SURGERY | Facility: HOSPITAL | Age: 74
End: 2024-12-15
Attending: EMERGENCY MEDICINE
Payer: MEDICARE

## 2024-12-15 ENCOUNTER — HOSPITAL ENCOUNTER (EMERGENCY)
Facility: HOSPITAL | Age: 74
Discharge: HOME | End: 2024-12-15
Attending: EMERGENCY MEDICINE
Payer: MEDICARE

## 2024-12-15 VITALS
SYSTOLIC BLOOD PRESSURE: 142 MMHG | OXYGEN SATURATION: 100 % | TEMPERATURE: 97.8 F | DIASTOLIC BLOOD PRESSURE: 81 MMHG | BODY MASS INDEX: 25.71 KG/M2 | HEART RATE: 80 BPM | WEIGHT: 160 LBS | RESPIRATION RATE: 17 BRPM | HEIGHT: 66 IN

## 2024-12-15 DIAGNOSIS — M79.604 BILATERAL LEG PAIN: Primary | ICD-10-CM

## 2024-12-15 DIAGNOSIS — M79.605 BILATERAL LEG PAIN: Primary | ICD-10-CM

## 2024-12-15 PROCEDURE — 6370000000 HC RX 637 (ALT 250 FOR IP): Performed by: EMERGENCY MEDICINE

## 2024-12-15 PROCEDURE — 99284 EMERGENCY DEPT VISIT MOD MDM: CPT

## 2024-12-15 PROCEDURE — 93970 EXTREMITY STUDY: CPT

## 2024-12-15 RX ORDER — ACETAMINOPHEN 500 MG
1000 TABLET ORAL
Status: COMPLETED | OUTPATIENT
Start: 2024-12-15 | End: 2024-12-15

## 2024-12-15 RX ADMIN — ACETAMINOPHEN 1000 MG: 500 TABLET ORAL at 15:24

## 2024-12-15 ASSESSMENT — PAIN SCALES - PAIN ASSESSMENT IN ADVANCED DEMENTIA (PAINAD)
BODYLANGUAGE: RELAXED
CONSOLABILITY: NO NEED TO CONSOLE
TOTALSCORE: 0
TOTALSCORE: 0
FACIALEXPRESSION: SMILING OR INEXPRESSIVE
BREATHING: NORMAL
CONSOLABILITY: NO NEED TO CONSOLE
BREATHING: NORMAL
FACIALEXPRESSION: SMILING OR INEXPRESSIVE

## 2024-12-15 ASSESSMENT — PAIN - FUNCTIONAL ASSESSMENT: PAIN_FUNCTIONAL_ASSESSMENT: PAIN ASSESSMENT IN ADVANCED DEMENTIA (PAINAD)

## 2024-12-15 NOTE — ED TRIAGE NOTES
Patient ambulatory to triage with c/o bilateral leg pain radiating from hip down.     Son states sx present for past few weeks.     Denies fall/injury.     Hx of Alzheimer's per son.

## 2024-12-15 NOTE — ED PROVIDER NOTES
[facility] EMERGENCY DEPT  EMERGENCY DEPARTMENT ENCOUNTER      Patient Name: Peggy Sheehan  MRN: [id number]  Birthdate 1950  Date of Evaluation: 12/15/2024  Physician: Lambert Red MD    CHIEF COMPLAINT       Chief Complaint   Patient presents with    Leg Pain       HISTORY OF PRESENT ILLNESS   (Location/Symptom, Timing/Onset, Context/Setting, Quality, Duration, Modifying Factors, Severity)   Peggy Sheehan, 73 y.o., female     The patient is a 73-year-old female presenting with bilateral leg pain that has been ongoing for the past several weeks. The pain is described as aching and is present in both legs, though it is not localized to any specific area. The patient denies any trauma or injury to the legs and reports no history of deep vein thrombosis (DVT).    She has a history of gout, but she has not noticed any associated swelling, redness, or painful joints that would suggest a gout flare. The pain is persistent, though it does not seem to worsen with activity or weight-bearing, and she reports no changes in the appearance of her legs, such as bruising or skin discoloration.    The patient is concerned about the pain and is seeking further evaluation but does not identify any clear triggers or risk factors for her symptoms.            Nursing Notes were reviewed.    REVIEW OF SYSTEMS    (Not required)   Review of Systems    Except as noted above the remainder of the review of systems was reviewed and negative.     PAST MEDICAL HISTORY     Past Medical History:   Diagnosis Date    Anxiety and depression     Diabetes (HCC)     Hypertension     Obese     Seizure disorder (HCC)        SURGICAL HISTORY     No past surgical history on file.    CURRENT MEDICATIONS       Discharge Medication List as of 12/15/2024  3:56 PM        CONTINUE these medications which have NOT CHANGED    Details   naproxen (NAPROSYN) 500 MG tablet Take 1 tablet by mouth every 12 hours as needed for Pain, Disp-20 tablet, R-0Normal      traZODone

## 2024-12-16 LAB — ECHO BSA: 1.84 M2

## (undated) DEVICE — BAG SPEC BIOHZRD 10 X 10 IN --

## (undated) DEVICE — SOLIDIFIER MEDC 1200ML -- CONVERT TO 356117

## (undated) DEVICE — BITEBLOCK ENDOSCP 60FR MAXI WHT POLYETH STURDY W/ VELC WVN

## (undated) DEVICE — FORCEPS BX L240CM JAW DIA2.8MM L CAP W/ NDL MIC MESH TOOTH

## (undated) DEVICE — KIT COLON W/ 1.1OZ LUB AND 2 END

## (undated) DEVICE — 1200 GUARD II KIT W/5MM TUBE W/O VAC TUBE: Brand: GUARDIAN

## (undated) DEVICE — WORKING LENGTH 235CM, WORKING CHANNEL 2.8MM: Brand: RESOLUTION 360 CLIP